# Patient Record
Sex: MALE | Race: WHITE | NOT HISPANIC OR LATINO | Employment: FULL TIME | URBAN - METROPOLITAN AREA
[De-identification: names, ages, dates, MRNs, and addresses within clinical notes are randomized per-mention and may not be internally consistent; named-entity substitution may affect disease eponyms.]

---

## 2017-01-02 PROBLEM — Z48.298 AFTERCARE FOLLOWING ORGAN TRANSPLANT: Status: ACTIVE | Noted: 2017-01-02

## 2017-01-03 ENCOUNTER — TELEPHONE (OUTPATIENT)
Dept: TRANSPLANT | Facility: CLINIC | Age: 47
End: 2017-01-03

## 2017-01-03 DIAGNOSIS — Z94.0 KIDNEY REPLACED BY TRANSPLANT: Primary | ICD-10-CM

## 2017-01-03 NOTE — Clinical Note
PHYSICIAN ORDERS      DATE & TIME ISSUED: 2017 11:13 AM  PATIENT NAME: Raj Pierce   : 1970     [facility] MR# [if applicable]: [id number]     DIAGNOSIS:  Kidney Transplant  ICD-10 CODE: Z94.0     Please complete the following labs:  Magnesium  PTH  Vitamin D    Any questions please call: [phone]    Please fax these results to [phone].    .

## 2017-01-03 NOTE — TELEPHONE ENCOUNTER
ISSUE:  Per Dr. Espinoza, pt was to have a bilateral native kidney and transplant kidney ultrasound to rule out RCC, has not been done.   Pt also needs additional f/u labs: PTH, Vit D, Mg.     PLAN:   Will send to scheduling.   Please send additional lab orders to outpt lab.

## 2017-01-10 ENCOUNTER — TELEPHONE (OUTPATIENT)
Dept: TRANSPLANT | Facility: CLINIC | Age: 47
End: 2017-01-10

## 2017-01-10 NOTE — TELEPHONE ENCOUNTER
Pt does not want to come to [place] for US.     Phone call to pt to determine where he'd like to schedule US and writer will send orders: KIERSTEN Left.

## 2017-01-11 NOTE — TELEPHONE ENCOUNTER
Callback: VM left. [facility] is trying to contact pt per Dr. Espinoza referral for pt to follow locally. Will work on coordinating this. Return call requested.

## 2017-01-12 DIAGNOSIS — Z94.0 KIDNEY REPLACED BY TRANSPLANT: Primary | ICD-10-CM

## 2017-01-12 NOTE — PROGRESS NOTES
Discussed with pt [facility] attempting ot get a hold of him. He states he's aware.     We discussed indications of being seen with them - pt agrees.     Discussed with ISHA Jarquin at [facility] - she is expecting pt call to schedule. Lab and imaging orders faxed along with office visits to [phone]

## 2017-01-12 NOTE — Clinical Note
PHYSICIAN ORDERS      DATE & TIME ISSUED: 2017 10:18 AM  PATIENT NAME: Raj Pierce   : 1970     [facility] MR# [if applicable]: [id number]     DIAGNOSIS:  Kidney transplant status    Please obtain:   Bilateral native kidney Ultrasound  Kidney transplant ultrasound    Any questions please call: [phone]    Please fax these results to [phone].    .

## 2017-01-20 PROCEDURE — 80197 ASSAY OF TACROLIMUS: CPT | Performed by: INTERNAL MEDICINE

## 2017-01-23 LAB
TACROLIMUS BLD-MCNC: 6.4 UG/L (ref 5–15)
TME LAST DOSE: NORMAL H

## 2017-01-24 ENCOUNTER — TELEPHONE (OUTPATIENT)
Dept: TRANSPLANT | Facility: CLINIC | Age: 47
End: 2017-01-24

## 2017-01-24 DIAGNOSIS — Z94.0 KIDNEY TRANSPLANTED: Primary | ICD-10-CM

## 2017-01-24 NOTE — Clinical Note
January 25, 2017    OUTPATIENT LABORATORY TEST ORDER    Patient Name: Raj Pierce  Transplant Date: 10/13/2015 (Kidney)  YOB: 1970                                                                Issue Date & Time:1/25/2017  1:27 PM    [facility] MR:  [id number]  Exp. Date (1 year after date issued)      Diagnoses: Kidney Transplant (ICD-10  Z94.0)   Long term use of medications (ICD-10  Z79.899)     Lab results to be available on the same day drawn.   Patient should release information to the [facility], Transplant Center.  Please fax to the Transplant Center at [phone].    Monthly   ?Hemogram and Platelet  ?Basic Metabolic Panel (Sodium, Potassium, Chloride, CO2, Creatinine, Urea Nitrogen, Glucose, Calcium)          ?/Tacrolimus/Prograf drug level  Every 3 Months Due:                   ?Urine for protein/creatinine  Every 6 months   ?BK (Polyoma Virus) PCR Quantitative - Plasma  Yearly:   ? PRA/DSA level (mailers provided by the patient)    If you have any questions, please call The Transplant Center at [phone] or [phone].    Please fax labs to [phone]  .

## 2017-01-25 NOTE — TELEPHONE ENCOUNTER
Return call: VM left indicating urine protein was reviewed by Dr. Espinoza, no concerns will continue to watch, all labs are stable. Will increase urine protein monitoring to [id number].

## 2017-01-30 DIAGNOSIS — N18.6 ESRD (END STAGE RENAL DISEASE) (H): ICD-10-CM

## 2017-01-30 DIAGNOSIS — Z76.82 AWAITING ORGAN TRANSPLANT: Primary | ICD-10-CM

## 2017-02-03 ENCOUNTER — TRANSFERRED RECORDS (OUTPATIENT)
Dept: HEALTH INFORMATION MANAGEMENT | Facility: CLINIC | Age: 47
End: 2017-02-03

## 2017-02-17 PROCEDURE — 80197 ASSAY OF TACROLIMUS: CPT | Performed by: PATHOLOGY

## 2017-02-21 ENCOUNTER — TELEPHONE (OUTPATIENT)
Dept: TRANSPLANT | Facility: CLINIC | Age: 47
End: 2017-02-21

## 2017-02-21 NOTE — LETTER
January 25, 2017    OUTPATIENT LABORATORY TEST ORDER    Patient Name: Raj Pierce  Transplant Date: 10/13/2015 (Kidney)  YOB: 1970                                                                Issue Date & Time:2/21/2017  1:27 PM    [facility] MR:  [id number]  Exp. Date (1 year after date issued)      Diagnoses: Kidney Transplant (ICD-10  Z94.0)   Long term use of medications (ICD-10  Z79.899)     Lab results to be available on the same day drawn.   Patient should release information to the [facility], Transplant Center.  Please fax to the Transplant Center at [phone].    Monthly   ?Hemogram and Platelet  ?Basic Metabolic Panel (Sodium, Potassium, Chloride, CO2, Creatinine, Urea Nitrogen, Glucose, Calcium)          ?/Tacrolimus/Prograf drug level  Every 3 Months Due:                   ?Urine for protein/creatinine  Every 6 months   ?BK (Polyoma Virus) PCR Quantitative - Plasma  Yearly:   ? PRA/DSA level (mailers provided by the patient)    If you have any questions, please call [facility] at [phone] or [phone].    Please fax labs to [phone]  .

## 2017-02-21 NOTE — TELEPHONE ENCOUNTER
Received a call from a [facility] center at [facility] in [facility]  Caller said that the lab orders were sent to a [phone] Fax - this patient does not go to this place to have labs drawn.  I reviewed patients chart patient have labs done at [place] Fax# is [phone].

## 2017-02-23 DIAGNOSIS — Z94.0 KIDNEY REPLACED BY TRANSPLANT: ICD-10-CM

## 2017-02-23 DIAGNOSIS — Z48.298 AFTERCARE FOLLOWING ORGAN TRANSPLANT: Primary | ICD-10-CM

## 2017-02-23 DIAGNOSIS — Z79.899 ENCOUNTER FOR LONG-TERM CURRENT USE OF MEDICATION: ICD-10-CM

## 2017-02-23 LAB
TACROLIMUS BLD-MCNC: 5.6 UG/L (ref 5–15)
TME LAST DOSE: NORMAL H

## 2017-02-24 ENCOUNTER — TELEPHONE (OUTPATIENT)
Dept: TRANSPLANT | Facility: CLINIC | Age: 47
End: 2017-02-24

## 2017-02-24 NOTE — TELEPHONE ENCOUNTER
ISSUE:  Tac level 5.6 on a 17-hour trough - if true, likely out of range  Only tac level reported, no additional labs    PLAN:   Please call pt and confirm 12-hour trough, dose. If good trough no changes needed, recheck with next labs. If poor trough, please reinforce 12-hour trough and have pt repeat at his convenience.   Please determine where he had labs drawn and obtain results. Thanks.

## 2017-02-24 NOTE — TELEPHONE ENCOUNTER
Left message for patient regarding tac level = 5.6.  Asked patient to return call regarding 12 hour trough level and current dose.

## 2017-03-03 ENCOUNTER — TELEPHONE (OUTPATIENT)
Dept: TRANSPLANT | Facility: CLINIC | Age: 47
End: 2017-03-03

## 2017-03-03 ENCOUNTER — RESULTS ONLY (OUTPATIENT)
Dept: OTHER | Facility: CLINIC | Age: 47
End: 2017-03-03

## 2017-03-03 DIAGNOSIS — N18.6 ESRD (END STAGE RENAL DISEASE) (H): ICD-10-CM

## 2017-03-03 DIAGNOSIS — Z76.82 AWAITING ORGAN TRANSPLANT: ICD-10-CM

## 2017-03-03 PROCEDURE — 86833 HLA CLASS II HIGH DEFIN QUAL: CPT | Performed by: TRANSPLANT SURGERY

## 2017-03-03 PROCEDURE — 86832 HLA CLASS I HIGH DEFIN QUAL: CPT | Performed by: TRANSPLANT SURGERY

## 2017-03-06 LAB — PRA SINGLE ANTIGEN IGG ANTIBODY: NORMAL

## 2017-03-08 DIAGNOSIS — I15.1 HYPERTENSION SECONDARY TO OTHER RENAL DISORDERS: ICD-10-CM

## 2017-03-09 RX ORDER — CHLORTHALIDONE 25 MG/1
TABLET ORAL
Qty: 15 TABLET | Refills: 11 | Status: SHIPPED | OUTPATIENT
Start: 2017-03-09 | End: 2018-04-07

## 2017-03-09 RX ORDER — AMLODIPINE BESYLATE 10 MG/1
TABLET ORAL
Qty: 30 TABLET | Refills: 11 | Status: SHIPPED | OUTPATIENT
Start: 2017-03-09 | End: 2018-04-07

## 2017-03-09 NOTE — TELEPHONE ENCOUNTER
Last Office Visit with Nephrologist:  12/20/16.  Medication refilled per Nephrology Clinic protocol.     Whit Wilkerson RN

## 2017-03-14 LAB
SA1 CELL: NORMAL
SA1 COMMENTS: NORMAL
SA1 HI RISK ABY: NORMAL
SA1 MOD RISK ABY: NORMAL
SA1 TEST METHOD: NORMAL
SA2 CELL: NORMAL
SA2 COMMENTS: NORMAL
SA2 HI RISK ABY UA: NORMAL
SA2 MOD RISK ABY: NORMAL
SA2 TEST METHOD: NORMAL

## 2017-03-17 DIAGNOSIS — Z48.298 AFTERCARE FOLLOWING ORGAN TRANSPLANT: ICD-10-CM

## 2017-03-17 DIAGNOSIS — Z94.0 KIDNEY REPLACED BY TRANSPLANT: ICD-10-CM

## 2017-03-17 DIAGNOSIS — Z79.899 ENCOUNTER FOR LONG-TERM CURRENT USE OF MEDICATION: ICD-10-CM

## 2017-03-24 ENCOUNTER — HOSPITAL ENCOUNTER (OUTPATIENT)
Facility: CLINIC | Age: 47
Setting detail: SPECIMEN
Discharge: HOME | End: 2017-03-24
Admitting: INTERNAL MEDICINE
Payer: MEDICARE

## 2017-03-24 PROCEDURE — 80197 ASSAY OF TACROLIMUS: CPT | Performed by: INTERNAL MEDICINE

## 2017-03-26 LAB
TACROLIMUS BLD-MCNC: 6.6 UG/L (ref 5–15)
TME LAST DOSE: NORMAL H

## 2017-03-31 DIAGNOSIS — N25.81 SECONDARY RENAL HYPERPARATHYROIDISM (H): ICD-10-CM

## 2017-03-31 DIAGNOSIS — Z94.0 KIDNEY REPLACED BY TRANSPLANT: ICD-10-CM

## 2017-03-31 RX ORDER — CALCITRIOL 0.25 UG/1
CAPSULE, LIQUID FILLED ORAL
Qty: 12 CAPSULE | Refills: 2 | Status: SHIPPED | OUTPATIENT
Start: 2017-03-31 | End: 2017-07-21

## 2017-04-14 DIAGNOSIS — Z79.899 ENCOUNTER FOR LONG-TERM CURRENT USE OF MEDICATION: ICD-10-CM

## 2017-04-14 DIAGNOSIS — Z48.298 AFTERCARE FOLLOWING ORGAN TRANSPLANT: ICD-10-CM

## 2017-04-14 DIAGNOSIS — Z94.0 KIDNEY REPLACED BY TRANSPLANT: ICD-10-CM

## 2017-04-14 PROCEDURE — 80197 ASSAY OF TACROLIMUS: CPT | Performed by: INTERNAL MEDICINE

## 2017-04-20 LAB
TACROLIMUS BLD-MCNC: 5.5 UG/L (ref 5–15)
TME LAST DOSE: NORMAL H

## 2017-05-26 DIAGNOSIS — Z79.899 ENCOUNTER FOR LONG-TERM CURRENT USE OF MEDICATION: ICD-10-CM

## 2017-05-26 DIAGNOSIS — Z48.298 AFTERCARE FOLLOWING ORGAN TRANSPLANT: ICD-10-CM

## 2017-05-26 DIAGNOSIS — Z94.0 KIDNEY REPLACED BY TRANSPLANT: ICD-10-CM

## 2017-05-26 PROCEDURE — 80197 ASSAY OF TACROLIMUS: CPT | Performed by: INTERNAL MEDICINE

## 2017-06-02 LAB
TACROLIMUS BLD-MCNC: 5.8 UG/L (ref 5–15)
TME LAST DOSE: NORMAL H

## 2017-06-05 ENCOUNTER — RESULTS ONLY (OUTPATIENT)
Dept: OTHER | Facility: CLINIC | Age: 47
End: 2017-06-05

## 2017-06-05 ENCOUNTER — OFFICE VISIT (OUTPATIENT)
Dept: NEPHROLOGY | Facility: CLINIC | Age: 47
End: 2017-06-05
Attending: INTERNAL MEDICINE
Payer: MEDICARE

## 2017-06-05 VITALS
WEIGHT: 204.6 LBS | BODY MASS INDEX: 29.29 KG/M2 | SYSTOLIC BLOOD PRESSURE: 138 MMHG | HEIGHT: 70 IN | DIASTOLIC BLOOD PRESSURE: 87 MMHG | RESPIRATION RATE: 18 BRPM | TEMPERATURE: 97.5 F | HEART RATE: 73 BPM

## 2017-06-05 DIAGNOSIS — Z94.0 KIDNEY TRANSPLANTED: ICD-10-CM

## 2017-06-05 DIAGNOSIS — D84.9 IMMUNOSUPPRESSED STATUS (H): ICD-10-CM

## 2017-06-05 DIAGNOSIS — Z48.298 AFTERCARE FOLLOWING ORGAN TRANSPLANT: ICD-10-CM

## 2017-06-05 DIAGNOSIS — Z76.82 AWAITING ORGAN TRANSPLANT: ICD-10-CM

## 2017-06-05 DIAGNOSIS — N25.81 SECONDARY RENAL HYPERPARATHYROIDISM (H): ICD-10-CM

## 2017-06-05 DIAGNOSIS — Z79.899 ENCOUNTER FOR LONG-TERM CURRENT USE OF MEDICATION: ICD-10-CM

## 2017-06-05 DIAGNOSIS — I15.1 HYPERTENSION SECONDARY TO OTHER RENAL DISORDERS: ICD-10-CM

## 2017-06-05 DIAGNOSIS — Z94.0 KIDNEY REPLACED BY TRANSPLANT: Primary | ICD-10-CM

## 2017-06-05 DIAGNOSIS — E55.9 VITAMIN D DEFICIENCY: ICD-10-CM

## 2017-06-05 DIAGNOSIS — Z94.0 KIDNEY REPLACED BY TRANSPLANT: ICD-10-CM

## 2017-06-05 DIAGNOSIS — N18.6 ESRD (END STAGE RENAL DISEASE) (H): ICD-10-CM

## 2017-06-05 LAB
ANION GAP SERPL CALCULATED.3IONS-SCNC: 10 MMOL/L (ref 3–14)
BUN SERPL-MCNC: 39 MG/DL (ref 7–30)
CALCIUM SERPL-MCNC: 8.4 MG/DL (ref 8.5–10.1)
CHLORIDE SERPL-SCNC: 110 MMOL/L (ref 94–109)
CO2 SERPL-SCNC: 23 MMOL/L (ref 20–32)
CREAT SERPL-MCNC: 2.65 MG/DL (ref 0.66–1.25)
CREAT UR-MCNC: 62 MG/DL
ERYTHROCYTE [DISTWIDTH] IN BLOOD BY AUTOMATED COUNT: 13 % (ref 10–15)
GFR SERPL CREATININE-BSD FRML MDRD: 26 ML/MIN/1.7M2
GLUCOSE SERPL-MCNC: 90 MG/DL (ref 70–99)
HCT VFR BLD AUTO: 49.2 % (ref 40–53)
HGB BLD-MCNC: 15.8 G/DL (ref 13.3–17.7)
MCH RBC QN AUTO: 27 PG (ref 26.5–33)
MCHC RBC AUTO-ENTMCNC: 32.1 G/DL (ref 31.5–36.5)
MCV RBC AUTO: 84 FL (ref 78–100)
PLATELET # BLD AUTO: 215 10E9/L (ref 150–450)
POTASSIUM SERPL-SCNC: 4.1 MMOL/L (ref 3.4–5.3)
PROT UR-MCNC: 0.8 G/L
PROT/CREAT 24H UR: 1.3 G/G CR (ref 0–0.2)
RBC # BLD AUTO: 5.85 10E12/L (ref 4.4–5.9)
SODIUM SERPL-SCNC: 143 MMOL/L (ref 133–144)
WBC # BLD AUTO: 6.5 10E9/L (ref 4–11)

## 2017-06-05 PROCEDURE — 84156 ASSAY OF PROTEIN URINE: CPT | Performed by: INTERNAL MEDICINE

## 2017-06-05 PROCEDURE — 86833 HLA CLASS II HIGH DEFIN QUAL: CPT | Performed by: TRANSPLANT SURGERY

## 2017-06-05 PROCEDURE — 86832 HLA CLASS I HIGH DEFIN QUAL: CPT | Performed by: TRANSPLANT SURGERY

## 2017-06-05 PROCEDURE — 87799 DETECT AGENT NOS DNA QUANT: CPT | Performed by: INTERNAL MEDICINE

## 2017-06-05 PROCEDURE — 80048 BASIC METABOLIC PNL TOTAL CA: CPT | Performed by: INTERNAL MEDICINE

## 2017-06-05 PROCEDURE — 36415 COLL VENOUS BLD VENIPUNCTURE: CPT | Performed by: INTERNAL MEDICINE

## 2017-06-05 PROCEDURE — 85027 COMPLETE CBC AUTOMATED: CPT | Performed by: INTERNAL MEDICINE

## 2017-06-05 PROCEDURE — 99212 OFFICE O/P EST SF 10 MIN: CPT | Mod: ZF

## 2017-06-05 ASSESSMENT — PAIN SCALES - GENERAL: PAINLEVEL: NO PAIN (0)

## 2017-06-05 NOTE — NURSING NOTE
"Chief Complaint   Patient presents with     RECHECK     Kidney follow up       Initial /87 (BP Location: Left arm, Patient Position: Chair, Cuff Size: Adult Large)  Pulse 73  Temp 97.5  F (36.4  C) (Oral)  Resp 18  Ht 1.778 m (5' 10\")  Wt 92.8 kg (204 lb 9.6 oz)  BMI 29.36 kg/m2 Estimated body mass index is 29.36 kg/(m^2) as calculated from the following:    Height as of this encounter: 1.778 m (5' 10\").    Weight as of this encounter: 92.8 kg (204 lb 9.6 oz).  Medication Reconciliation: complete   KASHIF VINCENT CMA      "

## 2017-06-05 NOTE — PROGRESS NOTES
Assessment and Plan:  1. DDKT - baseline Cr ~ 2.9-3.2, which has been stable lately.  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  Patient's wait list time was restored back to 7/24/13.  Moderate proteinuria of just over 1 gram.  No DSA.  Will make no other changes in immunosuppression.  2. HTN - okay control right at target of less than 140/90.  No changes.  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  Will continue ARB.  4. Secondary renal hyperparathyroidism - mildly elevated PTH.  Will continue on calcitriol.  Patient is managed by primary Nephrologist.  5. Vitamin D deficiency - low normal vitamin D level with last check and would recommend starting cholecalciferol 2000 iu daily.  6. Hypomagnesemia - normal serum magnesium level with last check and will continue on oral magnesium supplement.  7. Overweight - recommend increased exercise and watching caloric intake.  8. PKD - increased size in bilateral polycystic kidneys with some discomfort and side effects associated with this.  Will refer patient to Transplant Surgery to discuss possible bilateral native nephrectomy.  9. Recommend return visit in 12 months.    Assessment and plan was discussed with patient and he voiced his understanding and agreement.    Reason for Visit:  Mr. Pierce is here for routine follow up.    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         Transplant Hx:       Tx: DDKT  Date: 10/13/15       Present Maintenance IS: Tacrolimus and Mycophenolate mofetil       Baseline Creatinine: 2.9-3.2       Recent DSA: No  Date late checked: 7/2016       Biopsy: 10/28/15, mild, resolving ATN, other findings consistent with dysplastic kidney    Mr. Pierce reports feeling good overall with some medical complaints.  His energy level has been good and pretty much normal.  He is active and is  getting a little more exercise, more so during the warmer weather.  Denies any chest pain or shortness of breath with exertion.  Appetite is good and stable weight.  He can get full at times from pressure due to his large polycystic kidneys.  Patient also notes some uncomfortableness with sitting secondary to the large size of his kidneys.    No nausea, vomiting or diarrhea.  No fever, sweats or chills.  Slight leg swelling by the end of the day at times.    Home BP:  130/80s.      ROS:   A comprehensive review of systems was obtained and negative, except as noted in the HPI or PMH.    Active Medical Problems:  Patient Active Problem List   Diagnosis     Polycystic kidney, autosomal dominant     Dyslipidemia     Vitamin D deficiency     Hypertension secondary to other renal disorders     Secondary renal hyperparathyroidism (HCC)     Kidney replaced by transplant     Immunosuppressed status (H)     Acute gouty arthritis     Hypomagnesemia     Aftercare following organ transplant       Personal Hx:  Social History     Social History     Marital status:      Spouse name: Dianne     Number of children: 2     Years of education: 13     Occupational History      [employer]     Social History Main Topics     Smoking status: Never Smoker     Smokeless tobacco: Not on file     Alcohol use No     Drug use: No     Sexual activity: Yes     Partners: Female     Other Topics Concern     Blood Transfusions No     Seat Belt Yes     Social History Narrative       Allergies:  Allergies   Allergen Reactions     Alcohol Rash     Swabs used at hosptial       Medications:  Prior to Admission medications    Medication Sig Start Date End Date Taking? Authorizing Provider   amLODIPine (NORVASC) 10 MG tablet Take 1 tablet (10 mg) by mouth daily 11/17/15  Yes Francis Espinoza MD   mycophenolate (CELLCEPT - GENERIC EQUIVALENT) 250 MG capsule Take 4 capsules (1,000 mg) by mouth 2 times daily 11/12/15  Yes  Rodolfo Ruiz MD   sulfamethoxazole-trimethoprim (BACTRIM,SEPTRA) 400-80 MG per tablet Take 1 tablet by mouth three times a week 11/10/15  Yes Tessy Finch MD   clotrimazole (MYCELEX) 10 MG LOZG Place 1 lozenge (10 mg) inside cheek 3 times daily 11/10/15  Yes Tessy Finch MD   pantoprazole (PROTONIX) 40 MG enteric coated tablet Take 1 tablet (40 mg) by mouth daily 11/9/15  Yes Tessy Finch MD   polyethylene glycol (MIRALAX) powder Take 17 g by mouth daily 10/19/15  Yes Tessy Finch MD   bisacodyl (DULCOLAX) 10 MG suppository Place 1 suppository (10 mg) rectally daily as needed for constipation 10/18/15  Yes Noelle Dozier MD   ondansetron (ZOFRAN-ODT) 4 MG disintegrating tablet Take 1 tablet (4 mg) by mouth every 6 hours as needed for nausea 10/18/15  Yes Noelle Dozier MD   senna-docusate (SENOKOT-S;PERICOLACE) 8.6-50 MG per tablet Take 2 tablets by mouth 2 times daily  Patient taking differently: Take 2 tablets by mouth daily as needed  10/18/15  Yes Noelle Dozier MD   valGANciclovir (VALCYTE) 450 MG tablet Take 1 tablet (450 mg) by mouth twice a week 10/19/15  Yes Noelle Dozier MD   calcitRIOL (ROCALTROL) 0.25 MCG capsule Take 1 capsule (0.25 mcg) by mouth daily 10/18/15  Yes Noelle Dozier MD   oxyCODONE-acetaminophen (PERCOCET) 5-325 MG per tablet Take 1-2 tablets by mouth every 4 hours as needed for moderate to severe pain 10/18/15  Yes Eulalio Moreau MD   magnesium oxide (MAG-OX) 400 MG tablet Take 1 tablet (400 mg) by mouth daily 10/18/15  Yes Eulalio Moreau MD   SIMVASTATIN PO Take 10 mg by mouth At Bedtime   Yes Reported, Patient   citalopram (CELEXA) 20 MG tablet Take 20 mg by mouth daily.   Yes Reported, Patient   tacrolimus (PROGRAF - GENERIC EQUIVALENT) 1 MG capsule Take 1 capsule (1 mg) by mouth 2 times daily 11/20/15   Shannon Eugene MD       Vitals:  /87 (BP Location: Left arm, Patient Position: Chair, Cuff Size: Adult  "Large)  Pulse 73  Temp 97.5  F (36.4  C) (Oral)  Resp 18  Ht 1.778 m (5' 10\")  Wt 92.8 kg (204 lb 9.6 oz)  BMI 29.36 kg/m2    Exam:   GENERAL APPEARANCE: alert and no distress  HENT: mouth without ulcers or lesions  LYMPHATICS: no cervical or supraclavicular nodes  RESP: lungs clear to auscultation - no rales, rhonchi or wheezes  CV: regular rhythm, normal rate, no rub, no murmur  EDEMA: no LE edema bilaterally  ABDOMEN: soft, nondistended, nontender, bowel sounds normal, large bilateral polycystic kidneys palpable  MS: extremities normal - no gross deformities noted, no evidence of inflammation in joints, no muscle tenderness  SKIN: no rash  TX KIDNEY: normal    Results:   Recent Results (from the past 672 hour(s))   TXP External Lab Result    Collection Time: 05/26/17  8:15 AM   Result Value Ref Range    Glucose (External) 104 74 - 106 mg/dL    Urea Nitrogen (External) 40 (H) 7 - 17 mg/dL    Creatinine (External) 2.80 (H) 0.80 - 1.50 mg/dL    BUN/Creatinine Ratio (External) 13.8 (L) 15.0 - 20.0    Sodium (External) 145 137 - 145 meq/L    Potassium (External) 4.1 3.5 - 5.1 meq/L    Chloride (External) 109 (H) 98 - 107 meq/L    CO2 (External) 21 (L) 22 - 30 meq/L    Anion Gap (External) 19 6 - 20 meq/L    Calcium (External) 9.0 8.4 - 10.2 mg/dL    GFR Estimated (External) 23 ml/min/1.73 m2    GFR Est if Black (External) 28 ml/min/1.73 m2    WBC Count (External) 4.8 4.0 - 11.0 K/uL    RBC Count (External) 5.47 4.40 - 5.80 M/uL    Hemoglobin (External) 15.1 13.5 - 17.5 g/dL    Hematocrit (External) 45.9 40.0 - 50.0 %    MCV (External) 83.9 80.0 - 98.0 fL    MCH (External) 27.6 25.5 - 34.0 pg    MCHC (External) 32.9 31.6 - 36.5 g/dL    RDW (External) 13.5 11.5 - 15.5 %    Platelet Count (External) 203 140 - 400 K/uL    MPV (External) 10.4 8.5 - 12.0 fL   Tacrolimus level    Collection Time: 05/26/17  8:15 AM   Result Value Ref Range    Tacrolimus Last Dose 2000 5/25/17     Tacrolimus Level 5.8 5.0 - 15.0 ug/L "

## 2017-06-05 NOTE — LETTER
6/5/2017      RE: aRj Pierce  [address] [facility] [address]       Assessment and Plan:  1. DDKT - baseline Cr ~ 2.9-3.2, which has been stable lately.  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  Patient's wait list time was restored back to 7/24/13.  Moderate proteinuria of just over 1 gram.  No DSA.  Will make no other changes in immunosuppression.  2. HTN - okay control right at target of less than 140/90.  No changes.  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  Will continue ARB.  4. Secondary renal hyperparathyroidism - mildly elevated PTH.  Will continue on calcitriol.  Patient is managed by primary Nephrologist.  5. Vitamin D deficiency - low normal vitamin D level with last check and would recommend starting cholecalciferol 2000 iu daily.  6. Hypomagnesemia - normal serum magnesium level with last check and will continue on oral magnesium supplement.  7. Overweight - recommend increased exercise and watching caloric intake.  8. PKD - increased size in bilateral polycystic kidneys with some discomfort and side effects associated with this.  Will refer patient to Transplant Surgery to discuss possible bilateral native nephrectomy.  9. Recommend return visit in 12 months.    Assessment and plan was discussed with patient and he voiced his understanding and agreement.    Reason for Visit:  Mr. Pierce is here for routine follow up.    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         Transplant Hx:       Tx: DDKT  Date: 10/13/15       Present Maintenance IS: Tacrolimus and Mycophenolate mofetil       Baseline Creatinine: 2.9-3.2       Recent DSA: No  Date late checked: 7/2016       Biopsy: 10/28/15, mild, resolving ATN, other findings consistent with dysplastic kidney    Mr. Pierce reports feeling good overall with some medical  complaints.  His energy level has been good and pretty much normal.  He is active and is getting a little more exercise, more so during the warmer weather.  Denies any chest pain or shortness of breath with exertion.  Appetite is good and stable weight.  He can get full at times from pressure due to his large polycystic kidneys.  Patient also notes some uncomfortableness with sitting secondary to the large size of his kidneys.    No nausea, vomiting or diarrhea.  No fever, sweats or chills.  Slight leg swelling by the end of the day at times.    Home BP:  130/80s.      ROS:   A comprehensive review of systems was obtained and negative, except as noted in the HPI or PMH.    Active Medical Problems:  Patient Active Problem List   Diagnosis     Polycystic kidney, autosomal dominant     Dyslipidemia     Vitamin D deficiency     Hypertension secondary to other renal disorders     Secondary renal hyperparathyroidism (HCC)     Kidney replaced by transplant     Immunosuppressed status (H)     Acute gouty arthritis     Hypomagnesemia     Aftercare following organ transplant       Personal Hx:  Social History     Social History     Marital status:      Spouse name: Dianne     Number of children: 2     Years of education: 13     Occupational History      [employer]     Social History Main Topics     Smoking status: Never Smoker     Smokeless tobacco: Not on file     Alcohol use No     Drug use: No     Sexual activity: Yes     Partners: Female     Other Topics Concern     Blood Transfusions No     Seat Belt Yes     Social History Narrative       Allergies:  Allergies   Allergen Reactions     Alcohol Rash     Swabs used at hosptial       Medications:  Prior to Admission medications    Medication Sig Start Date End Date Taking? Authorizing Provider   amLODIPine (NORVASC) 10 MG tablet Take 1 tablet (10 mg) by mouth daily 11/17/15  Yes Francis Espinoza MD   mycophenolate (CELLCEPT - GENERIC  EQUIVALENT) 250 MG capsule Take 4 capsules (1,000 mg) by mouth 2 times daily 11/12/15  Yes Rodolfo Ruiz MD   sulfamethoxazole-trimethoprim (BACTRIM,SEPTRA) 400-80 MG per tablet Take 1 tablet by mouth three times a week 11/10/15  Yes Tessy Finch MD   clotrimazole (MYCELEX) 10 MG LOZG Place 1 lozenge (10 mg) inside cheek 3 times daily 11/10/15  Yes Tessy Finch MD   pantoprazole (PROTONIX) 40 MG enteric coated tablet Take 1 tablet (40 mg) by mouth daily 11/9/15  Yes Tessy Finch MD   polyethylene glycol (MIRALAX) powder Take 17 g by mouth daily 10/19/15  Yes Tessy Finch MD   bisacodyl (DULCOLAX) 10 MG suppository Place 1 suppository (10 mg) rectally daily as needed for constipation 10/18/15  Yes Noelle Dozier MD   ondansetron (ZOFRAN-ODT) 4 MG disintegrating tablet Take 1 tablet (4 mg) by mouth every 6 hours as needed for nausea 10/18/15  Yes Noelle Dozier MD   senna-docusate (SENOKOT-S;PERICOLACE) 8.6-50 MG per tablet Take 2 tablets by mouth 2 times daily  Patient taking differently: Take 2 tablets by mouth daily as needed  10/18/15  Yes Noelle Dozier MD   valGANciclovir (VALCYTE) 450 MG tablet Take 1 tablet (450 mg) by mouth twice a week 10/19/15  Yes Noelle Dozier MD   calcitRIOL (ROCALTROL) 0.25 MCG capsule Take 1 capsule (0.25 mcg) by mouth daily 10/18/15  Yes Noelle Dozier MD   oxyCODONE-acetaminophen (PERCOCET) 5-325 MG per tablet Take 1-2 tablets by mouth every 4 hours as needed for moderate to severe pain 10/18/15  Yes Eulalio Moreau MD   magnesium oxide (MAG-OX) 400 MG tablet Take 1 tablet (400 mg) by mouth daily 10/18/15  Yes Eulalio Moreau MD   SIMVASTATIN PO Take 10 mg by mouth At Bedtime   Yes Reported, Patient   citalopram (CELEXA) 20 MG tablet Take 20 mg by mouth daily.   Yes Reported, Patient   tacrolimus (PROGRAF - GENERIC EQUIVALENT) 1 MG capsule Take 1 capsule (1 mg) by mouth 2 times daily 11/20/15   Shannon Eugene MD  "      Vitals:  /87 (BP Location: Left arm, Patient Position: Chair, Cuff Size: Adult Large)  Pulse 73  Temp 97.5  F (36.4  C) (Oral)  Resp 18  Ht 1.778 m (5' 10\")  Wt 92.8 kg (204 lb 9.6 oz)  BMI 29.36 kg/m2    Exam:   GENERAL APPEARANCE: alert and no distress  HENT: mouth without ulcers or lesions  LYMPHATICS: no cervical or supraclavicular nodes  RESP: lungs clear to auscultation - no rales, rhonchi or wheezes  CV: regular rhythm, normal rate, no rub, no murmur  EDEMA: no LE edema bilaterally  ABDOMEN: soft, nondistended, nontender, bowel sounds normal, large bilateral polycystic kidneys palpable  MS: extremities normal - no gross deformities noted, no evidence of inflammation in joints, no muscle tenderness  SKIN: no rash  TX KIDNEY: normal    Results:   Recent Results (from the past 672 hour(s))   TXP External Lab Result    Collection Time: 05/26/17  8:15 AM   Result Value Ref Range    Glucose (External) 104 74 - 106 mg/dL    Urea Nitrogen (External) 40 (H) 7 - 17 mg/dL    Creatinine (External) 2.80 (H) 0.80 - 1.50 mg/dL    BUN/Creatinine Ratio (External) 13.8 (L) 15.0 - 20.0    Sodium (External) 145 137 - 145 meq/L    Potassium (External) 4.1 3.5 - 5.1 meq/L    Chloride (External) 109 (H) 98 - 107 meq/L    CO2 (External) 21 (L) 22 - 30 meq/L    Anion Gap (External) 19 6 - 20 meq/L    Calcium (External) 9.0 8.4 - 10.2 mg/dL    GFR Estimated (External) 23 ml/min/1.73 m2    GFR Est if Black (External) 28 ml/min/1.73 m2    WBC Count (External) 4.8 4.0 - 11.0 K/uL    RBC Count (External) 5.47 4.40 - 5.80 M/uL    Hemoglobin (External) 15.1 13.5 - 17.5 g/dL    Hematocrit (External) 45.9 40.0 - 50.0 %    MCV (External) 83.9 80.0 - 98.0 fL    MCH (External) 27.6 25.5 - 34.0 pg    MCHC (External) 32.9 31.6 - 36.5 g/dL    RDW (External) 13.5 11.5 - 15.5 %    Platelet Count (External) 203 140 - 400 K/uL    MPV (External) 10.4 8.5 - 12.0 fL   Tacrolimus level    Collection Time: 05/26/17  8:15 AM   Result Value " Ref Range    Tacrolimus Last Dose 2000 5/25/17     Tacrolimus Level 5.8 5.0 - 15.0 ug/L       Francis Espinoza MD

## 2017-06-05 NOTE — MR AVS SNAPSHOT
After Visit Summary   6/5/2017    Raj Pierce    MRN: [id number]           Patient Information     Date Of Birth          1970        Visit Information        Provider Department      6/5/2017 12:00 PM Francis Espinoza MD [facility] Nephrology        Today's Diagnoses     Kidney replaced by transplant    -  1    Aftercare following organ transplant        Immunosuppressed status (H)        Hypertension secondary to other renal disorders        Secondary renal hyperparathyroidism (HCC)        Vitamin D deficiency           Follow-ups after your visit        Follow-up notes from your care team     Return in about 1 year (around 6/5/2018).      Your next 10 appointments already scheduled     Jun 05, 2017 12:45 PM CDT   LAB with  LAB   [facility] Lab ([facility])    [address] [place] [address] [phone]           Patient must bring picture ID.  Patient should be prepared to give a urine specimen  Please do not eat 10-12 hours before your appointment if you are coming in fasting for labs on lipids, cholesterol, or glucose (sugar).  Pregnant women should follow their Care Team instructions. Water with medications is okay. Do not drink coffee or other fluids.   If you have concerns about taking  your medications, please ask at office or if scheduling via "[employer] send a message by clicking on Secure Messaging, Message Your Care Team.            Nov 27, 2017  9:30 AM CST   (Arrive by 9:15 AM)   RETURN WAIT LIST with Dariel Chavez MD   [facility] Solid Organ Transplant ([facility])    [address] [place] [address] [phone]              Who to contact     If you have questions or need follow up information about today's clinic visit or your schedule please contact [facility] NEPHROLOGY directly at [phone].  Normal or non-critical lab and imaging results will be  "communicated to you by MyChart, letter or phone within 4 business days after the clinic has received the results. If you do not hear from us within 7 days, please contact the clinic through [employer] or phone. If you have a critical or abnormal lab result, we will notify you by phone as soon as possible.  Submit refill requests through [employer] or call your pharmacy and they will forward the refill request to us. Please allow 3 business days for your refill to be completed.          Additional Information About Your Visit        [employer] Information     [employer] gives you secure access to your electronic health record. If you see a primary care provider, you can also send messages to your care team and make appointments. If you have questions, please call your primary care clinic.  If you do not have a primary care provider, please call [phone] and they will assist you.        Care EveryWhere ID     This is your Care EveryWhere ID. This could be used by other organizations to access your [facility] medical records  [id number]        Your Vitals Were     Pulse Temperature Respirations Height BMI (Body Mass Index)       73 97.5  F (36.4  C) (Oral) 18 1.778 m (5' 10\") 29.36 kg/m2        Blood Pressure from Last 3 Encounters:   06/05/17 138/87   12/20/16 (!) 133/92   11/28/16 139/90    Weight from Last 3 Encounters:   06/05/17 92.8 kg (204 lb 9.6 oz)   12/20/16 92.4 kg (203 lb 9.6 oz)   08/01/16 86.2 kg (190 lb)              Today, you had the following     No orders found for display       Primary Care Provider Office Phone # Fax #    Dozahraa Guerrero [phone]       [place] [phone]        Thank you!     Thank you for choosing [facility] NEPHROLOGY  for your care. Our goal is always to provide you with excellent care. Hearing back from our patients is one way we can continue to improve our services. Please take a few minutes to complete the written survey that you may " receive in the mail after your visit with us. Thank you!             Your Updated Medication List - Protect others around you: Learn how to safely use, store and throw away your medicines at www.disposemymeds.org.          This list is accurate as of: 6/5/17 12:15 PM.  Always use your most recent med list.                   Brand Name Dispense Instructions for use    amLODIPine 10 MG tablet    NORVASC    30 tablet    TAKE 1 TABLET BY MOUTH   ONCE DAILY       calcitRIOL 0.25 MCG capsule    ROCALTROL    12 capsule    TAKE 1 CAPSULE THREE     TIMES WEEKLY. TAKE ON    MONDAY WEDNESDAY AND     FRIDAYS.       chlorthalidone 25 MG tablet    HYGROTON    15 tablet    TAKE 1/2 TABLET BY MOUTH EVERY DAY       citalopram 20 MG tablet    celeXA     Take 20 mg by mouth daily.       losartan 25 MG tablet    COZAAR    90 tablet    Take 1 tablet (25 mg) by mouth daily       magnesium oxide 400 MG tablet    MAG-OX    30 tablet    Take 1 tablet (400 mg) by mouth daily       mycophenolate 250 MG capsule    CELLCEPT - GENERIC EQUIVALENT    180 capsule    Take 3 capsules (750 mg) by mouth 2 times daily       SIMVASTATIN PO      Take 10 mg by mouth At Bedtime       sulfamethoxazole-trimethoprim 400-80 MG per tablet    BACTRIM/SEPTRA    12 tablet    Take 1 tablet by mouth Every Mon, Wed, Fri Morning       tacrolimus 1 MG capsule    PROGRAF - GENERIC EQUIVALENT    120 capsule    Take 2 capsules (2 mg) by mouth 2 times daily

## 2017-06-06 LAB — PRA SINGLE ANTIGEN IGG ANTIBODY: NORMAL

## 2017-06-07 LAB
BKV DNA # SPEC NAA+PROBE: NORMAL COPIES/ML
BKV DNA SPEC NAA+PROBE-LOG#: NORMAL LOG COPIES/ML
SPECIMEN SOURCE: NORMAL

## 2017-06-23 DIAGNOSIS — Z79.899 ENCOUNTER FOR LONG-TERM CURRENT USE OF MEDICATION: ICD-10-CM

## 2017-06-23 DIAGNOSIS — Z94.0 KIDNEY REPLACED BY TRANSPLANT: ICD-10-CM

## 2017-06-23 DIAGNOSIS — Z48.298 AFTERCARE FOLLOWING ORGAN TRANSPLANT: ICD-10-CM

## 2017-06-23 PROCEDURE — 80197 ASSAY OF TACROLIMUS: CPT | Performed by: INTERNAL MEDICINE

## 2017-06-25 LAB
TACROLIMUS BLD-MCNC: 6.6 UG/L (ref 5–15)
TME LAST DOSE: NORMAL H

## 2017-06-28 DIAGNOSIS — Z94.0 KIDNEY TRANSPLANTED: Primary | ICD-10-CM

## 2017-06-29 RX ORDER — TACROLIMUS 1 MG/1
2 CAPSULE ORAL 2 TIMES DAILY
Qty: 120 CAPSULE | Refills: 11 | Status: SHIPPED | OUTPATIENT
Start: 2017-06-29 | End: 2017-07-24

## 2017-07-21 DIAGNOSIS — N25.81 SECONDARY RENAL HYPERPARATHYROIDISM (H): ICD-10-CM

## 2017-07-21 DIAGNOSIS — Z48.298 AFTERCARE FOLLOWING ORGAN TRANSPLANT: ICD-10-CM

## 2017-07-21 DIAGNOSIS — Z94.0 KIDNEY REPLACED BY TRANSPLANT: ICD-10-CM

## 2017-07-21 DIAGNOSIS — Z79.899 ENCOUNTER FOR LONG-TERM CURRENT USE OF MEDICATION: ICD-10-CM

## 2017-07-21 DIAGNOSIS — Z94.0 KIDNEY REPLACED BY TRANSPLANT: Primary | ICD-10-CM

## 2017-07-21 PROCEDURE — 80197 ASSAY OF TACROLIMUS: CPT | Performed by: INTERNAL MEDICINE

## 2017-07-21 RX ORDER — CALCITRIOL 0.25 UG/1
0.25 CAPSULE, LIQUID FILLED ORAL
Qty: 14 CAPSULE | Refills: 2 | Status: SHIPPED | OUTPATIENT
Start: 2017-07-21 | End: 2018-12-21

## 2017-07-21 RX ORDER — MYCOPHENOLATE MOFETIL 250 MG/1
750 CAPSULE ORAL 2 TIMES DAILY
Qty: 180 CAPSULE | Refills: 11 | Status: SHIPPED | OUTPATIENT
Start: 2017-07-21 | End: 2018-08-20

## 2017-07-23 LAB
TACROLIMUS BLD-MCNC: 6.9 UG/L (ref 5–15)
TME LAST DOSE: NORMAL H

## 2017-07-24 ENCOUNTER — TELEPHONE (OUTPATIENT)
Dept: TRANSPLANT | Facility: CLINIC | Age: 47
End: 2017-07-24

## 2017-07-24 DIAGNOSIS — Z94.0 KIDNEY TRANSPLANT RECIPIENT: Primary | ICD-10-CM

## 2017-07-24 DIAGNOSIS — Z79.60 LONG-TERM USE OF IMMUNOSUPPRESSANT MEDICATION: ICD-10-CM

## 2017-07-24 DIAGNOSIS — Z94.0 KIDNEY TRANSPLANTED: ICD-10-CM

## 2017-07-24 RX ORDER — TACROLIMUS 0.5 MG/1
0.5 CAPSULE ORAL 2 TIMES DAILY
Qty: 60 CAPSULE | Refills: 11 | Status: SHIPPED | OUTPATIENT
Start: 2017-07-24 | End: 2017-07-25

## 2017-07-24 RX ORDER — TACROLIMUS 1 MG/1
1 CAPSULE ORAL 2 TIMES DAILY
Qty: 60 CAPSULE | Refills: 11
Start: 2017-07-24 | End: 2017-07-25

## 2017-07-24 NOTE — LETTER
PHYSICIAN ORDERS      DATE & TIME ISSUED: 2017 4:49 PM  PATIENT NAME: Raj Pierce   : 1970     [facility] MR#  [id number]     DIAGNOSIS:  Kidney Transplant  ICD-10 CODE: Z94.0      Please check the following within 1-2 weeks of dose change  Tacrolimus Level   Creatinine Level    Any questions please call: [phone]    Please fax these results to [phone].

## 2017-07-25 DIAGNOSIS — Z94.0 KIDNEY TRANSPLANT RECIPIENT: Primary | ICD-10-CM

## 2017-07-25 DIAGNOSIS — Z79.60 LONG-TERM USE OF IMMUNOSUPPRESSANT MEDICATION: ICD-10-CM

## 2017-07-25 RX ORDER — TACROLIMUS 1 MG/1
1 CAPSULE ORAL 2 TIMES DAILY
Qty: 60 CAPSULE | Refills: 11 | Status: SHIPPED | OUTPATIENT
Start: 2017-07-25 | End: 2017-09-27

## 2017-07-25 RX ORDER — TACROLIMUS 0.5 MG/1
0.5 CAPSULE ORAL 2 TIMES DAILY
Qty: 60 CAPSULE | Refills: 11 | Status: SHIPPED | OUTPATIENT
Start: 2017-07-25 | End: 2017-09-27

## 2017-08-11 DIAGNOSIS — Z94.0 KIDNEY REPLACED BY TRANSPLANT: ICD-10-CM

## 2017-08-11 DIAGNOSIS — Z48.298 AFTERCARE FOLLOWING ORGAN TRANSPLANT: ICD-10-CM

## 2017-08-11 DIAGNOSIS — Z79.899 ENCOUNTER FOR LONG-TERM CURRENT USE OF MEDICATION: ICD-10-CM

## 2017-08-12 ENCOUNTER — HOSPITAL ENCOUNTER (OUTPATIENT)
Facility: CLINIC | Age: 47
Setting detail: SPECIMEN
Discharge: HOME | End: 2017-08-12
Admitting: INTERNAL MEDICINE
Payer: MEDICARE

## 2017-08-12 PROCEDURE — 80197 ASSAY OF TACROLIMUS: CPT | Performed by: INTERNAL MEDICINE

## 2017-08-13 LAB
TACROLIMUS BLD-MCNC: 4.7 UG/L (ref 5–15)
TME LAST DOSE: ABNORMAL H

## 2017-08-25 DIAGNOSIS — Z48.298 AFTERCARE FOLLOWING ORGAN TRANSPLANT: ICD-10-CM

## 2017-08-25 DIAGNOSIS — Z94.0 KIDNEY REPLACED BY TRANSPLANT: ICD-10-CM

## 2017-08-25 DIAGNOSIS — Z79.899 ENCOUNTER FOR LONG-TERM CURRENT USE OF MEDICATION: ICD-10-CM

## 2017-08-25 PROCEDURE — 80197 ASSAY OF TACROLIMUS: CPT | Performed by: INTERNAL MEDICINE

## 2017-08-27 LAB
TACROLIMUS BLD-MCNC: 4.7 UG/L (ref 5–15)
TME LAST DOSE: ABNORMAL H

## 2017-09-22 DIAGNOSIS — Z48.298 AFTERCARE FOLLOWING ORGAN TRANSPLANT: ICD-10-CM

## 2017-09-22 DIAGNOSIS — Z79.899 ENCOUNTER FOR LONG-TERM CURRENT USE OF MEDICATION: ICD-10-CM

## 2017-09-22 DIAGNOSIS — Z94.0 KIDNEY REPLACED BY TRANSPLANT: ICD-10-CM

## 2017-09-22 PROCEDURE — 80197 ASSAY OF TACROLIMUS: CPT | Performed by: INTERNAL MEDICINE

## 2017-09-24 LAB
TACROLIMUS BLD-MCNC: 3.1 UG/L (ref 5–15)
TME LAST DOSE: ABNORMAL H

## 2017-09-25 DIAGNOSIS — Z79.60 LONG-TERM USE OF IMMUNOSUPPRESSANT MEDICATION: ICD-10-CM

## 2017-09-25 DIAGNOSIS — Z94.0 KIDNEY TRANSPLANT RECIPIENT: ICD-10-CM

## 2017-09-25 NOTE — LETTER
PHYSICIAN ORDERS      DATE & TIME ISSUED: 2017 9:21 AM  PATIENT NAME: Raj Pierce   : 1970     [facility] MR# [if applicable]: [id number]     DIAGNOSIS:  Kidney transplant    ICD-9 CODE: Z94.0     Please recheck tacrolimus level within 1-2 weeks of dose change    Any questions please call: [phone]    Please fax these results to [phone].

## 2017-09-25 NOTE — TELEPHONE ENCOUNTER
Call placed to patient: No answer. Detailed voice message left requesting a return call to discuss tacrolimus level and possible dose change. Will try back

## 2017-09-25 NOTE — TELEPHONE ENCOUNTER
Tac level 3.1   Goal 4-6  Please phone patient and ask if last Tacrolimus level was a good 12 hour level.  If so, increase Tacrolimus from 1.5 mg Bid to 2.0 mg Bid  Repeat labs in 1-2 weeks

## 2017-09-27 RX ORDER — TACROLIMUS 0.5 MG/1
CAPSULE ORAL
Qty: 60 CAPSULE | Refills: 11
Start: 2017-09-27 | End: 2017-12-19

## 2017-09-27 RX ORDER — TACROLIMUS 1 MG/1
2 CAPSULE ORAL 2 TIMES DAILY
Qty: 120 CAPSULE | Refills: 11
Start: 2017-09-27 | End: 2017-11-27

## 2017-09-27 NOTE — TELEPHONE ENCOUNTER
F\U call placed to patient: Patient confirms current dose and accurate lab draw. Patient verbalize understanding to increase dose to 2 mg twice a day and repeat lab work in 1-2 weeks o  dose change. Rx/order sent.

## 2017-09-28 DIAGNOSIS — Z94.0 KIDNEY TRANSPLANTED: Primary | ICD-10-CM

## 2017-09-28 RX ORDER — SULFAMETHOXAZOLE AND TRIMETHOPRIM 400; 80 MG/1; MG/1
1 TABLET ORAL
Qty: 13 TABLET | Refills: 11 | Status: SHIPPED | OUTPATIENT
Start: 2017-09-29 | End: 2018-10-25

## 2017-10-06 ENCOUNTER — HOSPITAL ENCOUNTER (OUTPATIENT)
Facility: CLINIC | Age: 47
Setting detail: SPECIMEN
Discharge: HOME | End: 2017-10-06
Admitting: INTERNAL MEDICINE
Payer: MEDICARE

## 2017-10-06 PROCEDURE — 80197 ASSAY OF TACROLIMUS: CPT | Performed by: INTERNAL MEDICINE

## 2017-10-07 DIAGNOSIS — Z94.0 KIDNEY REPLACED BY TRANSPLANT: ICD-10-CM

## 2017-10-07 DIAGNOSIS — Z79.899 ENCOUNTER FOR LONG-TERM CURRENT USE OF MEDICATION: ICD-10-CM

## 2017-10-07 DIAGNOSIS — Z48.298 AFTERCARE FOLLOWING ORGAN TRANSPLANT: ICD-10-CM

## 2017-10-08 LAB
TACROLIMUS BLD-MCNC: 4.7 UG/L (ref 5–15)
TME LAST DOSE: ABNORMAL H

## 2017-10-10 ENCOUNTER — TELEPHONE (OUTPATIENT)
Dept: TRANSPLANT | Facility: CLINIC | Age: 47
End: 2017-10-10

## 2017-10-10 NOTE — TELEPHONE ENCOUNTER
Coordinator called pt to let Raj know that  his status was changed to inactive on kidney wait list d/t insurance prescription coverage.  Explained he will not be eligible for kidney offers while inactive, but will still accumulate wait time.     Contact information provided in voice mail. Left PFR name and office number.     [facility] updated, immunology & PFR notified, change in status letter routed to admin team to send out.

## 2017-10-10 NOTE — LETTER
October 10, 2017    Raj Pierce  [address]  [place] [address]      Dear Mr. Pierce,    This letter is sent to notify you that your listing status was changed from Active to Inactive on the kidney transplant waitlist at the [facility].  Your status was changed due to insurance prescription lack of coverage. Please contact your , Emperatriz at [phone].     During this waiting period, we may still request periodic laboratory tests with your primary physician.  It will be your responsibility to remind your physician to forward your results to the Transplant Office.    We still need to be kept informed of any changes such as:    o changes in your insurance coverage  o change in your phone number, address or emergency contact  o significant changes in your health  o significant infections (such as pneumonia or abscesses)  o blood transfusions  o any condition which requires hospitalization or surgery    Sincerely,        Kidney Transplant Program    Enclosures: UNOS Letter    CC:  [facility] Kidney Program-Gamaliel Nair

## 2017-10-11 ENCOUNTER — TELEPHONE (OUTPATIENT)
Dept: TRANSPLANT | Facility: CLINIC | Age: 47
End: 2017-10-11

## 2017-10-11 NOTE — TELEPHONE ENCOUNTER
Creatinine 3.12   baseline Cr ~ 2.9-3.2  Please phone patient and discuss hydration.  Ask patient to drink 1-2 Liters of water per day.  Ask if any N/V/D or change in medication.    Ok to check labs in 1 month.

## 2017-10-11 NOTE — TELEPHONE ENCOUNTER
Coordinator left pt msg that his status was changed to active on the kidney wait list as he now has insurance approval.  Contact information provided.    [facility] updated, immunology & PFR notified, change in status letter routed to admin team to send out.

## 2017-10-11 NOTE — TELEPHONE ENCOUNTER
Left message for patient regarding creatinine = 3.12, above baseline.  Instructed patient return call to transplant center regarding hydration, N/V/D or medication changes.  Instructed patient repeat labs in 1 month.

## 2017-10-11 NOTE — LETTER
October 11, 2017    Raj SOSA Stan  [address]  [facility] [address]      Dear Mr. Pierce,    This letter is sent to notify you that your listing status was changed from Inactive to Active on the kidney transplant waitlist at the [facility].  Your status was changed as you now have insurance approval.    During this waiting period, we may still request periodic laboratory tests with your primary physician.  It will be your responsibility to remind your physician to forward your results to the Transplant Office.    We still need to be kept informed of any changes such as:    o changes in your insurance coverage  o change in your phone number, address or emergency contact  o significant changes in your health  o significant infections (such as pneumonia or abscesses)  o blood transfusions  o any condition which requires hospitalization or surgery    Sincerely,        Kidney Transplant Program    Enclosures: Travel Resources, [facility]OS Letter, Waitlist Information Update and While You Are Waiting    CC: Gloria Nair; [facility]

## 2017-10-12 NOTE — TELEPHONE ENCOUNTER
F\U call placed to patient: No answer. Detailed voice message left requesting patient return call to discuss current creatinine level, n/v/d and hydration status. Patient instructed to improve hydration and recheck level in with next month lab work.

## 2017-10-18 ENCOUNTER — TEAM CONFERENCE (OUTPATIENT)
Dept: TRANSPLANT | Facility: CLINIC | Age: 47
End: 2017-10-18

## 2017-10-18 NOTE — TELEPHONE ENCOUNTER
Team Conference:      Attendees:Dr. Moreau, Dr. Marques, Coordinator, , Dietician, Pharmacy    Discussion and Outcome: Patient status changed to active approved.

## 2017-10-28 DIAGNOSIS — N25.81 SECONDARY RENAL HYPERPARATHYROIDISM (H): ICD-10-CM

## 2017-10-28 DIAGNOSIS — Z94.0 KIDNEY REPLACED BY TRANSPLANT: ICD-10-CM

## 2017-10-30 RX ORDER — CALCITRIOL 0.25 UG/1
CAPSULE, LIQUID FILLED ORAL
Qty: 12 CAPSULE | Refills: 1 | OUTPATIENT
Start: 2017-10-30

## 2017-11-10 DIAGNOSIS — Z94.0 KIDNEY REPLACED BY TRANSPLANT: ICD-10-CM

## 2017-11-10 DIAGNOSIS — Z79.899 ENCOUNTER FOR LONG-TERM CURRENT USE OF MEDICATION: ICD-10-CM

## 2017-11-10 DIAGNOSIS — Z48.298 AFTERCARE FOLLOWING ORGAN TRANSPLANT: ICD-10-CM

## 2017-11-10 PROCEDURE — 80197 ASSAY OF TACROLIMUS: CPT | Performed by: INTERNAL MEDICINE

## 2017-11-13 LAB
TACROLIMUS BLD-MCNC: 5.6 UG/L (ref 5–15)
TME LAST DOSE: NORMAL H

## 2017-11-14 DIAGNOSIS — G47.33 OBSTRUCTIVE SLEEP APNEA: ICD-10-CM

## 2017-11-14 DIAGNOSIS — Z76.82 ORGAN TRANSPLANT CANDIDATE: Primary | ICD-10-CM

## 2017-11-14 DIAGNOSIS — N18.6 END STAGE RENAL DISEASE (H): ICD-10-CM

## 2017-11-14 DIAGNOSIS — Z01.810 PREPROCEDURAL CARDIOVASCULAR EXAMINATION: ICD-10-CM

## 2017-11-14 DIAGNOSIS — I12.9 HYPERTENSIVE RENAL DISEASE: ICD-10-CM

## 2017-11-14 DIAGNOSIS — Z01.818 ENCOUNTER FOR PRE-OPERATIVE EXAMINATION: ICD-10-CM

## 2017-11-14 DIAGNOSIS — Z01.810 PRE-OPERATIVE CARDIOVASCULAR EXAMINATION: ICD-10-CM

## 2017-11-15 ENCOUNTER — TELEPHONE (OUTPATIENT)
Dept: TRANSPLANT | Facility: CLINIC | Age: 47
End: 2017-11-15

## 2017-11-15 NOTE — TELEPHONE ENCOUNTER
Creatinine level 3.45   baseline Cr ~ 2.9-3.2  Creatinine level continues to increase even with increase in hydration.  Will let MD know about level.

## 2017-11-15 NOTE — LETTER
November 15, 2017    Raj Pierce  [address] [facility] [address]      Dear Mr. Pierce,    Enclosed you will find a copy of your transplant waitlist appointment schedule and a map to our location.    If you are unable to come in for your appointments for any reason, please contact Shawna at [phone].      Sincerely,       [facility]    CC: ISHA Espinal, CHRISTOPHER                                          [facility]  [address] [place] [address]  [id number]    WAITLIST CLINIC APPOINTMENTS    Patient   Raj Pierce        MR#:    [id number]  :  Shawna    [phone]  Coordinator:  Aggie HARDY    [phone]  LPN:    Patience CAMPOS    [phone]  Location:   Transplant Center  Dates:   November 27, 2017    This is your schedule, please follow dates and times.  You will receive reminder phone calls for other tests, but please follow this schedule only!  If you have any questions about dates and times, please call us on number listed above.  Thank you, Transplant Clinic.     Day/Date:   Monday, November 27, 2017  Time Location Activity   9:30 a.m. [facility]  Transplant Services  3rd floor; Clinic 3A Appointment with Dr. Chavez,  Transplant Surgeon   11:00 a.m. [facility]  [facility] Waiting Room  2nd floor [facility] Lexiscan Stress Test = NO FOOD or DRINK 3 HOURS BEFORE TEST!!  Please read attached prep sheet!   2:00 p.m. [facility]  Transplant Services  3rd floor; Clinic 3A; Consult Room Appointment with either Shannon Shultz,  Transplant

## 2017-11-15 NOTE — TELEPHONE ENCOUNTER
Left voicemail for Raj to return my call.  I need to make sure the SW and Zoraida I added on 11/27 will work for him.

## 2017-11-25 DIAGNOSIS — D75.1 POST-TRANSPLANT ERYTHROCYTOSIS: ICD-10-CM

## 2017-11-25 DIAGNOSIS — I15.1 HYPERTENSION SECONDARY TO OTHER RENAL DISORDERS (CODE): ICD-10-CM

## 2017-11-27 ENCOUNTER — HOSPITAL ENCOUNTER (OUTPATIENT)
Dept: NUCLEAR MEDICINE | Facility: CLINIC | Age: 47
Setting detail: NUCLEAR MEDICINE
End: 2017-11-27
Attending: INTERNAL MEDICINE
Payer: MEDICARE

## 2017-11-27 ENCOUNTER — ALLIED HEALTH/NURSE VISIT (OUTPATIENT)
Dept: TRANSPLANT | Facility: CLINIC | Age: 47
End: 2017-11-27
Attending: PHYSICIAN ASSISTANT
Payer: MEDICARE

## 2017-11-27 ENCOUNTER — OFFICE VISIT (OUTPATIENT)
Dept: TRANSPLANT | Facility: CLINIC | Age: 47
End: 2017-11-27
Attending: TRANSPLANT SURGERY
Payer: MEDICARE

## 2017-11-27 ENCOUNTER — HOSPITAL ENCOUNTER (OUTPATIENT)
Dept: NUCLEAR MEDICINE | Facility: CLINIC | Age: 47
Setting detail: NUCLEAR MEDICINE
Discharge: HOME | End: 2017-11-27
Attending: INTERNAL MEDICINE | Admitting: INTERNAL MEDICINE
Payer: MEDICARE

## 2017-11-27 VITALS
HEART RATE: 72 BPM | SYSTOLIC BLOOD PRESSURE: 136 MMHG | WEIGHT: 210.5 LBS | RESPIRATION RATE: 16 BRPM | DIASTOLIC BLOOD PRESSURE: 87 MMHG | BODY MASS INDEX: 30.14 KG/M2 | TEMPERATURE: 98.2 F | HEIGHT: 70 IN | OXYGEN SATURATION: 100 %

## 2017-11-27 DIAGNOSIS — Z76.82 ORGAN TRANSPLANT CANDIDATE: ICD-10-CM

## 2017-11-27 DIAGNOSIS — N18.6 END STAGE RENAL DISEASE (H): ICD-10-CM

## 2017-11-27 DIAGNOSIS — Z94.0 KIDNEY REPLACED BY TRANSPLANT: Primary | ICD-10-CM

## 2017-11-27 DIAGNOSIS — Z79.60 LONG-TERM USE OF IMMUNOSUPPRESSANT MEDICATION: ICD-10-CM

## 2017-11-27 DIAGNOSIS — Z01.810 PRE-OPERATIVE CARDIOVASCULAR EXAMINATION: ICD-10-CM

## 2017-11-27 DIAGNOSIS — Z76.82 ORGAN TRANSPLANT CANDIDATE: Primary | ICD-10-CM

## 2017-11-27 DIAGNOSIS — Z94.0 KIDNEY TRANSPLANT RECIPIENT: ICD-10-CM

## 2017-11-27 PROCEDURE — 78452 HT MUSCLE IMAGE SPECT MULT: CPT | Mod: 26 | Performed by: INTERNAL MEDICINE

## 2017-11-27 PROCEDURE — 93016 CV STRESS TEST SUPVJ ONLY: CPT | Performed by: INTERNAL MEDICINE

## 2017-11-27 PROCEDURE — 93018 CV STRESS TEST I&R ONLY: CPT | Performed by: INTERNAL MEDICINE

## 2017-11-27 PROCEDURE — 99212 OFFICE O/P EST SF 10 MIN: CPT | Mod: ZF

## 2017-11-27 RX ORDER — TACROLIMUS 1 MG/1
2 CAPSULE ORAL 2 TIMES DAILY
Qty: 120 CAPSULE | Refills: 11 | Status: SHIPPED | OUTPATIENT
Start: 2017-11-27 | End: 2017-12-19

## 2017-11-27 RX ORDER — LOSARTAN POTASSIUM 25 MG/1
TABLET ORAL
Qty: 90 TABLET | Refills: 3 | Status: SHIPPED | OUTPATIENT
Start: 2017-11-27 | End: 2018-12-21

## 2017-11-27 RX ORDER — REGADENOSON 0.08 MG/ML
0.4 INJECTION, SOLUTION INTRAVENOUS ONCE
Status: COMPLETED | OUTPATIENT
Start: 2017-11-27 | End: 2017-11-27

## 2017-11-27 RX ADMIN — TETROFOSMIN 10.1 MCI.: 1.38 INJECTION, POWDER, LYOPHILIZED, FOR SOLUTION INTRAVENOUS at 10:45

## 2017-11-27 RX ADMIN — TETROFOSMIN 35.1 MCI.: 1.38 INJECTION, POWDER, LYOPHILIZED, FOR SOLUTION INTRAVENOUS at 11:54

## 2017-11-27 RX ADMIN — REGADENOSON 0.4 MG: 0.08 INJECTION, SOLUTION INTRAVENOUS at 12:00

## 2017-11-27 ASSESSMENT — PAIN SCALES - GENERAL: PAINLEVEL: MILD PAIN (2)

## 2017-11-27 NOTE — LETTER
11/27/2017       RE: Raj Pierce  [address]  [place] [address]     Dear Colleague,    Thank you for referring your patient, Raj Pierce, to the [facility] SOLID ORGAN TRANSPLANT at [facility]. Please see a copy of my visit note below.    HPI      ROS      Physical Exam    Transplant Surgery -OUTPATIENT IMMUNOSUPPRESSION PROGRESS NOTE    Date of Visit: 11/27/2017    Transplants:  10/13/2015 (Kidney); Postoperative day:  776  ASSESMENT AND PLAN:  1.Graft Function: kidney function stable creatinine  Is 3.45  2.Immunosuppression Management: keep tacrolimus levels at 5  3.Hypertension: ok  4.Renal Function: stable creatinine 3.45  5.Lab frequency:   6.Other:  Has mild pain in polycystic kidneys, If it gets worse, plan native nephrectomy    Date: November 27, 2017    Transplant:  [x]                             Liver []                              Kidney [x]                             Pancreas []                              Other:             Chief Complaint:Kidney Transplant (Yearly follow up) and Transplant Waitlist Maintenance (Assess PKD)  post kidney transplant 2 years, off dialysis, doing ok, has mild pain on the left side\  History of Present Illness:  Patient Active Problem List   Diagnosis     Polycystic kidney, autosomal dominant     Dyslipidemia     Vitamin D deficiency     Hypertension secondary to other renal disorders     Secondary renal hyperparathyroidism (HCC)     Kidney replaced by transplant     Immunosuppressed status (H)     Acute gouty arthritis     Hypomagnesemia     Aftercare following organ transplant     SOCIAL /FAMILY HISTORY: [x]                  No recent change    Past Medical History:   Diagnosis Date     Acidosis      Chronic kidney disease, stage IV (severe) (H)      Disorders of phosphorus metabolism      HTN (hypertension)     15 years     Hyperparathyroidism      Polycystic kidney, autosomal dominant      Pure  hypercholesterolemia      Sleep apnea     cpap     Vitamin D deficiency      Past Surgical History:   Procedure Laterality Date     BENCH KIDNEY N/A 10/13/2015    Procedure: BENCH KIDNEY;  Surgeon: Dariel Chavez MD;  Location: [facility] OR     CYSTOSCOPY, REMOVE STENT(S), COMBINED Right 2015    Procedure: COMBINED CYSTOSCOPY, REMOVE STENT(S);  Surgeon: Dariel Chavez MD;  Location: [facility] OR     HERNIORRHAPHY INGUINAL CHILD      right     TONSILLECTOMY       TRANSPLANT KIDNEY RECIPIENT  DONOR N/A 10/13/2015    Procedure: TRANSPLANT KIDNEY RECIPIENT  DONOR;  Surgeon: Dariel Chavez MD;  Location: [facility] OR     Social History     Social History     Marital status:      Spouse name: Dianne     Number of children: 2     Years of education: 13     Occupational History      [employer]     Social History Main Topics     Smoking status: Never Smoker     Smokeless tobacco: Not on file     Alcohol use No     Drug use: No     Sexual activity: Yes     Partners: Female     Other Topics Concern     Blood Transfusions No     Seat Belt Yes     Social History Narrative     Prescription Medications as of 2017             sulfamethoxazole-trimethoprim (BACTRIM/SEPTRA) 400-80 MG per tablet Take 1 tablet by mouth Every Mon, Wed, Fri Morning    tacrolimus (GENERIC EQUIVALENT) 1 MG capsule Take 2 capsules (2 mg) by mouth 2 times daily Total dose 2 mg twice a day    tacrolimus (GENERIC EQUIVALENT) 0.5 MG capsule HOLD    mycophenolate (CELLCEPT - GENERIC EQUIVALENT) 250 MG capsule Take 3 capsules (750 mg) by mouth 2 times daily    calcitRIOL (ROCALTROL) 0.25 MCG capsule Take 1 capsule (0.25 mcg) by mouth three times a week (, , )    chlorthalidone (HYGROTON) 25 MG tablet TAKE 1/2 TABLET BY MOUTH EVERY DAY    amLODIPine (NORVASC) 10 MG tablet TAKE 1 TABLET BY MOUTH   ONCE DAILY    losartan (COZAAR) 25 MG tablet Take 1 tablet (25 mg) by mouth daily     "magnesium oxide (MAG-OX) 400 MG tablet Take 1 tablet (400 mg) by mouth daily    SIMVASTATIN PO Take 10 mg by mouth At Bedtime    citalopram (CELEXA) 20 MG tablet Take 20 mg by mouth daily.        Alcohol   REVIEW OF SYSTEMS (check box if normal)  [x]               GENERAL  [x]                 PULMONARY [x]                GENITOURINARY  [x]                CNS                 [x]                 CARDIAC  [x]                 ENDOCRINE  [x]                EARS,NOSE,THROAT [x]                 GASTROINTESTINAL [x]                 NEUROLOGIC    [x]                MUSCLOSKELTAL  [x]                  HEMATOLOGY      PHYSICAL EXAM (check box if normal)/87  Pulse 72  Temp 98.2  F (36.8  C) (Oral)  Resp 16  Ht 1.778 m (5' 10\")  Wt 95.5 kg (210 lb 8 oz)  SpO2 100%  BMI 30.2 kg/m2      [x]            GENERAL:    [x]       EYES:  ICTERIC   []        YES  []                    NO  [x]           EXTREMITIES: Clubbing []       Y     [x]           N    [x]           EARS, NOSE, THROAT: Membranes Moist    YES   [x]                   NO []                  [x]           LUNGS:  CLEAR    YES       [x]                  NO    []                                [x]           SKIN: Jaundice           YES       []                  NO    [x]                   Rash: YES       []                  NO    [x]                                     [x]             HEART: Regular Rate          YES       [x]                  NO    []                   Incision Clean:  YES       [x]                  NO    []                                [x]                    ABDOMEN: Wound healing well Organomegaly YES       []                  NO    [x]                       [x]                    NEUROLOGICAL:  Nonfocal  YES       [x]                  NO    []                       [x]                    Hernia YES       []                  NO    [x]                   PSYCHIATRIC:  Appropriate  YES       [x]                  NO    []                   "     OTHER:                                                                                                   PAIN SCALE:: 3    Again, thank you for allowing me to participate in the care of your patient.      Sincerely,    Dariel Chavez MD

## 2017-11-27 NOTE — NURSING NOTE
"Chief Complaint   Patient presents with     Kidney Transplant     Yearly follow up     Transplant Waitlist Maintenance     Assess PKD       Initial /87  Pulse 72  Temp 98.2  F (36.8  C) (Oral)  Resp 16  Ht 1.778 m (5' 10\")  Wt 95.5 kg (210 lb 8 oz)  SpO2 100%  BMI 30.2 kg/m2 Estimated body mass index is 30.2 kg/(m^2) as calculated from the following:    Height as of this encounter: 1.778 m (5' 10\").    Weight as of this encounter: 95.5 kg (210 lb 8 oz).  Medication Reconciliation: complete    "

## 2017-11-27 NOTE — PROGRESS NOTES
HPI      ROS      Physical Exam    Transplant Surgery -OUTPATIENT IMMUNOSUPPRESSION PROGRESS NOTE    Date of Visit: 11/27/2017    Transplants:  10/13/2015 (Kidney); Postoperative day:  776  ASSESMENT AND PLAN:  1.Graft Function: kidney function stable creatinine  Is 3.45  2.Immunosuppression Management: keep tacrolimus levels at 5  3.Hypertension: ok  4.Renal Function: stable creatinine 3.45  5.Lab frequency:   6.Other:  Has mild pain in polycystic kidneys, If it gets worse, plan native nephrectomy    Date: November 27, 2017    Transplant:  [x]                             Liver []                              Kidney [x]                             Pancreas []                              Other:             Chief Complaint:Kidney Transplant (Yearly follow up) and Transplant Waitlist Maintenance (Assess PKD)  post kidney transplant 2 years, off dialysis, doing ok, has mild pain on the left side\  History of Present Illness:  Patient Active Problem List   Diagnosis     Polycystic kidney, autosomal dominant     Dyslipidemia     Vitamin D deficiency     Hypertension secondary to other renal disorders     Secondary renal hyperparathyroidism (HCC)     Kidney replaced by transplant     Immunosuppressed status (H)     Acute gouty arthritis     Hypomagnesemia     Aftercare following organ transplant     SOCIAL /FAMILY HISTORY: [x]                  No recent change    Past Medical History:   Diagnosis Date     Acidosis      Chronic kidney disease, stage IV (severe) (H)      Disorders of phosphorus metabolism      HTN (hypertension)     15 years     Hyperparathyroidism      Polycystic kidney, autosomal dominant      Pure hypercholesterolemia      Sleep apnea     cpap     Vitamin D deficiency      Past Surgical History:   Procedure Laterality Date     BENCH KIDNEY N/A 10/13/2015    Procedure: BENCH KIDNEY;  Surgeon: Dariel Chavez MD;  Location: [facility] OR     CYSTOSCOPY, REMOVE STENT(S), COMBINED Right 11/9/2015    Procedure:  COMBINED CYSTOSCOPY, REMOVE STENT(S);  Surgeon: Dariel Chavez MD;  Location: [facility] OR     HERNIORRHAPHY INGUINAL CHILD      right     TONSILLECTOMY       TRANSPLANT KIDNEY RECIPIENT  DONOR N/A 10/13/2015    Procedure: TRANSPLANT KIDNEY RECIPIENT  DONOR;  Surgeon: Dariel Chavez MD;  Location: [facility] OR     Social History     Social History     Marital status:      Spouse name: Dianne     Number of children: 2     Years of education: 13     Occupational History      [employer]     Social History Main Topics     Smoking status: Never Smoker     Smokeless tobacco: Not on file     Alcohol use No     Drug use: No     Sexual activity: Yes     Partners: Female     Other Topics Concern     Blood Transfusions No     Seat Belt Yes     Social History Narrative     Prescription Medications as of 2017             sulfamethoxazole-trimethoprim (BACTRIM/SEPTRA) 400-80 MG per tablet Take 1 tablet by mouth Every Mon, Wed, Fri Morning    tacrolimus (GENERIC EQUIVALENT) 1 MG capsule Take 2 capsules (2 mg) by mouth 2 times daily Total dose 2 mg twice a day    tacrolimus (GENERIC EQUIVALENT) 0.5 MG capsule HOLD    mycophenolate (CELLCEPT - GENERIC EQUIVALENT) 250 MG capsule Take 3 capsules (750 mg) by mouth 2 times daily    calcitRIOL (ROCALTROL) 0.25 MCG capsule Take 1 capsule (0.25 mcg) by mouth three times a week (, , )    chlorthalidone (HYGROTON) 25 MG tablet TAKE 1/2 TABLET BY MOUTH EVERY DAY    amLODIPine (NORVASC) 10 MG tablet TAKE 1 TABLET BY MOUTH   ONCE DAILY    losartan (COZAAR) 25 MG tablet Take 1 tablet (25 mg) by mouth daily    magnesium oxide (MAG-OX) 400 MG tablet Take 1 tablet (400 mg) by mouth daily    SIMVASTATIN PO Take 10 mg by mouth At Bedtime    citalopram (CELEXA) 20 MG tablet Take 20 mg by mouth daily.        Alcohol   REVIEW OF SYSTEMS (check box if normal)  [x]               GENERAL  [x]                 PULMONARY [x]            "     GENITOURINARY  [x]                CNS                 [x]                 CARDIAC  [x]                 ENDOCRINE  [x]                EARS,NOSE,THROAT [x]                 GASTROINTESTINAL [x]                 NEUROLOGIC    [x]                MUSCLOSKELTAL  [x]                  HEMATOLOGY      PHYSICAL EXAM (check box if normal)/87  Pulse 72  Temp 98.2  F (36.8  C) (Oral)  Resp 16  Ht 1.778 m (5' 10\")  Wt 95.5 kg (210 lb 8 oz)  SpO2 100%  BMI 30.2 kg/m2      [x]            GENERAL:    [x]       EYES:  ICTERIC   []        YES  []                    NO  [x]           EXTREMITIES: Clubbing []       Y     [x]           N    [x]           EARS, NOSE, THROAT: Membranes Moist    YES   [x]                   NO []                  [x]           LUNGS:  CLEAR    YES       [x]                  NO    []                                [x]           SKIN: Jaundice           YES       []                  NO    [x]                   Rash: YES       []                  NO    [x]                                     [x]             HEART: Regular Rate          YES       [x]                  NO    []                   Incision Clean:  YES       [x]                  NO    []                                [x]                    ABDOMEN: Wound healing well Organomegaly YES       []                  NO    [x]                       [x]                    NEUROLOGICAL:  Nonfocal  YES       [x]                  NO    []                       [x]                    Hernia YES       []                  NO    [x]                   PSYCHIATRIC:  Appropriate  YES       [x]                  NO    []                       OTHER:                                                                                                   PAIN SCALE:: 3  "

## 2017-11-27 NOTE — MR AVS SNAPSHOT
After Visit Summary   11/27/2017    Raj Pierce    MRN: [id number]           Patient Information     Date Of Birth          1970        Visit Information        Provider Department      11/27/2017 2:00 PM Lexii Burgess, VIRGILIO [facility] Solid Organ Transplant        Today's Diagnoses     Organ transplant candidate    -  1       Follow-ups after your visit        Your next 10 appointments already scheduled     Feb 26, 2018  9:30 AM CST   (Arrive by 9:15 AM)   RETURN WAIT LIST with Dariel Chavez MD   [facility] Solid Organ Transplant ([facility] [place])    [address] [place] [id number] [phone]              Who to contact     If you have questions or need follow up information about today's clinic visit or your schedule please contact [facility] SOLID ORGAN TRANSPLANT directly at [phone].  Normal or non-critical lab and imaging results will be communicated to you by MyChart, letter or phone within 4 business days after the clinic has received the results. If you do not hear from us within 7 days, please contact the clinic through [employer] or phone. If you have a critical or abnormal lab result, we will notify you by phone as soon as possible.  Submit refill requests through [employer] or call your pharmacy and they will forward the refill request to us. Please allow 3 business days for your refill to be completed.          Additional Information About Your Visit        MyChart Information     [employer] gives you secure access to your electronic health record. If you see a primary care provider, you can also send messages to your care team and make appointments. If you have questions, please call your primary care clinic.  If you do not have a primary care provider, please call [phone] and they will assist you.        Care EveryWhere ID     This is your Care EveryWhere ID. This could be used by other organizations to access your  [place] medical records  [id number]         Blood Pressure from Last 3 Encounters:   11/27/17 136/87   06/05/17 138/87   12/20/16 (!) 133/92    Weight from Last 3 Encounters:   11/27/17 95.5 kg (210 lb 8 oz)   06/05/17 92.8 kg (204 lb 9.6 oz)   12/20/16 92.4 kg (203 lb 9.6 oz)              Today, you had the following     No orders found for display         Where to get your medicines      These medications were sent to [facility]  [place] [id number] [facility]  [address], [facility] [address]     Phone:  [phone]     tacrolimus 1 MG capsule          Primary Care Provider Office Phone # Fax #    Do Guerrero, [phone]       [facility] [address] [facility] [address]        Equal Access to Services     MANUELA VELAZCO : Hadii nathanael ku hadasho Soomaali, waaxda luqadaha, qaybta kaalmada adeegyada, adia oneill . So [facility] [phone].    ATENCIÓN: Si habla español, tiene a garcia disposición servicios gratuitos de asistencia lingüística. Llame al [phone].    We comply with applicable federal civil rights laws and [place] laws. We do not discriminate on the basis of race, color, national origin, age, disability, sex, sexual orientation, or gender identity.            Thank you!     Thank you for choosing [facility] SOLID ORGAN TRANSPLANT  for your care. Our goal is always to provide you with excellent care. Hearing back from our patients is one way we can continue to improve our services. Please take a few minutes to complete the written survey that you may receive in the mail after your visit with us. Thank you!             Your Updated Medication List - Protect others around you: Learn how to safely use, store and throw away your medicines at www.disposemymeds.org.          This list is accurate as of: 11/27/17 11:59 PM.  Always use your most recent med list.                   Brand Name Dispense Instructions for use  Diagnosis    amLODIPine 10 MG tablet    NORVASC    30 tablet    TAKE 1 TABLET BY MOUTH   ONCE DAILY    Hypertension secondary to other renal disorders       calcitRIOL 0.25 MCG capsule    ROCALTROL    14 capsule    Take 1 capsule (0.25 mcg) by mouth three times a week (Mondays, Wednesdays, Fridays)    Kidney replaced by transplant, Secondary renal hyperparathyroidism (H)       chlorthalidone 25 MG tablet    HYGROTON    15 tablet    TAKE 1/2 TABLET BY MOUTH EVERY DAY    Hypertension secondary to other renal disorders       citalopram 20 MG tablet    celeXA     Take 20 mg by mouth daily.        losartan 25 MG tablet    COZAAR    90 tablet    TAKE 1 TABLET (25 MG) BY MOUTH DAILY    Post-transplant erythrocytosis, Hypertension secondary to other renal disorders (CODE)       magnesium oxide 400 MG tablet    MAG-OX    30 tablet    Take 1 tablet (400 mg) by mouth daily    Kidney replaced by transplant       mycophenolate 250 MG capsule    GENERIC EQUIVALENT    180 capsule    Take 3 capsules (750 mg) by mouth 2 times daily    Kidney replaced by transplant, Secondary renal hyperparathyroidism (H)       SIMVASTATIN PO      Take 10 mg by mouth At Bedtime        sulfamethoxazole-trimethoprim 400-80 MG per tablet    BACTRIM/SEPTRA    13 tablet    Take 1 tablet by mouth Every Mon, Wed, Fri Morning    Kidney transplanted       * tacrolimus 0.5 MG capsule    GENERIC EQUIVALENT    60 capsule    HOLD    Kidney transplant recipient, Long-term use of immunosuppressant medication       * tacrolimus 1 MG capsule    GENERIC EQUIVALENT    120 capsule    Take 2 capsules (2 mg) by mouth 2 times daily Total dose 2 mg twice a day    Kidney transplant recipient, Long-term use of immunosuppressant medication       * Notice:  This list has 2 medication(s) that are the same as other medications prescribed for you. Read the directions carefully, and ask your doctor or other care provider to review them with you.

## 2017-11-27 NOTE — PROGRESS NOTES
Pt here for Lexiscan. Test, medication and side effects reviewed with patient. Lung sounds clear. Pt denies caffeine use. Pt tolerated Lexiscan dose without any adverse reactions. Monitored post injection and escorted back to nuclear medicine for follow up imaging.

## 2017-11-27 NOTE — PROGRESS NOTES
"Patient Name: Raj Pierce  : 1970  Age: 47 year old  MRN: [id number]  Date of Initial Social Work Evaluation: 2013. Most recent transplant social work visit 2016.    Patient on kidney transplant wait list.  Saw today to update psychosocial assessment along with is geovanni Dean.      Presenting Information   Living Situation: Patient resides in a home with his wife and two children in [place], [place].  If not local, plans for short term stay:  Patient reported after his first transplant he and his wife drove back and forth from [place] to his daily appointments. Patient reported if he has to stay local, he will \"make arrangements\" but did not clarify.  Previous Functional Status: Patient is independent with ADL's.  Cultural/Language/Spiritual Considerations: None identified at this time.     Support System  Primary Support Person Geovanni Dean  Other support:  Sister, parents  Plan for support in immediate post-transplant period: Wife Dianne    Health Care Directive  Decision Maker: Self  Alternate Decision Maker: Wife  Health Care Directive: Provided education and Declined completing    Mental Health/Coping:   History of Mental Health: Patient has a history of depression.   History of Chemical Health: Patient reported he drinks about one alcoholic drink a day. Patient denied any tobacco or substance use.   Current status: Patient reported he is on an anti-depression medication and he feels his depression is well controlled. Patient denied any other mental health concerns.   Coping: Patient appears to be coping well.   Services Needed/Recommended: None identified at this time.     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   Impact of transplant on income: None identified at this time.   Insurance and medication coverage: Medicare (ESRD from first transplant which will end 10/2018) and BCBS through spouse employer.   Financial " concerns: None identified at this time.   Resources needed: None identified at this time.     Assessment and recommendations and plan:  Patient had his first transplant 10/2015. Patient is not yet on dialysis. Reviewed transplant education (Medicare, rehabilitation, donor issues, community/financial resources, and psych/family adjustment) as well as psychosocial risks of transplant. Provided patient with a copy of post-transplant informational sheet that includes information on potential costs of medications, Medicare ESRD, post-transplant lodging, etc. Patient seemed to process information well. Appeared well informed, motivated, and able to follow post transplant requirements. Behavior was appropriate during interview. Has adequate income and insurance coverage. Adequate social support. No major contraindications noted for transplant. At this time, patient appears to understand the risks and benefits of transplant.     Lexii Burgess [facility]    Kidney/Pancreas/[facility] Trans[facility]

## 2017-11-27 NOTE — MR AVS SNAPSHOT
After Visit Summary   11/27/2017    Raj Pierce    MRN: [id number]           Patient Information     Date Of Birth          1970        Visit Information        Provider Department      11/27/2017 9:30 AM Dariel Chavez MD [facility] Solid Organ Transplant        Today's Diagnoses     Kidney replaced by transplant    -  1       Follow-ups after your visit        Your next 10 appointments already scheduled     Nov 27, 2017 11:00 AM CST   NM INJECTION with [id number]   [facility], Nuclear Medicine ([facility])    [address] [address] [phone]            Nov 27, 2017 11:45 AM CST   NM SCAN3 with UUNM1   [facility], Nuclear Medicine ([facility])    [address] [id number] [phone]            Nov 27, 2017 12:15 PM CST   Ekg Stress Nm Lexiscan with [id number]   [facility] ELECTROCARDIOLOG[facility] ([facility])    [address] [facility] [address]               Nov 27, 2017  1:15 PM CST   NM MPI WITH LEXISCAN with UUNM1   [facility], Nuclear Medicine ([facility])    [address] [id number] [phone]           For a ONE day exam: Allow 3-4 hours for test. For a TWO day exam: Allow 2 hours PER day for test.  You may need to stop some medicines before the test. Follow your doctor s orders. - If you take a beta blocker: Follow your doctor s specific instructions on taking it prior to and on the day of your exam. - If you take Aggrenox or dipyridamole (Persantine, Permole), stop taking it 48 hours before your test. - If you take Viagra, Cialis or Levitra, stop taking it 48 hours before your test. - If you take theophylline or aminophylline, stop taking it 12 hours before your test.  For patients with diabetes: - If you take insulin, call  your diabetes care team. Ask if you should take a 1/2 dose the morning of your test. - If you take diabetes medicine by mouth, don t take it on the morning of your test. Bring it with you to take after the test. (If you have questions, call your diabetes care team.)  Do not take nitrates on the day of your test. Do not wear your Nitro-Patch.  Stop all caffeine 12 hours before the test. This includes coffee, tea, soda pop, chocolate and certain medicines (such as Anacin, Excedrin and NoDoz). Also avoid decaf coffee and tea, as these contain small amounts of caffeine.  No alcohol, smoking or other tobacco for 12 hours before the test.  Stop eating 3 hours before the test. You may drink water.  Please wear a loose two-piece outfit. If you will have an exercise test, bring rubber-soled walking shoes.  When you arrive, please tell us if you: - Have diabetes - Are breastfeeding - May be pregnant - Have a pacemaker of ICD (implantable defibrillator).  Please call your Imaging Department at your exam site with any questions.            Nov 27, 2017  2:00 PM CST   (Arrive by 1:45 PM)   SOT SOCIAL WORK EVAL with VIRGILIO Decker   [facility] Solid Organ Transplant ([facility])    [address] [place] [address] [phone]            Feb 26, 2018  9:30 AM CST   (Arrive by 9:15 AM)   RETURN WAIT LIST with Dariel Chavez MD   [facility] Solid Organ Transplant ([facility])    [address] [place] [address] [phone]              Who to contact     If you have questions or need follow up information about today's clinic visit or your schedule please contact [facility] SOLID ORGAN TRANSPLANT directly at [phone].  Normal or non-critical lab and imaging results will be communicated to you by MyChart, letter or phone within 4 business days after the clinic has received the results. If you do not hear from us  "within 7 days, please contact the clinic through [employer] or phone. If you have a critical or abnormal lab result, we will notify you by phone as soon as possible.  Submit refill requests through [employer] or call your pharmacy and they will forward the refill request to us. Please allow 3 business days for your refill to be completed.          Additional Information About Your Visit        [email or web address] Information     [employer] gives you secure access to your electronic health record. If you see a primary care provider, you can also send messages to your care team and make appointments. If you have questions, please call your primary care clinic.  If you do not have a primary care provider, please call [phone] and they will assist you.        Care EveryWhere ID     This is your Care EveryWhere ID. This could be used by other organizations to access your [place] medical records  [id number]        Your Vitals Were     Pulse Temperature Respirations Height Pulse Oximetry BMI (Body Mass Index)    72 98.2  F (36.8  C) (Oral) 16 1.778 m (5' 10\") 100% 30.2 kg/m2       Blood Pressure from Last 3 Encounters:   11/27/17 136/87   06/05/17 138/87   12/20/16 (!) 133/92    Weight from Last 3 Encounters:   11/27/17 95.5 kg (210 lb 8 oz)   06/05/17 92.8 kg (204 lb 9.6 oz)   12/20/16 92.4 kg (203 lb 9.6 oz)              Today, you had the following     No orders found for display       Primary Care Provider Office Phone # Fax #    Do DANNIE Guerrero [phone]       [place] [id number]        Equal Access to Services     MANUELA VELAZCO : Kassie Nava, sarah moon, qaadia sánchez. So [facility] [phone].    ATENCIÓN: Si habla español, tiene a garcia disposición servicios gratuitos de asistencia lingüística. Derick al [phone].    We comply with applicable federal civil rights laws and [place] laws. We do not " discriminate on the basis of race, color, national origin, age, disability, sex, sexual orientation, or gender identity.            Thank you!     Thank you for choosing [facility] SOLID ORGAN TRANSPLANT  for your care. Our goal is always to provide you with excellent care. Hearing back from our patients is one way we can continue to improve our services. Please take a few minutes to complete the written survey that you may receive in the mail after your visit with us. Thank you!             Your Updated Medication List - Protect others around you: Learn how to safely use, store and throw away your medicines at www.disposemymeds.org.          This list is accurate as of: 11/27/17 10:17 AM.  Always use your most recent med list.                   Brand Name Dispense Instructions for use Diagnosis    amLODIPine 10 MG tablet    NORVASC    30 tablet    TAKE 1 TABLET BY MOUTH   ONCE DAILY    Hypertension secondary to other renal disorders       calcitRIOL 0.25 MCG capsule    ROCALTROL    14 capsule    Take 1 capsule (0.25 mcg) by mouth three times a week (Mondays, Wednesdays, Fridays)    Kidney replaced by transplant, Secondary renal hyperparathyroidism (H)       chlorthalidone 25 MG tablet    HYGROTON    15 tablet    TAKE 1/2 TABLET BY MOUTH EVERY DAY    Hypertension secondary to other renal disorders       citalopram 20 MG tablet    celeXA     Take 20 mg by mouth daily.        losartan 25 MG tablet    COZAAR    90 tablet    Take 1 tablet (25 mg) by mouth daily    Post-transplant erythrocytosis, Hypertension secondary to other renal disorders (CODE)       magnesium oxide 400 MG tablet    MAG-OX    30 tablet    Take 1 tablet (400 mg) by mouth daily    Kidney replaced by transplant       mycophenolate 250 MG capsule    GENERIC EQUIVALENT    180 capsule    Take 3 capsules (750 mg) by mouth 2 times daily    Kidney replaced by transplant, Secondary renal hyperparathyroidism (H)       SIMVASTATIN PO      Take 10 mg by mouth At  Bedtime        sulfamethoxazole-trimethoprim 400-80 MG per tablet    BACTRIM/SEPTRA    13 tablet    Take 1 tablet by mouth Every Mon, Wed, Fri Morning    Kidney transplanted       * tacrolimus 1 MG capsule    GENERIC EQUIVALENT    120 capsule    Take 2 capsules (2 mg) by mouth 2 times daily Total dose 2 mg twice a day    Kidney transplant recipient, Long-term use of immunosuppressant medication       * tacrolimus 0.5 MG capsule    GENERIC EQUIVALENT    60 capsule    HOLD    Kidney transplant recipient, Long-term use of immunosuppressant medication       * Notice:  This list has 2 medication(s) that are the same as other medications prescribed for you. Read the directions carefully, and ask your doctor or other care provider to review them with you.

## 2017-11-27 NOTE — TELEPHONE ENCOUNTER
Patient Call: Medication Refill  Instruct the patient to first contact their pharmacy. If they have called their pharmacy and require further assistance, route to LPN.  Pharmacy Name:   [facility] - [place] - [address] [facility] [phone] (Phone)  [phone] (Fax)   Name of Medication: tacrolimus  When will the patient be out of this medication? Next few day also needs script to be updated

## 2017-12-15 ENCOUNTER — RESULTS ONLY (OUTPATIENT)
Dept: OTHER | Facility: CLINIC | Age: 47
End: 2017-12-15

## 2017-12-15 DIAGNOSIS — N18.6 ESRD (END STAGE RENAL DISEASE) (H): ICD-10-CM

## 2017-12-15 DIAGNOSIS — Z76.82 AWAITING ORGAN TRANSPLANT: ICD-10-CM

## 2017-12-15 PROCEDURE — 80197 ASSAY OF TACROLIMUS: CPT | Performed by: PATHOLOGY

## 2017-12-17 LAB
TACROLIMUS BLD-MCNC: 13 UG/L (ref 5–15)
TME LAST DOSE: NORMAL H

## 2017-12-18 ENCOUNTER — TELEPHONE (OUTPATIENT)
Dept: TRANSPLANT | Facility: CLINIC | Age: 47
End: 2017-12-18

## 2017-12-18 DIAGNOSIS — Z94.0 KIDNEY TRANSPLANT RECIPIENT: ICD-10-CM

## 2017-12-18 DIAGNOSIS — Z79.60 LONG-TERM USE OF IMMUNOSUPPRESSANT MEDICATION: ICD-10-CM

## 2017-12-18 NOTE — TELEPHONE ENCOUNTER
Tacrolimus level 13.0 reported as an 11-hour level  Goal 12-hour level 4-6  Current dose 2 mg twice daily    PLAN:  Decrease tacrolimus dose to 1.5 mg twice daily  Check tacrolimus level and creatinine one week after dose change.    TASK:  Call with instructions per plan.

## 2017-12-18 NOTE — LETTER
PHYSICIAN ORDERS      DATE & TIME ISSUED: 2017 11:01 AM  PATIENT NAME: Raj Pierce   : 1970     [facility] MR# [if applicable]: [id number]     DIAGNOSIS:  Kidney transplant  ICD-9 CODE: Z94.0      Please decrease tacrolimus dose to 1.5 mg twice daily and recheck labs in one week.    Tacrolimus level   Creatinine level    Any questions please call: [phone]    Please fax these results to [phone].

## 2017-12-19 RX ORDER — TACROLIMUS 1 MG/1
1 CAPSULE ORAL 2 TIMES DAILY
Qty: 60 CAPSULE | Refills: 11 | Status: SHIPPED | OUTPATIENT
Start: 2017-12-19 | End: 2018-01-03

## 2017-12-19 RX ORDER — TACROLIMUS 0.5 MG/1
0.5 CAPSULE ORAL 2 TIMES DAILY
Qty: 60 CAPSULE | Refills: 11 | Status: SHIPPED | OUTPATIENT
Start: 2017-12-19 | End: 2018-01-03

## 2017-12-19 NOTE — TELEPHONE ENCOUNTER
Call placed to patient:  No answer. Detailed voice message left requesting patient decrease tacrolimus level to 1.5 mg twice daily and recheck tacrolimus and creatinine level in one week after dose change. Will try back. Order/rx sent

## 2017-12-20 LAB — PRA SINGLE ANTIGEN IGG ANTIBODY: NORMAL

## 2017-12-20 NOTE — TELEPHONE ENCOUNTER
Call returned to patient: Patient verbalize understanding of instruction to decrease tacrolimus dose to 1.5 twice daily and check lab work in one week.

## 2017-12-29 PROCEDURE — 80197 ASSAY OF TACROLIMUS: CPT | Performed by: INTERNAL MEDICINE

## 2018-01-02 DIAGNOSIS — Z48.298 AFTERCARE FOLLOWING ORGAN TRANSPLANT: Primary | ICD-10-CM

## 2018-01-03 DIAGNOSIS — Z79.60 LONG-TERM USE OF IMMUNOSUPPRESSANT MEDICATION: ICD-10-CM

## 2018-01-03 DIAGNOSIS — Z94.0 KIDNEY TRANSPLANT RECIPIENT: ICD-10-CM

## 2018-01-03 LAB
TACROLIMUS BLD-MCNC: 13.9 UG/L (ref 5–15)
TME LAST DOSE: NORMAL H

## 2018-01-03 NOTE — TELEPHONE ENCOUNTER
Tacrolimus level 13.9, goal 4-6  Dose decreased to 1.5 mg two weeks ago    PLAN:  Decrease tacrolimus dose to 1 mg twice daily  Check tacrolimus level and creatinine 1-2 weeks after dose change.    TASK:  Call with instructions per plan.

## 2018-01-03 NOTE — LETTER
PHYSICIAN ORDERS      DATE & TIME ISSUED: January 3, 2018 5:04 PM  PATIENT NAME: Raj Pierce   : 1970     [facility] MR# [if applicable]: [id number]     DIAGNOSIS:  Kidney transplant  ICD-9 CODE: Z94.0      Please decrease tacrolimus dose to 1 mg twice daily, improve hydration and recheck tacrolimus and creatinine level in 1-2 weeks of dose change.     Creatinine and tacrolimus level    Any questions please call: [phone]    Please fax these results to [phone].    .

## 2018-01-03 NOTE — TELEPHONE ENCOUNTER
Call placed to patient: Patient states that he has had ongoing diarrhea and that he's attempting to stay hydrated. Notes that he was seen last Friday by his PCP whom ordered CDiff and ecoli labs, both returned negative. Patient verbalize understanding to decrease dose to 1 mg twice daily, improve hydration and recheck level in 1-2 weeks after dose change.

## 2018-01-04 RX ORDER — TACROLIMUS 1 MG/1
1 CAPSULE ORAL 2 TIMES DAILY
Qty: 60 CAPSULE | Refills: 11 | Status: SHIPPED | OUTPATIENT
Start: 2018-01-04 | End: 2019-01-28

## 2018-01-04 RX ORDER — TACROLIMUS 0.5 MG/1
CAPSULE ORAL
Qty: 60 CAPSULE | Refills: 11
Start: 2018-01-04 | End: 2019-01-28

## 2018-01-12 DIAGNOSIS — Z48.298 AFTERCARE FOLLOWING ORGAN TRANSPLANT: ICD-10-CM

## 2018-01-12 PROCEDURE — 80197 ASSAY OF TACROLIMUS: CPT | Performed by: INTERNAL MEDICINE

## 2018-01-14 LAB
TACROLIMUS BLD-MCNC: 7.5 UG/L (ref 5–15)
TME LAST DOSE: NORMAL H

## 2018-02-26 ENCOUNTER — OFFICE VISIT (OUTPATIENT)
Dept: TRANSPLANT | Facility: CLINIC | Age: 48
End: 2018-02-26
Attending: TRANSPLANT SURGERY
Payer: MEDICARE

## 2018-02-26 VITALS
HEIGHT: 70 IN | OXYGEN SATURATION: 96 % | BODY MASS INDEX: 27.49 KG/M2 | DIASTOLIC BLOOD PRESSURE: 81 MMHG | HEART RATE: 78 BPM | SYSTOLIC BLOOD PRESSURE: 139 MMHG | WEIGHT: 192 LBS

## 2018-02-26 DIAGNOSIS — Z94.0 KIDNEY REPLACED BY TRANSPLANT: Primary | ICD-10-CM

## 2018-02-26 PROCEDURE — G0463 HOSPITAL OUTPT CLINIC VISIT: HCPCS | Mod: ZF

## 2018-02-26 ASSESSMENT — PAIN SCALES - GENERAL: PAINLEVEL: NO PAIN (0)

## 2018-02-26 NOTE — PROGRESS NOTES
Immunosuppression Note:    Raj Pierce is a 47 year old male who is seen today  for immunosuppression management     I, Dariel Chavez MD, I have examined the patient with the resident/PA/Fellow, discussed and agree with the note and findings.  I have reviewed today's vital signs, medications, labs and imaging. I reviewed the immunosuppression medications and levels. I spoke to the patient/family and explained below clinical details and answered all the questions      Kidney Transplant Waitlist Clinic    Medical record number: [id number]  YOB: 1970,   Date of Visit:  02/26/2018    S: Mr. Pierce is in clinic today for kidney transplant waitlist updating.   Patient had a previous transplant with a  Small kidney that has not been functioning well.      Last Evaluation Clinic Visit Date:  Interval health problems since last visit: none.  Waitlist Data:  UNOS cPRA   Date Value Ref Range Status   06/16/2017 19  Final       Wait List Date:  7/24/  OS Qualifying Wait Time:   Status: Active/Inactive (either autopop or change to Active Status: Yes/No pull down)  Previous Transplant History: 10/13/2015 (Kidney)   Transplant Coordinator: Aggie Collier     Transplant Office Phone Number: [phone]     Summary of pertinent issues:           No   Yes  Dialysis:    [x]      [] via:    Can you link to the Dialysis section in [facility]?   Blood Transfusion                  [x]      []  Number of units:   Most recently:  Pregnancies:    [x]      [] Number:       Previous Transplant:  []      [x] Details:  2015  Makes Urine?   []      [x] Amount per day:  Few cups  Bladder dysfunction?  [x]      [] Cause:    Claudication   [x]      [] Distance:    Previous Amputation  [x]      [] Cause:     Cancer    [x]      [] Comment:   Recurrent infections  [x]      []  Type:                  On Immunosuppression? []      [x] Comment:     BID, Prograf 1mg BID   Chronic anticoagulation? []       [] Indication:   Religion  [x]      []    Potential Donor(s)  [x]      []      ROS:  A comprehensive review of systems was obtained and negative, except as noted in the HPI or PMH.    PMH:   Medical records were reviewed.  Past Medical History:   Diagnosis Date     Acidosis      Chronic kidney disease, stage IV (severe) (H)      Disorders of phosphorus metabolism      HTN (hypertension)     15 years     Hyperparathyroidism      Polycystic kidney, autosomal dominant      Pure hypercholesterolemia      Sleep apnea     cpap     Vitamin D deficiency        PSH:   Past Surgical History:   Procedure Laterality Date     BENCH KIDNEY N/A 10/13/2015    Procedure: BENCH KIDNEY;  Surgeon: Dariel Chavez MD;  Location: [facility] OR     CYSTOSCOPY, REMOVE STENT(S), COMBINED Right 2015    Procedure: COMBINED CYSTOSCOPY, REMOVE STENT(S);  Surgeon: Dariel Chavez MD;  Location: [facility] OR     HERNIORRHAPHY INGUINAL CHILD      right     TONSILLECTOMY       TRANSPLANT KIDNEY RECIPIENT  DONOR N/A 10/13/2015    Procedure: TRANSPLANT KIDNEY RECIPIENT  DONOR;  Surgeon: Dariel Chavez MD;  Location: [facility] OR       Personal Hx:   Social History     Social History     Marital status:      Spouse name: Dianne     Number of children: 2     Years of education: 13     Occupational History      [employer]     Social History Main Topics     Smoking status: Never Smoker     Smokeless tobacco: Never Used     Alcohol use 6.0 oz/week     10 Standard drinks or equivalent per week      Comment: 1 drink daily      Drug use: No     Sexual activity: Yes     Partners: Female     Other Topics Concern     Blood Transfusions No     Seat Belt Yes     Social History Narrative       Allergies:  Allergies   Allergen Reactions     Alcohol Rash     Other reaction(s): Rash  Swabs used at hosptial  Swabs used at hosptial       Medications:  Prior to Admission medications    Medication Sig Start Date  "End Date Taking? Authorizing Provider   tacrolimus (GENERIC EQUIVALENT) 0.5 MG capsule HOLD 1/4/18  Yes Francis Espinoza MD   tacrolimus (GENERIC EQUIVALENT) 1 MG capsule Take 1 capsule (1 mg) by mouth 2 times daily 1/4/18  Yes Francis Espinoza MD   losartan (COZAAR) 25 MG tablet TAKE 1 TABLET (25 MG) BY MOUTH DAILY 11/27/17  Yes Francis Espinoza MD   sulfamethoxazole-trimethoprim (BACTRIM/SEPTRA) 400-80 MG per tablet Take 1 tablet by mouth Every Mon, Wed, Fri Morning 9/29/17  Yes Francis Espinoza MD   mycophenolate (CELLCEPT - GENERIC EQUIVALENT) 250 MG capsule Take 3 capsules (750 mg) by mouth 2 times daily 7/21/17  Yes Francis Espinoza MD   calcitRIOL (ROCALTROL) 0.25 MCG capsule Take 1 capsule (0.25 mcg) by mouth three times a week (Mondays, Wednesdays, Fridays) 7/21/17  Yes Francis Espinoza MD   chlorthalidone (HYGROTON) 25 MG tablet TAKE 1/2 TABLET BY MOUTH EVERY DAY 3/9/17  Yes Francis Espinoza MD   amLODIPine (NORVASC) 10 MG tablet TAKE 1 TABLET BY MOUTH   ONCE DAILY 3/9/17  Yes Francis Espionza MD   magnesium oxide (MAG-OX) 400 MG tablet Take 1 tablet (400 mg) by mouth daily 3/15/16  Yes Francis Espinoza MD   SIMVASTATIN PO Take 10 mg by mouth At Bedtime   Yes Reported, Patient   citalopram (CELEXA) 20 MG tablet Take 20 mg by mouth daily.   Yes Reported, Patient        O: Vitals: /81  Pulse 78  Ht 1.778 m (5' 10\")  Wt 87.1 kg (192 lb)  SpO2 96%  BMI 27.55 kg/m2  BMI= Body mass index is 27.55 kg/(m^2).  Abdomen:  Femoral Pulses:     Exam:  GENERAL APPEARANCE: alert and no distress  HENT: mouth without ulcers or lesions  LYMPHATICS: no cervical or supraclavicular nodes  RESP: lungs clear to auscultation - no rales, rhonchi or wheezes  CV: regular rhythm, normal rate, no rub, no murmur  EDEMA: no LE edema bilaterally  ABDOMEN: soft, nondistended, nontender, bowel sounds normal  MS: extremities normal - no gross deformities noted, no evidence of " inflammation in joints, no muscle tenderness  SKIN: no rash    Results:       A/P:     1. CKD:  Previous transplant.  Cr has been stable   2. Wailist status: 5 years active.  Current health and functional status will be reviewed by the Multidisciplinary Selection Committee for final recommendations.  3. KDPI: We discussed approximate remaining wait time and how that is influenced by issues such as blood type and sensitization (PRA) and access to a living donor. I contrasted potential waiting time for living vs  donor kidneys from  normal (0-85%) or higher (%) kidney donor profile index (KDPI) donors and their associated outcomes. I would not recommend Mr. Pierce to consider kidneys from high KDPI donors. The reason for this decision is best summarized as: improved long term graft survival.    [id number]    Karena Roy, CNP

## 2018-02-26 NOTE — NURSING NOTE
"Chief Complaint   Patient presents with     RECHECK     Follow up.        Initial /81  Pulse 78  Ht 1.778 m (5' 10\")  Wt 87.1 kg (192 lb)  SpO2 96%  BMI 27.55 kg/m2 Estimated body mass index is 27.55 kg/(m^2) as calculated from the following:    Height as of this encounter: 1.778 m (5' 10\").    Weight as of this encounter: 87.1 kg (192 lb).  Medication Reconciliation: complete    "

## 2018-02-26 NOTE — MR AVS SNAPSHOT
"              After Visit Summary   2/26/2018    Raj Pierce    MRN: [id number]           Patient Information     Date Of Birth          1970        Visit Information        Provider Department      2/26/2018 9:30 AM Dariel Chavez MD [facility] Organ Transplant        Today's Diagnoses     Kidney replaced by transplant    -  1       Follow-ups after your visit        Who to contact     If you have questions or need follow up information about today's clinic visit or your schedule please contact [facility] SOLID ORGAN TRANSPLANT directly at [phone].  Normal or non-critical lab and imaging results will be communicated to you by [employer], letter or phone within 4 business days after the clinic has received the results. If you do not hear from us within 7 days, please contact the clinic through [employer] or phone. If you have a critical or abnormal lab result, we will notify you by phone as soon as possible.  Submit refill requests through [employer] or call your pharmacy and they will forward the refill request to us. Please allow 3 business days for your refill to be completed.          Additional Information About Your Visit        MyChart Information     [employer] gives you secure access to your electronic health record. If you see a primary care provider, you can also send messages to your care team and make appointments. If you have questions, please call your primary care clinic.  If you do not have a primary care provider, please call [phone] and they will assist you.        Care EveryWhere ID     This is your Care EveryWhere ID. This could be used by other organizations to access your [place] medical records  [id number]        Your Vitals Were     Pulse Height Pulse Oximetry BMI (Body Mass Index)          78 1.778 m (5' 10\") 96% 27.55 kg/m2         Blood Pressure from Last 3 Encounters:   02/26/18 139/81   11/27/17 136/87   06/05/17 138/87    Weight from Last 3 Encounters:   02/26/18 " 87.1 kg (192 lb)   11/27/17 95.5 kg (210 lb 8 oz)   06/05/17 92.8 kg (204 lb 9.6 oz)              Today, you had the following     No orders found for display       Primary Care Provider Office Phone # Fax #    Do Guerrero [phone]       [place] [address]        Equal Access to Services     MANUELA VELAZCO : Hadii aad ku hadasho Soomaali, waaxda luqadaha, qaybta kaalmada adeegyada, waxay idiin hayaan adeeg kharash mai . So [facility] [phone].    ATENCIÓN: Si merari sow, tiene a garcia disposición servicios gratuitos de asistencia lingüística. Llame al [phone].    We comply with applicable federal civil rights laws and [place] laws. We do not discriminate on the basis of race, color, national origin, age, disability, sex, sexual orientation, or gender identity.            Thank you!     Thank you for choosing [facility] SOLID ORGAN TRANSPLANT  for your care. Our goal is always to provide you with excellent care. Hearing back from our patients is one way we can continue to improve our services. Please take a few minutes to complete the written survey that you may receive in the mail after your visit with us. Thank you!             Your Updated Medication List - Protect others around you: Learn how to safely use, store and throw away your medicines at www.disposemymeds.org.          This list is accurate as of 2/26/18 11:59 PM.  Always use your most recent med list.                   Brand Name Dispense Instructions for use Diagnosis    amLODIPine 10 MG tablet    NORVASC    30 tablet    TAKE 1 TABLET BY MOUTH   ONCE DAILY    Hypertension secondary to other renal disorders       calcitRIOL 0.25 MCG capsule    ROCALTROL    14 capsule    Take 1 capsule (0.25 mcg) by mouth three times a week (Mondays, Wednesdays, Fridays)    Kidney replaced by transplant, Secondary renal hyperparathyroidism (H)       chlorthalidone 25 MG tablet    HYGROTON    15 tablet    TAKE  1/2 TABLET BY MOUTH EVERY DAY    Hypertension secondary to other renal disorders       citalopram 20 MG tablet    celeXA     Take 20 mg by mouth daily.        losartan 25 MG tablet    COZAAR    90 tablet    TAKE 1 TABLET (25 MG) BY MOUTH DAILY    Post-transplant erythrocytosis, Hypertension secondary to other renal disorders (CODE)       magnesium oxide 400 MG tablet    MAG-OX    30 tablet    Take 1 tablet (400 mg) by mouth daily    Kidney replaced by transplant       mycophenolate 250 MG capsule    GENERIC EQUIVALENT    180 capsule    Take 3 capsules (750 mg) by mouth 2 times daily    Kidney replaced by transplant, Secondary renal hyperparathyroidism (H)       SIMVASTATIN PO      Take 10 mg by mouth At Bedtime        sulfamethoxazole-trimethoprim 400-80 MG per tablet    BACTRIM/SEPTRA    13 tablet    Take 1 tablet by mouth Every Mon, Wed, Fri Morning    Kidney transplanted       * tacrolimus 0.5 MG capsule    GENERIC EQUIVALENT    60 capsule    HOLD    Kidney transplant recipient, Long-term use of immunosuppressant medication       * tacrolimus 1 MG capsule    GENERIC EQUIVALENT    60 capsule    Take 1 capsule (1 mg) by mouth 2 times daily    Kidney transplant recipient, Long-term use of immunosuppressant medication       * Notice:  This list has 2 medication(s) that are the same as other medications prescribed for you. Read the directions carefully, and ask your doctor or other care provider to review them with you.

## 2018-02-26 NOTE — LETTER
2/26/2018      RE: Raj Pierce  [address]  [place] [address]       Immunosuppression Note:    Raj Pierce is a 47 year old male who is seen today  for immunosuppression management     IDariel MD, I have examined the patient with the resident/PA/Fellow, discussed and agree with the note and findings.  I have reviewed today's vital signs, medications, labs and imaging. I reviewed the immunosuppression medications and levels. I spoke to the patient/family and explained below clinical details and answered all the questions      Kidney Transplant Waitlist Clinic    Medical record number: [id number]  YOB: 1970,   Date of Visit:  02/26/2018    S: Mr. Pierce is in clinic today for kidney transplant waitlist updating.   Patient had a previous transplant with a  Small kidney that has not been functioning well.      Last Evaluation Clinic Visit Date:  Interval health problems since last visit: none.  Waitlist Data:  OS cPRA   Date Value Ref Range Status   06/16/2017 19  Final       Wait List Date:  7/24/  OS Qualifying Wait Time:   Status: Active/Inactive (either autopop or change to Active Status: Yes/No pull down)  Previous Transplant History: 10/13/2015 (Kidney)   Transplant Coordinator: Aggie Collier     Transplant Office Phone Number: [phone]     Summary of pertinent issues:           No   Yes  Dialysis:    [x]      [] via:    Can you link to the Dialysis section in [facility]?   Blood Transfusion                  [x]      []  Number of units:   Most recently:  Pregnancies:    [x]      [] Number:       Previous Transplant:  []      [x] Details:  2015  Makes Urine?   []      [x] Amount per day:  Few cups  Bladder dysfunction?  [x]      [] Cause:    Claudication   [x]      [] Distance:    Previous Amputation  [x]      [] Cause:     Cancer    [x]      [] Comment:   Recurrent infections  [x]      []  Type:                  On  Immunosuppression? []      [x] Comment:     BID, Prograf 1mg BID   Chronic anticoagulation? []      [] Indication:   Quaker  [x]      []    Potential Donor(s)  [x]      []      ROS:  A comprehensive review of systems was obtained and negative, except as noted in the HPI or PMH.    PMH:   Medical records were reviewed.  Past Medical History:   Diagnosis Date     Acidosis      Chronic kidney disease, stage IV (severe) (H)      Disorders of phosphorus metabolism      HTN (hypertension)     15 years     Hyperparathyroidism      Polycystic kidney, autosomal dominant      Pure hypercholesterolemia      Sleep apnea     cpap     Vitamin D deficiency        PSH:   Past Surgical History:   Procedure Laterality Date     BENCH KIDNEY N/A 10/13/2015    Procedure: BENCH KIDNEY;  Surgeon: Dariel Chavez MD;  Location: [facility] OR     CYSTOSCOPY, REMOVE STENT(S), COMBINED Right 2015    Procedure: COMBINED CYSTOSCOPY, REMOVE STENT(S);  Surgeon: Dariel Chavez MD;  Location: [facility] OR     HERNIORRHAPHY INGUINAL CHILD      right     TONSILLECTOMY       TRANSPLANT KIDNEY RECIPIENT  DONOR N/A 10/13/2015    Procedure: TRANSPLANT KIDNEY RECIPIENT  DONOR;  Surgeon: Dariel Chavez MD;  Location: [facility] OR       Personal Hx:   Social History     Social History     Marital status:      Spouse name: Dianne     Number of children: 2     Years of education: 13     Occupational History      [employer]     Social History Main Topics     Smoking status: Never Smoker     Smokeless tobacco: Never Used     Alcohol use 6.0 oz/week     10 Standard drinks or equivalent per week      Comment: 1 drink daily      Drug use: No     Sexual activity: Yes     Partners: Female     Other Topics Concern     Blood Transfusions No     Seat Belt Yes     Social History Narrative       Allergies:  Allergies   Allergen Reactions     Alcohol Rash     Other reaction(s): Rash  Swabs used [facility]  "hosptial  [facility]       Medications:  Prior to Admission medications    Medication Sig Start Date End Date Taking? Authorizing Provider   tacrolimus (GENERIC EQUIVALENT) 0.5 MG capsule HOLD 1/4/18  Yes Francis Espinoza MD   tacrolimus (GENERIC EQUIVALENT) 1 MG capsule Take 1 capsule (1 mg) by mouth 2 times daily 1/4/18  Yes Francis Espinoza MD   losartan (COZAAR) 25 MG tablet TAKE 1 TABLET (25 MG) BY MOUTH DAILY 11/27/17  Yes Francis Espinoza MD   sulfamethoxazole-trimethoprim (BACTRIM/SEPTRA) 400-80 MG per tablet Take 1 tablet by mouth Every Mon, Wed, Fri Morning 9/29/17  Yes Francis Espinoza MD   mycophenolate (CELLCEPT - GENERIC EQUIVALENT) 250 MG capsule Take 3 capsules (750 mg) by mouth 2 times daily 7/21/17  Yes Francis Espinoza MD   calcitRIOL (ROCALTROL) 0.25 MCG capsule Take 1 capsule (0.25 mcg) by mouth three times a week (Mondays, Wednesdays, Fridays) 7/21/17  Yes Francis Espinoza MD   chlorthalidone (HYGROTON) 25 MG tablet TAKE 1/2 TABLET BY MOUTH EVERY DAY 3/9/17  Yes Francis Espinoza MD   amLODIPine (NORVASC) 10 MG tablet TAKE 1 TABLET BY MOUTH   ONCE DAILY 3/9/17  Yes Francis Espinoza MD   magnesium oxide (MAG-OX) 400 MG tablet Take 1 tablet (400 mg) by mouth daily 3/15/16  Yes Francis Espinoza MD   SIMVASTATIN PO Take 10 mg by mouth At Bedtime   Yes Reported, Patient   citalopram (CELEXA) 20 MG tablet Take 20 mg by mouth daily.   Yes Reported, Patient        O: Vitals: /81  Pulse 78  Ht 1.778 m (5' 10\")  Wt 87.1 kg (192 lb)  SpO2 96%  BMI 27.55 kg/m2  BMI= Body mass index is 27.55 kg/(m^2).  Abdomen:  Femoral Pulses:     Exam:  GENERAL APPEARANCE: alert and no distress  HENT: mouth without ulcers or lesions  LYMPHATICS: no cervical or supraclavicular nodes  RESP: lungs clear to auscultation - no rales, rhonchi or wheezes  CV: regular rhythm, normal rate, no rub, no murmur  EDEMA: no LE edema bilaterally  ABDOMEN: soft, " nondistended, nontender, bowel sounds normal  MS: extremities normal - no gross deformities noted, no evidence of inflammation in joints, no muscle tenderness  SKIN: no rash    Results:       A/P:     1. CKD:  Previous transplant.  Cr has been stable   2. Wailist status: 5 years active.  Current health and functional status will be reviewed by the Multidisciplinary Selection Committee for final recommendations.  3. KDPI: We discussed approximate remaining wait time and how that is influenced by issues such as blood type and sensitization (PRA) and access to a living donor. I contrasted potential waiting time for living vs  donor kidneys from  normal (0-85%) or higher (%) kidney donor profile index (KDPI) donors and their associated outcomes. I would not recommend Mr. Pierce to consider kidneys from high KDPI donors. The reason for this decision is best summarized as: improved long term graft survival.    Total time: 25 minutes  Counselling time: 15 minutes    JUAN Tyson MD

## 2018-03-02 DIAGNOSIS — Z48.298 AFTERCARE FOLLOWING ORGAN TRANSPLANT: ICD-10-CM

## 2018-03-02 PROCEDURE — 80197 ASSAY OF TACROLIMUS: CPT | Performed by: INTERNAL MEDICINE

## 2018-03-04 LAB
TACROLIMUS BLD-MCNC: 4.8 UG/L (ref 5–15)
TME LAST DOSE: ABNORMAL H

## 2018-03-06 ENCOUNTER — TELEPHONE (OUTPATIENT)
Dept: TRANSPLANT | Facility: CLINIC | Age: 48
End: 2018-03-06

## 2018-03-06 NOTE — TELEPHONE ENCOUNTER
Provider Call: Transplant Lab  Facility Name: [facility], fax [phone]  Facility Location: [place]  Reason for Call: Annual lab reorder  Callback needed? Yes  Return Call Needed  Same as documented in contacts section

## 2018-03-06 NOTE — LETTER
[facility]  Room 2-200  [facility],  [address]  [address]  Tel [phone]  Toll Free [phone]                OUTPATIENT LABORATORY TEST ORDER    Patient Name: Raj Pierce  Transplant Date: 10/13/2015 (Kidney)  YOB: 1970  Issue Date & Time:March 6, 20181:54 PM    [facility] MR:  [id number]  Exp. Date (1 year after date issued)      Diagnoses: Kidney Transplant (ICD-10  Z94.0)   Long term use of medications (ICD-10  Z79.899)     Lab results to be available on the same day drawn.   Patient should release information to the [facility].  Please fax to the Transplant Center at [phone].    Monthly    ?Hemogram and Platelet   ?Basic Metabolic Panel (Sodium, Potassium, Chloride, CO2, Creatinine, BUN, Glucose, Calcium)   ?/Tacrolimus/Prograf drug level (mail to [facility] - mailers and instructions provided by the patient)    Every 3 Months   ?Urine for protein/creatinine    Every 6 Months                  ?BK (Polyoma Virus) PCR Quantitative - Plasma                                             Yearly   ? PRA/DSA level (mailers provided by the patient)      If you have any questions, please call [facility] at [phone] or [phone].    Please fax labs to [phone]  .

## 2018-03-22 ENCOUNTER — DOCUMENTATION ONLY (OUTPATIENT)
Dept: TRANSPLANT | Facility: CLINIC | Age: 48
End: 2018-03-22

## 2018-03-22 DIAGNOSIS — Z76.82 AWAITING ORGAN TRANSPLANT: Primary | ICD-10-CM

## 2018-03-22 NOTE — PROGRESS NOTES
PATHOLOGY HLA CROSSMATCH CONSULTATION: DONOR/RECIPIENT VIRTUAL CROSSMATCH - Kidney  Consultation Date: 3/22/2018  Consultation Requested by: Dr. Rapp  Regarding: Compatibility of  donor organ UNOS #[id number]  from OPO/Hospital: [facility]/[place], [place] with patient Raj Pierce       Findings: Regarding a virtual crossmatch between Raj Pierce and  donor listed above (match ID [id number]):  The most recent and peak patient sera were analyzed.  The patient has 1 donor-specific antibody(ies)  (DSA) as listed in table below. No other antibodies listed with specificity against donor organ.      ANTIBODY MOST RECENT SERUM (mfi) 12.15.17 Peak Serum #1 (mfi)  07.01.15     DP1  - 600       Record Review Indicates: I personally reviewed the most recent serum and the  peak serum/sera.  In addition, I analyzed 11 more sera:  The patient has antibodies against the donor organ.   Based on historical data from this hospital's histocompatibility lab, using the sum mfi of the patient's DSA to antibodies with specificity against this donor organ, the probability of a positive B cell crossmatch is  7%  for the peak serum.  DSA to C-locus antigens were not considered in deriving the probability of positive crossmatch because DSA to C-locus antigens rarely contribute to a positive lymphocyte crossmatch test.    The results of this virtual XM are:   -most recent serum: Compatible  -peak #1:  Likely compatible    Disclaimer: Clinical judgement must take into account other factors, such as non-HLA antibodies not detected in the assay.   The VXM gives probabilities only.  The probability does not account for the potential for auto-antibodies that may be present in the patient's serum.  These autoantibodies may render the physical crossmatch falsely positive, and would be detected by an autologous crossmatch.  When possible, confirm findings with a prospective allogeneic and autologous flow crossmatches before going to  transplant.    Anne Marshall MD  Medical Director, Immunology/Histocompatibility Laboratory

## 2018-04-06 DIAGNOSIS — Z48.298 AFTERCARE FOLLOWING ORGAN TRANSPLANT: ICD-10-CM

## 2018-04-06 PROCEDURE — 80197 ASSAY OF TACROLIMUS: CPT | Performed by: INTERNAL MEDICINE

## 2018-04-07 DIAGNOSIS — I15.1 HYPERTENSION SECONDARY TO OTHER RENAL DISORDERS: ICD-10-CM

## 2018-04-09 LAB
TACROLIMUS BLD-MCNC: 3.5 UG/L (ref 5–15)
TME LAST DOSE: ABNORMAL H

## 2018-04-09 RX ORDER — AMLODIPINE BESYLATE 10 MG/1
TABLET ORAL
Qty: 30 TABLET | Refills: 12 | Status: SHIPPED | OUTPATIENT
Start: 2018-04-09 | End: 2019-10-28

## 2018-04-09 RX ORDER — CHLORTHALIDONE 25 MG/1
TABLET ORAL
Qty: 15 TABLET | Refills: 12 | Status: SHIPPED | OUTPATIENT
Start: 2018-04-09 | End: 2019-05-17

## 2018-04-10 ENCOUNTER — TELEPHONE (OUTPATIENT)
Dept: TRANSPLANT | Facility: CLINIC | Age: 48
End: 2018-04-10

## 2018-04-10 NOTE — TELEPHONE ENCOUNTER
ISSUE:   Tac 3.5 (goal 4-6)    PLAN:  Verify tacrolimus dose and if level was a good 12 hour trough.   If accurate, INCREASE tacrolimus to 1.5 mg every morning and 1 mg every evening from 1 mg BID.  Repeat tac level in 1-2 weeks.     LPN TASK:   Call pt with instructions per plan.   Enter rx/lab orders

## 2018-04-10 NOTE — LETTER
PHYSICIAN ORDERS      DATE & TIME ISSUED: 2018 11:18 AM  PATIENT NAME: Raj Pierce   : 1970     [facility] MR# [if applicable]: [id number]     DIAGNOSIS:  Kidney transplant  ICD-9 CODE: Z94.0      Please repeat tacrolimus level in 1-2 weeks and return call to the transplant office to discuss your results.     Any questions please call: [phone] Option #5    Please fax these results to [phone].    .

## 2018-04-10 NOTE — TELEPHONE ENCOUNTER
Call placed to patient. No answer. Detailed voice message left with instructions listed below. Will try back.

## 2018-04-11 NOTE — TELEPHONE ENCOUNTER
Call returned to patient: No answer. Detailed voice message left requesting patient increase his tacrolimus level to 1.5 mg in the AM and 1 mg in the PM and repeat level in 1-2 week if accurate trough level and repeat level in 1-2 weeks if not an accurate trough level. Order sent

## 2018-05-11 DIAGNOSIS — Z48.298 AFTERCARE FOLLOWING ORGAN TRANSPLANT: ICD-10-CM

## 2018-05-11 PROCEDURE — 80197 ASSAY OF TACROLIMUS: CPT | Performed by: INTERNAL MEDICINE

## 2018-05-13 LAB
TACROLIMUS BLD-MCNC: 3.5 UG/L (ref 5–15)
TME LAST DOSE: ABNORMAL H

## 2018-05-14 ENCOUNTER — TELEPHONE (OUTPATIENT)
Dept: TRANSPLANT | Facility: CLINIC | Age: 48
End: 2018-05-14

## 2018-05-14 NOTE — TELEPHONE ENCOUNTER
Left message for patient regarding tac level = 3.5, below goal.  Instructed patient return call and confirm current dose and good 12 hour trough level.  If both accurate, patient should increase dose to 1.5 mg BID and repeat labs.

## 2018-05-14 NOTE — TELEPHONE ENCOUNTER
ISSUE:  Tac level 3.5 (goal 4-6)    PLAN/TASK:  Confirm current dose of 1.5mg in the am and 1mg pm - and good 12 hour trough  Increase dose to 1.5mg twice daily and recheck level end of this week  Enter order, update rx. Thanks!

## 2018-05-15 NOTE — TELEPHONE ENCOUNTER
Left message for patient regarding tac level = 3.5, below goal.  Instructed patient return call and confirm current dose and good 12 hour trough level.  If both accurate, patient should increase dose to 1.5 mg BID and repeat labs

## 2018-06-15 DIAGNOSIS — Z48.298 AFTERCARE FOLLOWING ORGAN TRANSPLANT: ICD-10-CM

## 2018-06-15 PROCEDURE — 80197 ASSAY OF TACROLIMUS: CPT | Performed by: INTERNAL MEDICINE

## 2018-06-17 LAB
TACROLIMUS BLD-MCNC: 4.8 UG/L (ref 5–15)
TME LAST DOSE: ABNORMAL H

## 2018-07-06 ENCOUNTER — TELEPHONE (OUTPATIENT)
Dept: TRANSPLANT | Facility: CLINIC | Age: 48
End: 2018-07-06

## 2018-07-06 ENCOUNTER — DOCUMENTATION ONLY (OUTPATIENT)
Dept: TRANSPLANT | Facility: CLINIC | Age: 48
End: 2018-07-06

## 2018-07-06 DIAGNOSIS — I10 HYPERTENSION: ICD-10-CM

## 2018-07-06 DIAGNOSIS — N18.6 END STAGE RENAL DISEASE (H): ICD-10-CM

## 2018-07-06 DIAGNOSIS — Z76.82 ORGAN TRANSPLANT CANDIDATE: Primary | ICD-10-CM

## 2018-07-06 DIAGNOSIS — Z12.5 PROSTATE CANCER SCREENING: ICD-10-CM

## 2018-07-06 NOTE — TELEPHONE ENCOUNTER
Spoke to patient and confirmed he is not currently on dialysis. In looking at provider notes, patient is due for annual f/u and PKD check by surgeon. Orders were placed. Patient verbalized understanding and has no further questions at this time.

## 2018-07-13 ENCOUNTER — RESULTS ONLY (OUTPATIENT)
Dept: OTHER | Facility: CLINIC | Age: 48
End: 2018-07-13

## 2018-07-13 DIAGNOSIS — N18.6 END STAGE RENAL DISEASE (H): ICD-10-CM

## 2018-07-13 DIAGNOSIS — Z12.5 PROSTATE CANCER SCREENING: ICD-10-CM

## 2018-07-13 DIAGNOSIS — Z76.82 ORGAN TRANSPLANT CANDIDATE: ICD-10-CM

## 2018-07-13 DIAGNOSIS — I10 HYPERTENSION: ICD-10-CM

## 2018-07-13 PROCEDURE — 80197 ASSAY OF TACROLIMUS: CPT | Performed by: INTERNAL MEDICINE

## 2018-07-16 LAB — PRA SINGLE ANTIGEN IGG ANTIBODY: NORMAL

## 2018-07-20 LAB — UNOS CPRA: 10

## 2018-08-20 DIAGNOSIS — N25.81 SECONDARY RENAL HYPERPARATHYROIDISM (H): ICD-10-CM

## 2018-08-20 DIAGNOSIS — Z94.0 KIDNEY REPLACED BY TRANSPLANT: ICD-10-CM

## 2018-08-20 RX ORDER — MYCOPHENOLATE MOFETIL 250 MG/1
CAPSULE ORAL
Qty: 180 CAPSULE | Refills: 11 | Status: SHIPPED | OUTPATIENT
Start: 2018-08-20 | End: 2019-09-10

## 2018-08-31 ENCOUNTER — HOSPITAL ENCOUNTER (OUTPATIENT)
Facility: CLINIC | Age: 48
Setting detail: SPECIMEN
Discharge: HOME | End: 2018-08-31
Admitting: INTERNAL MEDICINE
Payer: MEDICARE

## 2018-08-31 PROCEDURE — 80197 ASSAY OF TACROLIMUS: CPT | Performed by: INTERNAL MEDICINE

## 2018-09-01 DIAGNOSIS — Z48.298 AFTERCARE FOLLOWING ORGAN TRANSPLANT: ICD-10-CM

## 2018-09-02 LAB
TACROLIMUS BLD-MCNC: 4.8 UG/L (ref 5–15)
TME LAST DOSE: ABNORMAL H

## 2018-10-25 DIAGNOSIS — Z94.0 KIDNEY TRANSPLANTED: ICD-10-CM

## 2018-10-25 RX ORDER — SULFAMETHOXAZOLE AND TRIMETHOPRIM 400; 80 MG/1; MG/1
TABLET ORAL
Qty: 12 TABLET | Refills: 11 | Status: SHIPPED | OUTPATIENT
Start: 2018-10-25 | End: 2019-11-18

## 2018-11-01 ENCOUNTER — OFFICE VISIT (OUTPATIENT)
Dept: TRANSPLANT | Facility: CLINIC | Age: 48
End: 2018-11-01
Attending: INTERNAL MEDICINE
Payer: COMMERCIAL

## 2018-11-01 ENCOUNTER — RADIANT APPOINTMENT (OUTPATIENT)
Dept: ULTRASOUND IMAGING | Facility: CLINIC | Age: 48
End: 2018-11-01
Attending: INTERNAL MEDICINE
Payer: COMMERCIAL

## 2018-11-01 ENCOUNTER — RADIANT APPOINTMENT (OUTPATIENT)
Dept: CARDIOLOGY | Facility: CLINIC | Age: 48
End: 2018-11-01
Attending: INTERNAL MEDICINE
Payer: COMMERCIAL

## 2018-11-01 VITALS
WEIGHT: 205.8 LBS | SYSTOLIC BLOOD PRESSURE: 132 MMHG | TEMPERATURE: 97.5 F | OXYGEN SATURATION: 97 % | DIASTOLIC BLOOD PRESSURE: 86 MMHG | HEIGHT: 70 IN | BODY MASS INDEX: 29.46 KG/M2 | HEART RATE: 86 BPM

## 2018-11-01 DIAGNOSIS — N18.6 END STAGE RENAL DISEASE (H): ICD-10-CM

## 2018-11-01 DIAGNOSIS — Z12.5 PROSTATE CANCER SCREENING: ICD-10-CM

## 2018-11-01 DIAGNOSIS — Z76.82 ORGAN TRANSPLANT CANDIDATE: ICD-10-CM

## 2018-11-01 DIAGNOSIS — Z94.0 KIDNEY REPLACED BY TRANSPLANT: Primary | ICD-10-CM

## 2018-11-01 DIAGNOSIS — I10 HYPERTENSION: ICD-10-CM

## 2018-11-01 DIAGNOSIS — Z48.298 AFTERCARE FOLLOWING ORGAN TRANSPLANT: ICD-10-CM

## 2018-11-01 DIAGNOSIS — I10 HYPERTENSION, UNSPECIFIED TYPE: ICD-10-CM

## 2018-11-01 DIAGNOSIS — Z79.60 LONG-TERM USE OF IMMUNOSUPPRESSANT MEDICATION: ICD-10-CM

## 2018-11-01 DIAGNOSIS — Z76.82 AWAITING ORGAN TRANSPLANT: ICD-10-CM

## 2018-11-01 LAB
ABO + RH BLD: NORMAL
ABO + RH BLD: NORMAL
BLOOD BANK CMNT PATIENT-IMP: NORMAL
BLOOD BANK CMNT PATIENT-IMP: NORMAL
PSA SERPL-ACNC: 1.17 UG/L (ref 0–4)
SPECIMEN EXP DATE BLD: NORMAL

## 2018-11-01 PROCEDURE — 86905 BLOOD TYPING RBC ANTIGENS: CPT | Performed by: INTERNAL MEDICINE

## 2018-11-01 PROCEDURE — 80197 ASSAY OF TACROLIMUS: CPT | Performed by: INTERNAL MEDICINE

## 2018-11-01 PROCEDURE — G0103 PSA SCREENING: HCPCS | Performed by: INTERNAL MEDICINE

## 2018-11-01 PROCEDURE — 86850 RBC ANTIBODY SCREEN: CPT

## 2018-11-01 PROCEDURE — 86900 BLOOD TYPING SEROLOGIC ABO: CPT | Performed by: INTERNAL MEDICINE

## 2018-11-01 PROCEDURE — 36415 COLL VENOUS BLD VENIPUNCTURE: CPT | Performed by: INTERNAL MEDICINE

## 2018-11-01 PROCEDURE — 86886 COOMBS TEST INDIRECT TITER: CPT | Performed by: INTERNAL MEDICINE

## 2018-11-01 ASSESSMENT — PAIN SCALES - GENERAL: PAINLEVEL: NO PAIN (0)

## 2018-11-01 NOTE — LETTER
11/1/2018       RE: Raj Pierce  [address]  [place] [address]     Dear Colleague,    Thank you for referring your patient, Raj Pierce, to the [facility] at [facility]. Please see a copy of my visit note below.    Kidney Transplant Waitlist Clinic    Medical record number: [id number]  YOB: 1970,   Date of Visit:  11/01/2018    S: Mr. Pierce is in clinic today for kidney transplant waitlist updating. Last Evaluation Clinic Visit Date:  2/26/2018 Interval health problems since last visit: None.  .  Waitlist Data:  B  Wait List Date:  7/24/2013  UNOS Qualifying Wait Time: 5  Status: Active  Previous Transplant History: 10/13/2015 (Kidney)   Transplant Coordinator: Rylee Espinal     Transplant Office Phone Number: [phone]   Summary of pertinent issues:                                                                                                                                                                                   No   Yes  Dialysis:                                                                     [x]      []                 via:    Can you link to the Dialysis section in [email or web address]?   Blood Transfusion                                 [x]      []                 Number of units:                       Most recently:  Pregnancies:                                                             [x]      []                 Number:       Previous Transplant:                                       []      [x]                 Details:  2015  Makes Urine?                                                            []      [x]                 Amount per day:  Few cups  Bladder dysfunction?                                       [x]      []                 Cause:    Claudication                                                               [x]      []                 Distance:    Previous Amputation                                        [x]      []                 Cause:     Cancer                                                                                 [x]      []                 Comment:   Recurrent infections                                        [x]      []                 Type:                  On Immunosuppression?                      []      [x]                 Comment:     BID, Prograf 1mg BID   Chronic anticoagulation?                       []      []                 Indication:   Islam                                          [x]      []     Potential Donor(s)                                        [x]      []    ROS:  A comprehensive review of systems was obtained and negative, except as noted in the HPI or PMH.    PMH:   Medical records were obtained and reviewed.  Past Medical History:   Diagnosis Date     Acidosis      Chronic kidney disease, stage IV (severe) (H)      Disorders of phosphorus metabolism      HTN (hypertension)     15 years     Hyperparathyroidism      Polycystic kidney, autosomal dominant      Pure hypercholesterolemia      Sleep apnea     cpap     Vitamin D deficiency        PSH:   Past Surgical History:   Procedure Laterality Date     BENCH KIDNEY N/A 10/13/2015    Procedure: BENCH KIDNEY;  Surgeon: Dariel Chavez MD;  Location: [facility] OR     CYSTOSCOPY, REMOVE STENT(S), COMBINED Right 2015    Procedure: COMBINED CYSTOSCOPY, REMOVE STENT(S);  Surgeon: Dariel Chavez MD;  Location: [facility] OR     HERNIORRHAPHY INGUINAL CHILD      right     TONSILLECTOMY       TRANSPLANT KIDNEY RECIPIENT  DONOR N/A 10/13/2015    Procedure: TRANSPLANT KIDNEY RECIPIENT  DONOR;  Surgeon: Dariel Chavez MD;  Location: [facility] OR       Personal Hx:   Social History     Social History     Marital status:      Spouse name: Dianne     Number of children: 2     Years of education: 13     Occupational History      [employer]      Social History Main Topics     Smoking status: Never Smoker     Smokeless tobacco: Never Used     Alcohol use 6.0 oz/week     10 Standard drinks or equivalent per week      Comment: 1 drink daily      Drug use: No     Sexual activity: Yes     Partners: Female     Other Topics Concern     Blood Transfusions No     Seat Belt Yes     Social History Narrative       Allergies:  Allergies   Allergen Reactions     Alcohol Rash     Other reaction(s): Rash  Swabs used at hosptial  Swabs used at hosptial       Medications:  Prior to Admission medications    Medication Sig Start Date End Date Taking? Authorizing Provider   amLODIPine (NORVASC) 10 MG tablet TAKE 1 TABLET BY MOUTH ONCE DAILY 4/9/18  Yes Francis Espinoza MD   calcitRIOL (ROCALTROL) 0.25 MCG capsule Take 1 capsule (0.25 mcg) by mouth three times a week (Mondays, Wednesdays, Fridays) 7/21/17  Yes Francis Espinoza MD   chlorthalidone (HYGROTON) 25 MG tablet TAKE 1/2 TABLET BY MOUTH EVERY DAY 4/9/18  Yes Francis Espinoza MD   citalopram (CELEXA) 20 MG tablet Take 20 mg by mouth daily.   Yes Reported, Patient   losartan (COZAAR) 25 MG tablet TAKE 1 TABLET (25 MG) BY MOUTH DAILY 11/27/17  Yes Francis Espinoza MD   magnesium oxide (MAG-OX) 400 MG tablet Take 1 tablet (400 mg) by mouth daily 3/15/16  Yes Francis Espinoza MD   mycophenolate (GENERIC EQUIVALENT) 250 MG capsule TAKE 3 CAPSULES BY MOUTH TWICE A DAY 8/20/18  Yes Francis Espinoza MD   SIMVASTATIN PO Take 10 mg by mouth At Bedtime   Yes Reported, Patient   sulfamethoxazole-trimethoprim (BACTRIM/SEPTRA) 400-80 MG per tablet TAKE 1 TABLET BY MOUTH EVERY MON WED & FRI MORNING 10/25/18  Yes Francis Espinoza MD   tacrolimus (GENERIC EQUIVALENT) 0.5 MG capsule HOLD 1/4/18  Yes Francis Espinoza MD   tacrolimus (GENERIC EQUIVALENT) 1 MG capsule Take 1 capsule (1 mg) by mouth 2 times daily 1/4/18  Yes Francis Espinoza MD        O: Vitals: /86  Pulse 86   "Temp 97.5  F (36.4  C)  Ht 1.778 m (5' 10\")  Wt 93.4 kg (205 lb 12.8 oz)  SpO2 97%  BMI 29.53 kg/m2  BMI= Body mass index is 29.53 kg/(m^2).  Abdomen:  Femoral Pulses:     Exam:  GENERAL APPEARANCE: alert and no distress  HENT: mouth without ulcers or lesions  LYMPHATICS: no cervical or supraclavicular nodes  RESP: lungs clear to auscultation - no rales, rhonchi or wheezes  CV: regular rhythm, normal rate, no rub, no murmur/  +2 femoral pulses  EDEMA: no LE edema bilaterally  ABDOMEN: soft, nondistended, nontender, bowel sounds normal  MS: extremities normal - no gross deformities noted, no evidence of inflammation in joints, no muscle tenderness  SKIN: no rash        A/P:     1. ESRD: PKD  2. Wailist status: active.  Current health and functional status will be reviewed by the Multidisciplinary Selection Committee for final recommendations.  3. KDPI: We discussed approximate remaining wait time and how that is influenced by issues such as blood type and sensitization (PRA) and access to a living donor. I contrasted potential waiting time for living vs  donor kidneys from  normal (0-85%) or higher (%) kidney donor profile index (KDPI) donors and their associated outcomes. I would not recommend Mr. Pierce to consider kidneys from high KDPI donors. The reason for this decision is best summarized as: improved long term graft survival.    Total time: 45 minutes  Counselling time: 35 minutes    Karena Roy, CNP     Attestation:  Patient has been seen and evaluated by me.  Immunosuppression impact upon kidney function was discussed as being potentially deleterious.  He may benefit from change to belatacept, although at this time with degree of graft dysfunction and low tacro dose it may be too late.  Will defer to Dr Espinoza.  Vital signs, labs, medications and orders were reviewed.   When obtained, diagnostic images were reviewed by me and interpreted as above.    The care plan was discussed with " the multidisciplinary team and I agree with the findings and plan in this note, with any differences recorded in blue.    .

## 2018-11-01 NOTE — NURSING NOTE
"Chief Complaint   Patient presents with     RECHECK     Blood pressure 132/86, pulse 86, temperature 97.5  F (36.4  C), height 1.778 m (5' 10\"), weight 93.4 kg (205 lb 12.8 oz), SpO2 97 %.    John Engle CMA    "

## 2018-11-01 NOTE — PROGRESS NOTES
Kidney Transplant Waitlist Clinic    Medical record number: [id number]  YOB: 1970,   Date of Visit:  11/01/2018    S: Mr. Pierce is in clinic today for kidney transplant waitlist updating. Last Evaluation Clinic Visit Date:  2/26/2018 Interval health problems since last visit: None.  .  Waitlist Data:  B  Wait List Date:  7/24/2013  UNOS Qualifying Wait Time: 5  Status: Active  Previous Transplant History: 10/13/2015 (Kidney)   Transplant Coordinator: Rylee Espinal     Transplant Office Phone Number: [phone]   Summary of pertinent issues:                                                                                                                                                                                   No   Yes  Dialysis:                                                                     [x]      []                 via:    Can you link to the Dialysis section in [facility]?   Blood Transfusion                                 [x]      []                 Number of units:                       Most recently:  Pregnancies:                                                             [x]      []                 Number:       Previous Transplant:                                       []      [x]                 Details:  2015  Makes Urine?                                                            []      [x]                 Amount per day:  Few cups  Bladder dysfunction?                                       [x]      []                 Cause:    Claudication                                                               [x]      []                 Distance:    Previous Amputation                                       [x]      []                 Cause:     Cancer                                                                                 [x]      []                 Comment:   Recurrent infections                                        [x]      []                 Type:                   On Immunosuppression?                      []      [x]                 Comment:     BID, Prograf 1mg BID   Chronic anticoagulation?                       []      []                 Indication:   Synagogue                                          [x]      []     Potential Donor(s)                                        [x]      []    ROS:  A comprehensive review of systems was obtained and negative, except as noted in the HPI or PMH.    PMH:   Medical records were obtained and reviewed.  Past Medical History:   Diagnosis Date     Acidosis      Chronic kidney disease, stage IV (severe) (H)      Disorders of phosphorus metabolism      HTN (hypertension)     15 years     Hyperparathyroidism      Polycystic kidney, autosomal dominant      Pure hypercholesterolemia      Sleep apnea     cpap     Vitamin D deficiency        PSH:   Past Surgical History:   Procedure Laterality Date     BENCH KIDNEY N/A 10/13/2015    Procedure: BENCH KIDNEY;  Surgeon: Dariel Chavez MD;  Location: [facility] OR     CYSTOSCOPY, REMOVE STENT(S), COMBINED Right 2015    Procedure: COMBINED CYSTOSCOPY, REMOVE STENT(S);  Surgeon: Dariel Chavez MD;  Location: [facility] OR     HERNIORRHAPHY INGUINAL CHILD      right     TONSILLECTOMY       TRANSPLANT KIDNEY RECIPIENT  DONOR N/A 10/13/2015    Procedure: TRANSPLANT KIDNEY RECIPIENT  DONOR;  Surgeon: Dariel Chavez MD;  Location: [facility] OR       Personal Hx:   Social History     Social History     Marital status:      Spouse name: Dianne     Number of children: 2     Years of education: 13     Occupational History      [employer]     Social History Main Topics     Smoking status: Never Smoker     Smokeless tobacco: Never Used     Alcohol use 6.0 oz/week     10 Standard drinks or equivalent per week      Comment: 1 drink daily      Drug use: No     Sexual activity: Yes     Partners: Female     Other Topics Concern     Blood  "Transfusions No     Seat Belt Yes     Social History Narrative       Allergies:  Allergies   Allergen Reactions     Alcohol Rash     Other reaction(s): Rash  Swabs used at hosptial  Swabs used at hosptial       Medications:  Prior to Admission medications    Medication Sig Start Date End Date Taking? Authorizing Provider   amLODIPine (NORVASC) 10 MG tablet TAKE 1 TABLET BY MOUTH ONCE DAILY 4/9/18  Yes Francis Espinoza MD   calcitRIOL (ROCALTROL) 0.25 MCG capsule Take 1 capsule (0.25 mcg) by mouth three times a week (Mondays, Wednesdays, Fridays) 7/21/17  Yes Francis Espinoza MD   chlorthalidone (HYGROTON) 25 MG tablet TAKE 1/2 TABLET BY MOUTH EVERY DAY 4/9/18  Yes Francis Espinoza MD   citalopram (CELEXA) 20 MG tablet Take 20 mg by mouth daily.   Yes Reported, Patient   losartan (COZAAR) 25 MG tablet TAKE 1 TABLET (25 MG) BY MOUTH DAILY 11/27/17  Yes Francis Espinoza MD   magnesium oxide (MAG-OX) 400 MG tablet Take 1 tablet (400 mg) by mouth daily 3/15/16  Yes Francis Espinoza MD   mycophenolate (GENERIC EQUIVALENT) 250 MG capsule TAKE 3 CAPSULES BY MOUTH TWICE A DAY 8/20/18  Yes Francis Espinoza MD   SIMVASTATIN PO Take 10 mg by mouth At Bedtime   Yes Reported, Patient   sulfamethoxazole-trimethoprim (BACTRIM/SEPTRA) 400-80 MG per tablet TAKE 1 TABLET BY MOUTH EVERY MON WED & FRI MORNING 10/25/18  Yes Francis Espinoza MD   tacrolimus (GENERIC EQUIVALENT) 0.5 MG capsule HOLD 1/4/18  Yes Francis Espinoza MD   tacrolimus (GENERIC EQUIVALENT) 1 MG capsule Take 1 capsule (1 mg) by mouth 2 times daily 1/4/18  Yes Francis Espinoza MD        O: Vitals: /86  Pulse 86  Temp 97.5  F (36.4  C)  Ht 1.778 m (5' 10\")  Wt 93.4 kg (205 lb 12.8 oz)  SpO2 97%  BMI 29.53 kg/m2  BMI= Body mass index is 29.53 kg/(m^2).  Abdomen:  Femoral Pulses:     Exam:  GENERAL APPEARANCE: alert and no distress  HENT: mouth without ulcers or lesions  LYMPHATICS: no cervical or " supraclavicular nodes  RESP: lungs clear to auscultation - no rales, rhonchi or wheezes  CV: regular rhythm, normal rate, no rub, no murmur/  +2 femoral pulses  EDEMA: no LE edema bilaterally  ABDOMEN: soft, nondistended, nontender, bowel sounds normal. Unable to palpate kidneys.   MS: extremities normal - no gross deformities noted, no evidence of inflammation in joints, no muscle tenderness  SKIN: no rash        A/P:     1. ESRD: PKD  2. Wailist status: active.  Current health and functional status will be reviewed by the Multidisciplinary Selection Committee for final recommendations.  3. KDPI: We discussed approximate remaining wait time and how that is influenced by issues such as blood type and sensitization (PRA) and access to a living donor. I contrasted potential waiting time for living vs  donor kidneys from  normal (0-85%) or higher (%) kidney donor profile index (KDPI) donors and their associated outcomes. I would not recommend Mr. Pierce to consider kidneys from high KDPI donors. The reason for this decision is best summarized as: improved long term graft survival.  4. PKD:  Unable to palpate kidneys.  Room for transplanted kidney     Total time: 45 minutes  Counselling time: 35 minutes    Karena Roy, CNP     Attestation:  Patient has been seen and evaluated by me.  Immunosuppression impact upon kidney function was discussed as being potentially deleterious.  He may benefit from change to belatacept, although at this time with degree of graft dysfunction and low tacro dose it may be too late.  Will defer to Dr Espinoza.  Vital signs, labs, medications and orders were reviewed.   When obtained, diagnostic images were reviewed by me and interpreted as above.    The care plan was discussed with the multidisciplinary team and I agree with the findings and plan in this note, with any differences recorded in blue.    .

## 2018-11-01 NOTE — MR AVS SNAPSHOT
After Visit Summary   11/1/2018    Raj Pierce    MRN: [id number]           Patient Information     Date Of Birth          1970        Visit Information        Provider Department      11/1/2018 10:30 AM Fahad Ro MD [facility] Solid Organ Transplant        Today's Diagnoses     Kidney replaced by transplant    -  1    Awaiting organ transplant        Long-term use of immunosuppressant medication        Hypertension, unspecified type           Follow-ups after your visit        Your next 10 appointments already scheduled     Nov 20, 2018  4:00 PM CST   RETURN WAIT LIST with Nikki Gilmore PA-C   [facility] ([facility])    [address]  [address]  [facility] [id number] [phone]            Dec 21, 2018  2:00 PM CST   Return Kidney Transplant with Francis Espinoza MD   [facility] ([facility])    [address] [place] [id number] [phone]              Who to contact     If you have questions or need follow up information about today's clinic visit or your schedule please contact [facility] SOLID ORGAN TRANSPLANT directly at [phone].  Normal or non-critical lab and imaging results will be communicated to you by [employer], letter or phone within 4 business days after the clinic has received the results. If you do not hear from us within 7 days, please contact the clinic through [employer] or phone. If you have a critical or abnormal lab result, we will notify you by phone as soon as possible.  Submit refill requests through [employer] or call your pharmacy and they will forward the refill request to us. Please allow 3 business days for your refill to be completed.          Additional Information About Your Visit        MyChart Information     [employer] gives you secure access to your electronic health record. If you see a primary care provider, you can also send messages to your care team and  "make appointments. If you have questions, please call your primary care clinic.  If you do not have a primary care provider, please call [phone] and they will assist you.        Care EveryWhere ID     This is your Care EveryWhere ID. This could be used by other organizations to access your [facility] medical records  [id number]        Your Vitals Were     Pulse Temperature Height Pulse Oximetry BMI (Body Mass Index)       86 97.5  F (36.4  C) 1.778 m (5' 10\") 97% 29.53 kg/m2        Blood Pressure from Last 3 Encounters:   11/01/18 132/86   02/26/18 139/81   11/27/17 136/87    Weight from Last 3 Encounters:   11/01/18 93.4 kg (205 lb 12.8 oz)   02/26/18 87.1 kg (192 lb)   11/27/17 95.5 kg (210 lb 8 oz)              Today, you had the following     No orders found for display       Primary Care Provider Office Phone # Fax #    Do Yolanda, [phone]       [place] [phone]        Equal Access to Services     [facility] : Hadii aad ku hadasho Soingrisali, waaxda luqadaha, qaybta kaalmada adeegyada, adia oneill . So [facility] [phone].    ATENCIÓN: Si habla español, tiene a garcia disposición servicios gratuitos de asistencia lingüística. [facility] [phone].    We comply with applicable federal civil rights laws and [place] laws. We do not discriminate on the basis of race, color, national origin, age, disability, sex, sexual orientation, or gender identity.            Thank you!     Thank you for choosing [facility] SOLID ORGAN TRANSPLANT  for your care. Our goal is always to provide you with excellent care. Hearing back from our patients is one way we can continue to improve our services. Please take a few minutes to complete the written survey that you may receive in the mail after your visit with us. Thank you!             Your Updated Medication List - Protect others around you: Learn how to safely use, store and throw away " your medicines at www.disposemymeds.org.          This list is accurate as of 11/1/18  2:08 PM.  Always use your most recent med list.                   Brand Name Dispense Instructions for use Diagnosis    amLODIPine 10 MG tablet    NORVASC    30 tablet    TAKE 1 TABLET BY MOUTH ONCE DAILY    Hypertension secondary to other renal disorders       calcitRIOL 0.25 MCG capsule    ROCALTROL    14 capsule    Take 1 capsule (0.25 mcg) by mouth three times a week (Mondays, Wednesdays, Fridays)    Kidney replaced by transplant, Secondary renal hyperparathyroidism (H)       chlorthalidone 25 MG tablet    HYGROTON    15 tablet    TAKE 1/2 TABLET BY MOUTH EVERY DAY    Hypertension secondary to other renal disorders       citalopram 20 MG tablet    celeXA     Take 20 mg by mouth daily.        losartan 25 MG tablet    COZAAR    90 tablet    TAKE 1 TABLET (25 MG) BY MOUTH DAILY    Post-transplant erythrocytosis, Hypertension secondary to other renal disorders (CODE)       magnesium oxide 400 MG tablet    MAG-OX    30 tablet    Take 1 tablet (400 mg) by mouth daily    Kidney replaced by transplant       mycophenolate 250 MG capsule    GENERIC EQUIVALENT    180 capsule    TAKE 3 CAPSULES BY MOUTH TWICE A DAY    Kidney replaced by transplant, Secondary renal hyperparathyroidism (H)       SIMVASTATIN PO      Take 10 mg by mouth At Bedtime        sulfamethoxazole-trimethoprim 400-80 MG per tablet    BACTRIM/SEPTRA    12 tablet    TAKE 1 TABLET BY MOUTH EVERY MON WED & FRI MORNING    Kidney transplanted       * tacrolimus 0.5 MG capsule    GENERIC EQUIVALENT    60 capsule    HOLD    Kidney transplant recipient, Long-term use of immunosuppressant medication       * tacrolimus 1 MG capsule    GENERIC EQUIVALENT    60 capsule    Take 1 capsule (1 mg) by mouth 2 times daily    Kidney transplant recipient, Long-term use of immunosuppressant medication       * Notice:  This list has 2 medication(s) that are the same as other medications  prescribed for you. Read the directions carefully, and ask your doctor or other care provider to review them with you.

## 2018-11-02 LAB
BLD GP AB SCN SERPL QL: NORMAL
BLD GP AB SCN TITR SERPL: NORMAL {TITER}

## 2018-11-08 DIAGNOSIS — Z48.298 AFTERCARE FOLLOWING ORGAN TRANSPLANT: ICD-10-CM

## 2018-11-08 PROCEDURE — 80197 ASSAY OF TACROLIMUS: CPT | Performed by: INTERNAL MEDICINE

## 2018-11-11 LAB
TACROLIMUS BLD-MCNC: 5 UG/L (ref 5–15)
TME LAST DOSE: NORMAL H

## 2018-12-14 DIAGNOSIS — Z48.298 AFTERCARE FOLLOWING ORGAN TRANSPLANT: ICD-10-CM

## 2018-12-14 PROCEDURE — 80197 ASSAY OF TACROLIMUS: CPT | Performed by: INTERNAL MEDICINE

## 2018-12-16 LAB
TACROLIMUS BLD-MCNC: 3.7 UG/L (ref 5–15)
TME LAST DOSE: ABNORMAL H

## 2018-12-21 ENCOUNTER — OFFICE VISIT (OUTPATIENT)
Dept: NEPHROLOGY | Facility: CLINIC | Age: 48
End: 2018-12-21
Payer: COMMERCIAL

## 2018-12-21 VITALS
SYSTOLIC BLOOD PRESSURE: 142 MMHG | BODY MASS INDEX: 30.06 KG/M2 | WEIGHT: 210 LBS | DIASTOLIC BLOOD PRESSURE: 80 MMHG | HEIGHT: 70 IN | HEART RATE: 70 BPM | RESPIRATION RATE: 20 BRPM

## 2018-12-21 DIAGNOSIS — I15.1 HTN, KIDNEY TRANSPLANT RELATED: Primary | ICD-10-CM

## 2018-12-21 DIAGNOSIS — Z94.0 HTN, KIDNEY TRANSPLANT RELATED: Primary | ICD-10-CM

## 2018-12-21 RX ORDER — LOSARTAN POTASSIUM 50 MG/1
50 TABLET ORAL DAILY
Qty: 90 TABLET | Refills: 3 | Status: SHIPPED | OUTPATIENT
Start: 2018-12-21 | End: 2019-12-12

## 2018-12-21 ASSESSMENT — MIFFLIN-ST. JEOR: SCORE: 1818.8

## 2018-12-21 NOTE — LETTER
2018      RE: Raj Pierce  [address] [place] [address]       CHRONIC TRANSPLANT NEPHROLOGY VISIT    Assessment & Plan   # DDKT: Stable. 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  Patient's wait list time was restored back to 13.  Patient is now active on the kidney transpant waiting list, but recommend since he is still off dialysis, he only accept a very good kidney.   - Baseline Cr ~ 2.9-3.2;    - Proteinuria: Mild   - Date of DSA last checked: 2018 Latest DSA: No   - BK Viremia: No   - Kidney Tx Biopsy: 10/28/15, mild, resolving ATN, other findings consistent with dysplastic kidney.    # Immunosuppression: Tacrolimus immediate release (goal  4-6) and Mycophenolate mofetil (goal  1-3.5)   - Changes: No    # Hypertension: Borderline control; Goal BP: < 130/80   - Changes: Yes - Will increase losartan to 50 mg daily.    # Post-Transplant erythrocytosis: Hgb: Stable, now low normal.    # Mineral Bone Disorder:    - Secondary renal hyperparathyroidism; PTH level is: Normal with last check and would confirm patient is not taking calcitriol.   - Vitamin D; level is: Low with last check and will recheck with next labs.   - Calcium; level is: Normal     # Electrolytes:   - Magnesium; level: Normal and will continue on magnesium supplement.  Will recheck magnesium with next labs.    # PKD: No symptoms.    # Overweight: Weight is stable.  Recommend increased exercise and watch caloric intake.    # Skin Cancer Risk:    - Discussed sun protection and recommend regular follow up with Dermatology.    # Medical Compliance: Yes    Return visit: Return in about 1 year (around 2019).    # Transplant History:  Etiology of kidney failure: polycystic kidney disease (PKD)  Tx: DDKT  Transplant: 10/13/2015 (Kidney)  Donor Type:  - Brain Death Donor Class:   Significant changes in  immunosuppression: None  Significant transplant-related complications: None    Transplant Office Phone Number: [phone]    Assessment and plan was discussed with the patient and he voiced his understanding and agreement.    Francis Espinoza MD    Chief Complaint   Mr. Pierce is a 48 year old here for routine follow up.    History of Present Illness    Mr. Pierce reports feeling good overall with minimal medical complaints.  Since last clinic visit, patient reports no hospitalizations or new medical complaints and has been doing well overall.  His energy level is good and remains pretty close to normal.  He is active, although doesn't get a lot of exercise.  Denies any chest pain or shortness of breath with exertion.  No leg swelling.    Recent Hospitalizations:  [x] No [] Yes    New Medical Issues: [x] No [] Yes    Decreased energy: [x] No [] Yes    Chest pain or SOB with exertion:  [x] No [] Yes    Appetite change or weight change: [x] No [] Yes    Nausea, vomiting or diarrhea:  [x] No [] Yes    Fever, sweats or chills: [x] No [] Yes    Leg swelling: [x] No [] Yes      Other medical issues:  No    Home BP: Not checked    Problem List   Patient Active Problem List   Diagnosis     Polycystic kidney, autosomal dominant     Dyslipidemia     Vitamin D deficiency     Hypertension secondary to other renal disorders     Secondary renal hyperparathyroidism (HCC)     Kidney replaced by transplant     Immunosuppressed status (H)     Acute gouty arthritis     Hypomagnesemia     Aftercare following organ transplant       Social History   Social History     Tobacco Use     Smoking status: Never Smoker     Smokeless tobacco: Never Used   Substance Use Topics     Alcohol use: Yes     Alcohol/week: 6.0 oz     Types: 10 Standard drinks or equivalent per week     Comment: 1 drink daily      Drug use: No       Allergies   Allergies   Allergen Reactions     Alcohol Rash     Other reaction(s): Rash  Swabs used at  "hosptial  Swabs used at hosptial       Medications   Current Outpatient Medications   Medication Sig     losartan (COZAAR) 50 MG tablet Take 1 tablet (50 mg) by mouth daily     amLODIPine (NORVASC) 10 MG tablet TAKE 1 TABLET BY MOUTH ONCE DAILY     chlorthalidone (HYGROTON) 25 MG tablet TAKE 1/2 TABLET BY MOUTH EVERY DAY     citalopram (CELEXA) 20 MG tablet Take 20 mg by mouth daily.     mycophenolate (GENERIC EQUIVALENT) 250 MG capsule TAKE 3 CAPSULES BY MOUTH TWICE A DAY     SIMVASTATIN PO Take 10 mg by mouth At Bedtime     sulfamethoxazole-trimethoprim (BACTRIM/SEPTRA) 400-80 MG per tablet TAKE 1 TABLET BY MOUTH EVERY MON WED & FRI MORNING     tacrolimus (GENERIC EQUIVALENT) 0.5 MG capsule HOLD     tacrolimus (GENERIC EQUIVALENT) 1 MG capsule Take 1 capsule (1 mg) by mouth 2 times daily     No current facility-administered medications for this visit.      Medications Discontinued During This Encounter   Medication Reason     magnesium oxide (MAG-OX) 400 MG tablet Medication Reconciliation Clean Up     calcitRIOL (ROCALTROL) 0.25 MCG capsule      losartan (COZAAR) 25 MG tablet        Physical Exam   Vital Signs: /80 (BP Location: Left arm, Patient Position: Sitting, Cuff Size: Adult Regular)   Pulse 70   Resp 20   Ht 1.778 m (5' 10\")   Wt 95.3 kg (210 lb)   BMI 30.13 kg/m       GENERAL APPEARANCE: alert and no distress  HENT: mouth without ulcers or lesions  LYMPHATICS: no cervical or supraclavicular nodes  RESP: lungs clear to auscultation - no rales, rhonchi or wheezes  CV: regular rhythm, normal rate, no rub, no murmur  EDEMA: no LE edema bilaterally  ABDOMEN: soft, nondistended, nontender, bowel sounds normal  MS: extremities normal - no gross deformities noted, no evidence of inflammation in joints, no muscle tenderness  SKIN: no rash  TX KIDNEY: normal      Data     Renal Latest Ref Rng & Units 12/14/2018 11/9/2018 8/31/2018   Na 133 - 144 mmol/L - - -   Na (external) 136 - 145 meq/L 144 140 144 "   K 3.4 - 5.3 mmol/L - - -   K (external) 3.5 - 5.1 meq/L 4.4 4.7 4.7   Cl 94 - 109 mmol/L - - -   Cl (external) 98 - 109 meq/L 109 107 113(H)   CO2 20 - 32 mmol/L - - -   CO2 (external) 20 - 29 meq/L 23 27 22   BUN 7 - 30 mg/dL - - -   BUN (external) 6 - 22 mg/dL 53(H) 64(H) 52(H)   Cr 0.66 - 1.25 mg/dL - - -   Cr (external) 0.70 - 1.30 mg/dL 2.98(H) 3.29(H) 3.20(H)   Glucose 70 - 99 mg/dL - - -   Glucose (external) 70 - 99 mg/dL 107(H) 91 112(H)   Ca  8.5 - 10.1 mg/dL - - -   Ca (external) 8.5 - 10.5 mg/dL 9.8 9.5 8.7   Mg 1.6 - 2.3 mg/dL - - -   Mg (external) 1.8 - 2.4 mg/dL - - -     Bone Health Latest Ref Rng & Units 9/22/2017 8/1/2016 1/25/2016   Phos 2.5 - 4.5 mg/dL - - -   Phos (external) 2.5 - 4.5 mg/dL 3.9 - -   PTHi 12 - 72 pg/mL - 90(H) 121(H)   Vit D Def 20 - 75 ug/L - 35 35     Heme Latest Ref Rng & Units 12/14/2018 11/9/2018 8/31/2018   WBC 4.0 - 11.0 10e9/L - - -   WBC (external) 4.0 - 11.0 K/uL 5.0 5.6 4.5   Hgb 13.3 - 17.7 g/dL - - -   Hgb (external) 13.5 - 17.5 g/dL 13.8 13.9 13.9   Plt 150 - 450 10e9/L - - -   Plt (external) 140 - 400 K/uL 209 202 175     Liver Latest Ref Rng & Units 12/29/2017 9/22/2017 4/15/2016   AP 40 - 150 U/L - - -   AP (external) 38 - 126 U/L 71 - 59   TBili 0.2 - 1.3 mg/dL - - -   TBili (external) 0.2 - 1.3 mg/dL 0.3 - 0.5   DBili (external) 0.0 - 0.4 mg/dL - - 0.1   ALT 0 - 70 U/L - - -   ALT (external) 21 - 72 U/L 46 - 22   AST 0 - 45 U/L - - -   AST (external) 17 - 59 U/L 28 - 22   Tot Protein 6.8 - 8.8 g/dL - - -   Tot Protein (external) 6.3 - 8.2 g/dL 6.6 - 6.9   Albumin 3.4 - 5.0 g/dL - - -   Albumin (external) 3.5 - 5.0 g/dL 4.4 4.4 4.3     Pancreas Latest Ref Rng & Units 10/14/2015 10/13/2015   A1C 4.3 - 6.0 % 4.8 4.8     Iron studies Latest Ref Rng & Units 10/17/2015   Iron 35 - 180 ug/dL 13(L)   Iron sat 15 - 46 % 5(L)     [facility] Txp Virology Latest Ref Rng & Units 11/9/2018 5/11/2018 11/10/2017   CVM DNA Quant - - - -   BK Spec - - - -   BK Res BKNEG copies/mL - -  -   BK Log <2.7 Log copies/mL - - -   BK Quant Result Ext None detected None detected None detected None detected   BK Quant Spec Ext - - - -   Hep B Core NR - - -   Hep B Core Ext Nonreactive - - -   Hep B Surf - - - -   Hep B Surf Ext  - - - -   HIV 1&2 NEG - - -        Recent Labs   Lab Test 08/31/18  1417 11/08/18  0800 12/14/18  0752   DOSTAC 08/30 2100 11/08/18 2000 [id number]   TACROL 4.8* 5.0 3.7*     Recent Labs   Lab Test 10/19/15  0707 10/26/15  0707 11/09/15  0758   DOSMPA 2,000 Not Provided 11/8/15 AT 2030   MPACID 1.27 4.01* 0.45*   MPAG >200.0* 190.0* 20.5*       Francis Espinoza MD

## 2018-12-21 NOTE — LETTER
12/21/2018       RE: Raj Pierce  [address]  [place] [address]     Dear Colleague,    Thank you for referring your patient, Raj Pierce, to the [email or web address] at [facility]. Please see a copy of my visit note below.    CHRONIC TRANSPLANT NEPHROLOGY VISIT    Assessment & Plan   # DDKT: Stable. 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  Patient's wait list time was restored back to 7/24/13.  Patient is now active on the kidney transpant waiting list, but recommend since he is still off dialysis, he only accept a very good kidney.   - Baseline Cr ~ 2.9-3.2;    - Proteinuria: Mild   - Date of DSA last checked: 7/2018 Latest DSA: No   - BK Viremia: No   - Kidney Tx Biopsy: 10/28/15, mild, resolving ATN, other findings consistent with dysplastic kidney.    # Immunosuppression: Tacrolimus immediate release (goal  4-6) and Mycophenolate mofetil (goal  1-3.5)   - Changes: No    # Hypertension: Borderline control; Goal BP: < 130/80   - Changes: Yes - Will increase losartan to 50 mg daily.    # Post-Transplant erythrocytosis: Hgb: Stable, now low normal.    # Mineral Bone Disorder:    - Secondary renal hyperparathyroidism; PTH level is: Normal with last check and would confirm patient is not taking calcitriol.   - Vitamin D; level is: Low with last check and will recheck with next labs.   - Calcium; level is: Normal     # Electrolytes:   - Magnesium; level: Normal and will continue on magnesium supplement.  Will recheck magnesium with next labs.    # PKD: No symptoms.    # Overweight: Weight is stable.  Recommend increased exercise and watch caloric intake.    # Skin Cancer Risk:    - Discussed sun protection and recommend regular follow up with Dermatology.    # Medical Compliance: Yes    Return visit: Return in about 1 year (around 12/21/2019).    #  Transplant History:  Etiology of kidney failure: polycystic kidney disease (PKD)  Tx: DDKT  Transplant: 10/13/2015 (Kidney)  Donor Type:  - Brain Death Donor Class:   Significant changes in immunosuppression: None  Significant transplant-related complications: None    Transplant Office Phone Number: [phone]    Assessment and plan was discussed with the patient and he voiced his understanding and agreement.    Francis Espinoza MD    Chief Complaint   Mr. Pierce is a 48 year old here for routine follow up.    History of Present Illness    Mr. Pierce reports feeling good overall with minimal medical complaints.  Since last clinic visit, patient reports no hospitalizations or new medical complaints and has been doing well overall.  His energy level is good and remains pretty close to normal.  He is active, although doesn't get a lot of exercise.  Denies any chest pain or shortness of breath with exertion.  No leg swelling.    Recent Hospitalizations:  [x] No [] Yes    New Medical Issues: [x] No [] Yes    Decreased energy: [x] No [] Yes    Chest pain or SOB with exertion:  [x] No [] Yes    Appetite change or weight change: [x] No [] Yes    Nausea, vomiting or diarrhea:  [x] No [] Yes    Fever, sweats or chills: [x] No [] Yes    Leg swelling: [x] No [] Yes      Other medical issues:  No    Home BP: Not checked    Problem List   Patient Active Problem List   Diagnosis     Polycystic kidney, autosomal dominant     Dyslipidemia     Vitamin D deficiency     Hypertension secondary to other renal disorders     Secondary renal hyperparathyroidism (HCC)     Kidney replaced by transplant     Immunosuppressed status (H)     Acute gouty arthritis     Hypomagnesemia     Aftercare following organ transplant       Social History   Social History     Tobacco Use     Smoking status: Never Smoker     Smokeless tobacco: Never Used   Substance Use Topics     Alcohol use: Yes     Alcohol/week: 6.0 oz     Types: 10  "Standard drinks or equivalent per week     Comment: 1 drink daily      Drug use: No       Allergies   Allergies   Allergen Reactions     Alcohol Rash     Other reaction(s): Rash  Swabs used at hosptial  Swabs used at hosptial       Medications   Current Outpatient Medications   Medication Sig     losartan (COZAAR) 50 MG tablet Take 1 tablet (50 mg) by mouth daily     amLODIPine (NORVASC) 10 MG tablet TAKE 1 TABLET BY MOUTH ONCE DAILY     chlorthalidone (HYGROTON) 25 MG tablet TAKE 1/2 TABLET BY MOUTH EVERY DAY     citalopram (CELEXA) 20 MG tablet Take 20 mg by mouth daily.     mycophenolate (GENERIC EQUIVALENT) 250 MG capsule TAKE 3 CAPSULES BY MOUTH TWICE A DAY     SIMVASTATIN PO Take 10 mg by mouth At Bedtime     sulfamethoxazole-trimethoprim (BACTRIM/SEPTRA) 400-80 MG per tablet TAKE 1 TABLET BY MOUTH EVERY MON WED & FRI MORNING     tacrolimus (GENERIC EQUIVALENT) 0.5 MG capsule HOLD     tacrolimus (GENERIC EQUIVALENT) 1 MG capsule Take 1 capsule (1 mg) by mouth 2 times daily     No current facility-administered medications for this visit.      Medications Discontinued During This Encounter   Medication Reason     magnesium oxide (MAG-OX) 400 MG tablet Medication Reconciliation Clean Up     calcitRIOL (ROCALTROL) 0.25 MCG capsule      losartan (COZAAR) 25 MG tablet        Physical Exam   Vital Signs: /80 (BP Location: Left arm, Patient Position: Sitting, Cuff Size: Adult Regular)   Pulse 70   Resp 20   Ht 1.778 m (5' 10\")   Wt 95.3 kg (210 lb)   BMI 30.13 kg/m       GENERAL APPEARANCE: alert and no distress  HENT: mouth without ulcers or lesions  LYMPHATICS: no cervical or supraclavicular nodes  RESP: lungs clear to auscultation - no rales, rhonchi or wheezes  CV: regular rhythm, normal rate, no rub, no murmur  EDEMA: no LE edema bilaterally  ABDOMEN: soft, nondistended, nontender, bowel sounds normal  MS: extremities normal - no gross deformities noted, no evidence of inflammation in joints, no muscle " tenderness  SKIN: no rash  TX KIDNEY: normal      Data     Renal Latest Ref Rng & Units 12/14/2018 11/9/2018 8/31/2018   Na 133 - 144 mmol/L - - -   Na (external) 136 - 145 meq/L 144 140 144   K 3.4 - 5.3 mmol/L - - -   K (external) 3.5 - 5.1 meq/L 4.4 4.7 4.7   Cl 94 - 109 mmol/L - - -   Cl (external) 98 - 109 meq/L 109 107 113(H)   CO2 20 - 32 mmol/L - - -   CO2 (external) 20 - 29 meq/L 23 27 22   BUN 7 - 30 mg/dL - - -   BUN (external) 6 - 22 mg/dL 53(H) 64(H) 52(H)   Cr 0.66 - 1.25 mg/dL - - -   Cr (external) 0.70 - 1.30 mg/dL 2.98(H) 3.29(H) 3.20(H)   Glucose 70 - 99 mg/dL - - -   Glucose (external) 70 - 99 mg/dL 107(H) 91 112(H)   Ca  8.5 - 10.1 mg/dL - - -   Ca (external) 8.5 - 10.5 mg/dL 9.8 9.5 8.7   Mg 1.6 - 2.3 mg/dL - - -   Mg (external) 1.8 - 2.4 mg/dL - - -     Bone Health Latest Ref Rng & Units 9/22/2017 8/1/2016 1/25/2016   Phos 2.5 - 4.5 mg/dL - - -   Phos (external) 2.5 - 4.5 mg/dL 3.9 - -   PTHi 12 - 72 pg/mL - 90(H) 121(H)   Vit D Def 20 - 75 ug/L - 35 35     Heme Latest Ref Rng & Units 12/14/2018 11/9/2018 8/31/2018   WBC 4.0 - 11.0 10e9/L - - -   WBC (external) 4.0 - 11.0 K/uL 5.0 5.6 4.5   Hgb 13.3 - 17.7 g/dL - - -   Hgb (external) 13.5 - 17.5 g/dL 13.8 13.9 13.9   Plt 150 - 450 10e9/L - - -   Plt (external) 140 - 400 K/uL 209 202 175     Liver Latest Ref Rng & Units 12/29/2017 9/22/2017 4/15/2016   AP 40 - 150 U/L - - -   AP (external) 38 - 126 U/L 71 - 59   TBili 0.2 - 1.3 mg/dL - - -   TBili (external) 0.2 - 1.3 mg/dL 0.3 - 0.5   DBili (external) 0.0 - 0.4 mg/dL - - 0.1   ALT 0 - 70 U/L - - -   ALT (external) 21 - 72 U/L 46 - 22   AST 0 - 45 U/L - - -   AST (external) 17 - 59 U/L 28 - 22   Tot Protein 6.8 - 8.8 g/dL - - -   Tot Protein (external) 6.3 - 8.2 g/dL 6.6 - 6.9   Albumin 3.4 - 5.0 g/dL - - -   Albumin (external) 3.5 - 5.0 g/dL 4.4 4.4 4.3     Pancreas Latest Ref Rng & Units 10/14/2015 10/13/2015   A1C 4.3 - 6.0 % 4.8 4.8     Iron studies Latest Ref Rng & Units 10/17/2015   Iron  35 - 180 ug/dL 13(L)   Iron sat 15 - 46 % 5(L)     [facility] Txp Virology Latest Ref Rng & Units 11/9/2018 5/11/2018 11/10/2017   CVM DNA Quant - - - -   BK Spec - - - -   BK Res BKNEG copies/mL - - -   BK Log <2.7 Log copies/mL - - -   BK Quant Result Ext None detected None detected None detected None detected   BK Quant Spec Ext - - - -   Hep B Core NR - - -   Hep B Core Ext Nonreactive - - -   Hep B Surf - - - -   Hep B Surf Ext  - - - -   HIV 1&2 NEG - - -        Recent Labs   Lab Test 08/31/18  1417 11/08/18  0800 12/14/18  0752   DOSTAC 08/30 2100 11/08/18 2000 [id number]   TACROL 4.8* 5.0 3.7*     Recent Labs   Lab Test 10/19/15  0707 10/26/15  0707 11/09/15  0758   DOSMPA 2,000 Not Provided 11/8/15 AT 2030   MPACID 1.27 4.01* 0.45*   MPAG >200.0* 190.0* 20.5*     Again, thank you for allowing me to participate in the care of your patient.      Sincerely,    Francis Espinoza MD

## 2018-12-21 NOTE — PROGRESS NOTES
CHRONIC TRANSPLANT NEPHROLOGY VISIT    Assessment & Plan   # DDKT: Stable. 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  Patient's wait list time was restored back to 13.  Patient is now active on the kidney transpant waiting list, but recommend since he is still off dialysis, he only accept a very good kidney.   - Baseline Cr ~ 2.9-3.2;    - Proteinuria: Mild   - Date of DSA last checked: 2018 Latest DSA: No   - BK Viremia: No   - Kidney Tx Biopsy: 10/28/15, mild, resolving ATN, other findings consistent with dysplastic kidney.    # Immunosuppression: Tacrolimus immediate release (goal  4-6) and Mycophenolate mofetil (goal  1-3.5)   - Changes: No    # Hypertension: Borderline control; Goal BP: < 130/80   - Changes: Yes - Will increase losartan to 50 mg daily.    # Post-Transplant erythrocytosis: Hgb: Stable, now low normal.    # Mineral Bone Disorder:    - Secondary renal hyperparathyroidism; PTH level is: Normal with last check and would confirm patient is not taking calcitriol.   - Vitamin D; level is: Low with last check and will recheck with next labs.   - Calcium; level is: Normal     # Electrolytes:   - Magnesium; level: Normal and will continue on magnesium supplement.  Will recheck magnesium with next labs.    # PKD: No symptoms.    # Overweight: Weight is stable.  Recommend increased exercise and watch caloric intake.    # Skin Cancer Risk:    - Discussed sun protection and recommend regular follow up with Dermatology.    # Medical Compliance: Yes    Return visit: Return in about 1 year (around 2019).    # Transplant History:  Etiology of kidney failure: polycystic kidney disease (PKD)  Tx: DDKT  Transplant: 10/13/2015 (Kidney)  Donor Type:  - Brain Death Donor Class:   Significant changes in immunosuppression: None  Significant transplant-related complications: None    Transplant Office Phone  Number: [phone]    Assessment and plan was discussed with the patient and he voiced his understanding and agreement.    Francis Espinoza MD    Chief Complaint   Mr. Pierce is a 48 year old here for routine follow up.    History of Present Illness    Mr. Pierce reports feeling good overall with minimal medical complaints.  Since last clinic visit, patient reports no hospitalizations or new medical complaints and has been doing well overall.  His energy level is good and remains pretty close to normal.  He is active, although doesn't get a lot of exercise.  Denies any chest pain or shortness of breath with exertion.  No leg swelling.    Recent Hospitalizations:  [x] No [] Yes    New Medical Issues: [x] No [] Yes    Decreased energy: [x] No [] Yes    Chest pain or SOB with exertion:  [x] No [] Yes    Appetite change or weight change: [x] No [] Yes    Nausea, vomiting or diarrhea:  [x] No [] Yes    Fever, sweats or chills: [x] No [] Yes    Leg swelling: [x] No [] Yes      Other medical issues:  No    Home BP: Not checked    Review of Systems   A comprehensive review of systems was obtained and negative, except as noted in the HPI or PMH.    Problem List   Patient Active Problem List   Diagnosis     Polycystic kidney, autosomal dominant     Dyslipidemia     Vitamin D deficiency     Hypertension secondary to other renal disorders     Secondary renal hyperparathyroidism (HCC)     Kidney replaced by transplant     Immunosuppressed status (H)     Acute gouty arthritis     Hypomagnesemia     Aftercare following organ transplant       Social History   Social History     Tobacco Use     Smoking status: Never Smoker     Smokeless tobacco: Never Used   Substance Use Topics     Alcohol use: Yes     Alcohol/week: 6.0 oz     Types: 10 Standard drinks or equivalent per week     Comment: 1 drink daily      Drug use: No       Allergies   Allergies   Allergen Reactions     Alcohol Rash     Other reaction(s): Rash  Swabs  "used at hosptial  Swabs used at hosptial       Medications   Current Outpatient Medications   Medication Sig     losartan (COZAAR) 50 MG tablet Take 1 tablet (50 mg) by mouth daily     amLODIPine (NORVASC) 10 MG tablet TAKE 1 TABLET BY MOUTH ONCE DAILY     chlorthalidone (HYGROTON) 25 MG tablet TAKE 1/2 TABLET BY MOUTH EVERY DAY     citalopram (CELEXA) 20 MG tablet Take 20 mg by mouth daily.     mycophenolate (GENERIC EQUIVALENT) 250 MG capsule TAKE 3 CAPSULES BY MOUTH TWICE A DAY     SIMVASTATIN PO Take 10 mg by mouth At Bedtime     sulfamethoxazole-trimethoprim (BACTRIM/SEPTRA) 400-80 MG per tablet TAKE 1 TABLET BY MOUTH EVERY MON WED & FRI MORNING     tacrolimus (GENERIC EQUIVALENT) 0.5 MG capsule HOLD     tacrolimus (GENERIC EQUIVALENT) 1 MG capsule Take 1 capsule (1 mg) by mouth 2 times daily     No current facility-administered medications for this visit.      Medications Discontinued During This Encounter   Medication Reason     magnesium oxide (MAG-OX) 400 MG tablet Medication Reconciliation Clean Up     calcitRIOL (ROCALTROL) 0.25 MCG capsule      losartan (COZAAR) 25 MG tablet        Physical Exam   Vital Signs: /80 (BP Location: Left arm, Patient Position: Sitting, Cuff Size: Adult Regular)   Pulse 70   Resp 20   Ht 1.778 m (5' 10\")   Wt 95.3 kg (210 lb)   BMI 30.13 kg/m      GENERAL APPEARANCE: alert and no distress  HENT: mouth without ulcers or lesions  LYMPHATICS: no cervical or supraclavicular nodes  RESP: lungs clear to auscultation - no rales, rhonchi or wheezes  CV: regular rhythm, normal rate, no rub, no murmur  EDEMA: no LE edema bilaterally  ABDOMEN: soft, nondistended, nontender, bowel sounds normal  MS: extremities normal - no gross deformities noted, no evidence of inflammation in joints, no muscle tenderness  SKIN: no rash  TX KIDNEY: normal      Data     Renal Latest Ref Rng & Units 12/14/2018 11/9/2018 8/31/2018   Na 133 - 144 mmol/L - - -   Na (external) 136 - 145 meq/L 144 " 140 144   K 3.4 - 5.3 mmol/L - - -   K (external) 3.5 - 5.1 meq/L 4.4 4.7 4.7   Cl 94 - 109 mmol/L - - -   Cl (external) 98 - 109 meq/L 109 107 113(H)   CO2 20 - 32 mmol/L - - -   CO2 (external) 20 - 29 meq/L 23 27 22   BUN 7 - 30 mg/dL - - -   BUN (external) 6 - 22 mg/dL 53(H) 64(H) 52(H)   Cr 0.66 - 1.25 mg/dL - - -   Cr (external) 0.70 - 1.30 mg/dL 2.98(H) 3.29(H) 3.20(H)   Glucose 70 - 99 mg/dL - - -   Glucose (external) 70 - 99 mg/dL 107(H) 91 112(H)   Ca  8.5 - 10.1 mg/dL - - -   Ca (external) 8.5 - 10.5 mg/dL 9.8 9.5 8.7   Mg 1.6 - 2.3 mg/dL - - -   Mg (external) 1.8 - 2.4 mg/dL - - -     Bone Health Latest Ref Rng & Units 9/22/2017 8/1/2016 1/25/2016   Phos 2.5 - 4.5 mg/dL - - -   Phos (external) 2.5 - 4.5 mg/dL 3.9 - -   PTHi 12 - 72 pg/mL - 90(H) 121(H)   Vit D Def 20 - 75 ug/L - 35 35     Heme Latest Ref Rng & Units 12/14/2018 11/9/2018 8/31/2018   WBC 4.0 - 11.0 10e9/L - - -   WBC (external) 4.0 - 11.0 K/uL 5.0 5.6 4.5   Hgb 13.3 - 17.7 g/dL - - -   Hgb (external) 13.5 - 17.5 g/dL 13.8 13.9 13.9   Plt 150 - 450 10e9/L - - -   Plt (external) 140 - 400 K/uL 209 202 175     Liver Latest Ref Rng & Units 12/29/2017 9/22/2017 4/15/2016   AP 40 - 150 U/L - - -   AP (external) 38 - 126 U/L 71 - 59   TBili 0.2 - 1.3 mg/dL - - -   TBili (external) 0.2 - 1.3 mg/dL 0.3 - 0.5   DBili (external) 0.0 - 0.4 mg/dL - - 0.1   ALT 0 - 70 U/L - - -   ALT (external) 21 - 72 U/L 46 - 22   AST 0 - 45 U/L - - -   AST (external) 17 - 59 U/L 28 - 22   Tot Protein 6.8 - 8.8 g/dL - - -   Tot Protein (external) 6.3 - 8.2 g/dL 6.6 - 6.9   Albumin 3.4 - 5.0 g/dL - - -   Albumin (external) 3.5 - 5.0 g/dL 4.4 4.4 4.3     Pancreas Latest Ref Rng & Units 10/14/2015 10/13/2015   A1C 4.3 - 6.0 % 4.8 4.8     Iron studies Latest Ref Rng & Units 10/17/2015   Iron 35 - 180 ug/dL 13(L)   Iron sat 15 - 46 % 5(L)     [facility] Txp Virology Latest Ref Rng & Units 11/9/2018 5/11/2018 11/10/2017   CVM DNA Quant - - - -   BK Spec - - - -   BK Res BKNEG  copies/mL - - -   BK Log <2.7 Log copies/mL - - -   BK Quant Result Ext None detected None detected None detected None detected   BK Quant Spec Ext - - - -   Hep B Core NR - - -   Hep B Core Ext Nonreactive - - -   Hep B Surf - - - -   Hep B Surf Ext  - - - -   HIV 1&2 NEG - - -        Recent Labs   Lab Test 08/31/18  1417 11/08/18  0800 12/14/18  0752   DOSTAC 08/30 2100 11/08/18 2000 [id number]   TACROL 4.8* 5.0 3.7*     Recent Labs   Lab Test 10/19/15  0707 10/26/15  0707 11/09/15  0758   DOSMPA 2,000 Not Provided 11/8/15 AT 2030   MPACID 1.27 4.01* 0.45*   MPAG >200.0* 190.0* 20.5*

## 2018-12-29 DIAGNOSIS — Z79.60 LONG-TERM USE OF IMMUNOSUPPRESSANT MEDICATION: ICD-10-CM

## 2018-12-29 DIAGNOSIS — Z94.0 KIDNEY TRANSPLANT RECIPIENT: Primary | ICD-10-CM

## 2018-12-31 DIAGNOSIS — Z79.60 LONG-TERM USE OF IMMUNOSUPPRESSANT MEDICATION: ICD-10-CM

## 2018-12-31 DIAGNOSIS — Z94.0 KIDNEY TRANSPLANT RECIPIENT: Primary | ICD-10-CM

## 2018-12-31 RX ORDER — TACROLIMUS 0.5 MG/1
0.5 CAPSULE ORAL 2 TIMES DAILY
Qty: 60 CAPSULE | Refills: 11 | Status: SHIPPED | OUTPATIENT
Start: 2018-12-31 | End: 2020-03-04

## 2018-12-31 RX ORDER — TACROLIMUS 0.5 MG/1
0.5 CAPSULE ORAL 2 TIMES DAILY
Qty: 60 CAPSULE | Refills: 11 | Status: SHIPPED | OUTPATIENT
Start: 2018-12-31 | End: 2019-01-28

## 2019-01-16 ENCOUNTER — DOCUMENTATION ONLY (OUTPATIENT)
Dept: TRANSPLANT | Facility: CLINIC | Age: 49
End: 2019-01-16

## 2019-01-23 ENCOUNTER — TELEPHONE (OUTPATIENT)
Dept: TRANSPLANT | Facility: CLINIC | Age: 49
End: 2019-01-23

## 2019-01-23 NOTE — LETTER
[facility]  Room 2-200  [facility],  [address]  [address]  Tel [phone]  Toll Free [phone]                OUTPATIENT LABORATORY TEST ORDER    Patient Name: Raj Pierce  Transplant Date: 10/13/2015 (Kidney)  YOB: 1970  Issue Date & Time:1- 5:20 PM    [facility] MR:  [id number]  Exp. Date (1 year after date issued)      Diagnoses: Kidney Transplant (ICD-10 Z94.0)   Long term use of medications (ICD-10 Z79.899)     Lab results to be available on the same day drawn.   Patient should release information to the [facility], [facility].  Please fax to the Transplant Center at [phone].    Monthly    ?Hemogram and Platelet   ?Basic Metabolic Panel (Sodium, Potassium, Chloride, CO2, Creatinine, BUN, Glucose, Calcium)   ?/Tacrolimus/Prograf drug level (mail to [facility] - mailers and instructions provided by the patient)            Every 6 Months                   ?BK (Polyoma Virus) PCR Quantitative - Plasma                                            ?Urine for protein/creatinine    Yearly:   ? PRA/DSA level (mailers provided by the patient)       If you have any questions, please call [facility] at [phone] or [phone].    Please fax labs to [phone]

## 2019-01-23 NOTE — TELEPHONE ENCOUNTER
Patient Call: Transplant Lab/Orders  Route to CHRISTOPHER Anthony was wondering if he needs to go every month or every three months for lab draws.  Post Transplant Days: 1198  When patient is less than 60 days post-transplant, route high priority    Reason for Call: Annual lab reorder  Callback needed? Yes    Return Call Needed  Same as documented in contacts section  When to return call?: Same day: Route High Priority

## 2019-01-25 DIAGNOSIS — Z48.298 AFTERCARE FOLLOWING ORGAN TRANSPLANT: ICD-10-CM

## 2019-01-25 PROCEDURE — 80197 ASSAY OF TACROLIMUS: CPT | Performed by: INTERNAL MEDICINE

## 2019-01-27 LAB
TACROLIMUS BLD-MCNC: 3.3 UG/L (ref 5–15)
TME LAST DOSE: ABNORMAL H

## 2019-01-28 DIAGNOSIS — Z79.60 LONG-TERM USE OF IMMUNOSUPPRESSANT MEDICATION: ICD-10-CM

## 2019-01-28 DIAGNOSIS — Z94.0 KIDNEY TRANSPLANT RECIPIENT: ICD-10-CM

## 2019-01-28 RX ORDER — TACROLIMUS 0.5 MG/1
CAPSULE ORAL
Qty: 60 CAPSULE | Refills: 11
Start: 2019-01-28 | End: 2020-03-04

## 2019-01-28 RX ORDER — TACROLIMUS 1 MG/1
2 CAPSULE ORAL 2 TIMES DAILY
Qty: 120 CAPSULE | Refills: 11 | Status: SHIPPED | OUTPATIENT
Start: 2019-01-28 | End: 2020-02-10

## 2019-01-28 NOTE — LETTER
PHYSICIAN ORDERS      DATE & TIME ISSUED: 2019 12:23 PM  PATIENT NAME: Raj Pierce   : 1970     [facility] MR# [if applicable]: [id number]     DIAGNOSIS:  Kidney transplant  ICD-10 CODE: Z94.0      Please increase tacrolimus dose to 2 mg BID and repeat level in one week of dose change.     Tacrolimus level    Any questions please call: [phone] option #5    Please fax results to [phone].

## 2019-01-28 NOTE — TELEPHONE ENCOUNTER
Call placed to patient. Patient confirms current dose and accurate trough level. Denies any recent illness, diarrhea or medication changes. Patient v\u to increase dose to 2 mg BID and recheck level in one week. Order/rx sent

## 2019-01-28 NOTE — TELEPHONE ENCOUNTER
ISSUE:   Tacrolimus level 3.3 on 1/25/19, goal 4-6, dose 1.5 mg BID    PLAN:   Please call pt and confirm this was a good 12-hour trough. Verify dose 1.5 mg BID. Confirm no new medications or illness (german. Diarrhea). If good trough, increase dose to 2 mg BID and recheck level in 1 weeks. Update rx and enter lab order.

## 2019-01-31 ENCOUNTER — TELEPHONE (OUTPATIENT)
Dept: TRANSPLANT | Facility: CLINIC | Age: 49
End: 2019-01-31

## 2019-01-31 DIAGNOSIS — Z76.82 ORGAN TRANSPLANT CANDIDATE: ICD-10-CM

## 2019-01-31 DIAGNOSIS — N18.6 ESRD (END STAGE RENAL DISEASE) (H): ICD-10-CM

## 2019-01-31 NOTE — TELEPHONE ENCOUNTER
ISSUE:  Creatinine above baseline 3.43    PLAN:  Call placed to pt. No answer. Left detailed vm asking for return call.

## 2019-01-31 NOTE — TELEPHONE ENCOUNTER
Call returned from [facility]. He states that he was recently treated for fistula infection. He has been advised to continue to orally hydrate well and to repeat all transplant labs next month as scheduled.  Pt verbalizes understanding of plan

## 2019-02-22 DIAGNOSIS — Z76.82 ORGAN TRANSPLANT CANDIDATE: ICD-10-CM

## 2019-02-22 DIAGNOSIS — N18.6 ESRD (END STAGE RENAL DISEASE) (H): ICD-10-CM

## 2019-02-22 PROCEDURE — 80197 ASSAY OF TACROLIMUS: CPT | Performed by: INTERNAL MEDICINE

## 2019-02-23 DIAGNOSIS — Z48.298 AFTERCARE FOLLOWING ORGAN TRANSPLANT: ICD-10-CM

## 2019-02-24 LAB
TACROLIMUS BLD-MCNC: 5.2 UG/L (ref 5–15)
TME LAST DOSE: NORMAL H

## 2019-02-26 LAB
PROTOCOL CUTOFF: NORMAL
SA1 CELL: NORMAL
SA1 COMMENTS: NORMAL
SA1 HI RISK ABY: NORMAL
SA1 MOD RISK ABY: NORMAL
SA1 TEST METHOD: NORMAL
SA2 CELL: NORMAL
SA2 COMMENTS: NORMAL
SA2 HI RISK ABY UA: NORMAL
SA2 MOD RISK ABY: NORMAL
SA2 TEST METHOD: NORMAL
UNACCEPTABLE ANTIGEN: NORMAL
UNOS CPRA: 10

## 2019-03-18 ENCOUNTER — DOCUMENTATION ONLY (OUTPATIENT)
Dept: TRANSPLANT | Facility: CLINIC | Age: 49
End: 2019-03-18

## 2019-03-25 ENCOUNTER — MEDICAL CORRESPONDENCE (OUTPATIENT)
Dept: TRANSPLANT | Facility: CLINIC | Age: 49
End: 2019-03-25

## 2019-03-29 PROCEDURE — 80197 ASSAY OF TACROLIMUS: CPT | Performed by: INTERNAL MEDICINE

## 2019-03-31 LAB
TACROLIMUS BLD-MCNC: 5.6 UG/L (ref 5–15)
TME LAST DOSE: NORMAL H

## 2019-05-02 PROCEDURE — 80197 ASSAY OF TACROLIMUS: CPT | Performed by: SURGERY

## 2019-05-03 LAB
TACROLIMUS BLD-MCNC: 5.5 UG/L (ref 5–15)
TME LAST DOSE: NORMAL H

## 2019-05-10 DIAGNOSIS — Z48.298 AFTERCARE FOLLOWING ORGAN TRANSPLANT: Primary | ICD-10-CM

## 2019-05-16 ENCOUNTER — RESULTS ONLY (OUTPATIENT)
Dept: OTHER | Facility: CLINIC | Age: 49
End: 2019-05-16

## 2019-05-16 ENCOUNTER — ORGAN (OUTPATIENT)
Dept: TRANSPLANT | Facility: CLINIC | Age: 49
End: 2019-05-16
Payer: COMMERCIAL

## 2019-05-16 ENCOUNTER — DOCUMENTATION ONLY (OUTPATIENT)
Dept: TRANSPLANT | Facility: CLINIC | Age: 49
End: 2019-05-16

## 2019-05-16 DIAGNOSIS — Z76.82 AWAITING ORGAN TRANSPLANT: Primary | ICD-10-CM

## 2019-05-16 NOTE — TELEPHONE ENCOUNTER
There are three types of donors - living donors, brain dead donors, and DCD donors.  Living donation would be like if your family member donated an organ to you.  Brain death donors are  donors who have no brain stem reflexes whose bodies are being kept alive with life support.  And then there is your type of offer - DCD donation.    DCD stands for donation after cardiac death.  It is when the potential donor had a brain injury but did not progress to brain death, so the family is choosing to withdraw life support.  There is a chance that the donor may not pass in time suitable for donation and transplant, but without moving forward, we have no way of knowing for sure.    So the important part for you to know about this is kidneys from DCD donors have the same long term outcomes as kidneys from brain dead donors.  Sometimes they may take longer to come to full power after transplant. This is called 'delayed graft function' and may result in the need for some dialysis treatments for the first few days or weeks.

## 2019-05-16 NOTE — PROGRESS NOTES
PATHOLOGY HLA CROSSMATCH CONSULTATION: DONOR/RECIPIENT  VIRTUAL CROSSMATCH - kIDNEY  Consultation Date: mAY 16, 2019  Consultation Requested by: Dr. Rapp  Regarding: Compatibility of  donor organ UNOS #[id number] from OPO: mnop  with Raj Pierce      Findings: Regarding a virtual crossmatch between Raj Pierce and  donor listed above (match ID [id number]):  The most recent (19) and 11 additional patient serum/sera  were analyzed.  The patient has no antibodies listed with HLA specificity against the donor organ.      Record Review Indicates: I personally reviewed the most recent serum, the historic peak sera, and all other sera with solid-phase HLA Single Antigen test results:  The patient has no HLA antibodies against the donor organ.     The results of this virtual XM are:   -most recent serum: compatible   -peak #1: compatible    Disclaimer: Clinical judgement must take into account other factors, such as non-HLA antibodies not detected in the assay. The VXM gives probabilities only.  The probability does not account for the potential for auto-antibodies that may be present in the patient's serum.  These autoantibodies may render the physical crossmatch falsely positive, and would be detected by an autologous crossmatch.  When possible, confirm findings with prospective allogeneic and autologous flow crossmatches before going to transplant as clinically indicated.     Aleks Bartlett, PhD,[facility]  Medical Director, Immunology/Histocompatibility Laboratory  Pager [phone]          I, Anne Marshall, have reviewed the laboratory and clinical data relevant for this case and have confirmed Dr. Bartlett's interpretation of the virtual crossmatch. The probability of an incompatible B cell flow crossmatch is approximately near 0% due to antibodies against HLA molecules on the donor organ. I would recommend Raj Pierce receive a prospective flow crossmatch prior to  transplantation.    Anne Marshall MD

## 2019-05-17 DIAGNOSIS — I15.1 HYPERTENSION SECONDARY TO OTHER RENAL DISORDERS: ICD-10-CM

## 2019-05-17 RX ORDER — CHLORTHALIDONE 25 MG/1
12.5 TABLET ORAL DAILY
Qty: 90 TABLET | Refills: 0 | Status: SHIPPED | OUTPATIENT
Start: 2019-05-17 | End: 2019-05-21

## 2019-05-17 NOTE — TELEPHONE ENCOUNTER
Medication: Nephrology RN Care Coordinator received prescription refill request for Pending Prescriptions:                       Disp   Refills    chlorthalidone (HYGROTON) 25 MG tablet    90 tab*0            Sig: Take 0.5 tablets (12.5 mg) by mouth daily    Last office visit: 12/18/19  Upcoming office visit: See's nephrology Dr. Don 5/30/19    Jaden Don MD    Nephrology    [address] [facility] [address]         Phone: [phone]    Fax: [phone]       BP Readings from Last 5 Encounters:   12/21/18 142/80   11/01/18 132/86   02/26/18 139/81   11/27/17 136/87   06/05/17 138/87         Medication refilled per Nephrology medication protocol    Madina Paniagua, RN, BSN  Nephrology Care Coordinator  [facility]  [email or web address]  [phone]    Please send future requests to   Do Guerrero RN,CNP (Jan. 22, 2016January 22, 2016 - Present)  [phone] (Work)  [phone] (Fax)  [address] [place] [address]     Or to Jaden Don MD    Nephrology    [address] [facility] [address]         Phone: [phone]    Fax: [phone]

## 2019-05-17 NOTE — TELEPHONE ENCOUNTER
Organ Offer Encounter Information    Organ Offer Information  Organ offer date & time:  5/16/2019  2:58 PM  Coordinator/Fellow/Attending name:  Lisa Esteves RN   Organ(s):   Organ UNOS ID Match Run ID Comment Organ Laterality   Kidney [id number]  [id number] MNOP       Recent infections?:  No    New medications?:  No Recent pregnancy?:  No   Angicoagulation medications?:  No Recent vaccinations?:  No   Recent blood transfusions?:  No Recent hospitalizations?:  No   Has your insurance changed in the last 6-12 months?:  Neg    Discussed organ offer with:  Patient  Patient/Caregiver name:  Raj  Discussed risk category with Patient/Other:  DCD  Did not understand donor criteria, questions answered, verbalized understanding  Patient/Other asked to speak to a surgeon?:  Yes  Discussed program-specific outcomes:  Did not have questions regarding SRTR  Right to decline organ offer without penalty, Patient/Other:  Aware of option to decline without penalty  Organ offer decision status Patient/Other:  Accepted Offer  Organ disposition:  Case Cancelled - Other (specify)  Additional Comments:  5/16/2019 2:59 PM  Kidney: Local, DCD  MD: Dionte ([facility])  Plan (XM, NPO, Donor OR): VXM done by Dr. Bartlett - compatible, no DSA; plan per Dr Rapp, have patient come drop off blood for FXM.  Called patient, no answer, LM requesting return call.  Saskia Esteves RN   Transplant Coordinator    May 16, 2019 3:16 PM  Received call back from patient; discussed offer, discussed donor DCD status.  Patient requesting to speak to Dr. Dionte HERNANDEZ to call patient.  Saskia Esteves RN   Transplant Coordinator    May 16, 2019 3:49 PM  Patient is interested in moving forward with offer, will come to [facility] to have blood drawn for FXM.  Kandice Burton in Immunology notified.  Instructed patient that he does NOT need to be NPO yet at this point and can go home to wait after blood is drawn.  Will f/u after XM results available.  Contact info  provided.  Saskia Esteves RN   Transplant Coordinator    May 16, 2019 11:03 PM  Case aborted d/t WIT.  Updated patient.  Saskia Esteves RN   Transplant Coordinator          Attestation I have discussed all of the above with the Patient/Legal Guardian/Caregiver regarding this organ offer.:  Yes  Coordinator/Fellow/Attending name:  Lisa Esteves RN

## 2019-05-20 LAB
CELL TYPE ALLO: NORMAL
CELL TYPE AUTO: NORMAL
CHANNELSHIFTALLOB1: -25
CHANNELSHIFTALLOB2: -49
CHANNELSHIFTALLOT1: -1
CHANNELSHIFTALLOT2: 3
CHANNELSHIFTAUTOB1: -101
CHANNELSHIFTAUTOB2: -106
CHANNELSHIFTAUTOT1: -31
CHANNELSHIFTAUTOT2: -42
COMMENT ALLOB2: NORMAL
CROSSMATCHDATEALLO: NORMAL
CROSSMATCHDATEAUTO: NORMAL
DONOR ALLO: NORMAL
DONOR AUTO: NORMAL
DONORCELLDATE ALLO: NORMAL
DONORCELLDATE AUTO: NORMAL
POS CUT OFF ALLO B: >101
POS CUT OFF ALLO T: >67
POS CUT OFF AUTO B: >101
POS CUT OFF AUTO T: >67
PROTOCOL CUTOFF: NORMAL
RESULT ALLO B1: NORMAL
RESULT ALLO B2: NORMAL
RESULT ALLO T1: NORMAL
RESULT ALLO T2: NORMAL
RESULT AUTO B1: NORMAL
RESULT AUTO B2: NORMAL
RESULT AUTO T1: NORMAL
RESULT AUTO T2: NORMAL
SA1 CELL: NORMAL
SA1 COMMENTS: NORMAL
SA1 HI RISK ABY: NORMAL
SA1 MOD RISK ABY: NORMAL
SA1 TEST METHOD: NORMAL
SA2 CELL: NORMAL
SA2 COMMENTS: NORMAL
SA2 HI RISK ABY UA: NORMAL
SA2 MOD RISK ABY: NORMAL
SA2 TEST METHOD: NORMAL
SERUM DATE ALLO B1: NORMAL
SERUM DATE ALLO B2: NORMAL
SERUM DATE ALLO T1: NORMAL
SERUM DATE ALLO T2: NORMAL
SERUM DATE AUTO B1: NORMAL
SERUM DATE AUTO B2: NORMAL
SERUM DATE AUTO T1: NORMAL
SERUM DATE AUTO T2: NORMAL
TESTMETHODALLO: NORMAL
TESTMETHODAUTO: NORMAL
TREATMENT ALLO B1: NORMAL
TREATMENT ALLO B2: NORMAL
TREATMENT ALLO T1: NORMAL
TREATMENT ALLO T2: NORMAL
TREATMENT AUTO B1: NORMAL
TREATMENT AUTO B2: NORMAL
TREATMENT AUTO T1: NORMAL
TREATMENT AUTO T2: NORMAL
UNACCEPTABLE ANTIGEN: NORMAL
UNOS CPRA: 10

## 2019-05-21 DIAGNOSIS — I15.1 HYPERTENSION SECONDARY TO OTHER RENAL DISORDERS: ICD-10-CM

## 2019-05-21 RX ORDER — CHLORTHALIDONE 25 MG/1
12.5 TABLET ORAL DAILY
Qty: 90 TABLET | Refills: 0 | Status: SHIPPED | OUTPATIENT
Start: 2019-05-21 | End: 2019-12-23

## 2019-05-22 ENCOUNTER — DOCUMENTATION ONLY (OUTPATIENT)
Dept: TRANSPLANT | Facility: CLINIC | Age: 49
End: 2019-05-22

## 2019-05-22 ENCOUNTER — ORGAN (OUTPATIENT)
Dept: TRANSPLANT | Facility: CLINIC | Age: 49
End: 2019-05-22

## 2019-05-22 DIAGNOSIS — Z76.82 AWAITING ORGAN TRANSPLANT: Primary | ICD-10-CM

## 2019-05-22 NOTE — PROGRESS NOTES
PATHOLOGY HLA CROSSMATCH CONSULTATION: DONOR/RECIPIENT VIRTUAL CROSSMATCH - Kidney  Consultation Date: May 22, 2019  Consultation Requested by: Dr. Michael Rapp  Regarding: Compatibility of  donor organ UNOS #[id number] from OPO: MNOP with patient Raj Pierce  Findings: Regarding a virtual crossmatch between Raj Pierce and  donor listed above (match ID [id number]):  The most recent and peak patient sera were analyzed.  The patient has one (1) donor-specific antibody(ies)  (DSA) as listed in table below. No other antibodies listed with specificity against donor organ.      ANTIBODY MOST RECENT SERUM (mfi) 19 Peak Serum #1 (mfi)  7/1/15      Cw7 None 651          Record Review Indicates: I personally reviewed the most recent serum and the  peak serum/sera.  In addition, I analyzed 10 more sera:  The patient has only anti-C-locus antibodies against the donor organ.   Based on historical data from this hospital's histocompatibility lab, this donor organ is considered compatible because DSA to C-locus antigens rarely contribute to a positive lymphocyte crossmatch test; therefore, they were not considered in deriving the probability of positive crossmatch.    The results of this virtual XM are:   -most recent serum: Compatible  -peak #1:  Compatible    Disclaimer: Clinical judgement must take into account other factors, such as non-HLA antibodies not detected in the assay.   The VXM gives probabilities only.  The probability does not account for the potential for auto-antibodies that may be present in the patient's serum.  These autoantibodies may render the physical crossmatch falsely positive, and would be detected by an autologous crossmatch.  When possible, confirm findings with a prospective allogeneic and autologous flow crossmatches before going to transplant.    Aleks Bartlett PhD, [facility]  Medical Director, Immunology/Histocompatibility Laboratory  Pager: [phone]

## 2019-05-22 NOTE — TELEPHONE ENCOUNTER
Organ Offer Encounter Information    Organ Offer Information  Organ offer date & time:  5/22/2019  8:45 AM  Coordinator/Fellow/Attending name:  Payton Jeff RN   Organ(s):   Organ UNOS ID Match Run ID Comment Organ Laterality   Kidney [id number] MNOP       Discussed organ offer with:  Patient  Discussed risk category with Patient/Other:  DCD  Organ offer decision status Patient/Other:  Declined Offer  UNOS decline reason:  830 - Donor age or quality  Additional Comments:  5/22/2019 8:45 AM  Kidney: Backup  MD: Finger  Plan (XM, NPO, Donor OR): Attempted to call patient regarding backup DCD Kidney offer. No answer-left VM.   Payton Jeff  Transplant Coordinator    5/22/2019 9:40 AM:  Upon review of offer with Dr. Emanuel--he feels as though patient needs better kidney. Coding for patient-830. Patient called back and this was explained to him-he understood and had no questions at this time.   Payton Jeff  Transplant Coordinator  Attestation I have discussed all of the above with the Patient/Legal Guardian/Caregiver regarding this organ offer.:  Yes  Coordinator/Fellow/Attending name:  Payton Jeff, RN

## 2019-06-20 ENCOUNTER — TELEPHONE (OUTPATIENT)
Dept: TRANSPLANT | Facility: CLINIC | Age: 49
End: 2019-06-20

## 2019-06-21 DIAGNOSIS — Z48.298 AFTERCARE FOLLOWING ORGAN TRANSPLANT: ICD-10-CM

## 2019-06-21 PROCEDURE — 80197 ASSAY OF TACROLIMUS: CPT | Performed by: INTERNAL MEDICINE

## 2019-06-23 LAB
TACROLIMUS BLD-MCNC: 4.7 UG/L (ref 5–15)
TME LAST DOSE: ABNORMAL H

## 2019-08-02 DIAGNOSIS — Z76.82 ORGAN TRANSPLANT CANDIDATE: ICD-10-CM

## 2019-08-02 DIAGNOSIS — N18.6 ESRD (END STAGE RENAL DISEASE) (H): ICD-10-CM

## 2019-08-02 PROCEDURE — 80197 ASSAY OF TACROLIMUS: CPT | Performed by: INTERNAL MEDICINE

## 2019-08-03 DIAGNOSIS — Z48.298 AFTERCARE FOLLOWING ORGAN TRANSPLANT: ICD-10-CM

## 2019-08-04 LAB
TACROLIMUS BLD-MCNC: 5.9 UG/L (ref 5–15)
TME LAST DOSE: NORMAL H

## 2019-08-06 ENCOUNTER — TELEPHONE (OUTPATIENT)
Dept: TRANSPLANT | Facility: CLINIC | Age: 49
End: 2019-08-06

## 2019-08-06 NOTE — TELEPHONE ENCOUNTER
RNCC left  for Raj to give SOT office a phone call back with an update as to how he is feeling.      UPDATE:  Ant called back - he is feeling well, no pruritis, N/V. [facility] is NOT currently on [facility]

## 2019-09-10 DIAGNOSIS — N25.81 SECONDARY RENAL HYPERPARATHYROIDISM (H): ICD-10-CM

## 2019-09-10 DIAGNOSIS — N18.6 ESRD (END STAGE RENAL DISEASE) (H): ICD-10-CM

## 2019-09-10 DIAGNOSIS — Z94.0 KIDNEY REPLACED BY TRANSPLANT: ICD-10-CM

## 2019-09-10 DIAGNOSIS — Z76.82 ORGAN TRANSPLANT CANDIDATE: ICD-10-CM

## 2019-09-10 DIAGNOSIS — Z12.5 PROSTATE CANCER SCREENING: ICD-10-CM

## 2019-09-10 DIAGNOSIS — Z01.810 ENCOUNTER FOR PRE-OPERATIVE CARDIOVASCULAR CLEARANCE: Primary | ICD-10-CM

## 2019-09-10 NOTE — TELEPHONE ENCOUNTER
Patient Call: Medication Refill  Route to LPN  Instruct the patient to first contact their pharmacy. If they have called their pharmacy and require further assistance, route to LPN.     mycophenolate (GENERIC EQUIVALENT) 250 MG capsule     [id number] - [address] & [facility] [phone] (Phone)  [phone] (Fax)         When will the patient be out of this medication?: Less than 24 [facility] then [facility] if no answer)

## 2019-09-11 ENCOUNTER — TELEPHONE (OUTPATIENT)
Dept: TRANSPLANT | Facility: CLINIC | Age: 49
End: 2019-09-11

## 2019-09-11 RX ORDER — MYCOPHENOLATE MOFETIL 250 MG/1
CAPSULE ORAL
Qty: 180 CAPSULE | Refills: 11 | Status: SHIPPED | OUTPATIENT
Start: 2019-09-11 | End: 2020-09-23

## 2019-09-11 NOTE — TELEPHONE ENCOUNTER
Pt had a VM regarding scheduling some appts, the pt is unaware if they are for his kidney waitlist or post transplant appts- please connect with the pt to schedule.

## 2019-09-12 NOTE — TELEPHONE ENCOUNTER
Spoke with the patient and confirmed Ev[facility]: Waitlist appointments on October 28 and 29 .  Informed patient an itinerary can be accessed via [employer], and will be sent via mail.

## 2019-09-13 DIAGNOSIS — Z48.298 AFTERCARE FOLLOWING ORGAN TRANSPLANT: ICD-10-CM

## 2019-09-13 PROCEDURE — 80197 ASSAY OF TACROLIMUS: CPT | Performed by: INTERNAL MEDICINE

## 2019-09-15 LAB
TACROLIMUS BLD-MCNC: 4.8 UG/L (ref 5–15)
TME LAST DOSE: ABNORMAL H

## 2019-10-18 ENCOUNTER — TELEPHONE (OUTPATIENT)
Dept: TRANSPLANT | Facility: CLINIC | Age: 49
End: 2019-10-18

## 2019-10-18 DIAGNOSIS — Z94.0 KIDNEY REPLACED BY TRANSPLANT: Primary | ICD-10-CM

## 2019-10-18 DIAGNOSIS — N18.6 ESRD (END STAGE RENAL DISEASE) (H): ICD-10-CM

## 2019-10-18 DIAGNOSIS — Z76.82 ORGAN TRANSPLANT CANDIDATE: ICD-10-CM

## 2019-10-18 DIAGNOSIS — Z48.298 AFTERCARE FOLLOWING ORGAN TRANSPLANT: ICD-10-CM

## 2019-10-18 DIAGNOSIS — Z79.899 ENCOUNTER FOR LONG-TERM CURRENT USE OF MEDICATION: ICD-10-CM

## 2019-10-18 DIAGNOSIS — Z94.0 KIDNEY REPLACED BY TRANSPLANT: ICD-10-CM

## 2019-10-18 PROCEDURE — 80197 ASSAY OF TACROLIMUS: CPT | Performed by: INTERNAL MEDICINE

## 2019-10-18 NOTE — TELEPHONE ENCOUNTER
ISSUE:  K+ 5.3    OUTCOME:  Called and spoke with Raj.  He verbalized understanding to decrease intake of K+ rich foods.

## 2019-10-22 LAB
TACROLIMUS BLD-MCNC: 5.3 UG/L (ref 5–15)
TME LAST DOSE: NORMAL H

## 2019-10-27 NOTE — PROGRESS NOTES
Heart Care       HPI: Raj Pierce is a 48 year old male who presents for kidney transplant wait list CV clearance.  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 His transplanted kidney in 2015 was too small and he was immediately relisted although he has not been on HD since his transplant in 2015. Lexiscan scheduled today.     Reviewed current interval:  -- Echocardiogram 11/2018 showed Normal LV function, LVEF 55-60%, normal right ventricular size and function, and no significant valvular dysfunction.  -- Presenting EKG personally reviewed tracings: NSR, Vent rate 74, DC interval 150 ms, QRS duration 102 ms, QTc 424 ms.    Current Interval history:   Patient states that he feels well. States that he rarely misses doses of medication. Patient denies tobacco use.  Drinking alcohol, 3 beers/day.  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  Sleeps with 2 pillows under head, has SILVIA and the two pillows help keep his CPAP in good position.  Has a family history of heart disease; dad has CAD first stents at 50-56 yo. Denies chest pain or pressure, dizziness, syncope, dyspnea at rest or with exertion, palpitations, orthopnea, PND, abdominal edema, pedal edema, or claudication.     Current Outpatient Medications   Medication Sig Dispense Refill     AMLODIPINE BESYLATE PO Take 10 mg by mouth daily       B Complex-C-Folic Acid (DIALYVITE 800 PO) Take 1 tablet by mouth daily       calcium acetate (PHOSLO) 667 MG CAPS Take 1,234 mg by mouth 3 times daily (with meals)       Epoetin Marko (PROCRIT IJ) gets at dialysis         Past Medical History:   Diagnosis Date     ESRD (end stage renal disease) (H)     11/13/15     Hepatitis B core antibody positive      Hypertension      Noncompliance        No past surgical history on file.    Family  History   Family history unknown: Yes       Social History     Tobacco Use     Smoking status: Never Smoker     Smokeless tobacco: Never Used   Substance Use Topics     Alcohol use: No     Alcohol/week: 0.0 oz       No Known Allergies      ROS:   A complete review of systems was performed and is negative except as noted in the HPI.     Physical Examination:  Vitals: 130/89, pulse 89   BMI= 29.84    GENERAL APPEARANCE: healthy, alert, and no acute distress  HEENT: no icterus, no xanthelasmas, normal pupil size and reaction, no cyanosis.  NECK: no asymmetry, no cervical bruits, no JVD   RESPIRATORY: lungs clear to auscultation - no rales, rhonchi or wheezes, no use of accessory muscles, no retractions, respirations are unlabored, normal respiratory rate  CARDIOVASCULAR: regular rhythm, normal S1 with physiologic split S2, no S3 or S4 and no murmur, click or rub  GI:  no abdominal bruits, soft, non-tender  EXTREMITIES: no clubbing  NEURO: alert and oriented to person/place/time, normal speech, gait and affect  VASC: Radial and posterior tibialis pulses +2 and symmetric bilaterally. No cyanosis or edema.   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  He is stable from a cardiovascular disease standpoint with no evidence of unstable angina, decompensated heart failure or significant arrhythmias.      CV Risk Factor Profile Includes:  Age > 60: No   Diabetes Mellitus: No  Hypertension: Yes  Dyslipidemia: Yes  Peripheral Vascular Disease: No  History of CAD: No  LVH: No  Family History of Heart Disease: Yes  Dialysis > 1 Year: No  Prolonged Duration of CKD: Yes  History of Smoking: No  History of Radiation Therapy (either whole body or chest irradiation: No    Per the [place] Society for Transplantation Guidelines (2005), patients  with < 3 of the above clinical risk factors are considered to be at low risk for cardiac disease, and in general, do not require screening with noninvasive testing unless significant DM or PVD history. Patients with > 3 of the above clinical risk factors are considered intermediate risk and a non-invasive study such as a dobutamine stress echo or nuclear SPECT is warranted. Patients with angina symptoms, cardiomyopathy with reduced EF, long-standing type I diabetes, or a positive stress test are considered high risk and may warrant further evaluation with coronary angiography.     Pre-operative cardiovascular examination  -- Lexiscan today was a normal stress test. He can proceed with transplant at an acceptable perioperative risk.   -- He should have repeat stress test and clinical follow-up annually if he is still on the transplant list. Discussed heart health lifestyle modifications with patient.     GUY Rocha CNP    CC  Patient Care Team:  Afshan Morley as PCP - General (Internal Medicine)  Lucie Swift RN as Registered Nurse (Transplant)  Te Hardy MD as MD (Nephrology)  Flavio Christie MD as MD (Nephrology)  NEMO GRAHAM

## 2019-10-28 ENCOUNTER — HOSPITAL ENCOUNTER (OUTPATIENT)
Dept: NUCLEAR MEDICINE | Facility: CLINIC | Age: 49
Setting detail: NUCLEAR MEDICINE
Discharge: HOME | End: 2019-10-28
Attending: INTERNAL MEDICINE | Admitting: INTERNAL MEDICINE
Payer: COMMERCIAL

## 2019-10-28 ENCOUNTER — HOSPITAL ENCOUNTER (OUTPATIENT)
Dept: CARDIOLOGY | Facility: CLINIC | Age: 49
Discharge: HOME | End: 2019-10-28
Attending: INTERNAL MEDICINE | Admitting: INTERNAL MEDICINE
Payer: COMMERCIAL

## 2019-10-28 ENCOUNTER — OFFICE VISIT (OUTPATIENT)
Dept: CARDIOLOGY | Facility: CLINIC | Age: 49
End: 2019-10-28
Attending: INTERNAL MEDICINE
Payer: COMMERCIAL

## 2019-10-28 ENCOUNTER — HOSPITAL ENCOUNTER (OUTPATIENT)
Dept: NUCLEAR MEDICINE | Facility: CLINIC | Age: 49
Setting detail: NUCLEAR MEDICINE
End: 2019-10-28
Attending: INTERNAL MEDICINE
Payer: COMMERCIAL

## 2019-10-28 VITALS
HEART RATE: 89 BPM | SYSTOLIC BLOOD PRESSURE: 130 MMHG | WEIGHT: 208 LBS | HEIGHT: 70 IN | BODY MASS INDEX: 29.78 KG/M2 | DIASTOLIC BLOOD PRESSURE: 89 MMHG | OXYGEN SATURATION: 97 %

## 2019-10-28 VITALS — SYSTOLIC BLOOD PRESSURE: 123 MMHG | DIASTOLIC BLOOD PRESSURE: 71 MMHG

## 2019-10-28 DIAGNOSIS — N18.6 ESRD (END STAGE RENAL DISEASE) (H): ICD-10-CM

## 2019-10-28 DIAGNOSIS — Z01.810 ENCOUNTER FOR PRE-OPERATIVE CARDIOVASCULAR CLEARANCE: ICD-10-CM

## 2019-10-28 DIAGNOSIS — Z76.82 ORGAN TRANSPLANT CANDIDATE: ICD-10-CM

## 2019-10-28 DIAGNOSIS — Z12.5 PROSTATE CANCER SCREENING: ICD-10-CM

## 2019-10-28 PROCEDURE — 93010 ELECTROCARDIOGRAM REPORT: CPT | Mod: 59 | Performed by: INTERNAL MEDICINE

## 2019-10-28 PROCEDURE — 93017 CV STRESS TEST TRACING ONLY: CPT

## 2019-10-28 PROCEDURE — 93018 CV STRESS TEST I&R ONLY: CPT | Performed by: INTERNAL MEDICINE

## 2019-10-28 PROCEDURE — 93016 CV STRESS TEST SUPVJ ONLY: CPT | Performed by: INTERNAL MEDICINE

## 2019-10-28 PROCEDURE — 78452 HT MUSCLE IMAGE SPECT MULT: CPT

## 2019-10-28 PROCEDURE — 25000128 H RX IP 250 OP 636: Performed by: INTERNAL MEDICINE

## 2019-10-28 PROCEDURE — G0463 HOSPITAL OUTPT CLINIC VISIT: HCPCS | Mod: 25,ZF

## 2019-10-28 PROCEDURE — 34300033 ZZH RX 343: Performed by: INTERNAL MEDICINE

## 2019-10-28 PROCEDURE — 99213 OFFICE O/P EST LOW 20 MIN: CPT | Mod: 25 | Performed by: NURSE PRACTITIONER

## 2019-10-28 PROCEDURE — 78452 HT MUSCLE IMAGE SPECT MULT: CPT | Mod: 26 | Performed by: INTERNAL MEDICINE

## 2019-10-28 PROCEDURE — A9502 TC99M TETROFOSMIN: HCPCS | Performed by: INTERNAL MEDICINE

## 2019-10-28 PROCEDURE — 93005 ELECTROCARDIOGRAM TRACING: CPT | Mod: XU

## 2019-10-28 RX ORDER — AMINOPHYLLINE 25 MG/ML
50-100 INJECTION, SOLUTION INTRAVENOUS
Status: DISCONTINUED | OUTPATIENT
Start: 2019-10-28 | End: 2019-10-29 | Stop reason: HOSPADM

## 2019-10-28 RX ORDER — ALBUTEROL SULFATE 90 UG/1
2 AEROSOL, METERED RESPIRATORY (INHALATION) EVERY 5 MIN PRN
Status: DISCONTINUED | OUTPATIENT
Start: 2019-10-28 | End: 2019-10-29 | Stop reason: HOSPADM

## 2019-10-28 RX ORDER — ACYCLOVIR 200 MG/1
0-1 CAPSULE ORAL
Status: DISCONTINUED | OUTPATIENT
Start: 2019-10-28 | End: 2019-10-29 | Stop reason: HOSPADM

## 2019-10-28 RX ORDER — REGADENOSON 0.08 MG/ML
0.4 INJECTION, SOLUTION INTRAVENOUS ONCE
Status: COMPLETED | OUTPATIENT
Start: 2019-10-28 | End: 2019-10-28

## 2019-10-28 RX ORDER — CHOLECALCIFEROL (VITAMIN D3) 50 MCG
1 TABLET ORAL DAILY
Status: ON HOLD | COMMUNITY
End: 2021-11-07

## 2019-10-28 RX ORDER — ATORVASTATIN CALCIUM 10 MG/1
10 TABLET, FILM COATED ORAL DAILY
COMMUNITY

## 2019-10-28 RX ADMIN — TETROFOSMIN 10.3 MCI.: 1.38 INJECTION, POWDER, LYOPHILIZED, FOR SOLUTION INTRAVENOUS at 12:40

## 2019-10-28 RX ADMIN — TETROFOSMIN 40 MCI.: 1.38 INJECTION, POWDER, LYOPHILIZED, FOR SOLUTION INTRAVENOUS at 13:35

## 2019-10-28 RX ADMIN — REGADENOSON 0.4 MG: 0.08 INJECTION, SOLUTION INTRAVENOUS at 13:33

## 2019-10-28 ASSESSMENT — ENCOUNTER SYMPTOMS
SHORTNESS OF BREATH: 0
WHEEZING: 0
DYSPNEA ON EXERTION: 0
HEMOPTYSIS: 0
SNORES LOUDLY: 1
POSTURAL DYSPNEA: 0
COUGH DISTURBING SLEEP: 0
COUGH: 0
SPUTUM PRODUCTION: 0

## 2019-10-28 ASSESSMENT — MIFFLIN-ST. JEOR: SCORE: 1809.73

## 2019-10-28 ASSESSMENT — PAIN SCALES - GENERAL: PAINLEVEL: NO PAIN (0)

## 2019-10-28 NOTE — LETTER
10/28/2019      RE: Raj Pierce  [address]  [place] [address]       Dear Colleague,    Thank you for the opportunity to participate in the care of your patient, Raj Pierce, at the [facility] at [facility]. Please see a copy of my visit note below.    Heart Care       HPI: Raj Pierce is a 48 year old male who presents for kidney transplant wait list CV clearance.  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 His transplanted kidney in 2015 was too small and he was immediately relisted although he has not been on HD since his transplant in 2015. Lexiscan scheduled today.     Reviewed current interval:  -- Echocardiogram 11/2018 showed Normal LV function, LVEF 55-60%, normal right ventricular size and function, and no significant valvular dysfunction.  -- Presenting EKG personally reviewed tracings: NSR, Vent rate 74, NV interval 150 ms, QRS duration 102 ms, QTc 424 ms.    Current Interval history:   Patient states that he feels well. States that he rarely misses doses of medication. Patient denies tobacco use.   Drinking alcohol, 3 beers/day.  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  Sleeps with 2 pillows under head, has SILVIA and the two pillows help keep his CPAP in good position.  Has a family history of heart disease; dad has CAD first stents at 50-54 yo. Denies chest pain or pressure, dizziness, syncope, dyspnea at rest or with exertion, palpitations, orthopnea, PND, abdominal edema, pedal edema, or claudication.     Current Outpatient Medications   Medication Sig Dispense Refill     AMLODIPINE BESYLATE PO Take 10 mg by mouth daily       B Complex-C-Folic Acid (DIALYVITE 800 PO) Take 1 tablet by mouth daily       calcium acetate (PHOSLO) 667 MG CAPS  Take 1,234 mg by mouth 3 times daily (with meals)       Epoetin Marko (PROCRIT IJ) gets at dialysis         Past Medical History:   Diagnosis Date     ESRD (end stage renal disease) (H)     11/13/15     Hepatitis B core antibody positive      Hypertension      Noncompliance        No past surgical history on file.    Family History   Family history unknown: Yes       Social History     Tobacco Use     Smoking status: Never Smoker     Smokeless tobacco: Never Used   Substance Use Topics     Alcohol use: No     Alcohol/week: 0.0 oz       No Known Allergies      ROS:   A complete review of systems was performed and is negative except as noted in the HPI.     Physical Examination:  Vitals: 130/89, pulse 89   BMI= 29.84    GENERAL APPEARANCE: healthy, alert, and no acute distress  HEENT: no icterus, no xanthelasmas, normal pupil size and reaction, no cyanosis.  NECK: no asymmetry, no cervical bruits, no JVD   RESPIRATORY: lungs clear to auscultation - no rales, rhonchi or wheezes, no use of accessory muscles, no retractions, respirations are unlabored, normal respiratory rate  CARDIOVASCULAR: regular rhythm, normal S1 with physiologic split S2, no S3 or S4 and no murmur, click or rub  GI:  no abdominal bruits, soft, non-tender  EXTREMITIES: no clubbing  NEURO: alert and oriented to person/place/time, normal speech, gait and affect  VASC: Radial and posterior tibialis pulses +2 and symmetric bilaterally. No cyanosis or edema.   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  He is stable from a cardiovascular disease standpoint with no evidence of unstable angina, decompensated heart failure or significant arrhythmias.      CV Risk Factor Profile Includes:  Age > 60: No   Diabetes Mellitus: No  Hypertension: Yes  Dyslipidemia:  Yes  Peripheral Vascular Disease: No  History of CAD: No  LVH: No  Family History of Heart Disease: Yes  Dialysis > 1 Year: No  Prolonged Duration of CKD: Yes  History of Smoking: No  History of Radiation Therapy (either whole body or chest irradiation: No    Per the [place] Society for Transplantation Guidelines (2005), patients with < 3 of the above clinical risk factors are considered to be at low risk for cardiac disease, and in general, do not require screening with noninvasive testing unless significant DM or PVD history. Patients with > 3 of the above clinical risk factors are considered intermediate risk and a non-invasive study such as a dobutamine stress echo or nuclear SPECT is warranted. Patients with angina symptoms, cardiomyopathy with reduced EF, long-standing type I diabetes, or a positive stress test are considered high risk and may warrant further evaluation with coronary angiography.     Pre-operative cardiovascular examination  -- Lexiscan today was a normal stress test. He can proceed with transplant at an acceptable perioperative risk.   -- He should have repeat stress test and clinical follow-up annually if he is still on the transplant list. Discussed heart health lifestyle modifications with patient.     GUY Rocha CNP    CC  Patient Care Team:  Afshan Morley as PCP - General (Internal Medicine)  Lucie Swift RN as Registered Nurse (Transplant)  Te Hardy MD as MD (Nephrology)  Flavio Christie MD as MD (Nephrology)  NEMO GRAHAM

## 2019-10-28 NOTE — NURSING NOTE
Chief Complaint   Patient presents with     Follow Up     Cardiology eval     Vitals were taken and medications were reconciled. EKG was performed.    Johanna Bean CMA    9:11 AM

## 2019-10-28 NOTE — PROGRESS NOTES
Assessment and Plan:  #Kidney Transplant Wait List Evaluation: Patient is a good candidate overall. Patient should remain active on the wait list.    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 His wait time was re-instated and he has been listed since 2013. His eGFR has been around 17-21 ml/min for at least the last year and he has remained on MMF and tacrolimus. As he's not on dialysis, and overall feeling well, recommend he be selective with offers at this time.    # Cardiac Risk: he had risk assessment yesterday and a normal NM lexiscan stress test.     # PKD: evaluated by surgery earlier today.    # Health Maintenance: Colonoscopy: need records and Dental: Up to date. He will get a PSA with his next labs.     Discussed the risks and benefits of a transplant, including the risk of surgery and immunosuppression medications.    KDPI: We discussed approximate remaining wait time and how that is influenced by issues such as blood type and sensitization (PRA) and access to a living donor. I contrasted potential waiting time for living vs  donor kidneys from  normal (0-85%) or higher (%) kidney donor profile index (KDPI) donors and their associated outcomes. I would not recommend Mr. Pierce to consider kidneys from high KDPI donors. The reason for this decision is best summarized as: not on dialysis yet.    Patient s overall re-evaluation may require further discussion in the Transplant Program s multidisciplinary selection committee for a final recommendation on the patient s suitability for transplant.     Reason for Visit:  Raj Pierce is a 48-year-old male with ESKD from polycystic kidney disease (PKD), who presents for kidney transplant wait list evaluation.     Date of Initial Transplant Evaluation:          Current  Transplant Phase: Waitlist: Active  Official [facility] Listing Date: 7/24/2013  Blood Type: B        cPRA: 10       Date of cPRA: October 2019  Transplant Coordinator: Do [facility] Transplant Office phone number [phone]     Previous Medical Issues:  None     History of Present Illness:   Mr. Pierce is a 48-year-old male with history of ESKD from PKD s/p DDKT October 2015 that was unfortunately a small and dysplastic kidney.         Interim Events:        Overall, he's been doing very well. He continues to follow here and locally post-transplant. His potassium has been increasing and he's been counseled to eat a low potassium diet. He switched pharmacies recently and noticed that he hasn't had amlodipine for a few weeks. His BP at home has been in the high 130s/90s and was higher here today, but he reports he didn't take his BP meds with the stress test. He put a call in to his pharmacy to get his med list corrected.          Kidney Disease: PKD. Continues to follow with post-transplant team.        Kidney Disease Dx: Polycystic kidney disease (PKD)        On Dialysis: No       Primary Nephrologist: [facility] transplant neprhology         Cardiac/Vascular Disease History:       Known CAD: No       Known PAD/Claudication Symptoms: No       Known Heart Failure: No       Arrhythmia:  No       Pulmonary Hypertension: No        Valvular Disease: No       Other: None       New Cardiac/Vascular Events: No         Functional Capacity/Frailty:        He continues to work full time as a  and is not physically limited by anything in particular. He can walk several blocks and go up and down stairs without chest pain, SOB, or claudication.       Fatigue/Decreased Energy: [x] No [] Yes    Chest Pain or SOB with Exertion: [x] No [] Yes    Significant Weight Change: [x] No [] Yes    Nausea, Vomiting or Diarrhea: [x] No [] Yes    Fever, Sweats or Chills:  [x] No [] Yes    Leg Swelling [x] No [] Yes         Other Pertinent Transplant Surgical Issues:  Recent Blood Transfusion: No  Previous Abdominal Transplant: Yes  Bladder Dysfunction: No  Chronic/Recurrent Infections: No  Chronic Anticoagulation: No  Jehovah s Witness: No    Review Of Systems:  A comprehensive review of systems was obtained and negative, except as noted in the HPI or PMH.     Active Problem List:   Patient Active Problem List   Diagnosis     Polycystic kidney, autosomal dominant     Dyslipidemia     Vitamin D deficiency     Hypertension secondary to other renal disorders     Secondary renal hyperparathyroidism (HCC)     Kidney replaced by transplant     Immunosuppressed status (H)     Acute gouty arthritis     Hypomagnesemia     Aftercare following organ transplant       Personal History:  Social History     Socioeconomic History     Marital status:      Spouse name: Dianne     Number of children: 2     Years of education: 13     Highest education level: Not on file   Occupational History     Occupation: [employer]     Employer: [employer]   Social Needs     Financial resource strain: Not on file     Food insecurity:     Worry: Not on file     Inability: Not on file     Transportation needs:     Medical: Not on file     Non-medical: Not on file   Tobacco Use     Smoking status: Never Smoker     Smokeless tobacco: Never Used   Substance and Sexual Activity     Alcohol use: Yes     Alcohol/week: 10.0 standard drinks     Types: 10 Standard drinks or equivalent per week     Comment: 1 drink daily      Drug use: No     Sexual activity: Yes     Partners: Female   Lifestyle     Physical activity:     Days per week: Not on file     Minutes per session: Not on file     Stress: Not on file   Relationships     Social connections:     Talks on phone: Not on file     Gets together: Not on file     Attends [employer] service: Not on file     Active member of club or organization: Not on file     Attends meetings of clubs or organizations:  Not on file     Relationship status: Not on file     Intimate partner violence:     Fear of current or ex partner: Not on file     Emotionally abused: Not on file     Physically abused: Not on file     Forced sexual activity: Not on file   Other Topics Concern     Parent/sibling w/ CABG, MI or angioplasty before 65F 55M? Not Asked      Service Not Asked     Blood Transfusions No     Caffeine Concern Not Asked     Occupational Exposure Not Asked     Hobby Hazards Not Asked     Sleep Concern Not Asked     Stress Concern Not Asked     Weight Concern Not Asked     Special Diet Not Asked     Back Care Not Asked     Exercise Not Asked     Bike Helmet Not Asked     Seat Belt Yes     Self-Exams Not Asked   Social History Narrative     Not on file        Allergies:  Allergies   Allergen Reactions     Alcohol Rash     Other reaction(s): Rash  Swabs used at hosptial  Swabs used at hosptial        Medications:  Current Outpatient Medications   Medication Sig     atorvastatin (LIPITOR) 10 MG tablet Take 10 mg by mouth daily     chlorthalidone (HYGROTON) 25 MG tablet Take 0.5 tablets (12.5 mg) by mouth daily     citalopram (CELEXA) 20 MG tablet Take 20 mg by mouth daily.     losartan (COZAAR) 50 MG tablet Take 1 tablet (50 mg) by mouth daily     magnesium oxide 400 MG CAPS      mycophenolate (GENERIC EQUIVALENT) 250 MG capsule TAKE 3 CAPSULES BY MOUTH TWICE A DAY     SIMVASTATIN PO Take 10 mg by mouth At Bedtime     sulfamethoxazole-trimethoprim (BACTRIM/SEPTRA) 400-80 MG per tablet TAKE 1 TABLET BY MOUTH EVERY MON WED & FRI MORNING     tacrolimus (GENERIC EQUIVALENT) 0.5 MG capsule HOLD     tacrolimus (GENERIC EQUIVALENT) 0.5 MG capsule Take 1 capsule (0.5 mg) by mouth 2 times daily     tacrolimus (GENERIC EQUIVALENT) 1 MG capsule Take 2 capsules (2 mg) by mouth 2 times daily     vitamin D3 (CHOLECALCIFEROL) 2000 units (50 mcg) tablet Take 1 tablet by mouth daily     No current facility-administered medications for this  "visit.         Vitals:  BP (!) 158/101 (BP Location: Right arm, Patient Position: Sitting, Cuff Size: Adult Regular)   Pulse 74   Temp 98  F (36.7  C) (Oral)   Ht 1.778 m (5' 10\")   Wt 92.4 kg (203 lb 11.3 oz)   SpO2 97%   BMI 29.23 kg/m       Exam:  GENERAL APPEARANCE: alert and no distress  HENT: mouth without ulcers or lesions. Good dentition   LYMPHATICS: no cervical or supraclavicular nodes  RESP: lungs clear to auscultation - no rales, rhonchi or wheezes  CV: regular rhythm, normal rate, no rub, no murmur  EDEMA: no LE edema bilaterally  ABDOMEN: soft, nondistended, nontender, bowel sounds normal  MS: extremities normal - no gross deformities noted, no evidence of inflammation in joints, no muscle tenderness  SKIN: no rash        "

## 2019-10-28 NOTE — PATIENT INSTRUCTIONS
Cardiology Provider you saw in clinic today: LENNOX Tesfaye    Medication Changes:  None today    Labs/Tests needed:  EKG and NM stress test today.     Follow-up Visit:  Should have repeat stress test and clinical follow-up annually while still on the transplant list.    Further Instructions:    Lifestyle recommendations:     Avoid tobacco    Maintain a healthy weight    Limit alcohol    Eat at least 2-3 servings fruit and vegetables/day, limit saturated fats, and consider following the Mediterranean or the DASH diet. To download free cookbooks and healthy recipes go to https://healthyeating.nhlbi.nih.gov/default.aspx    Include stress reduction activities in your day. These may include Yoga, Demario chi, meditation, or deep breathing.     Get at least 150 minutes/week moderate intensity aerobic physical activities.  Also, do muscle strengthening activities on 2 or more days/week.    You will receive all normal lab and testing results via [employer] or mail if not reviewed in clinic today. Please contact our office if you need assistance with setting up [employer].    If you need a medication refill please contact your pharmacy. Please allow 3 business days for your refill to be completed.     As always, thank you for trusting us with your health care needs!    If you have any questions regarding your visit please contact your care team:   Cardiology  Telephone Number    EP RN  Electrophysiology Nurse Coordinator [phone]

## 2019-10-29 ENCOUNTER — ANCILLARY PROCEDURE (OUTPATIENT)
Dept: CT IMAGING | Facility: CLINIC | Age: 49
End: 2019-10-29
Payer: COMMERCIAL

## 2019-10-29 ENCOUNTER — OFFICE VISIT (OUTPATIENT)
Dept: TRANSPLANT | Facility: CLINIC | Age: 49
End: 2019-10-29
Attending: SURGERY
Payer: COMMERCIAL

## 2019-10-29 ENCOUNTER — TELEPHONE (OUTPATIENT)
Dept: TRANSPLANT | Facility: CLINIC | Age: 49
End: 2019-10-29

## 2019-10-29 ENCOUNTER — OFFICE VISIT (OUTPATIENT)
Dept: NEPHROLOGY | Facility: CLINIC | Age: 49
End: 2019-10-29
Attending: SURGERY
Payer: COMMERCIAL

## 2019-10-29 ENCOUNTER — ALLIED HEALTH/NURSE VISIT (OUTPATIENT)
Dept: TRANSPLANT | Facility: CLINIC | Age: 49
End: 2019-10-29
Attending: TRANSPLANT SURGERY
Payer: COMMERCIAL

## 2019-10-29 VITALS
BODY MASS INDEX: 29.16 KG/M2 | DIASTOLIC BLOOD PRESSURE: 101 MMHG | OXYGEN SATURATION: 97 % | HEART RATE: 74 BPM | TEMPERATURE: 98 F | WEIGHT: 203.71 LBS | SYSTOLIC BLOOD PRESSURE: 158 MMHG | HEIGHT: 70 IN

## 2019-10-29 VITALS
OXYGEN SATURATION: 97 % | DIASTOLIC BLOOD PRESSURE: 94 MMHG | WEIGHT: 203.8 LBS | HEART RATE: 74 BPM | SYSTOLIC BLOOD PRESSURE: 163 MMHG | BODY MASS INDEX: 29.18 KG/M2 | TEMPERATURE: 98.3 F | HEIGHT: 70 IN

## 2019-10-29 DIAGNOSIS — Z76.82 ORGAN TRANSPLANT CANDIDATE: Primary | ICD-10-CM

## 2019-10-29 DIAGNOSIS — Z76.82 ORGAN TRANSPLANT CANDIDATE: ICD-10-CM

## 2019-10-29 DIAGNOSIS — Z01.810 ENCOUNTER FOR PRE-OPERATIVE CARDIOVASCULAR CLEARANCE: ICD-10-CM

## 2019-10-29 DIAGNOSIS — Z76.82 AWAITING ORGAN TRANSPLANT: Primary | ICD-10-CM

## 2019-10-29 DIAGNOSIS — Z12.5 PROSTATE CANCER SCREENING: ICD-10-CM

## 2019-10-29 DIAGNOSIS — N18.6 ESRD (END STAGE RENAL DISEASE) (H): ICD-10-CM

## 2019-10-29 DIAGNOSIS — N18.6 END STAGE RENAL DISEASE (H): ICD-10-CM

## 2019-10-29 PROCEDURE — G0463 HOSPITAL OUTPT CLINIC VISIT: HCPCS | Mod: ZF

## 2019-10-29 ASSESSMENT — MIFFLIN-ST. JEOR
SCORE: 1790.25
SCORE: 1790.68

## 2019-10-29 ASSESSMENT — PAIN SCALES - GENERAL: PAINLEVEL: NO PAIN (0)

## 2019-10-29 NOTE — TELEPHONE ENCOUNTER
Lab order faxed.    Nikki Gilmore PA-C [place], Rebecca, ISHA             Hello- can you please add a PSA to patient's next local labs? I saw him for wait list follow up today and thought it be easier for him to do PSA locally rather than go to lab here for 1 draw. Thanks!

## 2019-10-29 NOTE — PROGRESS NOTES
Patient Name: Raj Pierce  : 1970  Age: 48 year old  MRN: [id number]  Date of Initial Social Work Evaluation: 2013. Most recent transplant social work visit 2017    Patient on kidney transplant wait list.  Saw today to update psychosocial assessment.      Presenting Information   Living Situation: Patient resides in a home with his wife and two adult children in [place], [place].  If not local, plans for short term stay:  Local hotel. Patient reported after his first transplant he and his wife drove back and forth from [place] to his daily appointments. Informed patient that he will need to stay local for up to 2 weeks.   Previous Functional Status: Patient is independent with ADL's.  Cultural/Language/Spiritual Considerations: None identified at this time.     Support System  Primary Support Person Wife Dianne  Other support:  Sister, parents  Plan for support in immediate post-transplant period: Wife Dianne    Health Care Directive  Decision Maker: Self  Alternate Decision Maker: Wife  Health Care Directive: Provided education and Declined completing    Mental Health/Coping:   History of Mental Health: Patient has a history of depression.   History of Chemical Health: Patient reported he drinks about one alcoholic drink a day. Patient denied any tobacco or substance use.   Current status: Patient reported he is on an anti-depression medication and he feels his depression is well controlled. Patient denied any other mental health concerns.   Coping: Patient appears to be coping well.   Services Needed/Recommended: None identified at this time.     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   Impact of transplant on income: None identified at this time.   Insurance and medication coverage:  BCBS through spouse employer.   Financial concerns: None identified at this time.   Resources needed: None identified at this time.      Assessment and recommendations and plan:  Patient had his first transplant 10/2015. Patient is not yet on dialysis. Patient reported now that he no longer has Medicare his Cellcept copay is a little high but manageable. Provided patient with $15 cellcept copay card. Reviewed transplant education (Medicare, rehabilitation, donor issues, community/financial resources, and psych/family adjustment) as well as psychosocial risks of transplant. Provided patient with a copy of post-transplant informational sheet that includes information on potential costs of medications, Medicare ESRD, post-transplant lodging, etc. Patient seemed to process information well. Appeared well informed, motivated, and able to follow post transplant requirements. Behavior was appropriate during interview. Has adequate income and insurance coverage. Adequate social support. No major contraindications noted for transplant. At this time, patient appears to understand the risks and benefits of transplant.     Lexii Burgess [facility]    Kidney/Pancreas/Auto Islet Transplant Programs

## 2019-10-29 NOTE — LETTER
10/29/2019      RE: Raj Pierce  [address]  [place] [address]     Assessment and Plan:  #Kidney Transplant Wait List Evaluation: Patient is a good candidate overall. Patient should remain active on the wait list.    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 His wait time was re-instated and he has been listed since 2013. His eGFR has been around 17-21 ml/min for at least the last year and he has remained on MMF and tacrolimus. As he's not on dialysis, and overall feeling well, recommend he be selective with offers at this time.    # Cardiac Risk: he had risk assessment yesterday and a normal NM lexiscan stress test.     # PKD: evaluated by surgery earlier today.    # Health Maintenance: Colonoscopy: need records and Dental: Up to date. He will get a PSA with his next labs.     Discussed the risks and benefits of a transplant, including the risk of surgery and immunosuppression medications.    KDPI: We discussed approximate remaining wait time and how that is influenced by issues such as blood type and sensitization (PRA) and access to a living donor. I contrasted potential waiting time for living vs  donor kidneys from  normal (0-85%) or higher (%) kidney donor profile index (KDPI) donors and their associated outcomes. I would not recommend Mr. Pierce to consider kidneys from high KDPI donors. The reason for this decision is best summarized as: not on dialysis yet.    Patient s overall re-evaluation may require further discussion in the Transplant Program s multidisciplinary selection committee for a final recommendation on the patient s suitability for transplant.     Reason for Visit:  Raj Pierce is a 48-year-old male with ESKD from polycystic kidney disease (PKD), who presents for kidney transplant  wait list evaluation.     Date of Initial Transplant Evaluation:  2013        Current Transplant Phase: Waitlist: Active  Official [facility] Listing Date: 7/24/2013  Blood Type: B        cPRA: 10       Date of cPRA: October 2019  Transplant Coordinator: [facility] Transplant Office phone number [phone]     Previous Medical Issues:  None     History of Present Illness:   Mr. Pierce is a 48-year-old male with history of ESKD from PKD s/p DDKT October 2015 that was unfortunately a small and dysplastic kidney.         Interim Events:        Overall, he's been doing very well. He continues to follow here and locally post-transplant. His potassium has been increasing and he's been counseled to eat a low potassium diet. He switched pharmacies recently and noticed that he hasn't had amlodipine for a few weeks. His BP at home has been in the high 130s/90s and was higher here today, but he reports he didn't take his BP meds with the stress test. He put a call in to his pharmacy to get his med list corrected.          Kidney Disease: PKD. Continues to follow with post-transplant team.        Kidney Disease Dx: Polycystic kidney disease (PKD)        On Dialysis: No       Primary Nephrologist: [facility] transplant neprhology         Cardiac/Vascular Disease History:       Known CAD: No       Known PAD/Claudication Symptoms: No       Known Heart Failure: No       Arrhythmia:  No       Pulmonary Hypertension: No        Valvular Disease: No       Other: None       New Cardiac/Vascular Events: No         Functional Capacity/Frailty:        He continues to work full time as a  and is not physically limited by anything in particular. He can walk several blocks and go up and down stairs without chest pain, SOB, or claudication.       Fatigue/Decreased Energy: [x] No [] Yes    Chest Pain or SOB with Exertion: [x] No [] Yes    Significant Weight Change: [x] No [] Yes    Nausea, Vomiting or Diarrhea: [x] No  [] Yes    Fever, Sweats or Chills:  [x] No [] Yes    Leg Swelling [x] No [] Yes        Other Pertinent Transplant Surgical Issues:  Recent Blood Transfusion: No  Previous Abdominal Transplant: Yes  Bladder Dysfunction: No  Chronic/Recurrent Infections: No  Chronic Anticoagulation: No  Jehovah s Witness: No    Review Of Systems:  A comprehensive review of systems was obtained and negative, except as noted in the HPI or PMH.     Active Problem List:   Patient Active Problem List   Diagnosis     Polycystic kidney, autosomal dominant     Dyslipidemia     Vitamin D deficiency     Hypertension secondary to other renal disorders     Secondary renal hyperparathyroidism (HCC)     Kidney replaced by transplant     Immunosuppressed status (H)     Acute gouty arthritis     Hypomagnesemia     Aftercare following organ transplant       Personal History:  Social History     Socioeconomic History     Marital status:      Spouse name: Dianne     Number of children: 2     Years of education: 13     Highest education level: Not on file   Occupational History     Occupation:      Employer: [employer]   Social Needs     Financial resource strain: Not on file     Food insecurity:     Worry: Not on file     Inability: Not on file     Transportation needs:     Medical: Not on file     Non-medical: Not on file   Tobacco Use     Smoking status: Never Smoker     Smokeless tobacco: Never Used   Substance and Sexual Activity     Alcohol use: Yes     Alcohol/week: 10.0 standard drinks     Types: 10 Standard drinks or equivalent per week     Comment: 1 drink daily      Drug use: No     Sexual activity: Yes     Partners: Female   Lifestyle     Physical activity:     Days per week: Not on file     Minutes per session: Not on file     Stress: Not on file   Relationships     Social connections:     Talks on phone: Not on file     Gets together: Not on file     Attends Denominational service: Not on file     Active member of  club or organization: Not on file     Attends meetings of clubs or organizations: Not on file     Relationship status: Not on file     Intimate partner violence:     Fear of current or ex partner: Not on file     Emotionally abused: Not on file     Physically abused: Not on file     Forced sexual activity: Not on file   Other Topics Concern     Parent/sibling w/ CABG, MI or angioplasty before 65F 55M? Not Asked      Service Not Asked     Blood Transfusions No     Caffeine Concern Not Asked     Occupational Exposure Not Asked     Hobby Hazards Not Asked     Sleep Concern Not Asked     Stress Concern Not Asked     Weight Concern Not Asked     Special Diet Not Asked     Back Care Not Asked     Exercise Not Asked     Bike Helmet Not Asked     Seat Belt Yes     Self-Exams Not Asked   Social History Narrative     Not on file        Allergies:  Allergies   Allergen Reactions     Alcohol Rash     Other reaction(s): Rash  Swabs used at hosptial  Swabs used at hosptial        Medications:  Current Outpatient Medications   Medication Sig     atorvastatin (LIPITOR) 10 MG tablet Take 10 mg by mouth daily     chlorthalidone (HYGROTON) 25 MG tablet Take 0.5 tablets (12.5 mg) by mouth daily     citalopram (CELEXA) 20 MG tablet Take 20 mg by mouth daily.     losartan (COZAAR) 50 MG tablet Take 1 tablet (50 mg) by mouth daily     magnesium oxide 400 MG CAPS      mycophenolate (GENERIC EQUIVALENT) 250 MG capsule TAKE 3 CAPSULES BY MOUTH TWICE A DAY     SIMVASTATIN PO Take 10 mg by mouth At Bedtime     sulfamethoxazole-trimethoprim (BACTRIM/SEPTRA) 400-80 MG per tablet TAKE 1 TABLET BY MOUTH EVERY MON WED & FRI MORNING     tacrolimus (GENERIC EQUIVALENT) 0.5 MG capsule HOLD     tacrolimus (GENERIC EQUIVALENT) 0.5 MG capsule Take 1 capsule (0.5 mg) by mouth 2 times daily     tacrolimus (GENERIC EQUIVALENT) 1 MG capsule Take 2 capsules (2 mg) by mouth 2 times daily     vitamin D3 (CHOLECALCIFEROL) 2000 units (50 mcg) tablet Take  "1 tablet by mouth daily     No current facility-administered medications for this visit.         Vitals:  BP (!) 158/101 (BP Location: Right arm, Patient Position: Sitting, Cuff Size: Adult Regular)   Pulse 74   Temp 98  F (36.7  C) (Oral)   Ht 1.778 m (5' 10\")   Wt 92.4 kg (203 lb 11.3 oz)   SpO2 97%   BMI 29.23 kg/m       Exam:  GENERAL APPEARANCE: alert and no distress  HENT: mouth without ulcers or lesions. Good dentition   LYMPHATICS: no cervical or supraclavicular nodes  RESP: lungs clear to auscultation - no rales, rhonchi or wheezes  CV: regular rhythm, normal rate, no rub, no murmur  EDEMA: no LE edema bilaterally  ABDOMEN: soft, nondistended, nontender, bowel sounds normal  MS: extremities normal - no gross deformities noted, no evidence of inflammation in joints, no muscle tenderness  SKIN: no rash    Nikki Gilmore PA-C      "

## 2019-10-29 NOTE — Clinical Note
10/29/2019       RE: Raj Pierce  [address]  [place] [address]     Dear Colleague,    Thank you for referring your patient, Raj Pierce, to the [facility] NEPHROLOGY at [facility]. Please see a copy of my visit note below.    Assessment and Plan:  #Kidney Transplant Wait List Evaluation: Patient is a good candidate overall. Patient should remain active on the wait list.    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 His wait time was re-instated and he has been listed since 2013. His eGFR has been around 17-21 ml/min for at least the last year and he has remained on MMF and tacrolimus.    # Cardiac Risk: he had risk assessment yesterday and a normal NM lexiscan stress test.     # PKD: evaluated by surgery earlier today.    # Health Maintenance: Colonoscopy: {UMP TX UP TO DATE:105279016} and Dental: {UMP TX UP TO DATE:381259798}     Discussed the risks and benefits of a transplant, including the risk of surgery and immunosuppression medications.    KDPI: We discussed approximate remaining wait time and how that is influenced by issues such as blood type and sensitization (PRA) and access to a living donor. I contrasted potential waiting time for living vs  donor kidneys from  normal (0-85%) or higher (%) kidney donor profile index (KDPI) donors and their associated outcomes. I {Would:890288} recommend Mr. Pierce to consider kidneys from high KDPI donors. The reason for this decision is best summarized as: {KDPI REASON:[id number]}.    Patient s overall re-evaluation may require further discussion in the Transplant Program s multidisciplinary selection committee for a final recommendation on the patient s suitability for transplant.     Reason for Visit:  Raj Pierce is  a 48-year-old male with ESKD from polycystic kidney disease (PKD), who presents for kidney transplant wait list evaluation.     Date of Initial Transplant Evaluation:  2013        Current Transplant Phase: Waitlist: Active  Official UNOS Listing Date: 7/24/2013  Blood Type: B        cPRA: 10       Date of cPRA: October 2019  Transplant Coordinator: [facility] Office phone number [phone]     Previous Medical Issues:  None     History of Present Illness:   Mr. Pierce is a 48-year-old male with history of ESKD from PKD s/p DDKT October 2015 that was unfortunately a small and dysplastic kidney.         Interim Events:        *** K+  [facility] and [facility]  139/90 at home, not sure if he took BP meds yesterday d/t stress  Amlodipine? On list, not taking, just switched pharmacy         Kidney Disease: PKD. Continues to follow with post-transplant team.        Kidney Disease Dx: Polycystic kidney disease (PKD)        On Dialysis: No       Primary Nephrologist: [facility] transplant neprhology         Cardiac/Vascular Disease History:       Known CAD: No       Known PAD/Claudication Symptoms: No       Known Heart Failure: No       Arrhythmia:  No       Pulmonary Hypertension: No        Valvular Disease: No       Other: None       New Cardiac/Vascular Events: No         Functional Capacity/Frailty:        ***       Fatigue/Decreased Energy: [] No [] Yes    Chest Pain or SOB with Exertion: [] No [] Yes    Significant Weight Change: [] No [] Yes    Nausea, Vomiting or Diarrhea: [] No [] Yes    Fever, Sweats or Chills:  [] No [] Yes    Leg Swelling [] No [] Yes        Other Pertinent Transplant Surgical Issues:  Recent Blood Transfusion: No  Previous Abdominal Transplant: Yes  Bladder Dysfunction: {YES WITH WILD CARD/NO:18441857}  Chronic/Recurrent Infections: No  Chronic Anticoagulation: No  Jehovah s Witness: No    Review Of Systems:  A comprehensive review of systems was obtained and negative,  except as noted in the HPI or PMH.     Active Problem List:   Patient Active Problem List   Diagnosis     Polycystic kidney, autosomal dominant     Dyslipidemia     Vitamin D deficiency     Hypertension secondary to other renal disorders     Secondary renal hyperparathyroidism (HCC)     Kidney replaced by transplant     Immunosuppressed status (H)     Acute gouty arthritis     Hypomagnesemia     Aftercare following organ transplant       Personal History:  Social History     Socioeconomic History     Marital status:      Spouse name: Dianne     Number of children: 2     Years of education: 13     Highest education level: Not on file   Occupational History     Occupation:      Employer: [employer]   Social Needs     Financial resource strain: Not on file     Food insecurity:     Worry: Not on file     Inability: Not on file     Transportation needs:     Medical: Not on file     Non-medical: Not on file   Tobacco Use     Smoking status: Never Smoker     Smokeless tobacco: Never Used   Substance and Sexual Activity     Alcohol use: Yes     Alcohol/week: 10.0 standard drinks     Types: 10 Standard drinks or equivalent per week     Comment: 1 drink daily      Drug use: No     Sexual activity: Yes     Partners: Female   Lifestyle     Physical activity:     Days per week: Not on file     Minutes per session: Not on file     Stress: Not on file   Relationships     Social connections:     Talks on phone: Not on file     Gets together: Not on file     Attends Scientologist service: Not on file     Active member of club or organization: Not on file     Attends meetings of clubs or organizations: Not on file     Relationship status: Not on file     Intimate partner violence:     Fear of current or ex partner: Not on file     Emotionally abused: Not on file     Physically abused: Not on file     Forced sexual activity: Not on file   Other Topics Concern     Parent/sibling w/ CABG, MI or angioplasty  before 65F 55M? Not Asked      Service Not Asked     Blood Transfusions No     Caffeine Concern Not Asked     Occupational Exposure Not Asked     Hobby Hazards Not Asked     Sleep Concern Not Asked     Stress Concern Not Asked     Weight Concern Not Asked     Special Diet Not Asked     Back Care Not Asked     Exercise Not Asked     Bike Helmet Not Asked     Seat Belt Yes     Self-Exams Not Asked   Social History Narrative     Not on file        Allergies:  Allergies   Allergen Reactions     Alcohol Rash     Other reaction(s): Rash  Swabs used at hosptial  Swabs used at hosptial        Medications:  Current Outpatient Medications   Medication Sig     atorvastatin (LIPITOR) 10 MG tablet Take 10 mg by mouth daily     chlorthalidone (HYGROTON) 25 MG tablet Take 0.5 tablets (12.5 mg) by mouth daily     citalopram (CELEXA) 20 MG tablet Take 20 mg by mouth daily.     losartan (COZAAR) 50 MG tablet Take 1 tablet (50 mg) by mouth daily     magnesium oxide 400 MG CAPS      mycophenolate (GENERIC EQUIVALENT) 250 MG capsule TAKE 3 CAPSULES BY MOUTH TWICE A DAY     SIMVASTATIN PO Take 10 mg by mouth At Bedtime     sulfamethoxazole-trimethoprim (BACTRIM/SEPTRA) 400-80 MG per tablet TAKE 1 TABLET BY MOUTH EVERY MON WED & FRI MORNING     tacrolimus (GENERIC EQUIVALENT) 0.5 MG capsule HOLD     tacrolimus (GENERIC EQUIVALENT) 0.5 MG capsule Take 1 capsule (0.5 mg) by mouth 2 times daily     tacrolimus (GENERIC EQUIVALENT) 1 MG capsule Take 2 capsules (2 mg) by mouth 2 times daily     vitamin D3 (CHOLECALCIFEROL) 2000 units (50 mcg) tablet Take 1 tablet by mouth daily     No current facility-administered medications for this visit.         Vitals:  There were no vitals taken for this visit.     Exam:  GENERAL APPEARANCE: alert and no distress  HENT: mouth without ulcers or lesions  LYMPHATICS: no cervical or supraclavicular nodes  RESP: lungs clear to auscultation - no rales, rhonchi or wheezes  CV: regular rhythm, normal  rate, no rub, no murmur  EDEMA: no LE edema bilaterally  ABDOMEN: soft, nondistended, nontender, bowel sounds normal  MS: extremities normal - no gross deformities noted, no evidence of inflammation in joints, no muscle tenderness  SKIN: no rash  DIALYSIS ACCESS:  { :[id number]}        Assessment and Plan:  #Kidney Transplant Wait List Evaluation: Patient is a good candidate overall. Patient should remain active on the wait list.    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 His wait time was re-instated and he has been listed since 2013. His eGFR has been around 17-21 ml/min for at least the last year and he has remained on MMF and tacrolimus. As he's not on dialysis, and overall feeling well, recommend he be selective with offers at this time.    # Cardiac Risk: he had risk assessment yesterday and a normal NM lexiscan stress test.     # PKD: evaluated by surgery earlier today.    # Health Maintenance: Colonoscopy: need records and Dental: Up to date. He will get a PSA with his next labs.     Discussed the risks and benefits of a transplant, including the risk of surgery and immunosuppression medications.    KDPI: We discussed approximate remaining wait time and how that is influenced by issues such as blood type and sensitization (PRA) and access to a living donor. I contrasted potential waiting time for living vs  donor kidneys from  normal (0-85%) or higher (%) kidney donor profile index (KDPI) donors and their associated outcomes. I would not recommend Mr. Pierce to consider kidneys from high KDPI donors. The reason for this decision is best summarized as: not on dialysis yet.    Patient s overall re-evaluation may require further discussion in the Transplant Program s multidisciplinary selection committee for a final  recommendation on the patient s suitability for transplant.     Reason for Visit:  Raj Pierce is a 48-year-old male with ESKD from polycystic kidney disease (PKD), who presents for kidney transplant wait list evaluation.     Date of Initial Transplant Evaluation:  2013        Current Transplant Phase: Waitlist: Active  Official UNOS Listing Date: 7/24/2013  Blood Type: B        cPRA: 10       Date of cPRA: October 2019  Transplant Coordinator: [facility] Office phone number [phone]     Previous Medical Issues:  None     History of Present Illness:   Mr. Pierce is a 48-year-old male with history of ESKD from PKD s/p DDKT October 2015 that was unfortunately a small and dysplastic kidney.         Interim Events:        Overall, he's been doing very well. He continues to follow here and locally post-transplant. His potassium has been increasing and he's been counseled to eat a low potassium diet. He switched pharmacies recently and noticed that he hasn't had amlodipine for a few weeks. His BP at home has been in the high 130s/90s and was higher here today, but he reports he didn't take his BP meds with the stress test. He put a call in to his pharmacy to get his med list corrected.          Kidney Disease: PKD. Continues to follow with post-transplant team.        Kidney Disease Dx: Polycystic kidney disease (PKD)        On Dialysis: No       Primary Nephrologist: [facility] transplant neprhology         Cardiac/Vascular Disease History:       Known CAD: No       Known PAD/Claudication Symptoms: No       Known Heart Failure: No       Arrhythmia:  No       Pulmonary Hypertension: No        Valvular Disease: No       Other: None       New Cardiac/Vascular Events: No         Functional Capacity/Frailty:        He continues to work full time as a  and is not physically limited by anything in particular. He can walk several blocks and go up and down stairs without chest  pain, SOB, or claudication.       Fatigue/Decreased Energy: [x] No [] Yes    Chest Pain or SOB with Exertion: [x] No [] Yes    Significant Weight Change: [x] No [] Yes    Nausea, Vomiting or Diarrhea: [x] No [] Yes    Fever, Sweats or Chills:  [x] No [] Yes    Leg Swelling [x] No [] Yes        Other Pertinent Transplant Surgical Issues:  Recent Blood Transfusion: No  Previous Abdominal Transplant: Yes  Bladder Dysfunction: No  Chronic/Recurrent Infections: No  Chronic Anticoagulation: No  Jehovah s Witness: No    Review Of Systems:  A comprehensive review of systems was obtained and negative, except as noted in the HPI or PMH.     Active Problem List:   Patient Active Problem List   Diagnosis     Polycystic kidney, autosomal dominant     Dyslipidemia     Vitamin D deficiency     Hypertension secondary to other renal disorders     Secondary renal hyperparathyroidism (HCC)     Kidney replaced by transplant     Immunosuppressed status (H)     Acute gouty arthritis     Hypomagnesemia     Aftercare following organ transplant       Personal History:  Social History     Socioeconomic History     Marital status:      Spouse name: Dianne     Number of children: 2     Years of education: 13     Highest education level: Not on file   Occupational History     Occupation:      Employer: [employer]   Social Needs     Financial resource strain: Not on file     Food insecurity:     Worry: Not on file     Inability: Not on file     Transportation needs:     Medical: Not on file     Non-medical: Not on file   Tobacco Use     Smoking status: Never Smoker     Smokeless tobacco: Never Used   Substance and Sexual Activity     Alcohol use: Yes     Alcohol/week: 10.0 standard drinks     Types: 10 Standard drinks or equivalent per week     Comment: 1 drink daily      Drug use: No     Sexual activity: Yes     Partners: Female   Lifestyle     Physical activity:     Days per week: Not on file     Minutes per  session: Not on file     Stress: Not on file   Relationships     Social connections:     Talks on phone: Not on file     Gets together: Not on file     Attends Congregational service: Not on file     Active member of club or organization: Not on file     Attends meetings of clubs or organizations: Not on file     Relationship status: Not on file     Intimate partner violence:     Fear of current or ex partner: Not on file     Emotionally abused: Not on file     Physically abused: Not on file     Forced sexual activity: Not on file   Other Topics Concern     Parent/sibling w/ CABG, MI or angioplasty before 65F 55M? Not Asked      Service Not Asked     Blood Transfusions No     Caffeine Concern Not Asked     Occupational Exposure Not Asked     Hobby Hazards Not Asked     Sleep Concern Not Asked     Stress Concern Not Asked     Weight Concern Not Asked     Special Diet Not Asked     Back Care Not Asked     Exercise Not Asked     Bike Helmet Not Asked     Seat Belt Yes     Self-Exams Not Asked   Social History Narrative     Not on file        Allergies:  Allergies   Allergen Reactions     Alcohol Rash     Other reaction(s): Rash  Swabs used at hosptial  Swabs used at hosptial        Medications:  Current Outpatient Medications   Medication Sig     atorvastatin (LIPITOR) 10 MG tablet Take 10 mg by mouth daily     chlorthalidone (HYGROTON) 25 MG tablet Take 0.5 tablets (12.5 mg) by mouth daily     citalopram (CELEXA) 20 MG tablet Take 20 mg by mouth daily.     losartan (COZAAR) 50 MG tablet Take 1 tablet (50 mg) by mouth daily     magnesium oxide 400 MG CAPS      mycophenolate (GENERIC EQUIVALENT) 250 MG capsule TAKE 3 CAPSULES BY MOUTH TWICE A DAY     SIMVASTATIN PO Take 10 mg by mouth At Bedtime     sulfamethoxazole-trimethoprim (BACTRIM/SEPTRA) 400-80 MG per tablet TAKE 1 TABLET BY MOUTH EVERY MON WED & FRI MORNING     tacrolimus (GENERIC EQUIVALENT) 0.5 MG capsule HOLD     tacrolimus (GENERIC EQUIVALENT) 0.5 MG  "capsule Take 1 capsule (0.5 mg) by mouth 2 times daily     tacrolimus (GENERIC EQUIVALENT) 1 MG capsule Take 2 capsules (2 mg) by mouth 2 times daily     vitamin D3 (CHOLECALCIFEROL) 2000 units (50 mcg) tablet Take 1 tablet by mouth daily     No current facility-administered medications for this visit.         Vitals:  BP (!) 158/101 (BP Location: Right arm, Patient Position: Sitting, Cuff Size: Adult Regular)   Pulse 74   Temp 98  F (36.7  C) (Oral)   Ht 1.778 m (5' 10\")   Wt 92.4 kg (203 lb 11.3 oz)   SpO2 97%   BMI 29.23 kg/m        Exam:  GENERAL APPEARANCE: alert and no distress  HENT: mouth without ulcers or lesions. Good dentition   LYMPHATICS: no cervical or supraclavicular nodes  RESP: lungs clear to auscultation - no rales, rhonchi or wheezes  CV: regular rhythm, normal rate, no rub, no murmur  EDEMA: no LE edema bilaterally  ABDOMEN: soft, nondistended, nontender, bowel sounds normal  MS: extremities normal - no gross deformities noted, no evidence of inflammation in joints, no muscle tenderness  SKIN: no rash          Again, thank you for allowing me to participate in the care of your patient.      Sincerely,    Nikki Gilmore PA-C    "

## 2019-10-29 NOTE — NURSING NOTE
"Chief Complaint   Patient presents with     RECHECK     Wait list     Blood pressure (!) 158/101, pulse 74, temperature 98  F (36.7  C), temperature source Oral, height 1.778 m (5' 10\"), weight 92.4 kg (203 lb 11.3 oz), SpO2 97 %.    Claire Morgan on 10/29/2019 at 12:15 PM    "

## 2019-10-29 NOTE — PROGRESS NOTES
Transplant Surgery Consult Note     Medical record number: [id number]  YOB: 1970,   Consult requested by Dr. Espinoza for evaluation of kidney transplant candidacy.    Assessment and Recommendations:Mr. Pierce appears to be a excellent candidate for kidney transplantation and has a good understanding of the risks and benefits of this approach to the management of renal failure. The following issues should be addressed prior to finalizing his transplant candidacy:     CT A/P  Low K diet  Would recommend being very selective on the kidneys he would accept at this time given he's not on dialysis and having very few symptoms from his kidney disease.  When he goes on dialysis or is having symptoms, I would recommend not being as selective.    The majority of our visit was spent in counselling, discussing the medical and surgical risks of kidney transplantation. We discussed approximate wait time and how that is influenced by issues such as blood type and sensitization (PRA) and access to a living donor. I contrasted potential waiting time for living vs  donor kidneys from  normal (0-85%) or higher (%) kidney donor profile index (KDPI) donors and their associated outcomes. I would not recommend this individual to consider kidneys from high KDPI donors. The reason for this decision is best summarized as: not on dialysis yet. Potential surgical complications of kidney transplantation include bleeding, superficial or deep wound complications (infection, hernia, lymphocele), ureteral anastomotic failure (leak or stenosis), graft thrombosis, need for reoperation and other issues such as cardiac complications, pneumonia, deep venous thrombosis, pulmonary embolism, post transplant diabetes and death. The potential for recurrent disease or need for retransplantation was also addressed. We discussed the possible need for ureteral stent (and subsequent removal), and the utility of protocol biopsy and  laboratory studies to evaluate for rejection or recurrent disease. We discussed the risk of graft rejection, our center's average graft and patient survival rates, immunosuppression protocols, as well as the potential opportunity to participate in clinical trials.  We also discussed the average length of stay, recovery process, and posttransplant lab and monitoring protocol.  I emphasized the need for strict immunosuppression medication adherence and the potential for complications of immunosuppression such as skin cancer or lymphoma, as well as a very low but not zero risk of donor-derived disease transmission risks (infection, cancer). Mr. Pierce asked good questions and his candidacy will be reviewed at our Multidisciplinary Selection Committee. Thank you for the opportunity to participate in Mr. Pierce's care.      Total time: 45 minutes  Counselling time: 40 minutes      Madina Warren MD FACS  Assistant Professor of Surgery  Director, Living Kidney Donor Program.  ---------------------------------------------------------------------------------------------------    HPI: Mr. Pierce has Chronic renal failure due to polycystic kidney disease (PKD). The patient is non-diabetic.       The patient is not on dialysis.    Has potential kidney donors:  No.  Interested in participation in paired exchange if a donor is willing: Doesn't know     The patient has the following pertinent history:       No    Yes  Dialysis:    [x]      [] via:       Blood Transfusion                  [x]      []  Number of units:   Most recently:  Pregnancy:    [x]      [] Number:       Previous Transplant:  []      [x] Details:   2015  Cancer    [x]      [] Comment:   Kidney stones   [x]      [] Comment:      Recurrent infections  [x]      []  Type:                  Bladder dysfunction  [x]      [] Cause:    Claudication   [x]      [] Distance:    Previous Amputation  [x]      [] Cause:     Chronic anticoagulation  [x]       [] Indication:   Temple  [x]      []      Past Medical History:   Diagnosis Date     Acidosis      Chronic kidney disease, stage IV (severe) (H)      Disorders of phosphorus metabolism      HTN (hypertension)     15 years     Hyperparathyroidism      Polycystic kidney, autosomal dominant      Pure hypercholesterolemia      Sleep apnea     cpap     Vitamin D deficiency      Past Surgical History:   Procedure Laterality Date     BENCH KIDNEY N/A 10/13/2015    Procedure: BENCH KIDNEY;  Surgeon: Dariel Chavez MD;  Location: [facility] OR     CYSTOSCOPY, REMOVE STENT(S), COMBINED Right 2015    Procedure: COMBINED CYSTOSCOPY, REMOVE STENT(S);  Surgeon: Dariel Chavez MD;  Location: [facility] OR     HERNIORRHAPHY INGUINAL CHILD      right     TONSILLECTOMY       TRANSPLANT KIDNEY RECIPIENT  DONOR N/A 10/13/2015    Procedure: TRANSPLANT KIDNEY RECIPIENT  DONOR;  Surgeon: Dariel Chavez MD;  Location: [facility] OR     Family History   Problem Relation Age of Onset     Gastrointestinal Disease Mother         ESRD PCKD     Hypertension Mother      C.A.D. Father      Hypertension Father      Gastrointestinal Disease Sister         PCKD     Social History     Socioeconomic History     Marital status:      Spouse name: Dianne     Number of children: 2     Years of education: 13     Highest education level: Not on file   Occupational History     Occupation:      Employer: [employer]   Social Needs     Financial resource strain: Not on file     Food insecurity:     Worry: Not on file     Inability: Not on file     Transportation needs:     Medical: Not on file     Non-medical: Not on file   Tobacco Use     Smoking status: Never Smoker     Smokeless tobacco: Never Used   Substance and Sexual Activity     Alcohol use: Yes     Alcohol/week: 10.0 standard drinks     Types: 10 Standard drinks or equivalent per week     Comment: 1 drink daily      Drug use: No     Sexual  activity: Yes     Partners: Female   Lifestyle     Physical activity:     Days per week: Not on file     Minutes per session: Not on file     Stress: Not on file   Relationships     Social connections:     Talks on phone: Not on file     Gets together: Not on file     Attends Taoism service: Not on file     Active member of club or organization: Not on file     Attends meetings of clubs or organizations: Not on file     Relationship status: Not on file     Intimate partner violence:     Fear of current or ex partner: Not on file     Emotionally abused: Not on file     Physically abused: Not on file     Forced sexual activity: Not on file   Other Topics Concern     Parent/sibling w/ CABG, MI or angioplasty before 65F 55M? Not Asked      Service Not Asked     Blood Transfusions No     Caffeine Concern Not Asked     Occupational Exposure Not Asked     Hobby Hazards Not Asked     Sleep Concern Not Asked     Stress Concern Not Asked     Weight Concern Not Asked     Special Diet Not Asked     Back Care Not Asked     Exercise Not Asked     Bike Helmet Not Asked     Seat Belt Yes     Self-Exams Not Asked   Social History Narrative     Not on file       ROS:   CONSTITUTIONAL:  No fevers or chills  EYES: negative for icterus  ENT:  negative for hearing loss, tinnitus and sore throat  RESPIRATORY:  negative for cough, sputum, dyspnea  CARDIOVASCULAR:  negative for chest pain Fatigue  GASTROINTESTINAL:  negative for nausea, vomiting, diarrhea or constipation  GENITOURINARY:  negative for incontinence, dysuria, bladder emptying problems  HEME:  No easy bruising  INTEGUMENT:  negative for rash and pruritus  NEURO:  Negative for headache, seizure disorder  Allergies:   Allergies   Allergen Reactions     Alcohol Rash     Other reaction(s): Rash  Swabs used at hosptial  Swabs used at hosptial     Medications:  Prescription Medications as of 10/29/2019       Rx Number Disp Refills Start End Last Dispensed Date Next Fill  Date Owning Pharmacy    atorvastatin (LIPITOR) 10 MG tablet        [id number] [place] [id number] [facility] AT [address]    Sig: Take 10 mg by mouth daily    Class: Historical    Route: Oral    chlorthalidone (HYGROTON) 25 MG tablet  90 tablet 0 5/21/2019    [facility][id number] - [place] [id number] [facility] AT [facility]    Sig: Take 0.5 tablets (12.5 mg) by mouth daily    Class: E-Prescribe    Route: Oral    citalopram (CELEXA) 20 MG tablet            Sig: Take 20 mg by mouth daily.    Class: Historical    Route: Oral    losartan (COZAAR) 50 MG tablet  90 tablet 3 12/21/2018 12/21/2019   [facility] [place] [address]    Sig: Take 1 tablet (50 mg) by mouth daily    Class: E-Prescribe    Route: Oral    magnesium oxide 400 MG CAPS        [id number] - [address] AT [facility]    Class: Historical    Route: Oral    mycophenolate (GENERIC EQUIVALENT) 250 MG capsule  180 capsule 11 9/11/2019    [id number] - [address] AT [address]    Sig: TAKE 3 CAPSULES BY MOUTH TWICE A DAY    Class: E-Prescribe    Renewals     Renewal provider:  Francis Espinoza MD          SIMVASTATIN PO            Sig: Take 10 mg by mouth At Bedtime    Class: Historical    Route: Oral    sulfamethoxazole-trimethoprim (BACTRIM/SEPTRA) 400-80 MG per tablet  12 tablet 11 10/25/2018    [facility] [place] [address]    Sig: TAKE 1 TABLET BY MOUTH EVERY MON WED & FRI MORNING    Class: E-Prescribe    tacrolimus (GENERIC EQUIVALENT) 0.5 MG capsule  60 capsule 11 1/28/2019    [facility] [place] [address]    Sig: HOLD    Class: No Print Out    tacrolimus (GENERIC EQUIVALENT) 0.5 MG capsule  60 capsule 11 12/31/2018    [facility] [place] [address]    Sig: Take 1  capsule (0.5 mg) by mouth 2 times daily    Class: E-Prescribe    Route: Oral    tacrolimus (GENERIC EQUIVALENT) 1 MG capsule  120 capsule 11 1/28/2019    [facility] [place] [facility] [address] [facility]    Sig: Take 2 capsules (2 mg) by mouth 2 times daily    Class: E-Prescribe    Notes to Pharmacy: Dose change    Route: Oral    vitamin D3 (CHOLECALCIFEROL) 2000 units (50 mcg) tablet        [id number] [place] - [address] [facility] AT [address] [facility]    Sig: Take 1 tablet by mouth daily    Class: Historical    Route: Oral      Hospital Medications as of 10/29/2019       Dose Frequency Start End    regadenoson (LEXISCAN) injection 0.4 mg (Completed) 0.4 mg ONCE 10/28/2019 10/28/2019    Admin Instructions: Push  medication in through saline lock over 15 seconds. Follow medication with saline flush. To be given in procedure area.    Class: E-Prescribe    Route: Intravenous    technetium Tc 99m tetrofosmin 2UD study (MYOVIEW) radioisotope injection 3-42 mCi (Completed) 3-42 mCi 2 TIMES DAILY PRN 10/28/2019 10/28/2019    Class: E-Prescribe    Route: Intravenous        Exam:   Heart Rate:  [101] 101  BP: (123)/(71) 123/71  Appearance: in no apparent distress.   Skin: normal  Head and Neck: Normal, no rashes or jaundice  Respiratory: easy respirations, no audible wheezing.  Cardiovascular: RRR  Abdomen: rounded, No distention and Surgical scars consistent with history   Extremeties: femoral 2+/2+, Edema, none  Neuro: without deficit     Diagnostics:   Recent Results (from the past 672 hour(s))   CBC with platelets    Collection Time: 10/18/19  7:39 AM   Result Value Ref Range    WBC Count (External) 7.1 4.0 - 11.0 K/uL    RBC Count (External) 5.04 4.40 - 5.80 M/uL    Hemoglobin (External) 14.1 13.5 - 17.5 g/dL    Hematocrit (External) 43.3 40.0 - 50.0 %    MCV (External) 85.9 80.0 - 98.0 fL    MCH (External) 28.0 25.5 - 34.0 pg    MCHC (External) 32.6 31.5 - 36.5 g/dL    RDW  (External) 13.1 11.5 - 15.5 %    Platelet Count (External) 212 140 - 400 K/uL   Basic metabolic panel    Collection Time: 10/18/19  7:39 AM   Result Value Ref Range    Glucose (External) 98 70 - 99 mg/dL    Urea Nitrogen (External) 55 (H) 6 - 22 mg/dL    Creatinine (External) 3.21 (H) 0.70 - 1.30 mg/dL    BUN/Creatinine Ratio (External) 17.1 0 - 25.0    Sodium (External) 141 136 - 145 meq/L    Potassium (External) 5.3 (H) 3.5 - 5.1 meq/L    Chloride (External) 108 98 - 109 meq/L    CO2 (External) 23 20 - 29 meq/L    Anion Gap (External) 15 6 - 20 meq/L    Calcium (External) 9.3 8.5 - 10.5 mg/dL    GFR Estimated (External) 21 (L) >=60 mL/min/1.73m2    GFR Est if Black (External) 25 (L) >=60 mL/min/1.73m2   Tacrolimus level    Collection Time: 10/18/19  7:50 AM   Result Value Ref Range    Tacrolimus Last Dose 10/17/2019 21:00     Tacrolimus Level 5.3 5.0 - 15.0 ug/L   PRA Single Antigen IgG Antibody ([id number])    Collection Time: 10/18/19  7:50 AM   Result Value Ref Range    SA1 Test Method SA FCS     SA1 Cell Class I     SA1 Hi Risk Loni None     SA1 Mod Risk Loni None     SA1 Comments        Test performed by modified procedure. Serum heat inactivated and tested   by a modified ([facility]) protocol including fetal calf serum addition.   High-risk, mfi >3,000. Mod-risk, mfi 500-3,000.      SA2 Test Method SA FCS     SA2 Cell Class II     SA2 Hi Risk Loni None     SA2 Mod Risk Loni DQ:4     SA2 Comments        Test performed by modified procedure. Serum heat inactivated and tested   by a modified ([facility]) protocol including fetal calf serum addition.   High-risk, mfi >3,000. Mod-risk, mfi 500-3,000.      Protocol Cutoff Plan A, 500 mfi/cumulative      UNOS cPRA 10     Unacceptable Antigen DQ:4       EKG 12-lead, tracing only (Same Day)    Collection Time: 10/28/19  9:15 AM   Result Value Ref Range    Interpretation ECG Click View Image link to view waveform and result      [facility] cPRA   Date Value Ref Range Status   10/18/2019 10   Final

## 2019-10-29 NOTE — LETTER
PHYSICIAN ORDERS      DATE & TIME ISSUED: 2019 3:12 PM  PATIENT NAME: Raj Pierce   : 1970     [facility] MR# [if applicable]: [id number]     DIAGNOSIS:  kidney transplant  ICD-10 CODE: z94.0      Please obtain the following labs with next lab draw   Prostate specific antigen screen    Any questions please call: [phone]  Please fax results to [phone].    .

## 2019-10-29 NOTE — LETTER
10/29/2019      RE: Raj Pierce  [address]  [place] [address]       Transplant Surgery Consult Note     Medical record number: [id number]  YOB: 1970,   Consult requested by Dr. Espinoza for evaluation of kidney transplant candidacy.    Assessment and Recommendations:Mr. Pierce appears to be a excellent candidate for kidney transplantation and has a good understanding of the risks and benefits of this approach to the management of renal failure. The following issues should be addressed prior to finalizing his transplant candidacy:     CT A/P  Low K diet  Would recommend being very selective on the kidneys he would accept at this time given he's not on dialysis and having very few symptoms from his kidney disease.  When he goes on dialysis or is having symptoms, I would recommend not being as selective.    The majority of our visit was spent in counselling, discussing the medical and surgical risks of kidney transplantation. We discussed approximate wait time and how that is influenced by issues such as blood type and sensitization (PRA) and access to a living donor. I contrasted potential waiting time for living vs  donor kidneys from  normal (0-85%) or higher (%) kidney donor profile index (KDPI) donors and their associated outcomes. I would not recommend this individual to consider kidneys from high KDPI donors. The reason for this decision is best summarized as: not on dialysis yet. Potential surgical complications of kidney transplantation include bleeding, superficial or deep wound complications (infection, hernia, lymphocele), ureteral anastomotic failure (leak or stenosis), graft thrombosis, need for reoperation and other issues such as cardiac complications, pneumonia, deep venous thrombosis, pulmonary embolism, post transplant diabetes and death. The potential for recurrent disease or need for retransplantation was also addressed. We discussed the possible need  for ureteral stent (and subsequent removal), and the utility of protocol biopsy and laboratory studies to evaluate for rejection or recurrent disease. We discussed the risk of graft rejection, our center's average graft and patient survival rates, immunosuppression protocols, as well as the potential opportunity to participate in clinical trials.  We also discussed the average length of stay, recovery process, and posttransplant lab and monitoring protocol.  I emphasized the need for strict immunosuppression medication adherence and the potential for complications of immunosuppression such as skin cancer or lymphoma, as well as a very low but not zero risk of donor-derived disease transmission risks (infection, cancer). Mr. Pierce asked good questions and his candidacy will be reviewed at our Multidisciplinary Selection Committee. Thank you for the opportunity to participate in Mr. Pierce's care.      Total time: 45 minutes  Counselling time: 40 minutes      Madina Warren MD FACS  Assistant Professor of Surgery  Director, Living Kidney Donor Program.  ---------------------------------------------------------------------------------------------------    HPI: Mr. Pierce has Chronic renal failure due to polycystic kidney disease (PKD). The patient is non-diabetic.       The patient is not on dialysis.    Has potential kidney donors:  No.  Interested in participation in paired exchange if a donor is willing: Doesn't know     The patient has the following pertinent history:       No    Yes  Dialysis:    [x]      [] via:       Blood Transfusion                  [x]      []  Number of units:   Most recently:  Pregnancy:    [x]      [] Number:       Previous Transplant:  []      [x] Details:   2015  Cancer    [x]      [] Comment:   Kidney stones   [x]      [] Comment:      Recurrent infections  [x]      []  Type:                  Bladder dysfunction  [x]      [] Cause:    Claudication   [x]       [] Distance:    Previous Amputation  [x]      [] Cause:     Chronic anticoagulation  [x]      [] Indication:   Hindu  [x]      []      Past Medical History:   Diagnosis Date     Acidosis      Chronic kidney disease, stage IV (severe) (H)      Disorders of phosphorus metabolism      HTN (hypertension)     15 years     Hyperparathyroidism      Polycystic kidney, autosomal dominant      Pure hypercholesterolemia      Sleep apnea     cpap     Vitamin D deficiency      Past Surgical History:   Procedure Laterality Date     BENCH KIDNEY N/A 10/13/2015    Procedure: BENCH KIDNEY;  Surgeon: Dariel Chavez MD;  Location: [facility] OR     CYSTOSCOPY, REMOVE STENT(S), COMBINED Right 2015    Procedure: COMBINED CYSTOSCOPY, REMOVE STENT(S);  Surgeon: Dariel Chavez MD;  Location: [facility] OR     HERNIORRHAPHY INGUINAL CHILD      right     TONSILLECTOMY       TRANSPLANT KIDNEY RECIPIENT  DONOR N/A 10/13/2015    Procedure: TRANSPLANT KIDNEY RECIPIENT  DONOR;  Surgeon: Dariel Chavez MD;  Location: [facility] OR     Family History   Problem Relation Age of Onset     Gastrointestinal Disease Mother         ESRD PCKD     Hypertension Mother      C.A.D. Father      Hypertension Father      Gastrointestinal Disease Sister         PCKD     Social History     Socioeconomic History     Marital status:      Spouse name: Dianne     Number of children: 2     Years of education: 13     Highest education level: Not on file   Occupational History     Occupation:      Employer: [employer]   Social Needs     Financial resource strain: Not on file     Food insecurity:     Worry: Not on file     Inability: Not on file     Transportation needs:     Medical: Not on file     Non-medical: Not on file   Tobacco Use     Smoking status: Never Smoker     Smokeless tobacco: Never Used   Substance and Sexual Activity     Alcohol use: Yes     Alcohol/week: 10.0 standard drinks     Types: 10  Standard drinks or equivalent per week     Comment: 1 drink daily      Drug use: No     Sexual activity: Yes     Partners: Female   Lifestyle     Physical activity:     Days per week: Not on file     Minutes per session: Not on file     Stress: Not on file   Relationships     Social connections:     Talks on phone: Not on file     Gets together: Not on file     Attends Faith service: Not on file     Active member of club or organization: Not on file     Attends meetings of clubs or organizations: Not on file     Relationship status: Not on file     Intimate partner violence:     Fear of current or ex partner: Not on file     Emotionally abused: Not on file     Physically abused: Not on file     Forced sexual activity: Not on file   Other Topics Concern     Parent/sibling w/ CABG, MI or angioplasty before 65F 55M? Not Asked      Service Not Asked     Blood Transfusions No     Caffeine Concern Not Asked     Occupational Exposure Not Asked     Hobby Hazards Not Asked     Sleep Concern Not Asked     Stress Concern Not Asked     Weight Concern Not Asked     Special Diet Not Asked     Back Care Not Asked     Exercise Not Asked     Bike Helmet Not Asked     Seat Belt Yes     Self-Exams Not Asked   Social History Narrative     Not on file       ROS:   CONSTITUTIONAL:  No fevers or chills  EYES: negative for icterus  ENT:  negative for hearing loss, tinnitus and sore throat  RESPIRATORY:  negative for cough, sputum, dyspnea  CARDIOVASCULAR:  negative for chest pain Fatigue  GASTROINTESTINAL:  negative for nausea, vomiting, diarrhea or constipation  GENITOURINARY:  negative for incontinence, dysuria, bladder emptying problems  HEME:  No easy bruising  INTEGUMENT:  negative for rash and pruritus  NEURO:  Negative for headache, seizure disorder  Allergies:   Allergies   Allergen Reactions     Alcohol Rash     Other reaction(s): Rash  Swabs used at hosptial  Swabs used at hosptial     Medications:  Prescription  Medications as of 10/29/2019       Rx Number Disp Refills Start End Last Dispensed Date Next Fill Date Owning Pharmacy    atorvastatin (LIPITOR) 10 MG tablet        [id number] - [place] AT  & [facility]    Sig: Take 10 mg by mouth daily    Class: Historical    Route: Oral    chlorthalidone (HYGROTON) 25 MG tablet  90 tablet 0 5/21/2019    [facility] #[id number] - [address] AT  & [facility]    Sig: Take 0.5 tablets (12.5 mg) by mouth daily    Class: E-Prescribe    Route: Oral    citalopram (CELEXA) 20 MG tablet            Sig: Take 20 mg by mouth daily.    Class: Historical    Route: Oral    losartan (COZAAR) 50 MG tablet  90 tablet 3 12/21/2018 12/21/2019   [facility] [address]    Sig: Take 1 tablet (50 mg) by mouth daily    Class: E-Prescribe    Route: Oral    magnesium oxide 400 MG CAPS        [facility] #[id number] - [address] AT  & [facility]    Class: Historical    Route: Oral    mycophenolate (GENERIC EQUIVALENT) 250 MG capsule  180 capsule 11 9/11/2019    [facility] #[id number] - [address] AT [address]    Sig: TAKE 3 CAPSULES BY MOUTH TWICE A DAY    Class: E-Prescribe    Renewals     Renewal provider:  Francis Espinoza MD          SIMVASTATIN PO            Sig: Take 10 mg by mouth At Bedtime    Class: Historical    Route: Oral    sulfamethoxazole-trimethoprim (BACTRIM/SEPTRA) 400-80 MG per tablet  12 tablet 11 10/25/2018    [facility] [place], [address]    Sig: TAKE 1 TABLET BY MOUTH EVERY MON WED & FRI MORNING    Class: E-Prescribe    tacrolimus (GENERIC EQUIVALENT) 0.5 MG capsule  60 capsule 11 1/28/2019    [facility] [place], [place] [id number] [facility]    Sig: HOLD    Class: No Print Out    tacrolimus (GENERIC EQUIVALENT) 0.5 MG capsule  60 capsule 11  12/31/2018    [facility] [place]  [address] [facility]    Sig: Take 1 capsule (0.5 mg) by mouth 2 times daily    Class: E-Prescribe    Route: Oral    tacrolimus (GENERIC EQUIVALENT) 1 MG capsule  120 capsule 11 1/28/2019    [facility] [place]  [address] [facility]    Sig: Take 2 capsules (2 mg) by mouth 2 times daily    Class: E-Prescribe    Notes to Pharmacy: Dose change    Route: Oral    vitamin D3 (CHOLECALCIFEROL) 2000 units (50 mcg) tablet        [facility][id number] - [place], [place] - [address] [facility] AT [facility] [address] & [facility]    Sig: Take 1 tablet by mouth daily    Class: Historical    Route: Oral      Hospital Medications as of 10/29/2019       Dose Frequency Start End    regadenoson (LEXISCAN) injection 0.4 mg (Completed) 0.4 mg ONCE 10/28/2019 10/28/2019    Admin Instructions: Push  medication in through saline lock over 15 seconds. Follow medication with saline flush. To be given in procedure area.    Class: E-Prescribe    Route: Intravenous    technetium Tc 99m tetrofosmin 2UD study (MYOVIEW) radioisotope injection 3-42 mCi (Completed) 3-42 mCi 2 TIMES DAILY PRN 10/28/2019 10/28/2019    Class: E-Prescribe    Route: Intravenous        Exam:   Heart Rate:  [101] 101  BP: (123)/(71) 123/71  Appearance: in no apparent distress.   Skin: normal  Head and Neck: Normal, no rashes or jaundice  Respiratory: easy respirations, no audible wheezing.  Cardiovascular: RRR  Abdomen: rounded, No distention and Surgical scars consistent with history   Extremeties: femoral 2+/2+, Edema, none  Neuro: without deficit     Diagnostics:   Recent Results (from the past 672 hour(s))   CBC with platelets    Collection Time: 10/18/19  7:39 AM   Result Value Ref Range    WBC Count (External) 7.1 4.0 - 11.0 K/uL    RBC Count (External) 5.04 4.40 - 5.80 M/uL    Hemoglobin (External) 14.1 13.5 - 17.5 g/dL    Hematocrit (External) 43.3 40.0 - 50.0 %    MCV (External) 85.9 80.0 - 98.0 fL     MCH (External) 28.0 25.5 - 34.0 pg    MCHC (External) 32.6 31.5 - 36.5 g/dL    RDW (External) 13.1 11.5 - 15.5 %    Platelet Count (External) 212 140 - 400 K/uL   Basic metabolic panel    Collection Time: 10/18/19  7:39 AM   Result Value Ref Range    Glucose (External) 98 70 - 99 mg/dL    Urea Nitrogen (External) 55 (H) 6 - 22 mg/dL    Creatinine (External) 3.21 (H) 0.70 - 1.30 mg/dL    BUN/Creatinine Ratio (External) 17.1 0 - 25.0    Sodium (External) 141 136 - 145 meq/L    Potassium (External) 5.3 (H) 3.5 - 5.1 meq/L    Chloride (External) 108 98 - 109 meq/L    CO2 (External) 23 20 - 29 meq/L    Anion Gap (External) 15 6 - 20 meq/L    Calcium (External) 9.3 8.5 - 10.5 mg/dL    GFR Estimated (External) 21 (L) >=60 mL/min/1.73m2    GFR Est if Black (External) 25 (L) >=60 mL/min/1.73m2   Tacrolimus level    Collection Time: 10/18/19  7:50 AM   Result Value Ref Range    Tacrolimus Last Dose 10/17/2019 21:00     Tacrolimus Level 5.3 5.0 - 15.0 ug/L   PRA Single Antigen IgG Antibody ([id number])    Collection Time: 10/18/19  7:50 AM   Result Value Ref Range    SA1 Test Method SA FCS     SA1 Cell Class I     SA1 Hi Risk Loni None     SA1 Mod Risk Loni None     SA1 Comments        Test performed by modified procedure. Serum heat inactivated and tested   by a modified ([place]) protocol including fetal calf serum addition.   High-risk, mfi >3,000. Mod-risk, mfi 500-3,000.      SA2 Test Method SA FCS     SA2 Cell Class II     SA2 Hi Risk Loni None     SA2 Mod Risk Loni DQ:4     SA2 Comments        Test performed by modified procedure. Serum heat inactivated and tested   by a modified ([place]) protocol including fetal calf serum addition.   High-risk, mfi >3,000. Mod-risk, mfi 500-3,000.      Protocol Cutoff Plan A, 500 mfi/cumulative      UNOS cPRA 10     Unacceptable Antigen DQ:4       EKG 12-lead, tracing only (Same Day)    Collection Time: 10/28/19  9:15 AM   Result Value Ref Range    Interpretation ECG Click View Image link to  view waveform and result      UNOS cPRA   Date Value Ref Range Status   10/18/2019 10  Final       Madina Warren MD, MD

## 2019-10-30 LAB — INTERPRETATION ECG - MUSE: NORMAL

## 2019-11-04 ENCOUNTER — DOCUMENTATION ONLY (OUTPATIENT)
Dept: TRANSPLANT | Facility: CLINIC | Age: 49
End: 2019-11-04

## 2019-11-04 NOTE — TELEPHONE ENCOUNTER
Call placed to patient: Patient verbalize understanding to decrease dose to 1.5 mg twice a day and recheck lab in 1-2 weeks of dose change. Order sent  
Last two tacrolimus levels 6.6 and 6.9, goal 4-6.  Tacrolimus dose 2 mg twice a day.    PLAN:  Decrease dose to 1.5 mg twice a day.  Check tacrolimus level and creatinine 1-2 weeks after dose change.    TASK:  Call patient with instructions for dose change and blood test.  Needs lab order. New prescriptions already sent to pharmacy for new dose.  
Prescriptions electronically submitted to pharmacy from [facility]

## 2019-11-18 DIAGNOSIS — Z94.0 KIDNEY TRANSPLANTED: Primary | ICD-10-CM

## 2019-11-18 RX ORDER — SULFAMETHOXAZOLE AND TRIMETHOPRIM 400; 80 MG/1; MG/1
1 TABLET ORAL
Qty: 12 TABLET | Refills: 11 | Status: SHIPPED | OUTPATIENT
Start: 2019-11-18 | End: 2020-12-28

## 2019-11-22 DIAGNOSIS — Z79.899 ENCOUNTER FOR LONG-TERM CURRENT USE OF MEDICATION: ICD-10-CM

## 2019-11-22 DIAGNOSIS — Z94.0 KIDNEY REPLACED BY TRANSPLANT: ICD-10-CM

## 2019-11-22 DIAGNOSIS — Z48.298 AFTERCARE FOLLOWING ORGAN TRANSPLANT: ICD-10-CM

## 2019-11-22 PROCEDURE — 80197 ASSAY OF TACROLIMUS: CPT | Performed by: INTERNAL MEDICINE

## 2019-11-24 LAB
TACROLIMUS BLD-MCNC: 5.1 UG/L (ref 5–15)
TME LAST DOSE: NORMAL H

## 2019-12-12 DIAGNOSIS — I15.1 HTN, KIDNEY TRANSPLANT RELATED: ICD-10-CM

## 2019-12-12 DIAGNOSIS — Z94.0 HTN, KIDNEY TRANSPLANT RELATED: ICD-10-CM

## 2019-12-12 RX ORDER — LOSARTAN POTASSIUM 50 MG/1
50 TABLET ORAL DAILY
Qty: 90 TABLET | Refills: 3 | Status: SHIPPED | OUTPATIENT
Start: 2019-12-12 | End: 2020-12-21

## 2019-12-13 ENCOUNTER — TELEPHONE (OUTPATIENT)
Dept: TRANSPLANT | Facility: CLINIC | Age: 49
End: 2019-12-13

## 2019-12-13 NOTE — TELEPHONE ENCOUNTER
Called patient to schedule 6 month Follow-up appt in [facility], [facility] to call back

## 2019-12-15 ENCOUNTER — ORGAN (OUTPATIENT)
Dept: TRANSPLANT | Facility: CLINIC | Age: 49
End: 2019-12-15

## 2019-12-15 ENCOUNTER — DOCUMENTATION ONLY (OUTPATIENT)
Dept: TRANSPLANT | Facility: CLINIC | Age: 49
End: 2019-12-15

## 2019-12-15 DIAGNOSIS — Z76.82 AWAITING ORGAN TRANSPLANT: Primary | ICD-10-CM

## 2019-12-16 NOTE — PROGRESS NOTES
PATHOLOGY HLA CROSSMATCH CONSULTATION: DONOR/RECIPIENT VIRTUAL CROSSMATCH - Kidney  Consultation Date: 12/15/2019  Consultation Requested by: Dr. Zhu  Regarding: Compatibility of  donor organ UNOS #[id number]  from OPO: [place]OP with patient Raj Pierce       Findings: Regarding a virtual crossmatch between Raj Pierce and  donor listed above (match ID [id number]):  The most recent and peak patient sera were analyzed.  The patient has 1 donor-specific antibody(ies)  (DSA) as listed in table below. No other antibodies listed with specificity against donor organ.      ANTIBODY MOST RECENT SERUM (mfi)10.18.19 Peak Serum #1 (mfi)  12.24.15     DR16  - 750       Record Review Indicates: I personally reviewed the most recent serum and the  peak serum/sera.  In addition, I analyzed 10 more sera:  The patient has antibodies against the donor organ.   Based on historical data from this \[facility]\""'s histocompatibility lab, using the sum mfi of the patient's DSA with specificity against this donor organ, the probability of a positive B cell crossmatch is  7% for the peak serum.  DSA to C-locus antigens were not considered in deriving the probability of positive crossmatch because DSA to C-locus antigens rarely contribute to a positive lymphocyte crossmatch test.    The results of this virtual XM are:   -most recent serum: Compatible  -peak #1:  Likely compatible      Disclaimer: Clinical judgement must take into account other factors, such as non-HLA antibodies not detected in the assay.   The VXM gives probabilities only.  The probability does not account for the potential for auto-antibodies that may be present in the patient's serum.  These autoantibodies may render the physical crossmatch falsely positive, and would be detected by an autologous crossmatch.  When possible, confirm findings with a prospective allogeneic and autologous flow crossmatches before going to transplant.    Anne Marshall,  MD  Medical Director, Immunology/Histocompatibility Laboratory

## 2019-12-16 NOTE — TELEPHONE ENCOUNTER
Organ Offer Encounter Information    Organ Offer Information  Organ offer date & time:  12/15/2019  6:15 PM  Coordinator/Fellow/Attending name:  Morenita Patel RN   Organ(s):   Organ UNOS ID Match Run ID Comment Organ Laterality   Kidney [id number] MNOP - backup       Recent infections?:  No    New medications?:  No Recent pregnancy?:  No   Angicoagulation medications?:  No Recent vaccinations?:  No   Recent blood transfusions?:  No Recent hospitalizations?:  No   Has your insurance changed in the last 6-12 months?:  Neg    Discussed organ offer with:  Patient  Understood donor criteria, verbalized understanding  Discussed program-specific outcomes:  Did not have questions regarding SRTR  Right to decline organ offer without penalty, Patient/Other:  Aware of option to decline without penalty  Organ offer decision status Patient/Other:  Accepted Offer  Organ disposition:  Case Cancelled - Other (specify) (Comment: did not become primary)  Additional Comments:  12/15/2019 6:33 PM  Kidney: Local Backup  MD: Dr. Zhu/Dr. Barton  OPO Contact: [place] [phone]  VXM Results: Negative for DSA on most recent sample (Dr. Marshall)  Plan (XM, NPO, Donor OR): Called and talked to patient, discussed offer with him and explained that he is backup and it is unlikely he will see this offer as the kidneys have already been placed w other patients. No need for NPO. Explained that this writer will be changing shifts and Saskia will call him with her information to touch base in the next couple hours. All questions answered.    December 15, 2019 7:08 PM  Called patient to give updated contact info.  No changes at this time.  Saskia Esteves RN   Transplant Coordinator    December 16, 2019 9:21 AM  Patient did not become primary, updated recipient.  Saskia Esteves RN   Transplant Coordinator          Attestation I have discussed all of the above with the Patient/Legal Guardian/Caregiver regarding this organ offer.:   Yes  Coordinator/Fellow/Attending name:  Morenita Patel, RN

## 2019-12-17 ENCOUNTER — ORGAN (OUTPATIENT)
Dept: TRANSPLANT | Facility: CLINIC | Age: 49
End: 2019-12-17

## 2019-12-17 ENCOUNTER — DOCUMENTATION ONLY (OUTPATIENT)
Dept: TRANSPLANT | Facility: CLINIC | Age: 49
End: 2019-12-17

## 2019-12-17 DIAGNOSIS — Z76.82 AWAITING ORGAN TRANSPLANT: Primary | ICD-10-CM

## 2019-12-18 ENCOUNTER — HOSPITAL ENCOUNTER (OUTPATIENT)
Facility: CLINIC | Age: 49
Discharge: HOME | End: 2019-12-18
Attending: SURGERY | Admitting: SURGERY
Payer: COMMERCIAL

## 2019-12-18 ENCOUNTER — ANESTHESIA EVENT (OUTPATIENT)
Dept: MEDSURG UNIT | Facility: CLINIC | Age: 49
End: 2019-12-18

## 2019-12-18 ENCOUNTER — ANESTHESIA (OUTPATIENT)
Dept: MEDSURG UNIT | Facility: CLINIC | Age: 49
End: 2019-12-18

## 2019-12-18 VITALS
BODY MASS INDEX: 23.27 KG/M2 | RESPIRATION RATE: 18 BRPM | OXYGEN SATURATION: 96 % | TEMPERATURE: 98.2 F | SYSTOLIC BLOOD PRESSURE: 143 MMHG | WEIGHT: 162.2 LBS | HEART RATE: 73 BPM | DIASTOLIC BLOOD PRESSURE: 110 MMHG

## 2019-12-18 PROBLEM — Z76.82 KIDNEY TRANSPLANT CANDIDATE: Status: ACTIVE | Noted: 2019-12-18

## 2019-12-18 LAB — GLUCOSE BLDC GLUCOMTR-MCNC: 90 MG/DL (ref 70–99)

## 2019-12-18 PROCEDURE — 00000146 ZZHCL STATISTIC GLUCOSE BY METER IP

## 2019-12-18 PROCEDURE — 12000026 ZZH R&B TRANSPLANT

## 2019-12-18 PROCEDURE — 80061 LIPID PANEL: CPT | Performed by: STUDENT IN AN ORGANIZED HEALTH CARE EDUCATION/TRAINING PROGRAM

## 2019-12-18 RX ORDER — CEFUROXIME SODIUM 1.5 G/16ML
1.5 INJECTION, POWDER, FOR SOLUTION INTRAVENOUS ONCE
Status: DISCONTINUED | OUTPATIENT
Start: 2019-12-18 | End: 2019-12-18 | Stop reason: HOSPADM

## 2019-12-18 ASSESSMENT — ACTIVITIES OF DAILY LIVING (ADL): ADLS_ACUITY_SCORE: 15

## 2019-12-18 NOTE — H&P
"    [facility], [place]    History and Physical  Solid Organ Transplant     Date of Admission:  2019    Assessment & Plan   Raj Pierce is a 49 year old male with a past medical history significant for end stage kidney disease secondary to polycystic kidney disease.  He underwent previous kidney transplant on 10/13/15 but this has since failed. Patient has continued on mycophenolate and tacrolimus. Other past medical history includes HTN, hyperparathyroidism, hypercholesterolemia, and SILVIA.  Patient was notified as an acceptable  donor organ became available and presented for further pre-operative work-up.  Patient was informed of the risks and benefits regarding  donor organ transplantation, and has elected to proceed.    The patient is on dialysis.  *** Modality: ***, via ***.    Dialysis days: {DAY OF WEEK-MULTI:774009}.  Last dialysis run: ***  The patient {is/is not:454302::\"is\"} making {quantity} urine prior to transplantation.    H/o blood transfusion: ***  ***Chronic anticoagulation, etc      Plan:  -[place] to outpatient for pre-op work-up  -Pre-op labs, including BMP, CBC, coag panel, viral serologies  -EKG, CXR  -Cardiac clearance:      Fe Vega DO PGY-1    Code Status   {CODE STATUS      :003098}    Primary Care Physician   Do Guerrero    Chief Complaint   Kidney transplant candidate    History is obtained from the patient    History of Present Illness   Raj Pierce is a 49 year old male with a past medical history significant for end stage kidney disease secondary to polycystic kidney disease. He underwent previous kidney transplant on 10/13/15 that has since failed. Other past medical history includes HTN, hyperparathyroidism, hypercholesterolemia, and SILVIA.    The patient {is/is not:860380::\"is\"} on dialysis.  ***Modality: ***, via ***.    Dialysis days: {DAY OF WEEK-MULTI:327371}.  Last dialysis run: ***  {Does/Does " "not:954732::\"is\"} make *** urine prior to transplantation.    H/o blood transfusion: ***  ***Chronic anticoagulation, etc  Other past medical history includes ***    At the time of admission, the patient ***    Past Medical History    {Did you review the Past Medical History?     :037388}    Past Surgical History   {Did you review the Past Surgical History?     :755680}    Prior to Admission Medications   Cannot display prior to admission medications because the patient has not been admitted in this contact.     Allergies   Allergies   Allergen Reactions     Alcohol Rash     Other reaction(s): Rash  Swabs used at hosptial  Swabs used at hosptial       Social History   {Did you review the Social History?:706976}    Family History   {Did you review the Family History?:349090}    Review of Systems   {ROS OPTIONS FOR H&P AND CONSULT COLLAPSIBLE NOTES:592101}    Physical Exam                      Vital Signs with Ranges  BP: ()/()   Arterial Line BP: ()/()   0 lbs 0 oz    {Choose blank, pick list or full exam by system and edit    :339998}    Data   {What lab and imaging data do you want to display for this admission?     :029271}    "

## 2019-12-18 NOTE — TELEPHONE ENCOUNTER
Organ Offer Encounter Information    Organ Offer Information  Organ offer date & time:  12/17/2019 10:44 PM  Coordinator/Fellow/Attending name:  Sandhya Patel, RN   Organ(s):   Organ UNOS ID Match Run ID Comment Organ Laterality   Kidney [id number] MNOP       Recent infections?:  No    New medications?:  No Recent pregnancy?:  No   Angicoagulation medications?:  No Recent vaccinations?:  No   Recent blood transfusions?:  No Recent hospitalizations?:  No   Has your insurance changed in the last 6-12 months?:  Neg    Discussed organ offer with:  Patient  Patient/Caregiver name:  Raj  Discussed risk category with Patient/Other:  DCD  Understood donor criteria, verbalized understanding  Patient/Other asked to speak to a surgeon?:  No  Discussed program-specific outcomes:  Asked questions regarding SRTR, verbalized understanding  Right to decline organ offer without penalty, Patient/Other:  Aware of option to decline without penalty  Organ offer decision status Patient/Other:  Accepted Offer  Organ disposition:  Case Cancelled - Donor quality - Other (specify)  Additional Comments:  12/17/2019 10:45 PM  Kidney: Primary, DCD  MD: Dr. Scott  OPO Contact: SelamKior Results: compatible, no DSA  Plan (XM, NPO, Donor OR): Donor OR at midnight. Plan to admit him after midnight pending the donor passing during allotted time.     Accepting right kidney. Notified Raj of plan to admit him now. Pt is a little over 2 hours away, ETA 05:30.   Admissions: [facility] at 0425     Unit: 7A done 04:40Dasha Provider Entering Orders: 7A charge to notify providers  Immunology:  Ariela done 04:15  Book OR: Ela at 0400, OR TBD pending room availability    Vessel Storage Confirmation:  n/a  Blood Bank:  [place] done 0430  Research: done 0500[place] replied at 05:06 excluded d/t previous transplant  TransNet/ABO Verification: Waiting biopsy and pump numbers to officially accept  Add Organ:  Done, right  kidney, removed at 0638    Organ turned down. Notified charge nurse, Dasha. Dr. Zhu updated patient. Immunology notified. OR notified. Blood bank notified.   Electronically filed by Sandhya Patel RN  12/18/2019  6:46 AM  Attestation I have discussed all of the above with the Patient/Legal Guardian/Caregiver regarding this organ offer.:  Yes  Coordinator/Fellow/Attending name:  Sandhya Patel RN

## 2019-12-23 DIAGNOSIS — I15.1 HYPERTENSION SECONDARY TO OTHER RENAL DISORDERS: ICD-10-CM

## 2019-12-23 RX ORDER — CHLORTHALIDONE 25 MG/1
12.5 TABLET ORAL DAILY
Qty: 90 TABLET | Refills: 0 | Status: ON HOLD | OUTPATIENT
Start: 2019-12-23 | End: 2021-10-26

## 2019-12-27 NOTE — PROGRESS NOTES
PATHOLOGY HLA CROSSMATCH CONSULTATION: DONOR/RECIPIENT  VIRTUAL CROSSMATCH - Kidney  Consultation Date: 2019  Consultation Requested by: Dr. George Zhu    Regarding: Compatibility of  donor organ UNOS #[id number] from OPO: MNOP with Raj Pierce    Findings: Regarding a virtual crossmatch between Raj Pierce and  donor listed above (match ID [id number]):  The most recent (10/18/19) and 11 additional patient serum/sera  were analyzed.  The patient has no antibodies listed with HLA specificity against the donor organ.      Record Review Indicates: I personally reviewed the most recent serum, the historic peak sera, and all other sera with solid-phase HLA Single Antigen test results:  The patient has no HLA antibodies against the donor organ.     The results of this virtual XM are:   -most recent serum: compatible   -peak #1: compatible  -peak #2: compatible    Disclaimer: Clinical judgement must take into account other factors, such as non-HLA antibodies not detected in the assay. The VXM gives probabilities only.  The probability does not account for the potential for auto-antibodies that may be present in the patient's serum.  These autoantibodies may render the physical crossmatch falsely positive, and would be detected by an autologous crossmatch.  When possible, confirm findings with prospective allogeneic and autologous flow crossmatches before going to transplant as clinically indicated.     Aleks Bartlett, PhD    Aleks Bartlett, PhD,[facility]  Medical Director, Immunology/Histocompatibility Laboratory  Pager [phone]

## 2019-12-30 DIAGNOSIS — Z79.899 ENCOUNTER FOR LONG-TERM CURRENT USE OF MEDICATION: ICD-10-CM

## 2019-12-30 DIAGNOSIS — Z48.298 AFTERCARE FOLLOWING ORGAN TRANSPLANT: ICD-10-CM

## 2019-12-30 DIAGNOSIS — Z94.0 KIDNEY REPLACED BY TRANSPLANT: ICD-10-CM

## 2019-12-30 PROCEDURE — 80197 ASSAY OF TACROLIMUS: CPT | Performed by: INTERNAL MEDICINE

## 2020-01-01 LAB
TACROLIMUS BLD-MCNC: 6.3 UG/L (ref 5–15)
TME LAST DOSE: NORMAL H

## 2020-02-09 DIAGNOSIS — Z79.60 LONG-TERM USE OF IMMUNOSUPPRESSANT MEDICATION: ICD-10-CM

## 2020-02-09 DIAGNOSIS — Z94.0 KIDNEY TRANSPLANT RECIPIENT: Primary | ICD-10-CM

## 2020-02-10 RX ORDER — TACROLIMUS 1 MG/1
2 CAPSULE ORAL 2 TIMES DAILY
Qty: 120 CAPSULE | Refills: 11 | Status: SHIPPED | OUTPATIENT
Start: 2020-02-10 | End: 2020-03-04

## 2020-02-14 ENCOUNTER — TELEPHONE (OUTPATIENT)
Dept: TRANSPLANT | Facility: CLINIC | Age: 50
End: 2020-02-14

## 2020-02-14 DIAGNOSIS — Z48.298 AFTERCARE FOLLOWING ORGAN TRANSPLANT: ICD-10-CM

## 2020-02-14 PROCEDURE — 80197 ASSAY OF TACROLIMUS: CPT | Performed by: INTERNAL MEDICINE

## 2020-02-14 NOTE — LETTER
PHYSICIAN ORDERS    DATE & TIME ISSUED: 2020 10:22 AM  PATIENT NAME: Raj Pierce   : 1970     [facility] MR# [if applicable]: [id number]     DIAGNOSIS / ICD - 10 CODES    Kidney Transplanted (Z94.0)    After Care Following Organ Transplant (Z48.298)    Long Term Use of Medication (Z79.899)    Immunosuppressed Status (Z92.25)    Complications Kidney Transplant (T86.10)    Every other month    Hemogram and Platelet    Basic Metabolic Panel (Sodium,potassium,chloride,CO2,creatinine,urea,nitrogen,glucose,calcium)     / tacrolimus / Prograf drug level    Every 6 months    BK (polyoma virus) PCR Quantitative - Plasma    Urine for protein creatinine ratio    Yearly    PRA / DSA level (mailers provided by patient)      Patient should release information to the [facility] Transplant [facility].   Please fax results to the Transplant Center at [phone].  Any questions please call [phone] or [phone].    .

## 2020-02-14 NOTE — TELEPHONE ENCOUNTER
Last lab order sent May 2019 and should be good until May 2020.    Updated lab orders sent to:   [phone] (Phone)  [phone] (Fax)

## 2020-02-14 NOTE — TELEPHONE ENCOUNTER
Patient Call: Transplant Lab/Orders    Post Transplant Days: 1585      Reason for Call: Annual lab reorder  Callback needed? No     Please mail Raj a copy of updated standing lab order too    [facility] lab fax# [phone]

## 2020-02-14 NOTE — LETTER
PHYSICIAN ORDERS    DATE & TIME ISSUED: 2020 10:22 AM  PATIENT NAME: Raj Pierce   : 1970     [facility] MR# [if applicable]: [id number]     DIAGNOSIS / ICD - 10 CODES    Kidney Transplanted (Z94.0)    After Care Following Organ Transplant (Z48.298)    Long Term Use of Medication (Z79.899)    Immunosuppressed Status (Z92.25)    Complications Kidney Transplant (T86.10)    Every other month    Hemogram and Platelet    Basic Metabolic Panel (Sodium,potassium,chloride,CO2,creatinine,urea,nitrogen,glucose,calcium)     / tacrolimus / Prograf drug level    Every 6 months    BK (polyoma virus) PCR Quantitative - Plasma    Urine for protein creatinine ratio    Yearly    PRA / DSA level (mailers provided by patient)      Patient should release information to the [facility] Transplant Center.   Please fax results to the Transplant Center at [phone].  Any questions please call [phone] or [phone].    .

## 2020-02-16 LAB
TACROLIMUS BLD-MCNC: 15 UG/L (ref 5–15)
TME LAST DOSE: NORMAL H

## 2020-02-17 ENCOUNTER — TELEPHONE (OUTPATIENT)
Dept: TRANSPLANT | Facility: CLINIC | Age: 50
End: 2020-02-17

## 2020-02-17 NOTE — TELEPHONE ENCOUNTER
Tacrolimus = 15.0 (2/14/20 7:53 AM)  Last dose 2/13/20 9:00 PM  Goal 4-6  Previous levels within goal.  Current tac dose 0.5 mg BID    PLAN:  Call Raj Pierce confirm result is a good 12 hr and taking 0.5 mg BID.  Confirm no new medications or illness (german. Diarrhea).   Previous levels at goal. No dose change at this time.   Recommend rechecking tac levels in 2 weeks.    OUTCOME:  Left VM.  Orders sent to  [facility] [phone] (Phone)  [phone] (Fax)

## 2020-02-17 NOTE — LETTER
PHYSICIAN ORDERS      DATE & TIME ISSUED: 2020 3:43 PM  PATIENT NAME: Raj Pierce   : 1970     [facility] MR# [if applicable]: [id number]     DIAGNOSIS / ICD - 10 CODES    Kidney Transplanted (Z94.0)    After Care Following Organ Transplant (Z48.298)    Long Term Use of Medication (Z79.899)      Please recheck in 1- 2 weeks:      Tacrolimus level  --- 12 hr trough, pls indicate date and time of last dose      Patient should release information to the [facility] Transplant [facility].   Please fax results to the Transplant Center at [phone].  Any questions please call [phone].        .

## 2020-02-17 NOTE — TELEPHONE ENCOUNTER
Patient called back to confirm receipt of VM. He did have the Flu last week. He will continue on the same dose and repeat labs as instructed.

## 2020-02-17 NOTE — LETTER
PHYSICIAN ORDERS      DATE & TIME ISSUED: 2020 3:43 PM  PATIENT NAME: Raj Pierce   : 1970     [facility] MR# [if applicable]: [id number]     DIAGNOSIS / ICD - 10 CODES    Kidney Transplanted (Z94.0)    After Care Following Organ Transplant (Z48.298)    Long Term Use of Medication (Z79.899)      Please recheck in 1- 2 weeks:      Tacrolimus level  --- 12 hr trough, pls indicate date and time of last dose      Patient should release information to the [facility] Transplant Center.   Please fax results to the Transplant Center at [phone].  Any questions please call [phone].        .

## 2020-02-25 ENCOUNTER — DOCUMENTATION ONLY (OUTPATIENT)
Dept: TRANSPLANT | Facility: CLINIC | Age: 50
End: 2020-02-25

## 2020-02-25 DIAGNOSIS — Z76.82 AWAITING ORGAN TRANSPLANT: Primary | ICD-10-CM

## 2020-02-25 NOTE — PROGRESS NOTES
PATHOLOGY HLA CROSSMATCH CONSULTATION: DONOR/RECIPIENT  VIRTUAL CROSSMATCH - Kidney  Consultation Date: 2020  Consultation Requested by: Dr. Madina Warren    Regarding: Compatibility of  donor organ UNOS #[id number] from OPO: LUNA  with Raj Pierce    Findings: Regarding a virtual crossmatch between Raj Pierce and  donor listed above (match ID [id number]):  The most recent (10/18/19) and 11 additional patient serum/sera  were analyzed.  The patient has no antibodies listed with HLA specificity against the donor organ.      Record Review Indicates: I personally reviewed the most recent serum, the historic peak sera, and all other sera with solid-phase HLA Single Antigen test results:  The patient has no HLA antibodies against the donor organ.     The results of this virtual XM are:   -most recent serum: compatible   -peak #1: compatible    NB:  This vXM is based on PRA test results obtained 4 mos. 8 days prior to the current kidney offer.  These results may not reflect the candidate's current HLA sensitization status.    Disclaimer: Clinical judgement must take into account other factors, such as non-HLA antibodies not detected in the assay. The VXM gives probabilities only.  The probability does not account for the potential for auto-antibodies that may be present in the patient's serum.  These autoantibodies may render the physical crossmatch falsely positive, and would be detected by an autologous crossmatch.  When possible, confirm findings with prospective allogeneic and autologous flow crossmatches before going to transplant as clinically indicated.     Aleks Bartlett, PhD    Aleks Bartlett, PhD,[facility]  Medical Director, Immunology/Histocompatibility Laboratory  Pager [phone]

## 2020-02-28 DIAGNOSIS — Z48.298 AFTERCARE FOLLOWING ORGAN TRANSPLANT: ICD-10-CM

## 2020-02-28 PROCEDURE — 80197 ASSAY OF TACROLIMUS: CPT | Performed by: INTERNAL MEDICINE

## 2020-03-02 ENCOUNTER — HEALTH MAINTENANCE LETTER (OUTPATIENT)
Age: 50
End: 2020-03-02

## 2020-03-03 DIAGNOSIS — Z48.298 AFTERCARE FOLLOWING ORGAN TRANSPLANT: ICD-10-CM

## 2020-03-03 DIAGNOSIS — Z94.0 KIDNEY REPLACED BY TRANSPLANT: ICD-10-CM

## 2020-03-03 DIAGNOSIS — Z79.899 ENCOUNTER FOR LONG-TERM CURRENT USE OF MEDICATION: ICD-10-CM

## 2020-03-03 LAB
TACROLIMUS BLD-MCNC: 11.7 UG/L (ref 5–15)
TME LAST DOSE: NORMAL H

## 2020-03-03 PROCEDURE — 86833 HLA CLASS II HIGH DEFIN QUAL: CPT | Performed by: TRANSPLANT SURGERY

## 2020-03-03 PROCEDURE — 86832 HLA CLASS I HIGH DEFIN QUAL: CPT | Performed by: TRANSPLANT SURGERY

## 2020-03-04 ENCOUNTER — TELEPHONE (OUTPATIENT)
Dept: TRANSPLANT | Facility: CLINIC | Age: 50
End: 2020-03-04

## 2020-03-04 DIAGNOSIS — Z94.0 KIDNEY TRANSPLANT RECIPIENT: ICD-10-CM

## 2020-03-04 DIAGNOSIS — Z79.60 LONG-TERM USE OF IMMUNOSUPPRESSANT MEDICATION: ICD-10-CM

## 2020-03-04 RX ORDER — TACROLIMUS 0.5 MG/1
0.5 CAPSULE ORAL 2 TIMES DAILY
Qty: 60 CAPSULE | Refills: 11 | Status: SHIPPED | OUTPATIENT
Start: 2020-03-04 | End: 2020-03-18

## 2020-03-04 RX ORDER — TACROLIMUS 1 MG/1
1 CAPSULE ORAL 2 TIMES DAILY
Qty: 60 CAPSULE | Refills: 11 | Status: SHIPPED | OUTPATIENT
Start: 2020-03-04 | End: 2020-03-18

## 2020-03-04 NOTE — LETTER
PHYSICIAN ORDERS    DATE & TIME ISSUED: 2020 3:00 PM  PATIENT NAME: Raj Pierce   : 1970     [facility] MR# [if applicable]: [id number]     DIAGNOSIS / ICD - 10 CODES    Kidney Transplanted (Z94.0)    After Care Following Organ Transplant (Z48.298)    Long Term Use of Medication (Z79.899)      Please complete the following:    C.difficile toxin B PCR    Enteric bacteria and virus panel by NAMRATA stool    Ova and parasite exam routine      Patient should release information to the [facility] Transplant Center.   Please fax results to the Transplant Center at [phone].  Any questions please call [phone].        .

## 2020-03-04 NOTE — LETTER
PHYSICIAN ORDERS    DATE & TIME ISSUED: 2020 3:00 PM  PATIENT NAME: Raj Pierce   : 1970     [facility] MR# [if applicable]: [id number]     DIAGNOSIS / ICD - 10 CODES    Kidney Transplanted (Z94.0)    After Care Following Organ Transplant (Z48.298)    Long Term Use of Medication (Z79.899)      Please complete the following:    C.difficile toxin B PCR    Enteric bacteria and virus panel by NAMRATA stool    Ova and parasite exam routine      Patient should release information to the [facility] Transplant Center.   Please fax results to the Transplant Center at [phone].  Any questions please call [phone].        .

## 2020-03-04 NOTE — LETTER
PHYSICIAN ORDERS      DATE & TIME ISSUED: 2020 12:55 PM  PATIENT NAME: Raj Pierce   : 1970     [facility] MR# [if applicable]: [id number]     DIAGNOSIS:  Kidney Transplant  ICD-10 CODE: Z94.0     Please repeat the following labs in 1 week:  Tacrolimus drug level  CBC  BMP    Any questions please call: [phone]  Please fax lab results to [phone].    .

## 2020-03-04 NOTE — TELEPHONE ENCOUNTER
ISSUE:   Tacrolimus IR level 11/7 on 2/28, goal 4-6, dose 2.5 mg BID.    PLAN:   Please call patient and confirm this was an accurate 12-hour trough. Verify Tacrolimus IR dose 2.5 mg BID. Confirm no new medications or illness. Confirm no missed doses. If accurate trough and accurate dose, decrease Tacrolimus IR dose to 2 mg BID and repeat labs in 1 week.    Katie Yusuf RN      OUTCOME:   Spoke with patient, they confirm accurate trough level and current dose 2 mg BID. Patient agrees to repeat labs in 1 weeks. Orders sent to preferred pharmacy for dose change and lab for repeat labs. Patient voiced understanding of plan.     Patient also reports ongoing diarrhea X 1 month, reporting 2 episodes of liquid diarrhea daily.  Patient also states that he vomited X 1 today and has had a headache occassionally for the past week.  Please advise of any recommendations.

## 2020-03-04 NOTE — TELEPHONE ENCOUNTER
PLAN:  Call Raj Pierce and discuss issue: diarrhea x 1 month.  Recommend stool test.    IS: mmf 750 bid, tac 2.5 bid    OUTCOME:  Called Raj Pierce and discussed completing stool test.  Confirmed on mmf 750 bid. Also taking 2 mg BID not 2.5 as written in Epic.  States he has been on 2 mg BID for some time.  Recommended decreasing dose to 1.5 mg BID.  rx for tac sent to  [facility] #[id number] - [place], [place] - [address] [place] [address] [phone] (Phone)  [phone] (Fax)   Verbalized understanding and agreement to plan.    Orders for stool test sent to:  [facility] [phone] (Phone)  [phone] (Fax)

## 2020-03-06 ENCOUNTER — TELEPHONE (OUTPATIENT)
Dept: TRANSPLANT | Facility: CLINIC | Age: 50
End: 2020-03-06

## 2020-03-06 DIAGNOSIS — Z94.0 KIDNEY TRANSPLANTED: ICD-10-CM

## 2020-03-06 DIAGNOSIS — Z48.298 AFTERCARE FOLLOWING ORGAN TRANSPLANT: ICD-10-CM

## 2020-03-06 DIAGNOSIS — A08.11 NOROVIRUS: Primary | ICD-10-CM

## 2020-03-06 DIAGNOSIS — D84.9 IMMUNOSUPPRESSED STATUS (H): ICD-10-CM

## 2020-03-09 ENCOUNTER — TELEPHONE (OUTPATIENT)
Dept: TRANSPLANT | Facility: CLINIC | Age: 50
End: 2020-03-09

## 2020-03-09 RX ORDER — NITAZOXANIDE 500 MG/1
500 TABLET ORAL 2 TIMES DAILY WITH MEALS
Qty: 28 TABLET | Refills: 0 | Status: SHIPPED | OUTPATIENT
Start: 2020-03-09 | End: 2020-03-23

## 2020-03-09 NOTE — TELEPHONE ENCOUNTER
Called back stating Nitazoxanide will cost $4000.  Discussed plan B, which is to decrease mmf to 500 mg BID for 1 week then resume original dose of 750 bid.  Verbalized understanding and agreement to plan.

## 2020-03-09 NOTE — TELEPHONE ENCOUNTER
Francis Espinoza MD [place]um, Teresa, RN               Yes to the plan.    Previous Messages     ----- Message -----   From: Shawna Sylvester, RN   Sent: 3/6/2020   2:37 PM CST   To: Francis Espinoza MD   Subject: positive Noro                                     Dr. Espinoza,     4 yrs post Kid txp     Issue: diarrhea x 1 month   Stool test came back + for norovirus     Current IS meds:   Tac 1.5 bid   Mmf 750 bid     Offer nitazoxanide 500 mg bid x 14 d if insurance covers it?   If not, are you ok with decreasing mmf to 500 bid x 5 days?   Let me know.     Thanks,   [place]         PLAN:  Call back Raj Pierce and discuss Rx for Nitazoxanide sent to   [facility] #[id number] - [place], [address] AT [address] [phone] (Phone)  [phone] (Fax)     OUTCOME:  Left VM rx sent to [facility].  Instructed to call back [facility] if insurance doesn't cover.

## 2020-03-10 ENCOUNTER — TELEPHONE (OUTPATIENT)
Dept: TRANSPLANT | Facility: CLINIC | Age: 50
End: 2020-03-10

## 2020-03-10 NOTE — TELEPHONE ENCOUNTER
Central Prior Authorization Team   Phone: [phone]    PA Initiation    Medication: nitazoxanide (ALINIA) 500 MG tablet   Insurance Company: [employer] - Phone [phone] Fax [phone]  Pharmacy Filling the Rx: [facility] #[id number] - [place], [place] - [address] [place] [address] AT [facility]  Filling Pharmacy Phone: [phone]  Filling Pharmacy Fax:    Start Date: 3/10/2020

## 2020-03-10 NOTE — TELEPHONE ENCOUNTER
Prior Authorization Specialty Medication Request    Medication/Dose:   nitazoxanide (ALINIA) 500 MG tablet  Take 1 tablet (500 mg) by mouth 2 times daily (with meals) for 14 days     ICD code (if different than what is on RX):  A04.72   Previously Tried and Failed:      Important Lab Values:   Rationale:     Insurance Name:   Insurance ID: [id number]  Insurance Phone Number: [phone]    Pharmacy Information (if different than what is on RX)  Name:  [place]  Phone:  [phone]

## 2020-03-11 NOTE — TELEPHONE ENCOUNTER
Prior Authorization Approval for 1 month        Authorization Effective Date: 2/9/2020  Authorization Expiration Date: 4/9/2020  Medication: nitazoxanide (ALINIA) 500 MG tablet   Approved Dose/Quantity:   Reference #: [id number]   Insurance Company: [employer] - Phone [phone] Fax [phone]  Expected CoPay:       CoPay Card Available:      Foundation Assistance Needed:    Which Pharmacy is filling the prescription (Not needed for infusion/clinic administered): [facility] #[id number] - [place] AT [address]  Pharmacy Notified: Yes - via voicemail  Patient Notified:

## 2020-03-13 DIAGNOSIS — Z79.899 ENCOUNTER FOR LONG-TERM CURRENT USE OF MEDICATION: ICD-10-CM

## 2020-03-13 DIAGNOSIS — Z94.0 KIDNEY REPLACED BY TRANSPLANT: ICD-10-CM

## 2020-03-13 DIAGNOSIS — Z48.298 AFTERCARE FOLLOWING ORGAN TRANSPLANT: ICD-10-CM

## 2020-03-13 PROCEDURE — 80197 ASSAY OF TACROLIMUS: CPT | Performed by: INTERNAL MEDICINE

## 2020-03-15 LAB
TACROLIMUS BLD-MCNC: 8.9 UG/L (ref 5–15)
TME LAST DOSE: NORMAL H

## 2020-03-16 DIAGNOSIS — Z79.60 LONG-TERM USE OF IMMUNOSUPPRESSANT MEDICATION: ICD-10-CM

## 2020-03-16 DIAGNOSIS — Z94.0 KIDNEY TRANSPLANT RECIPIENT: ICD-10-CM

## 2020-03-16 NOTE — LETTER
PHYSICIAN ORDERS      DATE & TIME ISSUED: 2020 11:16 AM  PATIENT NAME: Raj Pierce   : 1970     [facility] MR# [if applicable]: [id number]     DIAGNOSIS:  Kidney transplant   ICD-10 CODE: Z94.0      Please complete the following labs in two weeks  BMP  Tacrolimus level (ensure 12 hours between last dose and blood draw)     Any questions please call: [phone] option 5    Please fax results to [phone].    .

## 2020-03-16 NOTE — TELEPHONE ENCOUNTER
Tacrolimus = 8.9 (3/13/20)  Goal 4-6  Current Tac dose 1.5 mg BID    Creatinine = 3.36  Baseline 2.9-3.2    PLAN:   Call Raj Pierce and confirm this was a good 12-hour trough. Verify dose 1.5 mg BID.   Confirm no new medications or illness (german. Diarrhea).   If good trough, decrease dose to 1 mg BID and recheck Tac level in 2 weeks  Improve hydration and recheck BMP.    Fax lab orders to:  [facility] [phone] (Phone)  [phone] (Fax)

## 2020-03-18 RX ORDER — TACROLIMUS 0.5 MG/1
CAPSULE ORAL
Qty: 60 CAPSULE | Refills: 11 | Status: SHIPPED | OUTPATIENT
Start: 2020-03-18 | End: 2020-06-02

## 2020-03-18 RX ORDER — TACROLIMUS 1 MG/1
1 CAPSULE ORAL 2 TIMES DAILY
Qty: 60 CAPSULE | Refills: 11 | Status: SHIPPED | OUTPATIENT
Start: 2020-03-18 | End: 2020-06-02

## 2020-03-18 NOTE — TELEPHONE ENCOUNTER
Call placed to patient. No answer. Voice message left requesting a return call to confirm current dose and trough level. Instructions left for patient to improve hydration; decrease tacrolimus to 1 mg bid and repeat labs in two weeks. Order/rx sent

## 2020-03-26 ENCOUNTER — TELEPHONE (OUTPATIENT)
Dept: TRANSPLANT | Facility: CLINIC | Age: 50
End: 2020-03-26

## 2020-03-26 NOTE — TELEPHONE ENCOUNTER
Patient Call: Medication Clarification  Route to LPN    mycophenolate (GENERIC EQUIVALENT) 250 MG capsule       Call back needed? Yes    Return Call Needed  Same as documented in contacts section  When to return call?: Greater than one day: Route standard priority

## 2020-03-26 NOTE — TELEPHONE ENCOUNTER
Call returned to patient. No answer. Voice message left informing patient of his current mycophenolate dose.

## 2020-04-15 ENCOUNTER — TELEPHONE (OUTPATIENT)
Dept: TRANSPLANT | Facility: CLINIC | Age: 50
End: 2020-04-15

## 2020-04-15 NOTE — TELEPHONE ENCOUNTER
Called to check if need to complete labs this month.  RNCC discussed planning on having it next month.  Verbalized understanding and agreement to plan.

## 2020-05-14 ENCOUNTER — APPOINTMENT (OUTPATIENT)
Dept: LAB | Facility: CLINIC | Age: 50
End: 2020-05-14
Attending: TRANSPLANT SURGERY
Payer: COMMERCIAL

## 2020-05-14 PROCEDURE — 86833 HLA CLASS II HIGH DEFIN QUAL: CPT | Performed by: INTERNAL MEDICINE

## 2020-05-14 PROCEDURE — 80197 ASSAY OF TACROLIMUS: CPT | Performed by: INTERNAL MEDICINE

## 2020-05-14 PROCEDURE — 86832 HLA CLASS I HIGH DEFIN QUAL: CPT | Performed by: INTERNAL MEDICINE

## 2020-05-15 ENCOUNTER — TELEPHONE (OUTPATIENT)
Dept: TRANSPLANT | Facility: CLINIC | Age: 50
End: 2020-05-15

## 2020-05-15 LAB
TACROLIMUS BLD-MCNC: 7.3 UG/L (ref 5–15)
TME LAST DOSE: NORMAL H

## 2020-05-15 NOTE — TELEPHONE ENCOUNTER
Tacrolimus = 7.3 (5/14/20)  Goal 4-6  Previous levels above goal  Current tac dose 1 mg BID      PLAN:   Call Raj Pierce and confirm this was a good 12-hour trough. Verify dose 1 mg BID.   Confirm no new medications or illness (german. Diarrhea).   If good trough, decrease dose to 1.0 mg in AM and 0.5 mg in PM and recheck level in 2 weeks

## 2020-05-15 NOTE — TELEPHONE ENCOUNTER
Call placed to patient. Patient confirms current dose at 1.5 mg bid and accurate trough level. Denies any recent illness, diarrhea and medication changes. Patient v\u to decrease dose to 1 mg bid and repeat level in 2 weeks. Order sent

## 2020-05-15 NOTE — LETTER
PHYSICIAN ORDERS    DATE & TIME ISSUED: 5- 3:00 PM  PATIENT NAME: Raj Pierce   : 1970     [facility] MR# [if applicable]: [id number]     DIAGNOSIS / ICD - 10 CODES    Kidney Transplanted (Z94.0)    After Care Following Organ Transplant (Z48.298)    Long Term Use of Medication (Z79.899)      Please complete the following in 2 weeks:    Tacrolimus level      Patient should release information to the [facility] Transplant Center.   Please fax results to the Transplant Center at [phone].  Any questions please call [phone].        .

## 2020-05-18 DIAGNOSIS — Z94.0 KIDNEY REPLACED BY TRANSPLANT: ICD-10-CM

## 2020-05-18 DIAGNOSIS — Z48.298 AFTERCARE FOLLOWING ORGAN TRANSPLANT: ICD-10-CM

## 2020-05-18 DIAGNOSIS — Z79.899 ENCOUNTER FOR LONG-TERM CURRENT USE OF MEDICATION: ICD-10-CM

## 2020-05-21 ENCOUNTER — DOCUMENTATION ONLY (OUTPATIENT)
Dept: TRANSPLANT | Facility: CLINIC | Age: 50
End: 2020-05-21

## 2020-05-21 ENCOUNTER — ORGAN (OUTPATIENT)
Dept: TRANSPLANT | Facility: CLINIC | Age: 50
End: 2020-05-21

## 2020-05-21 DIAGNOSIS — Z76.82 AWAITING ORGAN TRANSPLANT: Primary | ICD-10-CM

## 2020-05-21 NOTE — TELEPHONE ENCOUNTER
Organ Offer Encounter Information    Organ Offer Information  Organ offer date & time:  5/21/2020  3:25 PM  Coordinator/Fellow/Attending name:  Payton Jeff, RN   Organ(s):   Organ UNOS ID Match Run ID Comment Organ Laterality   Kidney [id number] MNOP       Recent infections?:  No    New medications?:  No Recent pregnancy?:  No (Comment: NA)   Angicoagulation medications?:  No Recent vaccinations?:  No   Recent blood transfusions?:  No Recent hospitalizations?:  No   Has your insurance changed in the last 6-12 months?:  Neg    Discussed organ offer with:  Patient  Discussed risk category with Patient/Other:  DCD  Understood donor criteria, verbalized understanding  Patient/Other asked to speak to a surgeon?:  No  Discussed program-specific outcomes:  Did not have questions regarding SRTR  Right to decline organ offer without penalty, Patient/Other:  Aware of option to decline without penalty  Organ offer decision status Patient/Other:  Declined Offer  UNOS decline reason:  801 - Candidate ill, unavailable, refused, or temporarily unsuitable  Additional Comments:  5/21/2020 3:31 PM  Kidney: Primary  MD: Norma/Oralia  OPO Contact: Shelbi LOPEZ Results: Compatible-Dr. Marshall  Plan (XM, NPO, Donor OR): Attempted to call patient- no answer. Voicemail full-unable to leave message. Called wife-left message. Patient called back- discussed offer. Discussed donor was DCD and COVID-19 precautions-no visitor policy. Patient understood and wants to decline at this time, wants a better offer as his doctors have told him to be selective. Did not wish to have MD call him to discuss. Notified Dr. Green of patient turndown.    In the past month, have you had:    Any close contact with a suspect or laboratory-confirmed COVID-19 patient: No  Travel anywhere: No    Fever: No  Cough: No  Short of Breath: No  Rash: No    Payton Jeff  Transplant Coordinator    Attestation I have discussed all of the above  with the Patient/Legal Guardian/Caregiver regarding this organ offer.:  Yes  Coordinator/Fellow/Attending name:  Payton Jeff RN

## 2020-05-21 NOTE — PROGRESS NOTES
PATHOLOGY HLA CROSSMATCH CONSULTATION: DONOR/RECIPIENT  VIRTUAL CROSSMATCH - Kidney  Consultation Date: 2020  Consultation Requested by: Dr. Green    Regarding: Compatibility of  donor organ UNOS #[id number] from OPO: MNOP  with Raj Pierce    Findings: Regarding a virtual crossmatch between Raj Pierce and  donor listed above (match ID [id number]):  The most recent (2020) and 10 additional patient serum/sera  were analyzed.  The patient has no antibodies listed with HLA specificity against the donor organ.      Record Review Indicates: I personally reviewed the most recent serum, the historic peak sera, and all other sera with solid-phase HLA Single Antigen test results:  The patient has no HLA antibodies against the donor organ.     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    Disclaimer: Clinical judgement must take into account other factors, such as non-HLA antibodies not detected in the assay. The VXM gives probabilities only.  The probability does not account for the potential for auto-antibodies that may be present in the patient's serum.  These autoantibodies may render the physical crossmatch falsely positive, and would be detected by an autologous crossmatch.  When possible, confirm findings with prospective allogeneic and autologous flow crossmatches before going to transplant as clinically indicated.     Anne Marshall MD  Medical Director, Immunology/Histocompatibility Laboratory

## 2020-05-29 DIAGNOSIS — Z79.899 ENCOUNTER FOR LONG-TERM CURRENT USE OF MEDICATION: ICD-10-CM

## 2020-05-29 DIAGNOSIS — Z48.298 AFTERCARE FOLLOWING ORGAN TRANSPLANT: ICD-10-CM

## 2020-05-29 DIAGNOSIS — Z94.0 KIDNEY REPLACED BY TRANSPLANT: ICD-10-CM

## 2020-05-29 PROCEDURE — 80197 ASSAY OF TACROLIMUS: CPT | Performed by: INTERNAL MEDICINE

## 2020-05-31 LAB
TACROLIMUS BLD-MCNC: 3.2 UG/L (ref 5–15)
TME LAST DOSE: ABNORMAL H

## 2020-06-01 DIAGNOSIS — Z79.60 LONG-TERM USE OF IMMUNOSUPPRESSANT MEDICATION: ICD-10-CM

## 2020-06-01 DIAGNOSIS — Z94.0 KIDNEY TRANSPLANT RECIPIENT: ICD-10-CM

## 2020-06-01 NOTE — TELEPHONE ENCOUNTER
Left message and sent [employer] message regarding:  Tacrolimus = 3.2 (5/29/20)  Goal 4-6  Current tac dose 1.0/0.5  --- was recently decreased from 1 mg BID d/t levels above goal        PLAN:   Call Raj Pierce and confirm this was a good 12-hour trough. Verify dose 1.0/0.5.   Confirm no new medications or illness (german. Diarrhea).   If good trough, increase dose back to 1 mg BID and recheck level in 2 weeks or with next lab.  Otherwise, if not a good level, just have Tac levels rechecked and make sure it is a good trough

## 2020-06-01 NOTE — TELEPHONE ENCOUNTER
Tacrolimus = 3.2 (5/29/20)  Goal 4-6  Current tac dose 1.0/0.5  --- was recently decreased from 1 mg BID d/t levels above goal      PLAN:   Call Raj Pierce and confirm this was a good 12-hour trough. Verify dose 1.0/0.5.   Confirm no new medications or illness (german. Diarrhea).   If good trough, increase dose back to 1 mg BID and recheck level in 2 weeks or with next lab.  Otherwise, if not a good level, just have Tac levels rechecked and make sure it is a good trough.

## 2020-06-01 NOTE — LETTER
PHYSICIAN ORDERS      DATE & TIME ISSUED: 2020 10:55 AM  PATIENT NAME: Raj Pierce   : 1970     [facility] MR# [if applicable]: [id number]     DIAGNOSIS:  Kidney transplant   ICD-10 CODE: Z94.0      Please complete the following labs in two weeks  Tacrolimus level   BMP    Any questions please call: [phone] option 5    Please fax results to [phone].

## 2020-06-02 RX ORDER — TACROLIMUS 0.5 MG/1
0.5 CAPSULE ORAL 2 TIMES DAILY
Qty: 60 CAPSULE | Refills: 11 | Status: SHIPPED | OUTPATIENT
Start: 2020-06-02 | End: 2020-10-05

## 2020-06-02 RX ORDER — TACROLIMUS 1 MG/1
1 CAPSULE ORAL 2 TIMES DAILY
Qty: 60 CAPSULE | Refills: 11 | Status: SHIPPED | OUTPATIENT
Start: 2020-06-02 | End: 2020-10-05

## 2020-06-02 NOTE — TELEPHONE ENCOUNTER
Call placed to patient. Patient confirms accurate trough level and current dose at 1 mg bid. Denies any recent illness, diarrhea or medication changes. Patient v\u to increase dose to 1.5 mg bid and repeat level in two weeks. Rx/order sent

## 2020-06-10 DIAGNOSIS — I15.1 HYPERTENSION SECONDARY TO OTHER RENAL DISORDERS: ICD-10-CM

## 2020-06-10 RX ORDER — CHLORTHALIDONE 25 MG/1
12.5 TABLET ORAL DAILY
Qty: 90 TABLET | Refills: 0 | OUTPATIENT
Start: 2020-06-10

## 2020-06-12 DIAGNOSIS — Z48.298 AFTERCARE FOLLOWING ORGAN TRANSPLANT: ICD-10-CM

## 2020-06-12 DIAGNOSIS — Z79.899 ENCOUNTER FOR LONG-TERM CURRENT USE OF MEDICATION: ICD-10-CM

## 2020-06-12 DIAGNOSIS — Z94.0 KIDNEY REPLACED BY TRANSPLANT: ICD-10-CM

## 2020-06-12 PROCEDURE — 80197 ASSAY OF TACROLIMUS: CPT | Performed by: INTERNAL MEDICINE

## 2020-06-14 LAB
TACROLIMUS BLD-MCNC: 5.8 UG/L (ref 5–15)
TME LAST DOSE: NORMAL H

## 2020-07-24 ENCOUNTER — APPOINTMENT (OUTPATIENT)
Dept: LAB | Facility: CLINIC | Age: 50
End: 2020-07-24
Attending: TRANSPLANT SURGERY
Payer: COMMERCIAL

## 2020-07-24 DIAGNOSIS — Z48.298 AFTERCARE FOLLOWING ORGAN TRANSPLANT: ICD-10-CM

## 2020-07-24 PROCEDURE — 80197 ASSAY OF TACROLIMUS: CPT | Performed by: INTERNAL MEDICINE

## 2020-07-26 DIAGNOSIS — Z76.82 ORGAN TRANSPLANT CANDIDATE: ICD-10-CM

## 2020-07-26 DIAGNOSIS — N18.6 ESRD (END STAGE RENAL DISEASE) (H): ICD-10-CM

## 2020-07-26 LAB
TACROLIMUS BLD-MCNC: 6.6 UG/L (ref 5–15)
TME LAST DOSE: NORMAL H

## 2020-08-14 ENCOUNTER — TELEPHONE (OUTPATIENT)
Dept: TRANSPLANT | Facility: CLINIC | Age: 50
End: 2020-08-14

## 2020-08-14 PROCEDURE — 80197 ASSAY OF TACROLIMUS: CPT | Performed by: INTERNAL MEDICINE

## 2020-08-14 NOTE — LETTER
PHYSICIAN ORDERS      DATE & TIME ISSUED: 2020 6:03 PM  PATIENT NAME: Raj Pierce   : 1970     [facility] MR# [if applicable]: [id number]     DIAGNOSIS:  Kidney transplant   ICD-10 CODE: Z94.0      Please complete the following level in one week  BMP    Any questions please call: [phone] option 5    Please fax results to [phone].

## 2020-08-14 NOTE — TELEPHONE ENCOUNTER
Call placed to patient. Patient note that he feel well. Denies any recent illness, s\s of uremia, diarrhea or dehydration. Patient note the following medication changes. Losartan 75 mg daily and Sodium bicarb 650 mg bid. Patient v\u to improve hydration, decrease dietary potassium and repeat level in one week.

## 2020-08-14 NOTE — TELEPHONE ENCOUNTER
8/14/20:  Creatinine = 3.51  Potassium 5.4      PLAN:  Call Raj Pierce and check how patient is feeling?   Having cough/cold/congestion?   Nausea/Vomiting?  Diarrhea?   Dehydration?   Missed medication?  Changes in medication, (german diuretics)?    Any uremic symptoms:  Changes in taste, Loss of appetite, N/V/D  Changes in concentration  Fatigue  Restless leg  Decreased UO    Recommend eating LESS of Potassium rich food.  If not on fluid restriction, instruct to improve hydration and recheck BMP next week.

## 2020-08-17 DIAGNOSIS — Z94.0 KIDNEY REPLACED BY TRANSPLANT: Primary | ICD-10-CM

## 2020-08-17 DIAGNOSIS — Z79.899 ENCOUNTER FOR LONG-TERM CURRENT USE OF MEDICATION: ICD-10-CM

## 2020-08-17 DIAGNOSIS — Z48.298 AFTERCARE FOLLOWING ORGAN TRANSPLANT: ICD-10-CM

## 2020-08-17 LAB
TACROLIMUS BLD-MCNC: 7.2 UG/L (ref 5–15)
TME LAST DOSE: NORMAL H

## 2020-08-20 NOTE — TELEPHONE ENCOUNTER
Norovirus Positive (3/4/20)    PLAN:  Call Raj Pierce and discuss results.  Nitazoxanide 500 bid w/ meals x 14d? --- Will discuss with Dr. Espinoza.     OUTCOME:  Spoke with Raj and answered questions he had about the Norovirus.  Confirms current IS:  Tac 1.5 mg BID and mmf 750 bid.  Will discuss with Dr. Espinoza and call him back with plan.   Detail Level: Zone Otc Regimen: Moisturizes daily \\nCerave \\nCetaphil \\nVanicream Initiate Treatment: Efudex 5 % topical cream daily

## 2020-09-23 DIAGNOSIS — N25.81 SECONDARY RENAL HYPERPARATHYROIDISM (H): ICD-10-CM

## 2020-09-23 DIAGNOSIS — Z94.0 KIDNEY REPLACED BY TRANSPLANT: Primary | ICD-10-CM

## 2020-09-23 RX ORDER — MYCOPHENOLATE MOFETIL 250 MG/1
750 CAPSULE ORAL 2 TIMES DAILY
Qty: 180 CAPSULE | Refills: 11 | Status: SHIPPED | OUTPATIENT
Start: 2020-09-23 | End: 2021-10-04

## 2020-10-02 ENCOUNTER — TRANSFERRED RECORDS (OUTPATIENT)
Dept: HEALTH INFORMATION MANAGEMENT | Facility: CLINIC | Age: 50
End: 2020-10-02

## 2020-10-02 ENCOUNTER — APPOINTMENT (OUTPATIENT)
Dept: LAB | Facility: CLINIC | Age: 50
End: 2020-10-02
Attending: TRANSPLANT SURGERY
Payer: COMMERCIAL

## 2020-10-02 DIAGNOSIS — Z79.899 ENCOUNTER FOR LONG-TERM CURRENT USE OF MEDICATION: ICD-10-CM

## 2020-10-02 DIAGNOSIS — Z94.0 KIDNEY REPLACED BY TRANSPLANT: ICD-10-CM

## 2020-10-02 DIAGNOSIS — Z48.298 AFTERCARE FOLLOWING ORGAN TRANSPLANT: ICD-10-CM

## 2020-10-02 PROCEDURE — 86833 HLA CLASS II HIGH DEFIN QUAL: CPT | Performed by: INTERNAL MEDICINE

## 2020-10-02 PROCEDURE — 80197 ASSAY OF TACROLIMUS: CPT | Performed by: INTERNAL MEDICINE

## 2020-10-02 PROCEDURE — 86832 HLA CLASS I HIGH DEFIN QUAL: CPT | Performed by: INTERNAL MEDICINE

## 2020-10-04 DIAGNOSIS — Z48.298 AFTERCARE FOLLOWING ORGAN TRANSPLANT: ICD-10-CM

## 2020-10-04 DIAGNOSIS — Z94.0 KIDNEY REPLACED BY TRANSPLANT: ICD-10-CM

## 2020-10-04 DIAGNOSIS — Z79.899 ENCOUNTER FOR LONG-TERM CURRENT USE OF MEDICATION: ICD-10-CM

## 2020-10-04 LAB
TACROLIMUS BLD-MCNC: 6.7 UG/L (ref 5–15)
TME LAST DOSE: NORMAL H

## 2020-10-05 DIAGNOSIS — Z79.60 LONG-TERM USE OF IMMUNOSUPPRESSANT MEDICATION: ICD-10-CM

## 2020-10-05 DIAGNOSIS — Z94.0 KIDNEY TRANSPLANT RECIPIENT: Primary | ICD-10-CM

## 2020-10-05 RX ORDER — TACROLIMUS 0.5 MG/1
0.5 CAPSULE ORAL 2 TIMES DAILY
Qty: 60 CAPSULE | Refills: 3 | Status: SHIPPED | OUTPATIENT
Start: 2020-10-05 | End: 2020-12-17

## 2020-10-05 RX ORDER — TACROLIMUS 1 MG/1
1 CAPSULE ORAL 2 TIMES DAILY
Qty: 60 CAPSULE | Refills: 3 | Status: SHIPPED | OUTPATIENT
Start: 2020-10-05 | End: 2020-12-17

## 2020-10-05 NOTE — TELEPHONE ENCOUNTER
Patient Call: Medication Refill  Route to LPN  Instruct the patient to first contact their pharmacy. If they have called their pharmacy and require further assistance, route to LPN.      tacrolimus (GENERIC EQUIVALENT) 1 MG capsule    [id number] - [address] AT [facility] Phone:  [phone]   Fax:  [phone]          When will the patient be out of this medication?: Greater than 3 days (Route standard priority)

## 2020-10-06 NOTE — TELEPHONE ENCOUNTER
LPN task:  Call and invite back for Annual appointment with Transplant Nephrology (Virtual Visit).  Send a message to Scheduling pool to set-up visit.  Please send a letter if unable to discuss on the phone.    Rx for Tac sent (1 month, 3 refills)

## 2020-11-20 DIAGNOSIS — Z76.82 ORGAN TRANSPLANT CANDIDATE: ICD-10-CM

## 2020-11-20 DIAGNOSIS — Z12.5 PROSTATE CANCER SCREENING: ICD-10-CM

## 2020-11-20 DIAGNOSIS — Z01.810 PRE-OPERATIVE CARDIOVASCULAR EXAMINATION: ICD-10-CM

## 2020-11-20 DIAGNOSIS — N18.9 CHRONIC KIDNEY DISEASE (CKD): ICD-10-CM

## 2020-12-07 ENCOUNTER — VIRTUAL VISIT (OUTPATIENT)
Dept: TRANSPLANT | Facility: CLINIC | Age: 50
End: 2020-12-07
Attending: PHYSICIAN ASSISTANT
Payer: COMMERCIAL

## 2020-12-07 ENCOUNTER — TELEPHONE (OUTPATIENT)
Dept: TRANSPLANT | Facility: CLINIC | Age: 50
End: 2020-12-07

## 2020-12-07 ENCOUNTER — VIRTUAL VISIT (OUTPATIENT)
Dept: NEPHROLOGY | Facility: CLINIC | Age: 50
End: 2020-12-07
Attending: PHYSICIAN ASSISTANT
Payer: COMMERCIAL

## 2020-12-07 DIAGNOSIS — Z01.810 PRE-OPERATIVE CARDIOVASCULAR EXAMINATION: ICD-10-CM

## 2020-12-07 DIAGNOSIS — Z12.5 PROSTATE CANCER SCREENING: ICD-10-CM

## 2020-12-07 DIAGNOSIS — N18.4 CKD (CHRONIC KIDNEY DISEASE) STAGE 4, GFR 15-29 ML/MIN (H): ICD-10-CM

## 2020-12-07 DIAGNOSIS — Z76.82 ORGAN TRANSPLANT CANDIDATE: ICD-10-CM

## 2020-12-07 DIAGNOSIS — N18.9 CHRONIC KIDNEY DISEASE (CKD): ICD-10-CM

## 2020-12-07 PROCEDURE — 99213 OFFICE O/P EST LOW 20 MIN: CPT | Mod: 95 | Performed by: PHYSICIAN ASSISTANT

## 2020-12-07 NOTE — TELEPHONE ENCOUNTER
Nikki Gilmore PA-C [place], Teresa, RN             Hello,     I just spoke with Raj for wait list follow up. He asked about bactrim and tacrolimus refills, but looks like they were recently placed. He said he's taking tacrolimus 2 mg bid now, but his chart mentions 1.5 mg bid. Not sure if changes were made and I'm just not seeing them. Can you please call to confirm and refill as needed?     Thanks,   Nikki        6/2/20 - Tac level 3.2 (5/29/20) dose increased from 1 mg BID to 1.5 mg BID    Called and left VM, rechecking labs including a tac level. If remains above goal, will decrease dose to 1.5 mg BID.      LPN task:  Please send lab orders to:  [facility]        [phone] (Phone)        [phone] (Fax)

## 2020-12-07 NOTE — LETTER
2020         RE: Raj Pierce  [address]  [place] [address]        Dear Colleague,    Thank you for referring your patient, Raj Pierce, to the [facility]. Please see a copy of my visit note below.    Patient Name: Raj Pierce  : 1970  Age: 50 year old  MRN: [id number]  Date of Initial Social Work Evaluation: , most recent transplant social work visit 10/29/2019    Patient on kidney transplant wait list.  Telephone visit completed with patient today to update psychosocial assessment.      Presenting Information   Living Situation: Patient resides in a home with his wife and two adult children in [place], [place].  If not local, plans for short term stay:  Local hotel.   Previous Functional Status: Patient is independent with ADL's, drives self  Cultural/Language/Spiritual Considerations: None identified at this time.     Support System  Primary Support Person Wife Dianne  Other support:  Sister, parents  Plan for support in immediate post-transplant period: Wife Dianne     Health Care Directive  Decision Maker: Self  Alternate Decision Maker: Wife  Health Care Directive: Declined completing    Mental Health/Coping:   History of Mental Health: Patient has a history of depression.   History of Chemical Health: Patient reported he drinks alcohol about twice a week. Patient denied any tobacco or substance use.   Current status: Patient reported he continues to take an anti-depression medication and he feels his depression is well controlled. Patient denied any other mental health concerns.   Coping: Patient appears to be coping well.   Services Needed/Recommended: None identified at this time.     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   Impact of transplant on income: None identified at this time.   Insurance and medication coverage:  BCBS through spouse employer.   Financial  concerns: None identified at this time.   Resources needed: None identified at this time.     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 Pt continues to be on immunosuppression. Pt reported he is compliant. Reviewed transplant education (Medicare, rehabilitation, donor issues, community/financial resources, and psych/family adjustment) as well as psychosocial risks of transplant. Patient seemed to process information well. Appeared well informed, motivated, and able to follow post transplant requirements. Behavior was appropriate during interview. Has adequate income and insurance coverage. Adequate social support. No major contraindications noted for transplant. At this time, patient appears to understand the risks and benefits of transplant.     [facility]    Kidney/Pancreas/Auto Islet Transplant Programs    The author of this note documented a reason for not sharing it with the patient.        Again, thank you for allowing me to participate in the care of your patient.        Sincerely,        VIRGILIO Decker

## 2020-12-07 NOTE — PROGRESS NOTES
"Raj Pierce is a 50 year old male who is being evaluated via a billable telephone visit.      The patient has been notified of following:     \"This telephone visit will be conducted via a call between you and your physician/provider. We have found that certain health care needs can be provided without the need for a physical exam.  This service lets us provide the care you need with a short phone conversation.  If a prescription is necessary we can send it directly to your pharmacy.  If lab work is needed we can place an order for that and you can then stop by our lab to have the test done at a later time.    Telephone visits are billed at different rates depending on your insurance coverage. During this emergency period, for some insurers they may be billed the same as an in-person visit.  Please reach out to your insurance provider with any questions.    If during the course of the call the physician/provider feels a telephone visit is not appropriate, you will not be charged for this service.\"    Patient has given verbal consent for Telephone visit?  Yes    What phone number would you like to be contacted at? [phone]    How would you like to obtain your AVS? Mail a copy    Phone call duration: *** minutes    {signature options:[id number]}      "

## 2020-12-07 NOTE — LETTER
PHYSICIAN ORDERS    DATE & TIME ISSUED: 2020  5:16 PM  PATIENT NAME: Raj Pierce   : 1970     [facility] MR# [if applicable]: [id number]     DIAGNOSIS / ICD - 10 CODES    Kidney Transplanted (Z94.0)    After Care Following Organ Transplant (Z48.298)    Long Term Use of Medication (Z79.899)    Immunosuppressed Status (Z92.25)    Complications Kidney Transplant (T86.10)    Every 3 months    Hemogram and Platelet    Basic Metabolic Panel (Sodium,potassium,chloride,CO2,creatinine,urea,nitrogen,glucose,calcium)     / tacrolimus / Prograf drug level    Every 6 months    Urine for protein creatinine ratio    Patient should release information to the [facility].   Please fax results to the Transplant Center at [phone].  Any questions please call [phone] or [phone].    .

## 2020-12-07 NOTE — PROGRESS NOTES
Patient Name: Raj Pierce  : 1970  Age: 50 year old  MRN: [id number]  Date of Initial Social Work Evaluation: , most recent transplant social work visit 10/29/2019    Patient on kidney transplant wait list.  Telephone visit completed with patient today to update psychosocial assessment.      Presenting Information   Living Situation: Patient resides in a home with his wife and two adult children in [place], [place].  If not local, plans for short term stay:  Local hotel.   Previous Functional Status: Patient is independent with ADL's, drives self  Cultural/Language/Spiritual Considerations: None identified at this time.     Support System  Primary Support Person Wife Dianne  Other support:  Sister, parents  Plan for support in immediate post-transplant period: Wife Dianne     Health Care Directive  Decision Maker: Self  Alternate Decision Maker: Wife  Health Care Directive: Declined completing    Mental Health/Coping:   History of Mental Health: Patient has a history of depression.   History of Chemical Health: Patient reported he drinks alcohol about twice a week. Patient denied any tobacco or substance use.   Current status: Patient reported he continues to take an anti-depression medication and he feels his depression is well controlled. Patient denied any other mental health concerns.   Coping: Patient appears to be coping well.   Services Needed/Recommended: None identified at this time.     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   Impact of transplant on income: None identified at this time.   Insurance and medication coverage:  BCBS through spouse employer.   Financial concerns: None identified at this time.   Resources needed: None identified at this time.     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 Pt  continues to be on immunosuppression. Pt reported he is compliant. Reviewed transplant education (Medicare, rehabilitation, donor issues, community/financial resources, and psych/family adjustment) as well as psychosocial risks of transplant. Patient seemed to process information well. Appeared well informed, motivated, and able to follow post transplant requirements. Behavior was appropriate during interview. Has adequate income and insurance coverage. Adequate social support. No major contraindications noted for transplant. At this time, patient appears to understand the risks and benefits of transplant.     [facility]    Kidney/Pancreas/Auto Islet Transplant Programs    The author of this note documented a reason for not sharing it with the patient.

## 2020-12-07 NOTE — PROGRESS NOTES
"Raj Pierce is a 50 year old male who is being evaluated via a billable telephone visit.      The patient has been notified of following:     \"This telephone visit will be conducted via a call between you and your physician/provider. We have found that certain health care needs can be provided without the need for a physical exam.  This service lets us provide the care you need with a short phone conversation.  If a prescription is necessary we can send it directly to your pharmacy.  If lab work is needed we can place an order for that and you can then stop by our lab to have the test done at a later time.    Telephone visits are billed at different rates depending on your insurance coverage. During this emergency period, for some insurers they may be billed the same as an in-person visit.  Please reach out to your insurance provider with any questions.    If during the course of the call the physician/provider feels a telephone visit is not appropriate, you will not be charged for this service.\"    Patient has given verbal consent for Telephone visit?  Yes    What phone number would you like to be contacted at? [phone]    How would you like to obtain your AVS? Mail a copy    Phone call duration: 10 minutes    Nikki Gilmore PA-C          Assessment and Plan:  #Kidney Transplant Wait List Evaluation: Patient is a good candidate overall. Patient should remain active on the wait list.    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 His wait time was re-instated and he has been listed since July 2013. His eGFR has remained relatively stable around 18-23 ml/min and he has remained on MMF and tacrolimus. He would likely benefit from another kidney transplant.    # Cardiac Risk: he has no known history of cardiac disease or events and is " asymptomatic with exertion. He is scheduled for risk assessment in the upcoming weeks.     # PKD: scheduled for surgery visit next week.    # Health Maintenance: Colonoscopy: Up to date (repeat 2023), PSA due, and Dental: Not up to date     Discussed the risks and benefits of a transplant, including the risk of surgery and immunosuppression medications.    Patient s overall re-evaluation may require further discussion in the Transplant Program s multidisciplinary selection committee for a final recommendation on the patient s suitability for transplant.     Reason for Visit:  Raj Pierce is a 50-year-old male with ESKD from polycystic kidney disease (PKD), who presents for kidney transplant wait list evaluation.     Date of Initial Transplant Evaluation: 2013        Current Transplant Phase: Waitlist: Active  Official [facility]OS Listing Date: 7/24/2013  Blood Type: B        cPRA: 10       Date of cPRA: October 2020  Transplant Coordinator: Aggie Martinez Transplant Office phone number [phone]    History of Present Illness:   Mr. 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         Interim Events: None          Kidney Disease: eGFR has remained relatively stable around 18-23 ml/min over the last few years.        On Dialysis: No       Primary Nephrologist: Dr. Don         Cardiac/Vascular Disease History:       - None. Normal Lexiscan October 2019          Functional Capacity/Frailty:        He continues to work full time as a . He is not doing anything in particular for exercise, but is able to walk several blocks and go up and down stairs without chest pain, SOB, or claudication symptoms.     Fatigue/Decreased Energy: [x] No [] Yes    Chest Pain or SOB with Exertion: [x] No [] Yes    Significant Weight Change: [x] No [] Yes    Nausea, Vomiting or Diarrhea: [x]  No [] Yes    Fever, Sweats or Chills:  [x] No [] Yes    Leg Swelling [x] No [] Yes        Other Pertinent Transplant Surgical Issues:  Recent Blood Transfusion: No  Previous Abdominal Transplant: Yes  Bladder Dysfunction: No  Chronic/Recurrent Infections: No  Chronic Anticoagulation: No  Jehovah s Witness: No    Review Of Systems:  A comprehensive review of systems was obtained and negative, except as noted in the HPI or PMH.     Active Problem List:   Patient Active Problem List   Diagnosis     Polycystic kidney, autosomal dominant     Dyslipidemia     Vitamin D deficiency     Hypertension secondary to other renal disorders     Secondary renal hyperparathyroidism (HCC)     Kidney replaced by transplant     Immunosuppressed status (H)     Acute gouty arthritis     Hypomagnesemia     Aftercare following organ transplant     Kidney transplant candidate       Personal History:  Social History     Socioeconomic History     Marital status:      Spouse name: Dianne     Number of children: 2     Years of education: 13     Highest education level: Not on file   Occupational History     Occupation:      Employer: [employer]   Social Needs     Financial resource strain: Not on file     Food insecurity     Worry: Not on file     Inability: Not on file     Transportation needs     Medical: Not on file     Non-medical: Not on file   Tobacco Use     Smoking status: Never Smoker     Smokeless tobacco: Never Used   Substance and Sexual Activity     Alcohol use: Yes     Alcohol/week: 10.0 standard drinks     Types: 10 Standard drinks or equivalent per week     Comment: 1 drink daily      Drug use: No     Sexual activity: Yes     Partners: Female   Lifestyle     Physical activity     Days per week: Not on file     Minutes per session: Not on file     Stress: Not on file   Relationships     Social connections     Talks on phone: Not on file     Gets together: Not on file     Attends Sabianist service: Not  on file     Active member of club or organization: Not on file     Attends meetings of clubs or organizations: Not on file     Relationship status: Not on file     Intimate partner violence     Fear of current or ex partner: Not on file     Emotionally abused: Not on file     Physically abused: Not on file     Forced sexual activity: Not on file   Other Topics Concern     Parent/sibling w/ CABG, MI or angioplasty before 65F 55M? Not Asked      Service Not Asked     Blood Transfusions No     Caffeine Concern Not Asked     Occupational Exposure Not Asked     Hobby Hazards Not Asked     Sleep Concern Not Asked     Stress Concern Not Asked     Weight Concern Not Asked     Special Diet Not Asked     Back Care Not Asked     Exercise Not Asked     Bike Helmet Not Asked     Seat Belt Yes     Self-Exams Not Asked   Social History Narrative     Not on file        Allergies:  Allergies   Allergen Reactions     Alcohol Rash     Other reaction(s): Rash  Swabs used at hosptial  Swabs used at hosptial        Medications:  Current Outpatient Medications   Medication Sig     atorvastatin (LIPITOR) 10 MG tablet Take 10 mg by mouth daily     chlorthalidone (HYGROTON) 25 MG tablet Take 0.5 tablets (12.5 mg) by mouth daily     citalopram (CELEXA) 20 MG tablet Take 20 mg by mouth daily.     losartan (COZAAR) 50 MG tablet Take 1 tablet (50 mg) by mouth daily     magnesium oxide 400 MG CAPS      mycophenolate (GENERIC EQUIVALENT) 250 MG capsule Take 3 capsules (750 mg) by mouth 2 times daily TAKE 3 CAPSULES BY MOUTH TWICE A DAY     SIMVASTATIN PO Take 10 mg by mouth At Bedtime     sulfamethoxazole-trimethoprim (BACTRIM/SEPTRA) 400-80 MG tablet Take 1 tablet by mouth Every Mon, Wed, Fri Morning     tacrolimus (GENERIC EQUIVALENT) 0.5 MG capsule Take 1 capsule (0.5 mg) by mouth 2 times daily Total dose = 1.5 mg twice a day     tacrolimus (GENERIC EQUIVALENT) 1 MG capsule Take 1 capsule (1 mg) by mouth 2 times daily Total dose = 1.5  mg twice a day     vitamin D3 (CHOLECALCIFEROL) 2000 units (50 mcg) tablet Take 1 tablet by mouth daily     No current facility-administered medications for this visit.

## 2020-12-07 NOTE — LETTER
"12/7/2020       RE: Raj Pierce  [address]  [place] [address]     Dear Colleague,    Thank you for referring your patient, Raj Pierce, to the [facility] NEPHROLOGY [facility]IC [place] at [facility]. Please see a copy of my visit note below.    Raj Pierce is a 50 year old male who is being evaluated via a billable telephone visit.      The patient has been notified of following:     \"This telephone visit will be conducted via a call between you and your physician/provider. We have found that certain health care needs can be provided without the need for a physical exam.  This service lets us provide the care you need with a short phone conversation.  If a prescription is necessary we can send it directly to your pharmacy.  If lab work is needed we can place an order for that and you can then stop by our lab to have the test done at a later time.    Telephone visits are billed at different rates depending on your insurance coverage. During this emergency period, for some insurers they may be billed the same as an in-person visit.  Please reach out to your insurance provider with any questions.    If during the course of the call the physician/provider feels a telephone visit is not appropriate, you will not be charged for this service.\"    Patient has given verbal consent for Telephone visit?  Yes    What phone number would you like to be contacted at? [phone]    How would you like to obtain your AVS? Mail a copy    Phone call duration: 10 minutes    Nikki Gilmore PA-C          Assessment and Plan:  #Kidney Transplant Wait List Evaluation: Patient is a good candidate overall. Patient should remain active on the wait list.    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 " His wait time was re-instated and he has been listed since July 2013. His eGFR has remained relatively stable around 18-23 ml/min and he has remained on MMF and tacrolimus. He would likely benefit from another kidney transplant.    # Cardiac Risk: he has no known history of cardiac disease or events and is asymptomatic with exertion. He is scheduled for risk assessment in the upcoming weeks.     # PKD: scheduled for surgery visit next week.    # Health Maintenance: Colonoscopy: Up to date (repeat 2023), PSA due, and Dental: Not up to date     Discussed the risks and benefits of a transplant, including the risk of surgery and immunosuppression medications.    Patient s overall re-evaluation may require further discussion in the Transplant Program s multidisciplinary selection committee for a final recommendation on the patient s suitability for transplant.     Reason for Visit:  Raj Pierce is a 50-year-old male with ESKD from polycystic kidney disease (PKD), who presents for kidney transplant wait list evaluation.     Date of Initial Transplant Evaluation: 2013        Current Transplant Phase: Waitlist: Active  Official [facility] Listing Date: 7/24/2013  Blood Type: B        cPRA: 10       Date of cPRA: October 2020  Transplant Coordinator: Aggie Martinez Transplant Office phone number [phone]    History of Present Illness:   Mr. 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         Interim Events: None          Kidney Disease: eGFR has remained relatively stable around 18-23 ml/min over the last few years.        On Dialysis: No       Primary Nephrologist: Dr. Don         Cardiac/Vascular Disease History:       - None. Normal Lexiscan October 2019          Functional Capacity/Frailty:        He continues to work full time as a . He is not doing anything in  particular for exercise, but is able to walk several blocks and go up and down stairs without chest pain, SOB, or claudication symptoms.     Fatigue/Decreased Energy: [x] No [] Yes    Chest Pain or SOB with Exertion: [x] No [] Yes    Significant Weight Change: [x] No [] Yes    Nausea, Vomiting or Diarrhea: [x] No [] Yes    Fever, Sweats or Chills:  [x] No [] Yes    Leg Swelling [x] No [] Yes        Other Pertinent Transplant Surgical Issues:  Recent Blood Transfusion: No  Previous Abdominal Transplant: Yes  Bladder Dysfunction: No  Chronic/Recurrent Infections: No  Chronic Anticoagulation: No  Jehovah s Witness: No    Review Of Systems:  A comprehensive review of systems was obtained and negative, except as noted in the HPI or PMH.     Active Problem List:   Patient Active Problem List   Diagnosis     Polycystic kidney, autosomal dominant     Dyslipidemia     Vitamin D deficiency     Hypertension secondary to other renal disorders     Secondary renal hyperparathyroidism (HCC)     Kidney replaced by transplant     Immunosuppressed status (H)     Acute gouty arthritis     Hypomagnesemia     Aftercare following organ transplant     Kidney transplant candidate       Personal History:  Social History     Socioeconomic History     Marital status:      Spouse name: Dianne     Number of children: 2     Years of education: 13     Highest education level: Not on file   Occupational History     Occupation: [employer]     Employer: [employer]   Social Needs     Financial resource strain: Not on file     Food insecurity     Worry: Not on file     Inability: Not on file     Transportation needs     Medical: Not on file     Non-medical: Not on file   Tobacco Use     Smoking status: Never Smoker     Smokeless tobacco: Never Used   Substance and Sexual Activity     Alcohol use: Yes     Alcohol/week: 10.0 standard drinks     Types: 10 Standard drinks or equivalent per week     Comment: 1 drink daily      Drug use:  No     Sexual activity: Yes     Partners: Female   Lifestyle     Physical activity     Days per week: Not on file     Minutes per session: Not on file     Stress: Not on file   Relationships     Social connections     Talks on phone: Not on file     Gets together: Not on file     Attends Adventism service: Not on file     Active member of club or organization: Not on file     Attends meetings of clubs or organizations: Not on file     Relationship status: Not on file     Intimate partner violence     Fear of current or ex partner: Not on file     Emotionally abused: Not on file     Physically abused: Not on file     Forced sexual activity: Not on file   Other Topics Concern     Parent/sibling w/ CABG, MI or angioplasty before 65F 55M? Not Asked      Service Not Asked     Blood Transfusions No     Caffeine Concern Not Asked     Occupational Exposure Not Asked     Hobby Hazards Not Asked     Sleep Concern Not Asked     Stress Concern Not Asked     Weight Concern Not Asked     Special Diet Not Asked     Back Care Not Asked     Exercise Not Asked     Bike Helmet Not Asked     Seat Belt Yes     Self-Exams Not Asked   Social History Narrative     Not on file        Allergies:  Allergies   Allergen Reactions     Alcohol Rash     Other reaction(s): Rash  Swabs used at hosptial  Swabs used at hosptial        Medications:  Current Outpatient Medications   Medication Sig     atorvastatin (LIPITOR) 10 MG tablet Take 10 mg by mouth daily     chlorthalidone (HYGROTON) 25 MG tablet Take 0.5 tablets (12.5 mg) by mouth daily     citalopram (CELEXA) 20 MG tablet Take 20 mg by mouth daily.     losartan (COZAAR) 50 MG tablet Take 1 tablet (50 mg) by mouth daily     magnesium oxide 400 MG CAPS      mycophenolate (GENERIC EQUIVALENT) 250 MG capsule Take 3 capsules (750 mg) by mouth 2 times daily TAKE 3 CAPSULES BY MOUTH TWICE A DAY     SIMVASTATIN PO Take 10 mg by mouth At Bedtime     sulfamethoxazole-trimethoprim  (BACTRIM/SEPTRA) 400-80 MG tablet Take 1 tablet by mouth Every Mon, Wed, Fri Morning     tacrolimus (GENERIC EQUIVALENT) 0.5 MG capsule Take 1 capsule (0.5 mg) by mouth 2 times daily Total dose = 1.5 mg twice a day     tacrolimus (GENERIC EQUIVALENT) 1 MG capsule Take 1 capsule (1 mg) by mouth 2 times daily Total dose = 1.5 mg twice a day     vitamin D3 (CHOLECALCIFEROL) 2000 units (50 mcg) tablet Take 1 tablet by mouth daily     No current facility-administered medications for this visit.      Again, thank you for allowing me to participate in the care of your patient.      Sincerely,    Nikki Gilmore PA-C

## 2020-12-07 NOTE — TELEPHONE ENCOUNTER
Updated current standing lab orders and faxed to patients local lab and sent copy to patient via [facility].

## 2020-12-10 ENCOUNTER — TELEPHONE (OUTPATIENT)
Dept: TRANSPLANT | Facility: CLINIC | Age: 50
End: 2020-12-10

## 2020-12-10 PROBLEM — N18.4 CKD (CHRONIC KIDNEY DISEASE) STAGE 4, GFR 15-29 ML/MIN (H): Status: ACTIVE | Noted: 2020-12-10

## 2020-12-10 NOTE — TELEPHONE ENCOUNTER
Patient called back and confirmed for added appt for Lexiscan on Mon, Dec 14 & he'll look at his mychart.

## 2020-12-10 NOTE — TELEPHONE ENCOUNTER
Called patient lvm that Lexiscan was being scheduled for Mon, Dec 14 at 8am when he was coming in for other appts, I would be sending a [employer] message with prep and asked for a call back.

## 2020-12-14 ENCOUNTER — HOSPITAL ENCOUNTER (OUTPATIENT)
Dept: NUCLEAR MEDICINE | Facility: CLINIC | Age: 50
Setting detail: NUCLEAR MEDICINE
End: 2020-12-14
Attending: PHYSICIAN ASSISTANT
Payer: COMMERCIAL

## 2020-12-14 ENCOUNTER — OFFICE VISIT (OUTPATIENT)
Dept: TRANSPLANT | Facility: CLINIC | Age: 50
End: 2020-12-14
Attending: TRANSPLANT SURGERY
Payer: COMMERCIAL

## 2020-12-14 ENCOUNTER — HOSPITAL ENCOUNTER (OUTPATIENT)
Dept: CARDIOLOGY | Facility: CLINIC | Age: 50
End: 2020-12-14
Attending: PHYSICIAN ASSISTANT
Payer: COMMERCIAL

## 2020-12-14 VITALS
HEIGHT: 70 IN | BODY MASS INDEX: 29.43 KG/M2 | HEART RATE: 81 BPM | DIASTOLIC BLOOD PRESSURE: 93 MMHG | SYSTOLIC BLOOD PRESSURE: 141 MMHG | OXYGEN SATURATION: 97 % | WEIGHT: 205.6 LBS

## 2020-12-14 DIAGNOSIS — Z94.0 KIDNEY REPLACED BY TRANSPLANT: ICD-10-CM

## 2020-12-14 DIAGNOSIS — Z12.5 PROSTATE CANCER SCREENING: ICD-10-CM

## 2020-12-14 DIAGNOSIS — N18.9 CHRONIC KIDNEY DISEASE (CKD): ICD-10-CM

## 2020-12-14 DIAGNOSIS — Z76.82 ORGAN TRANSPLANT CANDIDATE: ICD-10-CM

## 2020-12-14 DIAGNOSIS — Z01.810 PRE-OPERATIVE CARDIOVASCULAR EXAMINATION: ICD-10-CM

## 2020-12-14 DIAGNOSIS — N18.4 STAGE 4 CHRONIC KIDNEY DISEASE (H): ICD-10-CM

## 2020-12-14 DIAGNOSIS — Z79.899 ENCOUNTER FOR LONG-TERM CURRENT USE OF MEDICATION: ICD-10-CM

## 2020-12-14 DIAGNOSIS — Z48.298 AFTERCARE FOLLOWING ORGAN TRANSPLANT: ICD-10-CM

## 2020-12-14 LAB
ANION GAP SERPL CALCULATED.3IONS-SCNC: 9 MMOL/L (ref 3–14)
BUN SERPL-MCNC: 68 MG/DL (ref 7–30)
CALCIUM SERPL-MCNC: 8.8 MG/DL (ref 8.5–10.1)
CHLORIDE SERPL-SCNC: 106 MMOL/L (ref 94–109)
CO2 SERPL-SCNC: 24 MMOL/L (ref 20–32)
CREAT SERPL-MCNC: 3.13 MG/DL (ref 0.66–1.25)
CV STRESS MAX HR HE: 106
ERYTHROCYTE [DISTWIDTH] IN BLOOD BY AUTOMATED COUNT: 12.4 % (ref 10–15)
GFR SERPL CREATININE-BSD FRML MDRD: 22 ML/MIN/{1.73_M2}
GLUCOSE SERPL-MCNC: 92 MG/DL (ref 70–99)
HCT VFR BLD AUTO: 43.9 % (ref 40–53)
HGB BLD-MCNC: 13.8 G/DL (ref 13.3–17.7)
MCH RBC QN AUTO: 27.8 PG (ref 26.5–33)
MCHC RBC AUTO-ENTMCNC: 31.4 G/DL (ref 31.5–36.5)
MCV RBC AUTO: 89 FL (ref 78–100)
PLATELET # BLD AUTO: 246 10E9/L (ref 150–450)
POTASSIUM SERPL-SCNC: 4.4 MMOL/L (ref 3.4–5.3)
PSA SERPL-ACNC: 0.88 UG/L (ref 0–4)
RATE PRESSURE PRODUCT: NORMAL
RBC # BLD AUTO: 4.96 10E12/L (ref 4.4–5.9)
SODIUM SERPL-SCNC: 139 MMOL/L (ref 133–144)
STRESS ECHO BASELINE DIASTOLIC HE: 89
STRESS ECHO BASELINE HR: 79
STRESS ECHO BASELINE SYSTOLIC BP: 130
STRESS ECHO CALCULATED PERCENT HR: 63 %
STRESS ECHO LAST STRESS DIASTOLIC BP: 79
STRESS ECHO LAST STRESS SYSTOLIC BP: 128
STRESS ECHO TARGET HR: 168
TACROLIMUS BLD-MCNC: 5.2 UG/L (ref 5–15)
TME LAST DOSE: NORMAL H
WBC # BLD AUTO: 6.1 10E9/L (ref 4–11)

## 2020-12-14 PROCEDURE — 36415 COLL VENOUS BLD VENIPUNCTURE: CPT | Performed by: PATHOLOGY

## 2020-12-14 PROCEDURE — 93016 CV STRESS TEST SUPVJ ONLY: CPT | Performed by: INTERNAL MEDICINE

## 2020-12-14 PROCEDURE — 250N000011 HC RX IP 250 OP 636: Performed by: INTERNAL MEDICINE

## 2020-12-14 PROCEDURE — 93017 CV STRESS TEST TRACING ONLY: CPT

## 2020-12-14 PROCEDURE — 78452 HT MUSCLE IMAGE SPECT MULT: CPT

## 2020-12-14 PROCEDURE — 78452 HT MUSCLE IMAGE SPECT MULT: CPT | Mod: 26 | Performed by: INTERNAL MEDICINE

## 2020-12-14 PROCEDURE — 85027 COMPLETE CBC AUTOMATED: CPT | Performed by: PATHOLOGY

## 2020-12-14 PROCEDURE — 80197 ASSAY OF TACROLIMUS: CPT | Mod: 90 | Performed by: PATHOLOGY

## 2020-12-14 PROCEDURE — A9502 TC99M TETROFOSMIN: HCPCS | Performed by: PHYSICIAN ASSISTANT

## 2020-12-14 PROCEDURE — 99213 OFFICE O/P EST LOW 20 MIN: CPT | Performed by: TRANSPLANT SURGERY

## 2020-12-14 PROCEDURE — G0103 PSA SCREENING: HCPCS | Performed by: PATHOLOGY

## 2020-12-14 PROCEDURE — 93018 CV STRESS TEST I&R ONLY: CPT | Performed by: INTERNAL MEDICINE

## 2020-12-14 PROCEDURE — 343N000001 HC RX 343: Performed by: PHYSICIAN ASSISTANT

## 2020-12-14 PROCEDURE — 80048 BASIC METABOLIC PNL TOTAL CA: CPT | Performed by: PATHOLOGY

## 2020-12-14 RX ORDER — ACYCLOVIR 200 MG/1
0-1 CAPSULE ORAL
Status: DISCONTINUED | OUTPATIENT
Start: 2020-12-14 | End: 2020-12-15 | Stop reason: HOSPADM

## 2020-12-14 RX ORDER — REGADENOSON 0.08 MG/ML
0.4 INJECTION, SOLUTION INTRAVENOUS ONCE
Status: COMPLETED | OUTPATIENT
Start: 2020-12-14 | End: 2020-12-14

## 2020-12-14 RX ORDER — AMINOPHYLLINE 25 MG/ML
50-100 INJECTION, SOLUTION INTRAVENOUS
Status: DISCONTINUED | OUTPATIENT
Start: 2020-12-14 | End: 2020-12-15 | Stop reason: HOSPADM

## 2020-12-14 RX ORDER — ALBUTEROL SULFATE 90 UG/1
2 AEROSOL, METERED RESPIRATORY (INHALATION) EVERY 5 MIN PRN
Status: DISCONTINUED | OUTPATIENT
Start: 2020-12-14 | End: 2020-12-15 | Stop reason: HOSPADM

## 2020-12-14 RX ORDER — CAFFEINE CITRATE 20 MG/ML
60 SOLUTION INTRAVENOUS
Status: DISCONTINUED | OUTPATIENT
Start: 2020-12-14 | End: 2020-12-15 | Stop reason: HOSPADM

## 2020-12-14 RX ADMIN — TETROFOSMIN 39 MCI.: 1.38 INJECTION, POWDER, LYOPHILIZED, FOR SOLUTION INTRAVENOUS at 09:03

## 2020-12-14 RX ADMIN — REGADENOSON 0.4 MG: 0.08 INJECTION, SOLUTION INTRAVENOUS at 08:59

## 2020-12-14 RX ADMIN — TETROFOSMIN 10.2 MCI.: 1.38 INJECTION, POWDER, LYOPHILIZED, FOR SOLUTION INTRAVENOUS at 07:42

## 2020-12-14 ASSESSMENT — MIFFLIN-ST. JEOR: SCORE: 1788.85

## 2020-12-14 NOTE — PROGRESS NOTES
Pt here for Lexiscan nuclear stress test.  Medication and side effects reviewed with patient. Lung sounds clear to auscultation bilaterally. Denied caffeine use. Patient tolerated Lexiscan dose without any adverse reactions. VSS. Monitored post injection and then taken to the [facility] waiting room and instructed to wait there for nuclear medicine tech for follow up imaging.

## 2020-12-14 NOTE — NURSING NOTE
"Chief Complaint   Patient presents with     Consult     RWL     Blood pressure (!) 141/93, pulse 81, height 1.778 m (5' 10\"), weight 93.3 kg (205 lb 9.6 oz), SpO2 97 %.    Claire Morgan on 12/14/2020 at 1:13 PM    "

## 2020-12-14 NOTE — LETTER
"    12/14/2020         RE: Raj Pierce  [address]  [place] [address]        Dear Colleague,    Thank you for referring your patient, Raj Pierce, to the [facility] TRANSPLANT [facility]. Please see a copy of my visit note below.            Transplant Surgery      Reason For Visit: Kidney transplant waitlist    History of Present Illness:  Patient is on the kidney transplant waiting list with 7+ years of waiting time, blood type B, cPRA 10% who comes in for a WL appointment. Patient has PKD.  Had a prior transplant, but that had poor function and his waiting time was reinstated.     Not on dialysiis.  On tac and MMF for IS.  Good activity tolerance    Exam:   BP (!) 141/93   Pulse 81   Ht 1.778 m (5' 10\")   Wt 93.3 kg (205 lb 9.6 oz)   SpO2 97%   BMI 29.50 kg/m    Well appearing no apparent distress   Abd soft, ND.  Palpable kidneys.  Space sufficient for transplant on left.    CT 2019 shows sufficient space for left sided transplant       Impression:  OK to remain on the transplant list.    Plan: Timing of transplant to be determined by patient and nephrology.    Eulalio Emanuel MD, PhD  Transplant Surgery          "

## 2020-12-16 DIAGNOSIS — Z94.0 KIDNEY TRANSPLANT RECIPIENT: ICD-10-CM

## 2020-12-16 DIAGNOSIS — Z79.60 LONG-TERM USE OF IMMUNOSUPPRESSANT MEDICATION: ICD-10-CM

## 2020-12-16 NOTE — TELEPHONE ENCOUNTER
Patient Call: Medication Refill  Route to LPN  Instruct the patient to first contact their pharmacy. If they have called their pharmacy and require further assistance, route to LPN.      tacrolimus (GENERIC EQUIVALENT) 0.5 MG capsule  tacrolimus (GENERIC EQUIVALENT) 1 MG capsule    [id number] - [place], [place] - [address] [place] [address] [facility] Phone:  [phone]   Fax:  [phone]          When will the patient be out of this medication?: Less than 3 days (Route high priority)

## 2020-12-17 RX ORDER — TACROLIMUS 0.5 MG/1
0.5 CAPSULE ORAL 2 TIMES DAILY
Qty: 60 CAPSULE | Refills: 3 | Status: SHIPPED | OUTPATIENT
Start: 2020-12-17 | End: 2021-07-06

## 2020-12-17 RX ORDER — TACROLIMUS 1 MG/1
1 CAPSULE ORAL 2 TIMES DAILY
Qty: 60 CAPSULE | Refills: 3 | Status: SHIPPED | OUTPATIENT
Start: 2020-12-17 | End: 2021-10-08

## 2020-12-20 ENCOUNTER — HEALTH MAINTENANCE LETTER (OUTPATIENT)
Age: 50
End: 2020-12-20

## 2020-12-21 ENCOUNTER — VIRTUAL VISIT (OUTPATIENT)
Dept: CARDIOLOGY | Facility: CLINIC | Age: 50
End: 2020-12-21
Attending: NURSE PRACTITIONER
Payer: COMMERCIAL

## 2020-12-21 DIAGNOSIS — I10 ESSENTIAL HYPERTENSION: ICD-10-CM

## 2020-12-21 DIAGNOSIS — N18.6 ESRD (END STAGE RENAL DISEASE) (H): ICD-10-CM

## 2020-12-21 DIAGNOSIS — I15.1 HTN, KIDNEY TRANSPLANT RELATED: ICD-10-CM

## 2020-12-21 DIAGNOSIS — Z94.0 HTN, KIDNEY TRANSPLANT RELATED: ICD-10-CM

## 2020-12-21 DIAGNOSIS — Z01.810 PRE-OPERATIVE CARDIOVASCULAR EXAMINATION: Primary | ICD-10-CM

## 2020-12-21 PROCEDURE — 99213 OFFICE O/P EST LOW 20 MIN: CPT | Mod: 95 | Performed by: NURSE PRACTITIONER

## 2020-12-21 RX ORDER — SODIUM BICARBONATE 650 MG/1
1300 TABLET ORAL 2 TIMES DAILY
Status: ON HOLD | COMMUNITY
Start: 2020-08-14 | End: 2021-10-26

## 2020-12-21 RX ORDER — LOSARTAN POTASSIUM 50 MG/1
75 TABLET ORAL DAILY
Qty: 90 TABLET | Refills: 3 | Status: ON HOLD | COMMUNITY
Start: 2020-12-21 | End: 2021-10-26

## 2020-12-21 NOTE — PROGRESS NOTES
"Raj Pierce is a 50 year old male who is being evaluated via a billable telephone visit.      The patient has been notified of following:     \"This telephone visit will be conducted via a call between you and your physician/provider. We have found that certain health care needs can be provided without the need for a physical exam.  This service lets us provide the care you need with a short phone conversation.  If a prescription is necessary we can send it directly to your pharmacy.  If lab work is needed we can place an order for that and you can then stop by our lab to have the test done at a later time.    Telephone visits are billed at different rates depending on your insurance coverage. During this emergency period, for some insurers they may be billed the same as an in-person visit.  Please reach out to your insurance provider with any questions.    If during the course of the call the physician/provider feels a telephone visit is not appropriate, you will not be charged for this service.\"    Patient has given verbal consent for Telephone visit?  Yes    What phone number would you like to be contacted at?   [phone]    How would you like to obtain your AVS? MyChart    Vitals - Patient Reported  Pain Score: No Pain (0)(No SOB)          "

## 2020-12-21 NOTE — PROGRESS NOTES
[facility]  CARDIOVASCULAR MEDICINE TELEPHONE VISIT NOTE    Referring Provider: Referred Self   Primary Care Provider: Do Guerrero     Patient Name: Raj Pierce   MRN: [id number]     PERTINENT CLINICAL HISTORY:   Raj Pierce is a 50 year old male with past medical history of HTN and ESRD (PCKD) s/p kidney transplant 2015 who presents via telephone visit for cardiac clearance as part of kidney transplant evaluation. Apparently kidney that was transplanted in 2015 was too small and thus he was immediately re-listed although has been doing well. Patient last saw Cardiology (Elif Hu, DANNIE) in 10/19. Nuclear stress test at that time was negative for inducible ischemia.     Raj reports feeling well recently. He is working full time, 10 hour days and is on his feet the whole time operating machinery. He does not do dedicated exercise although he can walk a couple of blocks and take stairs without symptoms. No chest pain, SOB, EDWARDS, palpitations, lightheadedness, dizziness. He had a nuclear stress test earlier this month which was negative for inducible ischemia.     Cardiac medications include atorvastatin 10 mg and losartan 75 mg.   PAST MEDICAL HISTORY:     Past Medical History:   Diagnosis Date     Acidosis      Chronic kidney disease, stage IV (severe) (H)      Disorders of phosphorus metabolism      HTN (hypertension)     15 years     Hyperparathyroidism      Polycystic kidney, autosomal dominant      Pure hypercholesterolemia      Sleep apnea     cpap     Vitamin D deficiency         PAST SURGICAL HISTORY:     Past Surgical History:   Procedure Laterality Date     BENCH KIDNEY N/A 10/13/2015    Procedure: BENCH KIDNEY;  Surgeon: Dariel Chavez MD;  Location: [facility] OR     CYSTOSCOPY, REMOVE STENT(S), COMBINED Right 11/9/2015    Procedure: COMBINED CYSTOSCOPY, REMOVE STENT(S);  Surgeon: Dariel Chavez MD;  Location: [facility] OR     HERNIORRHAPHY INGUINAL CHILD      right      TONSILLECTOMY       TRANSPLANT KIDNEY RECIPIENT  DONOR N/A 10/13/2015    Procedure: TRANSPLANT KIDNEY RECIPIENT  DONOR;  Surgeon: Dariel Chavez MD;  Location:  OR        CURRENT MEDICATIONS:     Current Outpatient Medications   Medication Sig Dispense Refill     atorvastatin (LIPITOR) 10 MG tablet Take 10 mg by mouth daily       chlorthalidone (HYGROTON) 25 MG tablet Take 0.5 tablets (12.5 mg) by mouth daily 90 tablet 0     citalopram (CELEXA) 20 MG tablet Take 20 mg by mouth daily.       losartan (COZAAR) 50 MG tablet Take 1 tablet (50 mg) by mouth daily 90 tablet 3     magnesium oxide 400 MG CAPS        mycophenolate (GENERIC EQUIVALENT) 250 MG capsule Take 3 capsules (750 mg) by mouth 2 times daily TAKE 3 CAPSULES BY MOUTH TWICE A  capsule 11     SIMVASTATIN PO Take 10 mg by mouth At Bedtime       sulfamethoxazole-trimethoprim (BACTRIM/SEPTRA) 400-80 MG tablet Take 1 tablet by mouth Every Mon, Wed, Fri Morning 12 tablet 11     tacrolimus (GENERIC EQUIVALENT) 0.5 MG capsule Take 1 capsule (0.5 mg) by mouth 2 times daily Total dose = 1.5 mg twice a day 60 capsule 3     tacrolimus (GENERIC EQUIVALENT) 1 MG capsule Take 1 capsule (1 mg) by mouth 2 times daily Total dose = 1.5 mg twice a day 60 capsule 3     vitamin D3 (CHOLECALCIFEROL) 2000 units (50 mcg) tablet Take 1 tablet by mouth daily          ALLERGIES:     Allergies   Allergen Reactions     Alcohol Rash     Other reaction(s): Rash  Swabs used at hosptial  Swabs used at hosptial        FAMILY HISTORY:     Family History   Problem Relation Age of Onset     Gastrointestinal Disease Mother         ESRD PCKD     Hypertension Mother      C.A.D. Father      Hypertension Father      Gastrointestinal Disease Sister         PCKD        SOCIAL HISTORY:     Social History     Socioeconomic History     Marital status:      Spouse name: Dianne     Number of children: 2     Years of education: 13     Highest education level: Not on file    Occupational History     Occupation:      Employer: [email or web address]   Social Needs     Financial resource strain: Not on file     Food insecurity     Worry: Not on file     Inability: Not on file     Transportation needs     Medical: Not on file     Non-medical: Not on file   Tobacco Use     Smoking status: Never Smoker     Smokeless tobacco: Never Used   Substance and Sexual Activity     Alcohol use: Yes     Alcohol/week: 10.0 standard drinks     Types: 10 Standard drinks or equivalent per week     Comment: 1 drink daily      Drug use: No     Sexual activity: Yes     Partners: Female   Lifestyle     Physical activity     Days per week: Not on file     Minutes per session: Not on file     Stress: Not on file   Relationships     Social connections     Talks on phone: Not on file     Gets together: Not on file     Attends Hoahaoism service: Not on file     Active member of club or organization: Not on file     Attends meetings of clubs or organizations: Not on file     Relationship status: Not on file     Intimate partner violence     Fear of current or ex partner: Not on file     Emotionally abused: Not on file     Physically abused: Not on file     Forced sexual activity: Not on file   Other Topics Concern     Parent/sibling w/ CABG, MI or angioplasty before 65F 55M? Not Asked      Service Not Asked     Blood Transfusions No     Caffeine Concern Not Asked     Occupational Exposure Not Asked     Hobby Hazards Not Asked     Sleep Concern Not Asked     Stress Concern Not Asked     Weight Concern Not Asked     Special Diet Not Asked     Back Care Not Asked     Exercise Not Asked     Bike Helmet Not Asked     Seat Belt Yes     Self-Exams Not Asked   Social History Narrative     Not on file     Raj  reports current alcohol use of about 10.0 standard drinks of alcohol per week. and  reports that he has never smoked. He has never used smokeless tobacco..     REVIEW OF SYSTEMS:   CLAUDIA  comprehensive review of systems was performed and negative unless otherwise noted in the HPI above.      PHYSICAL EXAMINATION:   No vital signs were taken for this telephone visit.   There is no height or weight on file to calculate BMI.  Wt Readings from Last 2 Encounters:   12/14/20 93.3 kg (205 lb 9.6 oz)   12/18/19 73.6 kg (162 lb 3.2 oz)     Constitutional: no acute distress, pleasant and cooperative  Respiratory: no audible wheezes   Neurologic: Alert and oriented  Psychiatric: appropriate affect, intact thought and speech     LABORATORY DATA:     LIPID RESULTS:  Recent Labs   Lab Test 10/13/15  0937 07/10/13  0723   CHOL 178 243*   HDL 66 61   LDL 86 155*   TRIG 129 136   CHOLHDLRATIO 2.7 4.0        LIVER ENZYME RESULTS:  Recent Labs   Lab Test 10/13/15  0937 07/10/13  0723   AST 30 28   ALT 28 26       CBC RESULTS:  Recent Labs   Lab Test 12/14/20  1302 06/05/17  1245   WBC 6.1 6.5   HGB 13.8 15.8   HCT 43.9 49.2    215       BMP RESULTS:  Recent Labs   Lab Test 12/14/20  1302 06/05/17  1245    143   POTASSIUM 4.4 4.1   CHLORIDE 106 110*   CO2 24 23   ANIONGAP 9 10   GLC 92 90   BUN 68* 39*   CR 3.13* 2.65*   LILIANA 8.8 8.4*       A1C RESULTS:  Lab Results   Component Value Date    A1C 4.8 10/14/2015       INR RESULTS:  Recent Labs   Lab Test 10/28/15  0701 10/13/15  0937   INR 1.05 0.98          PROCEDURES & FURTHER ASSESSMENTS:     ECHO: 11/18 - Normal left ventricular size, thickness, and function. EF 55-60%.  Normal right ventricular size and function.  No hemodynamically significant valvular dysfunction.  Mild dilatation of the ascending aorta (4.1 cm) that is unchanged from  11/28/16    STRESS TEST: 12/20 - The nuclear stress test is negative for inducible myocardial ischemia or infarction.     CLINICAL IMPRESSION:     Raj Pierce is a 50 year old male with past medical history of HTN and ESRD (PCKD) s/p kidney transplant 2015 who presents via telephone visit for cardiac clearance as  part of kidney transplant evaluation.    PLAN:  Pre-operative cardiovascular clearance   Raj is able to achieve >4 METS without symptoms. Risk factors for perioperative MACE include high-risk surgery and creatinine >2 thus RCRI risk score is 2. This score corresponds with a 2.4% risk of periprocedural MACE. He had a NM stress test in 12/20 which was ordered by Transplant Nephrology; this test was negative for inducible ischemia.   -given normal NM stress test, Raj may proceed with transplant at an acceptable cardiac risk   -no further cardiac testing indicated at this time    Follow-up: w/ Cardiology in one year if still listed at that time     Thank you for allowing me to take part in the care of this very pleasant patient.  Please do not hesitate to call if any further questions or concerns arise.    Mel Oconnell DNP APRN CNP  Interventional and Critical Care Cardiology  [phone]    Duration of telephone visit: 6 minutes  Patient location: home  Provider location: home    CC  Patient Care Team:  Do Guerrero NP as PCP - Jaden Mckeon MD as MD (Internal Medicine)  Francis Espinoza MD as MD (Nephrology)  Aggie Abrams, ISHA as Registered Nurse  Patience To LPN as Karena Jeffers NP as Nurse Practitioner (Nurse Practitioner)  Elif Hu APRN CNP as Assigned PCP  Madina Warren MD as Assigned Surgical Provider  SELF, REFERRED

## 2020-12-21 NOTE — PATIENT INSTRUCTIONS
Thank you for your visit with me in the Cardiovascular Clinic at the [facility]! I appreciate your time.     Cardiology provider you saw during your visit: Mel Oconnell, PAZ APRN CNP.    1. Your nuclear stress test was negative for inducible ischemia (normal). You are cleared for transplant from a heart standpoint.   3. Follow up with Cardiology in one year if still listed at that time.     Questions and [phone].   First press #1 for the University and then press #3 for Medical Questions to reach the Cardiology triage nurse.     On Call Cardiologist for after hours or on weekends: [phone], press option #4 and ask to speak to the on-call Cardiologist.

## 2020-12-21 NOTE — LETTER
12/21/2020      RE: Raj Pierce  [address]  [place] [address]       Dear Colleague,    Thank you for the opportunity to participate in the care of your patient, Raj Pierce, at the [facility] HEART [facility]. Please see a copy of my visit note below.    [facility]  CARDIOVASCULAR MEDICINE TELEPHONE VISIT NOTE    Referring Provider: Referred Self   Primary Care Provider: Do Guerrero     Patient Name: Raj Pierce   MRN: [id number]     PERTINENT CLINICAL HISTORY:   Raj Pierce is a 50 year old male with past medical history of HTN and ESRD (PCKD) s/p kidney transplant 2015 who presents via telephone visit for cardiac clearance as part of kidney transplant evaluation. Apparently kidney that was transplanted in 2015 was too small and thus he was immediately re-listed although has been doing well. Patient last saw Cardiology (Elif Hu, DANNIE) in 10/19. Nuclear stress test at that time was negative for inducible ischemia.     Raj reports feeling well recently. He is working full time, 10 hour days and is on his feet the whole time operating machinery. He does not do dedicated exercise although he can walk a couple of blocks and take stairs without symptoms. No chest pain, SOB, EDWARDS, palpitations, lightheadedness, dizziness. He had a nuclear stress test earlier this month which was negative for inducible ischemia.     Cardiac medications include atorvastatin 10 mg and losartan 75 mg.   PAST MEDICAL HISTORY:     Past Medical History:   Diagnosis Date     Acidosis      Chronic kidney disease, stage IV (severe) (H)      Disorders of phosphorus metabolism      HTN (hypertension)     15 years     Hyperparathyroidism      Polycystic kidney, autosomal dominant      Pure hypercholesterolemia      Sleep apnea     cpap     Vitamin D deficiency         PAST SURGICAL HISTORY:     Past Surgical History:   Procedure  Laterality Date     BENCH KIDNEY N/A 10/13/2015    Procedure: BENCH KIDNEY;  Surgeon: Dariel Chavez MD;  Location: [facility] OR     CYSTOSCOPY, REMOVE STENT(S), COMBINED Right 2015    Procedure: COMBINED CYSTOSCOPY, REMOVE STENT(S);  Surgeon: Dariel Chavez MD;  Location: [facility] OR     HERNIORRHAPHY INGUINAL CHILD      right     TONSILLECTOMY       TRANSPLANT KIDNEY RECIPIENT  DONOR N/A 10/13/2015    Procedure: TRANSPLANT KIDNEY RECIPIENT  DONOR;  Surgeon: Dariel Chavez MD;  Location: [facility] OR        CURRENT MEDICATIONS:     Current Outpatient Medications   Medication Sig Dispense Refill     atorvastatin (LIPITOR) 10 MG tablet Take 10 mg by mouth daily       chlorthalidone (HYGROTON) 25 MG tablet Take 0.5 tablets (12.5 mg) by mouth daily 90 tablet 0     citalopram (CELEXA) 20 MG tablet Take 20 mg by mouth daily.       losartan (COZAAR) 50 MG tablet Take 1 tablet (50 mg) by mouth daily 90 tablet 3     magnesium oxide 400 MG CAPS        mycophenolate (GENERIC EQUIVALENT) 250 MG capsule Take 3 capsules (750 mg) by mouth 2 times daily TAKE 3 CAPSULES BY MOUTH TWICE A  capsule 11     SIMVASTATIN PO Take 10 mg by mouth At Bedtime       sulfamethoxazole-trimethoprim (BACTRIM/SEPTRA) 400-80 MG tablet Take 1 tablet by mouth Every Mon, Wed, Fri Morning 12 tablet 11     tacrolimus (GENERIC EQUIVALENT) 0.5 MG capsule Take 1 capsule (0.5 mg) by mouth 2 times daily Total dose = 1.5 mg twice a day 60 capsule 3     tacrolimus (GENERIC EQUIVALENT) 1 MG capsule Take 1 capsule (1 mg) by mouth 2 times daily Total dose = 1.5 mg twice a day 60 capsule 3     vitamin D3 (CHOLECALCIFEROL) 2000 units (50 mcg) tablet Take 1 tablet by mouth daily          ALLERGIES:     Allergies   Allergen Reactions     Alcohol Rash     Other reaction(s): Rash  Swabs used at hosptial  Swabs used at hosptial        FAMILY HISTORY:     Family History   Problem Relation Age of Onset     Gastrointestinal Disease Mother          ESRD PCKD     Hypertension Mother      C.A.D. Father      Hypertension Father      Gastrointestinal Disease Sister         PCKD        SOCIAL HISTORY:     Social History     Socioeconomic History     Marital status:      Spouse name: Dianne     Number of children: 2     Years of education: 13     Highest education level: Not on file   Occupational History     Occupation:      Employer: [employer]   Social Needs     Financial resource strain: Not on file     Food insecurity     Worry: Not on file     Inability: Not on file     Transportation needs     Medical: Not on file     Non-medical: Not on file   Tobacco Use     Smoking status: Never Smoker     Smokeless tobacco: Never Used   Substance and Sexual Activity     Alcohol use: Yes     Alcohol/week: 10.0 standard drinks     Types: 10 Standard drinks or equivalent per week     Comment: 1 drink daily      Drug use: No     Sexual activity: Yes     Partners: Female   Lifestyle     Physical activity     Days per week: Not on file     Minutes per session: Not on file     Stress: Not on file   Relationships     Social connections     Talks on phone: Not on file     Gets together: Not on file     Attends Mosque service: Not on file     Active member of club or organization: Not on file     Attends meetings of clubs or organizations: Not on file     Relationship status: Not on file     Intimate partner violence     Fear of current or ex partner: Not on file     Emotionally abused: Not on file     Physically abused: Not on file     Forced sexual activity: Not on file   Other Topics Concern     Parent/sibling w/ CABG, MI or angioplasty before 65F 55M? Not Asked      Service Not Asked     Blood Transfusions No     Caffeine Concern Not Asked     Occupational Exposure Not Asked     Hobby Hazards Not Asked     Sleep Concern Not Asked     Stress Concern Not Asked     Weight Concern Not Asked     Special Diet Not Asked     Back Care Not Asked      Exercise Not Asked     Bike Helmet Not Asked     Seat Belt Yes     Self-Exams Not Asked   Social History Narrative     Not on file     Raj  reports current alcohol use of about 10.0 standard drinks of alcohol per week. and  reports that he has never smoked. He has never used smokeless tobacco..     REVIEW OF SYSTEMS:   A comprehensive review of systems was performed and negative unless otherwise noted in the HPI above.      PHYSICAL EXAMINATION:   No vital signs were taken for this telephone visit.   There is no height or weight on file to calculate BMI.  Wt Readings from Last 2 Encounters:   12/14/20 93.3 kg (205 lb 9.6 oz)   12/18/19 73.6 kg (162 lb 3.2 oz)     Constitutional: no acute distress, pleasant and cooperative  Respiratory: no audible wheezes   Neurologic: Alert and oriented  Psychiatric: appropriate affect, intact thought and speech     LABORATORY DATA:     LIPID RESULTS:  Recent Labs   Lab Test 10/13/15  0937 07/10/13  0723   CHOL 178 243*   HDL 66 61   LDL 86 155*   TRIG 129 136   CHOLHDLRATIO 2.7 4.0        LIVER ENZYME RESULTS:  Recent Labs   Lab Test 10/13/15  0937 07/10/13  0723   AST 30 28   ALT 28 26       CBC RESULTS:  Recent Labs   Lab Test 12/14/20  1302 06/05/17  1245   WBC 6.1 6.5   HGB 13.8 15.8   HCT 43.9 49.2    215       BMP RESULTS:  Recent Labs   Lab Test 12/14/20  1302 06/05/17  1245    143   POTASSIUM 4.4 4.1   CHLORIDE 106 110*   CO2 24 23   ANIONGAP 9 10   GLC 92 90   BUN 68* 39*   CR 3.13* 2.65*   LILIANA 8.8 8.4*       A1C RESULTS:  Lab Results   Component Value Date    A1C 4.8 10/14/2015       INR RESULTS:  Recent Labs   Lab Test 10/28/15  0701 10/13/15  0937   INR 1.05 0.98          PROCEDURES & FURTHER ASSESSMENTS:     ECHO: 11/18 - Normal left ventricular size, thickness, and function. EF 55-60%.  Normal right ventricular size and function.  No hemodynamically significant valvular dysfunction.  Mild dilatation of the ascending aorta (4.1 cm) that is unchanged  "from  11/28/16    STRESS TEST: 12/20 - The nuclear stress test is negative for inducible myocardial ischemia or infarction.     CLINICAL IMPRESSION:     Raj Pierce is a 50 year old male with past medical history of HTN and ESRD (PCKD) s/p kidney transplant 2015 who presents via telephone visit for cardiac clearance as part of kidney transplant evaluation.    PLAN:  Pre-operative cardiovascular clearance   Raj is able to achieve >4 METS without symptoms. Risk factors for perioperative MACE include high-risk surgery and creatinine >2 thus RCRI risk score is 2. This score corresponds with a 2.4% risk of periprocedural MACE. He had a NM stress test in 12/20 which was ordered by Transplant Nephrology; this test was negative for inducible ischemia.   -given normal NM stress test, Raj may proceed with transplant at an acceptable cardiac risk   -no further cardiac testing indicated at this time    Follow-up: w/ Cardiology in one year if still listed at that time     Thank you for allowing me to take part in the care of this very pleasant patient.  Please do not hesitate to call if any further questions or concerns arise.    Mel Oconnell DNP APRN CNP  Interventional and Critical Care Cardiology  [phone]    Duration of telephone visit: 6 minutes  Patient location: home  Provider location: home    CC  Patient Care Team:  Do Guerrero NP as PCP - Jaden Mckeon MD as MD (Internal Medicine)  Francis Espinoza MD as MD (Nephrology)  Aggie Abrams RN as Registered Nurse  Patience To LPN as Karena Jeffers NP as Nurse Practitioner (Nurse Practitioner)  Elif Hu APRN CNP as Assigned PCP  Madina Warren MD as Assigned Surgical Provider  SELF, REFERRED      Raj Pierce is a 50 year old male who is being evaluated via a billable telephone visit.      The patient has been notified of following:     \"This telephone visit will be conducted via a call between you " "and your physician/provider. We have found that certain health care needs can be provided without the need for a physical exam.  This service lets us provide the care you need with a short phone conversation.  If a prescription is necessary we can send it directly to your pharmacy.  If lab work is needed we can place an order for that and you can then stop by our lab to have the test done at a later time.    Telephone visits are billed at different rates depending on your insurance coverage. During this emergency period, for some insurers they may be billed the same as an in-person visit.  Please reach out to your insurance provider with any questions.    If during the course of the call the physician/provider feels a telephone visit is not appropriate, you will not be charged for this service.\"    Patient has given verbal consent for Telephone visit?  Yes    What phone number would you like to be contacted at?   [phone]    How would you like to obtain your AVS? Christinehart    Vitals - Patient Reported  Pain Score: No Pain (0)(No SOB)          Please do not hesitate to contact me if you have any questions/concerns.     Sincerely,     Mel Oconnell, GUY CNP    "

## 2020-12-28 ENCOUNTER — TELEPHONE (OUTPATIENT)
Dept: TRANSPLANT | Facility: CLINIC | Age: 50
End: 2020-12-28

## 2020-12-28 DIAGNOSIS — Z94.0 KIDNEY TRANSPLANTED: ICD-10-CM

## 2020-12-28 RX ORDER — SULFAMETHOXAZOLE AND TRIMETHOPRIM 400; 80 MG/1; MG/1
1 TABLET ORAL
Qty: 12 TABLET | Refills: 11 | Status: ON HOLD | OUTPATIENT
Start: 2020-12-28 | End: 2021-10-26

## 2020-12-28 NOTE — TELEPHONE ENCOUNTER
Needs Bactrim Refills.  Sent Rx to:  [id number] - [place], [place] - [address] AT [address]

## 2021-01-08 ENCOUNTER — TELEPHONE (OUTPATIENT)
Dept: TRANSPLANT | Facility: CLINIC | Age: 51
End: 2021-01-08

## 2021-01-08 NOTE — TELEPHONE ENCOUNTER
Patient Call: Transplant Illness  If the patient reports CHEST PAIN, SEVERE SHORTNESS OF BREATH, ONE SIDED WEAKNESS, or DIFFICULTY SPEAKING: CONTACT RN FACE-TO-FACE IMMEDIATELY    Duration of illness: extra Fatigue   Transplanted organ? kidney   Illness: Diarrhea       Heart Lung Liver Kidney/Pancreas   Blood pressure Route Routine to RN Route Routine to RN Route Routine to RN Route Routine to RN   Diarrhea Route Routine to RN Route Routine to RN Route Routine to RN Route Routine to RN   Fever Route Routine to RN Route Routine to RN Route Routine to RN Route Routine to RN   Nausea Route Routine to RN Route Routine to RN Route Routine to RN Route Routine to RN   Pain Route Routine to RN Route Routine to RN Route Routine to RN Route Routine to RN   Swelling Route Routine to RN Route Routine to RN Route Routine to RN Route Routine to RN   Vomiting >24 hours Lync, then Page Lync, then Page Lync, then Page Lync, route High   Unable to take medication Lync, then Page Lync, then Page Lync, then Page Lync, route High   Fever > 100.5 Lync, then Page Lync, then Page Lync, then Page Lync, route High   Shortness of breath Lync, then Page Lync, then Page Lync, then Page Lync, route High   Productive cough Route Routine to RN Lync, then Page Route Routine to RN Route Routine to RN   Jaundice Route Routine to RN Route Routine to RN Lync, then Page Route Routine to RN   Unable to urinate Route Routine to RN Route Routine to RN Route Routine to RN Lync, route High   Other Route Routine to RN Route Routine to RN Route Routine to RN Route Routine to RN

## 2021-01-08 NOTE — LETTER
PHYSICIAN ORDERS    DATE & TIME ISSUED: 2021 2:05 PM  PATIENT NAME: Raj Pierce   : 1970     [facility] MR# [if applicable]: [id number]     DIAGNOSIS / ICD - 10 CODES    Kidney Transplanted (Z94.0)    After Care Following Organ Transplant (Z48.298)    Long Term Use of Medication (Z79.899)    Immunosuppressed Status (Z92.25)      Please check:    COVID-19 PCR      Patient should release information to the [facility].   Please fax results to the Transplant Center at [phone].  Any questions please call [phone].        .

## 2021-01-08 NOTE — LETTER
PHYSICIAN ORDERS    DATE & TIME ISSUED: 2021 2:05 PM  PATIENT NAME: Raj Pierce   : 1970     [facility] MR# [if applicable]: [id number]     DIAGNOSIS / ICD - 10 CODES    Kidney Transplanted (Z94.0)    After Care Following Organ Transplant (Z48.298)    Long Term Use of Medication (Z79.899)    Immunosuppressed Status (Z92.25)      Please check:    COVID-19 PCR      Patient should release information to the [facility].   Please fax results to the Transplant Center at [phone].  Any questions please call [phone].        .

## 2021-01-08 NOTE — TELEPHONE ENCOUNTER
Diarrhea (2-3 x a day) and fatigue, walking  feet feels wiped out  Three weeks ago family had COVID-19 denies SOB, fevers  States only his wife got tested and was positive  He and his kids did not get tested but were symptomatic.  States kid lost sense of taste.    Recommended improving hydration.  States diarrhea is getting less.  Just feels wiped out while walking at work.  RNCC confirms he is wearing mask and washing hands.  Recommended getting tested for COVID 19  Verbalized understanding and agreement to plan.  RNCC sent orders to:  [facility]        [phone] (Phone)        [phone] (Fax)          Will make Dr. Quach aware.    RE: COVID-19 exposure, Sx Received: Today Message Contents   Georges Quach MD Ututalum, Teresa, RN             Decrease MMF to 500mg PO BID, get tested please. Thanks      Previous Messages    ----- Message -----   From: Shawna Sylvester RN   Sent: 1/8/2021   2:11 PM CST   To: Georges Quach MD   Subject: COVID-19 exposure, Sx                             Kidney Txp #1: 10/13/2015 (5y 2m)     IS:   Tac 4-6    BID     Reports three weeks ago family had COVID-19   Diarrhea (improved to 2-3 x a day) and fatigue, walking  feet at work feels wiped out   denies SOB, fevers     States only his wife got tested and was positive   He and his kids did not get tested but were symptomatic.   States kid lost sense of taste.     Recommended improving hydration.   COVID 19 PCR orders sent to local lab.   Anything else you recommend?     Thanks,   German           Called back Raj Pierce  Discussed recommendations to decrease MMF to 500 mg BID (orig dose 750 BID) and getting tested for COVID.

## 2021-01-11 ENCOUNTER — TELEPHONE (OUTPATIENT)
Dept: TRANSPLANT | Facility: CLINIC | Age: 51
End: 2021-01-11

## 2021-01-11 NOTE — TELEPHONE ENCOUNTER
Patient called to report positive COVID test. Patient will have Georgia sent test result to Transplant office.

## 2021-01-11 NOTE — TELEPHONE ENCOUNTER
1/10/21- Positive rapid test    Reports:  Diarrhea resolved.  Denies SOB.  Feels really tired.  No fever

## 2021-01-18 NOTE — PROGRESS NOTES
"  Transplant Surgery      Reason For Visit: Kidney transplant waitlist    History of Present Illness:  Patient is on the kidney transplant waiting list with 7+ years of waiting time, blood type B, cPRA 10% who comes in for a WL appointment. Patient has PKD.  Had a prior transplant, but that had poor function and his waiting time was reinstated.     Not on dialysiis.  On tac and MMF for IS.  Good activity tolerance    Exam:   BP (!) 141/93   Pulse 81   Ht 1.778 m (5' 10\")   Wt 93.3 kg (205 lb 9.6 oz)   SpO2 97%   BMI 29.50 kg/m    Well appearing no apparent distress   Abd soft, ND.  Palpable kidneys.  Space sufficient for transplant on left.    CT 2019 shows sufficient space for left sided transplant       Impression:  OK to remain on the transplant list.    Plan: Timing of transplant to be determined by patient and nephrology.    Eulalio Emanuel MD, PhD  Transplant Surgery      "

## 2021-01-19 ENCOUNTER — TELEPHONE (OUTPATIENT)
Dept: TRANSPLANT | Facility: CLINIC | Age: 51
End: 2021-01-19

## 2021-01-19 NOTE — TELEPHONE ENCOUNTER
Weekly COVID-19 check-in / update:  Still has fatigued. Did a few things and just had to stop because he felt really tired.  No fever no sob no diarrhea    Covid 19 Review Meeting Recommendations:  No changes.  Continue with same lowered Immunosuppression:  Tac 4-6,  mg BID (Original 750 BID).    LPN task:  Pls call Raj Pierce and make aware.

## 2021-01-21 ENCOUNTER — DOCUMENTATION ONLY (OUTPATIENT)
Dept: TRANSPLANT | Facility: CLINIC | Age: 51
End: 2021-01-21

## 2021-01-26 ENCOUNTER — TELEPHONE (OUTPATIENT)
Dept: TRANSPLANT | Facility: CLINIC | Age: 51
End: 2021-01-26

## 2021-01-26 NOTE — TELEPHONE ENCOUNTER
Weekly COVID-19 check-in / update:  Called Raj Pierce and left message to call back.  Called back again and left VM    Covid 19 Review Meeting Recommendations:  If getting better to increase MMF back to 750 mg BID      ADDENDUM:  Raj L Anderson called back and reports he feels so much better.He is back to work  Discussed increasing MMF to 750 mg BID.  Verbalized understanding and agreement to plan.

## 2021-02-05 NOTE — TELEPHONE ENCOUNTER
Covid 19 Review Meeting Recommendations:  No changes.  Track down COVID-19 results      Spoke with Raj Pierce  Verbalized understanding and agreement to plan.  [facility] and [facility].  He will send result / link via [employer].                 Discharge Planning Assessment   Timi     Patient Name: Reyna Escalona  MRN: [id number]  Today's Date: 2/5/2021    Admit Date: 2/4/2021    Discharge Needs Assessment     Row Name 02/05/21 1234       Living Environment    Lives With  spouse    Current Living Arrangements  home/apartment/condo    Potentially Unsafe Housing Conditions  unable to assess    Primary Care Provided by  self    Provides Primary Care For  no one    Family Caregiver if Needed  spouse    Quality of Family Relationships  unable to assess    Able to Return to Prior Arrangements  yes       Resource/Environmental Concerns    Resource/Environmental Concerns  none       Transition Planning    Patient/Family Anticipates Transition to  home with family    Patient/Family Anticipated Services at Transition  none       Discharge Needs Assessment    Equipment Currently Used at Home  none    Concerns to be Addressed  no discharge needs identified    Provided Post Acute Provider List?  N/A    N/A Provider List Comment  patient denies any needs for discharge        Discharge Plan     Row Name 02/05/21 1237       Plan    Plan  return home    Patient/Family in Agreement with Plan  yes    Plan Comments  spoke to patient in room with ppe(mask and goggles); keeping 6 feet away and less than 15 mins; patient states he lives with spouse; he drives and has no dme; he follows with va nurse practioner and [facility]; spouse will come to get him when discharged and denies any needs for discharge       Carol naegele rn  Case management  Office number [phone]  Cell phone [phone]

## 2021-02-19 ENCOUNTER — TELEPHONE (OUTPATIENT)
Dept: TRANSPLANT | Facility: CLINIC | Age: 51
End: 2021-02-19

## 2021-02-19 DIAGNOSIS — Z94.0 KIDNEY REPLACED BY TRANSPLANT: ICD-10-CM

## 2021-02-19 DIAGNOSIS — Z48.298 AFTERCARE FOLLOWING ORGAN TRANSPLANT: ICD-10-CM

## 2021-02-19 DIAGNOSIS — Z79.899 ENCOUNTER FOR LONG-TERM CURRENT USE OF MEDICATION: ICD-10-CM

## 2021-02-19 PROCEDURE — 86833 HLA CLASS II HIGH DEFIN QUAL: CPT | Performed by: INTERNAL MEDICINE

## 2021-02-19 PROCEDURE — 86832 HLA CLASS I HIGH DEFIN QUAL: CPT | Performed by: INTERNAL MEDICINE

## 2021-02-19 PROCEDURE — 80197 ASSAY OF TACROLIMUS: CPT | Performed by: INTERNAL MEDICINE

## 2021-02-19 NOTE — TELEPHONE ENCOUNTER
DATE:  2/19/2021   TIME OF RECEIPT FROM LAB:  3:35  LAB TEST:  Creatinine  LAB VALUE:  3.66  RESULTS GIVEN WITH READ-BACK TO (PROVIDER):  Patience Alicea    TIME LAB VALUE REPORTED TO PROVIDER:   3:43

## 2021-02-19 NOTE — TELEPHONE ENCOUNTER
Call placed to patient.     Reviewed Cr 3.66    Denies swelling, nausea.    Reports having a fever of 100.6 on Wednesday. States he has not checked it recently.   C/o LLQ pain. States kidney tx is located on right.   Denies s/s UTI.     Recommended pt check temperature, and go in for evaluation to PCP, urgent care, or ED. Recommended ED if febrile and pain persists. Pt v/u.

## 2021-02-22 ENCOUNTER — TELEPHONE (OUTPATIENT)
Dept: TRANSPLANT | Facility: CLINIC | Age: 51
End: 2021-02-22

## 2021-02-22 DIAGNOSIS — Z94.0 KIDNEY REPLACED BY TRANSPLANT: ICD-10-CM

## 2021-02-22 DIAGNOSIS — Z79.899 ENCOUNTER FOR LONG-TERM CURRENT USE OF MEDICATION: ICD-10-CM

## 2021-02-22 DIAGNOSIS — Z48.298 AFTERCARE FOLLOWING ORGAN TRANSPLANT: ICD-10-CM

## 2021-02-22 LAB
TACROLIMUS BLD-MCNC: 4.7 UG/L (ref 5–15)
TME LAST DOSE: ABNORMAL H

## 2021-02-22 NOTE — TELEPHONE ENCOUNTER
UPC 1.5 (2/19/21)  Cr 3.66 elevated from baseline 2.9-3.2    Reported fever last week w/ LLQ pain.    PLAN:  Call and check up on how he is doing.    OUTCOME:  Left VM.  Sent [employer] message as well.

## 2021-02-23 ENCOUNTER — TELEPHONE (OUTPATIENT)
Dept: TRANSPLANT | Facility: CLINIC | Age: 51
End: 2021-02-23

## 2021-02-23 NOTE — LETTER
PHYSICIAN ORDERS    DATE & TIME ISSUED: 2021 8:57 AM  PATIENT NAME: Raj Pierce   : 1970     [facility] MR# [if applicable]: [id number]     DIAGNOSIS / ICD - 10 CODES    Kidney Transplanted (Z94.0)    After Care Following Organ Transplant (Z48.298)    Long Term Use of Medication (Z79.899)    Immunosuppressed Status (Z92.25)    Complications Kidney Transplant (T86.10)    Creatinine Elevation (R79.89)      Please complete the following labs:    Basic Metabolic panel    Urinalysis    Urine Culture      Patient should release information to the [facility].   Please fax results to the Transplant Center at [phone].  Any questions please call [phone].      .

## 2021-02-23 NOTE — LETTER
PHYSICIAN ORDERS    DATE & TIME ISSUED: 2021 8:57 AM  PATIENT NAME: Raj Pierce   : 1970     [facility] MR# [if applicable]: [id number]     DIAGNOSIS / ICD - 10 CODES    Kidney Transplanted (Z94.0)    After Care Following Organ Transplant (Z48.298)    Long Term Use of Medication (Z79.899)    Immunosuppressed Status (Z92.25)    Complications Kidney Transplant (T86.10)    Creatinine Elevation (R79.89)      Please complete the following labs:    Basic Metabolic panel    Urinalysis    Urine Culture      Patient should release information to the [facility].   Please fax results to the Transplant Center at [phone].  Any questions please call [phone].      .

## 2021-02-24 LAB
PROTOCOL CUTOFF: NORMAL
SA1 CELL: NORMAL
SA1 COMMENTS: NORMAL
SA1 HI RISK ABY: NORMAL
SA1 MOD RISK ABY: NORMAL
SA1 TEST METHOD: NORMAL
SA2 CELL: NORMAL
SA2 COMMENTS: NORMAL
SA2 HI RISK ABY UA: NORMAL
SA2 MOD RISK ABY: NORMAL
SA2 TEST METHOD: NORMAL
UNACCEPTABLE ANTIGEN: NORMAL
UNOS CPRA: 67

## 2021-03-07 ENCOUNTER — TELEPHONE (OUTPATIENT)
Dept: TRANSPLANT | Facility: CLINIC | Age: 51
End: 2021-03-07

## 2021-03-07 NOTE — LETTER
PHYSICIAN ORDERS      DATE & TIME ISSUED: 2021 12:41 PM  PATIENT NAME: Raj Pierce   : 1970     [facility] MR# [if applicable]: [id number]     DIAGNOSIS / ICD - 10 CODES    Kidney Transplanted (Z94.0)    After Care Following Organ Transplant (Z48.298)    Long Term Use of Medication (Z79.899)    Immunosuppressed Status (Z92.25)    Complications Kidney Transplant (T86.10)    Creatinine Elevation (R79.89)    Please complete the following:    Urine protein / creatinine ratio    Urine albumin / creatinine ratio      Patient should release information to the [facility]plant [facility].   Please fax results to the Transplant Center at [phone].  Any questions please call [phone].      .

## 2021-03-07 NOTE — LETTER
PHYSICIAN ORDERS      DATE & TIME ISSUED: 2021 12:41 PM  PATIENT NAME: Raj Pierce   : 1970     [facility] MR# [if applicable]: [id number]     DIAGNOSIS / ICD - 10 CODES    Kidney Transplanted (Z94.0)    After Care Following Organ Transplant (Z48.298)    Long Term Use of Medication (Z79.899)    Immunosuppressed Status (Z92.25)    Complications Kidney Transplant (T86.10)    Creatinine Elevation (R79.89)    Please complete the following:    Urine protein / creatinine ratio    Urine albumin / creatinine ratio      Patient should release information to the [facility]plant [facility].   Please fax results to the Transplant Center at [phone].  Any questions please call [phone].      .

## 2021-03-08 NOTE — TELEPHONE ENCOUNTER
Francis Espinoza MD Ututalum, Teresa, RN             Likely just failing kidney.  Recommend repeating UPC and also urine albumin/creatinine.    Previous Messages    ----- Message -----   From: Shawna Sylvester RN   Sent: 3/4/2021   9:18 AM CST   To: Francis Espinoza MD     Cr back to baseline (2.9-3.2)   Results as expected for patient.   Increased proteinuria. Sent to Dr. Espinoza for review.         Check UPC and Urine albumin / creatinine    OUTCOME:  Left VM.   Sent MyChart Message.    Lab orders sent to:  [facility]        [phone] (Phone)        [phone] (Fax)

## 2021-03-09 ENCOUNTER — TELEPHONE (OUTPATIENT)
Dept: TRANSPLANT | Facility: CLINIC | Age: 51
End: 2021-03-09

## 2021-03-09 NOTE — TELEPHONE ENCOUNTER
Called [facility] and spoke with Erum.   Reports other orders still not completed.  Made aware it looks like patient went to [facility] lab in [place].  She will cancel those orders.  RNCC will add UA/UC which was not completed.

## 2021-03-09 NOTE — LETTER
PHYSICIAN ORDERS      DATE & TIME ISSUED: 2021 4:32 PM  PATIENT NAME: Raj Pierce   : 1970     [facility] MR# [if applicable]: [id number]     DIAGNOSIS / ICD - 10 CODES    Kidney Transplanted (Z94.0)    After Care Following Organ Transplant (Z48.298)    Long Term Use of Medication (Z79.899)    Immunosuppressed Status (Z92.25)    Complications Kidney Transplant (T86.10)    Proteinuria (R80.9)      Please add to next lab:    Urinalysis with micro    Urine culture      Patient should release information to the [facility].   Please fax results to the Transplant Center at [phone].  Any questions please call [phone].      .

## 2021-03-09 NOTE — LETTER
PHYSICIAN ORDERS      DATE & TIME ISSUED: 2021 4:32 PM  PATIENT NAME: Raj Pierce   : 1970     [facility] MR# [if applicable]: [id number]     DIAGNOSIS / ICD - 10 CODES    Kidney Transplanted (Z94.0)    After Care Following Organ Transplant (Z48.298)    Long Term Use of Medication (Z79.899)    Immunosuppressed Status (Z92.25)    Complications Kidney Transplant (T86.10)    Proteinuria (R80.9)      Please add to next lab:    Urinalysis with micro    Urine culture      Patient should release information to the [facility].   Please fax results to the Transplant Center at [phone].  Any questions please call [phone].      .

## 2021-04-18 ENCOUNTER — HEALTH MAINTENANCE LETTER (OUTPATIENT)
Age: 51
End: 2021-04-18

## 2021-06-25 DIAGNOSIS — Z79.899 ENCOUNTER FOR LONG-TERM CURRENT USE OF MEDICATION: ICD-10-CM

## 2021-06-25 DIAGNOSIS — Z94.0 KIDNEY REPLACED BY TRANSPLANT: ICD-10-CM

## 2021-06-25 DIAGNOSIS — Z48.298 AFTERCARE FOLLOWING ORGAN TRANSPLANT: ICD-10-CM

## 2021-06-25 PROCEDURE — 86832 HLA CLASS I HIGH DEFIN QUAL: CPT | Performed by: TRANSPLANT SURGERY

## 2021-06-25 PROCEDURE — 80197 ASSAY OF TACROLIMUS: CPT | Performed by: INTERNAL MEDICINE

## 2021-06-25 PROCEDURE — 86833 HLA CLASS II HIGH DEFIN QUAL: CPT | Performed by: TRANSPLANT SURGERY

## 2021-06-27 LAB
TACROLIMUS BLD-MCNC: 3.8 UG/L (ref 5–15)
TME LAST DOSE: ABNORMAL H

## 2021-06-28 ENCOUNTER — TELEPHONE (OUTPATIENT)
Dept: TRANSPLANT | Facility: CLINIC | Age: 51
End: 2021-06-28

## 2021-06-28 DIAGNOSIS — Z48.298 AFTERCARE FOLLOWING ORGAN TRANSPLANT: ICD-10-CM

## 2021-06-28 DIAGNOSIS — Z79.899 ENCOUNTER FOR LONG-TERM CURRENT USE OF MEDICATION: ICD-10-CM

## 2021-06-28 DIAGNOSIS — Z94.0 KIDNEY REPLACED BY TRANSPLANT: Primary | ICD-10-CM

## 2021-07-05 NOTE — TELEPHONE ENCOUNTER
Correct.   ----- Message -----   From: Lucie Galeano RN   Sent: 6/28/2021  10:53 AM CDT   To: Francis Espinoza MD   Subject: Increased creatinine  -                              Patient should continue  To  follow up with Dr Don  Regarding his Chronic    Active on wait list for kidney transplant

## 2021-07-06 DIAGNOSIS — Z79.60 LONG-TERM USE OF IMMUNOSUPPRESSANT MEDICATION: ICD-10-CM

## 2021-07-06 DIAGNOSIS — Z94.0 KIDNEY TRANSPLANT RECIPIENT: Primary | ICD-10-CM

## 2021-07-06 RX ORDER — TACROLIMUS 0.5 MG/1
0.5 CAPSULE ORAL 2 TIMES DAILY
Qty: 60 CAPSULE | Refills: 11 | Status: SHIPPED | OUTPATIENT
Start: 2021-07-06 | End: 2021-10-14

## 2021-10-03 ENCOUNTER — HEALTH MAINTENANCE LETTER (OUTPATIENT)
Age: 51
End: 2021-10-03

## 2021-10-04 DIAGNOSIS — Z94.0 KIDNEY REPLACED BY TRANSPLANT: Primary | ICD-10-CM

## 2021-10-04 DIAGNOSIS — N25.81 SECONDARY RENAL HYPERPARATHYROIDISM (H): ICD-10-CM

## 2021-10-04 RX ORDER — MYCOPHENOLATE MOFETIL 250 MG/1
750 CAPSULE ORAL 2 TIMES DAILY
Qty: 180 CAPSULE | Refills: 0 | Status: ON HOLD | OUTPATIENT
Start: 2021-10-04 | End: 2021-10-26

## 2021-10-07 ENCOUNTER — TELEPHONE (OUTPATIENT)
Dept: TRANSPLANT | Facility: CLINIC | Age: 51
End: 2021-10-07

## 2021-10-07 NOTE — TELEPHONE ENCOUNTER
Patient Call: Transplant Lab/Orders  Route to LPN  Post Transplant Days: 2186  When patient is less than 60 days post-transplant, route high priority    Patient is requesting his lab orders (Tacro & ALA) for 10/11 be sent to [facility] in [place] but in the future continue to go to [place].    Reason for Call: Lab Order  Callback needed? No

## 2021-10-07 NOTE — LETTER
PHYSICIAN ORDERS      DATE & TIME ISSUED: 2021 7:13 AM  PATIENT NAME: Raj Pierce   : 1970     [facility] MR# [if applicable]: [id number]     DIAGNOSIS:  Kidney transplant   ICD-10 CODE: Z94.0      Please complete the following labs  Tacrolimus level (ensure 12 hours between last dose and blood draw)  PRA DSA    Any questions please call: [phone] option 5    Please fax results to [phone].    .

## 2021-10-08 DIAGNOSIS — Z94.0 KIDNEY TRANSPLANT RECIPIENT: ICD-10-CM

## 2021-10-08 DIAGNOSIS — Z79.60 LONG-TERM USE OF IMMUNOSUPPRESSANT MEDICATION: ICD-10-CM

## 2021-10-08 RX ORDER — TACROLIMUS 1 MG/1
CAPSULE ORAL
Qty: 60 CAPSULE | Refills: 3 | Status: SHIPPED | OUTPATIENT
Start: 2021-10-08 | End: 2021-10-14

## 2021-10-11 ENCOUNTER — LAB (OUTPATIENT)
Dept: LAB | Facility: CLINIC | Age: 51
End: 2021-10-11
Payer: COMMERCIAL

## 2021-10-11 ENCOUNTER — APPOINTMENT (OUTPATIENT)
Dept: LAB | Facility: CLINIC | Age: 51
End: 2021-10-11
Payer: COMMERCIAL

## 2021-10-11 DIAGNOSIS — Z79.899 ENCOUNTER FOR LONG-TERM CURRENT USE OF MEDICATION: ICD-10-CM

## 2021-10-11 DIAGNOSIS — Z48.298 AFTERCARE FOLLOWING ORGAN TRANSPLANT: ICD-10-CM

## 2021-10-11 DIAGNOSIS — Z94.0 KIDNEY REPLACED BY TRANSPLANT: ICD-10-CM

## 2021-10-11 PROCEDURE — 36415 COLL VENOUS BLD VENIPUNCTURE: CPT

## 2021-10-11 PROCEDURE — 86833 HLA CLASS II HIGH DEFIN QUAL: CPT

## 2021-10-11 PROCEDURE — 80197 ASSAY OF TACROLIMUS: CPT

## 2021-10-11 PROCEDURE — 86832 HLA CLASS I HIGH DEFIN QUAL: CPT

## 2021-10-12 ENCOUNTER — TELEPHONE (OUTPATIENT)
Dept: TRANSPLANT | Facility: CLINIC | Age: 51
End: 2021-10-12

## 2021-10-12 ENCOUNTER — ORGAN (OUTPATIENT)
Dept: TRANSPLANT | Facility: CLINIC | Age: 51
End: 2021-10-12

## 2021-10-12 ENCOUNTER — DOCUMENTATION ONLY (OUTPATIENT)
Dept: TRANSPLANT | Facility: CLINIC | Age: 51
End: 2021-10-12

## 2021-10-12 DIAGNOSIS — Z79.60 LONG-TERM USE OF IMMUNOSUPPRESSANT MEDICATION: ICD-10-CM

## 2021-10-12 DIAGNOSIS — Z76.82 AWAITING ORGAN TRANSPLANT: Primary | ICD-10-CM

## 2021-10-12 DIAGNOSIS — N18.9 CHRONIC KIDNEY DISEASE (CKD): ICD-10-CM

## 2021-10-12 DIAGNOSIS — Z76.82 ORGAN TRANSPLANT CANDIDATE: ICD-10-CM

## 2021-10-12 DIAGNOSIS — Z94.0 KIDNEY TRANSPLANT RECIPIENT: Primary | ICD-10-CM

## 2021-10-12 LAB
TACROLIMUS BLD-MCNC: 3.3 UG/L (ref 5–15)
TME LAST DOSE: ABNORMAL H
TME LAST DOSE: ABNORMAL H

## 2021-10-12 NOTE — TELEPHONE ENCOUNTER
Some donors have risk factors or behaviors that increase their chance of having an infection such as human immunodeficiency virus (HIV), hepatitis B virus (HBV), and/or hepatitis C virus (HCV). This donor has met one or more of the risk criteria in the last 30 days for these infections.  This donor was tested for HIV, HBV, and HCV, which resulted as negative, but as with every test, there is a small chance that the test result was negative even if the virus is present.   A negative test might happen if the donor had a very recent infection.  For this reason, we identify and discuss donors who have potential exposures for HIV, HBV, and/or HCV in the last month that might be missed by testing. The risk for undetected infections is very low but not zero.    Studies show that transplant candidates may have a higher chance of survival by accepting organs from donors with risk factors for these infections compared to waiting for an organ from a donor without these risk factors.    All transplant recipients are tested for HIV, HCV, and HBV after transplant.  In the very rare event that transmission would occur, there are effective therapies available, your medical team would collaborate with a team of infectious disease experts to treat you accordingly.    *On-call Coordinator can reference list below of risk criteria behaviors if patient asks additional questions about criteria  List of Risk Criteria:  1. Sex (i.e., any method of sexual contact, including vaginal, anal, and oral) with a person known or suspected to have HIV, HBV, or HCV infection  2. Man who has had sex with another man  3. Sex in exchange for money or drugs  4. Sex with a person who had sex in exchange for money or drugs  5. Drug injection for nonmedical reasons  6. Sex with a person who injected drugs for nonmedical reasons  7. Incarceration (confinement in [employer], prison, or juvenile correction facility) for ?72 consecutive hours  8. Child  by a  mother with HIVinfection  9. Child born to a mother with HIV, HBV, or HCV infection  10. Unknown medical or social history

## 2021-10-12 NOTE — PROGRESS NOTES
PATHOLOGY HLA CROSSMATCH CONSULTATION: DONOR/RECIPIENT  VIRTUAL CROSSMATCH - Kidney  Consultation Date: 2021  Consultation Requested by: Dr. Georges Mattson    Regarding: Compatibility of  donor organ UNOS #[id number] from OPO: MNOP with Raj Pierce    Findings: Regarding a virtual crossmatch between Raj Pierce and  donor listed above (match ID [id number]):  The most recent (21) and 11 additional patient serum/sera  were analyzed.  The patient has no antibodies listed with HLA specificity against the donor organ.      Record Review Indicates: I personally reviewed the most recent serum, the historic peak sera, and all other sera with solid-phase HLA Single Antigen test results:  The patient has no HLA antibodies against the donor organ.     The results of this virtual XM are:   -most recent serum: compatible   -peak #1: compatible  -peak #2: compatible    Disclaimer: Clinical judgement must take into account other factors, such as non-HLA antibodies not detected in the assay. The VXM gives probabilities only.  The probability does not account for the potential for auto-antibodies that may be present in the patient's serum.  These autoantibodies may render the physical crossmatch falsely positive, and would be detected by an autologous crossmatch.  When possible, confirm findings with prospective allogeneic and autologous flow crossmatches before going to transplant as clinically indicated.     lAeks Bartlett, PhD    Aleks Barteltt, PhD,[facility]  Medical Director, Immunology/Histocompatibility Laboratory  Pager [phone]

## 2021-10-12 NOTE — TELEPHONE ENCOUNTER
Tacrolimus = 3.3 (10/11/21)  Goal 4-6  Current tac dose 0.5 mg BID    Previous level also below goal at 3.8 (June 2021)    PLAN:   Call and confirm this was a good 12-hour trough.   Verify dose 0.5 mg BID.   Confirm no new medications or illness (german. Diarrhea).  MISSED DOSES?   If good trough, increase dose to 1 mg / 0.5 mg.   Recheck level in 2 weeks and make sure it is a good trough to avoid additional lab draws.

## 2021-10-12 NOTE — TELEPHONE ENCOUNTER
Organ Offer Encounter Information    Organ Offer Information  Organ offer date & time: 10/12/2021 11:39 AM  Coordinator/Fellow/Attending name: Alona Beasley RN   Organ(s):  Organ UNOS ID Match Run ID Comment Organ Laterality   Kidney  [id number] MNOP       Discussed organ offer with: Patient  Patient/Caregiver name: Raj  Discussed risk category with Patient/Other: PHS Risk Criteria Met  Understood donor criteria, verbalized understanding  Patient/Other asked to speak to a surgeon?: No  Discussed program-specific outcomes: Did not have questions regarding SRTR  Right to decline organ offer without penalty, Patient/Other: Aware of option to decline without penalty  Organ offer decision status Patient/Other: Declined Offer  UNOS decline reason: 801 - Candidate ill, unavailable, refused, or temporarily unsuitable  Additional Comments: 10/12/2021 12:02 PM:  Called Raj with a backup kidney offer. Discussed the offer with him, discussed that the donor was PHS high risk and went over the information regarding that. Raj did not have any further questions regarding high risk criteria, but stated that he would like to decline this offer because he was not on HD and feeling good, and because he had not received his Covid Vaccination and is going to later this week and would like to get it before his transplant, and because of the high risk criteria. He is aware that we don't know when the next offer may come, and that he has a PRA of 65 and that can make him harder to match.   Alona Beasley  Transplant Coordinator  Attestation I have discussed all of the above with the Patient/Legal Guardian/Caregiver regarding this organ offer.: Yes  Coordinator/Fellow/Attending name: Alona Beasley RN

## 2021-10-12 NOTE — LETTER
PHYSICIAN ORDERS      DATE & TIME ISSUED: 10/14/2021  2:44 PM  PATIENT NAME: Raj Pierce   : 1970     [facility] MR# [if applicable]: [id number]     DIAGNOSIS:  Kidney transplant   ICD-10 CODE: Z94.0      Please repeat the following labs in 2 weeks:  Tacrolimus level (ensure 12 hours between last dose and blood draw)  CBC  BMP  Any questions please call: [phone] option 5    Please fax results to [phone].    .

## 2021-10-14 RX ORDER — TACROLIMUS 0.5 MG/1
0.5 CAPSULE ORAL EVERY EVENING
Qty: 30 CAPSULE | Refills: 11 | Status: ON HOLD | OUTPATIENT
Start: 2021-10-14 | End: 2021-10-26

## 2021-10-14 RX ORDER — TACROLIMUS 1 MG/1
CAPSULE ORAL
Qty: 270 CAPSULE | Refills: 3 | Status: ON HOLD | OUTPATIENT
Start: 2021-10-14 | End: 2021-10-26

## 2021-10-14 NOTE — TELEPHONE ENCOUNTER
Spoke to patient regarding:  Tacrolimus = 3.3 (10/11/21)  Goal 4-6  Current tac dose 0.5 mg BID     Confirmed this was a good 12-hour trough.   Verifie dose 1.5 mg BID.   Confirmed no new medications or illness (german. Diarrhea).  Patient denies any MISSED DOSES.   Patient confirms dose increase 2 mg / 1.5 mg.   Patient agrees to recheck level in 2 weeks and make sure it is a good trough to avoid additional lab draws

## 2021-10-18 DIAGNOSIS — N18.9 CHRONIC KIDNEY DISEASE (CKD): ICD-10-CM

## 2021-10-18 DIAGNOSIS — Z76.82 ORGAN TRANSPLANT CANDIDATE: ICD-10-CM

## 2021-10-19 LAB
PROTOCOL CUTOFF: NORMAL
SA 1 CELL: NORMAL
SA 1 TEST METHOD: NORMAL
SA 2 CELL: NORMAL
SA 2 TEST METHOD: NORMAL
SA1 HI RISK ABY: NORMAL
SA1 MOD RISK ABY: NORMAL
SA2 HI RISK ABY: NORMAL
SA2 MOD RISK ABY: NORMAL
UNACCEPTABLE ANTIGENS: NORMAL
UNOS CPRA: 67
ZZZSA 1  COMMENTS: NORMAL
ZZZSA 2 COMMENTS: NORMAL

## 2021-10-21 DIAGNOSIS — Z01.810 PRE-OPERATIVE CARDIOVASCULAR EXAMINATION: ICD-10-CM

## 2021-10-21 DIAGNOSIS — Z76.82 ORGAN TRANSPLANT CANDIDATE: ICD-10-CM

## 2021-10-21 DIAGNOSIS — N18.6 ESRD (END STAGE RENAL DISEASE) (H): ICD-10-CM

## 2021-10-21 DIAGNOSIS — Z12.5 PROSTATE CANCER SCREENING: ICD-10-CM

## 2021-10-21 NOTE — PROGRESS NOTES
Called pt to let them know they are due for yearly return waitlist visits with transplant nephrology, social work, transplant surgery, cards and lab appointment for PSA. Our schedulers will call to get these appts set up. Pt verbalized understanding of information and has no further questions. Encouraged to reach out if questions arise.

## 2021-10-22 ENCOUNTER — DOCUMENTATION ONLY (OUTPATIENT)
Dept: TRANSPLANT | Facility: CLINIC | Age: 51
End: 2021-10-22

## 2021-10-22 ENCOUNTER — ORGAN (OUTPATIENT)
Dept: TRANSPLANT | Facility: CLINIC | Age: 51
End: 2021-10-22

## 2021-10-22 DIAGNOSIS — Z76.82 AWAITING ORGAN TRANSPLANT: Primary | ICD-10-CM

## 2021-10-22 NOTE — PROGRESS NOTES
PATHOLOGY HLA CROSSMATCH CONSULTATION: DONOR/RECIPIENT VIRTUAL CROSSMATCH - Kidney  Consultation Date: 10/22/2021  Consultation Requested by: Dr. Mattson  Regarding: Compatibility of  donor organ UNOS #[id number]  from OPO: IAOP with patient Raj Pierce       Findings: Regarding a virtual crossmatch between Raj Pierce and  donor listed above (match ID [id number]):  The most recent and peak patient sera were analyzed.  The patient has 1 donor-specific antibody(ies)  (DSA) as listed in table below. No other antibodies listed with specificity against donor organ.      ANTIBODY MOST RECENT SERUM (mfi) 10.11.21 Peak Serum #1 (mfi)  7.1.15     C7  - 552       Record Review Indicates: I personally reviewed the most recent serum and the  peak serum/sera.  In addition, I analyzed 12 more sera:  The patient has antibodies against the donor organ.   DSA to C-locus antigens were not considered in deriving the probability of positive crossmatch because DSA to C-locus antigens rarely contribute to a positive lymphocyte crossmatch test.    The results of this virtual XM are:   -most recent serum: Compatible  -peak #1:  Likely compatible      Disclaimer: Clinical judgement must take into account other factors, such as non-HLA antibodies not detected in the assay.   The VXM gives probabilities only.  The probability does not account for the potential for auto-antibodies that may be present in the patient's serum.  These autoantibodies may render the physical crossmatch falsely positive, and would be detected by an autologous crossmatch.  When possible, confirm findings with a prospective allogeneic and autologous flow crossmatches before going to transplant.    Anne Marshall MD  Medical Director, Immunology/Histocompatibility Laboratory

## 2021-10-23 ENCOUNTER — APPOINTMENT (OUTPATIENT)
Dept: ULTRASOUND IMAGING | Facility: CLINIC | Age: 51
End: 2021-10-23
Attending: SURGERY
Payer: COMMERCIAL

## 2021-10-23 ENCOUNTER — ANESTHESIA EVENT (OUTPATIENT)
Dept: SURGERY | Facility: CLINIC | Age: 51
End: 2021-10-23
Payer: COMMERCIAL

## 2021-10-23 ENCOUNTER — APPOINTMENT (OUTPATIENT)
Dept: GENERAL RADIOLOGY | Facility: CLINIC | Age: 51
End: 2021-10-23
Attending: SURGERY
Payer: COMMERCIAL

## 2021-10-23 ENCOUNTER — DOCUMENTATION ONLY (OUTPATIENT)
Dept: TRANSPLANT | Facility: CLINIC | Age: 51
End: 2021-10-23

## 2021-10-23 ENCOUNTER — ANESTHESIA (OUTPATIENT)
Dept: SURGERY | Facility: CLINIC | Age: 51
End: 2021-10-23
Payer: COMMERCIAL

## 2021-10-23 ENCOUNTER — HOSPITAL ENCOUNTER (INPATIENT)
Facility: CLINIC | Age: 51
LOS: 3 days | Discharge: HOME | End: 2021-10-26
Attending: SURGERY | Admitting: SURGERY
Payer: COMMERCIAL

## 2021-10-23 DIAGNOSIS — Z94.0 KIDNEY TRANSPLANT RECIPIENT: Primary | ICD-10-CM

## 2021-10-23 DIAGNOSIS — Z94.0 KIDNEY REPLACED BY TRANSPLANT: ICD-10-CM

## 2021-10-23 DIAGNOSIS — N25.81 SECONDARY RENAL HYPERPARATHYROIDISM (H): ICD-10-CM

## 2021-10-23 DIAGNOSIS — Z76.82 KIDNEY TRANSPLANT CANDIDATE: ICD-10-CM

## 2021-10-23 LAB
ALBUMIN SERPL-MCNC: 3.6 G/DL (ref 3.4–5)
ALBUMIN UR-MCNC: 50 MG/DL
ALP SERPL-CCNC: 56 U/L (ref 40–150)
ALT SERPL W P-5'-P-CCNC: 24 U/L (ref 0–70)
ANION GAP SERPL CALCULATED.3IONS-SCNC: 10 MMOL/L (ref 3–14)
ANION GAP SERPL CALCULATED.3IONS-SCNC: 7 MMOL/L (ref 3–14)
APPEARANCE UR: CLEAR
APTT PPP: 28 SECONDS (ref 22–38)
AST SERPL W P-5'-P-CCNC: 23 U/L (ref 0–45)
BASE EXCESS BLDV CALC-SCNC: -2.4 MMOL/L (ref -8.1–1.9)
BASE EXCESS BLDV CALC-SCNC: -4.7 MMOL/L (ref -8.1–1.9)
BASOPHILS # BLD AUTO: 0 10E3/UL (ref 0–0.2)
BASOPHILS NFR BLD AUTO: 1 %
BILIRUB SERPL-MCNC: 0.6 MG/DL (ref 0.2–1.3)
BILIRUB UR QL STRIP: NEGATIVE
BUN SERPL-MCNC: 60 MG/DL (ref 7–30)
BUN SERPL-MCNC: 61 MG/DL (ref 7–30)
CA-I BLD-MCNC: 4.6 MG/DL (ref 4.4–5.2)
CA-I BLD-MCNC: 4.6 MG/DL (ref 4.4–5.2)
CALCIUM SERPL-MCNC: 8.5 MG/DL (ref 8.5–10.1)
CALCIUM SERPL-MCNC: 9.9 MG/DL (ref 8.5–10.1)
CHLORIDE BLD-SCNC: 107 MMOL/L (ref 94–109)
CHLORIDE BLD-SCNC: 108 MMOL/L (ref 94–109)
CHOLEST SERPL-MCNC: 162 MG/DL
CMV DNA SPEC NAA+PROBE-ACNC: NOT DETECTED IU/ML
CO2 SERPL-SCNC: 22 MMOL/L (ref 20–32)
CO2 SERPL-SCNC: 26 MMOL/L (ref 20–32)
COLOR UR AUTO: ABNORMAL
CREAT SERPL-MCNC: 3.9 MG/DL (ref 0.66–1.25)
CREAT SERPL-MCNC: 3.97 MG/DL (ref 0.66–1.25)
EOSINOPHIL # BLD AUTO: 0.2 10E3/UL (ref 0–0.7)
EOSINOPHIL NFR BLD AUTO: 3 %
ERYTHROCYTE [DISTWIDTH] IN BLOOD BY AUTOMATED COUNT: 12.2 % (ref 10–15)
ERYTHROCYTE [DISTWIDTH] IN BLOOD BY AUTOMATED COUNT: 12.3 % (ref 10–15)
FASTING STATUS PATIENT QL REPORTED: YES
GFR SERPL CREATININE-BSD FRML MDRD: 16 ML/MIN/1.73M2
GFR SERPL CREATININE-BSD FRML MDRD: 17 ML/MIN/1.73M2
GLUCOSE BLD-MCNC: 166 MG/DL (ref 70–99)
GLUCOSE BLD-MCNC: 174 MG/DL (ref 70–99)
GLUCOSE BLD-MCNC: 92 MG/DL (ref 70–99)
GLUCOSE BLD-MCNC: 94 MG/DL (ref 70–99)
GLUCOSE UR STRIP-MCNC: NEGATIVE MG/DL
HBA1C MFR BLD: 5.6 % (ref 0–5.6)
HBV CORE AB SERPL QL IA: NONREACTIVE
HBV SURFACE AB SERPL IA-ACNC: 62.4 M[IU]/ML
HBV SURFACE AG SERPL QL IA: NONREACTIVE
HCO3 BLDV-SCNC: 22 MMOL/L (ref 21–28)
HCO3 BLDV-SCNC: 24 MMOL/L (ref 21–28)
HCT VFR BLD AUTO: 37.1 % (ref 40–53)
HCT VFR BLD AUTO: 38.6 % (ref 40–53)
HCV AB SERPL QL IA: NONREACTIVE
HDLC SERPL-MCNC: 64 MG/DL
HGB BLD-MCNC: 11.6 G/DL (ref 13.3–17.7)
HGB BLD-MCNC: 11.8 G/DL (ref 13.3–17.7)
HGB BLD-MCNC: 11.8 G/DL (ref 13.3–17.7)
HGB BLD-MCNC: 12.1 G/DL (ref 13.3–17.7)
HGB UR QL STRIP: NEGATIVE
HIV 1+2 AB+HIV1 P24 AG SERPL QL IA: NONREACTIVE
HOLD SPECIMEN: NORMAL
IMM GRANULOCYTES # BLD: 0 10E3/UL
IMM GRANULOCYTES NFR BLD: 1 %
INR PPP: 1.07 (ref 0.85–1.15)
KETONES UR STRIP-MCNC: NEGATIVE MG/DL
LACTATE BLD-SCNC: 0.6 MMOL/L
LACTATE BLD-SCNC: 1.2 MMOL/L
LDLC SERPL CALC-MCNC: 77 MG/DL
LEUKOCYTE ESTERASE UR QL STRIP: NEGATIVE
LYMPHOCYTES # BLD AUTO: 1 10E3/UL (ref 0.8–5.3)
LYMPHOCYTES NFR BLD AUTO: 14 %
MAGNESIUM SERPL-MCNC: 1.7 MG/DL (ref 1.6–2.3)
MCH RBC QN AUTO: 27.9 PG (ref 26.5–33)
MCH RBC QN AUTO: 28 PG (ref 26.5–33)
MCHC RBC AUTO-ENTMCNC: 31.3 G/DL (ref 31.5–36.5)
MCHC RBC AUTO-ENTMCNC: 31.8 G/DL (ref 31.5–36.5)
MCV RBC AUTO: 88 FL (ref 78–100)
MCV RBC AUTO: 89 FL (ref 78–100)
MONOCYTES # BLD AUTO: 0.6 10E3/UL (ref 0–1.3)
MONOCYTES NFR BLD AUTO: 8 %
NEUTROPHILS # BLD AUTO: 4.9 10E3/UL (ref 1.6–8.3)
NEUTROPHILS NFR BLD AUTO: 73 %
NITRATE UR QL: NEGATIVE
NONHDLC SERPL-MCNC: 98 MG/DL
NRBC # BLD AUTO: 0 10E3/UL
NRBC BLD AUTO-RTO: 0 /100
O2/TOTAL GAS SETTING VFR VENT: 33 %
O2/TOTAL GAS SETTING VFR VENT: 42 %
OXYHGB MFR BLDV: 72 % (ref 92–100)
OXYHGB MFR BLDV: 74 % (ref 92–100)
PCO2 BLDV: 44 MM HG (ref 40–50)
PCO2 BLDV: 46 MM HG (ref 40–50)
PH BLDV: 7.3 [PH] (ref 7.32–7.43)
PH BLDV: 7.32 [PH] (ref 7.32–7.43)
PH UR STRIP: 7 [PH] (ref 5–7)
PHOSPHATE SERPL-MCNC: 6 MG/DL (ref 2.5–4.5)
PLATELET # BLD AUTO: 236 10E3/UL (ref 150–450)
PLATELET # BLD AUTO: 264 10E3/UL (ref 150–450)
PO2 BLDV: 40 MM HG (ref 25–47)
PO2 BLDV: 43 MM HG (ref 25–47)
POTASSIUM BLD-SCNC: 4.4 MMOL/L (ref 3.5–5)
POTASSIUM BLD-SCNC: 4.5 MMOL/L (ref 3.4–5.3)
POTASSIUM BLD-SCNC: 4.6 MMOL/L (ref 3.4–5.3)
POTASSIUM BLD-SCNC: 4.6 MMOL/L (ref 3.5–5)
PROT SERPL-MCNC: 6.5 G/DL (ref 6.8–8.8)
RBC # BLD AUTO: 4.21 10E6/UL (ref 4.4–5.9)
RBC # BLD AUTO: 4.33 10E6/UL (ref 4.4–5.9)
RBC URINE: 0 /HPF
SARS-COV-2 RNA RESP QL NAA+PROBE: NEGATIVE
SODIUM BLD-SCNC: 140 MMOL/L (ref 133–144)
SODIUM BLD-SCNC: 143 MMOL/L (ref 133–144)
SODIUM SERPL-SCNC: 139 MMOL/L (ref 133–144)
SODIUM SERPL-SCNC: 141 MMOL/L (ref 133–144)
SP GR UR STRIP: 1.01 (ref 1–1.03)
TRIGL SERPL-MCNC: 105 MG/DL
UROBILINOGEN UR STRIP-MCNC: NORMAL MG/DL
WBC # BLD AUTO: 6.7 10E3/UL (ref 4–11)
WBC # BLD AUTO: 6.8 10E3/UL (ref 4–11)
WBC URINE: 1 /HPF

## 2021-10-23 PROCEDURE — 999N000141 HC STATISTIC PRE-PROCEDURE NURSING ASSESSMENT: Performed by: SURGERY

## 2021-10-23 PROCEDURE — 258N000003 HC RX IP 258 OP 636

## 2021-10-23 PROCEDURE — 272N000001 HC OR GENERAL SUPPLY STERILE: Performed by: SURGERY

## 2021-10-23 PROCEDURE — 85730 THROMBOPLASTIN TIME PARTIAL: CPT

## 2021-10-23 PROCEDURE — 250N000013 HC RX MED GY IP 250 OP 250 PS 637: Performed by: SURGERY

## 2021-10-23 PROCEDURE — 250N000009 HC RX 250: Performed by: STUDENT IN AN ORGANIZED HEALTH CARE EDUCATION/TRAINING PROGRAM

## 2021-10-23 PROCEDURE — 710N000010 HC RECOVERY PHASE 1, LEVEL 2, PER MIN: Performed by: SURGERY

## 2021-10-23 PROCEDURE — 87340 HEPATITIS B SURFACE AG IA: CPT

## 2021-10-23 PROCEDURE — 370N000017 HC ANESTHESIA TECHNICAL FEE, PER MIN: Performed by: SURGERY

## 2021-10-23 PROCEDURE — 999N000065 XR CHEST PORT 1 VIEW

## 2021-10-23 PROCEDURE — 82947 ASSAY GLUCOSE BLOOD QUANT: CPT

## 2021-10-23 PROCEDURE — 86665 EPSTEIN-BARR CAPSID VCA: CPT

## 2021-10-23 PROCEDURE — 82805 BLOOD GASES W/O2 SATURATION: CPT

## 2021-10-23 PROCEDURE — 86644 CMV ANTIBODY: CPT

## 2021-10-23 PROCEDURE — 250N000011 HC RX IP 250 OP 636

## 2021-10-23 PROCEDURE — 81001 URINALYSIS AUTO W/SCOPE: CPT

## 2021-10-23 PROCEDURE — 87516 HEPATITIS B DNA AMP PROBE: CPT

## 2021-10-23 PROCEDURE — 86704 HEP B CORE ANTIBODY TOTAL: CPT

## 2021-10-23 PROCEDURE — 360N000077 HC SURGERY LEVEL 4, PER MIN: Performed by: SURGERY

## 2021-10-23 PROCEDURE — 86832 HLA CLASS I HIGH DEFIN QUAL: CPT

## 2021-10-23 PROCEDURE — 84295 ASSAY OF SERUM SODIUM: CPT | Performed by: SURGERY

## 2021-10-23 PROCEDURE — 86803 HEPATITIS C AB TEST: CPT

## 2021-10-23 PROCEDURE — 84132 ASSAY OF SERUM POTASSIUM: CPT

## 2021-10-23 PROCEDURE — 250N000011 HC RX IP 250 OP 636: Performed by: SURGERY

## 2021-10-23 PROCEDURE — 86825 HLA X-MATH NON-CYTOTOXIC: CPT

## 2021-10-23 PROCEDURE — C2617 STENT, NON-COR, TEM W/O DEL: HCPCS | Performed by: SURGERY

## 2021-10-23 PROCEDURE — 82330 ASSAY OF CALCIUM: CPT

## 2021-10-23 PROCEDURE — 93005 ELECTROCARDIOGRAM TRACING: CPT

## 2021-10-23 PROCEDURE — 999N000128 HC STATISTIC PERIPHERAL IV START W/O US GUIDANCE

## 2021-10-23 PROCEDURE — 84295 ASSAY OF SERUM SODIUM: CPT

## 2021-10-23 PROCEDURE — 80061 LIPID PANEL: CPT

## 2021-10-23 PROCEDURE — 82040 ASSAY OF SERUM ALBUMIN: CPT

## 2021-10-23 PROCEDURE — 84100 ASSAY OF PHOSPHORUS: CPT | Performed by: SURGERY

## 2021-10-23 PROCEDURE — 76776 US EXAM K TRANSPL W/DOPPLER: CPT

## 2021-10-23 PROCEDURE — 250N000009 HC RX 250: Performed by: SURGERY

## 2021-10-23 PROCEDURE — 76776 US EXAM K TRANSPL W/DOPPLER: CPT | Mod: 26 | Performed by: STUDENT IN AN ORGANIZED HEALTH CARE EDUCATION/TRAINING PROGRAM

## 2021-10-23 PROCEDURE — 83605 ASSAY OF LACTIC ACID: CPT

## 2021-10-23 PROCEDURE — 812N000003 HC ACQUISITION KIDNEY CADAVER

## 2021-10-23 PROCEDURE — 85018 HEMOGLOBIN: CPT | Performed by: SURGERY

## 2021-10-23 PROCEDURE — 258N000002 HC RX IP 258 OP 250: Performed by: SURGERY

## 2021-10-23 PROCEDURE — 250N000011 HC RX IP 250 OP 636: Performed by: ANESTHESIOLOGY

## 2021-10-23 PROCEDURE — 250N000011 HC RX IP 250 OP 636: Performed by: STUDENT IN AN ORGANIZED HEALTH CARE EDUCATION/TRAINING PROGRAM

## 2021-10-23 PROCEDURE — P9041 ALBUMIN (HUMAN),5%, 50ML: HCPCS | Performed by: SURGERY

## 2021-10-23 PROCEDURE — 250N000013 HC RX MED GY IP 250 OP 250 PS 637: Performed by: ANESTHESIOLOGY

## 2021-10-23 PROCEDURE — 258N000003 HC RX IP 258 OP 636: Performed by: SURGERY

## 2021-10-23 PROCEDURE — 93010 ELECTROCARDIOGRAM REPORT: CPT | Mod: 59 | Performed by: INTERNAL MEDICINE

## 2021-10-23 PROCEDURE — 250N000009 HC RX 250

## 2021-10-23 PROCEDURE — 50360 RNL ALTRNSPLJ W/O RCP NFRCT: CPT | Mod: RT | Performed by: SURGERY

## 2021-10-23 PROCEDURE — 71046 X-RAY EXAM CHEST 2 VIEWS: CPT | Mod: 26 | Performed by: STUDENT IN AN ORGANIZED HEALTH CARE EDUCATION/TRAINING PROGRAM

## 2021-10-23 PROCEDURE — 83036 HEMOGLOBIN GLYCOSYLATED A1C: CPT

## 2021-10-23 PROCEDURE — 84156 ASSAY OF PROTEIN URINE: CPT

## 2021-10-23 PROCEDURE — 85018 HEMOGLOBIN: CPT

## 2021-10-23 PROCEDURE — 71045 X-RAY EXAM CHEST 1 VIEW: CPT | Mod: 26 | Performed by: RADIOLOGY

## 2021-10-23 PROCEDURE — 87389 HIV-1 AG W/HIV-1&-2 AB AG IA: CPT

## 2021-10-23 PROCEDURE — 85025 COMPLETE CBC W/AUTO DIFF WBC: CPT

## 2021-10-23 PROCEDURE — 71046 X-RAY EXAM CHEST 2 VIEWS: CPT

## 2021-10-23 PROCEDURE — 120N000003 HC R&B IMCU UMMC

## 2021-10-23 PROCEDURE — 250N000025 HC SEVOFLURANE, PER MIN: Performed by: SURGERY

## 2021-10-23 PROCEDURE — 87799 DETECT AGENT NOS DNA QUANT: CPT

## 2021-10-23 PROCEDURE — 258N000003 HC RX IP 258 OP 636: Performed by: STUDENT IN AN ORGANIZED HEALTH CARE EDUCATION/TRAINING PROGRAM

## 2021-10-23 PROCEDURE — 0TY00Z0 TRANSPLANTATION OF RIGHT KIDNEY, ALLOGENEIC, OPEN APPROACH: ICD-10-PCS | Performed by: SURGERY

## 2021-10-23 PROCEDURE — 36415 COLL VENOUS BLD VENIPUNCTURE: CPT

## 2021-10-23 PROCEDURE — 99253 IP/OBS CNSLTJ NEW/EST LOW 45: CPT | Mod: GC | Performed by: INTERNAL MEDICINE

## 2021-10-23 PROCEDURE — 86706 HEP B SURFACE ANTIBODY: CPT

## 2021-10-23 PROCEDURE — 83735 ASSAY OF MAGNESIUM: CPT | Performed by: SURGERY

## 2021-10-23 PROCEDURE — U0003 INFECTIOUS AGENT DETECTION BY NUCLEIC ACID (DNA OR RNA); SEVERE ACUTE RESPIRATORY SYNDROME CORONAVIRUS 2 (SARS-COV-2) (CORONAVIRUS DISEASE [COVID-19]), AMPLIFIED PROBE TECHNIQUE, MAKING USE OF HIGH THROUGHPUT TECHNOLOGIES AS DESCRIBED BY CMS-2020-01-R: HCPCS

## 2021-10-23 PROCEDURE — 85610 PROTHROMBIN TIME: CPT

## 2021-10-23 PROCEDURE — 86833 HLA CLASS II HIGH DEFIN QUAL: CPT

## 2021-10-23 DEVICE — STENT URETERAL DBL PIGTAIL INLAY 6FRX16CM G14865
Type: IMPLANTABLE DEVICE | Site: URETER | Status: NON-FUNCTIONAL
Removed: 2021-10-27

## 2021-10-23 RX ORDER — HYDROMORPHONE HYDROCHLORIDE 1 MG/ML
0.2 INJECTION, SOLUTION INTRAMUSCULAR; INTRAVENOUS; SUBCUTANEOUS EVERY 5 MIN PRN
Status: DISCONTINUED | OUTPATIENT
Start: 2021-10-23 | End: 2021-10-23

## 2021-10-23 RX ORDER — ALBUMIN, HUMAN INJ 5% 5 %
250 SOLUTION INTRAVENOUS ONCE
Status: COMPLETED | OUTPATIENT
Start: 2021-10-23 | End: 2021-10-23

## 2021-10-23 RX ORDER — HYDROMORPHONE HCL IN WATER/PF 6 MG/30 ML
0.4 PATIENT CONTROLLED ANALGESIA SYRINGE INTRAVENOUS
Status: DISCONTINUED | OUTPATIENT
Start: 2021-10-23 | End: 2021-10-25

## 2021-10-23 RX ORDER — CEFUROXIME SODIUM 1.5 G/16ML
1.5 INJECTION, POWDER, FOR SOLUTION INTRAVENOUS ONCE
Status: COMPLETED | OUTPATIENT
Start: 2021-10-23 | End: 2021-10-23

## 2021-10-23 RX ORDER — NICOTINE POLACRILEX 4 MG
15-30 LOZENGE BUCCAL
Status: DISCONTINUED | OUTPATIENT
Start: 2021-10-23 | End: 2021-10-23 | Stop reason: HOSPADM

## 2021-10-23 RX ORDER — OXYCODONE HYDROCHLORIDE 5 MG/1
5 TABLET ORAL EVERY 4 HOURS PRN
Status: DISCONTINUED | OUTPATIENT
Start: 2021-10-23 | End: 2021-10-26 | Stop reason: HOSPADM

## 2021-10-23 RX ORDER — CALCIUM CHLORIDE 100 MG/ML
INJECTION INTRAVENOUS; INTRAVENTRICULAR PRN
Status: DISCONTINUED | OUTPATIENT
Start: 2021-10-23 | End: 2021-10-23

## 2021-10-23 RX ORDER — HEPARIN SODIUM 1000 [USP'U]/ML
INJECTION, SOLUTION INTRAVENOUS; SUBCUTANEOUS PRN
Status: DISCONTINUED | OUTPATIENT
Start: 2021-10-23 | End: 2021-10-23

## 2021-10-23 RX ORDER — DEXTROSE, SODIUM CHLORIDE, SODIUM LACTATE, POTASSIUM CHLORIDE, AND CALCIUM CHLORIDE 5; .6; .31; .03; .02 G/100ML; G/100ML; G/100ML; G/100ML; G/100ML
INJECTION, SOLUTION INTRAVENOUS CONTINUOUS
Status: DISCONTINUED | OUTPATIENT
Start: 2021-10-23 | End: 2021-10-25

## 2021-10-23 RX ORDER — FENTANYL CITRATE 50 UG/ML
25 INJECTION, SOLUTION INTRAMUSCULAR; INTRAVENOUS EVERY 5 MIN PRN
Status: DISCONTINUED | OUTPATIENT
Start: 2021-10-23 | End: 2021-10-23

## 2021-10-23 RX ORDER — SODIUM CHLORIDE, SODIUM LACTATE, POTASSIUM CHLORIDE, CALCIUM CHLORIDE 600; 310; 30; 20 MG/100ML; MG/100ML; MG/100ML; MG/100ML
INJECTION, SOLUTION INTRAVENOUS CONTINUOUS PRN
Status: DISCONTINUED | OUTPATIENT
Start: 2021-10-23 | End: 2021-10-23

## 2021-10-23 RX ORDER — VERAPAMIL HYDROCHLORIDE 2.5 MG/ML
INJECTION, SOLUTION INTRAVENOUS PRN
Status: DISCONTINUED | OUTPATIENT
Start: 2021-10-23 | End: 2021-10-23 | Stop reason: HOSPADM

## 2021-10-23 RX ORDER — NALOXONE HYDROCHLORIDE 0.4 MG/ML
0.2 INJECTION, SOLUTION INTRAMUSCULAR; INTRAVENOUS; SUBCUTANEOUS
Status: DISCONTINUED | OUTPATIENT
Start: 2021-10-23 | End: 2021-10-26 | Stop reason: HOSPADM

## 2021-10-23 RX ORDER — HYDROMORPHONE HCL IN WATER/PF 6 MG/30 ML
0.4 PATIENT CONTROLLED ANALGESIA SYRINGE INTRAVENOUS EVERY 5 MIN PRN
Status: DISCONTINUED | OUTPATIENT
Start: 2021-10-23 | End: 2021-10-23 | Stop reason: HOSPADM

## 2021-10-23 RX ORDER — MAGNESIUM OXIDE 400 MG/1
400 TABLET ORAL
Status: DISCONTINUED | OUTPATIENT
Start: 2021-10-25 | End: 2021-10-26 | Stop reason: HOSPADM

## 2021-10-23 RX ORDER — SODIUM CHLORIDE 9 MG/ML
1000 INJECTION, SOLUTION INTRAVENOUS CONTINUOUS PRN
Status: DISCONTINUED | OUTPATIENT
Start: 2021-10-23 | End: 2021-10-25

## 2021-10-23 RX ORDER — MANNITOL 20 G/100ML
INJECTION, SOLUTION INTRAVENOUS PRN
Status: DISCONTINUED | OUTPATIENT
Start: 2021-10-23 | End: 2021-10-23

## 2021-10-23 RX ORDER — HYDROMORPHONE HCL IN WATER/PF 6 MG/30 ML
0.2 PATIENT CONTROLLED ANALGESIA SYRINGE INTRAVENOUS
Status: DISCONTINUED | OUTPATIENT
Start: 2021-10-23 | End: 2021-10-25

## 2021-10-23 RX ORDER — FENTANYL CITRATE 50 UG/ML
INJECTION, SOLUTION INTRAMUSCULAR; INTRAVENOUS PRN
Status: DISCONTINUED | OUTPATIENT
Start: 2021-10-23 | End: 2021-10-23

## 2021-10-23 RX ORDER — ACETAMINOPHEN 325 MG/1
975 TABLET ORAL EVERY 8 HOURS
Status: DISCONTINUED | OUTPATIENT
Start: 2021-10-24 | End: 2021-10-26 | Stop reason: HOSPADM

## 2021-10-23 RX ORDER — CITALOPRAM HYDROBROMIDE 20 MG/1
20 TABLET ORAL DAILY
Status: DISCONTINUED | OUTPATIENT
Start: 2021-10-24 | End: 2021-10-26 | Stop reason: HOSPADM

## 2021-10-23 RX ORDER — LIDOCAINE 40 MG/G
CREAM TOPICAL
Status: DISCONTINUED | OUTPATIENT
Start: 2021-10-23 | End: 2021-10-23 | Stop reason: HOSPADM

## 2021-10-23 RX ORDER — ONDANSETRON 2 MG/ML
4 INJECTION INTRAMUSCULAR; INTRAVENOUS EVERY 30 MIN PRN
Status: DISCONTINUED | OUTPATIENT
Start: 2021-10-23 | End: 2021-10-23

## 2021-10-23 RX ORDER — GLYCOPYRROLATE 0.2 MG/ML
INJECTION, SOLUTION INTRAMUSCULAR; INTRAVENOUS PRN
Status: DISCONTINUED | OUTPATIENT
Start: 2021-10-23 | End: 2021-10-23

## 2021-10-23 RX ORDER — OXYCODONE HYDROCHLORIDE 5 MG/1
5 TABLET ORAL EVERY 4 HOURS PRN
Status: DISCONTINUED | OUTPATIENT
Start: 2021-10-23 | End: 2021-10-23

## 2021-10-23 RX ORDER — FENTANYL CITRATE 50 UG/ML
50 INJECTION, SOLUTION INTRAMUSCULAR; INTRAVENOUS EVERY 5 MIN PRN
Status: DISCONTINUED | OUTPATIENT
Start: 2021-10-23 | End: 2021-10-23 | Stop reason: HOSPADM

## 2021-10-23 RX ORDER — CEFUROXIME SODIUM 1.5 G/16ML
1.5 INJECTION, POWDER, FOR SOLUTION INTRAVENOUS SEE ADMIN INSTRUCTIONS
Status: DISCONTINUED | OUTPATIENT
Start: 2021-10-23 | End: 2021-10-23 | Stop reason: HOSPADM

## 2021-10-23 RX ORDER — SODIUM CHLORIDE 450 MG/100ML
INJECTION, SOLUTION INTRAVENOUS CONTINUOUS PRN
Status: DISCONTINUED | OUTPATIENT
Start: 2021-10-23 | End: 2021-10-25

## 2021-10-23 RX ORDER — ACETAMINOPHEN 325 MG/1
650 TABLET ORAL EVERY 4 HOURS PRN
Status: DISCONTINUED | OUTPATIENT
Start: 2021-10-26 | End: 2021-10-26 | Stop reason: HOSPADM

## 2021-10-23 RX ORDER — TACROLIMUS 1 MG/1
3 CAPSULE ORAL
Status: DISCONTINUED | OUTPATIENT
Start: 2021-10-24 | End: 2021-10-26 | Stop reason: HOSPADM

## 2021-10-23 RX ORDER — FUROSEMIDE 10 MG/ML
INJECTION INTRAMUSCULAR; INTRAVENOUS PRN
Status: DISCONTINUED | OUTPATIENT
Start: 2021-10-23 | End: 2021-10-23

## 2021-10-23 RX ORDER — ONDANSETRON 4 MG/1
4 TABLET, ORALLY DISINTEGRATING ORAL EVERY 6 HOURS PRN
Status: DISCONTINUED | OUTPATIENT
Start: 2021-10-23 | End: 2021-10-26 | Stop reason: HOSPADM

## 2021-10-23 RX ORDER — ATORVASTATIN CALCIUM 10 MG/1
10 TABLET, FILM COATED ORAL DAILY
Status: DISCONTINUED | OUTPATIENT
Start: 2021-10-24 | End: 2021-10-26 | Stop reason: HOSPADM

## 2021-10-23 RX ORDER — POLYETHYLENE GLYCOL 3350 17 G/17G
17 POWDER, FOR SOLUTION ORAL DAILY
Status: DISCONTINUED | OUTPATIENT
Start: 2021-10-24 | End: 2021-10-26 | Stop reason: HOSPADM

## 2021-10-23 RX ORDER — MYCOPHENOLATE MOFETIL 250 MG/1
750 CAPSULE ORAL
Status: DISCONTINUED | OUTPATIENT
Start: 2021-10-24 | End: 2021-10-26 | Stop reason: HOSPADM

## 2021-10-23 RX ORDER — NALOXONE HYDROCHLORIDE 0.4 MG/ML
0.4 INJECTION, SOLUTION INTRAMUSCULAR; INTRAVENOUS; SUBCUTANEOUS
Status: DISCONTINUED | OUTPATIENT
Start: 2021-10-23 | End: 2021-10-26 | Stop reason: HOSPADM

## 2021-10-23 RX ORDER — MAGNESIUM HYDROXIDE 1200 MG/15ML
LIQUID ORAL PRN
Status: DISCONTINUED | OUTPATIENT
Start: 2021-10-23 | End: 2021-10-23 | Stop reason: HOSPADM

## 2021-10-23 RX ORDER — OXYCODONE HYDROCHLORIDE 10 MG/1
10 TABLET ORAL EVERY 4 HOURS PRN
Status: DISCONTINUED | OUTPATIENT
Start: 2021-10-23 | End: 2021-10-26 | Stop reason: HOSPADM

## 2021-10-23 RX ORDER — AMOXICILLIN 250 MG
1 CAPSULE ORAL 2 TIMES DAILY
Status: DISCONTINUED | OUTPATIENT
Start: 2021-10-24 | End: 2021-10-26 | Stop reason: HOSPADM

## 2021-10-23 RX ORDER — SODIUM CHLORIDE, SODIUM LACTATE, POTASSIUM CHLORIDE, CALCIUM CHLORIDE 600; 310; 30; 20 MG/100ML; MG/100ML; MG/100ML; MG/100ML
INJECTION, SOLUTION INTRAVENOUS CONTINUOUS
Status: DISCONTINUED | OUTPATIENT
Start: 2021-10-23 | End: 2021-10-23

## 2021-10-23 RX ORDER — DEXTROSE MONOHYDRATE 25 G/50ML
25-50 INJECTION, SOLUTION INTRAVENOUS
Status: DISCONTINUED | OUTPATIENT
Start: 2021-10-23 | End: 2021-10-23 | Stop reason: HOSPADM

## 2021-10-23 RX ORDER — BISACODYL 10 MG
10 SUPPOSITORY, RECTAL RECTAL DAILY PRN
Status: DISCONTINUED | OUTPATIENT
Start: 2021-10-23 | End: 2021-10-26 | Stop reason: HOSPADM

## 2021-10-23 RX ORDER — SULFAMETHOXAZOLE AND TRIMETHOPRIM 400; 80 MG/1; MG/1
1 TABLET ORAL DAILY
Status: DISCONTINUED | OUTPATIENT
Start: 2021-10-24 | End: 2021-10-25

## 2021-10-23 RX ORDER — PROCHLORPERAZINE MALEATE 5 MG
10 TABLET ORAL EVERY 6 HOURS PRN
Status: DISCONTINUED | OUTPATIENT
Start: 2021-10-23 | End: 2021-10-26 | Stop reason: HOSPADM

## 2021-10-23 RX ORDER — ACETAMINOPHEN 325 MG/1
975 TABLET ORAL ONCE
Status: COMPLETED | OUTPATIENT
Start: 2021-10-23 | End: 2021-10-23

## 2021-10-23 RX ORDER — ONDANSETRON 2 MG/ML
INJECTION INTRAMUSCULAR; INTRAVENOUS PRN
Status: DISCONTINUED | OUTPATIENT
Start: 2021-10-23 | End: 2021-10-23

## 2021-10-23 RX ORDER — ONDANSETRON 4 MG/1
4 TABLET, ORALLY DISINTEGRATING ORAL EVERY 30 MIN PRN
Status: DISCONTINUED | OUTPATIENT
Start: 2021-10-23 | End: 2021-10-23

## 2021-10-23 RX ORDER — VALGANCICLOVIR 450 MG/1
450 TABLET, FILM COATED ORAL
Status: DISCONTINUED | OUTPATIENT
Start: 2021-10-25 | End: 2021-10-26

## 2021-10-23 RX ORDER — ONDANSETRON 2 MG/ML
4 INJECTION INTRAMUSCULAR; INTRAVENOUS EVERY 6 HOURS PRN
Status: DISCONTINUED | OUTPATIENT
Start: 2021-10-23 | End: 2021-10-26 | Stop reason: HOSPADM

## 2021-10-23 RX ORDER — PROPOFOL 10 MG/ML
INJECTION, EMULSION INTRAVENOUS PRN
Status: DISCONTINUED | OUTPATIENT
Start: 2021-10-23 | End: 2021-10-23

## 2021-10-23 RX ADMIN — FUROSEMIDE 100 MG: 10 INJECTION, SOLUTION INTRAVENOUS at 17:55

## 2021-10-23 RX ADMIN — ONDANSETRON 4 MG: 2 INJECTION INTRAMUSCULAR; INTRAVENOUS at 19:21

## 2021-10-23 RX ADMIN — ALBUMIN HUMAN 250 ML: 0.05 INJECTION, SOLUTION INTRAVENOUS at 23:10

## 2021-10-23 RX ADMIN — MYCOPHENOLATE MOFETIL 1000 MG: 500 INJECTION, POWDER, LYOPHILIZED, FOR SOLUTION INTRAVENOUS at 15:09

## 2021-10-23 RX ADMIN — FENTANYL CITRATE 150 MCG: 50 INJECTION, SOLUTION INTRAMUSCULAR; INTRAVENOUS at 13:28

## 2021-10-23 RX ADMIN — ACETAMINOPHEN 975 MG: 325 TABLET, FILM COATED ORAL at 11:19

## 2021-10-23 RX ADMIN — ROCURONIUM BROMIDE 20 MG: 50 INJECTION, SOLUTION INTRAVENOUS at 14:47

## 2021-10-23 RX ADMIN — SODIUM CHLORIDE, SODIUM LACTATE, POTASSIUM CHLORIDE, CALCIUM CHLORIDE AND DEXTROSE MONOHYDRATE 100 ML/HR: 5; 600; 310; 30; 20 INJECTION, SOLUTION INTRAVENOUS at 22:00

## 2021-10-23 RX ADMIN — SODIUM CHLORIDE 500 MG: 9 INJECTION, SOLUTION INTRAVENOUS at 14:21

## 2021-10-23 RX ADMIN — FUROSEMIDE 10 MG/HR: 10 INJECTION, SOLUTION INTRAVENOUS at 20:30

## 2021-10-23 RX ADMIN — PROPOFOL 150 MG: 10 INJECTION, EMULSION INTRAVENOUS at 13:28

## 2021-10-23 RX ADMIN — SODIUM CHLORIDE 100 ML: 9 INJECTION, SOLUTION INTRAVENOUS at 22:00

## 2021-10-23 RX ADMIN — SODIUM CHLORIDE, POTASSIUM CHLORIDE, SODIUM LACTATE AND CALCIUM CHLORIDE: 600; 310; 30; 20 INJECTION, SOLUTION INTRAVENOUS at 13:10

## 2021-10-23 RX ADMIN — FUROSEMIDE 10 MG/HR: 10 INJECTION, SOLUTION INTRAVENOUS at 18:27

## 2021-10-23 RX ADMIN — ACETAMINOPHEN 975 MG: 325 TABLET, FILM COATED ORAL at 23:55

## 2021-10-23 RX ADMIN — SODIUM CHLORIDE 10 ML: 4.5 INJECTION, SOLUTION INTRAVENOUS at 20:00

## 2021-10-23 RX ADMIN — FUROSEMIDE 100 MG: 10 INJECTION, SOLUTION INTRAVENOUS at 17:12

## 2021-10-23 RX ADMIN — CALCIUM CHLORIDE 500 MG: 100 INJECTION INTRAVENOUS; INTRAVENTRICULAR at 17:44

## 2021-10-23 RX ADMIN — SODIUM CHLORIDE, SODIUM LACTATE, POTASSIUM CHLORIDE, CALCIUM CHLORIDE AND DEXTROSE MONOHYDRATE 100 ML/HR: 5; 600; 310; 30; 20 INJECTION, SOLUTION INTRAVENOUS at 20:00

## 2021-10-23 RX ADMIN — CEFUROXIME 1.5 G: 1.5 INJECTION, POWDER, FOR SOLUTION INTRAVENOUS at 14:42

## 2021-10-23 RX ADMIN — HYDROMORPHONE HYDROCHLORIDE 0.4 MG: 0.2 INJECTION, SOLUTION INTRAMUSCULAR; INTRAVENOUS; SUBCUTANEOUS at 23:55

## 2021-10-23 RX ADMIN — CALCIUM CHLORIDE 500 MG: 100 INJECTION INTRAVENOUS; INTRAVENTRICULAR at 17:48

## 2021-10-23 RX ADMIN — FENTANYL CITRATE 50 MCG: 50 INJECTION, SOLUTION INTRAMUSCULAR; INTRAVENOUS at 16:07

## 2021-10-23 RX ADMIN — SODIUM CHLORIDE, POTASSIUM CHLORIDE, SODIUM LACTATE AND CALCIUM CHLORIDE: 600; 310; 30; 20 INJECTION, SOLUTION INTRAVENOUS at 17:03

## 2021-10-23 RX ADMIN — GLYCOPYRROLATE 0.2 MG: 0.2 INJECTION, SOLUTION INTRAMUSCULAR; INTRAVENOUS at 15:39

## 2021-10-23 RX ADMIN — ROCURONIUM BROMIDE 20 MG: 50 INJECTION, SOLUTION INTRAVENOUS at 16:07

## 2021-10-23 RX ADMIN — FENTANYL CITRATE 50 MCG: 50 INJECTION, SOLUTION INTRAMUSCULAR; INTRAVENOUS at 14:47

## 2021-10-23 RX ADMIN — FENTANYL CITRATE 50 MCG: 50 INJECTION INTRAMUSCULAR; INTRAVENOUS at 20:16

## 2021-10-23 RX ADMIN — CEFUROXIME 1.5 G: 1.5 INJECTION, POWDER, FOR SOLUTION INTRAVENOUS at 18:51

## 2021-10-23 RX ADMIN — ROCURONIUM BROMIDE 20 MG: 50 INJECTION, SOLUTION INTRAVENOUS at 17:42

## 2021-10-23 RX ADMIN — FENTANYL CITRATE 50 MCG: 50 INJECTION INTRAMUSCULAR; INTRAVENOUS at 20:46

## 2021-10-23 RX ADMIN — SUGAMMADEX 200 MG: 100 INJECTION, SOLUTION INTRAVENOUS at 19:28

## 2021-10-23 RX ADMIN — ANTI-THYMOCYTE GLOBULIN (RABBIT) 175 MG: 5 INJECTION, POWDER, LYOPHILIZED, FOR SOLUTION INTRAVENOUS at 15:01

## 2021-10-23 RX ADMIN — SODIUM CHLORIDE 125 ML: 4.5 INJECTION, SOLUTION INTRAVENOUS at 21:00

## 2021-10-23 RX ADMIN — HEPARIN SODIUM 3000 UNITS: 1000 INJECTION INTRAVENOUS; SUBCUTANEOUS at 15:58

## 2021-10-23 RX ADMIN — SODIUM CHLORIDE, POTASSIUM CHLORIDE, SODIUM LACTATE AND CALCIUM CHLORIDE: 600; 310; 30; 20 INJECTION, SOLUTION INTRAVENOUS at 14:53

## 2021-10-23 RX ADMIN — SODIUM CHLORIDE 1000 ML: 4.5 INJECTION, SOLUTION INTRAVENOUS at 23:55

## 2021-10-23 RX ADMIN — HYDROMORPHONE HYDROCHLORIDE 0.4 MG: 1 INJECTION, SOLUTION INTRAMUSCULAR; INTRAVENOUS; SUBCUTANEOUS at 21:41

## 2021-10-23 RX ADMIN — HYDROMORPHONE HYDROCHLORIDE 0.4 MG: 1 INJECTION, SOLUTION INTRAMUSCULAR; INTRAVENOUS; SUBCUTANEOUS at 21:27

## 2021-10-23 RX ADMIN — CALCIUM CHLORIDE 250 MG: 100 INJECTION INTRAVENOUS; INTRAVENTRICULAR at 16:54

## 2021-10-23 RX ADMIN — MANNITOL 25 G: 20 INJECTION, SOLUTION INTRAVENOUS at 17:12

## 2021-10-23 RX ADMIN — ROCURONIUM BROMIDE 70 MG: 50 INJECTION, SOLUTION INTRAVENOUS at 13:28

## 2021-10-23 RX ADMIN — SODIUM CHLORIDE, SODIUM LACTATE, POTASSIUM CHLORIDE, CALCIUM CHLORIDE AND DEXTROSE MONOHYDRATE 100 ML/HR: 5; 600; 310; 30; 20 INJECTION, SOLUTION INTRAVENOUS at 21:00

## 2021-10-23 RX ADMIN — HYDROMORPHONE HYDROCHLORIDE 0.2 MG: 1 INJECTION, SOLUTION INTRAMUSCULAR; INTRAVENOUS; SUBCUTANEOUS at 22:11

## 2021-10-23 RX ADMIN — HYDROMORPHONE HYDROCHLORIDE 0.5 MG: 1 INJECTION, SOLUTION INTRAMUSCULAR; INTRAVENOUS; SUBCUTANEOUS at 18:53

## 2021-10-23 ASSESSMENT — ACTIVITIES OF DAILY LIVING (ADL)
ADLS_ACUITY_SCORE: 5
VISION_MANAGEMENT: PERSCRIPTION
PATIENT_/_FAMILY_COMMUNICATION_STYLE: SPOKEN LANGUAGE (ENGLISH OR BILINGUAL)
DOING_ERRANDS_INDEPENDENTLY_DIFFICULTY: NO
WEAR_GLASSES_OR_BLIND: YES
CONCENTRATING,_REMEMBERING_OR_MAKING_DECISIONS_DIFFICULTY: NO
WALKING_OR_CLIMBING_STAIRS_DIFFICULTY: NO
DRESSING/BATHING_DIFFICULTY: NO
DIFFICULTY_EATING/SWALLOWING: NO
HEARING_DIFFICULTY_OR_DEAF: OTHER (SEE COMMENTS)
FALL_HISTORY_WITHIN_LAST_SIX_MONTHS: NO
DIFFICULTY_COMMUNICATING: NO
TOILETING_ISSUES: NO

## 2021-10-23 ASSESSMENT — LIFESTYLE VARIABLES: TOBACCO_USE: 0

## 2021-10-23 NOTE — PLAN OF CARE
BP (!) 136/95 (BP Location: Right arm)   Pulse 74   Temp 97.8  F (36.6  C) (Oral)   Resp 16   Wt 91.3 kg (201 lb 4.8 oz)   SpO2 97%   BMI 28.88 kg/m      Shift: 2293-6207  VS: VSS on RA, afebrile  Neuro: AOx4  Labs: Cr. 3.9  Respiratory: WDL.   Pain/Nausea/PRN: Denies.   Diet: NPO for surgery.   LDA: Waiting for PIV placement.   GI/: No BM this shift. UA sent and pending. Voiding spontaneously.   Skin: WDL. Clean, dry, intact.   Mobility: UAL.   Plan: Kidney transplant later this afternoon. Will continue to monitor and notify the team with any changes.     Handoff given to following RN.

## 2021-10-23 NOTE — PLAN OF CARE
Admitted/transferred from: Home  Time of arrival on unit 0155  2 RN full  skin assessment completed byJulienne GRAY And Anel WALTER  Skin assessment finding: skin intact, no problems   Interventions/actions: other No adjustments needed.      Will continue to monitor.

## 2021-10-23 NOTE — LETTER
Transition Communication Hand-off for Care Transitions to Next Level of Care Provider    Name: Raj Pierce  : 1970  MRN #: [id number]  Primary Care Provider: Do Guerrero     Primary Clinic: [address] [address]     Reason for Hospitalization:  Kidney transplant recipient [Z94.0]  Admit Date/Time: 10/23/2021  1:53 AM  Discharge Date:  10/26/2021  Payor Source: Payor: BCBS / Plan: BCBS OUT OF STATE / Product Type: Indemnity /     Reason for Communication Hand-off Referral: Other Continuity of care    Discharge Plan:  Home with outpatient follow up       Concern for non-adherence with plan of care:   No  Discharge Needs Assessment:  Needs      Most Recent Value   Equipment Currently Used at Home  none   # of Referrals Placed by CTS  External Care Coordination, Homecare            Follow-up specialty is recommended: Yes    Follow-up plan:    Future Appointments   Date Time Provider Department Center   10/26/2021 11:30 AM Scooby Desouza, PT [facility]   2021 12:00 PM Madina Warren MD [facility]       Any outstanding tests or procedures:              Key Recommendations:   Please see attached AVS.  Patient has declined home care.  Patient will not be staying locally.   ATC appointments requested for 10/27 and 10/28.     Ela Painting RN    AVS/Discharge Summary is the source of truth; this is a helpful guide for improved communication of patient story

## 2021-10-23 NOTE — TELEPHONE ENCOUNTER
"TRANSPLANT OR REPORT     Organ: Kidney  Laterality (if known): RIGHT  Organ Location: Import     UNOS ID: [id number]  Donor OR Time: 1830  Actual Cross Clamp Time: 1933  Expected Organ Arrival Time: 0145     Surgeon: Kelvin  Time in OR: 0600  Time in 3C (N/A for LI): 0500     Recipient Details  Admission ETA: 0130  Unit: 7A  Isolation: No  Latex Allergy: No  : No  Diagnosis: ESRD     Liver Transplants  Bypass: NA  Hemodialysis: NA  ~ \"RENAL STAFF TEACHING SERVICE MEDICINE\" : NA  ~ CRRT Resource Nurse: RENETTA  (Telephone Number for CRRT [phone])     Kidney/Panc Transplants  XM Status (Need to wait for XM?): Yes- should be complete around 6658-2093     Liver or KP/PA Recipients - Vessel Banking:  Donor has positive serologies for HIV/HCV/HBV: No  Donor has risk criteria for HIV/HCV/HBV: No        Transplant Coordinator Contact Info:   Until 0700 [address]   After 0700 [address]         Vessel Bank Information  Transplant hospitals must not store a donor s extra vessels if the donor has tested positive for any of the following:   - HIV by antibody, antigen, or nucleic acid test (NAMRATA)   - Hepatitis B surface antigen (HBsAg)   - Hepatitis B (HBV) by NAMRATA   - Hepatitis C (HCV) by antibody or NAMRATA      Extra vessels from donors that do not test positive for HIV, HBV, or HCV as above may be stored     "

## 2021-10-23 NOTE — ANESTHESIA PROCEDURE NOTES
Airway       Patient location during procedure: OR       Procedure Start/Stop Times: 10/23/2021 1:33 PM  Staff -        Anesthesiologist:  Melvin Jane MD       Resident/Fellow: Maurilio Harrison MD       Performed By: resident  Consent for Airway        Urgency: elective  Indications and Patient Condition       Indications for airway management: rodrigo-procedural       Induction type:intravenous       Mask difficulty assessment: 2 - vent by mask + OA or adjuvant +/- NMBA    Final Airway Details       Final airway type: endotracheal airway       Successful airway: ETT - single and Oral  Endotracheal Airway Details        ETT size (mm): 7.5       Cuffed: yes       Successful intubation technique: video laryngoscopy       VL Blade Size: MAC 4       Adjucts: stylet       Measured from: lips       Secured at (cm): 25       Bite block used: None    Post intubation assessment        Placement verified by: capnometry, equal breath sounds and chest rise        Number of attempts at approach: 1       Number of other approaches attempted: 0       Secured with: ties       Ease of procedure: easy       Dentition: Intact and Unchanged

## 2021-10-23 NOTE — ANESTHESIA PROCEDURE NOTES
Central Line/PA Catheter Placement    Pre-Procedure   Staff -        Anesthesiologist:  Melvin Jane MD       Resident/Fellow: Maurilio Harrison MD       Performed By: resident       Location: OR       Pre-Anesthestic Checklist: patient identified, IV checked, site marked, risks and benefits discussed, informed consent, monitors and equipment checked, pre-op evaluation and at physician/surgeon's request  Timeout:       Correct Patient: Yes        Correct Procedure: Yes        Correct Site: Yes        Correct Position: Yes        Correct Laterality: Yes     Procedure   Procedure: central line       Laterality: right       Insertion Site: internal jugular.       Patient Position: Trendelenburg  Sterile Prep        All elements of maximal sterile barrier technique followed       Patient Prep/Sterile Barriers: draped, hand hygiene, gloves , hat , mask , draped, gown, sterile gel and probe cover       Skin prep: Betadine  Insertion/Injection        Technique: ultrasound guided and Seldinger Technique        1. Ultrasound was used to evaluate the access site.       2. Vein evaluated via ultrasound for patency/adequacy.       3. Using real-time ultrasound the needle/catheter was observed entering the artery/vein.       5. The visualized structures were anatomically normal.       6. There were no apparent abnormal pathologic findings.       Introducer Type: 9 Fr, 7 cm        Catheter size: 8.5 Fr 4 lumen 15 cm.  Narrative         Secured by: suture       Biopatch and Tegaderm dressing used.       Complications: None apparent,        blood aspirated from all lumens,        All lumens flushed: Yes       Verification method: Placement to be verified post-op

## 2021-10-23 NOTE — ANESTHESIA PREPROCEDURE EVALUATION
Anesthesia Pre-Procedure Evaluation    Patient: Raj Pierce   MRN: [id number] : 1970        Preoperative Diagnosis: End stage renal disease (H) [N18.6]    Procedure : Procedure(s):  TRANSPLANT, KIDNEY, RECIPIENT,  DONOR          Past Medical History:   Diagnosis Date     Acidosis      Chronic kidney disease, stage IV (severe) (H)      Disorders of phosphorus metabolism      HTN (hypertension)     15 years     Hyperparathyroidism      Polycystic kidney, autosomal dominant      Pure hypercholesterolemia      Sleep apnea     cpap     Vitamin D deficiency       Past Surgical History:   Procedure Laterality Date     BENCH KIDNEY N/A 10/13/2015    Procedure: BENCH KIDNEY;  Surgeon: Dariel Chavez MD;  Location: [facility] OR     CYSTOSCOPY, REMOVE STENT(S), COMBINED Right 2015    Procedure: COMBINED CYSTOSCOPY, REMOVE STENT(S);  Surgeon: Dariel Chavez MD;  Location: [facility] OR     HERNIORRHAPHY INGUINAL CHILD      right     TONSILLECTOMY       TRANSPLANT KIDNEY RECIPIENT  DONOR N/A 10/13/2015    Procedure: TRANSPLANT KIDNEY RECIPIENT  DONOR;  Surgeon: Dariel Chavez MD;  Location: [facility] OR      Allergies   Allergen Reactions     Alcohol Rash     Other reaction(s): Rash  Swabs used at hosptial  Swabs used at hosptial      Social History     Tobacco Use     Smoking status: Never Smoker     Smokeless tobacco: Never Used   Substance Use Topics     Alcohol use: Yes     Alcohol/week: 10.0 standard drinks     Types: 10 Standard drinks or equivalent per week     Comment: 1 drink daily       Wt Readings from Last 1 Encounters:   20 93.3 kg (205 lb 9.6 oz)        Anesthesia Evaluation   Pt has had prior anesthetic.     No history of anesthetic complications       ROS/MED HX  ENT/Pulmonary:     (+) sleep apnea, uses CPAP,  (-) tobacco use   Neurologic:  - neg neurologic ROS     Cardiovascular:     (+) Dyslipidemia hypertension-----Previous cardiac testing   Echo: Date: 2018  Results:  LV EF 60-65%  RV normal  Stress Test: Date: 12/14/2020 Results:    The nuclear stress test is negative for inducible myocardial ischemia or infarction.    LVEDv 105ml. LVESv 21ml. LV EF80%.  ECG Reviewed: Date: Results:    Cath:  Date: Results:   (-) CAD and CHF   METS/Exercise Tolerance:     Hematologic:     (+) anemia,     Musculoskeletal: Comment: History of gout  (+) arthritis,     GI/Hepatic:  - neg GI/hepatic ROS     Renal/Genitourinary: Comment: History of polycystic kidney disease s/p KTP 2015, however, kidney was too small and was relisted; has not needed dialysis since then    (+) renal disease (stable CKD IV), type: CRI, Pt does not require dialysis, Pt has history of transplant, date: 10/13/2015, Nephrolithiasis ,     Endo: Comment: History of hyperparathyroidism    (+) Obesity,  (-) Type II DM   Psychiatric/Substance Use:     (+) psychiatric history depression     Infectious Disease:  - neg infectious disease ROS     Malignancy:  - neg malignancy ROS     Other:            Physical Exam    Airway        Mallampati: II   TM distance: > 3 FB   Neck ROM: full   Mouth opening: > 3 cm    Respiratory Devices and Support         Dental  no notable dental history         Cardiovascular          Rhythm and rate: regular and normal     Pulmonary           breath sounds clear to auscultation           OUTSIDE LABS:  CBC:   Lab Results   Component Value Date    WBC 6.1 12/14/2020    WBC 6.5 06/05/2017    HGB 13.8 12/14/2020    HGB 15.8 06/05/2017    HCT 43.9 12/14/2020    HCT 49.2 06/05/2017     12/14/2020     06/05/2017     BMP:   Lab Results   Component Value Date     12/14/2020     06/05/2017    POTASSIUM 4.4 12/14/2020    POTASSIUM 4.1 06/05/2017    CHLORIDE 106 12/14/2020    CHLORIDE 110 (H) 06/05/2017    CO2 24 12/14/2020    CO2 23 06/05/2017    BUN 68 (H) 12/14/2020    BUN 39 (H) 06/05/2017    CR 3.13 (H) 12/14/2020    CR 2.65 (H) 06/05/2017    GLC 92 12/14/2020    GLC 90  06/05/2017     COAGS:   Lab Results   Component Value Date    PTT 26 10/13/2015    INR 1.05 10/28/2015     POC:   Lab Results   Component Value Date    BGM 90 12/18/2019     HEPATIC:   Lab Results   Component Value Date    ALBUMIN 3.9 10/13/2015    PROTTOTAL 7.2 10/13/2015    ALT 28 10/13/2015    AST 30 10/13/2015    ALKPHOS 89 10/13/2015    BILITOTAL 0.9 10/13/2015     OTHER:   Lab Results   Component Value Date    LACT 0.7 10/17/2015    A1C 4.8 10/14/2015    LILIANA 8.8 12/14/2020    PHOS 3.1 11/09/2015    MAG 1.7 08/01/2016       Anesthesia Plan    ASA Status:  3      Anesthesia Type: General.     - Airway: ETT   Induction: Intravenous.   Maintenance: Balanced.   Techniques and Equipment:     - Lines/Monitors: 2nd IV, Central Line     Consents    Anesthesia Plan(s) and associated risks, benefits, and realistic alternatives discussed. Questions answered and patient/representative(s) expressed understanding.     - Discussed with:  Patient      - Extended Intubation/Ventilatory Support Discussed: No.      - Patient is DNR/DNI Status: No    Use of blood products discussed: Yes.     - Discussed with: Patient.     - Consented: consented to blood products            Reason for refusal: other.     Postoperative Care    Pain management: IV analgesics, Oral pain medications, Multi-modal analgesia.   PONV prophylaxis: Ondansetron (or other 5HT-3), Dexamethasone or Solumedrol     Comments:                Rito Barrera

## 2021-10-23 NOTE — H&P
[facility]    History and Physical  Solid Organ Transplant     Date of Admission:  10/23/2021    Attestation: I saw and examined the patient with Tapan Cheng MD, and the transplant team. I independently reviewed all pertinent laboratory and imaging results, including immunosuppression management. I agree with the findings and plan as detailed in this note.  George Zhu MD      Assessment & Plan   Raj Pierce is a 50 year old male with a past medical history significant for end stage kidney disease secondary PCKD s/p kidney transplant () c/b poor function. Other past medical history includes htn, hyperparathyroidism, pckd, and richard (with cpap)  Patient was notified as an acceptable  donor organ became available and presented for further pre-operative work-up.  Patient was informed of the risks and benefits regarding  donor kidney transplantation, and has elected to proceed.    -[place] to outpatient for pre-op work-up  -Pre-op labs, including BMP, CBC, coag panel, viral serologies  -EKG, CXR  -Final cross match  -covid test  -Cardiac clearance: performed and cleared    Code Status   Full Code  Disposition Plan    7A    Tapan Cheng MD    Primary Care Physician   Do Guerrero    Chief Complaint   Pre-op kidney transplant, ESRD    History is obtained from the patient    History of Present Illness   Raj Pierce is a 50 year old male with a past medical history significant for end stage kidney disease secondary PCKD s/p kidney transplant () c/b poor function. Other past medical history includes htn, hyperparathyroidism, pckd, and richard (with cpap)      The patient is not on dialysis. (prior to his first kidney transplant in  he was on dialysis)  Is making urine currently    At the time of admission, the patient is in good health. Denies any constitutional symptoms including fever, chills, night sweats, weight changes. Denies HA,  lightheadedness, cp, sob, abdominal pain, change in stool, change in urination. Denies any recent sickness or sick contacts.     Past Medical History    I have reviewed this patient's medical history and updated it with pertinent information if needed.   Past Medical History:   Diagnosis Date     Acidosis      Chronic kidney disease, stage IV (severe) (H)      Disorders of phosphorus metabolism      HTN (hypertension)     15 years     Hyperparathyroidism      Polycystic kidney, autosomal dominant      Pure hypercholesterolemia      Sleep apnea     cpap     Vitamin D deficiency        Past Surgical History   I have reviewed this patient's surgical history and updated it with pertinent information if needed.  Past Surgical History:   Procedure Laterality Date     BENCH KIDNEY N/A 10/13/2015    Procedure: BENCH KIDNEY;  Surgeon: Dariel Chavez MD;  Location: [facility]     CYSTOSCOPY, REMOVE STENT(S), COMBINED Right 2015    Procedure: COMBINED CYSTOSCOPY, REMOVE STENT(S);  Surgeon: Dariel Chavez MD;  Location: [facility]     HERNIORRHAPHY INGUINAL CHILD      right     TONSILLECTOMY       TRANSPLANT KIDNEY RECIPIENT  DONOR N/A 10/13/2015    Procedure: TRANSPLANT KIDNEY RECIPIENT  DONOR;  Surgeon: Dariel Chavez MD;  Location: [facility] OR       Prior to Admission Medications   Prior to Admission Medications   Prescriptions Last Dose Informant Patient Reported? Taking?   atorvastatin (LIPITOR) 10 MG tablet   Yes No   Sig: Take 10 mg by mouth daily   chlorthalidone (HYGROTON) 25 MG tablet   No No   Sig: Take 0.5 tablets (12.5 mg) by mouth daily   citalopram (CELEXA) 20 MG tablet   Yes No   Sig: Take 20 mg by mouth daily.   losartan (COZAAR) 50 MG tablet   Yes No   Sig: Take 1.5 tablets (75 mg) by mouth daily   magnesium oxide 400 MG CAPS   Yes No   mycophenolate (GENERIC EQUIVALENT) 250 MG capsule   No No   Sig: Take 3 capsules (750 mg) by mouth 2 times daily   sodium bicarbonate 650 MG tablet    Yes No   Sig: Take 1,300 mg by mouth 2 times daily   sulfamethoxazole-trimethoprim (BACTRIM) 400-80 MG tablet   No No   Sig: Take 1 tablet by mouth Every Mon, Wed, Fri Morning   tacrolimus (GENERIC EQUIVALENT) 0.5 MG capsule   No No   Sig: Take 1 capsule (0.5 mg) by mouth every evening Total dose = 2 mg in the AM and 1.5 mg in the PM   tacrolimus (GENERIC EQUIVALENT) 1 MG capsule   No No   Sig: Total dose = 2 mg in the AM and 1.5 mg in the PM   vitamin D3 (CHOLECALCIFEROL) 2000 units (50 mcg) tablet   Yes No   Sig: Take 1 tablet by mouth daily      Facility-Administered Medications: None     Allergies   Allergies   Allergen Reactions     Alcohol Rash     Other reaction(s): Rash  Swabs used at hosptial  Swabs used at hosptial       Social History   I have reviewed this patient's social history and updated it with pertinent information if needed. Raj Pierce  reports that he has never smoked. He has never used smokeless tobacco. He reports current alcohol use of about 10.0 standard drinks of alcohol per week. He reports that he does not use drugs. He lives in [facility] with his wife (Dianne) and two kids.     Family History   I have reviewed this patient's family history and updated it with pertinent information if needed.   Family History   Problem Relation Age of Onset     Gastrointestinal Disease Mother         ESRD PCKD     Hypertension Mother      C.A.D. Father      Hypertension Father      Gastrointestinal Disease Sister         PCKD       Review of Systems   The 10 point Review of Systems is negative other than noted in the HPI or here.     Physical Exam   Temp: 97.8  F (36.6  C) Temp src: Oral BP: (!) 136/95 Pulse: 74   Resp: 16 SpO2: 97 % O2 Device: None (Room air)    Vital Signs with Ranges  Temp:  [97.8  F (36.6  C)] 97.8  F (36.6  C)  Pulse:  [74] 74  Resp:  [16] 16  BP: (136)/(95) 136/95  SpO2:  [97 %] 97 %  201 lbs 4.8 oz    Gen: in no acute distress, in good spirits, conversational  HEENT:  atraumatic  Neuro: A&Ox3, CN 2-12 grossly intact,   CV: RRR, no m/r/g  Resp: ctab, normal work of breathing on RA  GI: soft, nontender, non-distended, palpable prior kidney tx in RLQ with healed incision  Extremities: WWP, 2+ pulses, prior LUE fistula in place    Data   No current data, will collect routine pretransplant labs

## 2021-10-23 NOTE — PLAN OF CARE
VS: BP (!) 134/97 (BP Location: Right arm)   Pulse 64   Temp 97.8  F (36.6  C) (Oral)   Resp 16   Wt 91.3 kg (201 lb 4.8 oz)   SpO2 97%   BMI 28.88 kg/m      Cares:[id number]    Current condition: Stable on RA  Neuro: WDL  Cardio: WDL  Respiratory: WDL  Skin: WDL, fistula on left arm.   Diet:   NPO  LDA:  IV order placed.   Mobility:  UAL  Pain: Denies.   Changes: Pt left at 1100 for pre-op.   Plan of Care: Plan for Kidney transplant.

## 2021-10-24 ENCOUNTER — APPOINTMENT (OUTPATIENT)
Dept: ULTRASOUND IMAGING | Facility: CLINIC | Age: 51
End: 2021-10-24
Attending: PHYSICIAN ASSISTANT
Payer: COMMERCIAL

## 2021-10-24 ENCOUNTER — APPOINTMENT (OUTPATIENT)
Dept: PHYSICAL THERAPY | Facility: CLINIC | Age: 51
End: 2021-10-24
Attending: SURGERY
Payer: COMMERCIAL

## 2021-10-24 LAB
ANION GAP SERPL CALCULATED.3IONS-SCNC: 9 MMOL/L (ref 3–14)
BASOPHILS # BLD AUTO: 0 10E3/UL (ref 0–0.2)
BASOPHILS NFR BLD AUTO: 0 %
BUN SERPL-MCNC: 64 MG/DL (ref 7–30)
CALCIUM SERPL-MCNC: 8.7 MG/DL (ref 8.5–10.1)
CHLORIDE BLD-SCNC: 106 MMOL/L (ref 94–109)
CO2 SERPL-SCNC: 23 MMOL/L (ref 20–32)
CREAT SERPL-MCNC: 4.44 MG/DL (ref 0.66–1.25)
CREAT UR-MCNC: 46 MG/DL
EOSINOPHIL # BLD AUTO: 0 10E3/UL (ref 0–0.7)
EOSINOPHIL NFR BLD AUTO: 0 %
ERYTHROCYTE [DISTWIDTH] IN BLOOD BY AUTOMATED COUNT: 12.7 % (ref 10–15)
GFR SERPL CREATININE-BSD FRML MDRD: 14 ML/MIN/1.73M2
GLUCOSE BLD-MCNC: 218 MG/DL (ref 70–99)
GLUCOSE BLDC GLUCOMTR-MCNC: 122 MG/DL (ref 70–99)
GLUCOSE BLDC GLUCOMTR-MCNC: 125 MG/DL (ref 70–99)
GLUCOSE BLDC GLUCOMTR-MCNC: 127 MG/DL (ref 70–99)
GLUCOSE BLDC GLUCOMTR-MCNC: 138 MG/DL (ref 70–99)
GLUCOSE BLDC GLUCOMTR-MCNC: 142 MG/DL (ref 70–99)
GLUCOSE BLDC GLUCOMTR-MCNC: 146 MG/DL (ref 70–99)
GLUCOSE BLDC GLUCOMTR-MCNC: 146 MG/DL (ref 70–99)
GLUCOSE BLDC GLUCOMTR-MCNC: 151 MG/DL (ref 70–99)
GLUCOSE BLDC GLUCOMTR-MCNC: 178 MG/DL (ref 70–99)
GLUCOSE BLDC GLUCOMTR-MCNC: 178 MG/DL (ref 70–99)
GLUCOSE BLDC GLUCOMTR-MCNC: 182 MG/DL (ref 70–99)
GLUCOSE BLDC GLUCOMTR-MCNC: 205 MG/DL (ref 70–99)
GLUCOSE BLDC GLUCOMTR-MCNC: 242 MG/DL (ref 70–99)
HCT VFR BLD AUTO: 36.6 % (ref 40–53)
HGB BLD-MCNC: 10 G/DL (ref 13.3–17.7)
HGB BLD-MCNC: 10.4 G/DL (ref 13.3–17.7)
HGB BLD-MCNC: 11 G/DL (ref 13.3–17.7)
HGB BLD-MCNC: 11.2 G/DL (ref 13.3–17.7)
HGB BLD-MCNC: 11.5 G/DL (ref 13.3–17.7)
IMM GRANULOCYTES # BLD: 0.1 10E3/UL
IMM GRANULOCYTES NFR BLD: 1 %
LYMPHOCYTES # BLD AUTO: 0 10E3/UL (ref 0.8–5.3)
LYMPHOCYTES NFR BLD AUTO: 0 %
MAGNESIUM SERPL-MCNC: 1.8 MG/DL (ref 1.6–2.3)
MCH RBC QN AUTO: 28.5 PG (ref 26.5–33)
MCHC RBC AUTO-ENTMCNC: 31.4 G/DL (ref 31.5–36.5)
MCV RBC AUTO: 91 FL (ref 78–100)
MONOCYTES # BLD AUTO: 0.5 10E3/UL (ref 0–1.3)
MONOCYTES NFR BLD AUTO: 4 %
NEUTROPHILS # BLD AUTO: 10.9 10E3/UL (ref 1.6–8.3)
NEUTROPHILS NFR BLD AUTO: 95 %
NRBC # BLD AUTO: 0 10E3/UL
NRBC BLD AUTO-RTO: 0 /100
PHOSPHATE SERPL-MCNC: 6.5 MG/DL (ref 2.5–4.5)
PLATELET # BLD AUTO: 266 10E3/UL (ref 150–450)
POTASSIUM BLD-SCNC: 4.4 MMOL/L (ref 3.4–5.3)
POTASSIUM BLD-SCNC: 4.7 MMOL/L (ref 3.4–5.3)
POTASSIUM BLD-SCNC: 4.8 MMOL/L (ref 3.4–5.3)
POTASSIUM BLD-SCNC: 4.8 MMOL/L (ref 3.4–5.3)
POTASSIUM BLD-SCNC: 5.4 MMOL/L (ref 3.4–5.3)
POTASSIUM BLD-SCNC: 5.5 MMOL/L (ref 3.4–5.3)
POTASSIUM BLD-SCNC: 5.5 MMOL/L (ref 3.4–5.3)
PROT UR-MCNC: 0.63 G/L
PROT/CREAT 24H UR: 1.37 G/G CR (ref 0–0.2)
RBC # BLD AUTO: 4.03 10E6/UL (ref 4.4–5.9)
SODIUM SERPL-SCNC: 138 MMOL/L (ref 133–144)
WBC # BLD AUTO: 11.5 10E3/UL (ref 4–11)

## 2021-10-24 PROCEDURE — 85018 HEMOGLOBIN: CPT | Performed by: STUDENT IN AN ORGANIZED HEALTH CARE EDUCATION/TRAINING PROGRAM

## 2021-10-24 PROCEDURE — 250N000012 HC RX MED GY IP 250 OP 636 PS 637: Performed by: SURGERY

## 2021-10-24 PROCEDURE — 250N000013 HC RX MED GY IP 250 OP 250 PS 637: Performed by: SURGERY

## 2021-10-24 PROCEDURE — 80048 BASIC METABOLIC PNL TOTAL CA: CPT | Performed by: STUDENT IN AN ORGANIZED HEALTH CARE EDUCATION/TRAINING PROGRAM

## 2021-10-24 PROCEDURE — 97116 GAIT TRAINING THERAPY: CPT | Mod: GP

## 2021-10-24 PROCEDURE — 76776 US EXAM K TRANSPL W/DOPPLER: CPT

## 2021-10-24 PROCEDURE — 120N000003 HC R&B IMCU UMMC

## 2021-10-24 PROCEDURE — 84132 ASSAY OF SERUM POTASSIUM: CPT | Performed by: PHYSICIAN ASSISTANT

## 2021-10-24 PROCEDURE — 258N000003 HC RX IP 258 OP 636: Performed by: SURGERY

## 2021-10-24 PROCEDURE — 76776 US EXAM K TRANSPL W/DOPPLER: CPT | Mod: 26 | Performed by: RADIOLOGY

## 2021-10-24 PROCEDURE — 36592 COLLECT BLOOD FROM PICC: CPT | Performed by: STUDENT IN AN ORGANIZED HEALTH CARE EDUCATION/TRAINING PROGRAM

## 2021-10-24 PROCEDURE — 250N000011 HC RX IP 250 OP 636: Performed by: SURGERY

## 2021-10-24 PROCEDURE — 97530 THERAPEUTIC ACTIVITIES: CPT | Mod: GP

## 2021-10-24 PROCEDURE — 250N000013 HC RX MED GY IP 250 OP 250 PS 637: Performed by: PHYSICIAN ASSISTANT

## 2021-10-24 PROCEDURE — 82310 ASSAY OF CALCIUM: CPT | Performed by: SURGERY

## 2021-10-24 PROCEDURE — 258N000003 HC RX IP 258 OP 636: Performed by: PHYSICIAN ASSISTANT

## 2021-10-24 PROCEDURE — 36415 COLL VENOUS BLD VENIPUNCTURE: CPT | Performed by: STUDENT IN AN ORGANIZED HEALTH CARE EDUCATION/TRAINING PROGRAM

## 2021-10-24 PROCEDURE — 84132 ASSAY OF SERUM POTASSIUM: CPT | Performed by: STUDENT IN AN ORGANIZED HEALTH CARE EDUCATION/TRAINING PROGRAM

## 2021-10-24 PROCEDURE — 250N000011 HC RX IP 250 OP 636: Performed by: PHYSICIAN ASSISTANT

## 2021-10-24 PROCEDURE — 84100 ASSAY OF PHOSPHORUS: CPT | Performed by: SURGERY

## 2021-10-24 PROCEDURE — 83735 ASSAY OF MAGNESIUM: CPT | Performed by: SURGERY

## 2021-10-24 PROCEDURE — 36592 COLLECT BLOOD FROM PICC: CPT | Performed by: SURGERY

## 2021-10-24 PROCEDURE — P9041 ALBUMIN (HUMAN),5%, 50ML: HCPCS | Performed by: PHYSICIAN ASSISTANT

## 2021-10-24 PROCEDURE — 258N000001 HC RX 258: Performed by: PHYSICIAN ASSISTANT

## 2021-10-24 PROCEDURE — 97161 PT EVAL LOW COMPLEX 20 MIN: CPT | Mod: GP

## 2021-10-24 PROCEDURE — 99233 SBSQ HOSP IP/OBS HIGH 50: CPT | Mod: GC | Performed by: INTERNAL MEDICINE

## 2021-10-24 PROCEDURE — 85025 COMPLETE CBC W/AUTO DIFF WBC: CPT | Performed by: SURGERY

## 2021-10-24 PROCEDURE — 36592 COLLECT BLOOD FROM PICC: CPT | Performed by: PHYSICIAN ASSISTANT

## 2021-10-24 RX ORDER — ACETAMINOPHEN 325 MG/1
650 TABLET ORAL ONCE
Status: DISCONTINUED | OUTPATIENT
Start: 2021-10-24 | End: 2021-10-25

## 2021-10-24 RX ORDER — DEXTROSE MONOHYDRATE 100 MG/ML
INJECTION, SOLUTION INTRAVENOUS CONTINUOUS
Status: DISCONTINUED | OUTPATIENT
Start: 2021-10-24 | End: 2021-10-24

## 2021-10-24 RX ORDER — DEXTROSE MONOHYDRATE 25 G/50ML
25-50 INJECTION, SOLUTION INTRAVENOUS
Status: CANCELLED | OUTPATIENT
Start: 2021-10-24

## 2021-10-24 RX ORDER — DEXTROSE MONOHYDRATE 25 G/50ML
25-50 INJECTION, SOLUTION INTRAVENOUS
Status: DISCONTINUED | OUTPATIENT
Start: 2021-10-24 | End: 2021-10-25

## 2021-10-24 RX ORDER — NICOTINE POLACRILEX 4 MG
15-30 LOZENGE BUCCAL
Status: CANCELLED | OUTPATIENT
Start: 2021-10-24

## 2021-10-24 RX ORDER — AMLODIPINE BESYLATE 5 MG/1
5 TABLET ORAL DAILY
Status: ON HOLD | COMMUNITY
End: 2021-10-26

## 2021-10-24 RX ORDER — DEXTROSE MONOHYDRATE 25 G/50ML
25-50 INJECTION, SOLUTION INTRAVENOUS
Status: DISCONTINUED | OUTPATIENT
Start: 2021-10-24 | End: 2021-10-26 | Stop reason: HOSPADM

## 2021-10-24 RX ORDER — DIPHENHYDRAMINE HCL 25 MG
25 CAPSULE ORAL ONCE
Status: COMPLETED | OUTPATIENT
Start: 2021-10-24 | End: 2021-10-24

## 2021-10-24 RX ORDER — NICOTINE POLACRILEX 4 MG
15-30 LOZENGE BUCCAL
Status: DISCONTINUED | OUTPATIENT
Start: 2021-10-24 | End: 2021-10-26 | Stop reason: HOSPADM

## 2021-10-24 RX ORDER — DEXTROSE MONOHYDRATE 25 G/50ML
25 INJECTION, SOLUTION INTRAVENOUS ONCE
Status: COMPLETED | OUTPATIENT
Start: 2021-10-24 | End: 2021-10-24

## 2021-10-24 RX ORDER — ALBUMIN, HUMAN INJ 5% 5 %
25 SOLUTION INTRAVENOUS ONCE
Status: COMPLETED | OUTPATIENT
Start: 2021-10-24 | End: 2021-10-24

## 2021-10-24 RX ORDER — DIPHENHYDRAMINE HYDROCHLORIDE 50 MG/ML
25 INJECTION INTRAMUSCULAR; INTRAVENOUS ONCE
Status: COMPLETED | OUTPATIENT
Start: 2021-10-24 | End: 2021-10-24

## 2021-10-24 RX ORDER — NICOTINE POLACRILEX 4 MG
15-30 LOZENGE BUCCAL
Status: DISCONTINUED | OUTPATIENT
Start: 2021-10-24 | End: 2021-10-25

## 2021-10-24 RX ADMIN — SODIUM CHLORIDE, SODIUM LACTATE, POTASSIUM CHLORIDE, CALCIUM CHLORIDE AND DEXTROSE MONOHYDRATE: 5; 600; 310; 30; 20 INJECTION, SOLUTION INTRAVENOUS at 05:12

## 2021-10-24 RX ADMIN — DOCUSATE SODIUM 50 MG AND SENNOSIDES 8.6 MG 1 TABLET: 8.6; 5 TABLET, FILM COATED ORAL at 19:02

## 2021-10-24 RX ADMIN — TACROLIMUS 3 MG: 1 CAPSULE ORAL at 07:49

## 2021-10-24 RX ADMIN — ATORVASTATIN CALCIUM 10 MG: 10 TABLET, FILM COATED ORAL at 07:50

## 2021-10-24 RX ADMIN — OXYCODONE HYDROCHLORIDE 5 MG: 5 TABLET ORAL at 05:07

## 2021-10-24 RX ADMIN — DEXTROSE MONOHYDRATE 25 G: 500 INJECTION PARENTERAL at 09:00

## 2021-10-24 RX ADMIN — SODIUM CHLORIDE 1000 ML: 9 INJECTION, SOLUTION INTRAVENOUS at 10:01

## 2021-10-24 RX ADMIN — SODIUM CHLORIDE, SODIUM LACTATE, POTASSIUM CHLORIDE, CALCIUM CHLORIDE AND DEXTROSE MONOHYDRATE: 5; 600; 310; 30; 20 INJECTION, SOLUTION INTRAVENOUS at 15:25

## 2021-10-24 RX ADMIN — OXYCODONE HYDROCHLORIDE 5 MG: 5 TABLET ORAL at 01:10

## 2021-10-24 RX ADMIN — HUMAN INSULIN 9 UNITS: 100 INJECTION, SOLUTION SUBCUTANEOUS at 09:02

## 2021-10-24 RX ADMIN — TACROLIMUS 3 MG: 1 CAPSULE ORAL at 17:26

## 2021-10-24 RX ADMIN — CITALOPRAM HYDROBROMIDE 20 MG: 20 TABLET ORAL at 08:02

## 2021-10-24 RX ADMIN — ACETAMINOPHEN 975 MG: 325 TABLET, FILM COATED ORAL at 07:50

## 2021-10-24 RX ADMIN — MYCOPHENOLATE MOFETIL 750 MG: 250 CAPSULE ORAL at 17:26

## 2021-10-24 RX ADMIN — OXYCODONE HYDROCHLORIDE 10 MG: 10 TABLET ORAL at 15:05

## 2021-10-24 RX ADMIN — FUROSEMIDE 10 MG/HR: 10 INJECTION, SOLUTION INTRAVENOUS at 07:05

## 2021-10-24 RX ADMIN — DOCUSATE SODIUM 50 MG AND SENNOSIDES 8.6 MG 1 TABLET: 8.6; 5 TABLET, FILM COATED ORAL at 07:49

## 2021-10-24 RX ADMIN — DEXTROSE MONOHYDRATE: 100 INJECTION, SOLUTION INTRAVENOUS at 08:54

## 2021-10-24 RX ADMIN — ANTI-THYMOCYTE GLOBULIN (RABBIT) 175 MG: 5 INJECTION, POWDER, LYOPHILIZED, FOR SOLUTION INTRAVENOUS at 15:28

## 2021-10-24 RX ADMIN — ALBUMIN HUMAN 25 G: 0.05 INJECTION, SOLUTION INTRAVENOUS at 12:17

## 2021-10-24 RX ADMIN — OXYCODONE HYDROCHLORIDE 5 MG: 5 TABLET ORAL at 20:04

## 2021-10-24 RX ADMIN — METHYLPREDNISOLONE SODIUM SUCCINATE 250 MG: 125 INJECTION, POWDER, LYOPHILIZED, FOR SOLUTION INTRAMUSCULAR; INTRAVENOUS at 14:53

## 2021-10-24 RX ADMIN — MYCOPHENOLATE MOFETIL 750 MG: 250 CAPSULE ORAL at 07:49

## 2021-10-24 RX ADMIN — ACETAMINOPHEN 975 MG: 325 TABLET, FILM COATED ORAL at 14:56

## 2021-10-24 RX ADMIN — DIPHENHYDRAMINE HYDROCHLORIDE 25 MG: 25 CAPSULE ORAL at 14:53

## 2021-10-24 RX ADMIN — OXYCODONE HYDROCHLORIDE 10 MG: 10 TABLET ORAL at 09:42

## 2021-10-24 RX ADMIN — SULFAMETHOXAZOLE AND TRIMETHOPRIM 1 TABLET: 400; 80 TABLET ORAL at 07:50

## 2021-10-24 ASSESSMENT — ACTIVITIES OF DAILY LIVING (ADL)
ADLS_ACUITY_SCORE: 5
ADLS_ACUITY_SCORE: 4
ADLS_ACUITY_SCORE: 5
ADLS_ACUITY_SCORE: 4
ADLS_ACUITY_SCORE: 5
ADLS_ACUITY_SCORE: 4
ADLS_ACUITY_SCORE: 5
ADLS_ACUITY_SCORE: 4
ADLS_ACUITY_SCORE: 4
ADLS_ACUITY_SCORE: 5
ADLS_ACUITY_SCORE: 4
ADLS_ACUITY_SCORE: 5
ADLS_ACUITY_SCORE: 5
ADLS_ACUITY_SCORE: 4

## 2021-10-24 NOTE — PROGRESS NOTES
"CLINICAL NUTRITION SERVICES - ASSESSMENT NOTE     Nutrition Prescription    RECOMMENDATIONS FOR MDs/PROVIDERS TO ORDER:  None at this time     Malnutrition Status:    Unable to determine    Recommendations already ordered by Registered Dietitian (RD):  Discharge diet order written.     Future/Additional Recommendations:  If suspect eating poorly, would offer supplements and start on kcal cnts.        REASON FOR ASSESSMENT  Raj Pierce is a 50 year old male seen by the dietitian for MD Order- Assess & Educate post-op SOT    NUTRITION HISTORY  Clinical History: end stage kidney disease secondary PCKD s/p kidney transplant (2015) c/b poor function. Other past medical history includes htn, hyperparathyroidism, pckd, and richard (with cpap). Kidney transplant on 10/23.     Spoke with Raj over the phone. Pt reports that he has a good appetite and usually eats 3 meals a day.     CURRENT NUTRITION ORDERS  Diet: Regular  Intake/Tolerance: Pt has eaten fruit so far today.     LABS  Labs reviewed  K+ 4.8 (WNL)  Phos 6.5 (H)  BG 10/23-10/24:     MEDICATIONS  Medications reviewed  Novolog- medium insulin resistance dosing   Methylprednisolone   Mycophenolate   Miralax  Senokot  Tacrolimus  D5 @ 100 mL/hr  Lasix    ANTHROPOMETRICS  Height: 5' 10\"   Most Recent Weight: 90 kg (198 lb 6.6 oz)    IBW: 75.5 kg (119%)   BMI: Overweight BMI 25-29.9  Weight History: Weight stable.   Wt Readings from Last 3 Encounters:   10/24/21 90 kg (198 lb 6.6 oz)   12/14/20 93.3 kg (205 lb 9.6 oz)   12/18/19 73.6 kg (162 lb 3.2 oz)     87.8 kg (193 lb 9.6 oz) 03/03/2021     Dosing Weight: 90 kg    ASSESSED NUTRITION NEEDS:  Estimated Energy Needs: 3997-5205 kcals (30-35 Kcal/Kg)  Justification: increased needs post-op SOT  Estimated Protein Needs: 115-180 grams protein (1.3-2 gm/Kg)  Justification: increased needs post op SOT  Estimated Fluid Needs: 6816-1779  mL (25-30 mL/Kg)  Justification: maintenance, or per MD pending fluid status and " adequate UOP    PHYSICAL FINDINGS  See malnutrition section below.    MALNUTRITION  % Intake: No decreased intake noted  % Weight Loss: None noted  Subcutaneous Fat Loss: Unable to assess  Muscle Loss: Unable to assess  Fluid Accumulation/Edema: Unable to assess  Malnutrition Diagnosis: Unable to determine due to no NFPA     NUTRITION DIAGNOSIS:  Food and nutrition-related knowledge deficit r/t length of time since previous post-transplant education AEB patient verbal report, review of chart record, and MD consult for nutrition education.     INTERVENTIONS  Implementation  Nutrition Education:  Provided instruction on post-transplant diet with discussion regarding protein sources and high protein needs in acute post-tx phase.  Reviewed recommendations to follow low fat/low sodium diet long term and discussed heart healthy diet tips.  Discussed monitoring of K+/Phos lab values with possible need for adjustment of these in the diet as necessary. Reviewed need for food safety precautions to prevent food borne illness.    Goals  1. Patient will verbalize understanding of 3 important aspects of post-transplant diet guidelines.   2. PO intake >50% meals TID.    Monitoring/Evaluation  Progress toward goals will be monitored and evaluated per protocol.    Deborah Inman, RD, LD  6B RD pager [phone]  Weekend RD pager [phone]

## 2021-10-24 NOTE — PLAN OF CARE
Neuro: A&Ox4. PERRLA. Follows commands. Swallows without difficulty.   Cardiac: SR/SB; intermittently HR will stay mid-50's. BP trending down from 120's/70's -- 100's/70's since admission from PACU. On-Call CC notified, see Provider Notification note. CVP's 6-7 overnight. Afebrile. Bruit & Thrill not present on admission; baseline.    Respiratory: Sating >95% on 2L NC. Denies SOB. Unlabored breathing.   GI/: Adequate urine output, spears. No BM.   Diet/appetite: Tolerating clear liquid diet. AAT to Regular diet.   Activity:  Stood at bedside this morning, tolerated well.  Pain: Pain at incision site. PRN dilaudid given x1 & PRN oxycodone given x2 with improvement.   Skin: No new deficits noted. Drainage marked on dressing. No new drainage noted.    LDA's:  - Spears catheter  - 2PIV R arm.  - CVC 4LPICC IJ    Plan: Address serum potassium levels with the Primary team in the AM. Continue with POC. Notify primary team with changes.

## 2021-10-24 NOTE — PHARMACY-ADMISSION MEDICATION HISTORY
Admission Medication History Completed by Pharmacy    See [facility] Admission Navigator for allergy information, preferred outpatient pharmacy, prior to admission medications and immunization status.     Medication History Sources:     Patient interview    Dispense report    Changes made to PTA medication list (reason):    Added:   o Amlodipine 5 MG tablet      Deleted: None      Changed:   o Magnesium Oxide 400 MG capsule --> Magnesium Oxide 250 MG tablet: Take 250 MG by mouth 2 times daily.      Additional Information:    Last home doses of pta meds occurred 10/22 PM    Long-term Bactrim M-W-F dosing    Prior to Admission medications    Medication Sig Last Dose Taking? Auth Provider   amLODIPine (NORVASC) 5 MG tablet Take 5 mg by mouth daily 10/22/2021 Yes Unknown, Entered By History   atorvastatin (LIPITOR) 10 MG tablet Take 10 mg by mouth daily 10/22/2021 Yes Reported, Patient   chlorthalidone (HYGROTON) 25 MG tablet Take 0.5 tablets (12.5 mg) by mouth daily 10/22/2021 Yes Francis Espinoza MD   citalopram (CELEXA) 20 MG tablet Take 20 mg by mouth daily. 10/22/2021 Yes Reported, Patient   losartan (COZAAR) 50 MG tablet Take 1.5 tablets (75 mg) by mouth daily 10/22/2021 Yes Mel Oconnell APRN CNP   Magnesium Oxide 250 MG TABS Take 250 mg by mouth 2 times daily  10/22/2021 Yes Reported, Patient   mycophenolate (GENERIC EQUIVALENT) 250 MG capsule Take 3 capsules (750 mg) by mouth 2 times daily 10/22/2021 Yes Francis Espinoza MD   sodium bicarbonate 650 MG tablet Take 1,300 mg by mouth 2 times daily 10/22/2021 Yes Reported, Patient   sulfamethoxazole-trimethoprim (BACTRIM) 400-80 MG tablet Take 1 tablet by mouth Every Mon, Wed, Fri Morning 10/22/2021 Yes Francis Espinoza MD   tacrolimus (GENERIC EQUIVALENT) 0.5 MG capsule Take 1 capsule (0.5 mg) by mouth every evening Total dose = 2 mg in the AM and 1.5 mg in the PM 10/22/2021 Yes Francis Espinoza MD   tacrolimus (GENERIC EQUIVALENT) 1 MG  capsule Total dose = 2 mg in the AM and 1.5 mg in the PM  Patient taking differently: Take 1 mg by mouth 2 times daily Total dose = 2 mg in the AM and 1.5 mg in the PM 10/22/2021 Yes Francis Espinoza MD   vitamin D3 (CHOLECALCIFEROL) 2000 units (50 mcg) tablet Take 1 tablet by mouth daily 10/22/2021 Yes Reported, Patient         Date completed: 10/24/21    Medication history completed by: Rk Flores

## 2021-10-24 NOTE — PROVIDER NOTIFICATION
"Text paged Surgery CC On-Call 0311  \"(FYI) /76 (MAP 85). . CVP 6.\"    Text paged Surgery CC On-all 0315  \"(FYI) Lab results came back; Potassium 5.5. Previously (2000 10/23) 4.6.\"  - No new interventions ordered. Continue to monitor pt. Team to address changes in the AM.     Text paged Surgery CC On-Call 0421  \"(FYI) UOP decreased from 125 to 105. /70's (MAP 80's).\"  - No new interventions ordered. Continue monitoring pt.     Text paged Surgery CC On-Call 0614  \"(FYI) BP 99/78, CVP 6, HR 60, .\"  - No new interventions ordered. Continue to monitor patient.   "

## 2021-10-24 NOTE — ANESTHESIA POSTPROCEDURE EVALUATION
Patient: Raj Pierce    Procedure: Procedure(s):  TRANSPLANT, KIDNEY, RECIPIENT,  DONOR, URETERAL STENT PLACEMENT       Diagnosis:End stage renal disease (H) [N18.6]  Diagnosis Additional Information: No value filed.    Anesthesia Type:  General    Note:  Disposition: Inpatient   Postop Pain Control: Uneventful            Sign Out: Well controlled pain   PONV: No   Neuro/Psych: Uneventful            Sign Out: Acceptable/Baseline neuro status   Airway/Respiratory: Uneventful            Sign Out: Acceptable/Baseline resp. status   CV/Hemodynamics: Uneventful            Sign Out: Acceptable CV status; No obvious hypovolemia; No obvious fluid overload   Other NRE: NONE   DID A NON-ROUTINE EVENT OCCUR? No           Last vitals:  Vitals Value Taken Time   /67 10/23/21 2120   Temp 35  C (95  F) 10/23/21 2000   Pulse 68 10/23/21 2125   Resp 16 10/23/21 2020   SpO2 96 % 10/23/21 2125   Vitals shown include unvalidated device data.    Electronically Signed By: Pedro Colvin MD  2021  9:26 PM

## 2021-10-24 NOTE — PROGRESS NOTES
10/24/21 1000   Quick Adds   Type of Visit Initial PT Evaluation  (Susy Em, PT, DPT )       Present no   Living Environment   People in home spouse;child(ned), adult   Current Living Arrangements house  (single story - basement avoidable )   Home Accessibility stairs within home   Number of Stairs, Within Home, Primary 4   Stair Railings, Within Home, Primary railing on right side (ascending)   Transportation Anticipated car, drives self;family or friend will provide   Living Environment Comments Lives in single story home with SO (who is a PTA). also has adult children who live with him. has 4 FRANSICO with handrail on the R.    Self-Care   Usual Activity Tolerance good   Current Activity Tolerance poor   Regular Exercise No   Equipment Currently Used at Home none   Activity/Exercise/Self-Care Comment indep with ADLs. no equipment usage pre op    Disability/Function   Hearing Difficulty or Deaf no   Wear Glasses or Blind yes   Vision Management glasses   Concentrating, Remembering or Making Decisions Difficulty no   Difficulty Communicating no   Difficulty Eating/Swallowing no   Walking or Climbing Stairs Difficulty no   Dressing/Bathing Difficulty no   Toileting issues no   Doing Errands Independently Difficulty (such as shopping) no   Fall history within last six months no   Change in Functional Status Since Onset of Current Illness/Injury yes   General Information   Onset of Illness/Injury or Date of Surgery 10/23/21   Referring Physician Georges Mattson MD   Patient/Family Therapy Goals Statement (PT) return home    Pertinent History of Current Problem (include personal factors and/or comorbidities that impact the POC) 50 year old male with a past medical history significant for end stage kidney disease secondary PCKD s/p kidney transplant (2015) c/b poor function. Other past medical history includes htn, hyperparathyroidism, pckd, and richard (with cpap)  Patient was notified as an  acceptable  donor organ became available and presented for further pre-operative work-up.  Patient was informed of the risks and benefits regarding  donor kidney transplantation, and has elected to proceed.   Existing Precautions/Restrictions abdominal   General Observations activity: ambulate    Cognition   Orientation Status (Cognition) oriented x 4   Affect/Mental Status (Cognition) WNL   Follows Commands (Cognition) WNL   Pain Assessment   Patient Currently in Pain Yes, see Vital Sign flowsheet  (pain at incision )   Integumentary/Edema   Integumentary/Edema no deficits were identifed   Integumentary/Edema Comments incision bandaged with no excess drainage    Posture    Posture Forward head position   Range of Motion (ROM)   ROM Quick Adds ROM WNL   Strength   Manual Muscle Testing Quick Adds Strength WNL   Bed Mobility   Comment (Bed Mobility) log roll, SBA    Transfers   Transfer Safety Comments STS with CGA   Gait/Stairs (Locomotion)   Comment (Gait/Stairs) ambulate with CGA, unilateral UE support on IV pole. dec step length and gait speed    Balance   Balance Comments not formally assessed, first time up    Sensory Examination   Sensory Perception WNL   Coordination   Coordination no deficits were identified   Muscle Tone   Muscle Tone no deficits were identified   Clinical Impression   Criteria for Skilled Therapeutic Intervention yes, treatment indicated   PT Diagnosis (PT) dec functional mobility    Influenced by the following impairments pain, post op, deconditioning    Functional limitations due to impairments bed mobility, transfers, gait, stairs, activity tolerance    Clinical Presentation Stable/Uncomplicated   Clinical Presentation Rationale PM, clinical judgement, rohini gómez    Clinical Decision Making (Complexity) moderate complexity   Therapy Frequency (PT) 6x/week   Predicted Duration of Therapy Intervention (days/wks) week   Planned Therapy Interventions (PT) balance  training;bed mobility training;gait training;home exercise program;stair training;strengthening;transfer training;home program guidelines;progressive activity/exercise   Anticipated Equipment Needs at Discharge (PT)   (TBD)   Risk & Benefits of therapy have been explained evaluation/treatment results reviewed;care plan/treatment goals reviewed;risks/benefits reviewed;participants included;patient   Clinical Impression Comments PT eval completed, tx indicated    PT Discharge Planning    PT Discharge Recommendation (DC Rec) home with assist   PT Rationale for DC Rec anaticipate pt will progress to dc home with A. pt lives with spouse who is a PTA. will update as appropriate   PT Brief overview of current status  up with A x 1, abd precautions    Total Evaluation Time   Total Evaluation Time (Minutes) 9

## 2021-10-24 NOTE — PROGRESS NOTES
[facility]  Transplant Nephrology PROGRESS NOTE  Date of Admission:  10/23/2021  Today's Date: 10/24/2021  Requesting physician: Madina Warren MD    Recommendations:  - high risk induction dose #2/3 ATG 2mg/kg today  - hold bactrim given hyperK  - run FK closer to 8 than 10 given slow graft function, hyperk    Assessment & Plan      # DDKT: pre-emptive DCD kidney with prolonged CIT-slow graft function   - Baseline Creatinine: ~ TBD.    - Proteinuria:  Will be checked  per protocol after Transplant    - Date DSA Last Checked: Jul/2018      Latest DSA: No   - BK Viremia: No   - Kidney Tx Biopsy: No  US kidney Tx no collection or hydronephrosis, good flows    ACCESS - his LUE fistula is clotted  He still makes adequate urine  No indication for HD at this time . However should he need RRT post transplant, he is agreeable to it. He signed consent for RRT ( placed in chart)     # Immunosuppression: tacrolimus goal trough (8-10).  mg po bid   - Changes: No   PRA 67.  Received high induction per DDF protocol due to prolonged cold ischemic time, DCD kidney    # Infection Prophylaxis:   - PJP: Sulfa/TMP (Bactrim)  - CMV: Valganciclovir (Valcyte)  - Thrush: None  - Fungal: None    # Hypertension: Controlled;  Goal BP: < 130/80   - Volume status: Euvolemic  EDW ~ 201 lbs   - Changes: No      # Anemia in Chronic Renal Disease: Hgb: Stable      NADIYA: No   - Iron studies: Not checked recently    # Mineral Bone Disorder:   - Secondary renal hyperparathyroidism; PTH level: Not checked recently        On treatment: None  - Vitamin D; level: Not checked recently        On supplement: No  - Calcium; level: Normal        On supplement: No  - Phosphorus; level: Normal        On supplement: No    # Electrolytes:   - Potassium; level: Normal        On supplement: No  - Magnesium; level: Normal        On supplement: No  - Bicarbonate; level: Normal        On supplement: No  -  Sodium; level: Normal      # Transplant History:  Etiology of Kidney Failure: Polycystic kidney disease (PKD)  Tx: DDKT  Transplant: 10/23/2021 (Kidney), 10/13/2015 (Kidney)  Crossmatch at time of Tx: negative  Significant changes in immunosuppression: None  Significant transplant-related complications: None    Recommendations were communicated to the primary team verbally.  Seen and discussed with Dr. Compa Marr MD  Pager: [phone]    INTERVAL HISTORY  Patient doing well, making urine, creatinine trending down.  No shortness of breath or chest pain.  No nausea vomiting.  Some discomfort at operative site.  No BM.  No flatus.    Review of Systems    The 4 point Review of Systems is negative other than noted in the HPI or here.     Allergies   Allergies   Allergen Reactions     Alcohol Rash     Other reaction(s): Rash  Swabs used at hosptial  Swabs used at hosptial     Prior to Admission Medications     acetaminophen  650 mg Oral Once     acetaminophen  975 mg Oral Q8H     atorvastatin  10 mg Oral Daily     citalopram  20 mg Oral Daily     insulin aspart  1-7 Units Subcutaneous TID AC     insulin aspart  1-5 Units Subcutaneous At Bedtime     [START ON 10/25/2021] magnesium oxide  400 mg Oral Daily with lunch     mycophenolate  750 mg Oral BID IS     polyethylene glycol  17 g Oral Daily     senna-docusate  1 tablet Oral BID     sodium chloride (PF)  10 mL Intracatheter Q8H     sulfamethoxazole-trimethoprim  1 tablet Oral Daily     tacrolimus  3 mg Oral BID IS     [START ON 10/25/2021] valGANciclovir  450 mg Oral Once per day on Mon Thu       dextrose 5% lactated ringers 100 mL/hr at 10/24/21 1525     NaCl Stopped (10/24/21 0000)     NaCl 25 mL/hr at 10/24/21 1600     Current Facility-Administered Medications   Medication     acetaminophen (TYLENOL) tablet 650 mg     [START ON 10/26/2021] acetaminophen (TYLENOL) tablet 650 mg     acetaminophen (TYLENOL) tablet 975 mg     atorvastatin (LIPITOR) tablet  10 mg     bisacodyl (DULCOLAX) Suppository 10 mg     citalopram (celeXA) tablet 20 mg     dextrose 5% in lactated ringers infusion     glucose gel 15-30 g    Or     dextrose 50 % injection 25-50 mL    Or     glucagon injection 1 mg     glucose gel 15-30 g    Or     dextrose 50 % injection 25-50 mL    Or     glucagon injection 1 mg     HYDROmorphone (DILAUDID) injection 0.2 mg    Or     HYDROmorphone (DILAUDID) injection 0.4 mg     insulin aspart (NovoLOG) injection (RAPID ACTING)     insulin aspart (NovoLOG) injection (RAPID ACTING)     magnesium hydroxide (MILK OF MAGNESIA) suspension 30 mL     [START ON 10/25/2021] magnesium oxide (MAG-OX) tablet 400 mg     mycophenolate (GENERIC EQUIVALENT) capsule 750 mg     naloxone (NARCAN) injection 0.2 mg    Or     naloxone (NARCAN) injection 0.4 mg    Or     naloxone (NARCAN) injection 0.2 mg    Or     naloxone (NARCAN) injection 0.4 mg     ondansetron (ZOFRAN-ODT) ODT tab 4 mg    Or     ondansetron (ZOFRAN) injection 4 mg     oxyCODONE (ROXICODONE) tablet 5 mg    Or     oxyCODONE IR (ROXICODONE) tablet 10 mg     polyethylene glycol (MIRALAX) Packet 17 g     prochlorperazine (COMPAZINE) injection 10 mg    Or     prochlorperazine (COMPAZINE) tablet 10 mg     senna-docusate (SENOKOT-S/PERICOLACE) 8.6-50 MG per tablet 1 tablet     sodium chloride (PF) 0.9% PF flush 10 mL     sodium chloride (PF) 0.9% PF flush 10-20 mL     sodium chloride 0.45% infusion     sodium chloride 0.9% infusion     sulfamethoxazole-trimethoprim (BACTRIM) 400-80 MG per tablet 1 tablet     tacrolimus (GENERIC EQUIVALENT) capsule 3 mg     [START ON 10/25/2021] valGANciclovir (VALCYTE) tablet 450 mg       Physical Exam   Temp  Av.9  F (36.1  C)  Min: 95  F (35  C)  Max: 97.8  F (36.6  C)      Pulse  Av  Min: 64  Max: 74 Resp  Avg: 15  Min: 14  Max: 16  SpO2  Av %  Min: 97 %  Max: 97 %     /68   Pulse 62   Temp 98.2  F (36.8  C) (Oral)   Resp 16   Wt 90 kg (198 lb 6.6 oz)   SpO2  93%   BMI 28.47 kg/m     Date 10/23/21 0700 - 10/24/21 0659   Shift 9075-7222 0513-8119 7064-4456 24 Hour Total   INTAKE   I.V. 1000 1800  2800   IV Piggyback  860  860   Shift Total(mL/kg) 1000(10.95) 2660(29.13)  3660(40.08)   OUTPUT   Urine  1150  1150   Shift Total(mL/kg)  1150(12.59)  1150(12.59)   Weight (kg) 91.31 91.31 91.31 91.31      Admit Weight: 91.3 kg (201 lb 4.8 oz)     GENERAL APPEARANCE: alert and no distress  HENT: mouth without ulcers or lesions  LYMPHATICS: no cervical or supraclavicular nodes  RESP: lungs clear to auscultation - no rales, rhonchi or wheezes  CV: regular rhythm, normal rate, no rub, no murmur  EDEMA: no LE edema bilaterally  ABDOMEN: Surgical dressing CDI.  Mild tenderness.  No guarding or rigidity.  Bowel sounds hypoactive   MS: extremities normal - no gross deformities noted, no evidence of inflammation in joints, no muscle tenderness  SKIN: no rash  Access -clotted left AV fistula.    Data   CMP  Recent Labs   Lab 10/24/21  1603 10/24/21  1512 10/24/21  1405 10/24/21  1402 10/24/21  1306 10/24/21  1223 10/24/21  1136 10/24/21  1020 10/24/21  0954 10/24/21  0849 10/24/21  0816 10/24/21  0636 10/24/21  0632 10/24/21  0234 10/23/21  2002 10/23/21  1647 10/23/21  1500 10/23/21  1500 10/23/21  0358 10/23/21  0358   NA  --   --   --   --   --   --   --   --   --   --   --   --  138  --  139 140  --  143  --  141   POTASSIUM  --   --  4.8  --   --   --  4.8  --  4.7  --  5.4*   < > 5.5*   < > 4.6 4.6   < > 4.4   < > 4.5   CHLORIDE  --   --   --   --   --   --   --   --   --   --   --   --  106  --  107  --   --   --   --  108   CO2  --   --   --   --   --   --   --   --   --   --   --   --  23  --  22  --   --   --   --  26   ANIONGAP  --   --   --   --   --   --   --   --   --   --   --   --  9  --  10  --   --   --   --  7   * 122*  --  142* 146*   < >  --    < >  --    < >  --    < > 218*  --  166* 174*   < > 94   < > 92   BUN  --   --   --   --   --   --   --   --   --    --   --   --  64*  --  61*  --   --   --   --  60*   CR  --   --   --   --   --   --   --   --   --   --   --   --  4.44*  --  3.97*  --   --   --   --  3.90*   GFRESTIMATED  --   --   --   --   --   --   --   --   --   --   --   --  14*  --  16*  --   --   --   --  17*   LILIANA  --   --   --   --   --   --   --   --   --   --   --   --  8.7  --  9.9  --   --   --   --  8.5   MAG  --   --   --   --   --   --   --   --   --   --   --   --  1.8  --  1.7  --   --   --   --   --    PHOS  --   --   --   --   --   --   --   --   --   --   --   --  6.5*  --  6.0*  --   --   --   --   --    PROTTOTAL  --   --   --   --   --   --   --   --   --   --   --   --   --   --   --   --   --   --   --  6.5*   ALBUMIN  --   --   --   --   --   --   --   --   --   --   --   --   --   --   --   --   --   --   --  3.6   BILITOTAL  --   --   --   --   --   --   --   --   --   --   --   --   --   --   --   --   --   --   --  0.6   ALKPHOS  --   --   --   --   --   --   --   --   --   --   --   --   --   --   --   --   --   --   --  56   AST  --   --   --   --   --   --   --   --   --   --   --   --   --   --   --   --   --   --   --  23   ALT  --   --   --   --   --   --   --   --   --   --   --   --   --   --   --   --   --   --   --  24    < > = values in this interval not displayed.     CBC  Recent Labs   Lab 10/24/21  1405 10/24/21  0954 10/24/21  0632 10/24/21  0234 10/23/21  2002 10/23/21  2002 10/23/21  1500 10/23/21  0358   HGB 10.4* 11.0* 11.5* 11.2*   < > 12.1*   < > 11.8*   WBC  --   --  11.5*  --   --  6.8  --  6.7   RBC  --   --  4.03*  --   --  4.33*  --  4.21*   HCT  --   --  36.6*  --   --  38.6*  --  37.1*   MCV  --   --  91  --   --  89  --  88   MCH  --   --  28.5  --   --  27.9  --  28.0   MCHC  --   --  31.4*  --   --  31.3*  --  31.8   RDW  --   --  12.7  --   --  12.3  --  12.2   PLT  --   --  266  --   --  236  --  264    < > = values in this interval not displayed.     INR  Recent Labs   Lab 10/23/21  0358   INR 1.07    PTT 28     ABG  Recent Labs   Lab 10/23/21  1647 10/23/21  1500   O2PER 42.0 33.0      Urine Studies  Recent Labs   Lab Test 10/23/21  0526 11/09/15  1300 10/28/15  0702 10/17/15  0500   COLOR Straw Yellow Yellow Light Yellow   APPEARANCE Clear Clear Clear Clear   URINEGLC Negative Negative Negative Negative   URINEBILI Negative Negative Negative Negative   URINEKETONE Negative Negative Negative Negative   SG 1.009 1.008 1.008 1.008   UBLD Negative Negative Negative Large*   URINEPH 7.0 5.5 6.5 5.5   PROTEIN 50 * 10* 10* 30*   NITRITE Negative Negative Negative Negative   LEUKEST Negative Negative Negative Negative   RBCU 0 <1 1 >182*   WBCU 1 1 1 3*     Recent Labs   Lab Test 10/23/21  0526 06/05/17  1253 01/25/16  1602 10/28/15  0702 10/13/15  1035   UTPG 1.37* 1.30* 0.36* 0.45* 0.55*     PTH  Recent Labs   Lab Test 08/01/16  1638 01/25/16  1601 10/15/15  0520   PTHI 90* 121* 434*     Iron Studies  Recent Labs   Lab Test 10/17/15  0455   IRON 13*      IRONSAT 5*       IMAGING:  All imaging studies reviewed by me.    This patient has been seen and evaluated by me, Alondra Roy MD.  I have reviewed the note and agree with plan of care as documented by the fellow.  Alondra Roy MD

## 2021-10-24 NOTE — PROGRESS NOTES
Patient removed from [facility] waitlist after DCD donor Right Ki transplant. [facility] ID [id number].    Donor Has Risk Criteria for Transmission of HIV/HCV/HBV: NO  Recipient Notified of Risk Criteria: N/A    Gilberto Taylor RN on 10/23/2021 at 11:59 PM

## 2021-10-24 NOTE — PROGRESS NOTES
"Transplant Surgery  Inpatient Daily Progress Note  10/24/2021    Assessment & Plan: Raj Pierce is a 50 year old male with a past medical history significant for end stage kidney disease secondary to PCKD s/p kidney transplant () c/b poor function (small graft). Other past medical history includes HTN, hyperparathyroidism, and SILVIA (with cpap). Now admitted for pre-emptive  donor (DCD) kidney re-transplant on 10/23 with Dr. Warren (ureteral stent placed).    Graft function: DCD donor with longer cold ischemia time, expecting slow or delayed graft function. Patient making good urine, though he did make urine beforehand. Continues on lasix gtt to help minimize hyperkalemia. K has normalized at this time, will stop lasix gtt.  Immunosuppression management: PRA of 67, but will bump patient to \"high risk\" immunosuppression protocol due to possibility of DGF.  Thymo 175/175 mg  Solu-Medrol 500/250 mg  Tacrolimus: PTA dose 2mg qAM/1.5 mg qPM, increased to 3 mg BID. Goal 8-10  MMF: 750 mg BID  Complexity of management:Medium. Contributing factors: organ dysfunction  Hematology: Hgb with slow drift down, latest 10.4  Cardiorespiratory: Slightly lower pressures, giving additional fluids. Had continued on lasix gtt earlier to mitigate hyperkalemia, will stop at this time since potassium has been stable and blood pressures have been running a little lower.  GI/Nutrition: Advance diet as tolerated.  Endocrine: Steroid induced hyperkalemia, sliding scale insulin ordered  Fluid/Electrolytes: MIVF:Continue IVF with urine replacements  : Wade to remain due to new surgical anastomosis  Infectious disease: Ppx with valcyte, bactrim  Prophylaxis: DVT, fall, GI, fungal  Disposition: 6B     Medical Decision Making: Medium  Subsequent visit 08766 (moderate level decision making)    JATINDER/Fellow/Resident Provider: Gudelia Ocampo PA-C    Faculty: Madina Warren, " MD  _________________________________________________________________  Transplant History: Admitted 10/23/2021 for  donor kidney re-transplant.  10/23/2021 (Kidney), 10/13/2015 (Kidney), Postoperative day:  (Kidney), 1 (Kidney)     Interval History: History is obtained from the patient  Overnight events: Good urine output, though made urine before. Feeling OK overall.    ROS:   A 10-point review of systems was negative except as noted above.    Meds:    acetaminophen  650 mg Oral Once     acetaminophen  975 mg Oral Q8H     anti-thymocyte globulin  175 mg Intravenous Central line Once     atorvastatin  10 mg Oral Daily     citalopram  20 mg Oral Daily     diphenhydrAMINE  25 mg Oral Once    Or     diphenhydrAMINE  25 mg Intravenous Once     insulin aspart  1-7 Units Subcutaneous TID AC     insulin aspart  1-5 Units Subcutaneous At Bedtime     [START ON 10/25/2021] magnesium oxide  400 mg Oral Daily with lunch     methylPREDNISolone  250 mg Intravenous Once     mycophenolate  750 mg Oral BID IS     polyethylene glycol  17 g Oral Daily     senna-docusate  1 tablet Oral BID     sodium chloride (PF)  10 mL Intracatheter Q8H     sulfamethoxazole-trimethoprim  1 tablet Oral Daily     tacrolimus  3 mg Oral BID IS     [START ON 10/25/2021] valGANciclovir  450 mg Oral Once per day on Mon Thu       Physical Exam:     Admit Weight: 91.3 kg (201 lb 4.8 oz)    Current vitals:   /78   Pulse 65   Temp 98  F (36.7  C) (Oral)   Resp 16   Wt 90 kg (198 lb 6.6 oz)   SpO2 95%   BMI 28.47 kg/m      CVP (mmHg): 7 mmHg    Vital sign ranges:    Temp:  [95  F (35  C)-99  F (37.2  C)] 98  F (36.7  C)  Pulse:  [54-79] 65  Resp:  [12-22] 16  BP: ()/(65-89) 108/78  SpO2:  [94 %-99 %] 95 %  Patient Vitals for the past 24 hrs:   BP Temp Temp src Pulse Resp SpO2 Weight   10/24/21 1400 108/78 -- -- 65 16 95 % --   10/24/21 1300 99/70 -- -- 63 16 95 % --   10/24/21 1200 101/66 -- -- 64 16 95 % --   10/24/21 1100 105/76  -- -- 73 18 95 % --   10/24/21 1000 100/69 -- -- 64 16 95 % --   10/24/21 0900 100/78 -- -- 68 16 98 % --   10/24/21 0800 113/82 -- -- 65 16 98 % --   10/24/21 0738 105/73 98  F (36.7  C) Oral 62 18 98 % --   10/24/21 0700 105/73 -- -- 62 -- 97 % --   10/24/21 0600 99/78 -- -- 57 15 98 % 90 kg (198 lb 6.6 oz)   10/24/21 0500 105/72 98.1  F (36.7  C) Oral 54 16 96 % --   10/24/21 0400 104/77 98.7  F (37.1  C) Oral 57 16 97 % --   10/24/21 0300 111/76 -- -- 55 18 97 % --   10/24/21 0200 123/75 -- -- 56 16 98 % --   10/24/21 0100 121/88 98.1  F (36.7  C) Oral 67 16 97 % --   10/24/21 0000 117/81 -- -- 75 20 99 % --   10/23/21 2341 135/89 98.7  F (37.1  C) Oral 66 -- -- --   10/23/21 2320 -- 98.1  F (36.7  C) Core -- -- -- --   10/23/21 2300 115/77 -- -- 73 18 95 % --   10/23/21 2250 108/78 -- -- 69 16 94 % --   10/23/21 2240 111/75 -- -- 76 12 94 % --   10/23/21 2230 110/78 99  F (37.2  C) Core 67 12 94 % --   10/23/21 2220 107/71 -- -- 67 16 96 % --   10/23/21 2210 119/81 -- -- 62 16 95 % --   10/23/21 2200 113/76 -- -- 65 14 96 % --   10/23/21 2150 105/70 -- -- 74 22 96 % --   10/23/21 2140 98/70 -- -- 73 14 95 % --   10/23/21 2130 110/74 98.4  F (36.9  C) Core 71 18 95 % --   10/23/21 2120 102/67 -- -- 76 12 96 % --   10/23/21 2110 105/65 -- -- 73 12 96 % --   10/23/21 2100 98/68 -- -- 73 12 96 % --   10/23/21 2050 99/66 97.5  F (36.4  C) -- 68 16 96 % --   10/23/21 2040 110/80 -- -- 79 -- 94 % --   10/23/21 2030 100/68 -- -- 64 -- 95 % --   10/23/21 2020 93/69 -- -- 70 16 95 % --   10/23/21 2010 112/73 -- -- 68 14 94 % --   10/23/21 2000 110/70 (!) 95  F (35  C) Core 68 14 94 % --   10/23/21 1950 112/77 -- -- 72 -- 96 % --   10/23/21 1940 115/72 -- -- -- -- -- --     General Appearance: in no apparent distress.   Skin: normal, warm  Heart: regular rate and rhythm  Lungs: Nonlabored resps on RA  Abdomen: The abdomen is soft, and non-tender. LLQ incision covered with primapore.  : spears is present.  Urine has no  gross hematuria.   Extremities: edema: absent.   Neurologic: awake, alert and oriented. Tremor absent.     Data:   CMP  Recent Labs   Lab 10/24/21  1402 10/24/21  1306 10/24/21  1223 10/24/21  1136 10/24/21  1020 10/24/21  0954 10/24/21  0636 10/24/21  0632 10/24/21  0234 10/23/21  2002 10/23/21  1647 10/23/21  1500 10/23/21  1500 10/23/21  0358 10/23/21  0358   NA  --   --   --   --   --   --   --  138  --  139 140   < > 143   < > 141   POTASSIUM  --   --   --  4.8  --  4.7   < > 5.5*   < > 4.6 4.6   < > 4.4   < > 4.5   CHLORIDE  --   --   --   --   --   --   --  106  --  107  --   --   --    < > 108   CO2  --   --   --   --   --   --   --  23  --  22  --   --   --    < > 26   * 146*   < >  --    < >  --    < > 218*  --  166* 174*   < > 94   < > 92   BUN  --   --   --   --   --   --   --  64*  --  61*  --   --   --    < > 60*   CR  --   --   --   --   --   --   --  4.44*  --  3.97*  --   --   --    < > 3.90*   GFRESTIMATED  --   --   --   --   --   --   --  14*  --  16*  --   --   --    < > 17*   LILIANA  --   --   --   --   --   --   --  8.7  --  9.9  --   --   --    < > 8.5   ICAW  --   --   --   --   --   --   --   --   --   --  4.6  --  4.6  --   --    MAG  --   --   --   --   --   --   --  1.8  --  1.7  --   --   --   --   --    PHOS  --   --   --   --   --   --   --  6.5*  --  6.0*  --   --   --   --   --    ALBUMIN  --   --   --   --   --   --   --   --   --   --   --   --   --   --  3.6   BILITOTAL  --   --   --   --   --   --   --   --   --   --   --   --   --   --  0.6   ALKPHOS  --   --   --   --   --   --   --   --   --   --   --   --   --   --  56   AST  --   --   --   --   --   --   --   --   --   --   --   --   --   --  23   ALT  --   --   --   --   --   --   --   --   --   --   --   --   --   --  24    < > = values in this interval not displayed.     CBC  Recent Labs   Lab 10/24/21  1405 10/24/21  0954 10/24/21  0632 10/24/21  0632 10/24/21  0234 10/23/21  2002 10/23/21  1500 10/23/21  0358   B  10.4* 11.0*   < > 11.5*   < > 12.1*   < > 11.8*   WBC  --   --   --  11.5*  --  6.8  --  6.7   PLT  --   --   --  266  --  236  --  264   A1C  --   --   --   --   --   --   --  5.6    < > = values in this interval not displayed.     COAGS  Recent Labs   Lab 10/23/21  0358   INR 1.07   PTT 28      Urinalysis  Recent Labs   Lab Test 10/23/21  0526 06/05/17  1253 01/25/16  1602 11/09/15  1300   COLOR Straw  --   --  Yellow   APPEARANCE Clear  --   --  Clear   URINEGLC Negative  --   --  Negative   URINEBILI Negative  --   --  Negative   URINEKETONE Negative  --   --  Negative   SG 1.009  --   --  1.008   UBLD Negative  --   --  Negative   URINEPH 7.0  --   --  5.5   PROTEIN 50 *  --   --  10*   NITRITE Negative  --   --  Negative   LEUKEST Negative  --   --  Negative   RBCU 0  --   --  <1   WBCU 1  --   --  1   UTPG 1.37* 1.30*   < >  --     < > = values in this interval not displayed.     Virology:  CMV DNA Quantitation Specimen   Date Value Ref Range Status   08/01/2016   Corrected    Plasma  CORRECTED ON 08/02 AT 1044: PREVIOUSLY REPORTED AS Blood       CMV IgG Antibody   Date Value Ref Range Status   07/10/2013 <0.20  Negative for anti-CMV IgG U/mL Final     EBV VCA IgG Antibody   Date Value Ref Range Status   07/10/2013 >750.00  Positive, suggests immunologic exposure. U/mL Final     Hepatitis C Antibody   Date Value Ref Range Status   10/23/2021 Nonreactive Nonreactive Final   04/25/2016  NR Final    Nonreactive   Assay performance characteristics have not been established for newborns,   infants, and children       Hep B Surface Loni   Date Value Ref Range Status   07/10/2013 0.0  Final     Comment:     Negative, No antibody detected when the value is less than 5.0 mlU/mL.     BK Virus Result   Date Value Ref Range Status   06/05/2017 BK Virus DNA Not Detected BKNEG copies/mL Final   10/28/2015 BK Virus DNA Not Detected BKNEG copies/mL Final

## 2021-10-24 NOTE — PROVIDER NOTIFICATION
[id number] Notified Surgery cross cover Re:  -/72 (81), HR 52-56. UOP 160mL/last hour.   -No interventions ordered at this time.

## 2021-10-24 NOTE — PROGRESS NOTES
Admission          10/23/2021  23:41  -----------------------------------------------------------  Reason for admission:  Primary team notified of pt arrival.  Admitted from:  Via: 6B bed.   Accompanied by: Spouse.  Belongings: Placed in closet; valuables sent home with family.  Admission Profile: complete.   Teaching: orientation to call light- call light within reach, call don't fall, use of console, meal times, when to call for the RN, and enforced importance of safety.  Access: PIV  Telemetry:Placed on pt.  Ht./Wt.: complete.  Code Status verified on armband: yes.  2 RN Skin Assessment Completed By: Angelia PIÑA  Med Rec completed: yes.  Bed surface reassessed with algorithm and charted: yes.  New bed surface ordered: no.  Suction/Ambu bag/Flowmeter at bedside: yes.    Pt status:  VSS. Afebrile. Continuous fluids rate/dose verified (see eMAR).

## 2021-10-24 NOTE — CONSULTS
[facility]  Transplant Nephrology Consult  Date of Admission:  10/23/2021  Today's Date: 10/23/2021  Requesting physician: Madina Warren MD    Recommendations:  Patient remains hemodynamically stable and OK to proceed with DDKT       Assessment & Plan      # DDKT: failed , now admmitted for repeat DDKT today    - Baseline Creatinine: ~ TBD.    - Proteinuria:  Will be checked  per protocol after Transplant    - Date DSA Last Checked: Jul/2018      Latest DSA: No   - BK Viremia: No   - Kidney Tx Biopsy: 10/28/15, mild, resolving ATN, other findings consistent with dysplastic kidney.  ACCESS - his LUE fistula is clotted  He still makes adequate urine  No indication for HD at this time . However should he need RRT post transplant, he is agreeable to it. He signed concent for RRT ( placed in chart)     # Immunosuppression: Tacrolimus immediate release (goal 4-6) and Mycophenolate mofetil (dose goal 1-3.5)   - Changes: Yes - patient schedduled for DDKT . PRA 67.     # Infection Prophylaxis:   - PJP: None  - CMV: None  - Thrush: None  - Fungal: None    # Hypertension: Controlled;  Goal BP: < 130/80   - Volume status: Euvolemic  EDW ~ 201 lbs   - Changes: No      # Anemia in Chronic Renal Disease: Hgb: Stable      NADIYA: No   - Iron studies: Not checked recently    # Mineral Bone Disorder:   - Secondary renal hyperparathyroidism; PTH level: Not checked recently        On treatment: None  - Vitamin D; level: Not checked recently        On supplement: No  - Calcium; level: Normal        On supplement: No  - Phosphorus; level: Normal        On supplement: No    # Electrolytes:   - Potassium; level: Normal        On supplement: No  - Magnesium; level: Normal        On supplement: No  - Bicarbonate; level: Normal        On supplement: No  - Sodium; level: Normal      # Transplant History:  Etiology of Kidney Failure: Polycystic kidney disease (PKD)  Tx: DDKT  Transplant:  10/23/2021 (Kidney), 10/13/2015 (Kidney)  Crossmatch at time of Tx: negative  Significant changes in immunosuppression: None  Significant transplant-related complications: None    Recommendations were communicated to the primary team via this note.    Seen and discussed with Dr. Compa Marr MD  Pager: [phone]    REASON FOR CONSULT   DDKT and IS management    History of Present Illness   Raj Pierce is a 50 year old male with h/o DDKT in  , which failed . 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   No anginal or anginal equivalent symptoms       Review of Systems    The 10 point Review of Systems is negative other than noted in the HPI or here.     Past Medical History    I have reviewed this patient's medical history and updated it with pertinent information if needed.   Past Medical History:   Diagnosis Date    Acidosis     Chronic kidney disease, stage IV (severe) (H)     Disorders of phosphorus metabolism     HTN (hypertension)     15 years    Hyperparathyroidism     Polycystic kidney, autosomal dominant     Pure hypercholesterolemia     Sleep apnea     cpap    Vitamin D deficiency        Past Surgical History   I have reviewed this patient's surgical history and updated it with pertinent information if needed.  Past Surgical History:   Procedure Laterality Date    BENCH KIDNEY N/A 10/13/2015    Procedure: BENCH KIDNEY;  Surgeon: Dariel Chavez MD;  Location: [facility] OR    CYSTOSCOPY, REMOVE STENT(S), COMBINED Right 2015    Procedure: COMBINED CYSTOSCOPY, REMOVE STENT(S);  Surgeon: Dariel Chavez MD;  Location: [facility] OR    HERNIORRHAPHY INGUINAL CHILD      right    TONSILLECTOMY      TRANSPLANT KIDNEY RECIPIENT  DONOR N/A 10/13/2015    Procedure: TRANSPLANT KIDNEY RECIPIENT  DONOR;  Surgeon: Dariel Chavez MD;  Location: [facility] OR       Family History   I have reviewed this patient's family  history and updated it with pertinent information if needed.   Family History   Problem Relation Age of Onset    Gastrointestinal Disease Mother         ESRD PCKD    Hypertension Mother     C.A.D. Father     Hypertension Father     Gastrointestinal Disease Sister         PCKD       Social History   I have reviewed this patient's social history and updated it with pertinent information if needed. Raj Pierce  reports that he has never smoked. He has never used smokeless tobacco. He reports current alcohol use of about 10.0 standard drinks of alcohol per week. He reports that he does not use drugs.    Allergies   Allergies   Allergen Reactions    Alcohol Rash     Other reaction(s): Rash  Swabs used at hosptial  Swabs used at hosptial     Prior to Admission Medications    sodium chloride (PF)  10 mL Intracatheter Q8H      dextrose 5% lactated ringers      furosemide (LASIX) infusion ADULT STANDARD      lactated ringers      NaCl      NaCl         Physical Exam   Temp  Av.9  F (36.1  C)  Min: 95  F (35  C)  Max: 97.8  F (36.6  C)      Pulse  Av  Min: 64  Max: 74 Resp  Avg: 15  Min: 14  Max: 16  SpO2  Av %  Min: 97 %  Max: 97 %     BP (!) 134/97 (BP Location: Right arm)   Pulse 64   Temp (!) 95  F (35  C) (Core)   Resp 14   Wt 91.3 kg (201 lb 4.8 oz)   SpO2 97%   BMI 28.88 kg/m     Date 10/23/21 0700 - 10/24/21 0659   Shift 7749-7591 8191-3749 4456-9147 24 Hour Total   INTAKE   I.V. 1000 1800  2800   IV Piggyback  860  860   Shift Total(mL/kg) 1000(10.95) 2660(29.13)  3660(40.08)   OUTPUT   Urine  1150  1150   Shift Total(mL/kg)  1150(12.59)  1150(12.59)   Weight (kg) 91.31 91.31 91.31 91.31      Admit Weight: 91.3 kg (201 lb 4.8 oz)     GENERAL APPEARANCE: alert and no distress  HENT: mouth without ulcers or lesions  LYMPHATICS: no cervical or supraclavicular nodes  RESP: lungs clear to auscultation - no rales, rhonchi or wheezes  CV: regular rhythm, normal rate, no rub, no murmur  EDEMA: no LE  edema bilaterally  ABDOMEN: soft, nondistended, nontender, bowel sounds normal  MS: extremities normal - no gross deformities noted, no evidence of inflammation in joints, no muscle tenderness  SKIN: no rash    Data   CMP  Recent Labs   Lab 10/23/21  1647 10/23/21  1500 10/23/21  0358    143 141   POTASSIUM 4.6 4.4 4.5   CHLORIDE  --   --  108   CO2  --   --  26   ANIONGAP  --   --  7   * 94 92   BUN  --   --  60*   CR  --   --  3.90*   GFRESTIMATED  --   --  17*   LILIANA  --   --  8.5   PROTTOTAL  --   --  6.5*   ALBUMIN  --   --  3.6   BILITOTAL  --   --  0.6   ALKPHOS  --   --  56   AST  --   --  23   ALT  --   --  24     CBC  Recent Labs   Lab 10/23/21  2002 10/23/21  1647 10/23/21  1500 10/23/21  0358   HGB 12.1* 11.6* 11.8* 11.8*   WBC 6.8  --   --  6.7   RBC 4.33*  --   --  4.21*   HCT 38.6*  --   --  37.1*   MCV 89  --   --  88   MCH 27.9  --   --  28.0   MCHC 31.3*  --   --  31.8   RDW 12.3  --   --  12.2     --   --  264     INR  Recent Labs   Lab 10/23/21  0358   INR 1.07   PTT 28     ABG  Recent Labs   Lab 10/23/21  1647 10/23/21  1500   O2PER 42.0 33.0      Urine Studies  Recent Labs   Lab Test 10/23/21  0526 11/09/15  1300 10/28/15  0702 10/17/15  0500   COLOR Straw Yellow Yellow Light Yellow   APPEARANCE Clear Clear Clear Clear   URINEGLC Negative Negative Negative Negative   URINEBILI Negative Negative Negative Negative   URINEKETONE Negative Negative Negative Negative   SG 1.009 1.008 1.008 1.008   UBLD Negative Negative Negative Large*   URINEPH 7.0 5.5 6.5 5.5   PROTEIN 50 * 10* 10* 30*   NITRITE Negative Negative Negative Negative   LEUKEST Negative Negative Negative Negative   RBCU 0 <1 1 >182*   WBCU 1 1 1 3*     Recent Labs   Lab Test 06/05/17  1253 01/25/16  1602 10/28/15  0702 10/13/15  1035   UTPG 1.30* 0.36* 0.45* 0.55*     PTH  Recent Labs   Lab Test 08/01/16  1638 01/25/16  1601 10/15/15  0520   PTHI 90* 121* 434*     Iron Studies  Recent Labs   Lab Test 10/17/15  045    IRON 13*      IRONSAT 5*       IMAGING:  All imaging studies reviewed by me.    This patient has been seen and evaluated by me, Alondra Roy MD.  I have reviewed the note and agree with plan of care as documented by the fellow.  Alondra Roy MD

## 2021-10-24 NOTE — ANESTHESIA CARE TRANSFER NOTE
Patient: Raj Pierce    Procedure: Procedure(s):  TRANSPLANT, KIDNEY, RECIPIENT,  DONOR, URETERAL STENT PLACEMENT       Diagnosis: End stage renal disease (H) [N18.6]  Diagnosis Additional Information: No value filed.    Anesthesia Type:   General     Note:    Oropharynx: spontaneously breathing  Level of Consciousness: awake  Oxygen Supplementation: nasal cannula    Independent Airway: airway patency satisfactory and stable  Dentition: dentition unchanged  Vital Signs Stable: post-procedure vital signs reviewed and stable  Report to RN Given: handoff report given  Patient transferred to: PACU    Handoff Report: Identifed the Patient, Identified the Reponsible Provider, Reviewed the pertinent medical history, Discussed the surgical course, Reviewed Intra-OP anesthesia mangement and issues during anesthesia, Set expectations for post-procedure period and Allowed opportunity for questions and acknowledgement of understanding      Vitals:  Vitals Value Taken Time   /72 10/23/21 1942   Temp     Pulse 77 10/23/21 1945   Resp     SpO2 92 % 10/23/21 1945   Vitals shown include unvalidated device data.    Electronically Signed By: Flavio Mejia MD  2021  7:46 PM

## 2021-10-24 NOTE — TELEPHONE ENCOUNTER
Organ Offer Encounter Information    Organ Offer Information  Organ offer date & time: 10/22/2021  6:50 PM  Coordinator/Fellow/Attending name: Payton Jeff, RN   Organ(s):  Organ UNOS ID Match Run ID Comment Organ Laterality   Kidney [id number] IAOP       Recent infections?: No    New medications?: No Recent pregnancy?: No (Comment: NA)   Angicoagulation medications?: No Recent vaccinations?: Yes (Comment: Terrence and [employer]rence COVID vaccine 10-14-21)   Recent blood transfusions?: No Recent hospitalizations?: No   Has your insurance changed in the last 6-12 months?: Neg    Discussed organ offer with: Patient  Discussed risk category with Patient/Other: DCD  Did not understand donor criteria, questions answered, verbalized understanding  Patient/Other asked to speak to a surgeon?: No  Discussed program-specific outcomes: Did not have questions regarding SRTR  Right to decline organ offer without penalty, Patient/Other: Aware of option to decline without penalty  Organ offer decision status Patient/Other: Accepted Offer  Organ disposition: Transplanted  Additional Comments: 10/22/2021 6:53 PM  Kidney: Primary without choice  MD: Kelvin/Twila  [facility] Contact: UNTAYLER/Sharon  VXM Results: Compatible. DSA in peak date to C7  XM Plan (FXM must be done with serum no older than 10 days from transplant):  Would be admitted and run XM once kidney would arrive if accepted  Plan (Admission, NPO, Donor OR): Called patient regarding primary DCD kidney offer. Donor OR 1830. Explained we would wait on biopsy and pump results before accepting kidney. Patient understood- discussing with family and will call back with answer. Provided contact information.  - - -   COVID Screening  In the past month, have you had:  Any close contact with a suspect or laboratory-confirmed COVID-19 patient: No  Travel anywhere: [place]drive  COVID Symptoms (Fever, Cough, Short of Breath, Loss of Taste/Smell, Rash): Magalis Cain  Randee  Transplant Coordinator    10/22/2021 7:16 PM:  Patient called back and wants to proceed with offer. Plan to make NPO at midnight. Will call patient back with updates- likely around midnight.   Payton Jeff  Transplant Coordinator    Donor OR Time: 1830  Procuring MD: Dr. Still  Contact in the OR: UNOS-Sharon  Organs Being Procured: KI  Flush Solution: [facility]  Biopsy: Yes  Pump: Yes  Special Requests (Special blood tubes, nodes, waivers): None  MD for Visualization: Twila  Transportation Details: Pending acceptance    10/22/2021 11:18 PM:  Reviewed biopsy and anatomy with Dr. Mattson. Accepting RIGHT kidney for recipient. Plan to set OR for 0600. Run FXM when kidney arrives. Imelda from [facility] [employer] setting up transportation with Sharon from [facility]. Called patient and provided admission instructions- admit ASAP.    Admissions: [address]-- ETA 0130  Unit: 7[address]- [address]-- Needs STAT FXM and COVID  Update Provider Entering Orders (XM Plan & COVID Testing): Paged [phone] CHASIDY RODRIGUEZ [ Msg Id [id number] ]- Call back [phone]- Needs stat covid and XM  Immunology: Payton Vallecillo  Inpatient Lab (COVID Testing [phone], Option 2): [place] 0150  Book OR: [place] 0017  Blood Bank: 0142 Done  TransNet/ABO Verification: Done 2334  Add Organ: Done 2320    Payton Jeff  Transplant Coordinator    10/22/2021 11:54 PM:  Due to logistics- requested kidney be placed on ice- not on pump for travel-discussed with Dr. Mattson. Would add CIT and delay XM by having pumped.   Payton Jeff  Transplant Coordinator    10/23/2021 1:41 AM:  OPO moved kidneys to their main office--extra 2.5 hrs away and had delays in transportation. Imelda at [facility] [employer] [facility] obtained a new - ETA 0800. Moved OR to 1200 to accommodate XM time.  Payton Jeff  Transplant Coordinator    10/23/2021 3:53 AM:  ETA 0800. Lovely Job ID [id number]   Payton Jeff  Transplant Coordinator      Attestation I have discussed all of the above with the  Patient/Legal Guardian/Caregiver regarding this organ offer.: Yes  Coordinator/Fellow/Attending name: Payton Jeff RN

## 2021-10-25 ENCOUNTER — DOCUMENTATION ONLY (OUTPATIENT)
Dept: TRANSPLANT | Facility: CLINIC | Age: 51
End: 2021-10-25

## 2021-10-25 ENCOUNTER — TELEPHONE (OUTPATIENT)
Dept: TRANSPLANT | Facility: CLINIC | Age: 51
End: 2021-10-25

## 2021-10-25 ENCOUNTER — LAB REQUISITION (OUTPATIENT)
Dept: LAB | Facility: CLINIC | Age: 51
End: 2021-10-25
Payer: COMMERCIAL

## 2021-10-25 ENCOUNTER — APPOINTMENT (OUTPATIENT)
Dept: PHYSICAL THERAPY | Facility: CLINIC | Age: 51
End: 2021-10-25
Attending: SURGERY
Payer: COMMERCIAL

## 2021-10-25 LAB
ANION GAP SERPL CALCULATED.3IONS-SCNC: 9 MMOL/L (ref 3–14)
ATRIAL RATE - MUSE: 65 BPM
BASOPHILS # BLD AUTO: 0 10E3/UL (ref 0–0.2)
BASOPHILS NFR BLD AUTO: 0 %
BKV DNA # SPEC NAA+PROBE: NOT DETECTED COPIES/ML
BUN SERPL-MCNC: 67 MG/DL (ref 7–30)
CALCIUM SERPL-MCNC: 7.9 MG/DL (ref 8.5–10.1)
CHLORIDE BLD-SCNC: 106 MMOL/L (ref 94–109)
CMV IGG SERPL IA-ACNC: <0.2 U/ML
CMV IGG SERPL IA-ACNC: NORMAL
CO2 SERPL-SCNC: 24 MMOL/L (ref 20–32)
CREAT SERPL-MCNC: 4.15 MG/DL (ref 0.66–1.25)
DIASTOLIC BLOOD PRESSURE - MUSE: NORMAL MMHG
DONOR IDENTIFICATION: NORMAL
DSA COMMENTS: NORMAL
DSA PRESENT: NO
DSA TEST METHOD: NORMAL
EBV VCA IGG SER IA-ACNC: >750 U/ML
EBV VCA IGG SER IA-ACNC: POSITIVE
EBV VCA IGM SER IA-ACNC: <10 U/ML
EBV VCA IGM SER IA-ACNC: NORMAL
EOSINOPHIL # BLD AUTO: 0 10E3/UL (ref 0–0.7)
EOSINOPHIL NFR BLD AUTO: 0 %
ERYTHROCYTE [DISTWIDTH] IN BLOOD BY AUTOMATED COUNT: 12.6 % (ref 10–15)
GFR SERPL CREATININE-BSD FRML MDRD: 15 ML/MIN/1.73M2
GLUCOSE BLD-MCNC: 201 MG/DL (ref 70–99)
GLUCOSE BLDC GLUCOMTR-MCNC: 104 MG/DL (ref 70–99)
GLUCOSE BLDC GLUCOMTR-MCNC: 127 MG/DL (ref 70–99)
GLUCOSE BLDC GLUCOMTR-MCNC: 147 MG/DL (ref 70–99)
GLUCOSE BLDC GLUCOMTR-MCNC: 171 MG/DL (ref 70–99)
HBV DNA SERPL QL NAA+PROBE: NORMAL
HCT VFR BLD AUTO: 31.4 % (ref 40–53)
HCV RNA SERPL QL NAA+PROBE: NORMAL
HGB BLD-MCNC: 9.8 G/DL (ref 13.3–17.7)
HIV1+2 RNA SERPL QL NAA+PROBE: NORMAL
IMM GRANULOCYTES # BLD: 0 10E3/UL
IMM GRANULOCYTES NFR BLD: 0 %
INTERPRETATION ECG - MUSE: NORMAL
LYMPHOCYTES # BLD AUTO: 0.1 10E3/UL (ref 0.8–5.3)
LYMPHOCYTES NFR BLD AUTO: 1 %
MAGNESIUM SERPL-MCNC: 1.9 MG/DL (ref 1.6–2.3)
MCH RBC QN AUTO: 28.3 PG (ref 26.5–33)
MCHC RBC AUTO-ENTMCNC: 31.2 G/DL (ref 31.5–36.5)
MCV RBC AUTO: 91 FL (ref 78–100)
MONOCYTES # BLD AUTO: 0.3 10E3/UL (ref 0–1.3)
MONOCYTES NFR BLD AUTO: 6 %
NEUTROPHILS # BLD AUTO: 5.4 10E3/UL (ref 1.6–8.3)
NEUTROPHILS NFR BLD AUTO: 93 %
NRBC # BLD AUTO: 0 10E3/UL
NRBC BLD AUTO-RTO: 0 /100
ORGAN: NORMAL
P AXIS - MUSE: 60 DEGREES
PHOSPHATE SERPL-MCNC: 6.2 MG/DL (ref 2.5–4.5)
PLATELET # BLD AUTO: 187 10E3/UL (ref 150–450)
POTASSIUM BLD-SCNC: 4.7 MMOL/L (ref 3.4–5.3)
PR INTERVAL - MUSE: 158 MS
QRS DURATION - MUSE: 106 MS
QT - MUSE: 424 MS
QTC - MUSE: 440 MS
R AXIS - MUSE: 33 DEGREES
RBC # BLD AUTO: 3.46 10E6/UL (ref 4.4–5.9)
SA 1 CELL: NORMAL
SA 1 TEST METHOD: NORMAL
SA 2 CELL: NORMAL
SA 2 TEST METHOD: NORMAL
SA1 HI RISK ABY: NORMAL
SA1 MOD RISK ABY: NORMAL
SA2 HI RISK ABY: NORMAL
SA2 MOD RISK ABY: NORMAL
SODIUM SERPL-SCNC: 139 MMOL/L (ref 133–144)
SYSTOLIC BLOOD PRESSURE - MUSE: NORMAL MMHG
T AXIS - MUSE: 21 DEGREES
TACROLIMUS BLD-MCNC: 6.6 UG/L (ref 5–15)
TME LAST DOSE: NORMAL H
TME LAST DOSE: NORMAL H
UNACCEPTABLE ANTIGENS: NORMAL
UNOS CPRA: 67
VENTRICULAR RATE- MUSE: 65 BPM
WBC # BLD AUTO: 5.8 10E3/UL (ref 4–11)
ZZZSA 1  COMMENTS: NORMAL
ZZZSA 2 COMMENTS: NORMAL

## 2021-10-25 PROCEDURE — 97116 GAIT TRAINING THERAPY: CPT | Mod: GP

## 2021-10-25 PROCEDURE — 250N000012 HC RX MED GY IP 250 OP 636 PS 637: Performed by: PHYSICIAN ASSISTANT

## 2021-10-25 PROCEDURE — 84100 ASSAY OF PHOSPHORUS: CPT | Performed by: SURGERY

## 2021-10-25 PROCEDURE — 250N000013 HC RX MED GY IP 250 OP 250 PS 637: Performed by: SURGERY

## 2021-10-25 PROCEDURE — 120N000011 HC R&B TRANSPLANT UMMC

## 2021-10-25 PROCEDURE — 85025 COMPLETE CBC W/AUTO DIFF WBC: CPT | Performed by: SURGERY

## 2021-10-25 PROCEDURE — 250N000012 HC RX MED GY IP 250 OP 636 PS 637: Performed by: SURGERY

## 2021-10-25 PROCEDURE — 83735 ASSAY OF MAGNESIUM: CPT | Performed by: SURGERY

## 2021-10-25 PROCEDURE — 80197 ASSAY OF TACROLIMUS: CPT | Performed by: PHYSICIAN ASSISTANT

## 2021-10-25 PROCEDURE — 250N000013 HC RX MED GY IP 250 OP 250 PS 637: Performed by: PHYSICIAN ASSISTANT

## 2021-10-25 PROCEDURE — 258N000003 HC RX IP 258 OP 636: Performed by: PHYSICIAN ASSISTANT

## 2021-10-25 PROCEDURE — 250N000011 HC RX IP 250 OP 636: Performed by: SURGERY

## 2021-10-25 PROCEDURE — 250N000011 HC RX IP 250 OP 636: Performed by: PHYSICIAN ASSISTANT

## 2021-10-25 PROCEDURE — 97530 THERAPEUTIC ACTIVITIES: CPT | Mod: GP

## 2021-10-25 PROCEDURE — 80048 BASIC METABOLIC PNL TOTAL CA: CPT | Performed by: SURGERY

## 2021-10-25 PROCEDURE — 36415 COLL VENOUS BLD VENIPUNCTURE: CPT | Performed by: SURGERY

## 2021-10-25 PROCEDURE — 99233 SBSQ HOSP IP/OBS HIGH 50: CPT | Mod: GC | Performed by: INTERNAL MEDICINE

## 2021-10-25 RX ORDER — DIPHENHYDRAMINE HCL 12.5MG/5ML
25-50 LIQUID (ML) ORAL ONCE
Status: COMPLETED | OUTPATIENT
Start: 2021-10-25 | End: 2021-10-25

## 2021-10-25 RX ORDER — SODIUM CHLORIDE 9 MG/ML
INJECTION, SOLUTION INTRAVENOUS CONTINUOUS
Status: DISCONTINUED | OUTPATIENT
Start: 2021-10-25 | End: 2021-10-25

## 2021-10-25 RX ORDER — SULFAMETHOXAZOLE AND TRIMETHOPRIM 400; 80 MG/1; MG/1
1 TABLET ORAL
Status: DISCONTINUED | OUTPATIENT
Start: 2021-10-26 | End: 2021-10-26

## 2021-10-25 RX ORDER — ACETAMINOPHEN 325 MG/1
650 TABLET ORAL ONCE
Status: DISCONTINUED | OUTPATIENT
Start: 2021-10-25 | End: 2021-10-26 | Stop reason: CLARIF

## 2021-10-25 RX ORDER — DIPHENHYDRAMINE HCL 25 MG
25-50 CAPSULE ORAL ONCE
Status: COMPLETED | OUTPATIENT
Start: 2021-10-25 | End: 2021-10-25

## 2021-10-25 RX ORDER — HEPARIN SODIUM 5000 [USP'U]/.5ML
5000 INJECTION, SOLUTION INTRAVENOUS; SUBCUTANEOUS EVERY 12 HOURS
Status: DISCONTINUED | OUTPATIENT
Start: 2021-10-25 | End: 2021-10-26 | Stop reason: HOSPADM

## 2021-10-25 RX ORDER — METHYLPREDNISOLONE SODIUM SUCCINATE 125 MG/2ML
100 INJECTION, POWDER, LYOPHILIZED, FOR SOLUTION INTRAMUSCULAR; INTRAVENOUS ONCE
Status: COMPLETED | OUTPATIENT
Start: 2021-10-25 | End: 2021-10-25

## 2021-10-25 RX ORDER — ASPIRIN 81 MG/1
81 TABLET ORAL DAILY
Status: DISCONTINUED | OUTPATIENT
Start: 2021-10-25 | End: 2021-10-25

## 2021-10-25 RX ADMIN — Medication 400 MG: at 13:41

## 2021-10-25 RX ADMIN — HEPARIN SODIUM 5000 UNITS: 10000 INJECTION, SOLUTION INTRAVENOUS; SUBCUTANEOUS at 10:24

## 2021-10-25 RX ADMIN — ACETAMINOPHEN 975 MG: 325 TABLET, FILM COATED ORAL at 00:13

## 2021-10-25 RX ADMIN — MYCOPHENOLATE MOFETIL 750 MG: 250 CAPSULE ORAL at 07:59

## 2021-10-25 RX ADMIN — OXYCODONE HYDROCHLORIDE 5 MG: 5 TABLET ORAL at 23:49

## 2021-10-25 RX ADMIN — OXYCODONE HYDROCHLORIDE 5 MG: 5 TABLET ORAL at 15:23

## 2021-10-25 RX ADMIN — TACROLIMUS 3 MG: 1 CAPSULE ORAL at 07:59

## 2021-10-25 RX ADMIN — DOCUSATE SODIUM 50 MG AND SENNOSIDES 8.6 MG 1 TABLET: 8.6; 5 TABLET, FILM COATED ORAL at 07:59

## 2021-10-25 RX ADMIN — METHYLPREDNISOLONE SODIUM SUCCINATE 100 MG: 125 INJECTION, POWDER, FOR SOLUTION INTRAMUSCULAR; INTRAVENOUS at 17:15

## 2021-10-25 RX ADMIN — HEPARIN SODIUM 5000 UNITS: 10000 INJECTION, SOLUTION INTRAVENOUS; SUBCUTANEOUS at 22:13

## 2021-10-25 RX ADMIN — OXYCODONE HYDROCHLORIDE 5 MG: 5 TABLET ORAL at 03:15

## 2021-10-25 RX ADMIN — INSULIN ASPART 1 UNITS: 100 INJECTION, SOLUTION INTRAVENOUS; SUBCUTANEOUS at 07:56

## 2021-10-25 RX ADMIN — SODIUM CHLORIDE: 9 INJECTION, SOLUTION INTRAVENOUS at 09:59

## 2021-10-25 RX ADMIN — VALGANCICLOVIR 450 MG: 450 TABLET, FILM COATED ORAL at 10:04

## 2021-10-25 RX ADMIN — DOCUSATE SODIUM 50 MG AND SENNOSIDES 8.6 MG 1 TABLET: 8.6; 5 TABLET, FILM COATED ORAL at 19:50

## 2021-10-25 RX ADMIN — ANTI-THYMOCYTE GLOBULIN (RABBIT) 175 MG: 5 INJECTION, POWDER, LYOPHILIZED, FOR SOLUTION INTRAVENOUS at 18:11

## 2021-10-25 RX ADMIN — DIPHENHYDRAMINE HYDROCHLORIDE 50 MG: 25 CAPSULE ORAL at 17:15

## 2021-10-25 RX ADMIN — OXYCODONE HYDROCHLORIDE 5 MG: 5 TABLET ORAL at 19:45

## 2021-10-25 RX ADMIN — SODIUM CHLORIDE, SODIUM LACTATE, POTASSIUM CHLORIDE, CALCIUM CHLORIDE AND DEXTROSE MONOHYDRATE: 5; 600; 310; 30; 20 INJECTION, SOLUTION INTRAVENOUS at 00:13

## 2021-10-25 RX ADMIN — OXYCODONE HYDROCHLORIDE 5 MG: 5 TABLET ORAL at 09:22

## 2021-10-25 RX ADMIN — ACETAMINOPHEN 975 MG: 325 TABLET, FILM COATED ORAL at 17:13

## 2021-10-25 RX ADMIN — MYCOPHENOLATE MOFETIL 750 MG: 250 CAPSULE ORAL at 17:14

## 2021-10-25 RX ADMIN — CITALOPRAM HYDROBROMIDE 20 MG: 20 TABLET ORAL at 08:00

## 2021-10-25 RX ADMIN — ATORVASTATIN CALCIUM 10 MG: 10 TABLET, FILM COATED ORAL at 07:59

## 2021-10-25 RX ADMIN — TACROLIMUS 3 MG: 1 CAPSULE ORAL at 17:14

## 2021-10-25 RX ADMIN — ACETAMINOPHEN 975 MG: 325 TABLET, FILM COATED ORAL at 07:59

## 2021-10-25 RX ADMIN — ACETAMINOPHEN 975 MG: 325 TABLET, FILM COATED ORAL at 23:48

## 2021-10-25 ASSESSMENT — ACTIVITIES OF DAILY LIVING (ADL)
ADLS_ACUITY_SCORE: 8
ADLS_ACUITY_SCORE: 8
ADLS_ACUITY_SCORE: 4
ADLS_ACUITY_SCORE: 8
ADLS_ACUITY_SCORE: 6
ADLS_ACUITY_SCORE: 8
ADLS_ACUITY_SCORE: 4
ADLS_ACUITY_SCORE: 8
ADLS_ACUITY_SCORE: 8
ADLS_ACUITY_SCORE: 4
ADLS_ACUITY_SCORE: 8
ADLS_ACUITY_SCORE: 4
ADLS_ACUITY_SCORE: 8
DEPENDENT_IADLS:: INDEPENDENT
ADLS_ACUITY_SCORE: 4
ADLS_ACUITY_SCORE: 8
ADLS_ACUITY_SCORE: 8

## 2021-10-25 NOTE — DISCHARGE SUMMARY
[facility]    Discharge Summary  Transplant Surgery    Date of Admission:  10/23/2021  Date of Discharge:  10/26/2021  Discharging Provider: Rika Sam PA-C     Attestation: I saw and examined the patient with Rika Sam PA-C, and the transplant team. I independently reviewed all pertinent laboratory and imaging results. I agree with the findings and plan as documented in this note.     -George Zhu MD    Discharge Diagnoses   Active Problems:    Immunosuppressed status (H)    Hypomagnesemia    Kidney transplant recipient      Procedure/Surgery Information   Procedure: Procedure(s):  TRANSPLANT, KIDNEY, RECIPIENT,  DONOR, URETERAL STENT PLACEMENT   Surgeon(s): Surgeon(s) and Role:     * Madina Warren MD - Primary     * Georges Mattson MD - Fellow - Assisting   Specimens: * No specimens in log *   Non-operative procedures None performed     History of Present Illness   Raj Pierce is a 50 year old male with a past medical history significant for end stage kidney disease secondary to PCKD s/p kidney transplant () c/b poor function (small graft). Other past medical history includes HTN, hyperparathyroidism, and SILVIA (with cpap). Now admitted for pre-emptive  donor (DCD) kidney re-transplant on 10/23 with Dr. Warren (ureteral stent placed).    Hospital Course   Raj Pierce was admitted on 10/23/2021.  The following problems were addressed during his hospitalization:    Kidney transplant: DCD donor with longer cold ischemia time, expecting slow or delayed graft function. Patient making good urine, though he did make urine beforehand. US kidney x 2 post op due to elevated velocity renal artery, velocity decreased 2nd US. Cr trending down to 3.4 on POD 3. UO 2.5L yesterday.  -US kidney x 2 post op due to elevated velocity renal artery, velocity decreased 2nd US.    Immunosuppression:  Induction immunosuppression  with thymoglobulin 6 mg/kg and steroid taper. Maintenance immunosuppression with tacrolimus and mycophenolate 750 mg BID.  Tacrolimus goal level 8-10 (12 hour trough).  Infectious prophylaxis with valganciclovir (x 6 months) and bactrim (indefinitely). Tacrolimus level 4.8 on 10/26, dose increased to 4 mg BID.    Transplant coordinator: Deborah Sotelo  [phone]  Donor type: DCD  DSA at time of transplant: No  Ureteral stent: yes  CMV IgG Ab High Risk Discordance (D+/R-): no  EBV IgG Ab High Risk Discordance (D+/R-): no  Thymoglobulin: 6 mg/kg    Post-operative pain control: included Hydromorphone (dilaudid) IV, oxycodone and acetaminophen and will be  oxycodone and acetaminophen  on discharge.     Anemia on chronic disease: Hemoglobin stable, 9.8.    : Spears removed POD 3. PVR negative for retention. Ureteral stent will need to be removed in 4 weeks.    GI: Patient tolerating diet. Had BM on POD 3.     Hypomagnesemia: Continue Mg oxide.    Rika Sam PA-C    Discharge Disposition   Discharged to home   Condition at discharge: Stable    Primary Care Physician   Do Guerrero    General Appearance: in no apparent distress.   Skin: normal, warm  Heart: NSR  Lungs: Nonlabored resps on RA  Abdomen: The abdomen is soft, and non-tender. LLQ incision with steri strips.  : spears is present.  Urine has no gross hematuria.   Extremities: edema: absent.   Neurologic: awake, alert and oriented. Tremor absent.     Consultations This Hospital Stay   NEPHROLOGY KIDNEY/PANCREAS TRANSPLANT ADULT IP CONSULT  VASCULAR ACCESS CARE ADULT IP CONSULT  NEPHROLOGY KIDNEY/PANCREAS TRANSPLANT ADULT IP CONSULT  SOT MEDICATION HISTORY IP PHARMACY CONSULT  SOCIAL WORK IP CONSULT  PHARMACY IP CONSULT  NUTRITION SERVICES ADULT IP CONSULT  PHYSICAL THERAPY ADULT IP CONSULT  CARE MANAGEMENT / SOCIAL WORK IP CONSULT    Time Spent on this Encounter   I have spent greater than 30 minutes on this discharge.    Discharge Orders      Reason  for your hospital stay     donor kidney retransplant with ureteral stent placement     Activity    Your activity upon discharge:   Walk at least four times a day, lift no greater than 10 pounds for 6-8 weeks from the time of surgery.  No driving while taking narcotics or 3 weeks after surgery.     Adult [facility] Follow-up and recommended labs and tests    Over the next 2 days you will be seen in the [facility] at 7 am (ph. [phone], option 7).  Your labs will be drawn at the beginning of your appointment at 7:00 am.  DO NOT take your medications prior to having labs drawn. Please bring all your medications with you from home to take after labs are drawn.    LABS:  CBC, BMP, Mg, Phos to be drawn daily while in ATC, then every Monday, Thursday by home health care nurse if arranged, or at an outpatient lab.   Tacrolimus levels (12 hours after administration) daily while in ATC then 2 times weekly.     FOLLOW UP APPOINTMENTS:  -Remember to always bring an updated medication list to all appointments.     -An appointment with your transplant surgeon will be scheduled for approximately 2 weeks following discharge from the hospital.  Your transplant surgeon is: Dr. Flavio Maradiaga.  -You will follow up with transplant nephrology in clinic as scheduled.   -Follow up with primary care provider in 4-8 weeks. (Pt to schedule)  -You have a ureteral stent in place which needs to be removed in 4-6 weeks.  You will be scheduled for stent removal.  If a  does not contact you for this, please contact your transplant coordinator.  Call scheduling at [phone] if you have not heard about your appointments within 48 hours after discharge.     When to contact your care team    WHEN TO CONTACT YOUR  COORDINATOR:  Deborah Sotelo, Transplant Coordinator [phone].   Notify your coordinator if you have pain over your kidney, increased redness or drainage from your incision, fever greater than 100.5F, or  decreased urine output.  Notify your coordinator immediately if you are ever unable to take your immunosuppressive medications for any reason.  If it is outside of office hours, please call the hospital switchboard at [phone] and ask to have the kidney transplant surgery fellow paged for urgent medical questions, or present to the emergency department.     Wound care and dressings    Instructions to care for your wound at home:   OK to shower. Gently wash around your incisions with soap and water.   Don't bathe/submerge incisions until ok per surgeon.  Wear loose-fitting clothes. This will help you be more comfortable and cause less irritation around your incisions.     Diet    Follow this diet upon discharge:   -Diet recommendations post-transplant: Heart healthy dietary habits long term (low saturated/trans fat, low sodium). High protein diet x 8 weeks. Practice food safety precautions.  -Keep yourself well hydrated (goal 1500 ml/day).   -Eat lightly the first week as tolerated and avoid constipating foods.  -If you are constipated, take a stool softener like Senna/Colace/Dulcolax/Miralax.     Discharge Medications   Discharge Medication List as of 10/26/2021 12:22 PM      START taking these medications    Details   oxyCODONE (ROXICODONE) 5 MG tablet Take 1 tablet (5 mg) by mouth every 6 hours as needed for moderate to severe pain, Disp-20 tablet, R-0, E-Prescribe      polyethylene glycol (MIRALAX) 17 GM/Dose powder Take 17 g by mouth daily, Disp-510 g, R-0, E-Prescribe      senna-docusate (SENOKOT-S/PERICOLACE) 8.6-50 MG tablet Take 1 tablet by mouth 2 times daily, Disp-60 tablet, R-0, E-Prescribe      acetaminophen (TYLENOL) 325 MG tablet Take 2 tablets (650 mg) by mouth every 4 hours as needed for other (For optimal non-opioid multimodal pain management to improve pain control.), OTC         CONTINUE these medications which have CHANGED    Details   mycophenolate (GENERIC EQUIVALENT) 250 MG capsule Take 3  capsules (750 mg) by mouth 2 times daily, Disp-180 capsule, R-11, E-Prescribe      sulfamethoxazole-trimethoprim (BACTRIM) 400-80 MG tablet Take 1 tablet by mouth daily, Disp-30 tablet, R-11, E-Prescribe      tacrolimus (GENERIC EQUIVALENT) 1 MG capsule Take 4 capsules (4 mg) by mouth 2 times daily, Disp-240 capsule, R-11, E-Prescribe      valGANciclovir (VALCYTE) 450 MG tablet Take 1 tablet (450 mg) by mouth every other day, Disp-60 tablet, R-5, E-Prescribe         CONTINUE these medications which have NOT CHANGED    Details   atorvastatin (LIPITOR) 10 MG tablet Take 10 mg by mouth daily, Historical      citalopram (CELEXA) 20 MG tablet Take 20 mg by mouth daily., Historical      Magnesium Oxide 250 MG TABS Take 250 mg by mouth 2 times daily , Historical      vitamin D3 (CHOLECALCIFEROL) 2000 units (50 mcg) tablet Take 1 tablet by mouth daily, Historical         STOP taking these medications       amLODIPine (NORVASC) 5 MG tablet Comments:   Reason for Stopping:         chlorthalidone (HYGROTON) 25 MG tablet Comments:   Reason for Stopping:         losartan (COZAAR) 50 MG tablet Comments:   Reason for Stopping:         sodium bicarbonate 650 MG tablet Comments:   Reason for Stopping:         tacrolimus (GENERIC EQUIVALENT) 0.5 MG capsule Comments:   Reason for Stopping:             Allergies   No Known Allergies

## 2021-10-25 NOTE — PLAN OF CARE
Neuro: Patient alert and oriented x4. PERRLA. Follows commands.   Cardiac: NSR/SB 50's-70's. BP's 100's systolic throughout the shift. 500ml of albumin given. Afebrile.   Respiratory: Sating >92% on RA-2L NC. Denies SOB.   GI/: Adequate urine output, spears. No BM. Starting to pass gas.   Diet/appetite: Tolerating regular diet. Fair appetite. ACHS BS d/t high dose steroids.   Activity: 1 assist. Up to chair and in room.   Pain: Pain at incision site. PRN oxycodone given, 5-10mg.   Skin: No new deficits noted. Drainage marked on dressing. No new drainage noted.    LDA's: Spears. 1 PIV on right. Quad lumen internal jugular with d5LR at 100 ml/hr. Intermittently requiring replacement #1.   Plan: Lasix gtt DC'ed. Potassium shifted this AM, latest K 4.4. Thymo given. Renal US completed. Wife updated. Will continue with plan of care.

## 2021-10-25 NOTE — PROGRESS NOTES
Handoff information     Type of transplant: DDKT   Date of transplant: 10/23/21   Direct/non-direct/PEP- NA  Transplant history: DDKT 10/2015 (small dysplastic kidney)  Outstanding items for patient: None  Pertinent history: ESRD from [facility]   Barriers to post transplant care: None

## 2021-10-25 NOTE — CONSULTS
Transplant Admission Psychosocial Assessment    Patient Name: Raj Pierce  : 1970  Age: 50 year old  MRN: [id number]  Completed assessment with: Patient, Wife Dianne    Patient underwent a  donor kidney transplant.  Met with patient to update psychosocial assessment and provide brief education about SW role while inpatient, as well as expectations/requirements and follow up needs post-transplant. SW also provided education about need for compliance with transplant medications, and explained ESRD Medicare benefits and medication coverage under Medicare part B.  Medicare 2728 forms completed and signed by patient.    Presenting Information   Living Situation: Patient lives in [place], [place] with his wife Dianne and their two children (Yazmin, 22 and Yuriy, 20)  If not local, plans for short term stay:  Patient plans to drive to and from the transplant center  Previous Functional Status: Independent, works full time  Cultural/Language/Spiritual Considerations: None indicated    Support System  Primary Support Person: Wife Dianne  Other support:  Family  Plan for support in immediate post-transplant period: Wife Dianne    Health Care Directive  Decision Maker: Patient  Alternate Decision Maker: Legal NOK would be his geovanni Dean  Health Care Directive: Provided education    Mental Health/Coping:   History of Mental Health: History of depression but no current concerns. He continues to take Celexa  History of Chemical Health: Patient reports having 2 drinks per day  Current status: Appropriate and oriented  Coping: Patient appears to be coping well  Services Needed/Recommended: SW provided information on how to write to his donor's family    Financial   Income: Patient and wife work full time  Impact of transplant on income: Patient unable to work for 6-8 weeks  Insurance and medication coverage: SOT*OLVINTORITO GAMEZ IS A KIDNEY TRANSPLANT PATIENT  (DATE OF TRANSPLANT: 10/23/21)     HEALTH BENEFIT:  [employer]BS  ID#[id number] GRP#[id number] (EFFECTIVE 19)     PHARMACY BENEFIT: [employer]  PROCESSING INFO: ID#[id number] GRP#[id number] BIN#[id number] (EFFECTIVE 12)   NO DEDUCTIBLE & OUT-OF-POCKET $6550  COPAY STRUCTURE:  UNABLE TO DETERMINE THE BENEFITS WHEN CALLING [employer], PLEASE REFER TO TEST CLAIMS OR CONTACT MEMBER SERVICES    TESTCLAIM SPECIALTY #28:   MYCOPHENOLATE 250MG=$52.49,   PROGRAF 1MG=$160,   TACROLIMUS 1MG=REFILL TOO SOON, NEXT FILL 10/30,   CYCLOSPORINE 100MG=$154.61  VALGANCICLOVIR 450MG=$20  Financial concerns: None indicated  Resources needed: None indicated    Assessment, recommendations and discharge plan:  Patient is a 50-year-old, male, who underwent a  donor kidney transplant on 10/23/2021. Patient was not on dialysis prior to transplant. Patient was sitting up in a chair and willing to talk with . Patient's wife Dianne was also present and supportive. SW provided information on Medicare and how to sign up for Medicare post-transplant. SW also provided information on how to write to his donor's family. SW provided contact information for inpatient and outpatient SW. SW then provided the cost of patient's transplant medication. Patient has adequate insurance coverage and social support. No coping issues or psychosocial concerns. Patient was well informed of post-transplant expectations/follow through.    VIRGILIO Rob, LAUREL  7A   Ph: [phone]  Pager: [phone]

## 2021-10-25 NOTE — TELEPHONE ENCOUNTER
"A pharmacist spent 25 minutes on the phone providing medication teaching with Raj Pierce and Dianne (wife) for discharge with a focus on new medications/dose changes.  The discharge medication list was reviewed with the patient/family and the following points were discussed, as applicable: Name, description, purpose, dose/strength, duration of medications, common side effects, food/medications to avoid, action to be taken if dose is missed and when to call MD.  Raj stated that he felt \"pretty good.\"  The patient will be responsible for managing medications. Additionally, the following transplant related education was covered: Purpose of medication card, Medication videos, Timing of medications and day of lab draw considerations , Emesis or missed dose, Prescription Insurance  and Discharge process for receiving meds   Patient will  a thermometer at discharge pharmacy along with medications. He already has a BP monitor and medication box. Patient will use local pharmacy or other mail order for outpatient medications. His insurance precludes [place] pharmacies from shipping to him.  Clinical Pharmacy Consult:                                                      Transplant Specific:   Date of Transplant: 10/23/2021  Type of Transplant: kidney  First Transplant: no, kidney: 10/13/2015  History of rejection: no    Immunosuppression Regimen   TAC 3mg qAM & 3mg qPM and MMF 750mg qAM & 750mg qPM  Patient specific goal: 8-10 (closer to 8 than 10, d/t slow graft function and hyperkalemia)  Most recent level: ---, date   Immunosuppressant Levels:    Pt adherent to lab draws: yes  Scr:   Lab Results   Component Value Date    CR 4.15 10/25/2021    CR 3.13 12/14/2020     Side effects: no side effects    Prophylactic Medications  Antibacterial:  Bactrim 400-80 mg Q Tues, Thurs, Sat  Scheduled Discontinue Date: Lifelong    Antifungal: Not needed thus far  Scheduled Discontinue Date: N/A    Antiviral: CrCl 10 to 24 " mL/minute: Valcyte 450 mg twice weekly   Scheduled Discontinue Date: 6 months    Acid Reducer:   Scheduled Reviewed Date:     Thrombosis Prevention:   Scheduled Discontinue Date:     Blood Pressure Management  Frequency of home Blood Pressure checks: twice daily  Most recent home BP: 135/98  Patient Blood pressure goal: <130/80  Patient blood pressure at goal:  no  Hospitalizations/ER visits since last assessment: 0      Med rec/DUR performed: yes  Med Rec Discrepancies: no    Reminders:    1. Bring to first clinic appt: med box, med card, bp monitor, all medications being taken, and lab book.  2.   MT[facility] pharmacist visit on first clinic appt and if ok, again in 3 to 4 months during follow up appt.  3.   Avoid Grapefruit and Grapefruit juice.   4.   Avoid herbal supplements. If wish to take other medications or supplements, call your coordinator.   5.   Keep lab appts.   6.   Can use apps on phone like [employer] to help manage medication lists and reminders.   7.   Make sure you are protecting your skin by wearing long sleeves and applying sunscreen to exposed skin, for any significant time in the sun.     Transplant Coordinator is Sheri Valdez (?).      Jordyn Contreras [facility]

## 2021-10-25 NOTE — PLAN OF CARE
Neuro: A&Ox4. Able to make needs known. Denies N/T  Cardiac: SR/SB HR 50-70s. -130s/80-90s. CVPs 14-17 this shift.   Respiratory: Sating >95% on 2L NC. Attempted to titrate off oxygen but requires 2L while sleeping. IS at bedside, encouraged use. Pt reports he will use in the morning.   GI/: Adequate urine output, 60-110mL/Hr, via spears. LBM 10/22, passing gas.  Diet/appetite: Tolerating Regular diet.   Activity:  Stood at bedside for weight. Moves in bed independently, refused assistance w/any repositioning.   Pain: C/O Incisional pain, PRN Oxycodone x1, w/relief. Scheduled Tylenol.   Skin: No new deficits noted.  LDA's: Quadruple R internal jugular, R PIV x1.     Plan: Continue with POC. Notify primary team with changes.

## 2021-10-25 NOTE — PROGRESS NOTES
"Transplant Surgery  Inpatient Daily Progress Note  10/25/2021    Assessment & Plan: Raj Pierce is a 50 year old male with a past medical history significant for end stage kidney disease secondary to PCKD s/p kidney transplant () c/b poor function (small graft). Other past medical history includes HTN, hyperparathyroidism, and SILVIA (with cpap). Now admitted for pre-emptive  donor (DCD) kidney re-transplant on 10/23 with Dr. Warren (ureteral stent placed).    Graft function: DCD donor with longer cold ischemia time, expecting slow or delayed graft function. Patient making good urine, though he did make urine beforehand. K stable. Lasix gtt discontinued. US kidney x 2 post op due to elevated velocity renal artery, velocity decreased 2nd US. Cr 4.15 (4.4). UO 3.6L yesterday and 655 ml thus far today.   Immunosuppression management: PRA of 67, plan for \"high risk\" immunosuppression protocol due to possibility of DGF.  Thymo 175/175 mg/175 mg = 6 mg/kg  Solu-Medrol 500 mg/250 mg/100 mg  Tacrolimus: PTA dose 2mg qAM/1.5 mg qPM, increased to 3 mg BID. Goal 8-10. Check level today.  MMF: 750 mg BID  Complexity of management:Medium. Contributing factors: organ dysfunction  Hematology: Hgb stable, 9.8. .  Cardiorespiratory: Stable on RA. BP improved with fluid replacement. Now BP 120s-130s.   GI/Nutrition: Regular diet.  Bowel regimen: Senna/colace BID, miralax daily.  Neuro: Acute pain: Tylenol scheduled. Oxycodone PRN.   Endocrine: Steroid induced hyperkalemia, sliding scale insulin ordered  Fluid/Electrolytes: Stop MIV. Electrolytes acceptable.   -PTA Mg oxide 250 mg BID. Started Mg oxide 400 mg daily ordered.   : Wade to remain due to new surgical anastomosis. Will removed on POD 3.   Infectious disease: afebrile.   Prophylaxis: DVT (heparin), viral (Valcyte x 6 months, CMV IgG D+/R-), PJP (Bactrim).  Disposition: transfer to .      Medical Decision Making: Medium  Subsequent visit 68426 " (moderate level decision making)    JATINDER/Fellow/Resident Provider: Rika Sam PA-C    Faculty: George Zhu MD    Attestation: I saw and examined the patient with Rika Sam PA-C, and the transplant team. I independently reviewed all pertinent laboratory and imaging results. I agree with the findings and plan as documented in this note.     -George Zhu MD  _________________________________________________________________  Transplant History: Admitted 10/23/2021 for  donor kidney re-transplant.  10/23/2021 (Kidney), 10/13/2015 (Kidney), Postoperative day:  (Kidney), 2 (Kidney)     Interval History: History is obtained from the patient  Overnight events: Pain control adequate. No c/o nausea. Tolerating diet.     ROS:   A 10-point review of systems was negative except as noted above.    Meds:    acetaminophen  650 mg Oral Once     acetaminophen  650 mg Oral Once     acetaminophen  975 mg Oral Q8H     anti-thymocyte globulin  2 mg/kg Intravenous Central line Once     atorvastatin  10 mg Oral Daily     citalopram  20 mg Oral Daily     diphenhydrAMINE  25-50 mg Oral Once    Or     diphenhydrAMINE  25-50 mg Per NG tube Once     heparin ANTICOAGULANT  5,000 Units Subcutaneous Q12H     insulin aspart  1-7 Units Subcutaneous TID AC     insulin aspart  1-5 Units Subcutaneous At Bedtime     magnesium oxide  400 mg Oral Daily with lunch     methylPREDNISolone  100 mg Intravenous Once     mycophenolate  750 mg Oral BID IS     polyethylene glycol  17 g Oral Daily     senna-docusate  1 tablet Oral BID     sodium chloride (PF)  10 mL Intracatheter Q8H     [START ON 10/26/2021] sulfamethoxazole-trimethoprim  1 tablet Oral Once per day on  Sat     tacrolimus  3 mg Oral BID IS     valGANciclovir  450 mg Oral Once per day on        Physical Exam:     Admit Weight: 91.3 kg (201 lb 4.8 oz)    Current vitals:   BP (!) 137/90 (BP Location: Right arm)   Pulse 70   Temp 98.6  F (37  C)  (Oral)   Resp 16   Wt 91 kg (200 lb 9.9 oz)   SpO2 92%   BMI 28.79 kg/m        Vital sign ranges:    Temp:  [97.7  F (36.5  C)-98.6  F (37  C)] 98.6  F (37  C)  Pulse:  [58-78] 70  Resp:  [14-18] 16  BP: ()/(66-98) 137/90  SpO2:  [90 %-96 %] 92 %  Patient Vitals for the past 24 hrs:   BP Temp Temp src Pulse Resp SpO2 Weight   10/25/21 0800 (!) 137/90 98.6  F (37  C) Oral 70 16 92 % --   10/25/21 0700 (!) 130/92 -- -- 65 16 96 % --   10/25/21 0521 -- -- -- 76 -- 95 % 91 kg (200 lb 9.9 oz)   10/25/21 0500 (!) 121/91 -- -- 61 15 94 % --   10/25/21 0400 (!) 136/93 98.1  F (36.7  C) Oral 58 14 95 % --   10/25/21 0300 (!) 130/92 -- -- 58 14 95 % --   10/25/21 0200 124/84 -- -- 63 15 94 % --   10/25/21 0100 (!) 126/98 -- -- 62 14 95 % --   10/25/21 0000 113/87 97.7  F (36.5  C) Oral 73 16 94 % --   10/24/21 2300 118/83 -- -- 66 14 93 % --   10/24/21 2200 111/81 -- -- 69 15 93 % --   10/24/21 2100 106/82 -- -- 64 14 94 % --   10/24/21 2000 112/81 -- -- 78 14 95 % --   10/24/21 1900 110/79 97.8  F (36.6  C) Oral 71 16 92 % --   10/24/21 1800 (!) 125/90 -- -- 69 18 90 % --   10/24/21 1700 101/74 -- -- 74 14 90 % --   10/24/21 1600 101/68 -- -- 62 16 93 % --   10/24/21 1545 100/71 98.2  F (36.8  C) Oral 72 14 94 % --   10/24/21 1530 108/75 98.4  F (36.9  C) Oral 68 14 95 % --   10/24/21 1500 110/79 -- -- 70 18 94 % --   10/24/21 1400 108/78 -- -- 65 16 95 % --   10/24/21 1300 99/70 -- -- 63 16 95 % --   10/24/21 1200 101/66 -- -- 64 16 95 % --   10/24/21 1100 105/76 -- -- 73 18 95 % --   10/24/21 1000 100/69 -- -- 64 16 95 % --     General Appearance: in no apparent distress.   Skin: normal, warm  Heart: NSR  Lungs: Nonlabored resps on RA  Abdomen: The abdomen is soft, and non-tender. LLQ incision with steri strips.  : spears is present.  Urine has no gross hematuria.   Extremities: edema: absent.   Neurologic: awake, alert and oriented. Tremor absent.     Data:   CMP  Recent Labs   Lab 10/25/21  0743 10/25/21  9901  10/24/21  2103 10/24/21  1920 10/24/21  0636 10/24/21  0632 10/23/21  2002 10/23/21  1647 10/23/21  1500 10/23/21  1500 10/23/21  0358   NA  --  139  --   --   --  138   < > 140   < > 143 141   POTASSIUM  --  4.7  --  4.4   < > 5.5*   < > 4.6   < > 4.4 4.5   CHLORIDE  --  106  --   --   --  106   < >  --   --   --  108   CO2  --  24  --   --   --  23   < >  --   --   --  26   * 201*   < >  --    < > 218*   < > 174*   < > 94 92   BUN  --  67*  --   --   --  64*   < >  --   --   --  60*   CR  --  4.15*  --   --   --  4.44*   < >  --   --   --  3.90*   GFRESTIMATED  --  15*  --   --   --  14*   < >  --   --   --  17*   LILIANA  --  7.9*  --   --   --  8.7   < >  --   --   --  8.5   ICAW  --   --   --   --   --   --   --  4.6  --  4.6  --    MAG  --  1.9  --   --   --  1.8   < >  --   --   --   --    PHOS  --  6.2*  --   --   --  6.5*   < >  --   --   --   --    ALBUMIN  --   --   --   --   --   --   --   --   --   --  3.6   BILITOTAL  --   --   --   --   --   --   --   --   --   --  0.6   ALKPHOS  --   --   --   --   --   --   --   --   --   --  56   AST  --   --   --   --   --   --   --   --   --   --  23   ALT  --   --   --   --   --   --   --   --   --   --  24    < > = values in this interval not displayed.     CBC  Recent Labs   Lab 10/25/21  0505 10/24/21  1920 10/24/21  0954 10/24/21  0632 10/23/21  1500 10/23/21  0358   HGB 9.8* 10.0*   < > 11.5*   < > 11.8*   WBC 5.8  --   --  11.5*   < > 6.7     --   --  266   < > 264   A1C  --   --   --   --   --  5.6    < > = values in this interval not displayed.     COAGS  Recent Labs   Lab 10/23/21  0358   INR 1.07   PTT 28      Urinalysis  Recent Labs   Lab Test 10/23/21  0526 06/05/17  1253 01/25/16  1602 11/09/15  1300   COLOR Straw  --   --  Yellow   APPEARANCE Clear  --   --  Clear   URINEGLC Negative  --   --  Negative   URINEBILI Negative  --   --  Negative   URINEKETONE Negative  --   --  Negative   SG 1.009  --   --  1.008   UBLD Negative  --   --   Negative   URINEPH 7.0  --   --  5.5   PROTEIN 50 *  --   --  10*   NITRITE Negative  --   --  Negative   LEUKEST Negative  --   --  Negative   RBCU 0  --   --  <1   WBCU 1  --   --  1   UTPG 1.37* 1.30*   < >  --     < > = values in this interval not displayed.     Virology:  CMV DNA Quantitation Specimen   Date Value Ref Range Status   08/01/2016   Corrected    Plasma  CORRECTED ON 08/02 AT 1044: PREVIOUSLY REPORTED AS Blood       CMV IgG Antibody   Date Value Ref Range Status   07/10/2013 <0.20  Negative for anti-CMV IgG U/mL Final     EBV VCA IgG Antibody   Date Value Ref Range Status   07/10/2013 >750.00  Positive, suggests immunologic exposure. U/mL Final     Hepatitis C Antibody   Date Value Ref Range Status   10/23/2021 Nonreactive Nonreactive Final   04/25/2016  NR Final    Nonreactive   Assay performance characteristics have not been established for newborns,   infants, and children       Hep B Surface Loni   Date Value Ref Range Status   07/10/2013 0.0  Final     Comment:     Negative, No antibody detected when the value is less than 5.0 mlU/mL.     BK Virus Result   Date Value Ref Range Status   06/05/2017 BK Virus DNA Not Detected BKNEG copies/mL Final   10/28/2015 BK Virus DNA Not Detected BKNEG copies/mL Final

## 2021-10-25 NOTE — PLAN OF CARE
BP (!) 135/98 (BP Location: Right arm)   Pulse 71   Temp 97.9  F (36.6  C) (Oral)   Resp 16   Wt 91 kg (200 lb 9.9 oz)   SpO2 96%   BMI 28.79 kg/m      4248-7923. AVSS on RA. . Denies pain and nausea. R internal jugular + PIV x 2, saline locked. Tolerating a regular diet, good oral intake. Wade, pink tinged w/ adequate output. No BM, pt reports passing flatus. Incision w/ dermabond + steri strips. Pt & spouse met with specialty pharmacy today. Med card and lab book up to date. Plan for discharge tomorrow. Will continue to monitor and update with any changes.

## 2021-10-25 NOTE — PHARMACY-TRANSPLANT NOTE
Adult Kidney Transplant Post Operative Note    50 year old male s/p  donor kidney transplant on 10/23/2021 for end stage kidney disease secondary to PCKD s/p kidney transplant () c/b poor function (small graft).    Planned immunosuppression regimen per kidney transplant COVID-19 high risk protocol due to possible DGF:  INDUCTION: thymoglobulin to total ~ 6mg/kg (2mg/kg x 3 days) and methylprednisolone IV daily x 3 doses.  MAINTENANCE:  mycophenolate and tacrolimus with goal trough levels of 8-10 mcg/L for first 6 months post-transplant.    Opportunistic pathogen prophylaxis includes: trimethoprim/sulfamethoxazole and valganciclovir.    Patient is not enrolled in medication study.    Pharmacy will monitor for medication interactions and immunosuppression levels in conjunction with the team. Medication therapy needs for discharge planning will continue to be addressed throughout the current admission via multidisciplinary rounds and order review.  Pharmacy will make recommendations as appropriate.    Gracie Cobian, Pharm.D., [facility]  Pager [phone]

## 2021-10-25 NOTE — PLAN OF CARE
Admitted/transferred from: 6B  Time of arrival on unit 1100  2 RN full  skin assessment completed by Marzena HUMPHREY & Ant LOVELACE  Skin assessment finding: skin intact, no problems   Interventions/actions: other none needed     Will continue to monitor.

## 2021-10-25 NOTE — PROGRESS NOTES
Transfer  Transferred to: 7A  Via: w/c  Reason for transfer:Pt no longer appropriate for 6B- improved patient condition  Family: Wife Dianne accompanied pt  Belongings: Packed and sent with pt, recliner sent with pt.  Chart: Delivered with pt to next unit  Medications: Meds sent to new unit with pt  Report given to: WADE RN  Pt status: A&Ox4, VSS on RA. Urine monitoring q2hr, good output. Incision dressing removed by provider during rounds, approximated, left BRIJESH. Ambulated in weems x1, up in recliner.

## 2021-10-25 NOTE — PROGRESS NOTES
[facility]   Transplant Nephrology Progress Note  Date of Admission:  10/23/2021  Today's Date: 10/25/2021    Recommendations:  - Follow tacrolimus level. 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               - Proteinuria:  Will be checked  per protocol after Transplant               - Date DSA Last Checked: Jul/2018      Latest DSA: No              - BK Viremia: No              - Kidney Tx Biopsy: No  US kidney Tx no collection or hydronephrosis, good flows     ACCESS - his LUE fistula is clotted  He still makes adequate urine  No indication for HD at this time. However should he need RRT post transplant, he is agreeable to it. He signed consent for RRT (placed in chart)    # Immunosuppression: tacrolimus goal trough (~8).  mg po bid              - Changes: No   PRA 67.  Received high induction per DDF protocol due to prolonged cold ischemic time, DCD kidney    # Infection Prophylaxis:  - PJP: Sulfa/TMP (Bactrim)  - CMV: Valganciclovir (Valcyte)     # Hypertension: Controlled;   Goal BP: < 150/90              - Volume status: Euvolemic                EDW ~ 201 lbs              - Changes: No     # Anemia in Chronic Renal Disease: Hgb: Stable      NADIYA: No              - Iron studies: Not checked recently     # Mineral Bone Disorder:   - Secondary renal hyperparathyroidism; PTH level: Not checked recently        On treatment: None  - Vitamin D; level: Not checked recently        On supplement: No  - Calcium; level: Normal        On supplement: No  - Phosphorus; level: Normal        On supplement: No     # Electrolytes:   - Potassium; level: Normal        On supplement: No  - Magnesium; level: Normal        On supplement: No  - Bicarbonate; level: Normal        On supplement: No  - Sodium; level:  Normal     # Transplant History:  Etiology of Kidney Failure: Polycystic kidney disease (PKD)  Tx: DDKT  Transplant: 10/23/2021 (Kidney), 10/13/2015 (Kidney)  Crossmatch at time of Tx: negative  Significant changes in immunosuppression: None  Significant transplant-related complications: None     Recommendations were communicated to the primary team verbally.  Seen and discussed with Dr. Compa Bailey MD   Pager: [phone]    Interval History   Denies any new concerns. Continues to make adequate urine but Cr not significantly improved    Review of Systems   4 point ROS was obtained and negative except as noted in the Interval History.    MEDICATIONS:   acetaminophen  650 mg Oral Once    acetaminophen  975 mg Oral Q8H    anti-thymocyte globulin  2 mg/kg Intravenous Central line Once    atorvastatin  10 mg Oral Daily    citalopram  20 mg Oral Daily    diphenhydrAMINE  25-50 mg Oral Once    Or    diphenhydrAMINE  25-50 mg Per NG tube Once    heparin ANTICOAGULANT  5,000 Units Subcutaneous Q12H    insulin aspart  1-7 Units Subcutaneous TID AC    insulin aspart  1-5 Units Subcutaneous At Bedtime    magnesium oxide  400 mg Oral Daily with lunch    methylPREDNISolone  100 mg Intravenous Once    mycophenolate  750 mg Oral BID IS    polyethylene glycol  17 g Oral Daily    senna-docusate  1 tablet Oral BID    sodium chloride (PF)  10 mL Intracatheter Q8H    [START ON 10/26/2021] sulfamethoxazole-trimethoprim  1 tablet Oral Once per day on Tue Thu Sat    tacrolimus  3 mg Oral BID IS    valGANciclovir  450 mg Oral Once per day on Mon Thu        Physical Exam   Temp  Av  F (36.7  C)  Min: 95  F (35  C)  Max: 99  F (37.2  C)      Pulse  Av.3  Min: 54  Max: 79 Resp  Avg: 15.5  Min: 12  Max: 22  SpO2  Av.1 %  Min: 90 %  Max: 99 %    CVP (mmHg): 14 mmHgBP (!) 135/98 (BP Location: Right arm)   Pulse 71   Temp 97.9  F (36.6  C) (Oral)   Resp 16   Wt 91 kg (200 lb 9.9 oz)   SpO2 96%   BMI 28.79 kg/m      Date 10/25/21 0700 - 10/26/21 0659   Shift 4389-5479 9875-0164 7026-0210 24 Hour Total   INTAKE   P.O. 240   240   Shift Total(mL/kg) 240(2.64)   240(2.64)   OUTPUT   Urine 425   425   Shift Total(mL/kg) 425(4.67)   425(4.67)   Weight (kg) 91 91 91 91      Admit Weight: 91.3 kg (201 lb 4.8 oz)     GENERAL APPEARANCE: alert and no distress  HENT: mouth without ulcers or lesions  RESP: breathing non-labored  CV: regular rhythm, normal rate  EDEMA: no LE edema bilaterally  ABDOMEN: soft, nondistended  MS: extremities normal - no gross deformities noted, no evidence of inflammation in joints  SKIN: no rash on exposed surfaces    Data   All labs reviewed by me.  CMP  Recent Labs   Lab 10/25/21  1332 10/25/21  0743 10/25/21  0505 10/24/21  2103 10/24/21  1920 10/24/21  1603 10/24/21  1512 10/24/21  1405 10/24/21  1223 10/24/21  1136 10/24/21  0636 10/24/21  0632 10/24/21  0234 10/23/21  2002 10/23/21  1647 10/23/21  1500 10/23/21  0358 10/23/21  0358   NA  --   --  139  --   --   --   --   --   --   --   --  138  --  139 140   < >  --  141   POTASSIUM  --   --  4.7  --  4.4  --   --  4.8  --  4.8   < > 5.5*   < > 4.6 4.6   < >   < > 4.5   CHLORIDE  --   --  106  --   --   --   --   --   --   --   --  106  --  107  --   --   --  108   CO2  --   --  24  --   --   --   --   --   --   --   --  23  --  22  --   --   --  26   ANIONGAP  --   --  9  --   --   --   --   --   --   --   --  9  --  10  --   --   --  7   * 171* 201* 178*  --    < >   < >  --    < >  --    < > 218*  --  166* 174*   < >   < > 92   BUN  --   --  67*  --   --   --   --   --   --   --   --  64*  --  61*  --   --   --  60*   CR  --   --  4.15*  --   --   --   --   --   --   --   --  4.44*  --  3.97*  --   --   --  3.90*   GFRESTIMATED  --   --  15*  --   --   --   --   --   --   --   --  14*  --  16*  --   --   --  17*   LILIANA  --   --  7.9*  --   --   --   --   --   --   --   --  8.7  --  9.9  --   --   --  8.5   MAG  --   --  1.9  --   --   --   --    --   --   --   --  1.8  --  1.7  --   --   --   --    PHOS  --   --  6.2*  --   --   --   --   --   --   --   --  6.5*  --  6.0*  --   --   --   --    PROTTOTAL  --   --   --   --   --   --   --   --   --   --   --   --   --   --   --   --   --  6.5*   ALBUMIN  --   --   --   --   --   --   --   --   --   --   --   --   --   --   --   --   --  3.6   BILITOTAL  --   --   --   --   --   --   --   --   --   --   --   --   --   --   --   --   --  0.6   ALKPHOS  --   --   --   --   --   --   --   --   --   --   --   --   --   --   --   --   --  56   AST  --   --   --   --   --   --   --   --   --   --   --   --   --   --   --   --   --  23   ALT  --   --   --   --   --   --   --   --   --   --   --   --   --   --   --   --   --  24    < > = values in this interval not displayed.     CBC  Recent Labs   Lab 10/25/21  0505 10/24/21  1920 10/24/21  1405 10/24/21  0954 10/24/21  0632 10/24/21  0632 10/24/21  0234 10/23/21  2002 10/23/21  1500 10/23/21  0358   HGB 9.8* 10.0* 10.4* 11.0*   < > 11.5*   < > 12.1*   < > 11.8*   WBC 5.8  --   --   --   --  11.5*  --  6.8  --  6.7   RBC 3.46*  --   --   --   --  4.03*  --  4.33*  --  4.21*   HCT 31.4*  --   --   --   --  36.6*  --  38.6*  --  37.1*   MCV 91  --   --   --   --  91  --  89  --  88   MCH 28.3  --   --   --   --  28.5  --  27.9  --  28.0   MCHC 31.2*  --   --   --   --  31.4*  --  31.3*  --  31.8   RDW 12.6  --   --   --   --  12.7  --  12.3  --  12.2     --   --   --   --  266  --  236  --  264    < > = values in this interval not displayed.     INR  Recent Labs   Lab 10/23/21  0358   INR 1.07   PTT 28     ABG  Recent Labs   Lab 10/23/21  1647 10/23/21  1500   O2PER 42.0 33.0      Urine Studies  Recent Labs   Lab Test 10/23/21  0526 11/09/15  1300 10/28/15  0702 10/17/15  0500   COLOR Straw Yellow Yellow Light Yellow   APPEARANCE Clear Clear Clear Clear   URINEGLC Negative Negative Negative Negative   URINEBILI Negative Negative Negative Negative   URINEKETONE  Negative Negative Negative Negative   SG 1.009 1.008 1.008 1.008   UBLD Negative Negative Negative Large*   URINEPH 7.0 5.5 6.5 5.5   PROTEIN 50 * 10* 10* 30*   NITRITE Negative Negative Negative Negative   LEUKEST Negative Negative Negative Negative   RBCU 0 <1 1 >182*   WBCU 1 1 1 3*     Recent Labs   Lab Test 10/23/21  0526 06/05/17  1253 01/25/16  1602 10/28/15  0702 10/13/15  1035   UTPG 1.37* 1.30* 0.36* 0.45* 0.55*     PTH  Recent Labs   Lab Test 08/01/16  1638 01/25/16  1601 10/15/15  0520   PTHI 90* 121* 434*     Iron Studies  Recent Labs   Lab Test 10/17/15  0455   IRON 13*      IRONSAT 5*       IMAGING:  All imaging studies reviewed by me.    This patient has been seen and evaluated by me, Alondra Roy MD.  I have reviewed the note and agree with plan of care as documented by the fellow.  Alondra Roy MD

## 2021-10-26 ENCOUNTER — TELEPHONE (OUTPATIENT)
Dept: TRANSPLANT | Facility: CLINIC | Age: 51
End: 2021-10-26

## 2021-10-26 ENCOUNTER — APPOINTMENT (OUTPATIENT)
Dept: PHYSICAL THERAPY | Facility: CLINIC | Age: 51
End: 2021-10-26
Attending: SURGERY
Payer: COMMERCIAL

## 2021-10-26 VITALS
TEMPERATURE: 97.8 F | DIASTOLIC BLOOD PRESSURE: 87 MMHG | OXYGEN SATURATION: 95 % | WEIGHT: 204.5 LBS | RESPIRATION RATE: 16 BRPM | SYSTOLIC BLOOD PRESSURE: 125 MMHG | HEART RATE: 72 BPM | BODY MASS INDEX: 29.34 KG/M2

## 2021-10-26 LAB
ANION GAP SERPL CALCULATED.3IONS-SCNC: 7 MMOL/L (ref 3–14)
BASOPHILS # BLD AUTO: 0 10E3/UL (ref 0–0.2)
BASOPHILS NFR BLD AUTO: 0 %
BUN SERPL-MCNC: 67 MG/DL (ref 7–30)
CALCIUM SERPL-MCNC: 8.1 MG/DL (ref 8.5–10.1)
CELL TYPE ALLO: NORMAL
CELL TYPE AUTO: NORMAL
CHANNELSHIFTALLOB1: -50
CHANNELSHIFTALLOB2: -49
CHANNELSHIFTALLOT1: -9
CHANNELSHIFTALLOT2: -23
CHANNELSHIFTAUTOB1: -58
CHANNELSHIFTAUTOB2: -45
CHANNELSHIFTAUTOT1: -33
CHANNELSHIFTAUTOT2: -25
CHLORIDE BLD-SCNC: 109 MMOL/L (ref 94–109)
CO2 SERPL-SCNC: 23 MMOL/L (ref 20–32)
CREAT SERPL-MCNC: 3.4 MG/DL (ref 0.66–1.25)
CROSSMATCHDATEALLO: NORMAL
CROSSMATCHDATEAUTO: NORMAL
DONOR ALLO: NORMAL
DONOR AUTO: NORMAL
DONORCELLDATE ALLO: NORMAL
DONORCELLDATE AUTO: NORMAL
EOSINOPHIL # BLD AUTO: 0 10E3/UL (ref 0–0.7)
EOSINOPHIL NFR BLD AUTO: 0 %
ERYTHROCYTE [DISTWIDTH] IN BLOOD BY AUTOMATED COUNT: 12.4 % (ref 10–15)
GFR SERPL CREATININE-BSD FRML MDRD: 20 ML/MIN/1.73M2
GLUCOSE BLD-MCNC: 136 MG/DL (ref 70–99)
GLUCOSE BLDC GLUCOMTR-MCNC: 145 MG/DL (ref 70–99)
GLUCOSE BLDC GLUCOMTR-MCNC: 158 MG/DL (ref 70–99)
HCT VFR BLD AUTO: 31.3 % (ref 40–53)
HGB BLD-MCNC: 9.7 G/DL (ref 13.3–17.7)
IMM GRANULOCYTES # BLD: 0 10E3/UL
IMM GRANULOCYTES NFR BLD: 0 %
LYMPHOCYTES # BLD AUTO: 0.1 10E3/UL (ref 0.8–5.3)
LYMPHOCYTES NFR BLD AUTO: 3 %
MAGNESIUM SERPL-MCNC: 2.1 MG/DL (ref 1.6–2.3)
MCH RBC QN AUTO: 28 PG (ref 26.5–33)
MCHC RBC AUTO-ENTMCNC: 31 G/DL (ref 31.5–36.5)
MCV RBC AUTO: 91 FL (ref 78–100)
MONOCYTES # BLD AUTO: 0.3 10E3/UL (ref 0–1.3)
MONOCYTES NFR BLD AUTO: 11 %
NEUTROPHILS # BLD AUTO: 2.1 10E3/UL (ref 1.6–8.3)
NEUTROPHILS NFR BLD AUTO: 86 %
NRBC # BLD AUTO: 0 10E3/UL
NRBC BLD AUTO-RTO: 0 /100
PHOSPHATE SERPL-MCNC: 5.9 MG/DL (ref 2.5–4.5)
PLATELET # BLD AUTO: 172 10E3/UL (ref 150–450)
POS CUT OFF ALLO B: >93
POS CUT OFF ALLO T: >79
POS CUT OFF AUTO B: >93
POS CUT OFF AUTO T: >79
POTASSIUM BLD-SCNC: 4.7 MMOL/L (ref 3.4–5.3)
RBC # BLD AUTO: 3.46 10E6/UL (ref 4.4–5.9)
RESULT ALLO B1: NORMAL
RESULT ALLO B2: NORMAL
RESULT ALLO T1: NORMAL
RESULT ALLO T2: NORMAL
RESULT AUTO B1: NORMAL
RESULT AUTO B2: NORMAL
RESULT AUTO T1: NORMAL
RESULT AUTO T2: NORMAL
SERUM DATE ALLO B1: NORMAL
SERUM DATE ALLO B2: NORMAL
SERUM DATE ALLO T1: NORMAL
SERUM DATE ALLO T2: NORMAL
SERUM DATE AUTO B1: NORMAL
SERUM DATE AUTO B2: NORMAL
SERUM DATE AUTO T1: NORMAL
SERUM DATE AUTO T2: NORMAL
SODIUM SERPL-SCNC: 139 MMOL/L (ref 133–144)
TACROLIMUS BLD-MCNC: 4.8 UG/L (ref 5–15)
TESTMETHODALLO: NORMAL
TESTMETHODAUTO: NORMAL
TME LAST DOSE: ABNORMAL H
TME LAST DOSE: ABNORMAL H
TREATMENT ALLO B1: NORMAL
TREATMENT ALLO B2: NORMAL
TREATMENT ALLO T1: NORMAL
TREATMENT ALLO T2: NORMAL
TREATMENT AUTO B1: NORMAL
TREATMENT AUTO B2: NORMAL
TREATMENT AUTO T1: NORMAL
TREATMENT AUTO T2: NORMAL
WBC # BLD AUTO: 2.4 10E3/UL (ref 4–11)
ZZZCOMMENT ALLOB2: NORMAL

## 2021-10-26 PROCEDURE — 80197 ASSAY OF TACROLIMUS: CPT | Performed by: PHYSICIAN ASSISTANT

## 2021-10-26 PROCEDURE — 250N000011 HC RX IP 250 OP 636: Performed by: PHYSICIAN ASSISTANT

## 2021-10-26 PROCEDURE — 85025 COMPLETE CBC W/AUTO DIFF WBC: CPT | Performed by: PHYSICIAN ASSISTANT

## 2021-10-26 PROCEDURE — 80048 BASIC METABOLIC PNL TOTAL CA: CPT | Performed by: PHYSICIAN ASSISTANT

## 2021-10-26 PROCEDURE — 83735 ASSAY OF MAGNESIUM: CPT | Performed by: PHYSICIAN ASSISTANT

## 2021-10-26 PROCEDURE — 36592 COLLECT BLOOD FROM PICC: CPT | Performed by: PHYSICIAN ASSISTANT

## 2021-10-26 PROCEDURE — 250N000012 HC RX MED GY IP 250 OP 636 PS 637: Performed by: PHYSICIAN ASSISTANT

## 2021-10-26 PROCEDURE — 97116 GAIT TRAINING THERAPY: CPT | Mod: GP

## 2021-10-26 PROCEDURE — 84100 ASSAY OF PHOSPHORUS: CPT | Performed by: PHYSICIAN ASSISTANT

## 2021-10-26 PROCEDURE — 250N000013 HC RX MED GY IP 250 OP 250 PS 637: Performed by: PHYSICIAN ASSISTANT

## 2021-10-26 RX ORDER — AMOXICILLIN 250 MG
1 CAPSULE ORAL 2 TIMES DAILY
Qty: 60 TABLET | Refills: 0 | Status: SHIPPED | OUTPATIENT
Start: 2021-10-26 | End: 2022-03-18

## 2021-10-26 RX ORDER — TACROLIMUS 1 MG/1
4 CAPSULE ORAL 2 TIMES DAILY
Qty: 240 CAPSULE | Refills: 11 | Status: ON HOLD | OUTPATIENT
Start: 2021-10-26 | End: 2021-11-07

## 2021-10-26 RX ORDER — TACROLIMUS 1 MG/1
3 CAPSULE ORAL 2 TIMES DAILY
Qty: 180 CAPSULE | Refills: 11 | Status: SHIPPED | OUTPATIENT
Start: 2021-10-26 | End: 2021-10-26

## 2021-10-26 RX ORDER — SULFAMETHOXAZOLE AND TRIMETHOPRIM 400; 80 MG/1; MG/1
1 TABLET ORAL DAILY
Qty: 30 TABLET | Refills: 11 | Status: ON HOLD | OUTPATIENT
Start: 2021-10-26 | End: 2021-11-07

## 2021-10-26 RX ORDER — VALGANCICLOVIR 450 MG/1
450 TABLET, FILM COATED ORAL
Qty: 60 TABLET | Refills: 5 | Status: SHIPPED | OUTPATIENT
Start: 2021-10-28 | End: 2021-10-26

## 2021-10-26 RX ORDER — POLYETHYLENE GLYCOL 3350 17 G/17G
17 POWDER, FOR SOLUTION ORAL DAILY
Qty: 510 G | Refills: 0 | Status: SHIPPED | OUTPATIENT
Start: 2021-10-26 | End: 2022-03-18

## 2021-10-26 RX ORDER — SULFAMETHOXAZOLE AND TRIMETHOPRIM 400; 80 MG/1; MG/1
1 TABLET ORAL
Qty: 30 TABLET | Refills: 11 | Status: SHIPPED | OUTPATIENT
Start: 2021-10-26 | End: 2021-10-26

## 2021-10-26 RX ORDER — ACETAMINOPHEN 325 MG/1
650 TABLET ORAL EVERY 4 HOURS PRN
COMMUNITY
Start: 2021-10-26 | End: 2022-08-18

## 2021-10-26 RX ORDER — VALGANCICLOVIR 450 MG/1
450 TABLET, FILM COATED ORAL EVERY OTHER DAY
Qty: 60 TABLET | Refills: 5 | Status: ON HOLD | OUTPATIENT
Start: 2021-10-26 | End: 2021-11-07

## 2021-10-26 RX ORDER — MYCOPHENOLATE MOFETIL 250 MG/1
750 CAPSULE ORAL 2 TIMES DAILY
Qty: 180 CAPSULE | Refills: 11 | Status: SHIPPED | OUTPATIENT
Start: 2021-10-26 | End: 2021-12-15

## 2021-10-26 RX ORDER — VALGANCICLOVIR 450 MG/1
450 TABLET, FILM COATED ORAL EVERY OTHER DAY
Status: DISCONTINUED | OUTPATIENT
Start: 2021-10-27 | End: 2021-10-26 | Stop reason: HOSPADM

## 2021-10-26 RX ORDER — SULFAMETHOXAZOLE AND TRIMETHOPRIM 400; 80 MG/1; MG/1
1 TABLET ORAL DAILY
Status: DISCONTINUED | OUTPATIENT
Start: 2021-10-27 | End: 2021-10-26 | Stop reason: HOSPADM

## 2021-10-26 RX ORDER — OXYCODONE HYDROCHLORIDE 5 MG/1
5 TABLET ORAL EVERY 6 HOURS PRN
Qty: 20 TABLET | Refills: 0 | Status: SHIPPED | OUTPATIENT
Start: 2021-10-26 | End: 2022-03-18

## 2021-10-26 RX ORDER — BISACODYL 10 MG
10 SUPPOSITORY, RECTAL RECTAL DAILY PRN
Status: DISCONTINUED | OUTPATIENT
Start: 2021-10-26 | End: 2021-10-26

## 2021-10-26 RX ADMIN — OXYCODONE HYDROCHLORIDE 5 MG: 5 TABLET ORAL at 11:25

## 2021-10-26 RX ADMIN — DOCUSATE SODIUM 50 MG AND SENNOSIDES 8.6 MG 1 TABLET: 8.6; 5 TABLET, FILM COATED ORAL at 07:49

## 2021-10-26 RX ADMIN — MYCOPHENOLATE MOFETIL 750 MG: 250 CAPSULE ORAL at 07:48

## 2021-10-26 RX ADMIN — ACETAMINOPHEN 975 MG: 325 TABLET, FILM COATED ORAL at 07:48

## 2021-10-26 RX ADMIN — CITALOPRAM HYDROBROMIDE 20 MG: 20 TABLET ORAL at 07:48

## 2021-10-26 RX ADMIN — POLYETHYLENE GLYCOL 3350 17 G: 17 POWDER, FOR SOLUTION ORAL at 07:48

## 2021-10-26 RX ADMIN — TACROLIMUS 3 MG: 1 CAPSULE ORAL at 07:49

## 2021-10-26 RX ADMIN — INSULIN ASPART 1 UNITS: 100 INJECTION, SOLUTION INTRAVENOUS; SUBCUTANEOUS at 08:01

## 2021-10-26 RX ADMIN — Medication 400 MG: at 11:25

## 2021-10-26 RX ADMIN — ATORVASTATIN CALCIUM 10 MG: 10 TABLET, FILM COATED ORAL at 07:48

## 2021-10-26 RX ADMIN — OXYCODONE HYDROCHLORIDE 5 MG: 5 TABLET ORAL at 05:22

## 2021-10-26 RX ADMIN — SULFAMETHOXAZOLE AND TRIMETHOPRIM 1 TABLET: 400; 80 TABLET ORAL at 07:57

## 2021-10-26 RX ADMIN — HEPARIN SODIUM 5000 UNITS: 10000 INJECTION, SOLUTION INTRAVENOUS; SUBCUTANEOUS at 11:25

## 2021-10-26 ASSESSMENT — ACTIVITIES OF DAILY LIVING (ADL)
ADLS_ACUITY_SCORE: 8

## 2021-10-26 NOTE — PLAN OF CARE
DISCHARGE:   D: Patient with orders to discharge to Home.   I: Discharge instructions, medications, & follow ups reviewed with Patient. Copy of discharge summary given to Patient.  PIV removed. All belongings packed & sent with patient. Medications filled & picked up at discharge pharmacy. Paperwork faxed.   A: Patient in stable condition. AVSS. Patient had no further questions regarding discharge instructions and medications. Patient transferred out by ambulation & left with Wife.   P: Plan for Discharge to home

## 2021-10-26 NOTE — PLAN OF CARE
BP (!) 130/99 (BP Location: Right arm)   Pulse 65   Temp 98.4  F (36.9  C) (Oral)   Resp 16   Wt 91 kg (200 lb 9.9 oz)   SpO2 93%   BMI 28.79 kg/m   AVSS on RA. Patient c/o mild incisional pain. Received scheduled Tylenol and prn Oxycodone 5 mg at 2400. Patient denies nausea.  @ 0200. Urine Output - 850 ml pink via spears so far this shift. Bowel Function - No BM since before surgery. Activity - 1 assist/SBA. Slept fair.

## 2021-10-26 NOTE — PHARMACY-TRANSPLANT NOTE
Solid Organ Transplant Recipient Prior to Discharge Note    50 year old male s/p kidney transplant on 10/23/2021.    Maintenance immunosuppression:  -Mycophenoalte mofetil 750mg by mouth twice a day   -Tacrolimus titrate dose to goal trough of 8-10 mcg/L as directed by transplant team. Tacrolimus level today, 10/26/2021 is 4.8 mcg/L (13.5 hour trough) on regimen of 3mg by mouth twice a day (has received 4 doses).    Pharmacy has monitored for medication interactions and immunosuppression levels in conjunction with the multidisciplinary team. In anticipation for discharge, medication therapy needs have been addressed daily throughout the current admission via multidisciplinary rounds and/or discussions, order verification, daily clinical pharmacy review, and communication with prescribers.  Gracie Cobian, Pharm.D., [facility]  Pager [phone]

## 2021-10-26 NOTE — PLAN OF CARE
Vitals: BP (!) 139/96 (BP Location: Right arm)   Pulse 72   Temp 98.3  F (36.8  C) (Oral)   Resp 16   Wt 91 kg (200 lb 9.9 oz)   SpO2 96%   BMI 28.79 kg/m    Endocrine: ACHS BG checks, 127, 147  Labs: None on this shift  Pain: Controlled. Abdominal, incisional  PRN's: Oxycodone given x2  Diet: Regular. Poor appetite. Pt states r/t dislike for hospital food.   LDA: R internal jugular, PIV, Wade  GI: No BM on this shift. Active bowel sounds, passing flatus. Senna given. Pt declined other interventions.   : Wade in place. 800 mL output. Light pink, clear urine.   Skin: L hockey stick incision, dermabonded, BRIJESH. No redness, swelling, or drainage noted.   Neuro: Intact  Mobility: UAL  Plan: Possible discharge home tomorrow. Will continue to monitor and follow POC. Will update provider with any changes in condition.

## 2021-10-26 NOTE — PLAN OF CARE
Physical Therapy Discharge Summary    Reason for therapy discharge:    Discharged to home.  All goals and outcomes met, no further needs identified.  Pt able to perform all functional mobility IND    Progress towards therapy goal(s). See goals on Care Plan in [facility] electronic health record for goal details.  Goals met    Therapy recommendation(s):    No further therapy is recommended.

## 2021-10-26 NOTE — OP NOTE
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 Right kidney DCD transplant,  Donation after Circulatory Death , Left iliac fossa, with venous reconstruction. A J-J ureteral stent was placed.  2. Kidney allograft preparation on Back Table      Surgeon:  MARIYA CLAYTON    Fellow/Assistant:  Georges Mattson, fellow, There was no qualified resident to assist with this procedure.    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 After discussing the risks and benefits of proceeding, the patient agreed to proceed with surgery and provided informed consent.  Findings: Integrity of recipient artery: Normal   Intraoperative Events: None,     Final ABO/Crossmatch verification: After the donor organ arrived to the operating room and prior to anastomosis, I participated in the transplant pre-verification upon organ receipt timeout by visually verifying the donor ID, organ and laterality, donor blood type, recipient unique identifier, recipient blood type, and that the donor and recipient are blood type compatible. The crossmatch was done prospectively; the T cell flow crossmatch result was negative and B cell flow crossmatch result was negative prior to anastomosis.  The patient received Thymoglobulin on induction.    Donor Organ Information:   Donor UNOS ID:  [id number]    Donor arterial clamp on:  10/22/2021  7:33 PM    Total ischemic time:  1320 min    Cold ischemic time:  1,265 min    Warm ischemic time:  55 min    Preservation fluid:  HTK      Back Table  Details:   Procedure:  Bench preparation of the kidney allograft for transplantation with venous reconstruction    Surgeon:  MARIYA CLAYTON    Faculty Co-Surgeon:  MARIYA CLAYTON    Fellow/Assistant:  Georges Mattson fellow,     Donor arrival to recipient room:  10/23/2021  1:10 PM    Graft injury:  No    Graft biopsy:  yes    Organ received on:  Ice    Pump resistance:      Pump flow:      Arterial anatomy:  Single    Donor arterial quality:  Mild atherosclerosis    Venous anatomy:  Single    Ureteral anatomy:  Single    Any reconstruction:  Yes    Artery:  None    Vein:  Caval conduit anastomosis x 2     Complications: None.    Findings: Mild atherosclerosis       None.    Back Table Preparation:  The donor kidney was received and inspected. It had been flushed with HTK. The graft was prepared on the back table by removing perinephric fat and ligating venous tributaries and lymphatics. The ureter was also cleaned of excess tissue. If required, reconstruction was performed as detailed above. The kidney was stored in iced cold preservation solution until ready for transplantation. Faculty was present for the critical portions of the procedure.    Operative Procedure:   Arterial anastomosis start:  10/23/2021  4:38 PM    Arterial unclamp:  10/23/2021  5:33 PM    Extra vessels used:   None        The patient was brought to the operating room, placed in a supine position, and a time out was performed. Sequential compression devices were placed on both lower extremities and general endotracheal anesthesia was induced.  The patient was given IV antibiotics and a Wade catheter. A central line was placed by Anesthesia service. The abdomen was then shaved, prepped, and draped in the usual sterile fashion.  An incision was made in the left lower quadrant and carried down through the subcutaneous tissue and the abdominal wall fascia. If encountered, the epigastric vessels were ligated in continuity, divided and  secured with surgical clips. The left iliac artery and vein were exposed. The retractor system was placed and the lymphatics overlying the vessels were serially ligated and divided.  Several internal iliac veins were ligated and divided to allow the external iliac vein to elevate.   The patient was heparinized. We applied atraumatic vascular clamps and the donor kidney was brought to the operative field. We made a venotomy and the caval conduit was anastomosed to the recipient left External Iliac vein in an end-to-side fashion. An arteriotomy was made and the donor renal artery was anastomosed to the recipient left common iliac artery  in an end to side fashion. The patient was simultaneously loaded with IV mannitol, Lasix and volume. The renal artery was protected and the clamps were removed. After several cardiac cycles, we opened the renal artery and the kidney had Good reperfusion and was firm, pink  and normal. Verapimil given for vasospasm with good response.    The transplant ureter was managed by creating a Liche (anterior multistitch) anastomosis with absorbable suture. A stent was placed across the anastomosis. The kidney made urine prior to implantation.    Hemostasis was obtained, the anastomoses inspected, and the kidney placed in the iliac fossa. After placement, the vessel lay was inspected and found to be acceptable. The kidney position was Retroperitoneal. The field was irrigated with antibiotic solution. No drain was placed. The retractor was removed and the abdominal wall fascia reapproximated. Subcutaneous tissues were irrigated and hemostasis obtained.  The skin was reapproximated with running subcuticular stitch and steristrip was applied.   All needle, sponge and instrument counts were correct x 2. The patient was awakened, extubated, and transferred to PACU for post-op monitoring. Faculty was present for key portions of the procedure.    Physician Attestation   I was present for the key  portions of the procedure and I was immediately available for the entire procedure between opening and closing.    Madina Warren MD  Date of Service (when I saw the patient): 10/23/2021

## 2021-10-26 NOTE — PROGRESS NOTES
Care Management Discharge Note    Discharge Date: 10/26/2021       Discharge Disposition: Home  Discharge Services: None    Discharge DME: None    Discharge Transportation: family or friend will provide    Patient/family educated on Medicare website which has current facility and service quality ratings:No    Education Provided on the Discharge Plan:  Yes  Persons Notified of Discharge Plans: Patient  Patient/Family in Agreement with the Plan: yes    Handoff Referral Completed: Yes    Additional Information:  Pt status reviewed/discussed during care team rounds.  Patient s/p kidney transplant 10/23.  Team anticipates discharge home today.  Per discussion with provider team pt has declined need for home care RN f/u.   Pt will need ATC appointments confirmed for  Wednesday 10/27 and  Thursday 10/28.  Per provider team pt does not need to stay locally.     Met with pt.  Introduced RNCC role.  Discussed/reviewed discharge plan.  Pt notes no concerns.  Per pt his wife will be able to bring him to all outpatient clinic appointments.        ATC appointment request sent.       Nadia Painting RN BSN, PHN, ACM-RN  7A RN Care Coordinator  Phone: [phone]  Pager [phone]    10/26/2021 11:09 AM

## 2021-10-26 NOTE — PROGRESS NOTES
CLINICAL NUTRITION SERVICES - BRIEF/DISCHARGE NOTE     Nutrition Prescription    Reviewed the following with the patient for homegoing:  Minimize diet restrictions as able d/t high calorie/protein needs post-transplant.  Oral supplements as needed to help meet nutritional needs.     High protein food choices with meals to help meet high needs post-transplant over the next 6-8 weeks.     Heart-healthy diet (low saturated fat, low sodium, high fiber) and food safety precautions long term due to immunosuppression regimen post-transplant         NEW FINDINGS   K+ 4.7 (WNL) - pt reports having had to watch K+ prior to transplant. Was last elevated on 10/24.    Patient s discharge needs assessed and discharge planning has been conducted with the multidisciplinary transplant care team including physicians, pharmacy, social work and transplant coordinator.    Interventions:  Nutrition Education (pt and spouse): Reviewed post-transplant diet guidelines, particularly food safety, potential for hyperkalemia, and high-protein diet. Provided handouts (Food Safety Booklet, Guide to Diet After Transplant, and Source of Protein). Discussed protein goals. Pt reports he is overall familiar with post-txp diet as this is his 2nd kidney transplant. He was appreciative of the review. Answered all questions.     Monitoring/Evaluation  Progress toward goals will be monitored and evaluated per protocol.    Once discharged, place outpatient nutrition consult via the transplant team if nutrition concerns arise.    Stella Dunlap RD, LD  Pager: [phone]

## 2021-10-26 NOTE — PROGRESS NOTES
Transplant Social Work Service Discharge Note      Patient Name:  Raj Pierce     Anticipated Discharge Date: 10/26/2021    Discharge Disposition: Home    Plan for 24 hour care for immediate post transplant period: Patient will receive care from his wife Dianne    If not local, plans for short term stay: Will be driving to and from [place], [place]. Surgeon team aware.     Additional Services/Equipment Arranged: RNCC set up appropriate ATC visits. No further SW needs.    Persons notified of above discharge plan:  Patient, patient's wife, treatment team, nursing staff, ATC, home care    Patient / Family response to discharge plan:  Agreeable    Education and resources provided by SW at discharge: Social Work role inpatient setting, 2728 Medicare Form, [facility] Donor information, and outpatient social work contact information.    Discussed anticipated pharmacy out of pocket costs: YES    Provided [facility] Donor Letter Writing packet : YES    Plan: Patient will discharge home into the care of his wife Dianne in [place], [place]. Patient will attend ATC and has declined home care. No SW needs identified at discharge.    VIRGILIO Rob, LAUREL  7A   Ph: [phone]  Pager: [phone]

## 2021-10-27 ENCOUNTER — APPOINTMENT (OUTPATIENT)
Dept: ULTRASOUND IMAGING | Facility: CLINIC | Age: 51
End: 2021-10-27
Attending: SURGERY
Payer: COMMERCIAL

## 2021-10-27 ENCOUNTER — HOSPITAL ENCOUNTER (INPATIENT)
Facility: CLINIC | Age: 51
LOS: 11 days | Discharge: HOME | End: 2021-11-07
Attending: EMERGENCY MEDICINE | Admitting: SURGERY
Payer: COMMERCIAL

## 2021-10-27 ENCOUNTER — OFFICE VISIT (OUTPATIENT)
Dept: INFUSION THERAPY | Facility: CLINIC | Age: 51
End: 2021-10-27
Attending: INTERNAL MEDICINE
Payer: COMMERCIAL

## 2021-10-27 ENCOUNTER — LAB (OUTPATIENT)
Dept: LAB | Facility: CLINIC | Age: 51
End: 2021-10-27
Attending: INTERNAL MEDICINE
Payer: COMMERCIAL

## 2021-10-27 ENCOUNTER — TELEPHONE (OUTPATIENT)
Dept: TRANSPLANT | Facility: CLINIC | Age: 51
End: 2021-10-27

## 2021-10-27 ENCOUNTER — ANCILLARY PROCEDURE (OUTPATIENT)
Dept: CT IMAGING | Facility: CLINIC | Age: 51
End: 2021-10-27
Attending: INTERNAL MEDICINE
Payer: COMMERCIAL

## 2021-10-27 ENCOUNTER — ANCILLARY PROCEDURE (OUTPATIENT)
Dept: ULTRASOUND IMAGING | Facility: CLINIC | Age: 51
End: 2021-10-27
Attending: INTERNAL MEDICINE
Payer: COMMERCIAL

## 2021-10-27 ENCOUNTER — APPOINTMENT (OUTPATIENT)
Dept: GENERAL RADIOLOGY | Facility: CLINIC | Age: 51
End: 2021-10-27
Attending: SURGERY
Payer: COMMERCIAL

## 2021-10-27 ENCOUNTER — ANESTHESIA EVENT (OUTPATIENT)
Dept: SURGERY | Facility: CLINIC | Age: 51
End: 2021-10-27
Payer: COMMERCIAL

## 2021-10-27 ENCOUNTER — ANESTHESIA (OUTPATIENT)
Dept: SURGERY | Facility: CLINIC | Age: 51
End: 2021-10-27
Payer: COMMERCIAL

## 2021-10-27 VITALS
BODY MASS INDEX: 28.91 KG/M2 | HEART RATE: 81 BPM | TEMPERATURE: 98.1 F | DIASTOLIC BLOOD PRESSURE: 81 MMHG | OXYGEN SATURATION: 94 % | WEIGHT: 201.5 LBS | SYSTOLIC BLOOD PRESSURE: 123 MMHG

## 2021-10-27 DIAGNOSIS — R10.84 GENERALIZED ABDOMINAL PAIN: ICD-10-CM

## 2021-10-27 DIAGNOSIS — Z94.0 KIDNEY TRANSPLANT RECIPIENT: ICD-10-CM

## 2021-10-27 DIAGNOSIS — R19.7 DIARRHEA, UNSPECIFIED TYPE: ICD-10-CM

## 2021-10-27 DIAGNOSIS — Z76.82 KIDNEY TRANSPLANT CANDIDATE: ICD-10-CM

## 2021-10-27 DIAGNOSIS — Z48.298 AFTERCARE FOLLOWING ORGAN TRANSPLANT: ICD-10-CM

## 2021-10-27 DIAGNOSIS — T81.49XA SURGICAL SITE INFECTION: Primary | ICD-10-CM

## 2021-10-27 DIAGNOSIS — D84.9 IMMUNOSUPPRESSED STATUS (H): ICD-10-CM

## 2021-10-27 DIAGNOSIS — T86.19 RENAL VEIN THROMBOSIS OF KIDNEY TRANSPLANT (H): Primary | ICD-10-CM

## 2021-10-27 DIAGNOSIS — R33.9 RETENTION OF URINE, UNSPECIFIED: ICD-10-CM

## 2021-10-27 DIAGNOSIS — I82.3 RENAL VEIN THROMBOSIS OF KIDNEY TRANSPLANT (H): ICD-10-CM

## 2021-10-27 DIAGNOSIS — I82.3 RENAL VEIN THROMBOSIS (H): ICD-10-CM

## 2021-10-27 DIAGNOSIS — Z94.0 KIDNEY TRANSPLANT RECIPIENT: Primary | ICD-10-CM

## 2021-10-27 DIAGNOSIS — Z79.899 ENCOUNTER FOR LONG-TERM CURRENT USE OF MEDICATION: ICD-10-CM

## 2021-10-27 DIAGNOSIS — Z94.0 KIDNEY REPLACED BY TRANSPLANT: ICD-10-CM

## 2021-10-27 DIAGNOSIS — R11.10 ACUTE VOMITING: ICD-10-CM

## 2021-10-27 DIAGNOSIS — T86.19 RENAL VEIN THROMBOSIS OF KIDNEY TRANSPLANT (H): ICD-10-CM

## 2021-10-27 DIAGNOSIS — I82.3 RENAL VEIN THROMBOSIS OF KIDNEY TRANSPLANT (H): Primary | ICD-10-CM

## 2021-10-27 DIAGNOSIS — Z20.822 CONTACT WITH AND (SUSPECTED) EXPOSURE TO COVID-19: ICD-10-CM

## 2021-10-27 DIAGNOSIS — Z94.0 KIDNEY REPLACED BY TRANSPLANT: Primary | ICD-10-CM

## 2021-10-27 LAB
ABO/RH(D): NORMAL
ANION GAP SERPL CALCULATED.3IONS-SCNC: 8 MMOL/L (ref 3–14)
ANION GAP SERPL CALCULATED.3IONS-SCNC: 9 MMOL/L (ref 3–14)
ANTIBODY SCREEN: NEGATIVE
APTT PPP: 110 SECONDS (ref 22–38)
BASOPHILS # BLD AUTO: 0 10E3/UL (ref 0–0.2)
BASOPHILS NFR BLD AUTO: 0 %
BLD PROD TYP BPU: NORMAL
BLD PROD TYP BPU: NORMAL
BLOOD COMPONENT TYPE: NORMAL
BLOOD COMPONENT TYPE: NORMAL
BUN SERPL-MCNC: 74 MG/DL (ref 7–30)
BUN SERPL-MCNC: 80 MG/DL (ref 7–30)
CALCIUM SERPL-MCNC: 7.7 MG/DL (ref 8.5–10.1)
CALCIUM SERPL-MCNC: 8.2 MG/DL (ref 8.5–10.1)
CHLORIDE BLD-SCNC: 106 MMOL/L (ref 94–109)
CHLORIDE BLD-SCNC: 107 MMOL/L (ref 94–109)
CO2 SERPL-SCNC: 23 MMOL/L (ref 20–32)
CO2 SERPL-SCNC: 24 MMOL/L (ref 20–32)
CODING SYSTEM: NORMAL
CODING SYSTEM: NORMAL
CREAT SERPL-MCNC: 4.33 MG/DL (ref 0.66–1.25)
CREAT SERPL-MCNC: 4.35 MG/DL (ref 0.66–1.25)
CROSSMATCH: NORMAL
CROSSMATCH: NORMAL
DEPRECATED CALCIDIOL+CALCIFEROL SERPL-MC: 39 UG/L (ref 20–75)
EOSINOPHIL # BLD AUTO: 0 10E3/UL (ref 0–0.7)
EOSINOPHIL NFR BLD AUTO: 0 %
ERYTHROCYTE [DISTWIDTH] IN BLOOD BY AUTOMATED COUNT: 12.3 % (ref 10–15)
ERYTHROCYTE [DISTWIDTH] IN BLOOD BY AUTOMATED COUNT: 13.1 % (ref 10–15)
FERRITIN SERPL-MCNC: 597 NG/ML (ref 26–388)
FIBRINOGEN PPP-MCNC: 546 MG/DL (ref 170–490)
GFR SERPL CREATININE-BSD FRML MDRD: 15 ML/MIN/1.73M2
GFR SERPL CREATININE-BSD FRML MDRD: 15 ML/MIN/1.73M2
GLUCOSE BLD-MCNC: 132 MG/DL (ref 70–99)
GLUCOSE BLD-MCNC: 145 MG/DL (ref 70–99)
HCT VFR BLD AUTO: 24.5 % (ref 40–53)
HCT VFR BLD AUTO: 35.3 % (ref 40–53)
HGB BLD-MCNC: 11.2 G/DL (ref 13.3–17.7)
HGB BLD-MCNC: 7.1 G/DL (ref 13.3–17.7)
HGB BLD-MCNC: 7.6 G/DL (ref 13.3–17.7)
HOLD SPECIMEN: NORMAL
HOLD SPECIMEN: NORMAL
IMM GRANULOCYTES # BLD: 0 10E3/UL
IMM GRANULOCYTES NFR BLD: 0 %
INR PPP: 1.43 (ref 0.85–1.15)
IRON SATN MFR SERPL: 10 % (ref 15–46)
IRON SERPL-MCNC: 19 UG/DL (ref 35–180)
ISSUE DATE AND TIME: NORMAL
ISSUE DATE AND TIME: NORMAL
LMWH PPP CHRO-ACNC: 0.66 IU/ML
LMWH PPP CHRO-ACNC: 0.67 IU/ML
LYMPHOCYTES # BLD AUTO: 0.1 10E3/UL (ref 0.8–5.3)
LYMPHOCYTES NFR BLD AUTO: 2 %
MAGNESIUM SERPL-MCNC: 1.9 MG/DL (ref 1.6–2.3)
MAGNESIUM SERPL-MCNC: 1.9 MG/DL (ref 1.6–2.3)
MCH RBC QN AUTO: 27.7 PG (ref 26.5–33)
MCH RBC QN AUTO: 27.7 PG (ref 26.5–33)
MCHC RBC AUTO-ENTMCNC: 31 G/DL (ref 31.5–36.5)
MCHC RBC AUTO-ENTMCNC: 31.7 G/DL (ref 31.5–36.5)
MCV RBC AUTO: 87 FL (ref 78–100)
MCV RBC AUTO: 89 FL (ref 78–100)
MONOCYTES # BLD AUTO: 0.4 10E3/UL (ref 0–1.3)
MONOCYTES NFR BLD AUTO: 6 %
NEUTROPHILS # BLD AUTO: 6.4 10E3/UL (ref 1.6–8.3)
NEUTROPHILS NFR BLD AUTO: 92 %
NRBC # BLD AUTO: 0 10E3/UL
NRBC BLD AUTO-RTO: 0 /100
PHOSPHATE SERPL-MCNC: 2.2 MG/DL (ref 2.5–4.5)
PHOSPHATE SERPL-MCNC: 5 MG/DL (ref 2.5–4.5)
PLATELET # BLD AUTO: 139 10E3/UL (ref 150–450)
PLATELET # BLD AUTO: 202 10E3/UL (ref 150–450)
POTASSIUM BLD-SCNC: 4.3 MMOL/L (ref 3.4–5.3)
POTASSIUM BLD-SCNC: 4.8 MMOL/L (ref 3.4–5.3)
PTH-INTACT SERPL-MCNC: 149 PG/ML (ref 18–80)
RADIOLOGIST FLAGS: ABNORMAL
RBC # BLD AUTO: 2.74 10E6/UL (ref 4.4–5.9)
RBC # BLD AUTO: 4.05 10E6/UL (ref 4.4–5.9)
SARS-COV-2 RNA RESP QL NAA+PROBE: NEGATIVE
SODIUM SERPL-SCNC: 138 MMOL/L (ref 133–144)
SODIUM SERPL-SCNC: 139 MMOL/L (ref 133–144)
SPECIMEN EXPIRATION DATE: NORMAL
TACROLIMUS BLD-MCNC: 4.2 UG/L (ref 5–15)
TIBC SERPL-MCNC: 197 UG/DL (ref 240–430)
TME LAST DOSE: ABNORMAL H
TME LAST DOSE: ABNORMAL H
UFH PPP CHRO-ACNC: 0.69 IU/ML
UNIT ABO/RH: NORMAL
UNIT ABO/RH: NORMAL
UNIT NUMBER: NORMAL
UNIT NUMBER: NORMAL
UNIT STATUS: NORMAL
UNIT STATUS: NORMAL
UNIT TYPE ISBT: 7300
UNIT TYPE ISBT: 7300
WBC # BLD AUTO: 6.9 10E3/UL (ref 4–11)
WBC # BLD AUTO: 9.3 10E3/UL (ref 4–11)

## 2021-10-27 PROCEDURE — 250N000009 HC RX 250: Performed by: NURSE ANESTHETIST, CERTIFIED REGISTERED

## 2021-10-27 PROCEDURE — 85730 THROMBOPLASTIN TIME PARTIAL: CPT

## 2021-10-27 PROCEDURE — 50380 RNL AUTOTRNSPLJ RIMPLTJ KDN: CPT | Mod: 78 | Performed by: SURGERY

## 2021-10-27 PROCEDURE — 85520 HEPARIN ASSAY: CPT

## 2021-10-27 PROCEDURE — 0TT10ZZ RESECTION OF LEFT KIDNEY, OPEN APPROACH: ICD-10-PCS | Performed by: SURGERY

## 2021-10-27 PROCEDURE — 36415 COLL VENOUS BLD VENIPUNCTURE: CPT | Performed by: PATHOLOGY

## 2021-10-27 PROCEDURE — C1757 CATH, THROMBECTOMY/EMBOLECT: HCPCS | Performed by: SURGERY

## 2021-10-27 PROCEDURE — 88304 TISSUE EXAM BY PATHOLOGIST: CPT | Mod: TC | Performed by: SURGERY

## 2021-10-27 PROCEDURE — 06CD0ZZ EXTIRPATION OF MATTER FROM LEFT COMMON ILIAC VEIN, OPEN APPROACH: ICD-10-PCS | Performed by: SURGERY

## 2021-10-27 PROCEDURE — 250N000011 HC RX IP 250 OP 636: Performed by: STUDENT IN AN ORGANIZED HEALTH CARE EDUCATION/TRAINING PROGRAM

## 2021-10-27 PROCEDURE — 86923 COMPATIBILITY TEST ELECTRIC: CPT | Performed by: SURGERY

## 2021-10-27 PROCEDURE — 99285 EMERGENCY DEPT VISIT HI MDM: CPT | Performed by: EMERGENCY MEDICINE

## 2021-10-27 PROCEDURE — 71045 X-RAY EXAM CHEST 1 VIEW: CPT | Mod: 26 | Performed by: RADIOLOGY

## 2021-10-27 PROCEDURE — 96374 THER/PROPH/DIAG INJ IV PUSH: CPT

## 2021-10-27 PROCEDURE — 96375 TX/PRO/DX INJ NEW DRUG ADDON: CPT | Performed by: EMERGENCY MEDICINE

## 2021-10-27 PROCEDURE — 99000 SPECIMEN HANDLING OFFICE-LAB: CPT | Performed by: PATHOLOGY

## 2021-10-27 PROCEDURE — 85025 COMPLETE CBC W/AUTO DIFF WBC: CPT | Performed by: PATHOLOGY

## 2021-10-27 PROCEDURE — P9041 ALBUMIN (HUMAN),5%, 50ML: HCPCS | Performed by: NURSE ANESTHETIST, CERTIFIED REGISTERED

## 2021-10-27 PROCEDURE — 85520 HEPARIN ASSAY: CPT | Performed by: NURSE ANESTHETIST, CERTIFIED REGISTERED

## 2021-10-27 PROCEDURE — 82306 VITAMIN D 25 HYDROXY: CPT | Mod: 90 | Performed by: PATHOLOGY

## 2021-10-27 PROCEDURE — 0TP90DZ REMOVAL OF INTRALUMINAL DEVICE FROM URETER, OPEN APPROACH: ICD-10-PCS | Performed by: SURGERY

## 2021-10-27 PROCEDURE — 84100 ASSAY OF PHOSPHORUS: CPT | Performed by: PATHOLOGY

## 2021-10-27 PROCEDURE — 6ABT0BZ PERFUSION OF URINARY SYSTEM DONOR ORGAN, SINGLE: ICD-10-PCS | Performed by: SURGERY

## 2021-10-27 PROCEDURE — 999N000063 XR ABDOMEN PORT 1 VIEWS

## 2021-10-27 PROCEDURE — 85610 PROTHROMBIN TIME: CPT

## 2021-10-27 PROCEDURE — 96374 THER/PROPH/DIAG INJ IV PUSH: CPT | Performed by: EMERGENCY MEDICINE

## 2021-10-27 PROCEDURE — 258N000003 HC RX IP 258 OP 636: Performed by: SURGERY

## 2021-10-27 PROCEDURE — 250N000011 HC RX IP 250 OP 636: Performed by: SURGERY

## 2021-10-27 PROCEDURE — 80048 BASIC METABOLIC PNL TOTAL CA: CPT | Performed by: SURGERY

## 2021-10-27 PROCEDURE — U0003 INFECTIOUS AGENT DETECTION BY NUCLEIC ACID (DNA OR RNA); SEVERE ACUTE RESPIRATORY SYNDROME CORONAVIRUS 2 (SARS-COV-2) (CORONAVIRUS DISEASE [COVID-19]), AMPLIFIED PROBE TECHNIQUE, MAKING USE OF HIGH THROUGHPUT TECHNOLOGIES AS DESCRIBED BY CMS-2020-01-R: HCPCS | Performed by: EMERGENCY MEDICINE

## 2021-10-27 PROCEDURE — 96361 HYDRATE IV INFUSION ADD-ON: CPT

## 2021-10-27 PROCEDURE — 250N000011 HC RX IP 250 OP 636: Performed by: NURSE ANESTHETIST, CERTIFIED REGISTERED

## 2021-10-27 PROCEDURE — 84100 ASSAY OF PHOSPHORUS: CPT | Performed by: SURGERY

## 2021-10-27 PROCEDURE — 250N000025 HC SEVOFLURANE, PER MIN: Performed by: SURGERY

## 2021-10-27 PROCEDURE — G0463 HOSPITAL OUTPT CLINIC VISIT: HCPCS | Mod: 25

## 2021-10-27 PROCEDURE — 04CA0ZZ EXTIRPATION OF MATTER FROM LEFT RENAL ARTERY, OPEN APPROACH: ICD-10-PCS | Performed by: SURGERY

## 2021-10-27 PROCEDURE — 272N000001 HC OR GENERAL SUPPLY STERILE: Performed by: SURGERY

## 2021-10-27 PROCEDURE — C9803 HOPD COVID-19 SPEC COLLECT: HCPCS | Performed by: EMERGENCY MEDICINE

## 2021-10-27 PROCEDURE — 99285 EMERGENCY DEPT VISIT HI MDM: CPT | Mod: 25 | Performed by: EMERGENCY MEDICINE

## 2021-10-27 PROCEDURE — 76776 US EXAM K TRANSPL W/DOPPLER: CPT | Performed by: RADIOLOGY

## 2021-10-27 PROCEDURE — 36415 COLL VENOUS BLD VENIPUNCTURE: CPT

## 2021-10-27 PROCEDURE — 85027 COMPLETE CBC AUTOMATED: CPT | Performed by: EMERGENCY MEDICINE

## 2021-10-27 PROCEDURE — 999N000065 XR CHEST PORT 1 VIEW

## 2021-10-27 PROCEDURE — 0T770DZ DILATION OF LEFT URETER WITH INTRALUMINAL DEVICE, OPEN APPROACH: ICD-10-PCS | Performed by: SURGERY

## 2021-10-27 PROCEDURE — 71250 CT THORAX DX C-: CPT | Performed by: RADIOLOGY

## 2021-10-27 PROCEDURE — 85018 HEMOGLOBIN: CPT

## 2021-10-27 PROCEDURE — 74176 CT ABD & PELVIS W/O CONTRAST: CPT | Performed by: RADIOLOGY

## 2021-10-27 PROCEDURE — 80197 ASSAY OF TACROLIMUS: CPT | Mod: 90 | Performed by: PATHOLOGY

## 2021-10-27 PROCEDURE — 710N000010 HC RECOVERY PHASE 1, LEVEL 2, PER MIN: Performed by: SURGERY

## 2021-10-27 PROCEDURE — 99215 OFFICE O/P EST HI 40 MIN: CPT | Mod: 24 | Performed by: INTERNAL MEDICINE

## 2021-10-27 PROCEDURE — 83550 IRON BINDING TEST: CPT | Performed by: PATHOLOGY

## 2021-10-27 PROCEDURE — 250N000013 HC RX MED GY IP 250 OP 250 PS 637: Performed by: SURGERY

## 2021-10-27 PROCEDURE — 250N000011 HC RX IP 250 OP 636: Performed by: INTERNAL MEDICINE

## 2021-10-27 PROCEDURE — 50370 RMVL TRANSPLANTED RNL ALGRFT: CPT | Mod: 78 | Performed by: SURGERY

## 2021-10-27 PROCEDURE — 34151 REMOVAL OF ARTERY CLOT: CPT | Mod: 78 | Performed by: SURGERY

## 2021-10-27 PROCEDURE — 82728 ASSAY OF FERRITIN: CPT | Performed by: PATHOLOGY

## 2021-10-27 PROCEDURE — 06CB0ZZ EXTIRPATION OF MATTER FROM LEFT RENAL VEIN, OPEN APPROACH: ICD-10-PCS | Performed by: SURGERY

## 2021-10-27 PROCEDURE — 370N000017 HC ANESTHESIA TECHNICAL FEE, PER MIN: Performed by: SURGERY

## 2021-10-27 PROCEDURE — 36415 COLL VENOUS BLD VENIPUNCTURE: CPT | Performed by: SURGERY

## 2021-10-27 PROCEDURE — 258N000003 HC RX IP 258 OP 636: Performed by: INTERNAL MEDICINE

## 2021-10-27 PROCEDURE — 74018 RADEX ABDOMEN 1 VIEW: CPT | Mod: 26 | Performed by: RADIOLOGY

## 2021-10-27 PROCEDURE — 83735 ASSAY OF MAGNESIUM: CPT | Performed by: PATHOLOGY

## 2021-10-27 PROCEDURE — 258N000003 HC RX IP 258 OP 636: Performed by: NURSE ANESTHETIST, CERTIFIED REGISTERED

## 2021-10-27 PROCEDURE — 278N000051 HC OR IMPLANT GENERAL: Performed by: SURGERY

## 2021-10-27 PROCEDURE — 51798 US URINE CAPACITY MEASURE: CPT | Performed by: EMERGENCY MEDICINE

## 2021-10-27 PROCEDURE — 76776 US EXAM K TRANSPL W/DOPPLER: CPT

## 2021-10-27 PROCEDURE — 88304 TISSUE EXAM BY PATHOLOGIST: CPT | Mod: 26 | Performed by: PATHOLOGY

## 2021-10-27 PROCEDURE — 76776 US EXAM K TRANSPL W/DOPPLER: CPT | Mod: 26 | Performed by: RADIOLOGY

## 2021-10-27 PROCEDURE — 86900 BLOOD TYPING SEROLOGIC ABO: CPT | Performed by: NURSE ANESTHETIST, CERTIFIED REGISTERED

## 2021-10-27 PROCEDURE — 84132 ASSAY OF SERUM POTASSIUM: CPT | Performed by: SURGERY

## 2021-10-27 PROCEDURE — 85520 HEPARIN ASSAY: CPT | Performed by: SURGERY

## 2021-10-27 PROCEDURE — P9016 RBC LEUKOCYTES REDUCED: HCPCS | Performed by: SURGERY

## 2021-10-27 PROCEDURE — 0TY10Z0 TRANSPLANTATION OF LEFT KIDNEY, ALLOGENEIC, OPEN APPROACH: ICD-10-PCS | Performed by: SURGERY

## 2021-10-27 PROCEDURE — 120N000011 HC R&B TRANSPLANT UMMC

## 2021-10-27 PROCEDURE — 83735 ASSAY OF MAGNESIUM: CPT | Performed by: SURGERY

## 2021-10-27 PROCEDURE — 250N000012 HC RX MED GY IP 250 OP 636 PS 637: Performed by: SURGERY

## 2021-10-27 PROCEDURE — C1769 GUIDE WIRE: HCPCS | Performed by: SURGERY

## 2021-10-27 PROCEDURE — 76998 US GUIDE INTRAOP: CPT | Mod: 26 | Performed by: SURGERY

## 2021-10-27 PROCEDURE — 360N000077 HC SURGERY LEVEL 4, PER MIN: Performed by: SURGERY

## 2021-10-27 PROCEDURE — 80048 BASIC METABOLIC PNL TOTAL CA: CPT | Performed by: PATHOLOGY

## 2021-10-27 PROCEDURE — 86923 COMPATIBILITY TEST ELECTRIC: CPT

## 2021-10-27 PROCEDURE — 85384 FIBRINOGEN ACTIVITY: CPT

## 2021-10-27 PROCEDURE — 250N000011 HC RX IP 250 OP 636: Performed by: EMERGENCY MEDICINE

## 2021-10-27 PROCEDURE — 83970 ASSAY OF PARATHORMONE: CPT | Mod: 90 | Performed by: PATHOLOGY

## 2021-10-27 PROCEDURE — 258N000001 HC RX 258: Performed by: SURGERY

## 2021-10-27 DEVICE — IMPLANTABLE DEVICE
Type: IMPLANTABLE DEVICE | Site: URETER | Status: NON-FUNCTIONAL
Removed: 2021-11-04

## 2021-10-27 RX ORDER — ONDANSETRON 2 MG/ML
4 INJECTION INTRAMUSCULAR; INTRAVENOUS EVERY 30 MIN PRN
Status: DISCONTINUED | OUTPATIENT
Start: 2021-10-27 | End: 2021-10-27 | Stop reason: HOSPADM

## 2021-10-27 RX ORDER — CITALOPRAM HYDROBROMIDE 20 MG/1
20 TABLET ORAL DAILY
Status: DISCONTINUED | OUTPATIENT
Start: 2021-10-28 | End: 2021-11-07 | Stop reason: HOSPADM

## 2021-10-27 RX ORDER — BISACODYL 10 MG
10 SUPPOSITORY, RECTAL RECTAL DAILY PRN
Status: DISCONTINUED | OUTPATIENT
Start: 2021-10-27 | End: 2021-11-07 | Stop reason: HOSPADM

## 2021-10-27 RX ORDER — TACROLIMUS 1 MG/1
4 CAPSULE ORAL
Status: DISCONTINUED | OUTPATIENT
Start: 2021-10-27 | End: 2021-10-28

## 2021-10-27 RX ORDER — NALOXONE HYDROCHLORIDE 0.4 MG/ML
0.2 INJECTION, SOLUTION INTRAMUSCULAR; INTRAVENOUS; SUBCUTANEOUS
Status: CANCELLED | OUTPATIENT
Start: 2021-10-27

## 2021-10-27 RX ORDER — CEFUROXIME SODIUM 1.5 G/16ML
INJECTION, POWDER, FOR SOLUTION INTRAVENOUS PRN
Status: DISCONTINUED | OUTPATIENT
Start: 2021-10-27 | End: 2021-10-27

## 2021-10-27 RX ORDER — ALBUTEROL SULFATE 90 UG/1
1-2 AEROSOL, METERED RESPIRATORY (INHALATION)
Status: CANCELLED
Start: 2021-10-27

## 2021-10-27 RX ORDER — SULFAMETHOXAZOLE AND TRIMETHOPRIM 400; 80 MG/1; MG/1
1 TABLET ORAL
Status: DISCONTINUED | OUTPATIENT
Start: 2021-10-28 | End: 2021-11-07

## 2021-10-27 RX ORDER — DEXTROSE, SODIUM CHLORIDE, SODIUM LACTATE, POTASSIUM CHLORIDE, AND CALCIUM CHLORIDE 5; .6; .31; .03; .02 G/100ML; G/100ML; G/100ML; G/100ML; G/100ML
INJECTION, SOLUTION INTRAVENOUS CONTINUOUS
Status: DISCONTINUED | OUTPATIENT
Start: 2021-10-27 | End: 2021-10-28

## 2021-10-27 RX ORDER — LABETALOL HYDROCHLORIDE 5 MG/ML
10 INJECTION, SOLUTION INTRAVENOUS
Status: DISCONTINUED | OUTPATIENT
Start: 2021-10-27 | End: 2021-10-27 | Stop reason: HOSPADM

## 2021-10-27 RX ORDER — ATORVASTATIN CALCIUM 10 MG/1
10 TABLET, FILM COATED ORAL DAILY
Status: DISCONTINUED | OUTPATIENT
Start: 2021-10-28 | End: 2021-11-07 | Stop reason: HOSPADM

## 2021-10-27 RX ORDER — EPINEPHRINE 1 MG/ML
0.3 INJECTION, SOLUTION INTRAMUSCULAR; SUBCUTANEOUS EVERY 5 MIN PRN
Status: CANCELLED | OUTPATIENT
Start: 2021-10-27

## 2021-10-27 RX ORDER — OXYCODONE HYDROCHLORIDE 5 MG/1
5 TABLET ORAL EVERY 4 HOURS PRN
Status: DISCONTINUED | OUTPATIENT
Start: 2021-10-27 | End: 2021-10-31

## 2021-10-27 RX ORDER — MAGNESIUM OXIDE 400 MG/1
400 TABLET ORAL
Status: DISCONTINUED | OUTPATIENT
Start: 2021-10-29 | End: 2021-11-07 | Stop reason: HOSPADM

## 2021-10-27 RX ORDER — MORPHINE SULFATE 4 MG/ML
4 INJECTION, SOLUTION INTRAMUSCULAR; INTRAVENOUS ONCE
Status: DISCONTINUED | OUTPATIENT
Start: 2021-10-27 | End: 2021-10-27

## 2021-10-27 RX ORDER — ALBUTEROL SULFATE 0.83 MG/ML
2.5 SOLUTION RESPIRATORY (INHALATION)
Status: CANCELLED | OUTPATIENT
Start: 2021-10-27

## 2021-10-27 RX ORDER — VALGANCICLOVIR 450 MG/1
450 TABLET, FILM COATED ORAL
Status: DISCONTINUED | OUTPATIENT
Start: 2021-10-28 | End: 2021-11-07 | Stop reason: HOSPADM

## 2021-10-27 RX ORDER — ONDANSETRON 2 MG/ML
4 INJECTION INTRAMUSCULAR; INTRAVENOUS EVERY 6 HOURS PRN
Status: CANCELLED
Start: 2021-10-27

## 2021-10-27 RX ORDER — ACETAMINOPHEN 325 MG/1
975 TABLET ORAL EVERY 8 HOURS
Status: COMPLETED | OUTPATIENT
Start: 2021-10-27 | End: 2021-10-30

## 2021-10-27 RX ORDER — ONDANSETRON 2 MG/ML
INJECTION INTRAMUSCULAR; INTRAVENOUS PRN
Status: DISCONTINUED | OUTPATIENT
Start: 2021-10-27 | End: 2021-10-27

## 2021-10-27 RX ORDER — FENTANYL CITRATE 50 UG/ML
50 INJECTION, SOLUTION INTRAMUSCULAR; INTRAVENOUS EVERY 5 MIN PRN
Status: DISCONTINUED | OUTPATIENT
Start: 2021-10-27 | End: 2021-10-27 | Stop reason: HOSPADM

## 2021-10-27 RX ORDER — ONDANSETRON 2 MG/ML
4 INJECTION INTRAMUSCULAR; INTRAVENOUS EVERY 6 HOURS PRN
Status: DISCONTINUED | OUTPATIENT
Start: 2021-10-27 | End: 2021-11-07 | Stop reason: HOSPADM

## 2021-10-27 RX ORDER — PROPOFOL 10 MG/ML
INJECTION, EMULSION INTRAVENOUS PRN
Status: DISCONTINUED | OUTPATIENT
Start: 2021-10-27 | End: 2021-10-27

## 2021-10-27 RX ORDER — HEPARIN SODIUM (PORCINE) LOCK FLUSH IV SOLN 100 UNIT/ML 100 UNIT/ML
5 SOLUTION INTRAVENOUS
Status: CANCELLED | OUTPATIENT
Start: 2021-10-27

## 2021-10-27 RX ORDER — POLYETHYLENE GLYCOL 3350 17 G/17G
17 POWDER, FOR SOLUTION ORAL DAILY
Status: DISCONTINUED | OUTPATIENT
Start: 2021-10-28 | End: 2021-11-07 | Stop reason: HOSPADM

## 2021-10-27 RX ORDER — AMOXICILLIN 250 MG
1 CAPSULE ORAL 2 TIMES DAILY
Status: DISCONTINUED | OUTPATIENT
Start: 2021-10-27 | End: 2021-11-07 | Stop reason: HOSPADM

## 2021-10-27 RX ORDER — HYDROMORPHONE HCL IN WATER/PF 6 MG/30 ML
0.4 PATIENT CONTROLLED ANALGESIA SYRINGE INTRAVENOUS
Status: DISCONTINUED | OUTPATIENT
Start: 2021-10-27 | End: 2021-10-29

## 2021-10-27 RX ORDER — NALOXONE HYDROCHLORIDE 0.4 MG/ML
0.2 INJECTION, SOLUTION INTRAMUSCULAR; INTRAVENOUS; SUBCUTANEOUS
Status: DISCONTINUED | OUTPATIENT
Start: 2021-10-27 | End: 2021-11-07 | Stop reason: HOSPADM

## 2021-10-27 RX ORDER — MEPERIDINE HYDROCHLORIDE 25 MG/ML
25 INJECTION INTRAMUSCULAR; INTRAVENOUS; SUBCUTANEOUS EVERY 30 MIN PRN
Status: CANCELLED | OUTPATIENT
Start: 2021-10-27

## 2021-10-27 RX ORDER — OXYCODONE HYDROCHLORIDE 10 MG/1
10 TABLET ORAL EVERY 4 HOURS PRN
Status: DISCONTINUED | OUTPATIENT
Start: 2021-10-27 | End: 2021-10-31

## 2021-10-27 RX ORDER — MYCOPHENOLATE MOFETIL 250 MG/1
750 CAPSULE ORAL
Status: DISCONTINUED | OUTPATIENT
Start: 2021-10-27 | End: 2021-11-07 | Stop reason: HOSPADM

## 2021-10-27 RX ORDER — ONDANSETRON 4 MG/1
4 TABLET, ORALLY DISINTEGRATING ORAL EVERY 6 HOURS PRN
Status: DISCONTINUED | OUTPATIENT
Start: 2021-10-27 | End: 2021-11-07 | Stop reason: HOSPADM

## 2021-10-27 RX ORDER — CARVEDILOL 12.5 MG/1
12.5 TABLET ORAL 2 TIMES DAILY WITH MEALS
Status: DISCONTINUED | OUTPATIENT
Start: 2021-10-27 | End: 2021-10-27

## 2021-10-27 RX ORDER — DIPHENHYDRAMINE HYDROCHLORIDE 50 MG/ML
50 INJECTION INTRAMUSCULAR; INTRAVENOUS
Status: CANCELLED
Start: 2021-10-27

## 2021-10-27 RX ORDER — NALOXONE HYDROCHLORIDE 0.4 MG/ML
0.4 INJECTION, SOLUTION INTRAMUSCULAR; INTRAVENOUS; SUBCUTANEOUS
Status: DISCONTINUED | OUTPATIENT
Start: 2021-10-27 | End: 2021-11-07 | Stop reason: HOSPADM

## 2021-10-27 RX ORDER — SODIUM CHLORIDE, SODIUM LACTATE, POTASSIUM CHLORIDE, CALCIUM CHLORIDE 600; 310; 30; 20 MG/100ML; MG/100ML; MG/100ML; MG/100ML
INJECTION, SOLUTION INTRAVENOUS CONTINUOUS
Status: DISCONTINUED | OUTPATIENT
Start: 2021-10-27 | End: 2021-10-27 | Stop reason: HOSPADM

## 2021-10-27 RX ORDER — ONDANSETRON 4 MG/1
4 TABLET, ORALLY DISINTEGRATING ORAL EVERY 30 MIN PRN
Status: DISCONTINUED | OUTPATIENT
Start: 2021-10-27 | End: 2021-10-27 | Stop reason: HOSPADM

## 2021-10-27 RX ORDER — PROCHLORPERAZINE MALEATE 5 MG
10 TABLET ORAL EVERY 6 HOURS PRN
Status: DISCONTINUED | OUTPATIENT
Start: 2021-10-27 | End: 2021-11-07 | Stop reason: HOSPADM

## 2021-10-27 RX ORDER — HYDROMORPHONE HYDROCHLORIDE 1 MG/ML
0.4 INJECTION, SOLUTION INTRAMUSCULAR; INTRAVENOUS; SUBCUTANEOUS EVERY 5 MIN PRN
Status: DISCONTINUED | OUTPATIENT
Start: 2021-10-27 | End: 2021-10-27 | Stop reason: HOSPADM

## 2021-10-27 RX ORDER — MANNITOL 20 G/100ML
INJECTION, SOLUTION INTRAVENOUS PRN
Status: DISCONTINUED | OUTPATIENT
Start: 2021-10-27 | End: 2021-10-27

## 2021-10-27 RX ORDER — HEPARIN SODIUM,PORCINE 10 UNIT/ML
5 VIAL (ML) INTRAVENOUS
Status: CANCELLED | OUTPATIENT
Start: 2021-10-27

## 2021-10-27 RX ORDER — HEPARIN SODIUM 10000 [USP'U]/100ML
0-5000 INJECTION, SOLUTION INTRAVENOUS CONTINUOUS
Status: DISCONTINUED | OUTPATIENT
Start: 2021-10-27 | End: 2021-10-27

## 2021-10-27 RX ORDER — ACETAMINOPHEN 325 MG/1
650 TABLET ORAL EVERY 4 HOURS PRN
Status: DISCONTINUED | OUTPATIENT
Start: 2021-10-30 | End: 2021-11-07 | Stop reason: HOSPADM

## 2021-10-27 RX ORDER — NALOXONE HYDROCHLORIDE 0.4 MG/ML
0.2 INJECTION, SOLUTION INTRAMUSCULAR; INTRAVENOUS; SUBCUTANEOUS
Status: DISCONTINUED | OUTPATIENT
Start: 2021-10-27 | End: 2021-10-27

## 2021-10-27 RX ORDER — ALBUMIN, HUMAN INJ 5% 5 %
SOLUTION INTRAVENOUS CONTINUOUS PRN
Status: DISCONTINUED | OUTPATIENT
Start: 2021-10-27 | End: 2021-10-27

## 2021-10-27 RX ORDER — CALCIUM CHLORIDE 100 MG/ML
INJECTION INTRAVENOUS; INTRAVENTRICULAR PRN
Status: DISCONTINUED | OUTPATIENT
Start: 2021-10-27 | End: 2021-10-27

## 2021-10-27 RX ORDER — FENTANYL CITRATE 50 UG/ML
INJECTION, SOLUTION INTRAMUSCULAR; INTRAVENOUS PRN
Status: DISCONTINUED | OUTPATIENT
Start: 2021-10-27 | End: 2021-10-27

## 2021-10-27 RX ORDER — FUROSEMIDE 10 MG/ML
INJECTION INTRAMUSCULAR; INTRAVENOUS PRN
Status: DISCONTINUED | OUTPATIENT
Start: 2021-10-27 | End: 2021-10-27

## 2021-10-27 RX ORDER — METHYLPREDNISOLONE SODIUM SUCCINATE 125 MG/2ML
125 INJECTION, POWDER, LYOPHILIZED, FOR SOLUTION INTRAMUSCULAR; INTRAVENOUS
Status: CANCELLED
Start: 2021-10-27

## 2021-10-27 RX ORDER — OXYCODONE HYDROCHLORIDE 5 MG/1
5 TABLET ORAL EVERY 4 HOURS PRN
Status: DISCONTINUED | OUTPATIENT
Start: 2021-10-27 | End: 2021-10-27 | Stop reason: HOSPADM

## 2021-10-27 RX ORDER — LIDOCAINE HYDROCHLORIDE 20 MG/ML
INJECTION, SOLUTION INFILTRATION; PERINEURAL PRN
Status: DISCONTINUED | OUTPATIENT
Start: 2021-10-27 | End: 2021-10-27

## 2021-10-27 RX ORDER — HYDROMORPHONE HCL IN WATER/PF 6 MG/30 ML
0.2 PATIENT CONTROLLED ANALGESIA SYRINGE INTRAVENOUS
Status: DISCONTINUED | OUTPATIENT
Start: 2021-10-27 | End: 2021-10-29

## 2021-10-27 RX ORDER — LIDOCAINE HYDROCHLORIDE 20 MG/ML
JELLY TOPICAL
Status: DISCONTINUED
Start: 2021-10-27 | End: 2021-10-27 | Stop reason: HOSPADM

## 2021-10-27 RX ORDER — NALOXONE HYDROCHLORIDE 0.4 MG/ML
0.4 INJECTION, SOLUTION INTRAMUSCULAR; INTRAVENOUS; SUBCUTANEOUS
Status: DISCONTINUED | OUTPATIENT
Start: 2021-10-27 | End: 2021-10-27

## 2021-10-27 RX ORDER — SODIUM CHLORIDE, SODIUM GLUCONATE, SODIUM ACETATE, POTASSIUM CHLORIDE AND MAGNESIUM CHLORIDE 526; 502; 368; 37; 30 MG/100ML; MG/100ML; MG/100ML; MG/100ML; MG/100ML
INJECTION, SOLUTION INTRAVENOUS CONTINUOUS PRN
Status: DISCONTINUED | OUTPATIENT
Start: 2021-10-27 | End: 2021-10-27

## 2021-10-27 RX ORDER — ONDANSETRON 2 MG/ML
4 INJECTION INTRAMUSCULAR; INTRAVENOUS EVERY 6 HOURS PRN
Status: DISCONTINUED | OUTPATIENT
Start: 2021-10-27 | End: 2021-10-27 | Stop reason: HOSPADM

## 2021-10-27 RX ADMIN — FENTANYL CITRATE 50 MCG: 50 INJECTION, SOLUTION INTRAMUSCULAR; INTRAVENOUS at 16:28

## 2021-10-27 RX ADMIN — ACETAMINOPHEN 975 MG: 325 TABLET, FILM COATED ORAL at 21:54

## 2021-10-27 RX ADMIN — SODIUM CHLORIDE 1000 ML: 9 INJECTION, SOLUTION INTRAVENOUS at 10:09

## 2021-10-27 RX ADMIN — ALBUMIN (HUMAN): 12.5 SOLUTION INTRAVENOUS at 15:34

## 2021-10-27 RX ADMIN — MIDAZOLAM 2 MG: 1 INJECTION INTRAMUSCULAR; INTRAVENOUS at 12:45

## 2021-10-27 RX ADMIN — ROCURONIUM BROMIDE 50 MG: 50 INJECTION, SOLUTION INTRAVENOUS at 12:45

## 2021-10-27 RX ADMIN — FENTANYL CITRATE 50 MCG: 50 INJECTION, SOLUTION INTRAMUSCULAR; INTRAVENOUS at 15:00

## 2021-10-27 RX ADMIN — MANNITOL 25 G: 20 INJECTION, SOLUTION INTRAVENOUS at 14:58

## 2021-10-27 RX ADMIN — ROCURONIUM BROMIDE 20 MG: 50 INJECTION, SOLUTION INTRAVENOUS at 13:57

## 2021-10-27 RX ADMIN — FENTANYL CITRATE 25 MCG: 50 INJECTION, SOLUTION INTRAMUSCULAR; INTRAVENOUS at 18:38

## 2021-10-27 RX ADMIN — ROCURONIUM BROMIDE 30 MG: 50 INJECTION, SOLUTION INTRAVENOUS at 13:08

## 2021-10-27 RX ADMIN — ONDANSETRON 4 MG: 2 INJECTION INTRAMUSCULAR; INTRAVENOUS at 07:20

## 2021-10-27 RX ADMIN — FENTANYL CITRATE 50 MCG: 50 INJECTION, SOLUTION INTRAMUSCULAR; INTRAVENOUS at 13:38

## 2021-10-27 RX ADMIN — PHENYLEPHRINE HYDROCHLORIDE 100 MCG: 10 INJECTION INTRAVENOUS at 13:16

## 2021-10-27 RX ADMIN — SODIUM CHLORIDE, POTASSIUM CHLORIDE, SODIUM LACTATE AND CALCIUM CHLORIDE 2000 ML: 600; 310; 30; 20 INJECTION, SOLUTION INTRAVENOUS at 07:19

## 2021-10-27 RX ADMIN — FENTANYL CITRATE 25 MCG: 50 INJECTION, SOLUTION INTRAMUSCULAR; INTRAVENOUS at 18:21

## 2021-10-27 RX ADMIN — SODIUM CHLORIDE, SODIUM LACTATE, POTASSIUM CHLORIDE, CALCIUM CHLORIDE AND DEXTROSE MONOHYDRATE: 5; 600; 310; 30; 20 INJECTION, SOLUTION INTRAVENOUS at 17:50

## 2021-10-27 RX ADMIN — FENTANYL CITRATE 50 MCG: 50 INJECTION, SOLUTION INTRAMUSCULAR; INTRAVENOUS at 12:45

## 2021-10-27 RX ADMIN — MYCOPHENOLATE MOFETIL 750 MG: 250 CAPSULE ORAL at 21:54

## 2021-10-27 RX ADMIN — TACROLIMUS 4 MG: 1 CAPSULE ORAL at 21:55

## 2021-10-27 RX ADMIN — SUGAMMADEX 200 MG: 100 INJECTION, SOLUTION INTRAVENOUS at 16:44

## 2021-10-27 RX ADMIN — LIDOCAINE HYDROCHLORIDE 100 MG: 20 INJECTION, SOLUTION INFILTRATION; PERINEURAL at 12:45

## 2021-10-27 RX ADMIN — SODIUM CHLORIDE, SODIUM GLUCONATE, SODIUM ACETATE, POTASSIUM CHLORIDE AND MAGNESIUM CHLORIDE: 526; 502; 368; 37; 30 INJECTION, SOLUTION INTRAVENOUS at 15:34

## 2021-10-27 RX ADMIN — FUROSEMIDE 100 MG: 10 INJECTION, SOLUTION INTRAVENOUS at 15:31

## 2021-10-27 RX ADMIN — ROCURONIUM BROMIDE 20 MG: 50 INJECTION, SOLUTION INTRAVENOUS at 14:57

## 2021-10-27 RX ADMIN — FUROSEMIDE 100 MG: 10 INJECTION, SOLUTION INTRAVENOUS at 14:57

## 2021-10-27 RX ADMIN — ONDANSETRON 4 MG: 2 INJECTION INTRAMUSCULAR; INTRAVENOUS at 16:44

## 2021-10-27 RX ADMIN — CALCIUM CHLORIDE 200 MG: 100 INJECTION INTRAVENOUS; INTRAVENTRICULAR at 13:13

## 2021-10-27 RX ADMIN — PROPOFOL 50 MG: 10 INJECTION, EMULSION INTRAVENOUS at 16:55

## 2021-10-27 RX ADMIN — SODIUM CHLORIDE, SODIUM GLUCONATE, SODIUM ACETATE, POTASSIUM CHLORIDE AND MAGNESIUM CHLORIDE: 526; 502; 368; 37; 30 INJECTION, SOLUTION INTRAVENOUS at 12:45

## 2021-10-27 RX ADMIN — FENTANYL CITRATE 50 MCG: 50 INJECTION, SOLUTION INTRAMUSCULAR; INTRAVENOUS at 16:42

## 2021-10-27 RX ADMIN — HEPARIN SODIUM 1650 UNITS/HR: 10000 INJECTION, SOLUTION INTRAVENOUS at 12:25

## 2021-10-27 RX ADMIN — CEFUROXIME 1.5 G: 1.5 INJECTION, POWDER, FOR SOLUTION INTRAVENOUS at 12:56

## 2021-10-27 RX ADMIN — PHENYLEPHRINE HYDROCHLORIDE 100 MCG: 10 INJECTION INTRAVENOUS at 17:02

## 2021-10-27 RX ADMIN — ALBUMIN (HUMAN): 12.5 SOLUTION INTRAVENOUS at 13:27

## 2021-10-27 RX ADMIN — CALCIUM CHLORIDE 500 MG: 100 INJECTION INTRAVENOUS; INTRAVENTRICULAR at 14:59

## 2021-10-27 RX ADMIN — PROPOFOL 150 MG: 10 INJECTION, EMULSION INTRAVENOUS at 12:45

## 2021-10-27 RX ADMIN — PHENYLEPHRINE HYDROCHLORIDE 100 MCG: 10 INJECTION INTRAVENOUS at 17:06

## 2021-10-27 RX ADMIN — ROCURONIUM BROMIDE 10 MG: 50 INJECTION, SOLUTION INTRAVENOUS at 16:14

## 2021-10-27 RX ADMIN — ROCURONIUM BROMIDE 20 MG: 50 INJECTION, SOLUTION INTRAVENOUS at 15:40

## 2021-10-27 RX ADMIN — PHENYLEPHRINE HYDROCHLORIDE 100 MCG: 10 INJECTION INTRAVENOUS at 12:55

## 2021-10-27 RX ADMIN — DOCUSATE SODIUM 50 MG AND SENNOSIDES 8.6 MG 1 TABLET: 8.6; 5 TABLET, FILM COATED ORAL at 21:54

## 2021-10-27 RX ADMIN — CALCIUM CHLORIDE 200 MG: 100 INJECTION INTRAVENOUS; INTRAVENTRICULAR at 13:15

## 2021-10-27 ASSESSMENT — ACTIVITIES OF DAILY LIVING (ADL)
ADLS_ACUITY_SCORE: 7

## 2021-10-27 ASSESSMENT — ENCOUNTER SYMPTOMS
DYSURIA: 1
COUGH: 0
VOMITING: 1
FEVER: 0
CONSTIPATION: 0
NAUSEA: 1
SHORTNESS OF BREATH: 0
DIARRHEA: 0

## 2021-10-27 ASSESSMENT — LIFESTYLE VARIABLES: TOBACCO_USE: 0

## 2021-10-27 NOTE — BRIEF OP NOTE
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]  Post-operative diagnosis Same as pre-operative diagnosis    Procedure: Procedure(s):  Exploratory left lower quadrant of kidney transplant, back table flush, thrombectomy of renal vein, thrombectomy of iliac vein, reimplantation of kidney transplant, with ureteral stent removal and placement of a new ureteral stent, intraoperative ultrasound, intraoperative of transit time flow measurment (VeriQ)  Surgeon: Surgeon(s) and Role:     * Madina Warren MD - Primary     * Gustavo Barton MD - Assisting     * Georges Mattson MD - Fellow - Assisting  Anesthesia: General   Estimated Blood Loss: 200 ml    Drains: None  Specimens:   ID Type Source Tests Collected by Time Destination   1 : renal thrombus Tissue Other SURGICAL PATHOLOGY EXAM Madina Warren MD 10/27/2021  1:56 PM      Findings:   venous congestion with reversible ischemia of transplanted organ, thrombus of renal vein and external iliac vein.  Complications: None.  Implants:   Implant Name Type Inv. Item Serial No.  Lot No. LRB No. Used Action   STENT URETERAL DBL PIGTAIL INLAY [id number] - [id number] Stent STENT URETERAL DBL PIGTAIL INLAY [id number]  [employer] [id number] N/A 1 Explanted   Sof-Flex Double Pigtail Ureteral Stent Set Stent   [facility] [id number] N/A 1 Implanted

## 2021-10-27 NOTE — LETTER
10/27/2021         RE: Raj Pierce  [address]  [place] [address]        Dear Colleague,    Thank you for referring your patient, Raj Pierce, to the [facility]. Please see a copy of my visit note below.    Physician Attestation   I, Rigo Becerra MD, personally examined and evaluated this patient.  I discussed the patient with the resident/fellow and care team, and agree with the assessment and plan of care as documented in the note on 10/27/21 .    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 This was a pre-emptive transplant. Pertinent characteristics (DCD kidney, CIT 21 hours, negative XM, cPRA 67, no pre-transplant DSA, high risk induction with ATG, CMV low risk) seen in [facility] early post transplant today.   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 Concerning in his case is the reversal of diastolic arterial flow.  He is going to the OR today for thrombectomy/urgent management. We appreciate their expertise and assistance.   In review, renal vein thrombosis, while a relatively rare condition (0.3-4.2% of KTRs) is a graft-threatening surgical condition due to the lack of collateral vessels increasing risk for swelling, compromised arterial flow,graft rupture, and further embolic complications. Prompt thrombectomy is the treatment of choice.   There is a case report of a patient diagnosed with renal vein thrombosis 3 days post-transplant (with preserved arterial flow) who underwent thrombectomy, heparin anticoagulation who despite PAVITHRA and peaking of creatinine >6, had recovery and at 17 weeks post thrombectomy had a serum creatinine of 1.74mg/dl  Lerman M, Edilberto EASTMAN, Mauricio VERGARA, et al. Post transplant renal vein thrombosis, with successful thrombectomy and review of the literature. Int J Surg Case Rep. 2019;61:291-293.  Doi:[email or web address]  The inpatient consult service will follow closely in coordination with Transplant Surgery.   I personally reviewed vital signs, medications, labs and imaging.    Rigo Becerra MD  Date of Service (when I saw the patient): 10/27/21          TRANSPLANT NEPHROLOGY EARLY POST TRANSPLANT VISIT      Recommendations   NS 2 Litre  Will do stool work up  - c diff pcr,  Basic enteric  Panel   Will do CT abd/pelvis without contrast stat  Will do Tx Allograft  US stat      Addendum   Renal Allograft  US  Shows Renal vein thrombosis with edematous graft   CT a/p - similar findings . no bowel obstruction . Air in the old kidney graft   repeat /81, HR 81   Continues to have abd bloating and discofmort . No CP/SOB  Patient being transferred to ER for further management .  He has already been accepted by Dr George Zhu , Transplant Surgery Attending   I spoke with  ER physician  Dr Vasquez And gave sign out regarding patient's arrival . Requested Dr Vasquez  To notify Transplant Surgery team stat   Patient remains nauseous and unable to take his morning immunosuppressant medications   Patient being transferred by AMBULANCE to [facility] ER      Assessment & Plan      # DDKT: Trend up. pre-emptive DCD kidney with prolonged CIT-slow graft function   - Baseline Creatinine: ~ TBD   - Proteinuria: Not checked post transplant   - Date DSA Last Checked: Jul/2018      Latest DSA: No   - BK Viremia: No   - Kidney Tx Biopsy: No       ACCESS - his LUE fistula is clotted    Acute abd pain  Nausea  Vomiting  Unclear etiology   Not able to keep his medications down   Will give NS 2 Litre bolus , stat Allograft US , after that CT abdomen pelvis without contrast , will do Stool work up for diarrhea   Will do UA   Discussed with Transplant surgery team fellow Dr Mattson  - patient will be admitted directly under Dr George Tierney , Transplant surgeon  Case discussed with admission dept and also with  Transplant nephrology inpatient service     # Immunosuppression: Tacrolimus immediate release (goal 8-10) and Mycophenolate mofetil (dose 750 mg every 12 hours)   - Induction with Recent Transplant:  PRA 67.  Received high induction per DDF protocol due to prolonged cold ischemic time, DCD kidney   - Continue with intensive monitoring of immunosuppression for efficacy and toxicity.   - Changes: No    # Infection Prophylaxis:   - PJP: Sulfa/TMP (Bactrim)  - CMV: Valganciclovir (Valcyte)    # Hypertension: Hypotensive;  Goal BP: < 150/90   - Volume status: Hypovolemic  EDW    - Changes: Yes - NS 2 Litre fluid bolus       # Anemia in Chronic Renal Disease: Hgb: Stable      NADIYA: No   - Iron studies: Low iron saturation    # Mineral Bone Disorder:   - Secondary renal hyperparathyroidism; PTH level: Not checked recently        On treatment: None  - Vitamin D; level: Not checked recently        On supplement: No  - Calcium; level: Normal        On supplement: No  - Phosphorus; level: Normal        On supplement: No    # Electrolytes:   - Potassium; level: Normal        On supplement: No  - Magnesium; level: Normal        On supplement: No  - Bicarbonate; level: Normal        On supplement: No  - Sodium; level: Normal        # Medical Compliance: Yes    # COVID-19 Virus Review: Discussed COVID-19 virus and the potential medical risks.  Reviewed preventative health recommendations, including wearing a mask where appropriate.  Recommended COVID vaccination should be up to date with either an initial vaccination or booster shot when appropriate.  Asked the patient to inform the transplant center if they are exposed or diagnosed with this virus.    # COVID Vaccination Up To Date: Yes [facility] 10/14/21     # Transplant History:  Etiology of Kidney Failure: Polycystic kidney disease (PKD)  Tx: DDKT  Transplant: 10/23/2021 (Kidney), 10/13/2015 (Kidney)  Donor Type: Donation after Circulatory Death  Donor Class:   Crossmatch at time  of Tx: negative  DSA at time of Tx: No  Significant changes in immunosuppression: None  CMV IgG Ab High Risk Discordance (D+/R-): No  EBV IgG Ab High Risk Discordance (D+/R-): No  Significant transplant-related complications: None    Transplant Office Phone Number: [phone]    Assessment and plan was discussed with the patient and he voiced his understanding and agreement.  Patient seen and staffed with Dr Becerra  Return visit: No follow-ups on file.    Cristina Marr MD    Chief Complaint   Mr. Pierce is a 50 year old here for kidney transplant and immunosuppression management.     History of Present Illness      49 yo M with hx of ESRD 2/2 PKD s/p preemptive DDKT ( 10/23/21)   Pertinent characteristics (DCD kidney, CIT 21 hours, negative XM, cPRA 67, no pre-transplant DSA, high risk induction with ATG, CMV low risk) seen in [facility] early post transplant today.   Patient complains of having generalized abdominal pain, nausea, recurrent vomiting since last night.  He has been feeling weak.  He has loose multiple bowels nonbloody.  No difficulty passing urine.  No fever.  He ate some protein shake yesterday.  No other new food.  He vomited after taking his immunosuppressants since yesterday night.  Upon arrival and [facility] he is hypotensive 85/55 with pulse of 76 temperature 98.1.  His weight is 91.4 kg which is stable.    Labs show creatinine of 4.35 which has increased.  Yesterday it was 3.40.  Electrolytes are unremarkable no acidosis.  Magnesium 1.9, phosphorus 2.2.  H&H: Hemoglobin 11.2 up from 9.7 yesterday, suspect hemoconcentration due to recurrent diarrhea and vomiting.  White count has gone up to 6.9 still within normal range but it was 2.4 yesterday.  This is in consideration after his induction.        Problem List   Patient Active Problem List   Diagnosis     Polycystic kidney, autosomal dominant     Dyslipidemia     Vitamin D deficiency     Hypertension secondary to other renal disorders     Secondary  renal hyperparathyroidism (HCC)     Kidney replaced by transplant     Immunosuppressed status (H)     Acute gouty arthritis     Hypomagnesemia     Aftercare following organ transplant     Kidney transplant candidate     CKD (chronic kidney disease) stage 4, GFR 15-29 ml/min (H)     Kidney transplant recipient       Allergies   No Active Allergies    Medications   Current Outpatient Medications   Medication Sig     acetaminophen (TYLENOL) 325 MG tablet Take 2 tablets (650 mg) by mouth every 4 hours as needed for other (For optimal non-opioid multimodal pain management to improve pain control.)     atorvastatin (LIPITOR) 10 MG tablet Take 10 mg by mouth daily     citalopram (CELEXA) 20 MG tablet Take 20 mg by mouth daily.     Magnesium Oxide 250 MG TABS Take 250 mg by mouth 2 times daily      mycophenolate (GENERIC EQUIVALENT) 250 MG capsule Take 3 capsules (750 mg) by mouth 2 times daily     oxyCODONE (ROXICODONE) 5 MG tablet Take 1 tablet (5 mg) by mouth every 6 hours as needed for moderate to severe pain     polyethylene glycol (MIRALAX) 17 GM/Dose powder Take 17 g by mouth daily     senna-docusate (SENOKOT-S/PERICOLACE) 8.6-50 MG tablet Take 1 tablet by mouth 2 times daily     sulfamethoxazole-trimethoprim (BACTRIM) 400-80 MG tablet Take 1 tablet by mouth daily     tacrolimus (GENERIC EQUIVALENT) 1 MG capsule Take 4 capsules (4 mg) by mouth 2 times daily     valGANciclovir (VALCYTE) 450 MG tablet Take 1 tablet (450 mg) by mouth every other day     vitamin D3 (CHOLECALCIFEROL) 2000 units (50 mcg) tablet Take 1 tablet by mouth daily     No current facility-administered medications for this visit.     Facility-Administered Medications Ordered in Other Visits   Medication     lactated ringers BOLUS 2,000 mL     ondansetron (ZOFRAN) injection 4 mg     There are no discontinued medications.    Physical Exam   Vital Signs: There were no vitals taken for this visit.    GENERAL APPEARANCE: alert and mild distress due to  his current symtpoms   HENT: mouth without ulcers or lesions  LYMPHATICS: no cervical or supraclavicular nodes  RESP: lungs clear to auscultation - no rales, rhonchi or wheezes  CV: regular rhythm, normal rate, no rub, no murmur  EDEMA: no LE edema bilaterally  ABDOMEN: soft, nondistended, nontender, bowel sounds normal. Surgical incision healing well , no tenderness  MS: extremities normal - no gross deformities noted, no evidence of inflammation in joints, no muscle tenderness  SKIN: no rash    Data     Renal Latest Ref Rng & Units 10/27/2021 10/26/2021 10/26/2021   Na 133 - 144 mmol/L 139 - 139   Na (external) 136 - 146 mmol/L - - -   K 3.4 - 5.3 mmol/L 4.3 - 4.7   K (external) 3.5 - 5.1 mmol/L - - -   Cl 94 - 109 mmol/L 107 - 109   Cl (external) 98 - 107 mmol/L - - -   CO2 20 - 32 mmol/L 24 - 23   CO2 (external) 22 - 29 mmol/L - - -   BUN 7 - 30 mg/dL 80(H) - 67(H)   BUN (external) 10.0 - 20.0 mg/dL - - -   Cr 0.66 - 1.25 mg/dL 4.35(H) - 3.40(H)   Cr (external) 0.72 - 1.25 mg/dL - - -   Glucose 70 - 99 mg/dL 145(H) 145(H) 136(H)   Glucose (external) 70 - 100 mg/dL - - -   Ca  8.5 - 10.1 mg/dL 8.2(L) - 8.1(L)   Ca (external) 8.6 - 10.5 mg/dL - - -   Mg 1.6 - 2.3 mg/dL 1.9 - 2.1   Mg (external) 1.8 - 2.6 mg/dL - - -     Bone Health Latest Ref Rng & Units 10/27/2021 10/26/2021 10/25/2021   Phos 2.5 - 4.5 mg/dL 2.2(L) 5.9(H) 6.2(H)   Phos (external) 2.9 - 5.2 mg/dL - - -   PTHi 12 - 72 pg/mL - - -   PTHi (external) 11.0 - 101.0 pg/mL - - -   Vit D Def 20 - 75 ug/L - - -   Vit D Def (external) 30 - 80 ng/mL - - -     Heme Latest Ref Rng & Units 10/27/2021 10/26/2021 10/25/2021   WBC 4.0 - 11.0 10e3/uL 6.9 2.4(L) 5.8   WBC (external) 3.9 - 11.9 10(3)/uL - - -   Hgb 13.3 - 17.7 g/dL 11.2(L) 9.7(L) 9.8(L)   Hgb (external) 13.1 - 17.1 g/dL - - -   Plt 150 - 450 10e3/uL 202 172 187   Plt (external) 179 - 450 10(3)/uL - - -   ABSOLUTE NEUTROPHIL 1.6 - 8.3 10e9/L - - -   ABSOLUTE NEUTROPHILS (EXTERNAL) 1.6 - 8.1 10(3)/uL -  - -   ABSOLUTE LYMPHOCYTES 0.8 - 5.3 10e9/L - - -   ABSOLUTE LYMPHOCYTES (EXTERNAL) 0.9 - 3.5 10(3)/uL - - -   ABSOLUTE MONOCYTES 0.0 - 1.3 10e9/L - - -   ABSOLUTE MONOCYTES (EXTERNAL) 0.0 - 1.1 10(3)/uL - - -   ABSOLUTE EOSINOPHILS 0.0 - 0.7 10e9/L - - -   ABSOLUTE EOSINOPHILS (EXTERNAL) 0.0 - 0.8 10(3)/uL - - -   ABSOLUTE BASOPHILS 0.0 - 0.2 10e9/L - - -   ABSOLUTE BASOPHILS (EXTERNAL) 0.0 - 0.2 10(3)/uL - - -   ABS IMMATURE GRANULOCYTES 0 - 0.4 10e9/L - - -     Liver Latest Ref Rng & Units 10/23/2021 10/11/2021 3/3/2021   AP 40 - 150 U/L 56 - -   AP (external) 38 - 126 U/L - - -   TBili 0.2 - 1.3 mg/dL 0.6 - -   TBili (external) 0.2 - 1.3 mg/dL - - -   DBili (external) 0.0 - 0.4 mg/dL - - -   ALT 0 - 70 U/L 24 - -   ALT (external) 21 - 72 U/L - - -   AST 0 - 45 U/L 23 - -   AST (external) 17 - 59 U/L - - -   Tot Protein 6.8 - 8.8 g/dL 6.5(L) - -   Tot Protein (external) 6.3 - 8.2 g/dL - - -   Albumin 3.4 - 5.0 g/dL 3.6 - -   Albumin (external) 3.5 - 5.0 g/dL - 4.2 4.2     Pancreas Latest Ref Rng & Units 10/23/2021 10/14/2015 10/13/2015   A1C 0.0 - 5.6 % 5.6 4.8 4.8     Iron studies Latest Ref Rng & Units 10/27/2021 3/3/2021 10/17/2015   Iron 35 - 180 ug/dL 19(L) - 13(L)   Iron sat 15 - 46 % 10(L) - 5(L)   Ferritin 26 - 388 ng/mL 597(H) - -   Ferritin (external) 18.0 - 464.0 ng/mL - 383.3 -     [facility] Txp Virology Latest Ref Rng & Units 10/23/2021 7/24/2020 5/14/2020   CVM DNA Quant - - - -   CMV QUANT IU/ML Not Detected IU/mL Not Detected - -   LOG IU/ML OF CMVQNT <2.1 [Log:IU]/mL - - -   BK Spec - - - -   BK Res BKNEG copies/mL - - -   BK Log <2.7 Log copies/mL - - -   BK Quant Result Ext None Detected - None Detected None Detected   BK Quant Spec Ext - - Blood -   EBV VCA IGG ANTIBODY U/mL - - -   EBV CAPSID ANTIBODY IGG No detectable antibody. Positive(A) - -   EBV DNA COPIES/ML EBVNEG [Copies]/mL - - -   EBV DNA LOG OF COPIES <2.7 [Log:copies]/mL - - -   Hep B Core NR - - -   Hep B Core Ext Nonreactive - - -    Hep B Surf - - - -   Hep B Surf Ext  - - - -   HIV 1&2 NEG - - -        Recent Labs   Lab Test 02/19/21  0746 02/19/21  0746 06/25/21  0740 10/11/21  0800 10/25/21  0505 10/26/21  0631   DOSTAC 02/18/2021 2000  --  06/24/21  09:00 am 10/10/2021  --   --    TACROL 4.7*   < > 3.8* 3.3* 6.6 4.8*    < > = values in this interval not displayed.     Recent Labs   Lab Test 10/19/15  0707 10/26/15  0707 11/09/15  0758   DOSMPA 2,000 Not Provided 11/8/15 AT 2030   MPACID 1.27 4.01* 0.45*   MPAG >200.0* 190.0* 20.5*       Again, thank you for allowing me to participate in the care of your patient.        Sincerely,        Rigo Becerra MD

## 2021-10-27 NOTE — OR NURSING
"Dr. Mattson paged \"PACU [place] 18; Stan D: Ot's hemoglobin 7.6 & renal ultrasound complete. -Sarah  [id number] or [phone]\"    Dr. Mattson called and notified pt's pre-op Hg 11.2 and post-op Hg 7.6. Last /62 with MAP 76 and that pt has had a total of 70 mLs out from NATHEN drain since 1750. No blood ordered at this time, said Hg will be redrawn in 4 hours, at 2200.  "

## 2021-10-27 NOTE — ANESTHESIA CARE TRANSFER NOTE
Patient: Raj Pierce    Procedure: Procedure(s):  Exploratory left lower quadrant of kidney transplant, back table flush, thrombectomy of renal vein, thrombectomy of iliac vein, reimplantation of kidney transplant, with ureteral stent removal and placement of a new ureteral stent, intraoperative ultrasound, intraoperative of transit time flow measurment (VeriQ)       Diagnosis: Renal vein thrombosis (H) [I82.3]  Diagnosis Additional Information: No value filed.    Anesthesia Type:   General     Note:    Oropharynx: oropharynx clear of all foreign objects  Level of Consciousness: awake  Oxygen Supplementation: face mask  Level of Supplemental Oxygen (L/min / FiO2): 6  Independent Airway: airway patency satisfactory and stable  Dentition: dentition unchanged  Vital Signs Stable: post-procedure vital signs reviewed and stable  Report to RN Given: handoff report given  Patient transferred to: PACU  Comments:   -on 6L O2 via FM, Pt Spont. breathing, awake & alert, monitors placed, VSS, RN at bedside        Vitals:  Vitals Value Taken Time   /58 10/27/21 1753   Temp     Pulse 87 10/27/21 1759   Resp     SpO2 96 % 10/27/21 1759   Vitals shown include unvalidated device data.    Electronically Signed By: GUY Ovalle CRNA  October 27, 2021  6:01 PM

## 2021-10-27 NOTE — TELEPHONE ENCOUNTER
Dinane the patient's wife called via triage line. She stated that Raj was discharged from the hospital earlier today and that he has vomited 3 times since being home (he did not have any emesis in the hospital). He took his evening meds at 2000 and vomited at 2020 but no pills were visible. Talked to Dr. Mattson; if he is vomiting all night he should go to the ED, otherwise he can go to his clinic appointment at 0600 in the morning and hopefully get treated with IV fluids/antiemetics in the clinic or get admitted if needed.

## 2021-10-27 NOTE — PROGRESS NOTES
"Raj Pierce came to [facility] today for a lab and assess following a kidney transplant on 10/23.      Discharge date: 10/26  Transplant coordinator: Deborah Sotelo  Phone number patient can be reached at: [phone] (M)       Physical Assessment:  See physical assessment located under \"Document Flowsheets\".  Incision site: c/d/i steri strips covering but no s/s infection  Lines: N/A  Wade: N/A  Urine clarity: hematuria per pt report  Hydration: very poor. Emesis x7-8 overnight, little to no PO intake  Nutrition: Per wife, has not eaten since yesterday morning.   Last BM: this morning, loose  Pain: 3/10 \"bloating\" discomfort, has not taken pain med since yesterday      Laboratory tests: Standard labs drawn.    Plan of care for today: Pt arrived to [facility] looking quite unwell, reports vomiting 7-8x overnight including 20 mins after evening medications. Per spouse, has not kept anything down since yesterday. Weight down a kilo since yesterday, BP 85/55 sitting, 73/53 standing; very symptomatic/dizzy. Dr. Becerra paged immediately and orders received for 2 L LR and 4 mg IV Zofran; administered per orders. MD to bedside with additional orders for ULT, CT scan, and admission to hosp. [place] [facility] [place]. No education completed today due to pt feeling so unwell; second transplant so wife demonstrated good understanding of medication regimen. Imaging reveals renal vein thrombosis; [facility] transport set up to transfer pt to ER to await bed placement. Pt transported via [facility] EMS at ~1120. Dr. Evans called physician report to ER MD.       Medication changes: none    Medications administered:       Administrations This Visit     0.9% sodium chloride BOLUS     Admin Date  10/27/2021 Action  New Bag Dose  1,000 mL Rate  75 mL/hr Route  Intravenous Administered By  Ayse Kwong RN          lactated ringers BOLUS 2,000 mL     Admin Date  10/27/2021 Action  New Bag " Dose  2,000 mL Rate  1,000 mL/hr Route  Intravenous Administered By  Barbara Reed, ISHA          ondansetron (ZOFRAN) injection 4 mg     Admin Date  10/27/2021 Action  Given Dose  4 mg Route  Intravenous Administered By  Barbara Reed, ISHA              Nursing time: 60 mins      Patient education:    The following teaching topics were addressed: teaching was not completed today due to pt condition   spouse verbalized understanding and all questions answered.    Drug level:  To be followed inpatient. Pt vomited after evening dose of medications per wife and was unable to take morning meds due to nausea.     Discharge Plan    Pt will follow up with [facility] tomorrow for LBA  Discharge instructions reviewed with patient: YES  Patient/Representative verbalized understanding, all questions answered: YES    Discharged from unit at 1120 with whom: EMS to ER.    Barbara Reed RN

## 2021-10-27 NOTE — TELEPHONE ENCOUNTER
Raj is a post-kidney transplant patient who discharged 10/26/21. This is his second kidney transplant.    Patient will use local pharmacy or other mail order for outpatient medications. His insurance precludes [place] pharmacies from shipping to him.    Jordyn Contreras [facility]  [phone]

## 2021-10-27 NOTE — ED TRIAGE NOTES
"Pt presents post L kidney transplant 4 days ago with hypotension at follow-up visit at clinic. Pt reports around 10 episode of vomiting overnight as well as some abd distention. Per pt, ultrasound performed at clinic and \"blockage\" discovered. Pt sent here for admission.  "

## 2021-10-27 NOTE — ANESTHESIA PROCEDURE NOTES
Airway       Patient location during procedure: OR       Procedure Start/Stop Times: 10/27/2021 12:49 PM  Staff -        Other Anesthesia Staff: Olayinka Alvarez       Performed By: SRNA  Consent for Airway        Urgency: elective  Indications and Patient Condition       Indications for airway management: rodrigo-procedural       Induction type:intravenous       Mask difficulty assessment: 1 - vent by mask    Final Airway Details       Final airway type: endotracheal airway       Successful airway: ETT - single  Endotracheal Airway Details        ETT size (mm): 8.0       Cuffed: yes       Successful intubation technique: video laryngoscopy       VL Blade Size: MAC 4       Grade View of Cords: 1       Adjucts: stylet       Position: Right       Measured from: gums/teeth       Secured at (cm): 23       Bite block used: None    Post intubation assessment        Placement verified by: capnometry, equal breath sounds and chest rise        Number of attempts at approach: 1       Secured with: pink tape       Ease of procedure: easy       Dentition: Intact and Unchanged

## 2021-10-27 NOTE — ED PROVIDER NOTES
[facility] ([facility])  10/27/21  History     Chief Complaint   Patient presents with     Hypotension     The history is provided by the patient and medical records.     Raj Pierce is a 50 year old male with a past medical history significant for end stage kidney disease secondary to PCKD s/p kidney transplant (2015) c/b poor function (small graft), HTN, hyperparathyroidism, and SILVIA (with cpap). who presents to the Emergency Department for evaluation of nausea and vomiting since last night.  He has not been able to keep his transplant medications down since last night.  Patient was discharged s/p kidney transplant yesterday.  Here in the ED he endorses pressure in his abdomen and urgency. He has urinated here in the ED but did not feel like he was able to completely empty.  He notes hematuria with blood clots last night.  He denies cough, chest pain, shortness of breath, constipation, diarrhea, or leg swelling.  He has passed a bowel movement since his surgery.  Patient has been using oxycodone for pain control but has not received any pain medication yet this morning.  He was given Zofran and 2.5 L of fluid in clinic.    Per review of the patient's medical record, patient received kidney re-transplant on 10/23 with Dr. Warren (ureteral stent placed).     Past Medical History:   Diagnosis Date     Acidosis      Chronic kidney disease, stage IV (severe) (H)      Disorders of phosphorus metabolism      HTN (hypertension)     15 years     Hyperparathyroidism      Kidney replaced by transplant 10/23/2021    DCD re-transplant. Induction with thymoglobulin 6mg/kg and steroids.     Kidney replaced by transplant 10/13/2015     Polycystic kidney, autosomal dominant      Pure hypercholesterolemia      Sleep apnea     cpap     Vitamin D deficiency        Past Surgical History:   Procedure Laterality Date     BENCH KIDNEY N/A 10/13/2015    Procedure: BENCH KIDNEY;  Surgeon:  Dariel Chavez MD;  Location: [facility] OR     CYSTOSCOPY, REMOVE STENT(S), COMBINED Right 2015    Procedure: COMBINED CYSTOSCOPY, REMOVE STENT(S);  Surgeon: Dariel Chavez MD;  Location: [facility] OR     HERNIORRHAPHY INGUINAL CHILD      right     TONSILLECTOMY       TRANSPLANT KIDNEY RECIPIENT  DONOR N/A 10/13/2015    Procedure: TRANSPLANT KIDNEY RECIPIENT  DONOR;  Surgeon: Dariel Chavez MD;  Location: [facility] OR     TRANSPLANT KIDNEY RECIPIENT  DONOR N/A 10/23/2021    Procedure: TRANSPLANT, KIDNEY, RECIPIENT,  DONOR, URETERAL STENT PLACEMENT;  Surgeon: Madina Warren MD;  Location: [facility] OR       Family History   Problem Relation Age of Onset     Gastrointestinal Disease Mother         ESRD PCKD     Hypertension Mother      C.A.D. Father      Hypertension Father      Gastrointestinal Disease Sister         PCKD       Social History     Tobacco Use     Smoking status: Never Smoker     Smokeless tobacco: Never Used   Substance Use Topics     Alcohol use: Yes     Alcohol/week: 10.0 standard drinks     Types: 10 Standard drinks or equivalent per week     Comment: 1 drink daily        Current Facility-Administered Medications   Medication     heparin 25,000 units in 0.45% NaCl 250 mL ANTICOAGULANT infusion     [Auto Hold] morphine (PF) injection 4 mg     Facility-Administered Medications Ordered in Other Encounters   Medication     Plasma-Lyte A (electrolyte A) solution      No Known Allergies    I have reviewed the Medications, Allergies, Past Medical and Surgical History, and Social History in the Epic system.    Review of Systems   Constitutional: Negative for fever.   Respiratory: Negative for cough and shortness of breath.    Cardiovascular: Negative for chest pain and leg swelling.   Gastrointestinal: Positive for nausea and vomiting. Negative for constipation and diarrhea.   Genitourinary: Positive for dysuria and urgency.   All other systems reviewed and are  negative.    A complete review of systems was performed with pertinent positives and negatives noted in the HPI, and all other systems negative.    Physical Exam   BP: 121/64  Pulse: 86  Temp: 98.2  F (36.8  C)  Resp: 18  SpO2: 97 %      Physical Exam  General: awake, alert, NAD  Head: normal cephalic  HEENT: pupils equal, conjugate gaze intact  Neck: Supple  CV: regular rate and rhythm without murmur  Lungs: clear to auscultation  Abd: soft, patient has a new surgical incision over his left lower quadrant.  Appears clean, dry, intact, no purulent drainage.  No surrounding redness.  He has moderate tenderness around his left lower quadrant but no guarding, no peritoneal signs  EXT: lower extremities without swelling or edema  Neuro: awake, answers questions appropriately. No focal deficits noted       ED Course     At 12:00 PM the patient was seen and examined by Robbie Vann MD in Room ED12.        Procedures    The medical record was reviewed and interpreted.  Current labs reviewed and interpreted.  Previous labs reviewed and interpreted.     Results for orders placed or performed in visit on 10/27/21 (from the past 24 hour(s))    Renal Transplant   Result Value Ref Range    Radiologist flags Renal vein thrombosis (Urgent)     Narrative    EXAMINATION: US RENAL TRANSPLANT, 10/27/2021 9:46 AM     COMPARISON: None.    HISTORY: Postop renal transplant 10/23/2021    TECHNIQUE:  Grey-scale, color Doppler and spectral flow analysis.    FINDINGS:  The transplant kidney is located in left lower quadrant, and measures  14.8 cm length. Parenchyma is of the second and mildly heterogeneous.  No focal lesions. No hydronephrosis. No perinephric fluid collection.      Renal Vein Flow: Not visualized.    Upper Pole Arcuate artery: Not visualized.    Mid pole Arcuate artery:  1.0 resistive index.     Lower Pole Arcuate artery:  1.0 resistive index.     Renal artery flow: Reversal of diastolic flow demonstrated throughout  the  renal artery, resistive indices of 1.0 throughout the renal  artery.  137 cm/sec peak systolic at hilum  163 cm/sec peak systolic at anastomosis.    Iliac artery flow:  137 cm/sec peak systolic above anastomosis.  153 cm/sec peak systolic below anastomosis.    Iliac vein flow: Patent above and below the anastomosis    Urinary bladder is unremarkable. Partially visualized ureteral stent.      Impression    IMPRESSION: In this patient with new left lower quadrant transplant  kidney:  1. Reversal of diastolic flow throughout the renal transplant artery  and absent flow within renal vein, findings consistent with renal vein  thrombosis in the acute postoperative setting.      [Urgent Result: Renal vein thrombosis]    Finding was identified on 10/27/2021 9:56 AM.     Dr. Evans was contacted by Dr. Garcia at 10/27/2021 10:00 AM and  verbalized understanding of the urgent finding.     I have personally reviewed the examination and initial interpretation  and I agree with the findings.    MAYO CASE MD         SYSTEM ID:  [id number]     Medications   heparin 25,000 units in 0.45% NaCl 250 mL ANTICOAGULANT infusion (1,650 Units/hr Intravenous Rate/Dose Change 10/27/21 1240)   morphine (PF) injection 4 mg ( Intravenous Auto Hold 10/27/21 1239)   heparin ANTICOAGULANT Loading dose for HIGH INTENSITY TREATMENT * Give BEFORE starting heparin infusion (7,300 Units Intravenous Given 10/27/21 1223)             Assessments & Plan (with Medical Decision Making)   Ant's a 50-year-old male past medical history significant for recent renal transplant who presents from clinic with nausea vomiting and abdominal pain.    I spoke with the clinic doctor, reviewed previous work-up.  Work-up has been notable for a renal vein thrombosis of the transplanted kidney.  He was initially hypotensive though received 2-1/2 L of fluid in clinic, is normotensive here and will withhold on additional fluid at this time.  Vital signs are otherwise  stable.  Will give morphine for pain control.    Patient also notes that he had some clots in his urine and now feels as though he cannot empty his bladder he endorses significant pain over his bladder feeling like he needs to urinate.  Bedside ultrasound showed 350 cc of urine, plan was to straight cath for comfort.    Reviewed labs that were drawn earlier today.  Normal white count, he stable hemoglobin, increasing creatinine    Discussed case with transplant fellow.  Will start high intensity heparin drip given the new occlusion.  Patient was transferred to the operating room shortly after arrival to the ER.     Patient remained vitally stable in the emergency department.    I have reviewed the nursing notes.    I have reviewed the findings, diagnosis, plan and need for follow up with the patient.    Current Discharge Medication List          Final diagnoses:   Renal vein thrombosis (H)       IPark am serving as a trained medical scribe to document services personally performed by Robbie Vasquez MD, based on the provider's statements to me.      I, Robbie Vasquez MD, was physically present and have reviewed and verified the accuracy of this note documented by Park Oconnell.     Robbie Vasquez MD  10/27/2021   [facility] EMERGENCY DEPARTMENT     Robbie Vasquez MD  10/27/21 0949

## 2021-10-27 NOTE — OR NURSING
OR procedure note:    Clamp time of kidney: 1335  Flush time of kidney: 1341  Anastomosis of kidney: 1430  Reperfusion time: 1457

## 2021-10-27 NOTE — ANESTHESIA PREPROCEDURE EVALUATION
Anesthesia Pre-Procedure Evaluation    Patient: Raj Pierce   MRN: [id number] : 1970        Preoperative Diagnosis: Renal vein thrombosis (H) [I82.3]    Procedure : Procedure(s):  left retroperitoneal wound exploration  possible thrombectomy          Past Medical History:   Diagnosis Date     Acidosis      Chronic kidney disease, stage IV (severe) (H)      Disorders of phosphorus metabolism      HTN (hypertension)     15 years     Hyperparathyroidism      Kidney replaced by transplant 10/23/2021    DCD re-transplant. Induction with thymoglobulin 6mg/kg and steroids.     Kidney replaced by transplant 10/13/2015     Polycystic kidney, autosomal dominant      Pure hypercholesterolemia      Sleep apnea     cpap     Vitamin D deficiency       Past Surgical History:   Procedure Laterality Date     BENCH KIDNEY N/A 10/13/2015    Procedure: BENCH KIDNEY;  Surgeon: Dariel Chavez MD;  Location: [facility] OR     CYSTOSCOPY, REMOVE STENT(S), COMBINED Right 2015    Procedure: COMBINED CYSTOSCOPY, REMOVE STENT(S);  Surgeon: Dariel Chavez MD;  Location: [facility] OR     HERNIORRHAPHY INGUINAL CHILD      right     TONSILLECTOMY       TRANSPLANT KIDNEY RECIPIENT  DONOR N/A 10/13/2015    Procedure: TRANSPLANT KIDNEY RECIPIENT  DONOR;  Surgeon: Dariel Chavez MD;  Location: [facility] OR     TRANSPLANT KIDNEY RECIPIENT  DONOR N/A 10/23/2021    Procedure: TRANSPLANT, KIDNEY, RECIPIENT,  DONOR, URETERAL STENT PLACEMENT;  Surgeon: Madina Warren MD;  Location: [facility] OR      No Known Allergies   Social History     Tobacco Use     Smoking status: Never Smoker     Smokeless tobacco: Never Used   Substance Use Topics     Alcohol use: Yes     Alcohol/week: 10.0 standard drinks     Types: 10 Standard drinks or equivalent per week     Comment: 1 drink daily       Wt Readings from Last 1 Encounters:   10/27/21 91.4 kg (201 lb 8 oz)        Anesthesia Evaluation   Pt has had prior anesthetic.      No history of anesthetic complications       ROS/MED HX  ENT/Pulmonary:     (+) sleep apnea, uses CPAP,  (-) tobacco use   Neurologic:  - neg neurologic ROS     Cardiovascular:     (+) Dyslipidemia hypertension-----Previous cardiac testing   Echo: Date: 11/2018 Results:  LV EF 60-65%  RV normal  Stress Test: Date: 12/14/2020 Results:    The nuclear stress test is negative for inducible myocardial ischemia or infarction.    LVEDv 105ml. LVESv 21ml. LV EF80%.  ECG Reviewed: Date: Results:    Cath:  Date: Results:   (-) CAD and CHF   METS/Exercise Tolerance:     Hematologic:     (+) anemia,     Musculoskeletal: Comment: History of gout  (+) arthritis,     GI/Hepatic:  - neg GI/hepatic ROS     Renal/Genitourinary: Comment: History of polycystic kidney disease s/p kidney transplant 2015, re-transplant 10/23/21. Now with concern for renal vein thrombosis.    (+) renal disease, type: CRI, Pt does not require dialysis, Pt has history of transplant, date: 10/23/21, Nephrolithiasis ,     Endo: Comment: History of hyperparathyroidism    (+) Obesity,  (-) Type II DM   Psychiatric/Substance Use:     (+) psychiatric history depression     Infectious Disease:  - neg infectious disease ROS     Malignancy:  - neg malignancy ROS     Other:               OUTSIDE LABS:  CBC:   Lab Results   Component Value Date    WBC 6.9 10/27/2021    WBC 2.4 (L) 10/26/2021    HGB 11.2 (L) 10/27/2021    HGB 9.7 (L) 10/26/2021    HCT 35.3 (L) 10/27/2021    HCT 31.3 (L) 10/26/2021     10/27/2021     10/26/2021     BMP:   Lab Results   Component Value Date     10/27/2021     10/26/2021    POTASSIUM 4.3 10/27/2021    POTASSIUM 4.7 10/26/2021    CHLORIDE 107 10/27/2021    CHLORIDE 109 10/26/2021    CO2 24 10/27/2021    CO2 23 10/26/2021    BUN 80 (H) 10/27/2021    BUN 67 (H) 10/26/2021    CR 4.35 (H) 10/27/2021    CR 3.40 (H) 10/26/2021     (H) 10/27/2021     (H) 10/26/2021     COAGS:   Lab Results   Component  Value Date    PTT 28 10/23/2021    INR 1.07 10/23/2021     POC:   Lab Results   Component Value Date    BGM 90 12/18/2019     HEPATIC:   Lab Results   Component Value Date    ALBUMIN 3.6 10/23/2021    PROTTOTAL 6.5 (L) 10/23/2021    ALT 24 10/23/2021    AST 23 10/23/2021    ALKPHOS 56 10/23/2021    BILITOTAL 0.6 10/23/2021     OTHER:   Lab Results   Component Value Date    LACT 1.2 10/23/2021    A1C 5.6 10/23/2021    LILIANA 8.2 (L) 10/27/2021    PHOS 2.2 (L) 10/27/2021    MAG 1.9 10/27/2021       Anesthesia Plan    ASA Status:  3, emergent    NPO Status:  NPO Appropriate    Anesthesia Type: General.     - Airway: ETT   Induction: Intravenous.   Maintenance: Balanced.   Techniques and Equipment:     - Lines/Monitors: 2nd IV     Consents    Anesthesia Plan(s) and associated risks, benefits, and realistic alternatives discussed. Questions answered and patient/representative(s) expressed understanding.     - Discussed with:  Patient         Postoperative Care    Pain management: IV analgesics, Oral pain medications.   PONV prophylaxis: Ondansetron (or other 5HT-3), Dexamethasone or Solumedrol     Comments:                Geeta Guallpa MD

## 2021-10-27 NOTE — LETTER
Transition Communication Hand-off for Care Transitions to Next Level of Care Provider    Name: Raj Pierce  : 1970  MRN #: [id number]  Primary Care Provider: Do Guerrero     Primary Clinic: [address] [address]     Reason for Hospitalization:  Renal vein thrombosis (H) [I82.3]  Admit Date/Time: 10/27/2021 11:36 AM  Discharge Date: 2021  Payor Source: Payor: BCBS / Plan: BCBS OUT OF STATE / Product Type: Indemnity /     Readmission Assessment Measure (ADINA) Risk Score/category: Moderate         Reason for Communication Hand-off Referral: Other [facility] Standard of Care    Discharge Plan: Return home with NEW warfarin management and resumption of previous transplant labs     Concern for non-adherence with plan of care: No    Discharge Needs Assessment:   Needs      Most Recent Value   Anticipated Changes Related to Illness none   Equipment Currently Used at Home none   # of Referrals Placed by [facility] External Care Coordination        Follow-up specialty is recommended: Yes    Follow-up plan:    Future Appointments   Date Time Provider Department Center          2021 12:00 PM Madina Warren MD [facility]   2021 10:00 AM [facility]   2021  9:00 AM Karena Roy NP [facility]   2021  9:30 AM [facility]   2021  1:00 PM Silas Nino, [facility]   2022 10:00 AM Transplant, [facility]   2022 11:00 AM Ladi Malin, [facility]   2022 10:00 AM Transplant, [facility]       Any outstanding tests or procedures:  Transplant labs; initiate care at warfarin clinic ([place])         Key Recommendations:  Patient hospitalized for repeat kidney transplant 10/23/2021.  Unfortunately, this failed (renal vein thrombosis) and was removed 2021.  Still has functioning 2015 transplanted kidney.  Now on  warfarin. Please see discharge summary for details.    Jaleesa Moody, RN, PHN  RN Care Coordinator   [address] [address]   [email or web address]   Casual Employee - Weekend Coverage only  To contact weekend RNCC, dial [phone] and enter pager number [phone] at prompt OR use Gogo to text page.  This pager can not be contacted by outside line.

## 2021-10-27 NOTE — PROGRESS NOTES
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 no bowel obstruction . Air in the old kidney graft   repeat /81, HR 81   Continues to have abd bloating and discofmort . No CP/SOB  Patient being transferred to ER for further management .  He has already been accepted by Dr George Zhu , Transplant Surgery Attending   I spoke with  ER physician  Dr Vasquez And gave sign out regarding patient's arrival . Requested Dr Vasquez  To notify Transplant Surgery team stat   Patient remains nauseous and unable to take his morning immunosuppressant medications   Patient being transferred by AMBULANCE to [facility] ER      Assessment & Plan      # DDKT: Trend up. pre-emptive DCD kidney with prolonged CIT-slow graft function   - Baseline Creatinine: ~ TBD   - Proteinuria: Not checked post transplant   - Date DSA Last Checked: Jul/2018      Latest DSA: No   - BK Viremia: No   - Kidney Tx Biopsy: No       ACCESS - his LUE fistula is clotted    Acute abd pain  Nausea  Vomiting  Unclear etiology   Not able to keep his medications down   Will give NS 2 Litre bolus , stat Allograft US , after that CT abdomen pelvis without contrast , will do Stool work up for diarrhea   Will do UA   Discussed with Transplant surgery team fellow Dr Mattson  - patient will be admitted directly under Dr George Tierney , Transplant surgeon  Case discussed with admission dept and also with Transplant nephrology inpatient service     # Immunosuppression: Tacrolimus immediate release (goal 8-10) and Mycophenolate mofetil (dose 750 mg every 12 hours)   - Induction with Recent Transplant:  PRA 67.  Received high induction per DDF protocol due to prolonged cold ischemic  time, DCD kidney   - Continue with intensive monitoring of immunosuppression for efficacy and toxicity.   - Changes: No    # Infection Prophylaxis:   - PJP: Sulfa/TMP (Bactrim)  - CMV: Valganciclovir (Valcyte)    # Hypertension: Hypotensive;  Goal BP: < 150/90   - Volume status: Hypovolemic  EDW    - Changes: Yes - NS 2 Litre fluid bolus       # Anemia in Chronic Renal Disease: Hgb: Stable      NADIYA: No   - Iron studies: Low iron saturation    # Mineral Bone Disorder:   - Secondary renal hyperparathyroidism; PTH level: Not checked recently        On treatment: None  - Vitamin D; level: Not checked recently        On supplement: No  - Calcium; level: Normal        On supplement: No  - Phosphorus; level: Normal        On supplement: No    # Electrolytes:   - Potassium; level: Normal        On supplement: No  - Magnesium; level: Normal        On supplement: No  - Bicarbonate; level: Normal        On supplement: No  - Sodium; level: Normal        # Medical Compliance: Yes    # COVID-19 Virus Review: Discussed COVID-19 virus and the potential medical risks.  Reviewed preventative health recommendations, including wearing a mask where appropriate.  Recommended COVID vaccination should be up to date with either an initial vaccination or booster shot when appropriate.  Asked the patient to inform the transplant center if they are exposed or diagnosed with this virus.    # COVID Vaccination Up To Date: Yes [facility] 10/14/21     # Transplant History:  Etiology of Kidney Failure: Polycystic kidney disease (PKD)  Tx: DDKT  Transplant: 10/23/2021 (Kidney), 10/13/2015 (Kidney)  Donor Type: Donation after Circulatory Death  Donor Class:   Crossmatch at time of Tx: negative  DSA at time of Tx: No  Significant changes in immunosuppression: None  CMV IgG Ab High Risk Discordance (D+/R-): No  EBV IgG Ab High Risk Discordance (D+/R-): No  Significant transplant-related complications: None    Transplant Office Phone Number:  [phone]    Assessment and plan was discussed with the patient and he voiced his understanding and agreement.  Patient seen and staffed with Dr Becerra  Return visit: No follow-ups on file.    Cristina Marr MD    Chief Complaint   Mr. Pierce is a 50 year old here for kidney transplant and immunosuppression management.     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   Patient complains of having generalized abdominal pain, nausea, recurrent vomiting since last night.  He has been feeling weak.  He has loose multiple bowels nonbloody.  No difficulty passing urine.  No fever.  He ate some protein shake yesterday.  No other new food.  He vomited after taking his immunosuppressants since yesterday night.  Upon arrival and [facility] he is hypotensive 85/55 with pulse of 76 temperature 98.1.  His weight is 91.4 kg which is stable.    Labs show creatinine of 4.35 which has increased.  Yesterday it was 3.40.  Electrolytes are unremarkable no acidosis.  Magnesium 1.9, phosphorus 2.2.  H&H: Hemoglobin 11.2 up from 9.7 yesterday, suspect hemoconcentration due to recurrent diarrhea and vomiting.  White count has gone up to 6.9 still within normal range but it was 2.4 yesterday.  This is in consideration after his induction.        Problem List   Patient Active Problem List   Diagnosis     Polycystic kidney, autosomal dominant     Dyslipidemia     Vitamin D deficiency     Hypertension secondary to other renal disorders     Secondary renal hyperparathyroidism (HCC)     Kidney replaced by transplant     Immunosuppressed status (H)     Acute gouty arthritis     Hypomagnesemia     Aftercare following organ transplant     Kidney transplant candidate     CKD (chronic kidney disease) stage 4, GFR  15-29 ml/min (H)     Kidney transplant recipient       Allergies   No Active Allergies    Medications   Current Outpatient Medications   Medication Sig     acetaminophen (TYLENOL) 325 MG tablet Take 2 tablets (650 mg) by mouth every 4 hours as needed for other (For optimal non-opioid multimodal pain management to improve pain control.)     atorvastatin (LIPITOR) 10 MG tablet Take 10 mg by mouth daily     citalopram (CELEXA) 20 MG tablet Take 20 mg by mouth daily.     Magnesium Oxide 250 MG TABS Take 250 mg by mouth 2 times daily      mycophenolate (GENERIC EQUIVALENT) 250 MG capsule Take 3 capsules (750 mg) by mouth 2 times daily     oxyCODONE (ROXICODONE) 5 MG tablet Take 1 tablet (5 mg) by mouth every 6 hours as needed for moderate to severe pain     polyethylene glycol (MIRALAX) 17 GM/Dose powder Take 17 g by mouth daily     senna-docusate (SENOKOT-S/PERICOLACE) 8.6-50 MG tablet Take 1 tablet by mouth 2 times daily     sulfamethoxazole-trimethoprim (BACTRIM) 400-80 MG tablet Take 1 tablet by mouth daily     tacrolimus (GENERIC EQUIVALENT) 1 MG capsule Take 4 capsules (4 mg) by mouth 2 times daily     valGANciclovir (VALCYTE) 450 MG tablet Take 1 tablet (450 mg) by mouth every other day     vitamin D3 (CHOLECALCIFEROL) 2000 units (50 mcg) tablet Take 1 tablet by mouth daily     No current facility-administered medications for this visit.     Facility-Administered Medications Ordered in Other Visits   Medication     lactated ringers BOLUS 2,000 mL     ondansetron (ZOFRAN) injection 4 mg     There are no discontinued medications.    Physical Exam   Vital Signs: There were no vitals taken for this visit.    GENERAL APPEARANCE: alert and mild distress due to his current symtpoms   HENT: mouth without ulcers or lesions  LYMPHATICS: no cervical or supraclavicular nodes  RESP: lungs clear to auscultation - no rales, rhonchi or wheezes  CV: regular rhythm, normal rate, no rub, no murmur  EDEMA: no LE edema  bilaterally  ABDOMEN: soft, nondistended, nontender, bowel sounds normal. Surgical incision healing well , no tenderness  MS: extremities normal - no gross deformities noted, no evidence of inflammation in joints, no muscle tenderness  SKIN: no rash    Data     Renal Latest Ref Rng & Units 10/27/2021 10/26/2021 10/26/2021   Na 133 - 144 mmol/L 139 - 139   Na (external) 136 - 146 mmol/L - - -   K 3.4 - 5.3 mmol/L 4.3 - 4.7   K (external) 3.5 - 5.1 mmol/L - - -   Cl 94 - 109 mmol/L 107 - 109   Cl (external) 98 - 107 mmol/L - - -   CO2 20 - 32 mmol/L 24 - 23   CO2 (external) 22 - 29 mmol/L - - -   BUN 7 - 30 mg/dL 80(H) - 67(H)   BUN (external) 10.0 - 20.0 mg/dL - - -   Cr 0.66 - 1.25 mg/dL 4.35(H) - 3.40(H)   Cr (external) 0.72 - 1.25 mg/dL - - -   Glucose 70 - 99 mg/dL 145(H) 145(H) 136(H)   Glucose (external) 70 - 100 mg/dL - - -   Ca  8.5 - 10.1 mg/dL 8.2(L) - 8.1(L)   Ca (external) 8.6 - 10.5 mg/dL - - -   Mg 1.6 - 2.3 mg/dL 1.9 - 2.1   Mg (external) 1.8 - 2.6 mg/dL - - -     Bone Health Latest Ref Rng & Units 10/27/2021 10/26/2021 10/25/2021   Phos 2.5 - 4.5 mg/dL 2.2(L) 5.9(H) 6.2(H)   Phos (external) 2.9 - 5.2 mg/dL - - -   PTHi 12 - 72 pg/mL - - -   PTHi (external) 11.0 - 101.0 pg/mL - - -   Vit D Def 20 - 75 ug/L - - -   Vit D Def (external) 30 - 80 ng/mL - - -     Heme Latest Ref Rng & Units 10/27/2021 10/26/2021 10/25/2021   WBC 4.0 - 11.0 10e3/uL 6.9 2.4(L) 5.8   WBC (external) 3.9 - 11.9 10(3)/uL - - -   Hgb 13.3 - 17.7 g/dL 11.2(L) 9.7(L) 9.8(L)   Hgb (external) 13.1 - 17.1 g/dL - - -   Plt 150 - 450 10e3/uL 202 172 187   Plt (external) 179 - 450 10(3)/uL - - -   ABSOLUTE NEUTROPHIL 1.6 - 8.3 10e9/L - - -   ABSOLUTE NEUTROPHILS (EXTERNAL) 1.6 - 8.1 10(3)/uL - - -   ABSOLUTE LYMPHOCYTES 0.8 - 5.3 10e9/L - - -   ABSOLUTE LYMPHOCYTES (EXTERNAL) 0.9 - 3.5 10(3)/uL - - -   ABSOLUTE MONOCYTES 0.0 - 1.3 10e9/L - - -   ABSOLUTE MONOCYTES (EXTERNAL) 0.0 - 1.1 10(3)/uL - - -   ABSOLUTE EOSINOPHILS 0.0 - 0.7 10e9/L  - - -   ABSOLUTE EOSINOPHILS (EXTERNAL) 0.0 - 0.8 10(3)/uL - - -   ABSOLUTE BASOPHILS 0.0 - 0.2 10e9/L - - -   ABSOLUTE BASOPHILS (EXTERNAL) 0.0 - 0.2 10(3)/uL - - -   ABS IMMATURE GRANULOCYTES 0 - 0.4 10e9/L - - -     Liver Latest Ref Rng & Units 10/23/2021 10/11/2021 3/3/2021   AP 40 - 150 U/L 56 - -   AP (external) 38 - 126 U/L - - -   TBili 0.2 - 1.3 mg/dL 0.6 - -   TBili (external) 0.2 - 1.3 mg/dL - - -   DBili (external) 0.0 - 0.4 mg/dL - - -   ALT 0 - 70 U/L 24 - -   ALT (external) 21 - 72 U/L - - -   AST 0 - 45 U/L 23 - -   AST (external) 17 - 59 U/L - - -   Tot Protein 6.8 - 8.8 g/dL 6.5(L) - -   Tot Protein (external) 6.3 - 8.2 g/dL - - -   Albumin 3.4 - 5.0 g/dL 3.6 - -   Albumin (external) 3.5 - 5.0 g/dL - 4.2 4.2     Pancreas Latest Ref Rng & Units 10/23/2021 10/14/2015 10/13/2015   A1C 0.0 - 5.6 % 5.6 4.8 4.8     Iron studies Latest Ref Rng & Units 10/27/2021 3/3/2021 10/17/2015   Iron 35 - 180 ug/dL 19(L) - 13(L)   Iron sat 15 - 46 % 10(L) - 5(L)   Ferritin 26 - 388 ng/mL 597(H) - -   Ferritin (external) 18.0 - 464.0 ng/mL - 383.3 -     [facility] Txp Virology Latest Ref Rng & Units 10/23/2021 7/24/2020 5/14/2020   CVM DNA Quant - - - -   CMV QUANT IU/ML Not Detected IU/mL Not Detected - -   LOG IU/ML OF CMVQNT <2.1 [Log:IU]/mL - - -   BK Spec - - - -   BK Res BKNEG copies/mL - - -   BK Log <2.7 Log copies/mL - - -   BK Quant Result Ext None Detected - None Detected None Detected   BK Quant Spec Ext - - Blood -   EBV VCA IGG ANTIBODY U/mL - - -   EBV CAPSID ANTIBODY IGG No detectable antibody. Positive(A) - -   EBV DNA COPIES/ML EBVNEG [Copies]/mL - - -   EBV DNA LOG OF COPIES <2.7 [Log:copies]/mL - - -   Hep B Core NR - - -   Hep B Core Ext Nonreactive - - -   Hep B Surf - - - -   Hep B Surf Ext  - - - -   HIV 1&2 NEG - - -        Recent Labs   Lab Test 02/19/21  0746 02/19/21  0746 06/25/21  0740 10/11/21  0800 10/25/21  0505 10/26/21  0631   DOSTAC 02/18/2021 2000  --  06/24/21  09:00 am 10/10/2021  --    --    TACROL 4.7*   < > 3.8* 3.3* 6.6 4.8*    < > = values in this interval not displayed.     Recent Labs   Lab Test 10/19/15  0707 10/26/15  0707 11/09/15  0758   DOSMPA 2,000 Not Provided 11/8/15 AT 2030   MPACID 1.27 4.01* 0.45*   MPAG >200.0* 190.0* 20.5*

## 2021-10-27 NOTE — PROGRESS NOTES
Physician Attestation   I, Rigo Becerra MD, personally examined and evaluated this patient.  I discussed the patient with the resident/fellow and care team, and agree with the assessment and plan of care as documented in the note on 10/27/21 .    49 yo M with hx of ESRD 2/2 PKD 4 days out from his second kidney transplant. This was a pre-emptive transplant. Pertinent characteristics (DCD kidney, CIT 21 hours, negative XM, cPRA 67, no pre-transplant DSA, high risk induction with ATG, CMV low risk) seen in [facility] early post transplant today.   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 Concerning in his case is the reversal of diastolic arterial flow.  He is going to the OR today for thrombectomy/urgent management. We appreciate their expertise and assistance.   In review, renal vein thrombosis, while a relatively rare condition (0.3-4.2% of KTRs) is a graft-threatening surgical condition due to the lack of collateral vessels increasing risk for swelling, compromised arterial flow,graft rupture, and further embolic complications. Prompt thrombectomy is the treatment of choice.   There is a case report of a patient diagnosed with renal vein thrombosis 3 days post-transplant (with preserved arterial flow) who underwent thrombectomy, heparin anticoagulation who despite PAVITHRA and peaking of creatinine >6, had recovery and at 17 weeks post thrombectomy had a serum creatinine of 1.74mg/dl  Lerman M, Edilberto EASTMAN, Mauricio VERGARA, et al. Post transplant renal vein thrombosis, with successful thrombectomy and review of the literature. Int J Surg Case Rep. 2019;61:291-293. Doi:[email or web address]  The inpatient consult service will follow closely in coordination with Transplant Surgery.   I personally reviewed vital signs, medications, labs and imaging.    Rigo Becerra MD  Date of Service (when I saw the patient): 10/27/21

## 2021-10-27 NOTE — LETTER
"    10/27/2021         RE: Raj Pierce  [address]  [place] [address]        Dear Colleague,    Thank you for referring your patient, Raj Pierce, to the [facility]. Please see a copy of my visit note below.    Raj Pierce came to [facility] today for a lab and assess following a kidney transplant on 10/23.      Discharge date: 10/26  Transplant coordinator: Deborah Sotelo  Phone number patient can be reached at: [phone] (M)       Physical Assessment:  See physical assessment located under \"Document Flowsheets\".  Incision site: c/d/i steri strips covering but no s/s infection  Lines: N/A  Wade: N/A  Urine clarity: hematuria per pt report  Hydration: very poor. Emesis x7-8 overnight, little to no PO intake  Nutrition: Per wife, has not eaten since yesterday morning.   Last BM: this morning, loose  Pain: 3/10 \"bloating\" discomfort, has not taken pain med since yesterday      Laboratory tests: Standard labs drawn.    Plan of care for today: Pt arrived to [facility] looking quite unwell, reports vomiting 7-8x overnight including 20 mins after evening medications. Per spouse, has not kept anything down since yesterday. Weight down a kilo since yesterday, BP 85/55 sitting, 73/53 standing; very symptomatic/dizzy. Dr. Becerra paged immediately and orders received for 2 L LR and 4 mg IV Zofran; administered per orders. MD to bedside with additional orders for ULT, CT scan, and admission to hosp. Imaging scheduled in [facility] and completed per orders. No education completed today due to pt feeling so unwell; second transplant so wife demonstrated good understanding of medication regimen. Imaging reveals renal vein thrombosis; [facility] transport set up to transfer pt to ER to await bed placement. Pt transported via [facility] at ~1120. Dr. Evans called physician report to ER MD.       Medication changes: none    Medications administered: "       Administrations This Visit     0.9% sodium chloride BOLUS     Admin Date  10/27/2021 Action  New Bag Dose  1,000 mL Rate  75 mL/hr Route  Intravenous Administered By  Ayse Kwong RN          lactated ringers BOLUS 2,000 mL     Admin Date  10/27/2021 Action  New Bag Dose  2,000 mL Rate  1,000 mL/hr Route  Intravenous Administered By  Barbara Reed RN          ondansetron (ZOFRAN) injection 4 mg     Admin Date  10/27/2021 Action  Given Dose  4 mg Route  Intravenous Administered By  Barbara Reed RN              Nursing time: 60 mins      Patient education:    The following teaching topics were addressed: teaching was not completed today due to pt condition   spouse verbalized understanding and all questions answered.    Drug level:  To be followed inpatient. Pt vomited after evening dose of medications per wife and was unable to take morning meds due to nausea.     Discharge Plan    Pt will follow up with [facility] tomorrow for LBA  Discharge instructions reviewed with patient: YES  Patient/Representative verbalized understanding, all questions answered: YES    Discharged from unit at 1120 with whom: EMS to ER.    Barbara Reed RN          Again, thank you for allowing me to participate in the care of your patient.        Sincerely,        [facility]

## 2021-10-28 ENCOUNTER — APPOINTMENT (OUTPATIENT)
Dept: ULTRASOUND IMAGING | Facility: CLINIC | Age: 51
End: 2021-10-28
Attending: PHYSICIAN ASSISTANT
Payer: COMMERCIAL

## 2021-10-28 LAB
ANION GAP SERPL CALCULATED.3IONS-SCNC: 10 MMOL/L (ref 3–14)
ATRIAL RATE - MUSE: 78 BPM
BASOPHILS # BLD AUTO: 0 10E3/UL (ref 0–0.2)
BASOPHILS NFR BLD AUTO: 0 %
BLD PROD TYP BPU: NORMAL
BLD PROD TYP BPU: NORMAL
BLOOD COMPONENT TYPE: NORMAL
BLOOD COMPONENT TYPE: NORMAL
BUN SERPL-MCNC: 80 MG/DL (ref 7–30)
CALCIUM SERPL-MCNC: 7.3 MG/DL (ref 8.5–10.1)
CHLORIDE BLD-SCNC: 106 MMOL/L (ref 94–109)
CO2 SERPL-SCNC: 22 MMOL/L (ref 20–32)
CODING SYSTEM: NORMAL
CODING SYSTEM: NORMAL
CREAT SERPL-MCNC: 4.98 MG/DL (ref 0.66–1.25)
CROSSMATCH: NORMAL
CROSSMATCH: NORMAL
DIASTOLIC BLOOD PRESSURE - MUSE: NORMAL MMHG
EOSINOPHIL # BLD AUTO: 0 10E3/UL (ref 0–0.7)
EOSINOPHIL NFR BLD AUTO: 0 %
ERYTHROCYTE [DISTWIDTH] IN BLOOD BY AUTOMATED COUNT: 13.8 % (ref 10–15)
GFR SERPL CREATININE-BSD FRML MDRD: 12 ML/MIN/1.73M2
GLUCOSE BLD-MCNC: 211 MG/DL (ref 70–99)
GLUCOSE BLDC GLUCOMTR-MCNC: 140 MG/DL (ref 70–99)
GLUCOSE BLDC GLUCOMTR-MCNC: 178 MG/DL (ref 70–99)
HCT VFR BLD AUTO: 23.2 % (ref 40–53)
HGB BLD-MCNC: 6.7 G/DL (ref 13.3–17.7)
HGB BLD-MCNC: 6.8 G/DL (ref 13.3–17.7)
HGB BLD-MCNC: 7.4 G/DL (ref 13.3–17.7)
HGB BLD-MCNC: 7.4 G/DL (ref 13.3–17.7)
HGB BLD-MCNC: 7.6 G/DL (ref 13.3–17.7)
HGB BLD-MCNC: 9 G/DL (ref 13.3–17.7)
IMM GRANULOCYTES # BLD: 0.1 10E3/UL
IMM GRANULOCYTES NFR BLD: 1 %
INTERPRETATION ECG - MUSE: NORMAL
ISSUE DATE AND TIME: NORMAL
ISSUE DATE AND TIME: NORMAL
LYMPHOCYTES # BLD AUTO: 0.2 10E3/UL (ref 0.8–5.3)
LYMPHOCYTES NFR BLD AUTO: 3 %
MAGNESIUM SERPL-MCNC: 1.9 MG/DL (ref 1.6–2.3)
MCH RBC QN AUTO: 28.6 PG (ref 26.5–33)
MCHC RBC AUTO-ENTMCNC: 31.9 G/DL (ref 31.5–36.5)
MCV RBC AUTO: 90 FL (ref 78–100)
MONOCYTES # BLD AUTO: 0.8 10E3/UL (ref 0–1.3)
MONOCYTES NFR BLD AUTO: 10 %
NEUTROPHILS # BLD AUTO: 7 10E3/UL (ref 1.6–8.3)
NEUTROPHILS NFR BLD AUTO: 86 %
NRBC # BLD AUTO: 0 10E3/UL
NRBC BLD AUTO-RTO: 0 /100
P AXIS - MUSE: 54 DEGREES
PHOSPHATE SERPL-MCNC: 4.6 MG/DL (ref 2.5–4.5)
PLATELET # BLD AUTO: 103 10E3/UL (ref 150–450)
POTASSIUM BLD-SCNC: 4.2 MMOL/L (ref 3.4–5.3)
POTASSIUM BLD-SCNC: 4.3 MMOL/L (ref 3.4–5.3)
POTASSIUM BLD-SCNC: 4.5 MMOL/L (ref 3.4–5.3)
POTASSIUM BLD-SCNC: 4.8 MMOL/L (ref 3.4–5.3)
POTASSIUM BLD-SCNC: 6.1 MMOL/L (ref 3.4–5.3)
PR INTERVAL - MUSE: 142 MS
QRS DURATION - MUSE: 100 MS
QT - MUSE: 404 MS
QTC - MUSE: 460 MS
R AXIS - MUSE: 9 DEGREES
RBC # BLD AUTO: 2.59 10E6/UL (ref 4.4–5.9)
SODIUM SERPL-SCNC: 138 MMOL/L (ref 133–144)
SYSTOLIC BLOOD PRESSURE - MUSE: NORMAL MMHG
T AXIS - MUSE: 1 DEGREES
UFH PPP CHRO-ACNC: 0.32 IU/ML
UFH PPP CHRO-ACNC: <0.1 IU/ML
UNIT ABO/RH: NORMAL
UNIT ABO/RH: NORMAL
UNIT NUMBER: NORMAL
UNIT NUMBER: NORMAL
UNIT STATUS: NORMAL
UNIT STATUS: NORMAL
UNIT TYPE ISBT: 7300
UNIT TYPE ISBT: 7300
VENTRICULAR RATE- MUSE: 78 BPM
WBC # BLD AUTO: 8.2 10E3/UL (ref 4–11)

## 2021-10-28 PROCEDURE — 82310 ASSAY OF CALCIUM: CPT | Performed by: SURGERY

## 2021-10-28 PROCEDURE — 120N000003 HC R&B IMCU UMMC

## 2021-10-28 PROCEDURE — 999N000127 HC STATISTIC PERIPHERAL IV START W US GUIDANCE

## 2021-10-28 PROCEDURE — 85025 COMPLETE CBC W/AUTO DIFF WBC: CPT | Performed by: SURGERY

## 2021-10-28 PROCEDURE — 83735 ASSAY OF MAGNESIUM: CPT | Performed by: SURGERY

## 2021-10-28 PROCEDURE — P9041 ALBUMIN (HUMAN),5%, 50ML: HCPCS

## 2021-10-28 PROCEDURE — 94640 AIRWAY INHALATION TREATMENT: CPT

## 2021-10-28 PROCEDURE — 99255 IP/OBS CONSLTJ NEW/EST HI 80: CPT | Mod: GC | Performed by: INTERNAL MEDICINE

## 2021-10-28 PROCEDURE — 250N000012 HC RX MED GY IP 250 OP 636 PS 637: Performed by: SURGERY

## 2021-10-28 PROCEDURE — 250N000009 HC RX 250

## 2021-10-28 PROCEDURE — 76776 US EXAM K TRANSPL W/DOPPLER: CPT | Mod: 26 | Performed by: STUDENT IN AN ORGANIZED HEALTH CARE EDUCATION/TRAINING PROGRAM

## 2021-10-28 PROCEDURE — 80048 BASIC METABOLIC PNL TOTAL CA: CPT | Performed by: SURGERY

## 2021-10-28 PROCEDURE — 87040 BLOOD CULTURE FOR BACTERIA: CPT | Performed by: PHYSICIAN ASSISTANT

## 2021-10-28 PROCEDURE — P9041 ALBUMIN (HUMAN),5%, 50ML: HCPCS | Performed by: PHYSICIAN ASSISTANT

## 2021-10-28 PROCEDURE — 999N000157 HC STATISTIC RCP TIME EA 10 MIN

## 2021-10-28 PROCEDURE — 250N000011 HC RX IP 250 OP 636: Performed by: PHYSICIAN ASSISTANT

## 2021-10-28 PROCEDURE — 86923 COMPATIBILITY TEST ELECTRIC: CPT | Performed by: PHYSICIAN ASSISTANT

## 2021-10-28 PROCEDURE — 36415 COLL VENOUS BLD VENIPUNCTURE: CPT | Performed by: SURGERY

## 2021-10-28 PROCEDURE — 36415 COLL VENOUS BLD VENIPUNCTURE: CPT | Performed by: PHYSICIAN ASSISTANT

## 2021-10-28 PROCEDURE — 85520 HEPARIN ASSAY: CPT | Performed by: SURGERY

## 2021-10-28 PROCEDURE — 85004 AUTOMATED DIFF WBC COUNT: CPT | Performed by: SURGERY

## 2021-10-28 PROCEDURE — 85520 HEPARIN ASSAY: CPT

## 2021-10-28 PROCEDURE — 84132 ASSAY OF SERUM POTASSIUM: CPT | Performed by: PHYSICIAN ASSISTANT

## 2021-10-28 PROCEDURE — P9016 RBC LEUKOCYTES REDUCED: HCPCS

## 2021-10-28 PROCEDURE — 93005 ELECTROCARDIOGRAM TRACING: CPT

## 2021-10-28 PROCEDURE — 85018 HEMOGLOBIN: CPT | Performed by: PHYSICIAN ASSISTANT

## 2021-10-28 PROCEDURE — P9016 RBC LEUKOCYTES REDUCED: HCPCS | Performed by: PHYSICIAN ASSISTANT

## 2021-10-28 PROCEDURE — 258N000003 HC RX IP 258 OP 636: Performed by: STUDENT IN AN ORGANIZED HEALTH CARE EDUCATION/TRAINING PROGRAM

## 2021-10-28 PROCEDURE — 258N000003 HC RX IP 258 OP 636: Performed by: PHYSICIAN ASSISTANT

## 2021-10-28 PROCEDURE — 36415 COLL VENOUS BLD VENIPUNCTURE: CPT

## 2021-10-28 PROCEDURE — 76776 US EXAM K TRANSPL W/DOPPLER: CPT

## 2021-10-28 PROCEDURE — 250N000013 HC RX MED GY IP 250 OP 250 PS 637: Performed by: SURGERY

## 2021-10-28 PROCEDURE — 85018 HEMOGLOBIN: CPT

## 2021-10-28 PROCEDURE — 84132 ASSAY OF SERUM POTASSIUM: CPT

## 2021-10-28 PROCEDURE — 84100 ASSAY OF PHOSPHORUS: CPT | Performed by: SURGERY

## 2021-10-28 PROCEDURE — 250N000011 HC RX IP 250 OP 636: Performed by: SURGERY

## 2021-10-28 PROCEDURE — 86923 COMPATIBILITY TEST ELECTRIC: CPT

## 2021-10-28 PROCEDURE — 250N000011 HC RX IP 250 OP 636

## 2021-10-28 PROCEDURE — 258N000001 HC RX 258

## 2021-10-28 PROCEDURE — 93010 ELECTROCARDIOGRAM REPORT: CPT | Performed by: INTERNAL MEDICINE

## 2021-10-28 RX ORDER — ALBUMIN, HUMAN INJ 5% 5 %
500 SOLUTION INTRAVENOUS ONCE
Status: COMPLETED | OUTPATIENT
Start: 2021-10-28 | End: 2021-10-28

## 2021-10-28 RX ORDER — DEXTROSE MONOHYDRATE 25 G/50ML
25 INJECTION, SOLUTION INTRAVENOUS ONCE
Status: COMPLETED | OUTPATIENT
Start: 2021-10-28 | End: 2021-10-28

## 2021-10-28 RX ORDER — SODIUM CHLORIDE, SODIUM LACTATE, POTASSIUM CHLORIDE, CALCIUM CHLORIDE 600; 310; 30; 20 MG/100ML; MG/100ML; MG/100ML; MG/100ML
INJECTION, SOLUTION INTRAVENOUS CONTINUOUS
Status: DISCONTINUED | OUTPATIENT
Start: 2021-10-28 | End: 2021-10-30

## 2021-10-28 RX ORDER — NICOTINE POLACRILEX 4 MG
15-30 LOZENGE BUCCAL
Status: DISCONTINUED | OUTPATIENT
Start: 2021-10-28 | End: 2021-11-02

## 2021-10-28 RX ORDER — DEXTROSE MONOHYDRATE 25 G/50ML
25-50 INJECTION, SOLUTION INTRAVENOUS
Status: DISCONTINUED | OUTPATIENT
Start: 2021-10-28 | End: 2021-11-02

## 2021-10-28 RX ORDER — ALBUMIN, HUMAN INJ 5% 5 %
250 SOLUTION INTRAVENOUS ONCE
Status: COMPLETED | OUTPATIENT
Start: 2021-10-28 | End: 2021-10-28

## 2021-10-28 RX ORDER — TACROLIMUS 5 MG/1
5 CAPSULE ORAL
Status: DISCONTINUED | OUTPATIENT
Start: 2021-10-29 | End: 2021-10-29

## 2021-10-28 RX ORDER — ALBUMIN, HUMAN INJ 5% 5 %
250 SOLUTION INTRAVENOUS ONCE
Status: DISCONTINUED | OUTPATIENT
Start: 2021-10-28 | End: 2021-10-28

## 2021-10-28 RX ORDER — ALBUTEROL SULFATE 5 MG/ML
10 SOLUTION RESPIRATORY (INHALATION) ONCE
Status: COMPLETED | OUTPATIENT
Start: 2021-10-28 | End: 2021-10-28

## 2021-10-28 RX ORDER — CALCIUM GLUCONATE 94 MG/ML
1 INJECTION, SOLUTION INTRAVENOUS ONCE
Status: COMPLETED | OUTPATIENT
Start: 2021-10-28 | End: 2021-10-28

## 2021-10-28 RX ORDER — HEPARIN SODIUM 10000 [USP'U]/100ML
500 INJECTION, SOLUTION INTRAVENOUS CONTINUOUS
Status: DISCONTINUED | OUTPATIENT
Start: 2021-10-28 | End: 2021-10-28

## 2021-10-28 RX ORDER — HEPARIN SODIUM 10000 [USP'U]/100ML
1100 INJECTION, SOLUTION INTRAVENOUS CONTINUOUS
Status: DISCONTINUED | OUTPATIENT
Start: 2021-10-28 | End: 2021-10-30

## 2021-10-28 RX ORDER — DEXTROSE MONOHYDRATE 100 MG/ML
INJECTION, SOLUTION INTRAVENOUS CONTINUOUS
Status: DISCONTINUED | OUTPATIENT
Start: 2021-10-28 | End: 2021-10-28

## 2021-10-28 RX ADMIN — OXYCODONE HYDROCHLORIDE 10 MG: 10 TABLET ORAL at 13:39

## 2021-10-28 RX ADMIN — DOCUSATE SODIUM 50 MG AND SENNOSIDES 8.6 MG 1 TABLET: 8.6; 5 TABLET, FILM COATED ORAL at 20:17

## 2021-10-28 RX ADMIN — OXYCODONE HYDROCHLORIDE 5 MG: 5 TABLET ORAL at 08:30

## 2021-10-28 RX ADMIN — ALBUTEROL SULFATE 10 MG: 2.5 SOLUTION RESPIRATORY (INHALATION) at 03:19

## 2021-10-28 RX ADMIN — SULFAMETHOXAZOLE AND TRIMETHOPRIM 1 TABLET: 400; 80 TABLET ORAL at 08:32

## 2021-10-28 RX ADMIN — TACROLIMUS 4 MG: 1 CAPSULE ORAL at 08:24

## 2021-10-28 RX ADMIN — ATORVASTATIN CALCIUM 10 MG: 10 TABLET, FILM COATED ORAL at 08:24

## 2021-10-28 RX ADMIN — SODIUM CHLORIDE, POTASSIUM CHLORIDE, SODIUM LACTATE AND CALCIUM CHLORIDE: 600; 310; 30; 20 INJECTION, SOLUTION INTRAVENOUS at 14:54

## 2021-10-28 RX ADMIN — SODIUM CHLORIDE, POTASSIUM CHLORIDE, SODIUM LACTATE AND CALCIUM CHLORIDE 1000 ML: 600; 310; 30; 20 INJECTION, SOLUTION INTRAVENOUS at 02:02

## 2021-10-28 RX ADMIN — TACROLIMUS 4 MG: 1 CAPSULE ORAL at 18:57

## 2021-10-28 RX ADMIN — CALCIUM GLUCONATE 1 G: 98 INJECTION, SOLUTION INTRAVENOUS at 03:33

## 2021-10-28 RX ADMIN — SODIUM CHLORIDE, SODIUM LACTATE, POTASSIUM CHLORIDE, CALCIUM CHLORIDE AND DEXTROSE MONOHYDRATE: 5; 600; 310; 30; 20 INJECTION, SOLUTION INTRAVENOUS at 05:06

## 2021-10-28 RX ADMIN — SODIUM CHLORIDE, POTASSIUM CHLORIDE, SODIUM LACTATE AND CALCIUM CHLORIDE: 600; 310; 30; 20 INJECTION, SOLUTION INTRAVENOUS at 23:54

## 2021-10-28 RX ADMIN — HEPARIN SODIUM 500 UNITS/HR: 10000 INJECTION, SOLUTION INTRAVENOUS at 05:19

## 2021-10-28 RX ADMIN — ALBUMIN HUMAN 500 ML: 0.05 INJECTION, SOLUTION INTRAVENOUS at 21:09

## 2021-10-28 RX ADMIN — VALGANCICLOVIR 450 MG: 450 TABLET, FILM COATED ORAL at 08:32

## 2021-10-28 RX ADMIN — ONDANSETRON 4 MG: 2 INJECTION INTRAMUSCULAR; INTRAVENOUS at 08:51

## 2021-10-28 RX ADMIN — HEPARIN SODIUM 700 UNITS/HR: 10000 INJECTION, SOLUTION INTRAVENOUS at 13:38

## 2021-10-28 RX ADMIN — ALBUMIN HUMAN 500 ML: 0.05 INJECTION, SOLUTION INTRAVENOUS at 06:37

## 2021-10-28 RX ADMIN — ACETAMINOPHEN 975 MG: 325 TABLET, FILM COATED ORAL at 13:39

## 2021-10-28 RX ADMIN — DEXTROSE MONOHYDRATE 25 G: 500 INJECTION PARENTERAL at 04:16

## 2021-10-28 RX ADMIN — CITALOPRAM HYDROBROMIDE 20 MG: 20 TABLET ORAL at 08:23

## 2021-10-28 RX ADMIN — ACETAMINOPHEN 975 MG: 325 TABLET, FILM COATED ORAL at 05:31

## 2021-10-28 RX ADMIN — ALBUMIN HUMAN 250 ML: 0.05 INJECTION, SOLUTION INTRAVENOUS at 18:12

## 2021-10-28 RX ADMIN — MYCOPHENOLATE MOFETIL 750 MG: 250 CAPSULE ORAL at 18:57

## 2021-10-28 RX ADMIN — MYCOPHENOLATE MOFETIL 750 MG: 250 CAPSULE ORAL at 08:23

## 2021-10-28 RX ADMIN — ACETAMINOPHEN 975 MG: 325 TABLET, FILM COATED ORAL at 22:06

## 2021-10-28 RX ADMIN — DOCUSATE SODIUM 50 MG AND SENNOSIDES 8.6 MG 1 TABLET: 8.6; 5 TABLET, FILM COATED ORAL at 08:32

## 2021-10-28 ASSESSMENT — ACTIVITIES OF DAILY LIVING (ADL)
ADLS_ACUITY_SCORE: 7
ADLS_ACUITY_SCORE: 9
ADLS_ACUITY_SCORE: 7
ADLS_ACUITY_SCORE: 7
ADLS_ACUITY_SCORE: 9
ADLS_ACUITY_SCORE: 7
DOING_ERRANDS_INDEPENDENTLY_DIFFICULTY: NO
ADLS_ACUITY_SCORE: 5
DIFFICULTY_EATING/SWALLOWING: NO
DRESSING/BATHING_DIFFICULTY: NO
ADLS_ACUITY_SCORE: 7
ADLS_ACUITY_SCORE: 7
DIFFICULTY_COMMUNICATING: NO
CONCENTRATING,_REMEMBERING_OR_MAKING_DECISIONS_DIFFICULTY: NO
ADLS_ACUITY_SCORE: 9
ADLS_ACUITY_SCORE: 9
ADLS_ACUITY_SCORE: 7
WALKING_OR_CLIMBING_STAIRS_DIFFICULTY: NO
FALL_HISTORY_WITHIN_LAST_SIX_MONTHS: YES
ADLS_ACUITY_SCORE: 7
WEAR_GLASSES_OR_BLIND: NO
ADLS_ACUITY_SCORE: 7
TOILETING_ISSUES: NO
ADLS_ACUITY_SCORE: 7
ADLS_ACUITY_SCORE: 9
ADLS_ACUITY_SCORE: 7

## 2021-10-28 NOTE — PLAN OF CARE
BP 95/55 (BP Location: Left arm, Cuff Size: Adult Regular)   Pulse 77   Temp 98.2  F (36.8  C) (Oral)   Resp 16   SpO2 96%     Shift: 1021-5815  VS: Hypotensive most of the shift on RA, afebrile  Neuro: AOx4  Labs: Q4h Xa and hgb.  Respiratory: WDL.   Pain/Nausea/PRN: Denied pain and nausea.   Diet: NPO - due to possible OR overnight   LDA: NATHEN to bulb suction. RLQ incision UTV. 2 PIV SL. L AVF.  GI/: No BM this shift. Voiding spontaneously.   Skin: Pale, otherwise intact.   Mobility: Assist x1.   Changes: Gave one unit of blood - tolerated well. Gave 1000mL bolus of LR.   Plan: Left the floor around 0300 to transfer to . Transferred due to soft pressures - systolic in 70s and K+ of 6.1.    Handoff given to following RN.

## 2021-10-28 NOTE — CONSULTS
[facility]  Transplant Nephrology Consult  Date of Admission:  10/27/2021  Today's Date: 10/28/2021  Requesting physician: Madina Warren MD    Recommendations:  - closely monitor hemodynamic status & maintain SBP>100  - serial H/H, check lactate  - serial US +doppler or NM renogram per transplant surgery team  - Follow tacrolimus level. Run goal closer to 8 than 10 given expected slow graft function, h/o hyperkalemia    Assessment & Plan   # DDKT: Trend up. 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  Exploratory left lower quadrant of kidney transplant, back table flush, thrombectomy of renal vein, thrombectomy of iliac vein, reimplantation of kidney transplant, with ureteral stent removal and placement of a new ureteral stent  On heparin with close monitoring of hemodynamic status    US+doppler limited nonvisualization of renal artery and vein anastomosis;    # Acute bloss anemia  Related to above, continue to check serial Hgb     # Immunosuppression: Tacrolimus immediate release (goal 8-10) and Mycophenolate mofetil (dose 750 mg every 12 hours)              - Induction with Recent Transplant:  PRA 67.  Received high induction per DDF protocol due to prolonged cold ischemic time, DCD kidney              - Continue with intensive monitoring of immunosuppression for efficacy and toxicity.              - Changes: No     # Infection Prophylaxis:   - PJP: Sulfa/TMP (Bactrim)  - CMV: discordant Valganciclovir (Valcyte)x6 months     # Hypertension: Hypotensive;           Goal BP: < 150/90               - Volume status: Hypovolemic              - Changes: No, continue resuscitation    # Anemia in Chronic Renal Disease: Hgb: Stable      NADIYA: No              - Iron studies: Low iron saturation     # Mineral Bone Disorder:   - Secondary renal hyperparathyroidism; PTH level: Not checked recently        On treatment: None  - Vitamin D; level: Not checked recently        On supplement: No  - Calcium; level: Normal        On supplement: No  - Phosphorus; level: Normal        On supplement: No     # Electrolytes:   - Potassium; level: Normal        On supplement: No  - Magnesium; level: Normal        On supplement: No  - Bicarbonate; level: Normal        On supplement: No  - Sodium; level: Normal    # Transplant History:  Etiology of Kidney Failure: Polycystic kidney disease (PKD)  Tx: DDKT  Transplant: 10/23/2021 (Kidney), 10/13/2015 (Kidney)  Donor Type: Donation after Circulatory Death  Donor Class:   Crossmatch at time of Tx: negative  DSA at time of Tx: No  Significant changes in immunosuppression: None  CMV IgG Ab High Risk Discordance (D+/R-): No  EBV IgG Ab High Risk Discordance (D+/R-): No  Significant transplant-related complications: None    Recommendations were communicated to the primary team verbally.    Seen and discussed with Dr. 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 Feels okay this morning but BP remains low. Continues to make urine and denies significant pain or SOB.  US kidney showed multiple perinephric fluid collections  with     Review of Systems    The 10 point Review of Systems is negative other than noted in the HPI    Past Medical History    I have reviewed this patient's medical history and updated it with pertinent information if needed.   Past Medical History:   Diagnosis Date     Acidosis      Chronic kidney disease, stage IV (severe) (H)      Disorders of phosphorus metabolism      HTN (hypertension)     15 years     Hyperparathyroidism      Kidney replaced by transplant 10/23/2021    DCD re-transplant. Induction with thymoglobulin 6mg/kg and steroids.     Kidney replaced by transplant 10/13/2015     Polycystic kidney, autosomal dominant      Pure hypercholesterolemia      Sleep apnea     cpap     Vitamin D deficiency        Past Surgical History   I have reviewed this patient's surgical history and updated it with pertinent information if needed.  Past Surgical History:   Procedure Laterality Date     BENCH KIDNEY N/A 10/13/2015    Procedure: BENCH KIDNEY;  Surgeon: Dariel Chavez MD;  Location: [facility] OR     CYSTOSCOPY, REMOVE STENT(S), COMBINED Right 2015    Procedure: COMBINED CYSTOSCOPY, REMOVE STENT(S);  Surgeon: Dariel hCavez MD;  Location: [facility] OR     HERNIORRHAPHY INGUINAL CHILD      right     TONSILLECTOMY       TRANSPLANT KIDNEY RECIPIENT  DONOR N/A 10/13/2015    Procedure: TRANSPLANT KIDNEY RECIPIENT  DONOR;  Surgeon: Dariel Chavez MD;  Location: [facility] OR     TRANSPLANT KIDNEY RECIPIENT  DONOR N/A 10/23/2021    Procedure: TRANSPLANT, KIDNEY, RECIPIENT,  DONOR, URETERAL STENT PLACEMENT;  Surgeon: Madina Warren MD;  Location: [facility] OR       Family History   I have reviewed this patient's family history and updated it with pertinent information if needed.   Family History   Problem Relation Age of Onset     Gastrointestinal Disease Mother         ESRD PCKD     Hypertension Mother      C.A.D. Father      Hypertension Father      Gastrointestinal Disease  Sister         PCKD     Social History   I have reviewed this patient's social history and updated it with pertinent information if needed. Raj Pierce  reports that he has never smoked. He has never used smokeless tobacco. He reports current alcohol use of about 10.0 standard drinks of alcohol per week. He reports that he does not use drugs.    Allergies   No Known Allergies  Prior to Admission Medications     acetaminophen  975 mg Oral Q8H     atorvastatin  10 mg Oral Daily     citalopram  20 mg Oral Daily     insulin regular (HumuLIN R,NovoLIN R) for IV use  0.1 Units/kg Intravenous Once     [START ON 10/29/2021] magnesium oxide  400 mg Oral Daily with lunch     mycophenolate  750 mg Oral BID IS     polyethylene glycol  17 g Oral Daily     senna-docusate  1 tablet Oral BID     sulfamethoxazole-trimethoprim  1 tablet Oral Once per day on  Sat     tacrolimus  4 mg Oral BID IS     valGANciclovir  450 mg Oral Once per day on        dextrose 10% Stopped (10/28/21 0351)     dextrose 5% lactated ringers 150 mL/hr at 10/28/21 0800     heparin         Physical Exam   Temp  Av.3  F (36.8  C)  Min: 95  F (35  C)  Max: 101.5  F (38.6  C)      Pulse  Av.2  Min: 54  Max: 108 Resp  Avg: 15.9  Min: 12  Max: 22  SpO2  Av.8 %  Min: 90 %  Max: 100 %     /61   Pulse 86   Temp 98.5  F (36.9  C) (Oral)   Resp 18   SpO2 98%    Date 10/28/21 0700 - 10/29/21 0659   Shift 6642-7512 0146-0817 5878-6832 24 Hour Total   INTAKE   I.V. 1226.68   1226.68   Shift Total 1226.68   1226.68   OUTPUT   Urine 270   270   Drains 10   10   Shift Total 280   280   Weight (kg)            GENERAL APPEARANCE: alert and no distress  HENT: mouth without ulcers or lesions  RESP: breathing non-labored  CV: regular rhythm, normal rate  EDEMA: no LE edema bilaterally  ABDOMEN: soft, nondistended  MS: extremities normal - no gross deformities noted  SKIN: no rash on exposed surfaces  : no spears    Data   CMP  Recent  Labs   Lab 10/28/21  0921 10/28/21  0523 10/28/21  0443 10/28/21  0350 10/28/21  0321 10/28/21  0134 10/27/21  2336 10/27/21  1815 10/27/21  0621 10/27/21  0621 10/26/21  0737 10/26/21  0631 10/23/21  1500 10/23/21  0358   NA  --   --  138  --   --   --   --  138  --  139  --  139   < > 141   POTASSIUM 4.2  --  4.2  4.2  --  4.8 6.1*   < > 4.8   < > 4.3   < > 4.7   < > 4.5   CHLORIDE  --   --  106  --   --   --   --  106  --  107  --  109   < > 108   CO2  --   --  22  --   --   --   --  23  --  24  --  23   < > 26   ANIONGAP  --   --  10  --   --   --   --  9  --  8  --  7   < > 7   GLC  --  178* 211* 140*  --   --   --  132*   < > 145*   < > 136*   < > 92   BUN  --   --  80*  --   --   --   --  74*  --  80*  --  67*   < > 60*   CR  --   --  4.98*  --   --   --   --  4.33*  --  4.35*  --  3.40*   < > 3.90*   GFRESTIMATED  --   --  12*  --   --   --   --  15*  --  15*  --  20*   < > 17*   LILIANA  --   --  7.3*  --   --   --   --  7.7*  --  8.2*  --  8.1*   < > 8.5   MAG  --   --  1.9  --   --   --   --  1.9  --  1.9  --  2.1   < >  --    PHOS  --   --  4.6*  --   --   --   --  5.0*  --  2.2*  --  5.9*   < >  --    PROTTOTAL  --   --   --   --   --   --   --   --   --   --   --   --   --  6.5*   ALBUMIN  --   --   --   --   --   --   --   --   --   --   --   --   --  3.6   BILITOTAL  --   --   --   --   --   --   --   --   --   --   --   --   --  0.6   ALKPHOS  --   --   --   --   --   --   --   --   --   --   --   --   --  56   AST  --   --   --   --   --   --   --   --   --   --   --   --   --  23   ALT  --   --   --   --   --   --   --   --   --   --   --   --   --  24    < > = values in this interval not displayed.     CBC  Recent Labs   Lab 10/28/21  1104 10/28/21  0443 10/28/21  0134 10/27/21  2336 10/27/21  2153 10/27/21  1815 10/27/21  0621 10/27/21  0621 10/26/21  0630 10/26/21  0630   HGB 6.7* 7.4* 9.0* 7.4*   < > 7.6*   < > 11.2*   < > 9.7*   WBC  --  8.2  --   --   --  9.3  --  6.9  --  2.4*   RBC  --  2.59*   --   --   --  2.74*  --  4.05*  --  3.46*   HCT  --  23.2*  --   --   --  24.5*  --  35.3*  --  31.3*   MCV  --  90  --   --   --  89  --  87  --  91   MCH  --  28.6  --   --   --  27.7  --  27.7  --  28.0   MCHC  --  31.9  --   --   --  31.0*  --  31.7  --  31.0*   RDW  --  13.8  --   --   --  13.1  --  12.3  --  12.4   PLT  --  103*  --   --   --  139*  --  202  --  172    < > = values in this interval not displayed.     INR  Recent Labs   Lab 10/27/21  2153 10/23/21  0358   INR 1.43* 1.07   * 28     ABG  Recent Labs   Lab 10/23/21  1647 10/23/21  1500   O2PER 42.0 33.0      Urine Studies  Recent Labs   Lab Test 10/23/21  0526 11/09/15  1300 10/28/15  0702 10/17/15  0500   COLOR Straw Yellow Yellow Light Yellow   APPEARANCE Clear Clear Clear Clear   URINEGLC Negative Negative Negative Negative   URINEBILI Negative Negative Negative Negative   URINEKETONE Negative Negative Negative Negative   SG 1.009 1.008 1.008 1.008   UBLD Negative Negative Negative Large*   URINEPH 7.0 5.5 6.5 5.5   PROTEIN 50 * 10* 10* 30*   NITRITE Negative Negative Negative Negative   LEUKEST Negative Negative Negative Negative   RBCU 0 <1 1 >182*   WBCU 1 1 1 3*     Recent Labs   Lab Test 10/23/21  0526 06/05/17  1253 01/25/16  1602 10/28/15  0702 10/13/15  1035   UTPG 1.37* 1.30* 0.36* 0.45* 0.55*     PTH  Recent Labs   Lab Test 10/27/21  0621 08/01/16  1638 01/25/16  1601 10/15/15  0520   PTHI 149* 90* 121* 434*     Iron Studies  Recent Labs   Lab Test 10/27/21  0621 10/17/15  0455   IRON 19* 13*   * 241   IRONSAT 10* 5*   TERRANCE 597*  --        IMAGING:  All imaging studies reviewed by me.    This patient has been seen and evaluated by me, Alondra Roy MD.  I have reviewed the note and agree with plan of care as documented by the fellow.  Alondra Roy MD on 10/28/2021 at 2:15 PM

## 2021-10-28 NOTE — PHARMACY-ADMISSION MEDICATION HISTORY
Admission Medication History Completed by Pharmacy    See Psychiatric Admission Navigator for allergy information, preferred outpatient pharmacy, prior to admission medications and immunization status.     Medication History Sources:     Patient readmitted within 24hr of discharge. Last dose of medications reviewed with patient by med scribe    Changes made to PTA medication list (reason):    Added: None    Deleted: None    Changed: None    Additional Information:    None    Prior to Admission medications    Medication Sig Last Dose Taking? Auth Provider   atorvastatin (LIPITOR) 10 MG tablet Take 10 mg by mouth daily 10/26/2021 at 0800 Yes Reported, Patient   citalopram (CELEXA) 20 MG tablet Take 20 mg by mouth daily. 10/26/2021 at 0800 Yes Reported, Patient   Magnesium Oxide 250 MG TABS Take 250 mg by mouth 2 times daily  10/26/2021 at 1200 Yes Reported, Patient   mycophenolate (GENERIC EQUIVALENT) 250 MG capsule Take 3 capsules (750 mg) by mouth 2 times daily 10/26/2021 at 2000 Yes Rika Sam PA-C   oxyCODONE (ROXICODONE) 5 MG tablet Take 1 tablet (5 mg) by mouth every 6 hours as needed for moderate to severe pain  at prn Yes Rika Sam PA-C   polyethylene glycol (MIRALAX) 17 GM/Dose powder Take 17 g by mouth daily 10/26/2021 at 0800 Yes Rika Sam PA-C   senna-docusate (SENOKOT-S/PERICOLACE) 8.6-50 MG tablet Take 1 tablet by mouth 2 times daily 10/26/2021 at 2000 Yes Rika Sam PA-C   sulfamethoxazole-trimethoprim (BACTRIM) 400-80 MG tablet Take 1 tablet by mouth daily 10/26/2021 at 0800 Yes Rika Sam PA-C   tacrolimus (GENERIC EQUIVALENT) 1 MG capsule Take 4 capsules (4 mg) by mouth 2 times daily 10/26/2021 at 2000 Yes Rika Sam PA-C   valGANciclovir (VALCYTE) 450 MG tablet Take 1 tablet (450 mg) by mouth every other day 10/26/2021 at 0800 Yes Rika Sam PA-C   acetaminophen (TYLENOL) 325 MG tablet Take 2 tablets (650 mg) by mouth every 4 hours as needed for  other (For optimal non-opioid multimodal pain management to improve pain control.)  Patient not taking: Reported on 10/27/2021 Not Taking at Unknown time  Rika Sam PA-C   vitamin D3 (CHOLECALCIFEROL) 2000 units (50 mcg) tablet Take 1 tablet by mouth daily  Patient not taking: Reported on 10/27/2021 Not Taking at Unknown time  Reported, Patient       Date completed: 10/28/21    Medication history completed by:Gracie Cobian, Pharm.D., [facility]  Pager [phone]

## 2021-10-28 NOTE — ANESTHESIA POSTPROCEDURE EVALUATION
Patient: Raj Pierce    Procedure: Procedure(s):  Exploratory left lower quadrant of kidney transplant, back table flush, thrombectomy of renal vein, thrombectomy of iliac vein, reimplantation of kidney transplant, with ureteral stent removal and placement of a new ureteral stent, intraoperative ultrasound, intraoperative of transit time flow measurment (VeriQ)       Diagnosis:Renal vein thrombosis (H) [I82.3]  Diagnosis Additional Information: No value filed.    Anesthesia Type:  General    Note:  Disposition: Admission   Postop Pain Control: Uneventful            Sign Out: Well controlled pain   PONV: No   Neuro/Psych: Uneventful            Sign Out: Acceptable/Baseline neuro status   Airway/Respiratory: Uneventful            Sign Out: Acceptable/Baseline resp. status   CV/Hemodynamics: Uneventful            Sign Out: Acceptable CV status; No obvious hypovolemia; No obvious fluid overload   Other NRE: NONE   DID A NON-ROUTINE EVENT OCCUR? No           Last vitals:  Vitals Value Taken Time   BP 99/62 10/27/21 1950   Temp 38.6  C (101.5  F) 10/27/21 1900   Pulse 96 10/27/21 2000   Resp 17 10/27/21 1900   SpO2 97 % 10/27/21 2000   Vitals shown include unvalidated device data.    Electronically Signed By: Lala Sutton MD  October 27, 2021  8:00 PM

## 2021-10-28 NOTE — PROVIDER NOTIFICATION
Reported K+ of 6.1 to surgery cross cover . Waiting for further orders as well as preparing to transport to  due to hypotension and increasing bloody output from NATHEN.

## 2021-10-28 NOTE — PROVIDER NOTIFICATION
"Paged MD \"FYI -  NATHEN output 70cc in the last 20min.\"    MD came to bedside to assess; MD Cheng gave verbal order to decrease heparin gtt rate from 1400units per hour to 1300units per hour. Will continue to monitor.  "

## 2021-10-28 NOTE — PROGRESS NOTES
Vascular access RN attempted PIV start in R arm. Unsuccessful. Pt has two 18G IVS both of which are working. Blood return noted in the hand IV which was causing the pt some discomfort. Vascular access RN was able to flush the IV without the pt reporting pain. Vascular explained to the primary nurse that after one failed attempt with no further prospects, it would be best to use the two PIVs present and alert the team/perhaps have a PICC in the morning. Primary nurse verbalized understanding

## 2021-10-28 NOTE — PLAN OF CARE
Transfer  Transferred from: 7A  Via:bed  Reason for transfer: Pt appropriate for 6B- worsened patient condition  Family: Aware of transfer  Belongings: Received with pt  Chart: Received with pt  Medications: Meds received from old unit with pt  Code Status verified on armband: yes  2 RN Skin Assessment Completed By: ISHA Tavera and Linsey RN  Med rec completed: yes  Bed surface reassessed with algorithm and charted: yes  New bed surface ordered: no  Suction/Ambu bag/Flowmeter at bedside: yes    Report received from: Silvia  Pt status: Stable

## 2021-10-28 NOTE — PLAN OF CARE
Neuro: A&Ox4.   Cardiac: SR. SBP 's. Team notified of lower BP's.   Respiratory: Sating >96% on RA. Oxygen applied when sleeping. Clear/diminished throughout.   GI/: 25-50 ml UO via spears, pink in color. No BM, passing flatus. Int nausea, prn zofran x1.   Diet/appetite: Clear liquid, 2g K.   Activity:  Assist of 1, up in chair today. Declined to ambulate due to pain/nausea.   Pain: PRN oxycodone given with moderate relief. Pt reluctant to take pain meds.   Skin: Abd dressing changed per kidney team.   LDA's: R PIV x2. L Fistula. L NATHEN with minimal output.  ml/hr . Heparin 700 units/hr.     Plan: 1 unit of blood given. Renal US completed. Plan for renogram tomorrow. Labs ordered for 1600. Continue with POC. Notify primary team with changes.  BP 97/63   Pulse 93   Temp 98.8  F (37.1  C) (Oral)   Resp 16   SpO2 94%

## 2021-10-28 NOTE — PLAN OF CARE
Admitted/transferred from: PACU  Time of arrival on unit 2050  2 RN full  skin assessment completed byAnel WALTER And Julienne GRAY  Skin assessment finding: issues found RLQ incision UTV.   Interventions/actions: skin interventions no skin adjustments necessary at this time.     Will continue to monitor.

## 2021-10-28 NOTE — PLAN OF CARE
Neuro: A&Ox4. Able to make needs known.   Cardiac: SR HR 70s-80s. BP soft. SBP 80s-90s/60s. Last re check 102/64.    Respiratory: Desats while sleeping on RA, applied 2 L NC and O2 >92%.   GI/: Wade in place. Low urine output. Providers aware. Red/pink urine.   Diet/appetite: NPO  Activity:  Assist of 1-2 with GB   Pain: At acceptable level on current regimen. Scheduled tylenol and also utilizing ice packs.   Skin: No new deficits noted. Incision covered with primi pore.   LDA's: 2 PIV R arm   Upper arm infusing Albumin  Hand infusing maintenance and heparin gtt at 500 units/hr    Other: Paged surgery CC regarding potassium re check of 4.8, orders to hold insulin IV push. Also orders to re start maintenance fluid. Talked to surgeon MD Warren at 0545 regarding UO, Bps, and Heparin Xa. Verbal order given to change heparin gtt to start at 500 units/hr. Also wanted albumin ordered. Surgery CC was notified, and orders for 500 ml of albumin placed and infused.   Plan: Continue with POC. Notify primary team with changes.

## 2021-10-28 NOTE — PROVIDER NOTIFICATION
"Paged MD \"FYI - Please discontinue continuous cardiac monitoring and CBP orders. If wanting cardiac monitoring please order med/surg tele.\"  "

## 2021-10-28 NOTE — PROGRESS NOTES
"CLINICAL NUTRITION SERVICES - ASSESSMENT NOTE     Nutrition Prescription    Malnutrition Status:    Patient does not meet two of the established criteria necessary for diagnosing malnutrition but is at risk for malnutrition    Recommendations already ordered by Registered Dietitian (RD):  None at this time    Future/Additional Recommendations:  Monitor for diet advancement, tolerance of PO intake, and need for nutrition supplements.      REASON FOR ASSESSMENT  Raj Pierce is a 50 year old male seen by the dietitian for MD Order- Assess & Educate post-op SOT    NUTRITION HISTORY  Clinical History: end stage kidney disease secondary to PCKD s/p kidney transplant (2015) c/b poor function (small graft), HTN, hyperparathyroidism, and SILVIA (with cpap). who presents to the Emergency Department for evaluation of nausea and vomiting since last night.  He has not been able to keep his transplant medications down since last night. Patient was discharged s/p kidney transplant 10/26.     Obtained nutrition history from patient and spouse. Pt with nausea and vomiting since the night of 10/26 and last ate a meal while in the hospital the morning of 10/26. Pt had a good appetite prior to transplant.     CURRENT NUTRITION ORDERS  Diet: Clear Liquid   Intake/Tolerance: Pt has had sips of water so far today.     LABS  Labs reviewed  Cr 4.98 (H)  K+ 4.2 (WNL- previously high)  Phos 4.6 (H)  BG 10/27-10/28: 132-211    MEDICATIONS  Medications reviewed  Insulin   Miralax  Senokot  Tacrolimus  D10 @ 75 mL/hr for prevention of hypoglycemia   D5 @ 150 mL/hr- maintenance rate     ANTHROPOMETRICS  Height: 5' 10\"  Most Recent Weight:   Wt Readings from Last 1 Encounters:   10/27/21 91.4 kg (201 lb 8 oz)   IBW: 75.5 kg (121%)   BMI: Overweight BMI 25-29.9  Weight History: Weight stable   Wt Readings from Last 3 Encounters:   10/27/21 91.4 kg (201 lb 8 oz)   10/26/21 92.8 kg (204 lb 8 oz)   12/14/20 93.3 kg (205 lb 9.6 oz)     87.8 kg (193 " lb 9.6 oz) 03/03/2021     Dosing Weight: 79 kg (adjusted based on most recent weight of 91.4 kg and IBW)     ASSESSED NUTRITION NEEDS:  Estimated Energy Needs: 7912-8955 kcals (30-35 Kcal/Kg)  Justification: increased needs post-op SOT  Estimated Protein Needs: 105-160 grams protein (1.3-2 gm/Kg)  Justification: increased needs post op SOT  Estimated Fluid Needs: 4488-5961  mL (25-30 mL/Kg)  Justification: maintenance, or per MD pending fluid status and adequate UOP    PHYSICAL FINDINGS  See malnutrition section below.    MALNUTRITION  % Intake: Decreased intake does not meet criteria  % Weight Loss: None noted  Subcutaneous Fat Loss: None observed  Muscle Loss: Temporal and Thoracic region (clavicle, acromium bone, deltoid, trapezius, pectoral): Mild   Fluid Accumulation/Edema: None noted  Malnutrition Diagnosis: Patient does not meet two of the established criteria necessary for diagnosing malnutrition but is at risk for malnutrition    NUTRITION DIAGNOSIS:  Inadequate oral intake related to decreased appetite 2/2 nausea and vomiting as evidenced by pt report of no PO intake x2 days    INTERVENTIONS  Implementation  Nutrition Education: Discussed recent PO intake, N/V, and clear liquid diet options.     Goals  Diet adv v nutrition support within 2-3 days.    Monitoring/Evaluation  Progress toward goals will be monitored and evaluated per protocol.    Deborah Inman, RD, LD  6B RD pager [phone]

## 2021-10-28 NOTE — PROGRESS NOTES
Brief Progress note / post op check    Post op checked Mr. Pierce once he arrived to . Initially, he denied any abdominal pain. Denied headache, dizziness, cp, or shortness of breath. Denied feeling weak. Vitals initially with bp 98/63, hr 90s, satting upper 90s on 2L with RR 18. Exam showed a soft abdomen, non-tender, nondistended with NATHEN in LLQ showing sanguinous output. In the 40 minutes since he arrived to  his NATHEN returned ~220 ml. The remainder of his exam of benign.     1pRBC was already running from PACU. Due to concern for acute blood loss anemia from heparin causing hypotension, dose was decreased from 1450 to 1400 to 1300 and subsequently turned off. Another 1u of pRBC was ordered for continued hypotension and low hgb. Xa levels and hgb were also ordered q4. Hgb increased throughout the night (7.6 to 7.1 to 7.4 to 9.0) with administration of blood product.     At approximately 1:30AM that patient continued to slowly become more hypotensive (75/53) and slightly tachycardic (102). Exam remained unchanged. NATHEN output since OR ~800cc and sanguinous. Recheck hgb resulted 9.0, K 6.1. due to concern for hypovolemia patient was given a 1L fluid challenge and orders were made to shift his K due to hyperkalemia (caclium, insulin, and albuterol). Decision was also made to transfer to [facility] (6B) for higher level of cares.    ~300AM patient blood pressure has now improved (91/58). Continues to be asymptomatic. We will continue to monitor him for any changes.     Current plan:  Monitor hemodynamic status, drain output, urine output  Continue mIVF at 150  q4 K+, Xa levels, and hgb  Shift potassium  If next Xa is <0.3 - will restart heparin ggt at 1000 unit(s) (low dose without bolus)    Decisions and changes were discussed with fellow and melissa resident. Staff aware at beginning of     Tapan Cheng  General Surgery PGY1

## 2021-10-28 NOTE — PROGRESS NOTES
Vascular access RN spoke with primary RN and charge RN of 6b who decided to continue to use the current 18G IVs as vascular would not be able to place a third IV in this pt.

## 2021-10-28 NOTE — PROGRESS NOTES
Transplant Surgery  Inpatient Daily Progress Note  10/28/2021    Assessment & Plan: Raj Pierce is a 50 year old male  with a PMH significant for ESRD 2/2 PCKD s/p kidney transplant () c/b poor function (small graft). Other PMH includes HTN, hyperparathyroidism, and SILVIA (with cpap). Now status post pre-emptive  donor (DCD) kidney re-transplant on 10/23. Discharged 10/26 POD 3. Readmitted 10/27 after emergent exploratory laparotomy due to renal vein thrombosis.    S/p exploratory left lower quadrant of kidney transplant, back table flush, thrombectomy of renal vein, thrombectomy of iliac vein, reimplantation of kidney transplant, with ureteral stent removal and placement of a new ureteral stent, intraoperative ultrasound, intraoperative of transit time flow measurment. POD 1.    Graft function: uncertain. Cr 4.98 this AM <-4.3 prior to OR<-3.4 POD 3. UO 20-30 ml/hr, light red, no clots. US this AM with improved flow, 3 small perinephric fluid collections. NATHEN sanguinous overnight, ~ 900 ml. NATHEN output now decreased, serosanguinous. Continue to strip drain PRN.   Immunosuppression management: High risk protocol due to DGF    mg BID  Tac 4 mg BID. Goal 8-10. 10/27 4.2. Increase 5 mg BID. Level scheduled for tomorrow AM.    Complexity of management:High. Contributing factors: anemia, thrombocytopenia  Hematology:   Acute blood loss, chronic anemia: Received 2 units RBC overnight. Hgb 6.7 (7.4). Give 1 unit RBCs today. Check hgb after transfusion. If stable, check hgb q 6 hrs.   Anticoagulation: Renal vein thrombosis, thrombectomy of iliac vein, reimplantation of kidney transplant. Heparin gtt straight rate 700 ml/hr. Check anti 10A q 4 hours. Rate adjustment per surgeon.   Cardiorespiratory: Stable on RA. Encourage frequent IS  Hypotension, secondary to bleeding, third spacin unit RBCs, LR 1L, albumin 500 ml, RBC 1 unit, MIV increased 150 ml/hr. Improved with blood transfusions, fluid  replacement. Continue to check Hgb.   GI/Nutrition: JIGAR - low K diet to start.   Endocrine: Stress hyperglycemia. . Monitor. Remove dextrose in MIV.   Fluid/Electrolytes: MIVF: D5  ml/hr. Switch to  ml/hr. Electrolytes acceptable.   : Wade to remain due to new surgical anastomosis  Infectious disease: Tmax 101. BC x 2  Prophylaxis: DVT (heparin), valcyte x 6 months (D+/R-), bactrim  Disposition: 6B     Medical Decision Making: High  Subsequent visit [id number] (high level decision making)    JATINDER/Fellow/Resident Provider: Rika Sam PA-C    Faculty: George Zhu MD    Attestation: I saw and examined the patient with Rika Sam PA-C, and the transplant team. I independently reviewed all pertinent laboratory and imaging results. I agree with the findings and plan as documented in this note.     -George Zhu MD    _________________________________________________________________  Transplant History: Admitted 10/27/2021 for renal vein thrombosis.  10/23/2021 (Kidney), 10/13/2015 (Kidney), Postoperative day: 2207 (Kidney), 5 (Kidney)     Interval History: History is obtained from the patient  Overnight events: Pain LLQ adequate control with oxycodone. Lightheaded. Denies SOB.    ROS:   A 10-point review of systems was negative except as noted above.    Meds:    acetaminophen  975 mg Oral Q8H     atorvastatin  10 mg Oral Daily     citalopram  20 mg Oral Daily     insulin regular (HumuLIN R,NovoLIN R) for IV use  0.1 Units/kg Intravenous Once     [START ON 10/29/2021] magnesium oxide  400 mg Oral Daily with lunch     mycophenolate  750 mg Oral BID IS     polyethylene glycol  17 g Oral Daily     senna-docusate  1 tablet Oral BID     sulfamethoxazole-trimethoprim  1 tablet Oral Once per day on Tue Thu Sat     tacrolimus  4 mg Oral BID IS     valGANciclovir  450 mg Oral Once per day on Mon Thu       Physical Exam:     Admit      Current vitals:   /61   Pulse 86   Temp 98.5   F (36.9  C) (Oral)   Resp 18   SpO2 98%          Vital sign ranges:    Temp:  [98  F (36.7  C)-101.5  F (38.6  C)] 98.5  F (36.9  C)  Pulse:  [] 86  Resp:  [14-20] 18  BP: ()/(46-86) 102/61  SpO2:  [95 %-100 %] 98 %  Patient Vitals for the past 24 hrs:   BP Temp Temp src Pulse Resp SpO2   10/28/21 1215 102/61 98.5  F (36.9  C) Oral 86 -- --   10/28/21 1200 109/59 98.5  F (36.9  C) Oral 80 18 98 %   10/28/21 1110 -- 98.5  F (36.9  C) Oral -- 18 --   10/28/21 1100 95/86 -- -- 90 -- 96 %   10/28/21 1000 97/62 -- -- 87 -- --   10/28/21 0900 94/65 -- -- 83 -- --   10/28/21 0800 97/63 98.7  F (37.1  C) Oral 82 20 96 %   10/28/21 0700 102/64 98.4  F (36.9  C) Oral 88 16 99 %   10/28/21 0600 92/66 98.6  F (37  C) Oral 89 14 100 %   10/28/21 0500 (!) 85/57 98.4  F (36.9  C) Oral 95 14 100 %   10/28/21 0400 96/58 -- -- 94 20 100 %   10/28/21 0319 -- -- -- -- -- 96 %   10/28/21 0309 95/55 98.2  F (36.8  C) Oral 77 16 98 %   10/28/21 0244 91/58 98.4  F (36.9  C) -- 80 16 99 %   10/28/21 0221 (!) 83/70 -- -- 95 -- --   10/28/21 0147 (!) 81/54 -- -- 86 -- --   10/28/21 0142 (!) 81/54 -- -- -- -- --   10/28/21 0130 (!) 78/52 -- -- -- -- --   10/28/21 0115 (!) 71/50 98.1  F (36.7  C) Oral 95 16 96 %   10/28/21 0100 (!) 77/53 -- -- -- -- --   10/28/21 0044 (!) 75/53 100  F (37.8  C) Axillary 102 16 96 %   10/28/21 0043 (!) 75/53 -- -- -- -- --   10/28/21 0028 (!) 84/46 99.1  F (37.3  C) -- 105 16 97 %   10/27/21 2358 (!) 86/59 98.9  F (37.2  C) Oral 108 18 98 %   10/27/21 2324 90/64 98.8  F (37.1  C) Oral 96 18 99 %   10/27/21 2255 92/64 98  F (36.7  C) Oral 95 -- 99 %   10/27/21 2230 90/56 -- -- 102 -- 96 %   10/27/21 2100 98/63 98.2  F (36.8  C) Oral 94 -- 100 %   10/27/21 2050 90/59 99.4  F (37.4  C) Oral 101 18 99 %   10/27/21 2021 91/61 98.5  F (36.9  C) Oral 101 -- 97 %   10/27/21 2000 94/64 -- -- 96 -- 97 %   10/27/21 1915 95/65 -- -- 98 16 96 %   10/27/21 1900 100/62 (!) 101.5  F (38.6  C) Axillary 91 17 96 %    10/27/21 1845 104/71 -- -- 96 18 97 %   10/27/21 1830 99/61 -- -- 93 14 95 %   10/27/21 1815 106/69 -- -- 88 15 95 %   10/27/21 1800 108/66 -- -- 87 17 96 %   10/27/21 1755 102/58 98.8  F (37.1  C) Oral 91 14 99 %     General Appearance: in mild distress.   Skin: normal, dry  Heart: regular rate and rhythm  Lungs: clear to auscultation. NLB on RA  Abdomen: The abdomen is rounded, and  mildly tender, LLQ. he is tender over the kidney graft. The wound is well approximated.  : spears is present.   Urine thin red, no clots.   Extremities: edema: present bilateral.   Neurologic: awake, alert and oriented.    PIV x 2     Data:   CMP  Recent Labs   Lab 10/28/21  0921 10/28/21  0523 10/28/21  0443 10/27/21  2336 10/27/21  1815 10/23/21  2002 10/23/21  1647 10/23/21  1500 10/23/21  1500 10/23/21  0358   NA  --   --  138  --  138   < > 140   < > 143 141   POTASSIUM 4.2  --  4.2  4.2   < > 4.8   < > 4.6   < > 4.4 4.5   CHLORIDE  --   --  106  --  106   < >  --   --   --  108   CO2  --   --  22  --  23   < >  --   --   --  26   GLC  --  178* 211*   < > 132*   < > 174*   < > 94 92   BUN  --   --  80*  --  74*   < >  --   --   --  60*   CR  --   --  4.98*  --  4.33*   < >  --   --   --  3.90*   GFRESTIMATED  --   --  12*  --  15*   < >  --   --   --  17*   LILIANA  --   --  7.3*  --  7.7*   < >  --   --   --  8.5   ICAW  --   --   --   --   --   --  4.6  --  4.6  --    MAG  --   --  1.9  --  1.9   < >  --   --   --   --    PHOS  --   --  4.6*  --  5.0*   < >  --   --   --   --    ALBUMIN  --   --   --   --   --   --   --   --   --  3.6   BILITOTAL  --   --   --   --   --   --   --   --   --  0.6   ALKPHOS  --   --   --   --   --   --   --   --   --  56   AST  --   --   --   --   --   --   --   --   --  23   ALT  --   --   --   --   --   --   --   --   --  24    < > = values in this interval not displayed.     CBC  Recent Labs   Lab 10/28/21  1104 10/28/21  0443 10/27/21  2153 10/27/21  1815 10/23/21  1500 10/23/21  7038   HGB  6.7* 7.4*   < > 7.6*   < > 11.8*   WBC  --  8.2  --  9.3   < > 6.7   PLT  --  103*  --  139*   < > 264   A1C  --   --   --   --   --  5.6    < > = values in this interval not displayed.     COAGS  Recent Labs   Lab 10/27/21  2153 10/23/21  0358   INR 1.43* 1.07   * 28      Urinalysis  Recent Labs   Lab Test 10/23/21  0526 06/05/17  1253 01/25/16  1602 11/09/15  1300   COLOR Straw  --   --  Yellow   APPEARANCE Clear  --   --  Clear   URINEGLC Negative  --   --  Negative   URINEBILI Negative  --   --  Negative   URINEKETONE Negative  --   --  Negative   SG 1.009  --   --  1.008   UBLD Negative  --   --  Negative   URINEPH 7.0  --   --  5.5   PROTEIN 50 *  --   --  10*   NITRITE Negative  --   --  Negative   LEUKEST Negative  --   --  Negative   RBCU 0  --   --  <1   WBCU 1  --   --  1   UTPG 1.37* 1.30*   < >  --     < > = values in this interval not displayed.     Virology:  CMV DNA Quantitation Specimen   Date Value Ref Range Status   08/01/2016   Corrected    Plasma  CORRECTED ON 08/02 AT 1044: PREVIOUSLY REPORTED AS Blood       CMV IgG Antibody   Date Value Ref Range Status   07/10/2013 <0.20  Negative for anti-CMV IgG U/mL Final     EBV VCA IgG Antibody   Date Value Ref Range Status   07/10/2013 >750.00  Positive, suggests immunologic exposure. U/mL Final     Hepatitis C Antibody   Date Value Ref Range Status   10/23/2021 Nonreactive Nonreactive Final   04/25/2016  NR Final    Nonreactive   Assay performance characteristics have not been established for newborns,   infants, and children       Hep B Surface Loni   Date Value Ref Range Status   07/10/2013 0.0  Final     Comment:     Negative, No antibody detected when the value is less than 5.0 mlU/mL.     BK Virus Result   Date Value Ref Range Status   06/05/2017 BK Virus DNA Not Detected BKNEG copies/mL Final   10/28/2015 BK Virus DNA Not Detected BKNEG copies/mL Final     BK Virus DNA copies/mL   Date Value Ref Range Status   10/23/2021 Not Detected Not  Detected copies/mL Final

## 2021-10-28 NOTE — OR NURSING
NIGHAT Sutton notified patient's pre-op Hg 11.2 and post-op Hg 7.6. Last /62 with MAP 76, no new orders but to notify patient's surgical team, see note.

## 2021-10-28 NOTE — PROVIDER NOTIFICATION
"Paged MD-  \"FYI - last BP 86/57 ; 108bpm  Pt states feeling hot but last temp 98.9  - Still waiting for the second unit of blood.\"  "

## 2021-10-29 ENCOUNTER — APPOINTMENT (OUTPATIENT)
Dept: NUCLEAR MEDICINE | Facility: CLINIC | Age: 51
End: 2021-10-29
Attending: PHYSICIAN ASSISTANT
Payer: COMMERCIAL

## 2021-10-29 ENCOUNTER — APPOINTMENT (OUTPATIENT)
Dept: ULTRASOUND IMAGING | Facility: CLINIC | Age: 51
End: 2021-10-29
Attending: SURGERY
Payer: COMMERCIAL

## 2021-10-29 LAB
ANION GAP SERPL CALCULATED.3IONS-SCNC: 7 MMOL/L (ref 3–14)
BASOPHILS # BLD MANUAL: 0 10E3/UL (ref 0–0.2)
BASOPHILS NFR BLD MANUAL: 0 %
BUN SERPL-MCNC: 82 MG/DL (ref 7–30)
CALCIUM SERPL-MCNC: 7.3 MG/DL (ref 8.5–10.1)
CHLORIDE BLD-SCNC: 104 MMOL/L (ref 94–109)
CO2 SERPL-SCNC: 22 MMOL/L (ref 20–32)
CREAT SERPL-MCNC: 5.73 MG/DL (ref 0.66–1.25)
EOSINOPHIL # BLD MANUAL: 0 10E3/UL (ref 0–0.7)
EOSINOPHIL NFR BLD MANUAL: 0 %
ERYTHROCYTE [DISTWIDTH] IN BLOOD BY AUTOMATED COUNT: 14.2 % (ref 10–15)
GFR SERPL CREATININE-BSD FRML MDRD: 10 ML/MIN/1.73M2
GLUCOSE BLD-MCNC: 118 MG/DL (ref 70–99)
HCT VFR BLD AUTO: 25.8 % (ref 40–53)
HGB BLD-MCNC: 7.2 G/DL (ref 13.3–17.7)
HGB BLD-MCNC: 7.4 G/DL (ref 13.3–17.7)
HGB BLD-MCNC: 7.4 G/DL (ref 13.3–17.7)
HGB BLD-MCNC: 7.8 G/DL (ref 13.3–17.7)
HGB BLD-MCNC: 8 G/DL (ref 13.3–17.7)
HGB BLD-MCNC: 8.1 G/DL (ref 13.3–17.7)
LYMPHOCYTES # BLD MANUAL: 0.1 10E3/UL (ref 0.8–5.3)
LYMPHOCYTES NFR BLD MANUAL: 1 %
MAGNESIUM SERPL-MCNC: 2.1 MG/DL (ref 1.6–2.3)
MCH RBC QN AUTO: 28.8 PG (ref 26.5–33)
MCHC RBC AUTO-ENTMCNC: 31.4 G/DL (ref 31.5–36.5)
MCV RBC AUTO: 92 FL (ref 78–100)
METAMYELOCYTES # BLD MANUAL: 0.1 10E3/UL
METAMYELOCYTES NFR BLD MANUAL: 1 %
MONOCYTES # BLD MANUAL: 0.4 10E3/UL (ref 0–1.3)
MONOCYTES NFR BLD MANUAL: 3 %
NEUTROPHILS # BLD MANUAL: 11.1 10E3/UL (ref 1.6–8.3)
NEUTROPHILS NFR BLD MANUAL: 95 %
PATH REPORT.COMMENTS IMP SPEC: NORMAL
PATH REPORT.COMMENTS IMP SPEC: NORMAL
PATH REPORT.FINAL DX SPEC: NORMAL
PATH REPORT.GROSS SPEC: NORMAL
PATH REPORT.MICROSCOPIC SPEC OTHER STN: NORMAL
PHOSPHATE SERPL-MCNC: 4.1 MG/DL (ref 2.5–4.5)
PHOTO IMAGE: NORMAL
PLAT MORPH BLD: ABNORMAL
PLATELET # BLD AUTO: 126 10E3/UL (ref 150–450)
POTASSIUM BLD-SCNC: 4.4 MMOL/L (ref 3.4–5.3)
RBC # BLD AUTO: 2.81 10E6/UL (ref 4.4–5.9)
RBC MORPH BLD: ABNORMAL
SODIUM SERPL-SCNC: 133 MMOL/L (ref 133–144)
TACROLIMUS BLD-MCNC: 10.1 UG/L (ref 5–15)
TME LAST DOSE: NORMAL H
TME LAST DOSE: NORMAL H
UFH PPP CHRO-ACNC: <0.1 IU/ML
WBC # BLD AUTO: 11.7 10E3/UL (ref 4–11)

## 2021-10-29 PROCEDURE — 120N000011 HC R&B TRANSPLANT UMMC

## 2021-10-29 PROCEDURE — 250N000012 HC RX MED GY IP 250 OP 636 PS 637: Performed by: NURSE PRACTITIONER

## 2021-10-29 PROCEDURE — A9562 TC99M MERTIATIDE: HCPCS | Performed by: SURGERY

## 2021-10-29 PROCEDURE — 83735 ASSAY OF MAGNESIUM: CPT | Performed by: SURGERY

## 2021-10-29 PROCEDURE — 85018 HEMOGLOBIN: CPT

## 2021-10-29 PROCEDURE — 258N000003 HC RX IP 258 OP 636: Performed by: NURSE PRACTITIONER

## 2021-10-29 PROCEDURE — 78707 K FLOW/FUNCT IMAGE W/O DRUG: CPT | Mod: 26 | Performed by: RADIOLOGY

## 2021-10-29 PROCEDURE — 36415 COLL VENOUS BLD VENIPUNCTURE: CPT | Performed by: SURGERY

## 2021-10-29 PROCEDURE — 250N000012 HC RX MED GY IP 250 OP 636 PS 637: Performed by: SURGERY

## 2021-10-29 PROCEDURE — 78707 K FLOW/FUNCT IMAGE W/O DRUG: CPT

## 2021-10-29 PROCEDURE — 85520 HEPARIN ASSAY: CPT | Performed by: NURSE PRACTITIONER

## 2021-10-29 PROCEDURE — 80048 BASIC METABOLIC PNL TOTAL CA: CPT | Performed by: SURGERY

## 2021-10-29 PROCEDURE — 85520 HEPARIN ASSAY: CPT

## 2021-10-29 PROCEDURE — 250N000013 HC RX MED GY IP 250 OP 250 PS 637: Performed by: NURSE PRACTITIONER

## 2021-10-29 PROCEDURE — 76776 US EXAM K TRANSPL W/DOPPLER: CPT

## 2021-10-29 PROCEDURE — 36415 COLL VENOUS BLD VENIPUNCTURE: CPT | Performed by: NURSE PRACTITIONER

## 2021-10-29 PROCEDURE — 80197 ASSAY OF TACROLIMUS: CPT | Performed by: SURGERY

## 2021-10-29 PROCEDURE — 250N000011 HC RX IP 250 OP 636: Performed by: SURGERY

## 2021-10-29 PROCEDURE — 85520 HEPARIN ASSAY: CPT | Performed by: SURGERY

## 2021-10-29 PROCEDURE — 85018 HEMOGLOBIN: CPT | Performed by: SURGERY

## 2021-10-29 PROCEDURE — 343N000001 HC RX 343: Performed by: SURGERY

## 2021-10-29 PROCEDURE — 250N000011 HC RX IP 250 OP 636

## 2021-10-29 PROCEDURE — 84100 ASSAY OF PHOSPHORUS: CPT | Performed by: SURGERY

## 2021-10-29 PROCEDURE — 76776 US EXAM K TRANSPL W/DOPPLER: CPT | Mod: 26 | Performed by: RADIOLOGY

## 2021-10-29 PROCEDURE — 250N000012 HC RX MED GY IP 250 OP 636 PS 637: Performed by: PHYSICIAN ASSISTANT

## 2021-10-29 PROCEDURE — 36415 COLL VENOUS BLD VENIPUNCTURE: CPT

## 2021-10-29 PROCEDURE — 99233 SBSQ HOSP IP/OBS HIGH 50: CPT | Mod: GC | Performed by: INTERNAL MEDICINE

## 2021-10-29 PROCEDURE — 250N000013 HC RX MED GY IP 250 OP 250 PS 637: Performed by: SURGERY

## 2021-10-29 RX ORDER — LIDOCAINE 4 G/G
1-2 PATCH TOPICAL
Status: DISCONTINUED | OUTPATIENT
Start: 2021-10-29 | End: 2021-11-05

## 2021-10-29 RX ORDER — TACROLIMUS 1 MG/1
4 CAPSULE ORAL
Status: DISCONTINUED | OUTPATIENT
Start: 2021-10-29 | End: 2021-10-30

## 2021-10-29 RX ORDER — BISACODYL 10 MG
10 SUPPOSITORY, RECTAL RECTAL ONCE
Status: DISCONTINUED | OUTPATIENT
Start: 2021-10-29 | End: 2021-10-30 | Stop reason: CLARIF

## 2021-10-29 RX ADMIN — DOCUSATE SODIUM 50 MG AND SENNOSIDES 8.6 MG 1 TABLET: 8.6; 5 TABLET, FILM COATED ORAL at 08:16

## 2021-10-29 RX ADMIN — Medication 400 MG: at 11:56

## 2021-10-29 RX ADMIN — CITALOPRAM HYDROBROMIDE 20 MG: 20 TABLET ORAL at 08:16

## 2021-10-29 RX ADMIN — ACETAMINOPHEN 975 MG: 325 TABLET, FILM COATED ORAL at 06:18

## 2021-10-29 RX ADMIN — OXYCODONE HYDROCHLORIDE 10 MG: 10 TABLET ORAL at 06:19

## 2021-10-29 RX ADMIN — ATORVASTATIN CALCIUM 10 MG: 10 TABLET, FILM COATED ORAL at 08:16

## 2021-10-29 RX ADMIN — LIDOCAINE 2 PATCH: 560 PATCH PERCUTANEOUS; TOPICAL; TRANSDERMAL at 08:16

## 2021-10-29 RX ADMIN — MYCOPHENOLATE MOFETIL 750 MG: 250 CAPSULE ORAL at 18:14

## 2021-10-29 RX ADMIN — TECHNESCAN TC 99M MERTIATIDE 9.9 MCI.: 1 INJECTION, POWDER, LYOPHILIZED, FOR SOLUTION INTRAVENOUS at 10:21

## 2021-10-29 RX ADMIN — HEPARIN SODIUM 1100 UNITS/HR: 10000 INJECTION, SOLUTION INTRAVENOUS at 12:55

## 2021-10-29 RX ADMIN — ONDANSETRON 4 MG: 4 TABLET, ORALLY DISINTEGRATING ORAL at 13:48

## 2021-10-29 RX ADMIN — OXYCODONE HYDROCHLORIDE 5 MG: 5 TABLET ORAL at 12:00

## 2021-10-29 RX ADMIN — MYCOPHENOLATE MOFETIL 750 MG: 250 CAPSULE ORAL at 08:16

## 2021-10-29 RX ADMIN — TACROLIMUS 5 MG: 5 CAPSULE ORAL at 08:16

## 2021-10-29 RX ADMIN — SODIUM CHLORIDE, POTASSIUM CHLORIDE, SODIUM LACTATE AND CALCIUM CHLORIDE: 600; 310; 30; 20 INJECTION, SOLUTION INTRAVENOUS at 07:39

## 2021-10-29 RX ADMIN — ACETAMINOPHEN 975 MG: 325 TABLET, FILM COATED ORAL at 13:48

## 2021-10-29 RX ADMIN — OXYCODONE HYDROCHLORIDE 5 MG: 5 TABLET ORAL at 23:01

## 2021-10-29 RX ADMIN — DOCUSATE SODIUM 50 MG AND SENNOSIDES 8.6 MG 1 TABLET: 8.6; 5 TABLET, FILM COATED ORAL at 21:09

## 2021-10-29 RX ADMIN — ACETAMINOPHEN 975 MG: 325 TABLET, FILM COATED ORAL at 23:01

## 2021-10-29 RX ADMIN — SODIUM CHLORIDE, POTASSIUM CHLORIDE, SODIUM LACTATE AND CALCIUM CHLORIDE: 600; 310; 30; 20 INJECTION, SOLUTION INTRAVENOUS at 18:23

## 2021-10-29 RX ADMIN — TACROLIMUS 4 MG: 1 CAPSULE ORAL at 18:14

## 2021-10-29 RX ADMIN — OXYCODONE HYDROCHLORIDE 10 MG: 10 TABLET ORAL at 00:08

## 2021-10-29 RX ADMIN — OXYCODONE HYDROCHLORIDE 5 MG: 5 TABLET ORAL at 18:23

## 2021-10-29 ASSESSMENT — ACTIVITIES OF DAILY LIVING (ADL)
ADLS_ACUITY_SCORE: 7

## 2021-10-29 NOTE — PROVIDER NOTIFICATION
Time of notification: 8:48PM  Provider notified: Surg Cross cover  Patient status: hgb 6.8  Temp:  [98.2  F (36.8  C)-99.1  F (37.3  C)] 98.8  F (37.1  C)  Pulse:  [77-96] 87  Resp:  [14-20] 16  BP: ()/(54-86) 111/73  SpO2:  [91 %-100 %] 91 %  Orders received: transfuse 1u PRBC

## 2021-10-29 NOTE — PROGRESS NOTES
[facility]   Transplant Nephrology Progress Note  Date of Admission:  10/27/2021  Today's Date: 10/29/2021    Recommendations:  - Agree with renogram   - maintain SBP>100  - Serial Hgb monitoring  -check ionized Ca, albumin, start oscal 1 tab po bid  - Follow tacrolimus level. Run goal closer to 8 than 10 given expected slow graft function, h/o hyperkalemia    Assessment & Plan   # DDKT: Trend up. Pre-emptive DCD kidney with prolonged CIT-slow graft function, course also complicated by renal vein thrombosis (see below)              - Baseline Creatinine: ~ TBD              - Proteinuria: Not checked post transplant              - Date DSA Last Checked: Jul/2018      Latest DSA: No              - BK Viremia: No              - Kidney Tx Biopsy: No      ACCESS - LUE fistula is clotted     # Transplant vein thrombosis, s/p thrombectomy, explant and re-implantation 10/27  Exploration LLQ kidney transplant, back table flush, thrombectomy of renal vein, thrombectomy of iliac vein, reimplantation of kidney transplant, with ureteral stent removal and placement of a new ureteral stent  On heparin with close monitoring of hemodynamic status     # Acute bloss anemia  Related to above, continue to check serial Hgb     # Immunosuppression: Tacrolimus immediate release (goal 8-10) and Mycophenolate mofetil (dose 750 mg every 12 hours)              - Induction with Recent Transplant:  PRA 67.  Received high induction per DDF protocol due to prolonged cold ischemic time, DCD kidney              - Changes: No     # Infection Prophylaxis:   - PJP: Sulfa/TMP (Bactrim)  - CMV: discordant Valganciclovir (Valcyte) x6 months     # Hypertension: Hypotensive;           Goal BP: < 150/90              - Volume status: Hypovolemic              - Changes: No, continue resuscitation     # Anemia in Chronic Renal Disease: Hgb: Stable      NADIYA: No              - Iron studies: Low iron saturation     #  Mineral Bone Disorder:   - Secondary renal hyperparathyroidism; PTH level: Not checked recently        On treatment: None  - Vitamin D; level: Not checked recently        On supplement: No  - Calcium; level: low      On supplement: No check iCa & albumin & replete if needed  - Phosphorus; level: Normal        On supplement: No     # Electrolytes:   - Potassium; level: Normal        On supplement: No  - Magnesium; level: Normal        On supplement: Yes  - Bicarbonate; level: Normal        On supplement: No  - Sodium; level: Normal     # Transplant History:  Etiology of Kidney Failure: Polycystic kidney disease (PKD)  Tx: DDKT  Transplant: 10/23/2021 (Kidney), 10/13/2015 (Kidney)  Donor Type: Donation after Circulatory Death  Donor Class:   Crossmatch at time of Tx: negative  DSA at time of Tx: No  Significant changes in immunosuppression: None  CMV IgG Ab High Risk Discordance (D+/R-): No  EBV IgG Ab High Risk Discordance (D+/R-): No  Significant transplant-related complications: None     Recommendations were communicated to the primary team verbally.     Seen and discussed with Dr. Kirill Bailey MD  Pager: [phone]    Interval History   No acute events noted. BP and UOP improved. Transfused again this morning    Review of Systems   4 point ROS was obtained and negative except as noted in the Interval History.    MEDICATIONS:    acetaminophen  975 mg Oral Q8H     atorvastatin  10 mg Oral Daily     bisacodyl  10 mg Rectal Once     citalopram  20 mg Oral Daily     lidocaine  1-2 patch Transdermal Q24H     lidocaine   Transdermal Q8H     magnesium oxide  400 mg Oral Daily with lunch     mycophenolate  750 mg Oral BID IS     polyethylene glycol  17 g Oral Daily     senna-docusate  1 tablet Oral BID     sulfamethoxazole-trimethoprim  1 tablet Oral Once per day on Tue Thu Sat     tacrolimus  4 mg Oral BID IS     valGANciclovir  450 mg Oral Once per day on Mon Thu       heparin 1,100 Units/hr (10/29/21 1255)      lactated ringers 75 mL/hr at 10/29/21 1200       Physical Exam   Temp  Av.4  F (36.9  C)  Min: 95  F (35  C)  Max: 101.5  F (38.6  C)      Pulse  Av.8  Min: 54  Max: 108 Resp  Avg: 15.9  Min: 12  Max: 22  SpO2  Av.7 %  Min: 90 %  Max: 100 %     /66 (BP Location: Right arm)   Pulse 78   Temp 98  F (36.7  C) (Oral)   Resp 20   Wt 97 kg (213 lb 13.5 oz)   SpO2 95%   BMI 30.68 kg/m     Date 10/29/21 0700 - 10/30/21 0659   Shift 0501-7894 7245-0204 4121-8884 24 Hour Total   INTAKE   P.O. 400   400   I.V. 958.35   958.35   Shift Total(mL/kg) 1358.35(14)   1358.35(14)   OUTPUT   Urine 285   285   Drains 30   30   Shift Total(mL/kg) 315(3.25)   315(3.25)   Weight (kg) 97 97 97 97     Admit Weight: 93.5 kg (206 lb 2.1 oz)    GENERAL APPEARANCE: alert and no distress  HENT: mouth without ulcers or lesions  RESP: breathing non-labored  CV: regular rhythm, normal rate  EDEMA: no LE edema bilaterally  ABDOMEN: soft, nondistended  MS: extremities normal - no gross deformities noted  SKIN: no rash on exposed surfaces    Data   All labs reviewed by me.  CMP  Recent Labs   Lab 10/29/21  0604 10/28/21  1631 10/28/21  0921 10/28/21  0523 10/28/21  0443 10/28/21  0350 10/28/21  0321 10/27/21  2336 10/27/21  1815 10/27/21  0621 10/27/21  0621 10/23/21  1500 10/23/21  0358     --   --   --  138  --   --   --  138  --  139   < > 141   POTASSIUM 4.4 4.3 4.2  --  4.2  4.2  --    < >   < > 4.8   < > 4.3   < > 4.5   CHLORIDE 104  --   --   --  106  --   --   --  106  --  107   < > 108   CO2 22  --   --   --  22  --   --   --  23  --  24   < > 26   ANIONGAP 7  --   --   --  10  --   --   --  9  --  8   < > 7   *  --   --  178* 211* 140*  --   --  132*   < > 145*   < > 92   BUN 82*  --   --   --  80*  --   --   --  74*  --  80*   < > 60*   CR 5.73*  --   --   --  4.98*  --   --   --  4.33*  --  4.35*   < > 3.90*   GFRESTIMATED 10*  --   --   --  12*  --   --   --  15*  --  15*   < > 17*   LILIANA 7.3*   --   --   --  7.3*  --   --   --  7.7*  --  8.2*   < > 8.5   MAG 2.1  --   --   --  1.9  --   --   --  1.9  --  1.9   < >  --    PHOS 4.1  --   --   --  4.6*  --   --   --  5.0*  --  2.2*   < >  --    PROTTOTAL  --   --   --   --   --   --   --   --   --   --   --   --  6.5*   ALBUMIN  --   --   --   --   --   --   --   --   --   --   --   --  3.6   BILITOTAL  --   --   --   --   --   --   --   --   --   --   --   --  0.6   ALKPHOS  --   --   --   --   --   --   --   --   --   --   --   --  56   AST  --   --   --   --   --   --   --   --   --   --   --   --  23   ALT  --   --   --   --   --   --   --   --   --   --   --   --  24    < > = values in this interval not displayed.     CBC  Recent Labs   Lab 10/29/21  1132 10/29/21  0604 10/29/21  0351 10/29/21  0013 10/28/21  1104 10/28/21  0443 10/27/21  2153 10/27/21  1815 10/27/21  0621 10/27/21  0621   HGB 8.0* 8.1* 7.4* 7.8*   < > 7.4*   < > 7.6*   < > 11.2*   WBC  --  11.7*  --   --   --  8.2  --  9.3  --  6.9   RBC  --  2.81*  --   --   --  2.59*  --  2.74*  --  4.05*   HCT  --  25.8*  --   --   --  23.2*  --  24.5*  --  35.3*   MCV  --  92  --   --   --  90  --  89  --  87   MCH  --  28.8  --   --   --  28.6  --  27.7  --  27.7   MCHC  --  31.4*  --   --   --  31.9  --  31.0*  --  31.7   RDW  --  14.2  --   --   --  13.8  --  13.1  --  12.3   PLT  --  126*  --   --   --  103*  --  139*  --  202    < > = values in this interval not displayed.     INR  Recent Labs   Lab 10/27/21  2153 10/23/21  0358   INR 1.43* 1.07   * 28     ABG  Recent Labs   Lab 10/23/21  1647 10/23/21  1500   O2PER 42.0 33.0      Urine Studies  Recent Labs   Lab Test 10/23/21  0526 11/09/15  1300 10/28/15  0702 10/17/15  0500   COLOR Straw Yellow Yellow Light Yellow   APPEARANCE Clear Clear Clear Clear   URINEGLC Negative Negative Negative Negative   URINEBILI Negative Negative Negative Negative   URINEKETONE Negative Negative Negative Negative   SG 1.009 1.008 1.008 1.008   UBLD  Negative Negative Negative Large*   URINEPH 7.0 5.5 6.5 5.5   PROTEIN 50 * 10* 10* 30*   NITRITE Negative Negative Negative Negative   LEUKEST Negative Negative Negative Negative   RBCU 0 <1 1 >182*   WBCU 1 1 1 3*     Recent Labs   Lab Test 10/23/21  0526 06/05/17  1253 01/25/16  1602 10/28/15  0702 10/13/15  1035   UTPG 1.37* 1.30* 0.36* 0.45* 0.55*     PTH  Recent Labs   Lab Test 10/27/21  0621 08/01/16  1638 01/25/16  1601 10/15/15  0520   PTHI 149* 90* 121* 434*     Iron Studies  Recent Labs   Lab Test 10/27/21  0621 10/17/15  0455   IRON 19* 13*   * 241   IRONSAT 10* 5*   TERRANCE 597*  --        IMAGING:  All imaging studies reviewed by me.    This patient has been seen and evaluated by me, Alondra Roy MD.  I have reviewed the note and agree with plan of care as documented by the fellow.  Alondra Roy MD

## 2021-10-29 NOTE — PROGRESS NOTES
Transplant Surgery  Inpatient Daily Progress Note  10/29/2021    Assessment & Plan: Raj Pierce is a 50 year old male  with a PMH significant for ESRD 2/2 PCKD s/p kidney transplant () c/b poor function (small graft). Other PMH includes HTN, hyperparathyroidism, and SILVIA (with cpap). Now status post pre-emptive  donor (DCD) kidney re-transplant on 10/23. Discharged 10/26 POD 3. Readmitted 10/27 for emergent exploratory laparotomy due to renal vein thrombosis.    S/p exploratory left lower quadrant of kidney transplant, back table flush, thrombectomy of renal vein, thrombectomy of iliac vein, reimplantation of kidney transplant, with ureteral stent removal and placement of a new ureteral stent, intraoperative ultrasound, intraoperative of transit time flow measurment. POD 2.    Graft function: uncertain. Cr 5.73 this AM <-4.3 prior to OR<-3.4 POD 3. UOP improving slightly now at 50 ml/hr, urine lightly serosanguinous in color, no clots present. US continues to show postoperative fluid collections, although small and stable. Continues to have high resistance waveform in the renal and arcuate arteries, although decreased in velocity today. Renogram shows poor excretion of radiotracer, potential ATN vs vasomotor nephropathy. NATHEN drain in place with a small amount of sanguinous fluid in bulb, 230 ml output past day, total of 80 ml today. Continue to strip drain PRN. Repeat renal US tomorrow.     Immunosuppression management: High risk protocol due to DGF    mg BID  Tac 4 mg BID. Goal 8-10. 10/29 10.1. Continue 5 mg BID.    Complexity of management:High. Contributing factors: anemia, thrombocytopenia     Hematology:   Acute blood loss, chronic anemia: Received 2 units RBC 10/27. Hgb 6.7 (7.4). Gave 1 unit RBCs yesterday. Another low hgb last night (6.8) and transfused 1 unit RBCs early this morning. Repeat Hgb this afternoon 7.4. Will continue to monitor Hgb q4h.   Anticoagulation: Renal vein  thrombosis, thrombectomy of iliac vein, reimplantation of kidney transplant. Heparin gtt straight rate increased to 1100 ml/hr (700 ml/hr) due to level < 0.10. Check anti 10A q4h. Rate adjustment per surgeon.     Cardiorespiratory: Stable on RA. Encourage frequent IS.  Hypotension, secondary to bleeding, third spacin unit RBCs, LR 1L, albumin 500 ml, RBC 1 unit, MIV increased 150 ml/hr. Improved with blood transfusions, fluid replacement. MIV fluids decreased to 75 ml/hr.      GI/Nutrition: JIGAR - continue low K diet. Potassium level wnl at this time.     Endocrine: Stress hyperglycemia. BG up and down, currently 118. Monitor. Remove dextrose in MIV.     Fluid/Electrolytes: MIVF: LR 75/hr. Electrolytes acceptable.     : Wade to remain due to new surgical anastomosis. Stent in place, remove in 4 weeks.     Infectious disease: Febrile on 10/28. BC x 2, NGTD. Afebrile today with Tmax 99.1.     Prophylaxis: DVT (heparin), valcyte x 6 months (D+/R-), bactrim  Disposition: 6B     Medical Decision Making: High  Subsequent visit 14480 (high level decision making)    Resident Provider: Nette Pardo MD    Faculty: George Zhu MD    _________________________________________________________________  Transplant History: Admitted 10/27/2021 for renal vein thrombosis.  10/23/2021 (Kidney), 10/13/2015 (Kidney), Postoperative day:  (Kidney), 6 (Kidney)     Interval History: History is obtained from the patient  Overnight events: Cross cover surgery resident notified for hgb of 6.8. --> transfused 1 unit pRBCs. Patient reports feeling well today and repeat hgb improved to 7.4. No fever/chills or pain. Has tolerated PO intake include approximately 5 glasses of water. Passing flatus and one small bowel movement.     ROS:   A 10-point review of systems was negative except as noted above.    Meds:    acetaminophen  975 mg Oral Q8H     atorvastatin  10 mg Oral Daily     bisacodyl  10 mg Rectal Once     citalopram  20  mg Oral Daily     lidocaine  1-2 patch Transdermal Q24H     lidocaine   Transdermal Q8H     magnesium oxide  400 mg Oral Daily with lunch     mycophenolate  750 mg Oral BID IS     polyethylene glycol  17 g Oral Daily     senna-docusate  1 tablet Oral BID     sulfamethoxazole-trimethoprim  1 tablet Oral Once per day on Tue Thu Sat     tacrolimus  5 mg Oral BID IS     valGANciclovir  450 mg Oral Once per day on Mon Thu     Physical Exam:   General Appearance: lying in bed comfortably, NAD, non-toxic  Skin: pale in color, similar to prior  Heart: regular rate and rhythm  Lungs: normal rate and work of breathing  Abdomen: soft, minimal tenderness in LLQ around incision and drain otherwise non-tender, non-distended. Incision C/D/I and healing well with staples in place. NATHEN drain in LLQ draining a small amount of sanguinous fluid.   : spears in place with small amount of light serosanguinous fluid in bag, no clots present.  Extremities: warm and well perfused, palpable radial and PT pulses.   Neurologic: alert and oriented, no gross deficits.   Lines/Tubes: L forearm AV fistula, PIV x2, Spears, LLQ NATHEN drain.    Current vitals:   /70 (BP Location: Right arm)   Pulse 80   Temp 98.4  F (36.9  C) (Oral)   Resp 16   Wt 97 kg (213 lb 13.5 oz)   SpO2 95%   BMI 30.68 kg/m      Vital sign ranges:    Temp:  [98.2  F (36.8  C)-99.1  F (37.3  C)] 98.4  F (36.9  C)  Pulse:  [73-96] 80  Resp:  [16-18] 16  BP: ()/(54-86) 103/70  SpO2:  [91 %-99 %] 95 %  Patient Vitals for the past 24 hrs:   BP Temp Temp src Pulse Resp SpO2 Weight   10/29/21 0909 103/70 -- -- 80 -- 95 % --   10/29/21 0752 99/70 98.4  F (36.9  C) Oral 73 16 94 % --   10/29/21 0600 -- -- -- -- -- -- 97 kg (213 lb 13.5 oz)   10/29/21 0400 107/73 98.6  F (37  C) Oral 83 16 93 % --   10/29/21 0300 105/71 -- -- 81 -- -- --   10/29/21 0100 111/73 -- -- 87 -- -- --   10/29/21 0000 110/76 98.8  F (37.1  C) Oral 83 16 91 % --   10/28/21 2300 104/72 -- -- 85 -- --  --   10/28/21 2204 111/70 -- -- 85 -- -- --   10/28/21 2149 111/68 -- -- 84 16 -- --   10/28/21 2138 110/72 98.9  F (37.2  C) -- 83 16 -- --   10/28/21 2100 107/66 -- -- 79 -- -- --   10/28/21 2000 101/64 -- -- 79 -- -- --   10/28/21 1909 102/62 98.5  F (36.9  C) Oral 83 16 99 % --   10/28/21 1800 96/59 -- -- 88 -- -- --   10/28/21 1720 -- -- -- -- -- -- 93.5 kg (206 lb 2.1 oz)   10/28/21 1600 99/63 -- -- 93 -- -- --   10/28/21 1528 103/61 99.1  F (37.3  C) Oral 91 18 97 % --   10/28/21 1500 101/62 -- -- 93 -- -- --   10/28/21 1431 97/63 98.8  F (37.1  C) Oral 93 16 94 % --   10/28/21 1404 90/54 -- -- 96 -- -- --   10/28/21 1400 (!) 87/55 98.7  F (37.1  C) -- 90 -- -- --   10/28/21 1300 112/67 98.2  F (36.8  C) -- 89 -- 98 % --   10/28/21 1215 102/61 98.5  F (36.9  C) Oral 86 -- -- --   10/28/21 1200 109/59 98.5  F (36.9  C) Oral 80 18 98 % --   10/28/21 1110 -- 98.5  F (36.9  C) Oral -- 18 -- --   10/28/21 1100 95/86 -- -- 90 -- 96 % --     Data:   [facility]  Recent Labs   Lab 10/29/21  0604 10/28/21  1631 10/28/21  0921 10/28/21  0523 10/28/21  0443 10/28/21  0350 10/23/21  2002 10/23/21  1647 10/23/21  1500 10/23/21  1500 10/23/21  0358     --   --   --  138  --    < > 140   < > 143 141   POTASSIUM 4.4 4.3   < >  --  4.2  4.2  --    < > 4.6   < > 4.4 4.5   CHLORIDE 104  --   --   --  106  --    < >  --   --   --  108   CO2 22  --   --   --  22  --    < >  --   --   --  26   *  --   --  178* 211*   < >   < > 174*   < > 94 92   BUN 82*  --   --   --  80*  --    < >  --   --   --  60*   CR 5.73*  --   --   --  4.98*  --    < >  --   --   --  3.90*   GFRESTIMATED 10*  --   --   --  12*  --    < >  --   --   --  17*   LILIANA 7.3*  --   --   --  7.3*  --    < >  --   --   --  8.5   ICAW  --   --   --   --   --   --   --  4.6  --  4.6  --    MAG 2.1  --   --   --  1.9  --    < >  --   --   --   --    PHOS 4.1  --   --   --  4.6*  --    < >  --   --   --   --    ALBUMIN  --   --   --   --   --   --   --   --   --    --  3.6   BILITOTAL  --   --   --   --   --   --   --   --   --   --  0.6   ALKPHOS  --   --   --   --   --   --   --   --   --   --  56   AST  --   --   --   --   --   --   --   --   --   --  23   ALT  --   --   --   --   --   --   --   --   --   --  24    < > = values in this interval not displayed.     CBC  Recent Labs   Lab 10/29/21  0604 10/29/21  0351 10/28/21  1104 10/28/21  0443 10/23/21  1500 10/23/21  0358   HGB 8.1* 7.4*   < > 7.4*   < > 11.8*   WBC 11.7*  --   --  8.2   < > 6.7   *  --   --  103*   < > 264   A1C  --   --   --   --   --  5.6    < > = values in this interval not displayed.     COAGS  Recent Labs   Lab 10/27/21  2153 10/23/21  0358   INR 1.43* 1.07   * 28      Urinalysis  Recent Labs   Lab Test 10/23/21  0526 06/05/17  1253 01/25/16  1602 11/09/15  1300   COLOR Straw  --   --  Yellow   APPEARANCE Clear  --   --  Clear   URINEGLC Negative  --   --  Negative   URINEBILI Negative  --   --  Negative   URINEKETONE Negative  --   --  Negative   SG 1.009  --   --  1.008   UBLD Negative  --   --  Negative   URINEPH 7.0  --   --  5.5   PROTEIN 50 *  --   --  10*   NITRITE Negative  --   --  Negative   LEUKEST Negative  --   --  Negative   RBCU 0  --   --  <1   WBCU 1  --   --  1   UTPG 1.37* 1.30*   < >  --     < > = values in this interval not displayed.     Virology:  CMV DNA Quantitation Specimen   Date Value Ref Range Status   08/01/2016   Corrected    Plasma  CORRECTED ON 08/02 AT 1044: PREVIOUSLY REPORTED AS Blood       CMV IgG Antibody   Date Value Ref Range Status   07/10/2013 <0.20  Negative for anti-CMV IgG U/mL Final     EBV VCA IgG Antibody   Date Value Ref Range Status   07/10/2013 >750.00  Positive, suggests immunologic exposure. U/mL Final     Hepatitis C Antibody   Date Value Ref Range Status   10/23/2021 Nonreactive Nonreactive Final   04/25/2016  NR Final    Nonreactive   Assay performance characteristics have not been established for newborns,   infants, and  children       Hep B Surface Loni   Date Value Ref Range Status   07/10/2013 0.0  Final     Comment:     Negative, No antibody detected when the value is less than 5.0 mlU/mL.     BK Virus Result   Date Value Ref Range Status   06/05/2017 BK Virus DNA Not Detected BKNEG copies/mL Final   10/28/2015 BK Virus DNA Not Detected BKNEG copies/mL Final     BK Virus DNA copies/mL   Date Value Ref Range Status   10/23/2021 Not Detected Not Detected copies/mL Final     Discussed with the fellow and attending, who agree with my assessment and plan.     Nette Pardo  PGY1 N Surgery  10/29/2021

## 2021-10-29 NOTE — PLAN OF CARE
Transfer  Transferred to: 7A  Via: wheelchair  Reason for transfer:Pt no longer appropriate for 6B- improved patient condition  Family: Aware of transfer  Belongings: Packed and sent with pt  Chart: Delivered with pt to next unit  Medications: Meds sent to new unit with pt  Report given to: WADE RN  Pt status:   Neuro: A&Ox4.   Cardiac: SR. VSS. B/P: 100/70, T: 98, P: 87, R: 18  Respiratory: O2 sats stable on RA.  GI/: Wade with ~50ml/h urine output. Urine donato/pink colored. Passing gas, small BM this shift  Diet/appetite: Tolerating regular/ 2g K diet.  Activity:  Assist of 1. Up to chair. Ambulate in halls.  Pain: At acceptable level on current regimen. PRN oxycodone given x1  Skin: No new deficits noted. Dressing CDI  LDA's: PIV x2, NATHEN, Wade    LR @ 75ml/h  Heparin gtt @ 1100 units/h - MD managed.    Plan: Continue with POC. Notify primary team with changes.

## 2021-10-29 NOTE — PLAN OF CARE
/71   Pulse 81   Temp 98.8  F (37.1  C) (Oral)   Resp 16   Wt 93.5 kg (206 lb 2.1 oz)   SpO2 91%   BMI 29.58 kg/m      Hours of Care: 8809-4908    Neuro: A&Ox4. Pleasant and cooperative.  Cardiac: SR 70-80  Respiratory:  Sating 98% on RA, 2L HS  GI/: voiding via spears, 10-75mL/hr, no BM, passing gas.   Diet/appetite:  Regular diet orders, minimal appetite.   Activity:  Assist of 1. Up to chair and bathroom.   Pain: Using tylenol and oxycodone for pain relief  Skin: No new deficits yet.  LDA's: PIX x2, infusing heparin, LR.     Plan: 1u PRBC, 750mL albumin infused. Stable K. Urine output increasing throughout shift.  Nursing will continue to follow the POC and update the MD team with concerns.    Johanna Burns RN  6B Intermediate Care     Unique Flap 1 Text: A SMAS flap was marked with reference to the zygomatic arch. The flap boundary was incised and the flap was raised over the parotid-masseteric fascia,being careful to avoid injury to the facial nerve and the parotid gland. A leaf of this flap was transposed to the mastoid region for suspension of the neck. The edges were then approximated with 2-0 PDS suture.

## 2021-10-29 NOTE — PLAN OF CARE
/79 (BP Location: Right arm)   Pulse 83   Temp 98.8  F (37.1  C) (Oral)   Resp 18   Wt 97 kg (213 lb 13.5 oz)   SpO2 98%   BMI 30.68 kg/m       4721-0500  Patient transferred from  at 1800. Left abdominal incision; stapled- clean dry and intact. Left NATHEN to bulb suction; 15mL serosanguinous output. Adequate urine output; continues to have spears in- urine remains donato in color. Team is aware. LR at 75 ml/hr into right PIV. PIV infusing with straight rate 1100 units/hr. Left arm fistula. Up with SB. PRN oxy given 1x this evening. Denies nausea. 2g K+ diet. Continue plan of care, please notify MD with any changes.

## 2021-10-30 ENCOUNTER — APPOINTMENT (OUTPATIENT)
Dept: PHYSICAL THERAPY | Facility: CLINIC | Age: 51
End: 2021-10-30
Attending: SURGERY
Payer: COMMERCIAL

## 2021-10-30 ENCOUNTER — APPOINTMENT (OUTPATIENT)
Dept: ULTRASOUND IMAGING | Facility: CLINIC | Age: 51
End: 2021-10-30
Payer: COMMERCIAL

## 2021-10-30 LAB
ANION GAP SERPL CALCULATED.3IONS-SCNC: 11 MMOL/L (ref 3–14)
BASOPHILS # BLD MANUAL: 0 10E3/UL (ref 0–0.2)
BASOPHILS NFR BLD MANUAL: 0 %
BUN SERPL-MCNC: 83 MG/DL (ref 7–30)
CALCIUM SERPL-MCNC: 7.3 MG/DL (ref 8.5–10.1)
CHLORIDE BLD-SCNC: 105 MMOL/L (ref 94–109)
CO2 SERPL-SCNC: 18 MMOL/L (ref 20–32)
CREAT FLD-MCNC: NORMAL MG/DL
CREAT FLD-MCNC: NORMAL MG/DL
CREAT SERPL-MCNC: 5.69 MG/DL (ref 0.66–1.25)
EOSINOPHIL # BLD MANUAL: 0.3 10E3/UL (ref 0–0.7)
EOSINOPHIL NFR BLD MANUAL: 3 %
ERYTHROCYTE [DISTWIDTH] IN BLOOD BY AUTOMATED COUNT: 14.5 % (ref 10–15)
GFR SERPL CREATININE-BSD FRML MDRD: 11 ML/MIN/1.73M2
GLUCOSE BLD-MCNC: 100 MG/DL (ref 70–99)
HCT VFR BLD AUTO: 23.6 % (ref 40–53)
HGB BLD-MCNC: 7.2 G/DL (ref 13.3–17.7)
HGB BLD-MCNC: 7.4 G/DL (ref 13.3–17.7)
HGB BLD-MCNC: 7.4 G/DL (ref 13.3–17.7)
HGB BLD-MCNC: 7.8 G/DL (ref 13.3–17.7)
INR PPP: 1.03 (ref 0.85–1.15)
LYMPHOCYTES # BLD MANUAL: 0.4 10E3/UL (ref 0.8–5.3)
LYMPHOCYTES NFR BLD MANUAL: 4 %
MAGNESIUM SERPL-MCNC: 2.3 MG/DL (ref 1.6–2.3)
MCH RBC QN AUTO: 29.2 PG (ref 26.5–33)
MCHC RBC AUTO-ENTMCNC: 31.4 G/DL (ref 31.5–36.5)
MCV RBC AUTO: 93 FL (ref 78–100)
MONOCYTES # BLD MANUAL: 0.7 10E3/UL (ref 0–1.3)
MONOCYTES NFR BLD MANUAL: 7 %
NEUTROPHILS # BLD MANUAL: 8.9 10E3/UL (ref 1.6–8.3)
NEUTROPHILS NFR BLD MANUAL: 86 %
PHOSPHATE SERPL-MCNC: 3.6 MG/DL (ref 2.5–4.5)
PLAT MORPH BLD: ABNORMAL
PLATELET # BLD AUTO: 158 10E3/UL (ref 150–450)
POTASSIUM BLD-SCNC: 4.1 MMOL/L (ref 3.4–5.3)
RBC # BLD AUTO: 2.53 10E6/UL (ref 4.4–5.9)
RBC MORPH BLD: ABNORMAL
SODIUM SERPL-SCNC: 134 MMOL/L (ref 133–144)
TACROLIMUS BLD-MCNC: 14.2 UG/L (ref 5–15)
TME LAST DOSE: NORMAL H
TME LAST DOSE: NORMAL H
UFH PPP CHRO-ACNC: 0.42 IU/ML
UFH PPP CHRO-ACNC: <0.1 IU/ML
WBC # BLD AUTO: 10.3 10E3/UL (ref 4–11)

## 2021-10-30 PROCEDURE — 85520 HEPARIN ASSAY: CPT | Performed by: STUDENT IN AN ORGANIZED HEALTH CARE EDUCATION/TRAINING PROGRAM

## 2021-10-30 PROCEDURE — 85520 HEPARIN ASSAY: CPT | Performed by: SURGERY

## 2021-10-30 PROCEDURE — 85018 HEMOGLOBIN: CPT | Performed by: SURGERY

## 2021-10-30 PROCEDURE — 250N000011 HC RX IP 250 OP 636

## 2021-10-30 PROCEDURE — 80048 BASIC METABOLIC PNL TOTAL CA: CPT | Performed by: SURGERY

## 2021-10-30 PROCEDURE — 76776 US EXAM K TRANSPL W/DOPPLER: CPT

## 2021-10-30 PROCEDURE — 99233 SBSQ HOSP IP/OBS HIGH 50: CPT | Mod: 24 | Performed by: INTERNAL MEDICINE

## 2021-10-30 PROCEDURE — 250N000013 HC RX MED GY IP 250 OP 250 PS 637: Performed by: NURSE PRACTITIONER

## 2021-10-30 PROCEDURE — 36415 COLL VENOUS BLD VENIPUNCTURE: CPT | Performed by: NURSE PRACTITIONER

## 2021-10-30 PROCEDURE — 83735 ASSAY OF MAGNESIUM: CPT | Performed by: SURGERY

## 2021-10-30 PROCEDURE — 80197 ASSAY OF TACROLIMUS: CPT | Performed by: NURSE PRACTITIONER

## 2021-10-30 PROCEDURE — 120N000011 HC R&B TRANSPLANT UMMC

## 2021-10-30 PROCEDURE — 84100 ASSAY OF PHOSPHORUS: CPT | Performed by: SURGERY

## 2021-10-30 PROCEDURE — 250N000012 HC RX MED GY IP 250 OP 636 PS 637: Performed by: SURGERY

## 2021-10-30 PROCEDURE — 97161 PT EVAL LOW COMPLEX 20 MIN: CPT | Mod: GP

## 2021-10-30 PROCEDURE — 250N000012 HC RX MED GY IP 250 OP 636 PS 637: Performed by: NURSE PRACTITIONER

## 2021-10-30 PROCEDURE — 250N000011 HC RX IP 250 OP 636: Performed by: SURGERY

## 2021-10-30 PROCEDURE — 36415 COLL VENOUS BLD VENIPUNCTURE: CPT | Performed by: SURGERY

## 2021-10-30 PROCEDURE — 250N000011 HC RX IP 250 OP 636: Performed by: STUDENT IN AN ORGANIZED HEALTH CARE EDUCATION/TRAINING PROGRAM

## 2021-10-30 PROCEDURE — 85520 HEPARIN ASSAY: CPT | Performed by: NURSE PRACTITIONER

## 2021-10-30 PROCEDURE — 82570 ASSAY OF URINE CREATININE: CPT | Performed by: NURSE PRACTITIONER

## 2021-10-30 PROCEDURE — 97530 THERAPEUTIC ACTIVITIES: CPT | Mod: GP

## 2021-10-30 PROCEDURE — 76776 US EXAM K TRANSPL W/DOPPLER: CPT | Mod: 26 | Performed by: RADIOLOGY

## 2021-10-30 PROCEDURE — 85610 PROTHROMBIN TIME: CPT | Performed by: SURGERY

## 2021-10-30 PROCEDURE — 85027 COMPLETE CBC AUTOMATED: CPT | Performed by: SURGERY

## 2021-10-30 PROCEDURE — 250N000013 HC RX MED GY IP 250 OP 250 PS 637: Performed by: SURGERY

## 2021-10-30 RX ORDER — HEPARIN SODIUM 10000 [USP'U]/100ML
0-5000 INJECTION, SOLUTION INTRAVENOUS CONTINUOUS
Status: DISCONTINUED | OUTPATIENT
Start: 2021-10-30 | End: 2021-11-02

## 2021-10-30 RX ORDER — TACROLIMUS 1 MG/1
3 CAPSULE ORAL
Status: COMPLETED | OUTPATIENT
Start: 2021-10-30 | End: 2021-10-30

## 2021-10-30 RX ORDER — WARFARIN SODIUM 4 MG/1
4 TABLET ORAL
Status: DISCONTINUED | OUTPATIENT
Start: 2021-10-30 | End: 2021-10-30 | Stop reason: CLARIF

## 2021-10-30 RX ORDER — HEPARIN SODIUM 10000 [USP'U]/100ML
0-5000 INJECTION, SOLUTION INTRAVENOUS CONTINUOUS
Status: DISCONTINUED | OUTPATIENT
Start: 2021-10-30 | End: 2021-10-30

## 2021-10-30 RX ORDER — FUROSEMIDE 10 MG/ML
40 INJECTION INTRAMUSCULAR; INTRAVENOUS ONCE
Status: COMPLETED | OUTPATIENT
Start: 2021-10-30 | End: 2021-10-30

## 2021-10-30 RX ADMIN — OXYCODONE HYDROCHLORIDE 5 MG: 5 TABLET ORAL at 05:24

## 2021-10-30 RX ADMIN — HEPARIN SODIUM 1100 UNITS/HR: 10000 INJECTION, SOLUTION INTRAVENOUS at 07:37

## 2021-10-30 RX ADMIN — OXYCODONE HYDROCHLORIDE 5 MG: 5 TABLET ORAL at 20:08

## 2021-10-30 RX ADMIN — MYCOPHENOLATE MOFETIL 750 MG: 250 CAPSULE ORAL at 18:01

## 2021-10-30 RX ADMIN — OXYCODONE HYDROCHLORIDE 5 MG: 5 TABLET ORAL at 14:11

## 2021-10-30 RX ADMIN — SULFAMETHOXAZOLE AND TRIMETHOPRIM 1 TABLET: 400; 80 TABLET ORAL at 07:49

## 2021-10-30 RX ADMIN — ACETAMINOPHEN 975 MG: 325 TABLET, FILM COATED ORAL at 14:02

## 2021-10-30 RX ADMIN — TACROLIMUS 4 MG: 1 CAPSULE ORAL at 07:42

## 2021-10-30 RX ADMIN — ONDANSETRON 4 MG: 4 TABLET, ORALLY DISINTEGRATING ORAL at 18:03

## 2021-10-30 RX ADMIN — MYCOPHENOLATE MOFETIL 750 MG: 250 CAPSULE ORAL at 07:42

## 2021-10-30 RX ADMIN — Medication 400 MG: at 14:02

## 2021-10-30 RX ADMIN — LIDOCAINE 1 PATCH: 560 PATCH PERCUTANEOUS; TOPICAL; TRANSDERMAL at 07:43

## 2021-10-30 RX ADMIN — ACETAMINOPHEN 975 MG: 325 TABLET, FILM COATED ORAL at 05:24

## 2021-10-30 RX ADMIN — ATORVASTATIN CALCIUM 10 MG: 10 TABLET, FILM COATED ORAL at 07:43

## 2021-10-30 RX ADMIN — DOCUSATE SODIUM 50 MG AND SENNOSIDES 8.6 MG 1 TABLET: 8.6; 5 TABLET, FILM COATED ORAL at 20:08

## 2021-10-30 RX ADMIN — HEPARIN SODIUM 1200 UNITS/HR: 10000 INJECTION, SOLUTION INTRAVENOUS at 10:42

## 2021-10-30 RX ADMIN — CITALOPRAM HYDROBROMIDE 20 MG: 20 TABLET ORAL at 07:42

## 2021-10-30 RX ADMIN — DOCUSATE SODIUM 50 MG AND SENNOSIDES 8.6 MG 1 TABLET: 8.6; 5 TABLET, FILM COATED ORAL at 07:43

## 2021-10-30 RX ADMIN — ONDANSETRON 4 MG: 4 TABLET, ORALLY DISINTEGRATING ORAL at 10:52

## 2021-10-30 RX ADMIN — FUROSEMIDE 40 MG: 10 INJECTION, SOLUTION INTRAMUSCULAR; INTRAVENOUS at 10:46

## 2021-10-30 RX ADMIN — TACROLIMUS 3 MG: 1 CAPSULE ORAL at 18:01

## 2021-10-30 ASSESSMENT — ACTIVITIES OF DAILY LIVING (ADL)
ADLS_ACUITY_SCORE: 7
ADLS_ACUITY_SCORE: 5
ADLS_ACUITY_SCORE: 7
ADLS_ACUITY_SCORE: 5
ADLS_ACUITY_SCORE: 5
ADLS_ACUITY_SCORE: 7
ADLS_ACUITY_SCORE: 7
ADLS_ACUITY_SCORE: 5
ADLS_ACUITY_SCORE: 7
ADLS_ACUITY_SCORE: 5
ADLS_ACUITY_SCORE: 5
ADLS_ACUITY_SCORE: 7
ADLS_ACUITY_SCORE: 5
ADLS_ACUITY_SCORE: 5
ADLS_ACUITY_SCORE: 7

## 2021-10-30 NOTE — PLAN OF CARE
/77 (BP Location: Right arm)   Pulse 81   Temp 98.6  F (37  C) (Oral)   Resp 18   Wt 97 kg (213 lb 13.5 oz)   SpO2 97%   BMI 30.68 kg/m      Shift: 1202-8232  Isolation Status: NA  VS: AVSS on RA  Neuro: A&O x4  Behaviors: Calm, cooperative, and pleasant. Follows commands  BG: NA  Respiratory: WDL  Cardiac: WDL  Pain/Nausea/PRN: Abdominal pain manged with scheduled tylenol and oxy 5 mg x2  Diet: Regular diet  LDA: 2 PIV in R Arm, L NATHEN to small bulb, 30 mLs serosang output, Wade cath.  Infusion(s): LR @ 75 mL/hr, Hep SR @ 1100 U/hr.  GI/: No BM this shift. UOP of 700, orange/yellow.   Skin: WDL ex abdominal incision and old scar from previous KT (2015)  Mobility: Up with SBA & gait belt  Plan: Continue to support and monitor as graft begins to function.

## 2021-10-30 NOTE — PROGRESS NOTES
"   10/30/21 0854   Quick Adds   Type of Visit Initial PT Evaluation   Living Environment   People in home spouse;child(ned), adult   Current Living Arrangements house   Home Accessibility stairs to enter home   Number of Stairs, Main Entrance 4   Stair Railings, Main Entrance railing on right side (ascending)   Living Environment Comments All needs met main level, bathroom includes tub shower, standard height toilet.    Self-Care   Usual Activity Tolerance good   Current Activity Tolerance moderate   Equipment Currently Used at Home none   Activity/Exercise/Self-Care Comment Independent with mobility without AD. 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    General Information   Onset of Illness/Injury or Date of Surgery 10/27/21   Referring Physician Raj Pierce   Patient/Family Therapy Goals Statement (PT) not stated   Pertinent History of Current Problem (include personal factors and/or comorbidities that impact the POC) Per chart: \"Raj Pierce is a 50 year old male  with a PMH significant for ESRD 2/2 PCKD s/p kidney transplant () c/b poor function (small graft). Other PMH includes HTN, hyperparathyroidism, and SILVIA (with cpap). Now status post pre-emptive  donor (DCD) kidney re-transplant on 10/23. Discharged 10/26 POD 3. Readmitted 10/27 for emergent exploratory laparotomy due to renal vein thrombosis.\"   Existing Precautions/Restrictions abdominal   General Observations Patient is pleasant & agreeable to PT, spouse present & supportive   Cognition   Orientation Status (Cognition) oriented x 4   Pain Assessment   Patient Currently in Pain Yes, see Vital Sign flowsheet  (3-4/10 incision site)   Integumentary/Edema   Integumentary/Edema Comments incision not visualized, noted NATHEN drain   Posture    Posture Forward head position   Range of Motion (ROM)   ROM Comment LE & UE ROM appears WFL with functional mobility; trunk ROM somewhat reduced due " to abdominal precautions   Strength   Strength Comments Strength WFL with mobility   Bed Mobility   Comment (Bed Mobility) Supine>sit with log roll with reliance on bed rail from flat bed with S   Transfers   Transfer Safety Comments Sit<>stand ind from EOB   Gait/Stairs (Locomotion)   Negotiation (Stairs) number of steps;handrail location;stairs independence   [place] (Stairs) modified independence  (assist for lines only)   Handrail Location (Stairs) right side (ascending)   Number of Steps (Stairs) 4   Comment (Gait/Stairs) Ambulated in room and weems without AD with SBA-S, near ind level but slow-moderate speed, pt reports feeling below baseline/ feeling below level he was at at recent discharge on 10/26   Balance   Balance Comments Fairly steady without AD on flat surface but higher level balance not assessed   Sensory Examination   Sensory Perception Comments denies numbness/tingling   Clinical Impression   Criteria for Skilled Therapeutic Intervention yes, treatment indicated   PT Diagnosis (PT) decreased functional mobility   Influenced by the following impairments pain, abdominal precautions, post-op, deconditioning   Functional limitations due to impairments decreased independence with bed mobility, transfers, ambulation   Clinical Presentation Stable/Uncomplicated   Clinical Presentation Rationale clinical judgement   Clinical Decision Making (Complexity) low complexity   Therapy Frequency (PT) 3x/week   Predicted Duration of Therapy Intervention (days/wks) 1 week   Planned Therapy Interventions (PT) bed mobility training;balance training;gait training;home exercise program;patient/family education;transfer training;progressive activity/exercise;home program guidelines   Risk & Benefits of therapy have been explained evaluation/treatment results reviewed;care plan/treatment goals reviewed;participants included;patient;spouse/significant other   PT Discharge Planning    PT Discharge  Recommendation (DC Rec) home with assist   PT Rationale for DC Rec Patient demos mobility appropriate for discharge home with assist of spouse/family for household tasks as needed due to abdominal precautions. He is below baseline with activity tolerance and functional mobility and will benefit from PT in hospital to progress these toward baseline.    PT Brief overview of current status  SBA; encourage ambulation 3-4x/day, OK to ambulate with family in weems   Total Evaluation Time   Total Evaluation Time (Minutes) 8

## 2021-10-30 NOTE — PROGRESS NOTES
Transplant Surgery  Inpatient Daily Progress Note  10/30/2021    Assessment & Plan: Raj Pierce is a 50 year old male  with a PMH significant for ESRD 2/2 PCKD s/p kidney transplant () c/b poor function (small graft). Other PMH includes HTN, hyperparathyroidism, and SILVIA (with cpap). Now status post pre-emptive  donor (DCD) kidney re-transplant on 10/23. Discharged 10/26 POD 3. Readmitted 10/27 for emergent exploratory laparotomy due to renal vein thrombosis.    S/p exploratory left lower quadrant of kidney transplant, back table flush, thrombectomy of renal vein, thrombectomy of iliac vein, reimplantation of kidney transplant, with ureteral stent removal and placement of a new ureteral stent, intraoperative ultrasound, intraoperative of transit time flow measurment. POD 3.    Graft function: uncertain. Cr 5.69 this AM. Baseline 3. UOP slowly improving ml/hr, urine lightly serosanguinous in color, no clots present. US continues to show postoperative fluid collections, although small and stable. Continues to have high resistance waveform in the renal and arcuate arteries, although decreased in velocity. Renogram shows poor excretion of radiotracer, potential ATN vs vasomotor nephropathy. Unsure whether new kidney is making urine or this is from his other kidney, as he was making urine prior to surgery. NATHEN drain in place with a small amount of sanguinous fluid in bulb, decrease in total output, 95/24hrs (230 prior). Continue to strip drain PRN. Repeat renal US today.     Immunosuppression management: High risk protocol due to DGF    mg BID  Tac 4 mg BID. Goal 8-10. 10/29 10.1. Continue 5 mg BID.    Complexity of management:High. Contributing factors: anemia, thrombocytopenia     Hematology:   Acute blood loss, chronic anemia: Received 2 units RBC 10/27. Hgb 6.7 (7.4). Gave 1 unit RBCs 10/28. Hemoglobin has been stable over the last 24 hours, hovering above 7. Will continue to monitor Hgb q4h.      Anticoagulation: Renal vein thrombosis, thrombectomy of iliac vein, reimplantation of kidney transplant. Start low intensity heparin drip today without bolus, initial rate of 1200/hr, as well as Warfarin dosed by pharmacy with goal INR 2-2.5.     Cardiorespiratory: Stable on RA. Encourage frequent IS.  Hypotension, secondary to bleeding, third spacing. Improved with blood transfusions and fluid replacement. Weight up 4 kgs (97 from 93) and LE with +1 non-pitting edema. mIVF discontinued today and 40mg IV Lasix once.     GI/Nutrition: JIGAR - continue low K diet. Potassium level wnl at this time.     Endocrine: Stress hyperglycemia. BG up and down, currently 118. Monitor.    Fluid/Electrolytes: MIVF: None. Electrolytes acceptable.     : Wade to remain due to new surgical anastomosis. Stent in place, remove in 4 weeks.     Infectious disease: Febrile on 10/28. BC x 2, NGTD. Continues to be afebrile since.     Prophylaxis: DVT (heparin), valcyte x 6 months (D+/R-), bactrim    Disposition: 7A     Medical Decision Making: High  Subsequent visit 95510 (high level decision making)    Resident Provider: Nette Pardo MD    Faculty: George Zhu MD    _________________________________________________________________  Transplant History: Admitted 10/27/2021 for renal vein thrombosis.  10/23/2021 (Kidney), 10/13/2015 (Kidney), Postoperative day: 2209 (Kidney), 7 (Kidney)     Interval History: History is obtained from the patient  Overnight events: NAEO. Patient reports feeling well overall today. Pain managed, ambulating consistently, tolerating regular diet. Does feel puffy and more edematous today compared to yesterday.    ROS:   A 10-point review of systems was negative except as noted above.    Meds:    acetaminophen  975 mg Oral Q8H     atorvastatin  10 mg Oral Daily     bisacodyl  10 mg Rectal Once     citalopram  20 mg Oral Daily     lidocaine  1-2 patch Transdermal Q24H     lidocaine   Transdermal Q8H      magnesium oxide  400 mg Oral Daily with lunch     mycophenolate  750 mg Oral BID IS     polyethylene glycol  17 g Oral Daily     senna-docusate  1 tablet Oral BID     sulfamethoxazole-trimethoprim  1 tablet Oral Once per day on Tue Thu Sat     tacrolimus  4 mg Oral BID IS     valGANciclovir  450 mg Oral Once per day on Mon Thu     Physical Exam:  General Appearance: sitting in chair comfortably, NAD, non-toxic and appears improved compared to yesterday  Skin: normal in color, improved  Heart: regular rate and rhythm  Lungs: normal rate and work of breathing  Abdomen: soft, non-tender, non-distended. Incision C/D/I and healing well with staples in place. NATHEN drain in LLQ draining a small amount of sanguinous fluid.   : spears in place with small amount of light serosanguinous fluid in bag, no clots present.  Extremities: +1 non-pitting edema of the bilateral lower extremities. Warm and well perfused.  Neurologic: alert and oriented, no gross deficits.   Lines/Tubes: L forearm AV fistula, PIV x2, Spears, LLQ NATHEN drain.    Current vitals:   /82 (BP Location: Right arm)   Pulse 77   Temp 98.7  F (37.1  C) (Oral)   Resp 16   Wt 97 kg (213 lb 13.5 oz)   SpO2 94%   BMI 30.68 kg/m      Vital sign ranges:    Temp:  [98  F (36.7  C)-98.8  F (37.1  C)] 98.7  F (37.1  C)  Pulse:  [77-87] 77  Resp:  [16-20] 16  BP: (100-122)/(66-82) 120/82  SpO2:  [94 %-98 %] 94 %  Patient Vitals for the past 24 hrs:   BP Temp Temp src Pulse Resp SpO2   10/30/21 0724 120/82 98.7  F (37.1  C) Oral 77 16 94 %   10/30/21 0016 115/77 98.6  F (37  C) Oral 81 18 97 %   10/29/21 1750 122/79 98.8  F (37.1  C) Oral 83 18 98 %   10/29/21 1515 100/70 98  F (36.7  C) Oral 87 18 97 %   10/29/21 1122 119/66 98  F (36.7  C) Oral 78 20 95 %   10/29/21 0909 103/70 -- -- 80 -- 95 %     Data:   CMP  Recent Labs   Lab 10/30/21  0613 10/29/21  0604 10/23/21  2002 10/23/21  1647 10/23/21  1500 10/23/21  1500    133   < > 140   < > 143   POTASSIUM 4.1  4.4   < > 4.6   < > 4.4   CHLORIDE 105 104   < >  --   --   --    CO2 18* 22   < >  --   --   --    * 118*   < > 174*   < > 94   BUN 83* 82*   < >  --   --   --    CR 5.69* 5.73*   < >  --   --   --    GFRESTIMATED 11* 10*   < >  --   --   --    LILIANA 7.3* 7.3*   < >  --   --   --    ICAW  --   --   --  4.6  --  4.6   MAG 2.3 2.1   < >  --   --   --    PHOS 3.6 4.1   < >  --   --   --     < > = values in this interval not displayed.     CBC  Recent Labs   Lab 10/30/21  0613 10/30/21  0004 10/29/21  1132 10/29/21  0604   HGB 7.4* 7.2*   < > 8.1*   WBC 10.3  --   --  11.7*     --   --  126*    < > = values in this interval not displayed.     COAGS  Recent Labs   Lab 10/27/21  2153   INR 1.43*   *      Urinalysis  Recent Labs   Lab Test 10/23/21  0526 06/05/17  1253 01/25/16  1602 11/09/15  1300   COLOR Straw  --   --  Yellow   APPEARANCE Clear  --   --  Clear   URINEGLC Negative  --   --  Negative   URINEBILI Negative  --   --  Negative   URINEKETONE Negative  --   --  Negative   SG 1.009  --   --  1.008   UBLD Negative  --   --  Negative   URINEPH 7.0  --   --  5.5   PROTEIN 50 *  --   --  10*   NITRITE Negative  --   --  Negative   LEUKEST Negative  --   --  Negative   RBCU 0  --   --  <1   WBCU 1  --   --  1   UTPG 1.37* 1.30*   < >  --     < > = values in this interval not displayed.     Virology:  CMV DNA Quantitation Specimen   Date Value Ref Range Status   08/01/2016   Corrected    Plasma  CORRECTED ON 08/02 AT 1044: PREVIOUSLY REPORTED AS Blood       CMV IgG Antibody   Date Value Ref Range Status   07/10/2013 <0.20  Negative for anti-CMV IgG U/mL Final     EBV VCA IgG Antibody   Date Value Ref Range Status   07/10/2013 >750.00  Positive, suggests immunologic exposure. U/mL Final     Hepatitis C Antibody   Date Value Ref Range Status   10/23/2021 Nonreactive Nonreactive Final   04/25/2016  NR Final    Nonreactive   Assay performance characteristics have not been established for newborns,    infants, and children       Hep B Surface Loni   Date Value Ref Range Status   07/10/2013 0.0  Final     Comment:     Negative, No antibody detected when the value is less than 5.0 mlU/mL.     BK Virus Result   Date Value Ref Range Status   06/05/2017 BK Virus DNA Not Detected BKNEG copies/mL Final   10/28/2015 BK Virus DNA Not Detected BKNEG copies/mL Final     BK Virus DNA copies/mL   Date Value Ref Range Status   10/23/2021 Not Detected Not Detected copies/mL Final     Discussed with the transplant fellow, who agree with my assessment and plan.     Nette Pardo  PGY1 [facility]  10/30/2021

## 2021-10-30 NOTE — PHARMACY-ANTICOAGULATION SERVICE
Clinical Pharmacy - Warfarin Dosing Consult     Pharmacy has been consulted to manage this patient s warfarin therapy.  Indication: Renal vein thrombosis  Therapy Goal: INR 2-2.5  Warfarin Prior to Admission: No  Significant drug interactions: Bactrim, Heparin IV  Recent documented change in oral intake/nutrition: No      INR   Date Value Ref Range Status   10/30/2021 1.03 0.85 - 1.15 Final   10/27/2021 1.43 (H) 0.85 - 1.15 Final     Hold warfarin dose today, 10/30/2021 for possible IR procedure on 10/31/2021 per Radha Amezcua NP.  Pharmacy will monitor Raj Pierce daily and order warfarin doses to achieve specified goal.      Please contact pharmacy as soon as possible if the warfarin needs to be held for a procedure or if the warfarin goals change.      Gracie Cobian, Pharm.D., [facility]  Pager [phone]

## 2021-10-30 NOTE — PLAN OF CARE
/67 (BP Location: Right arm)   Pulse 90   Temp 99.7  F (37.6  C) (Oral)   Resp 18   Wt 99.2 kg (218 lb 11.2 oz)   SpO2 94%   BMI 31.38 kg/m       [id number]  Low grade temp; 99.7. All other VSS on RA. PRN oxycodone given for abdominal pain. Nausea managed with PRN PO zofran given x1. No emesis. Fair appetite and PO intake. Remains on 2g K+ diet. Anticipated to be NPO at midnight with possibility of patient having fluid collection (seen on ultrasound today) drained. Family updated and aware of plan. Wife by bedside- good support system. Right PIV infusing with heparin drip RN MANAGED; started at 1200 units/hr. Heparin Xa drawn at 1600; 0.42- therapeutic. Heparin 10a will be drawn again at 2300 and adjusted per protocol if needed. IV fluids stopped this AM. Great urine output; yellow; spears remains in place. No BM but continues to pass flatus. Ambulated in hallway with wife today. Up ad rowan in room. Left abdominal incision- clean dry and intact. Left NATHEN drain to bulb suction. Continue plan of care, please notify MD with any changes.

## 2021-10-30 NOTE — PROGRESS NOTES
[facility]   Transplant Nephrology Progress Note  Date of Admission:  10/27/2021  Today's Date: 10/30/2021    Recommendations:  -No indication for HD at this time; he does not have access.  -Appreciate transplant surgery management/IR opinion re: hematomas, findings on ultrasound today.   -Agree with tacrolimus change   -Low threshold for blood transfusion.       Assessment & Plan   # DDKT: Trend up. Pre-emptive DCD kidney with prolonged CIT-slow graft function, course also complicated by renal vein thrombosis (see below)   -No indication at this time for HD; remains fairly likely german from clearance standpoint.               - Baseline Creatinine: ~ TBD              - Proteinuria: Not checked post transplant              - Date DSA Last Checked: Jul/2018      Latest DSA: No              - BK Viremia: No              - Kidney Tx Biopsy: No    -Now with perinephric fluid collections favoring hematoma. Agree with IR consultation, possible sampling to differentiate hematoma, seroma, urinoma.      ACCESS - LUE fistula is clotted     # Transplant vein thrombosis, s/p thrombectomy, explant and re-implantation 10/27  Exploration LLQ kidney transplant, back table flush, thrombectomy of renal vein, thrombectomy of iliac vein, reimplantation of kidney transplant, with ureteral stent removal and placement of a new ureteral stent  On heparin with close monitoring of hemodynamic status     # Acute bloss anemia  Related to above, continue to check serial Hgb     # Immunosuppression: Tacrolimus immediate release (goal 8-10) and Mycophenolate mofetil (dose 750 mg every 12 hours)              - Induction with Recent Transplant:  PRA 67.  Received high induction per DDF protocol due to prolonged cold ischemic time, DCD kidney              - Changes: yes; reduced to 3 mg this evening then 3.5mg BID. Given immunologic risk, gentle change appropriate at this time. Would repeat level on 11/1.       # Infection Prophylaxis:   - PJP: Sulfa/TMP (Bactrim)  - CMV: discordant Valganciclovir (Valcyte) x6 months     # Hypertension: Hypotensive;           Goal BP: < 150/90              - Volume status: Hypovolemic              - Changes: No, continue resuscitation     # Anemia in Chronic Renal Disease: Hgb: stable, low; likely slow bleed with heparin for RVT treatment/patency    NADIYA: No              - Iron studies: Low iron saturation     # Mineral Bone Disorder:   - Secondary renal hyperparathyroidism; PTH level: Not checked recently        On treatment: None  - Vitamin D; level: Not checked recently        On supplement: No  - Calcium; level: low      On supplement: No check iCa & albumin & replete if needed  - Phosphorus; level: Normal        On supplement: No     # Electrolytes:   - Potassium; level: Normal        On supplement: No  - Magnesium; level: Normal        On supplement: Yes  - Bicarbonate; level: Low       On supplement: No; can consider bicarbonate tomorrow.   - Sodium; level: Normal     # Transplant History:  Etiology of Kidney Failure: Polycystic kidney disease (PKD)  Tx: DDKT  Transplant: 10/23/2021 (Kidney), 10/13/2015 (Kidney)  Donor Type: Donation after Circulatory Death  Donor Class:   Crossmatch at time of Tx: negative  DSA at time of Tx: No  Significant changes in immunosuppression: None  CMV IgG Ab High Risk Discordance (D+/R-): No  EBV IgG Ab High Risk Discordance (D+/R-): No  Significant transplant-related complications: None     Recommendations were communicated to the primary team verbally.     Rigo Becerra MD  Transplant Nephrology   Pager: [phone]      Interval History   No acute events noted. Continues to have abdominal bloating. H/H relatively stable. Ultrasound however with worsening fluid collections. He is making urine though was making significant urine pre-transplant. He does not have a viable HD access.     He denies nausea, vomiting, pruritis, hiccups, belching,  insomnia or other uremic symptoms.     Review of Systems   4 point ROS was obtained and negative except as noted in the Interval History.    MEDICATIONS:    atorvastatin  10 mg Oral Daily     citalopram  20 mg Oral Daily     lidocaine  1-2 patch Transdermal Q24H     lidocaine   Transdermal Q8H     magnesium oxide  400 mg Oral Daily with lunch     mycophenolate  750 mg Oral BID IS     polyethylene glycol  17 g Oral Daily     senna-docusate  1 tablet Oral BID     sulfamethoxazole-trimethoprim  1 tablet Oral Once per day on  Sat     tacrolimus  3 mg Oral ONCE at 18:00     [START ON 10/31/2021] tacrolimus  3.5 mg Oral BID IS     valGANciclovir  450 mg Oral Once per day on      warfarin-No DOSE today  1 each Does not apply no dose today (warfarin)       heparin 1,200 Units/hr (10/30/21 1042)     [Held by provider] Warfarin Therapy Reminder         Physical Exam   Temp  Av.4  F (36.9  C)  Min: 95  F (35  C)  Max: 101.5  F (38.6  C)      Pulse  Av.8  Min: 54  Max: 108 Resp  Avg: 15.9  Min: 12  Max: 22  SpO2  Av.7 %  Min: 90 %  Max: 100 %     /78 (BP Location: Right arm)   Pulse 78   Temp 98.3  F (36.8  C) (Oral)   Resp 18   Wt 99.2 kg (218 lb 11.2 oz)   SpO2 93%   BMI 31.38 kg/m     Date 10/29/21 0700 - 10/30/21 0659   Shift 5966-2216 9476-0675 0219-8618 24 Hour Total   INTAKE   P.O. 400   400   I.V. 958.35   958.35   Shift Total(mL/kg) 1358.35(14)   1358.35(14)   OUTPUT   Urine 285   285   Drains 30   30   Shift Total(mL/kg) 315(3.25)   315(3.25)   Weight (kg) 97 97 97 97     Admit Weight: 93.5 kg (206 lb 2.1 oz)    GENERAL APPEARANCE: alert and no distress  HENT: mouth without ulcers or lesions  RESP: breathing non-labored  CV: regular rhythm, normal rate  EDEMA: no LE edema bilaterally  ABDOMEN: soft, nondistended  MS: extremities normal - no gross deformities noted  SKIN: no rash on exposed surfaces  ACCESS: Clotted, non-viable AVF   Neuro: A&Ox4. No asterixis.     Data   All  labs reviewed by me.  CMP  Recent Labs   Lab 10/30/21  0613 10/29/21  0604 10/28/21  1631 10/28/21  0921 10/28/21  0523 10/28/21  0443 10/28/21  0443 10/27/21  2336 10/27/21  1815    133  --   --   --   --  138  --  138   POTASSIUM 4.1 4.4 4.3 4.2  --    < > 4.2  4.2   < > 4.8   CHLORIDE 105 104  --   --   --   --  106  --  106   CO2 18* 22  --   --   --   --  22  --  23   ANIONGAP 11 7  --   --   --   --  10  --  9   * 118*  --   --  178*  --  211*   < > 132*   BUN 83* 82*  --   --   --   --  80*  --  74*   CR 5.69* 5.73*  --   --   --   --  4.98*  --  4.33*   GFRESTIMATED 11* 10*  --   --   --   --  12*  --  15*   LILIANA 7.3* 7.3*  --   --   --   --  7.3*  --  7.7*   MAG 2.3 2.1  --   --   --   --  1.9  --  1.9   PHOS 3.6 4.1  --   --   --   --  4.6*  --  5.0*    < > = values in this interval not displayed.     CBC  Recent Labs   Lab 10/30/21  1115 10/30/21  0613 10/30/21  0004 10/29/21  2103 10/29/21  1132 10/29/21  0604 10/28/21  1104 10/28/21  0443 10/27/21  2153 10/27/21  1815   HGB 7.4* 7.4* 7.2* 7.2*   < > 8.1*   < > 7.4*   < > 7.6*   WBC  --  10.3  --   --   --  11.7*  --  8.2  --  9.3   RBC  --  2.53*  --   --   --  2.81*  --  2.59*  --  2.74*   HCT  --  23.6*  --   --   --  25.8*  --  23.2*  --  24.5*   MCV  --  93  --   --   --  92  --  90  --  89   MCH  --  29.2  --   --   --  28.8  --  28.6  --  27.7   MCHC  --  31.4*  --   --   --  31.4*  --  31.9  --  31.0*   RDW  --  14.5  --   --   --  14.2  --  13.8  --  13.1   PLT  --  158  --   --   --  126*  --  103*  --  139*    < > = values in this interval not displayed.     INR  Recent Labs   Lab 10/30/21  0613 10/27/21  2153   INR 1.03 1.43*   PTT  --  110*     ABG  Recent Labs   Lab 10/23/21  1647   O2PER 42.0      Urine Studies  Recent Labs   Lab Test 10/23/21  0526 11/09/15  1300 10/28/15  0702 10/17/15  0500   COLOR Straw Yellow Yellow Light Yellow   APPEARANCE Clear Clear Clear Clear   URINEGLC Negative Negative Negative Negative    URINEBILI Negative Negative Negative Negative   URINEKETONE Negative Negative Negative Negative   SG 1.009 1.008 1.008 1.008   UBLD Negative Negative Negative Large*   URINEPH 7.0 5.5 6.5 5.5   PROTEIN 50 * 10* 10* 30*   NITRITE Negative Negative Negative Negative   LEUKEST Negative Negative Negative Negative   RBCU 0 <1 1 >182*   WBCU 1 1 1 3*     Recent Labs   Lab Test 10/23/21  0526 06/05/17  1253 01/25/16  1602 10/28/15  0702 10/13/15  1035   UTPG 1.37* 1.30* 0.36* 0.45* 0.55*     PTH  Recent Labs   Lab Test 10/27/21  0621 08/01/16  1638 01/25/16  1601 10/15/15  0520   PTHI 149* 90* 121* 434*     Iron Studies  Recent Labs   Lab Test 10/27/21  0621 10/17/15  0455   IRON 19* 13*   * 241   IRONSAT 10* 5*   TERRANCE 597*  --        IMAGING:  All imaging studies reviewed by me.

## 2021-10-31 LAB
ANION GAP SERPL CALCULATED.3IONS-SCNC: 8 MMOL/L (ref 3–14)
BASOPHILS # BLD MANUAL: 0 10E3/UL (ref 0–0.2)
BASOPHILS NFR BLD MANUAL: 0 %
BUN SERPL-MCNC: 79 MG/DL (ref 7–30)
CALCIUM SERPL-MCNC: 7.3 MG/DL (ref 8.5–10.1)
CHLORIDE BLD-SCNC: 104 MMOL/L (ref 94–109)
CO2 SERPL-SCNC: 22 MMOL/L (ref 20–32)
CREAT SERPL-MCNC: 5.69 MG/DL (ref 0.66–1.25)
EOSINOPHIL # BLD MANUAL: 0.1 10E3/UL (ref 0–0.7)
EOSINOPHIL NFR BLD MANUAL: 1 %
ERYTHROCYTE [DISTWIDTH] IN BLOOD BY AUTOMATED COUNT: 14.6 % (ref 10–15)
GFR SERPL CREATININE-BSD FRML MDRD: 11 ML/MIN/1.73M2
GLUCOSE BLD-MCNC: 86 MG/DL (ref 70–99)
HCT VFR BLD AUTO: 23 % (ref 40–53)
HGB BLD-MCNC: 7.1 G/DL (ref 13.3–17.7)
HGB BLD-MCNC: 7.2 G/DL (ref 13.3–17.7)
HGB BLD-MCNC: 7.3 G/DL (ref 13.3–17.7)
HGB BLD-MCNC: 7.5 G/DL (ref 13.3–17.7)
INR PPP: 1.28 (ref 0.85–1.15)
LYMPHOCYTES # BLD MANUAL: 0 10E3/UL (ref 0.8–5.3)
LYMPHOCYTES NFR BLD MANUAL: 0 %
MAGNESIUM SERPL-MCNC: 2.3 MG/DL (ref 1.6–2.3)
MCH RBC QN AUTO: 28.6 PG (ref 26.5–33)
MCHC RBC AUTO-ENTMCNC: 31.3 G/DL (ref 31.5–36.5)
MCV RBC AUTO: 91 FL (ref 78–100)
METAMYELOCYTES # BLD MANUAL: 0.1 10E3/UL
METAMYELOCYTES NFR BLD MANUAL: 1 %
MONOCYTES # BLD MANUAL: 0.5 10E3/UL (ref 0–1.3)
MONOCYTES NFR BLD MANUAL: 4 %
NEUTROPHILS # BLD MANUAL: 11.2 10E3/UL (ref 1.6–8.3)
NEUTROPHILS NFR BLD MANUAL: 94 %
PHOSPHATE SERPL-MCNC: 2.7 MG/DL (ref 2.5–4.5)
PLAT MORPH BLD: ABNORMAL
PLATELET # BLD AUTO: 217 10E3/UL (ref 150–450)
POTASSIUM BLD-SCNC: 4.4 MMOL/L (ref 3.4–5.3)
RBC # BLD AUTO: 2.52 10E6/UL (ref 4.4–5.9)
RBC MORPH BLD: ABNORMAL
SODIUM SERPL-SCNC: 134 MMOL/L (ref 133–144)
TACROLIMUS BLD-MCNC: 15.2 UG/L (ref 5–15)
TME LAST DOSE: ABNORMAL H
TME LAST DOSE: ABNORMAL H
UFH PPP CHRO-ACNC: 0.1 IU/ML
UFH PPP CHRO-ACNC: 0.13 IU/ML
UFH PPP CHRO-ACNC: 0.26 IU/ML
UFH PPP CHRO-ACNC: <0.1 IU/ML
UFH PPP CHRO-ACNC: <0.1 IU/ML
WBC # BLD AUTO: 11.9 10E3/UL (ref 4–11)

## 2021-10-31 PROCEDURE — 36415 COLL VENOUS BLD VENIPUNCTURE: CPT | Performed by: SURGERY

## 2021-10-31 PROCEDURE — 80197 ASSAY OF TACROLIMUS: CPT | Performed by: SURGERY

## 2021-10-31 PROCEDURE — 120N000011 HC R&B TRANSPLANT UMMC

## 2021-10-31 PROCEDURE — 250N000011 HC RX IP 250 OP 636: Performed by: STUDENT IN AN ORGANIZED HEALTH CARE EDUCATION/TRAINING PROGRAM

## 2021-10-31 PROCEDURE — 82374 ASSAY BLOOD CARBON DIOXIDE: CPT | Performed by: SURGERY

## 2021-10-31 PROCEDURE — 85018 HEMOGLOBIN: CPT | Performed by: SURGERY

## 2021-10-31 PROCEDURE — 250N000012 HC RX MED GY IP 250 OP 636 PS 637: Performed by: NURSE PRACTITIONER

## 2021-10-31 PROCEDURE — 85520 HEPARIN ASSAY: CPT | Performed by: SURGERY

## 2021-10-31 PROCEDURE — 85610 PROTHROMBIN TIME: CPT

## 2021-10-31 PROCEDURE — 83735 ASSAY OF MAGNESIUM: CPT | Performed by: SURGERY

## 2021-10-31 PROCEDURE — 250N000013 HC RX MED GY IP 250 OP 250 PS 637: Performed by: NURSE PRACTITIONER

## 2021-10-31 PROCEDURE — 250N000013 HC RX MED GY IP 250 OP 250 PS 637: Performed by: SURGERY

## 2021-10-31 PROCEDURE — 84100 ASSAY OF PHOSPHORUS: CPT | Performed by: SURGERY

## 2021-10-31 PROCEDURE — 250N000011 HC RX IP 250 OP 636: Performed by: SURGERY

## 2021-10-31 PROCEDURE — 85520 HEPARIN ASSAY: CPT | Performed by: STUDENT IN AN ORGANIZED HEALTH CARE EDUCATION/TRAINING PROGRAM

## 2021-10-31 PROCEDURE — 36415 COLL VENOUS BLD VENIPUNCTURE: CPT | Performed by: STUDENT IN AN ORGANIZED HEALTH CARE EDUCATION/TRAINING PROGRAM

## 2021-10-31 PROCEDURE — 250N000012 HC RX MED GY IP 250 OP 636 PS 637: Performed by: SURGERY

## 2021-10-31 RX ORDER — WARFARIN SODIUM 3 MG/1
3 TABLET ORAL
Status: COMPLETED | OUTPATIENT
Start: 2021-10-31 | End: 2021-10-31

## 2021-10-31 RX ORDER — TACROLIMUS 1 MG/1
2 CAPSULE ORAL
Status: DISCONTINUED | OUTPATIENT
Start: 2021-11-01 | End: 2021-11-02

## 2021-10-31 RX ORDER — TACROLIMUS 1 MG/1
1 CAPSULE ORAL
Status: COMPLETED | OUTPATIENT
Start: 2021-10-31 | End: 2021-10-31

## 2021-10-31 RX ORDER — OXYCODONE HYDROCHLORIDE 5 MG/1
5 TABLET ORAL EVERY 4 HOURS PRN
Status: DISCONTINUED | OUTPATIENT
Start: 2021-10-31 | End: 2021-11-06

## 2021-10-31 RX ADMIN — ACETAMINOPHEN 650 MG: 325 TABLET, FILM COATED ORAL at 23:13

## 2021-10-31 RX ADMIN — DOCUSATE SODIUM 50 MG AND SENNOSIDES 8.6 MG 1 TABLET: 8.6; 5 TABLET, FILM COATED ORAL at 09:14

## 2021-10-31 RX ADMIN — CEPHALEXIN 500 MG: 250 CAPSULE ORAL at 20:13

## 2021-10-31 RX ADMIN — HEPARIN SODIUM 1800 UNITS/HR: 10000 INJECTION, SOLUTION INTRAVENOUS at 23:01

## 2021-10-31 RX ADMIN — TACROLIMUS 1 MG: 1 CAPSULE ORAL at 17:53

## 2021-10-31 RX ADMIN — ATORVASTATIN CALCIUM 10 MG: 10 TABLET, FILM COATED ORAL at 09:14

## 2021-10-31 RX ADMIN — WARFARIN SODIUM 3 MG: 3 TABLET ORAL at 17:53

## 2021-10-31 RX ADMIN — Medication 400 MG: at 12:02

## 2021-10-31 RX ADMIN — TACROLIMUS 3.5 MG: 1 CAPSULE ORAL at 09:14

## 2021-10-31 RX ADMIN — ACETAMINOPHEN 650 MG: 325 TABLET, FILM COATED ORAL at 14:16

## 2021-10-31 RX ADMIN — ONDANSETRON 4 MG: 4 TABLET, ORALLY DISINTEGRATING ORAL at 10:11

## 2021-10-31 RX ADMIN — OXYCODONE HYDROCHLORIDE 5 MG: 5 TABLET ORAL at 17:52

## 2021-10-31 RX ADMIN — ONDANSETRON 4 MG: 4 TABLET, ORALLY DISINTEGRATING ORAL at 17:51

## 2021-10-31 RX ADMIN — MYCOPHENOLATE MOFETIL 750 MG: 250 CAPSULE ORAL at 17:53

## 2021-10-31 RX ADMIN — DOCUSATE SODIUM 50 MG AND SENNOSIDES 8.6 MG 1 TABLET: 8.6; 5 TABLET, FILM COATED ORAL at 20:13

## 2021-10-31 RX ADMIN — MYCOPHENOLATE MOFETIL 750 MG: 250 CAPSULE ORAL at 09:14

## 2021-10-31 RX ADMIN — HEPARIN SODIUM 1500 UNITS/HR: 10000 INJECTION, SOLUTION INTRAVENOUS at 07:57

## 2021-10-31 RX ADMIN — CITALOPRAM HYDROBROMIDE 20 MG: 20 TABLET ORAL at 09:14

## 2021-10-31 RX ADMIN — OXYCODONE HYDROCHLORIDE 5 MG: 5 TABLET ORAL at 04:31

## 2021-10-31 ASSESSMENT — ACTIVITIES OF DAILY LIVING (ADL)
ADLS_ACUITY_SCORE: 5

## 2021-10-31 NOTE — PLAN OF CARE
/81 (BP Location: Right arm)   Pulse 86   Temp 99  F (37.2  C) (Oral)   Resp 18   Wt 98 kg (216 lb)   SpO2 95%   BMI 30.99 kg/m      Shift: 6774-1401  Isolation Status: NA  VS: AVSS on RA.  Neuro: A&O x4.  Behaviors: Calm, cooperative, and pleasant.  BG: NA  Respiratory: WDL  Cardiac: WDL  Pain/Nausea/PRN: Oxy x1. Zofran x1  Diet: Regular diet (NPO all night for potential IR trip.)  LDA: 2 RPIV  Infusion(s): Hep @ 1500 U/hr.  GI/: Wade cath with adequate output.  Skin: WDL ex abdominal incisions.  Mobility: UAL  Plan: IR trip to drain fluid in perinephritic space

## 2021-10-31 NOTE — PROGRESS NOTES
Transplant Surgery  Inpatient Daily Progress Note  10/31/2021    Assessment & Plan: Raj Pierce is a 50 year old male  with a PMH significant for ESRD 2/2 PCKD s/p kidney transplant () c/b poor function (small graft). Other PMH includes HTN, hyperparathyroidism, and SILVIA (with cpap). Now status post pre-emptive  donor (DCD) kidney re-transplant on 10/23. Discharged 10/26 POD 3. Readmitted 10/27 for emergent exploratory laparotomy due to renal vein thrombosis.    S/p exploratory left lower quadrant of kidney transplant, back table flush, thrombectomy of renal vein, thrombectomy of iliac vein, reimplantation of kidney transplant, with ureteral stent removal and placement of a new ureteral stent, intraoperative ultrasound, intraoperative of transit time flow measurment. POD 4.    Graft function:   Kidney graft, renal vein thrombosis: Cr stable over last 3 days, 5.69 this AM. Baseline 3. UOP improving now with 3240 mL/24 hours. 10/29 renogram showed that current function primarily from 2015 graft. Repeat US 10/30 showed increase in size of likely perinephric hematomas, largest measuring approximately [id number]. Hemodynamically stable.  Surgical site infection: Erythema at incision, progressing this afternoon. Start keflex.    Immunosuppression management: High risk protocol due to DGF    mg BID  Tac 4 mg BID. Goal 8-10. Level 15.2, decrease dose.  Complexity of management: High. Contributing factors: anemia, thrombocytopenia     Hematology:    Acute blood loss, chronic anemia: Received 2 units RBC 10/27. Hgb 7.2, last transfusion 10/28. Hemoglobin has been stable over the last 48 hours, hovering slightly above 7. Will continue to monitor Hgb q6h.     Anticoagulation for renal vein thrombosis: Renal vein thrombosis, thrombectomy of iliac vein, reimplantation of kidney transplant. Low intensity heparin drip, currently at 1200/hr, transitioning to Warfarin dosed by pharmacy with goal INR 2-2.5. Hep  levels continue to be <0.10, INR 1.28 today.     Cardiorespiratory: Stable on RA. Encourage frequent IS.  Hypotension, secondary to bleeding, third spacing: Resolved with blood transfusions and fluid replacement. Weight up 4 kgs (98 from 93) despite discontinuation of IVF and lasix yesterday.     GI/Nutrition: JIGAR - continue low K diet. Potassium level wnl at this time. Not much PO intake yesterday, will continue to encourage.     Endocrine: Stress hyperglycemia. BG up and down, currently normal at 88. Continue to monitor.    Fluid/Electrolytes: MIVF: None. Electrolytes acceptable.     : Wade to remain x 14d due to new surgical anastomosis. Stent in place, remove in 4 weeks.     Infectious disease: Afebrile. WBC 11.9  Surgical site infection: Keflex added 10/31. Monitor.    Prophylaxis: DVT (heparin), valcyte x 6 months (D+/R-), bactrim    Disposition: 7A     Medical Decision Making: Medium    Fellow/JATINDER/Resident Provider: Nette Pardo MD / Radha Amezcua NP     Faculty: George Zhu MD    Attestation: I saw and examined this patient with Radha Amezcua NP, and the transplant team. I independently reviewed all pertinent laboratory and imaging results. I agree with the findings and plan as documented in the note above.  -George Zhu MD    _________________________________________________________________  Transplant History: Admitted 10/27/2021 for renal vein thrombosis.  10/23/2021 (Kidney), 10/13/2015 (Kidney), Postoperative day: 2210 (Kidney), 8 (Kidney)     Interval History: History is obtained from the patient  Overnight events: Tired. Marginal appetite.    ROS:   A 10-point review of systems was negative except as noted above.    Meds:    atorvastatin  10 mg Oral Daily     citalopram  20 mg Oral Daily     lidocaine  1-2 patch Transdermal Q24H     lidocaine   Transdermal Q8H     magnesium oxide  400 mg Oral Daily with lunch     mycophenolate  750 mg Oral BID IS     polyethylene glycol  17 g Oral  Daily     senna-docusate  1 tablet Oral BID     sulfamethoxazole-trimethoprim  1 tablet Oral Once per day on Tue Thu Sat     tacrolimus  3.5 mg Oral BID IS     valGANciclovir  450 mg Oral Once per day on Mon Thu     Physical Exam:  General Appearance: sitting in chair comfortably, NAD, non-toxic  Skin: normal in color  Heart: perfused  Lungs: normal rate and work of breathing  Abdomen: soft, non-tender, non-distended. Incision with blanchable erythema, marked. NATHEN drain in LLQ draining a small amount of sanguinous fluid.   : spears in place, yellow urine  Extremities: +1 non-pitting edema of the bilateral lower extremities. Warm and well perfused.  Neurologic: alert and oriented x4, no gross deficits.     Current vitals:   /81 (BP Location: Right arm)   Pulse 86   Temp 99  F (37.2  C) (Oral)   Resp 18   Wt 98 kg (216 lb)   SpO2 95%   BMI 30.99 kg/m      Vital sign ranges:    Temp:  [98.3  F (36.8  C)-99.7  F (37.6  C)] 99  F (37.2  C)  Pulse:  [78-90] 86  Resp:  [18] 18  BP: (105-124)/(67-81) 118/81  SpO2:  [93 %-96 %] 95 %  Patient Vitals for the past 24 hrs:   BP Temp Temp src Pulse Resp SpO2 Weight   10/31/21 0757 118/81 99  F (37.2  C) Oral 86 18 95 % --   10/31/21 0100 -- -- -- -- -- -- 98 kg (216 lb)   10/30/21 2345 119/79 98.9  F (37.2  C) Oral 86 18 96 % --   10/30/21 1925 110/72 98.4  F (36.9  C) Oral 89 18 95 % --   10/30/21 1523 105/67 99.7  F (37.6  C) Oral 90 18 94 % --   10/30/21 1132 124/78 98.3  F (36.8  C) Oral 78 18 93 % --   10/30/21 1018 -- -- -- -- -- -- 99.2 kg (218 lb 11.2 oz)     Data:   CMP  Recent Labs   Lab 10/31/21  0552 10/30/21  0613    134   POTASSIUM 4.4 4.1   CHLORIDE 104 105   CO2 22 18*   GLC 86 100*   BUN 79* 83*   CR 5.69* 5.69*   GFRESTIMATED 11* 11*   LILIANA 7.3* 7.3*   MAG 2.3 2.3   PHOS 2.7 3.6     CBC  Recent Labs   Lab 10/31/21  0552 10/31/21  0017 10/30/21  1115 10/30/21  0613   HGB 7.2* 7.1*   < > 7.4*   WBC 11.9*  --   --  10.3     --   --  158    <  > = values in this interval not displayed.     COAGS  Recent Labs   Lab 10/31/21  0552 10/30/21  0613 10/27/21  2153 10/27/21  2153   INR 1.28* 1.03   < > 1.43*   PTT  --   --   --  110*    < > = values in this interval not displayed.      Urinalysis  Recent Labs   Lab Test 10/23/21  0526 06/05/17  1253 01/25/16  1602 11/09/15  1300   COLOR Straw  --   --  Yellow   APPEARANCE Clear  --   --  Clear   URINEGLC Negative  --   --  Negative   URINEBILI Negative  --   --  Negative   URINEKETONE Negative  --   --  Negative   SG 1.009  --   --  1.008   UBLD Negative  --   --  Negative   URINEPH 7.0  --   --  5.5   PROTEIN 50 *  --   --  10*   NITRITE Negative  --   --  Negative   LEUKEST Negative  --   --  Negative   RBCU 0  --   --  <1   WBCU 1  --   --  1   UTPG 1.37* 1.30*   < >  --     < > = values in this interval not displayed.     Virology:  CMV DNA Quantitation Specimen   Date Value Ref Range Status   08/01/2016   Corrected    Plasma  CORRECTED ON 08/02 AT 1044: PREVIOUSLY REPORTED AS Blood       CMV IgG Antibody   Date Value Ref Range Status   07/10/2013 <0.20  Negative for anti-CMV IgG U/mL Final     EBV VCA IgG Antibody   Date Value Ref Range Status   07/10/2013 >750.00  Positive, suggests immunologic exposure. U/mL Final     Hepatitis C Antibody   Date Value Ref Range Status   10/23/2021 Nonreactive Nonreactive Final   04/25/2016  NR Final    Nonreactive   Assay performance characteristics have not been established for newborns,   infants, and children       Hep B Surface Loni   Date Value Ref Range Status   07/10/2013 0.0  Final     Comment:     Negative, No antibody detected when the value is less than 5.0 mlU/mL.     BK Virus Result   Date Value Ref Range Status   06/05/2017 BK Virus DNA Not Detected BKNEG copies/mL Final   10/28/2015 BK Virus DNA Not Detected BKNEG copies/mL Final     BK Virus DNA copies/mL   Date Value Ref Range Status   10/23/2021 Not Detected Not Detected copies/mL Final

## 2021-10-31 NOTE — PLAN OF CARE
/80 (BP Location: Right arm)   Pulse 99   Temp 98.2  F (36.8  C) (Oral)   Resp 16   Wt 98 kg (216 lb)   SpO2 95%   BMI 30.99 kg/m       2817-6233  AVSS on RA. Abdominal pain managed with PRN tylenol and oxy. PRN zofran given for nausea. No emesis. Heparin drip RN MANAGED increased to 1800 units/hr; next hep 10a will be drawn at 2115 along with hemoglobin. Patient is very hard stick; will ask team to retime so these labs can be drawn at same time. 2g K+ diet; fair appetite and PO intake. Left stapled incision with left NATHEN drain; small output. Wade remains in place; adequate output. Up ad rowan. Wife is a great support system. Will be started on antibiotics tonight due to redness around incision that has increased in diameter as shift continued. Ambulated in hallway with wife; no SOB stated or noted during and/or after activity. Continue plan of care, please notify MD with any changes.

## 2021-11-01 ENCOUNTER — APPOINTMENT (OUTPATIENT)
Dept: PHYSICAL THERAPY | Facility: CLINIC | Age: 51
End: 2021-11-01
Payer: COMMERCIAL

## 2021-11-01 LAB
ABO/RH(D): NORMAL
ALBUMIN SERPL-MCNC: 2 G/DL (ref 3.4–5)
ANION GAP SERPL CALCULATED.3IONS-SCNC: 10 MMOL/L (ref 3–14)
ANTIBODY SCREEN: NEGATIVE
BLD PROD TYP BPU: NORMAL
BLOOD COMPONENT TYPE: NORMAL
BUN SERPL-MCNC: 80 MG/DL (ref 7–30)
CALCIUM SERPL-MCNC: 7 MG/DL (ref 8.5–10.1)
CHLORIDE BLD-SCNC: 105 MMOL/L (ref 94–109)
CO2 SERPL-SCNC: 19 MMOL/L (ref 20–32)
CODING SYSTEM: NORMAL
CREAT SERPL-MCNC: 5.43 MG/DL (ref 0.66–1.25)
CROSSMATCH: NORMAL
ERYTHROCYTE [DISTWIDTH] IN BLOOD BY AUTOMATED COUNT: 14.6 % (ref 10–15)
GFR SERPL CREATININE-BSD FRML MDRD: 11 ML/MIN/1.73M2
GLUCOSE BLD-MCNC: 97 MG/DL (ref 70–99)
HCT VFR BLD AUTO: 22.5 % (ref 40–53)
HGB BLD-MCNC: 6.9 G/DL (ref 13.3–17.7)
HGB BLD-MCNC: 6.9 G/DL (ref 13.3–17.7)
HGB BLD-MCNC: 7.3 G/DL (ref 13.3–17.7)
HGB BLD-MCNC: 8.4 G/DL (ref 13.3–17.7)
HOLD SPECIMEN: NORMAL
INR PPP: 1.16 (ref 0.85–1.15)
ISSUE DATE AND TIME: NORMAL
MCH RBC QN AUTO: 28.6 PG (ref 26.5–33)
MCHC RBC AUTO-ENTMCNC: 30.7 G/DL (ref 31.5–36.5)
MCV RBC AUTO: 93 FL (ref 78–100)
PHOSPHATE SERPL-MCNC: 3.2 MG/DL (ref 2.5–4.5)
PLATELET # BLD AUTO: 263 10E3/UL (ref 150–450)
POTASSIUM BLD-SCNC: 4.4 MMOL/L (ref 3.4–5.3)
RBC # BLD AUTO: 2.41 10E6/UL (ref 4.4–5.9)
SODIUM SERPL-SCNC: 134 MMOL/L (ref 133–144)
SPECIMEN EXPIRATION DATE: NORMAL
TACROLIMUS BLD-MCNC: 15.3 UG/L (ref 5–15)
TME LAST DOSE: ABNORMAL H
TME LAST DOSE: ABNORMAL H
UFH PPP CHRO-ACNC: 0.2 IU/ML
UFH PPP CHRO-ACNC: 0.76 IU/ML
UFH PPP CHRO-ACNC: >1.1 IU/ML
UNIT ABO/RH: NORMAL
UNIT NUMBER: NORMAL
UNIT STATUS: NORMAL
UNIT TYPE ISBT: 7300
WBC # BLD AUTO: 13.3 10E3/UL (ref 4–11)

## 2021-11-01 PROCEDURE — 36415 COLL VENOUS BLD VENIPUNCTURE: CPT | Performed by: SURGERY

## 2021-11-01 PROCEDURE — 250N000013 HC RX MED GY IP 250 OP 250 PS 637: Performed by: SURGERY

## 2021-11-01 PROCEDURE — 97116 GAIT TRAINING THERAPY: CPT | Mod: GP | Performed by: PHYSICAL THERAPIST

## 2021-11-01 PROCEDURE — 85520 HEPARIN ASSAY: CPT | Performed by: SURGERY

## 2021-11-01 PROCEDURE — 85018 HEMOGLOBIN: CPT

## 2021-11-01 PROCEDURE — 999N000128 HC STATISTIC PERIPHERAL IV START W/O US GUIDANCE

## 2021-11-01 PROCEDURE — 80069 RENAL FUNCTION PANEL: CPT | Performed by: SURGERY

## 2021-11-01 PROCEDURE — 85610 PROTHROMBIN TIME: CPT

## 2021-11-01 PROCEDURE — 250N000011 HC RX IP 250 OP 636: Performed by: PHYSICIAN ASSISTANT

## 2021-11-01 PROCEDURE — 84100 ASSAY OF PHOSPHORUS: CPT | Performed by: SURGERY

## 2021-11-01 PROCEDURE — 86923 COMPATIBILITY TEST ELECTRIC: CPT | Performed by: PHYSICIAN ASSISTANT

## 2021-11-01 PROCEDURE — 85018 HEMOGLOBIN: CPT | Performed by: SURGERY

## 2021-11-01 PROCEDURE — 85014 HEMATOCRIT: CPT | Performed by: NURSE PRACTITIONER

## 2021-11-01 PROCEDURE — 250N000011 HC RX IP 250 OP 636: Performed by: SURGERY

## 2021-11-01 PROCEDURE — 99233 SBSQ HOSP IP/OBS HIGH 50: CPT | Mod: GC | Performed by: INTERNAL MEDICINE

## 2021-11-01 PROCEDURE — 85018 HEMOGLOBIN: CPT | Performed by: PHYSICIAN ASSISTANT

## 2021-11-01 PROCEDURE — 80197 ASSAY OF TACROLIMUS: CPT | Performed by: PHYSICIAN ASSISTANT

## 2021-11-01 PROCEDURE — P9016 RBC LEUKOCYTES REDUCED: HCPCS | Performed by: PHYSICIAN ASSISTANT

## 2021-11-01 PROCEDURE — 86900 BLOOD TYPING SEROLOGIC ABO: CPT | Performed by: SURGERY

## 2021-11-01 PROCEDURE — 250N000013 HC RX MED GY IP 250 OP 250 PS 637: Performed by: NURSE PRACTITIONER

## 2021-11-01 PROCEDURE — 250N000012 HC RX MED GY IP 250 OP 636 PS 637: Performed by: SURGERY

## 2021-11-01 PROCEDURE — 250N000012 HC RX MED GY IP 250 OP 636 PS 637: Performed by: NURSE PRACTITIONER

## 2021-11-01 PROCEDURE — 250N000011 HC RX IP 250 OP 636: Performed by: STUDENT IN AN ORGANIZED HEALTH CARE EDUCATION/TRAINING PROGRAM

## 2021-11-01 PROCEDURE — 36415 COLL VENOUS BLD VENIPUNCTURE: CPT

## 2021-11-01 PROCEDURE — 120N000011 HC R&B TRANSPLANT UMMC

## 2021-11-01 RX ORDER — FUROSEMIDE 10 MG/ML
40 INJECTION INTRAMUSCULAR; INTRAVENOUS ONCE
Status: COMPLETED | OUTPATIENT
Start: 2021-11-01 | End: 2021-11-01

## 2021-11-01 RX ORDER — WARFARIN SODIUM 3 MG/1
3 TABLET ORAL
Status: COMPLETED | OUTPATIENT
Start: 2021-11-01 | End: 2021-11-01

## 2021-11-01 RX ORDER — BISACODYL 10 MG
10 SUPPOSITORY, RECTAL RECTAL ONCE
Status: DISCONTINUED | OUTPATIENT
Start: 2021-11-01 | End: 2021-11-02 | Stop reason: CLARIF

## 2021-11-01 RX ADMIN — MYCOPHENOLATE MOFETIL 750 MG: 250 CAPSULE ORAL at 18:34

## 2021-11-01 RX ADMIN — HEPARIN SODIUM 1950 UNITS/HR: 10000 INJECTION, SOLUTION INTRAVENOUS at 11:13

## 2021-11-01 RX ADMIN — ACETAMINOPHEN 650 MG: 325 TABLET, FILM COATED ORAL at 03:53

## 2021-11-01 RX ADMIN — POLYETHYLENE GLYCOL 3350 17 G: 17 POWDER, FOR SOLUTION ORAL at 11:13

## 2021-11-01 RX ADMIN — TACROLIMUS 2 MG: 1 CAPSULE ORAL at 18:34

## 2021-11-01 RX ADMIN — VALGANCICLOVIR 450 MG: 450 TABLET, FILM COATED ORAL at 07:46

## 2021-11-01 RX ADMIN — WARFARIN SODIUM 3 MG: 3 TABLET ORAL at 18:34

## 2021-11-01 RX ADMIN — TACROLIMUS 2 MG: 1 CAPSULE ORAL at 07:46

## 2021-11-01 RX ADMIN — ONDANSETRON 4 MG: 4 TABLET, ORALLY DISINTEGRATING ORAL at 15:36

## 2021-11-01 RX ADMIN — Medication 400 MG: at 11:13

## 2021-11-01 RX ADMIN — CITALOPRAM HYDROBROMIDE 20 MG: 20 TABLET ORAL at 07:46

## 2021-11-01 RX ADMIN — DOCUSATE SODIUM 50 MG AND SENNOSIDES 8.6 MG 1 TABLET: 8.6; 5 TABLET, FILM COATED ORAL at 07:46

## 2021-11-01 RX ADMIN — CEPHALEXIN 500 MG: 250 CAPSULE ORAL at 07:46

## 2021-11-01 RX ADMIN — MYCOPHENOLATE MOFETIL 750 MG: 250 CAPSULE ORAL at 07:46

## 2021-11-01 RX ADMIN — FUROSEMIDE 40 MG: 10 INJECTION, SOLUTION INTRAVENOUS at 11:12

## 2021-11-01 RX ADMIN — ATORVASTATIN CALCIUM 10 MG: 10 TABLET, FILM COATED ORAL at 07:46

## 2021-11-01 RX ADMIN — CEPHALEXIN 500 MG: 250 CAPSULE ORAL at 19:53

## 2021-11-01 RX ADMIN — LIDOCAINE 2 PATCH: 560 PATCH PERCUTANEOUS; TOPICAL; TRANSDERMAL at 07:45

## 2021-11-01 ASSESSMENT — ACTIVITIES OF DAILY LIVING (ADL)
ADLS_ACUITY_SCORE: 5

## 2021-11-01 NOTE — PROVIDER NOTIFICATION
"Lab called author to report a critical lab value. Pt's hemoglobin was 6.9 with AM lab draw. Transplant surgery on-call was paged the following:    \"Alvaro Pierce:    Critical lab value: Hemoglobin 6.9.    Kris, [phone]\"    No new orders have been placed at this time.   "

## 2021-11-01 NOTE — PROGRESS NOTES
Transplant Surgery  Inpatient Daily Progress Note  2021    Assessment & Plan: Raj Pierce is a 50 year old male  with a PMH significant for ESRD 2/2 PCKD s/p kidney transplant () c/b poor function (small graft). Other PMH includes HTN, hyperparathyroidism, and SILVIA (with cpap). Now status post pre-emptive  donor (DCD) kidney re-transplant on 10/23. Discharged 10/26 POD 3. Readmitted 10/27 for emergent exploratory laparotomy due to renal vein thrombosis.    S/p exploratory left lower quadrant of kidney transplant, back table flush, thrombectomy of renal vein, thrombectomy of iliac vein, reimplantation of kidney transplant, with ureteral stent removal and placement of a new ureteral stent, intraoperative ultrasound, intraoperative of transit time flow measurment.     POD 5    Today's changes  - transfuse 1 unit pRBCs  - 40 mg IV lasix  - Dulcolax suppository x1    Graft function:   Kidney graft, renal vein thrombosis: Cr stable over last 4 days, improved slightly to 5.43 this AM. Baseline 3. UOP improving now with 100 ml/hr for the last 2 days. 10/29 renogram showed that current function primarily from 2015 graft. Repeat US 10/30 showed increase in size of likely perinephric hematomas, largest measuring approximately [id number].   Surgical site infection: Erythema at incision, appears similar to prior today. Continue keflex.    Immunosuppression management: High risk protocol due to DGF    mg BID  Tac 4 mg BID. Goal 8-10. Level 15.3 (from 15.2), decreased dose yesterday. Will repeat level tomorrow.   Complexity of management: High. Contributing factors: anemia, thrombocytopenia     Hematology:    Acute blood loss, chronic anemia: Received 2 units RBC 10/27, 1 unit RBC on 10/28. Has been stable hovering slightly above 7 since then. This morning, Hgb of 6.9 --> transfuse 1 unit RBC. Will repeat hgb this afternoon and then monitor qday.    Anticoagulation for renal vein thrombosis: Renal vein  thrombosis, thrombectomy of iliac vein, reimplantation of kidney transplant. Low intensity heparin drip, currently at 1950/hr, transitioning to Warfarin dosed by pharmacy with goal INR 2-2.5. Hep < 0.20, INR 1.16.    Cardiorespiratory: Stable on RA. Encourage frequent IS.  Hypotension, secondary to bleeding, third spacing: Resolved with blood transfusions and fluid replacement. Weight continues to be up, currently 97.7 (93.5 at admit), but slowly improving. Will give 40 IV lasix today after blood transfusion.      GI/Nutrition: JIGAR - continue low K diet. Potassium level wnl at this time. Continue to encourage PO intake.    Endocrine: Stress hyperglycemia, has improved over the last 24 hours with BG ranging from 80s - 100s.     Fluid/Electrolytes: MIVF: None. Electrolytes acceptable.     : Wade to remain x 14d due to new surgical anastomosis. Stent in place, remove in 4 weeks.     Infectious disease: Afebrile. WBC 13.3 (11.9), will continue to treat potential incisional infection.   Surgical site infection: Keflex added 10/31. Incision appears mildly erythematous .    Prophylaxis: DVT (heparin + warfarin), valcyte x 6 months (D+/R-), bactrim    Disposition: 7A     Medical Decision Making: Medium    Fellow/JATINDER/Resident Provider: Nette Pardo MD    Faculty: George Zhu MD    _________________________________________________________________  Transplant History: Admitted 10/27/2021 for renal vein thrombosis.  10/23/2021 (Kidney), 10/13/2015 (Kidney), Postoperative day: 2211 (Kidney), 9 (Kidney)     Interval History: History is obtained from the patient  Overnight events: Hgb 6.9 overnight. No other acute events. Patient reports feeling well overall, tolerating a regular diet, passing flatus. Up walking some. Will continue to walk and use IS as able.     ROS:   A 10-point review of systems was negative except as noted above.    Meds:    atorvastatin  10 mg Oral Daily     cephALEXin  500 mg Oral Q12H DONITA      citalopram  20 mg Oral Daily     lidocaine  1-2 patch Transdermal Q24H     lidocaine   Transdermal Q8H     magnesium oxide  400 mg Oral Daily with lunch     mycophenolate  750 mg Oral BID IS     polyethylene glycol  17 g Oral Daily     senna-docusate  1 tablet Oral BID     sulfamethoxazole-trimethoprim  1 tablet Oral Once per day on Tue Thu Sat     tacrolimus  2 mg Oral BID IS     valGANciclovir  450 mg Oral Once per day on Mon Thu     Physical Exam:  General Appearance: NAD, non-toxic, sitting up in chair  Skin: normal  Heart: perfused  Lungs: normal rate and work of breathing  Abdomen: soft, non-tender, non-distended. LLQ incision with mild surrounding erythema. No active drainage. Staples in place.   : Wade in place draining yellow urine.   Neurologic: alert and oriented, no gross deficit.     Current vitals:   /76 (BP Location: Right arm)   Pulse 85   Temp 98.4  F (36.9  C) (Oral)   Resp 16   Wt 97.7 kg (215 lb 4.8 oz)   SpO2 95%   BMI 30.89 kg/m      Vital sign ranges:    Temp:  [98.2  F (36.8  C)-99.9  F (37.7  C)] 98.4  F (36.9  C)  Pulse:  [85-99] 85  Resp:  [16-18] 16  BP: (118-123)/(76-81) 120/76  SpO2:  [93 %-96 %] 95 %  Patient Vitals for the past 24 hrs:   BP Temp Temp src Pulse Resp SpO2 Weight   11/01/21 0658 -- -- -- -- -- -- 97.7 kg (215 lb 4.8 oz)   11/01/21 0326 120/76 98.4  F (36.9  C) Oral 85 16 95 % --   10/31/21 2308 122/81 99.2  F (37.3  C) Oral 90 17 93 % --   10/31/21 1933 119/78 99.9  F (37.7  C) Oral 94 18 94 % --   10/31/21 1515 123/80 98.2  F (36.8  C) Oral 99 16 95 % --   10/31/21 1207 118/81 99  F (37.2  C) Oral 91 16 96 % --   10/31/21 0757 118/81 99  F (37.2  C) Oral 86 18 95 % --     Data:   CMP  Recent Labs   Lab 11/01/21  0440 10/31/21  0552 10/30/21  0613 10/30/21  0613    134   < > 134   POTASSIUM 4.4 4.4   < > 4.1   CHLORIDE 105 104   < > 105   CO2 19* 22   < > 18*   GLC 97 86   < > 100*   BUN 80* 79*   < > 83*   CR 5.43* 5.69*   < > 5.69*   GFRESTIMATED  11* 11*   < > 11*   LILIANA 7.0* 7.3*   < > 7.3*   MAG  --  2.3  --  2.3   PHOS 3.2 2.7   < > 3.6    < > = values in this interval not displayed.     CBC  Recent Labs   Lab 11/01/21  0440 10/31/21  2110 10/31/21  1133 10/31/21  0552   HGB 6.9*  6.9* 7.3*   < > 7.2*   WBC 13.3*  --   --  11.9*     --   --  217    < > = values in this interval not displayed.     COAGS  Recent Labs   Lab 11/01/21  0440 10/31/21  0552 10/30/21  0613 10/27/21  2153   INR 1.16* 1.28*   < > 1.43*   PTT  --   --   --  110*    < > = values in this interval not displayed.      Urinalysis  Recent Labs   Lab Test 10/23/21  0526 06/05/17  1253 01/25/16  1602 11/09/15  1300   COLOR Straw  --   --  Yellow   APPEARANCE Clear  --   --  Clear   URINEGLC Negative  --   --  Negative   URINEBILI Negative  --   --  Negative   URINEKETONE Negative  --   --  Negative   SG 1.009  --   --  1.008   UBLD Negative  --   --  Negative   URINEPH 7.0  --   --  5.5   PROTEIN 50 *  --   --  10*   NITRITE Negative  --   --  Negative   LEUKEST Negative  --   --  Negative   RBCU 0  --   --  <1   WBCU 1  --   --  1   UTPG 1.37* 1.30*   < >  --     < > = values in this interval not displayed.     Virology:  CMV DNA Quantitation Specimen   Date Value Ref Range Status   08/01/2016   Corrected    Plasma  CORRECTED ON 08/02 AT 1044: PREVIOUSLY REPORTED AS Blood       CMV IgG Antibody   Date Value Ref Range Status   07/10/2013 <0.20  Negative for anti-CMV IgG U/mL Final     EBV VCA IgG Antibody   Date Value Ref Range Status   07/10/2013 >750.00  Positive, suggests immunologic exposure. U/mL Final     Hepatitis C Antibody   Date Value Ref Range Status   10/23/2021 Nonreactive Nonreactive Final   04/25/2016  NR Final    Nonreactive   Assay performance characteristics have not been established for newborns,   infants, and children       Hep B Surface Loni   Date Value Ref Range Status   07/10/2013 0.0  Final     Comment:     Negative, No antibody detected when the value is  less than 5.0 mlU/mL.     BK Virus Result   Date Value Ref Range Status   06/05/2017 BK Virus DNA Not Detected BKNEG copies/mL Final   10/28/2015 BK Virus DNA Not Detected BKNEG copies/mL Final     BK Virus DNA copies/mL   Date Value Ref Range Status   10/23/2021 Not Detected Not Detected copies/mL Final     Discussed with the fellow and attending, who agree with my assessment and plan.     Nette Pardo  PGY1 N Surgery  11/1/2021

## 2021-11-01 NOTE — PLAN OF CARE
/76 (BP Location: Right arm)   Pulse 85   Temp 98.4  F (36.9  C) (Oral)   Resp 16   Wt 97.7 kg (215 lb 4.8 oz)   SpO2 95%   BMI 30.89 kg/m      Shift: 1900 - 0730  VS: Stable on RA, afebrile  Neuro: AOx4  Labs: Creatinine: 5.43; Hgb: 6.9 (On-call notified)  Respiratory: No shortness of breath reported. No cough noted   Pain/Nausea/PRN: PRN tylenol x2  Diet: 2 gram K diet  LDA: R PIV (Infusing heparin @ 18 mL/hr); L Fistula (Nonfunctional); Wade catheter; L NATHEN drain  GI/: Wade catheter is positional with adequate urine output. LBM: 10/29, pt reports he is passing flatus  Skin: No new findings this shift; L abdominal incision (erythema around incision)  Mobility: UAL  Plan: Continue to monitor heparin Xa levels (gtt is RN managed), hemoglobin levels to be checked q6h    Will give report to oncoming nurse. Pt left in stable condition, care relinquished at this time.

## 2021-11-01 NOTE — OP NOTE
Transplant Surgery  Operative Note    Preop Dx:  Renal vein thrombosis s/p kidney transplant  Postop Dx: same  Procedure: Left retroperitoneal wound exploration, transplant nephrectomy, back table flush of renal allograft and renal vein thrombectomy, re-implantation of kidney transplant, iliac vein thrombectomy, ureteral stent placement, intra-operative flow monitoring and ultrasound  Surgeon: Madina Warren MD primary  Gustavo Barton MD assisted with nephrectomy, back table preparation, re-implantation, and thrombectomy and flow monitoring and ultrasound  ASSISTANT:  Georges turner assisted with all portions of the operation. There was no qualified general surgery resident available to assist during this procedure.   Anesthesia: General  EBL: 200 ml  Fluids: 1600 mL crystalloid, 500 mL albumin 5%  UO: 500 cc  Drains: Jared-Peacock drain  Specimen: renal vein, iliac thrombus.  Complications: None  Findings:  Congested transplant allograft, renal vein/iliac vein thrombus  Complications: None.    Indication: The patient had  donor (DCD) kidney transplant on 10/23/21 and presented to the ED post discharge on 10/27 with hematuria and pain found to have renal vein thrombosis on ultrasound.  After discussing the risks and benefits of surgery and potential complications, the patient provided informed consent.     DETAILS OF PROCEDURE:  The patient was brought to the operating room emergently, placed in a supine position.  Perioperative prophylactic IV antibiotics were given.  Anesthesia was adminisitered. The left flank wound site was prepped and draped in the usual sterile fashion.  Time out was performed.    The prior left Esparza incision over the renal allograft was opened sharply and the fascial sutures were removed opening the fascia. Upon exploration of the retroperitoneal cavity, a purple, dusky, and congested/engorged kidney allograft was encountered. The renal vein and iliac renal confluence.  A small amount of clot was evacuated. A 1 cm crack was found across the spine of the kidney that had evidence of old blood. As we elevated the kidney, we noted that the parenchyma became more pink and viable and the kidney itself began to bleed. Arterial inflow was pulsatile and intact. At this point in time, we asked Dr. Barton to join the operation to help evaluate the viability of the graft. We felt the graft to be viable and opted to explant to better flush thrombus from the kidney. A back table set-up was arranged prior to explant. Proximal and distal control of the external iliac artery and vein was achieved and the kidney was explanted and brought to the back table after the ureter was freed from the bladder mucosa directly.    On the back table, the kidney was flushed with 2L of UW solution via both the renal artery and vein to remove any gross thrombus from the graft. A thrombectomy was prformed with nancy catheter which did not reveal much clot extending up into the kidney.    The iliac artery and vein were evaluated within the wound. Ultrasound noted clot within the iliac vein system that appeared to originate from an acute turn of the common to external iliac vein. The internal iliac artery and a tethering internal iliac vein were ligated to better free the vein and alleviate any thrombogenic positional sources. A thrombectomy of the iliac venous system was then performed with nancy catheters and the thrombus was sent for path. The kidney was brought back into the field and anastomosed to the external iliac artery and vein in traditional end to side fashion with 6-0 prolene suture. Flow monitoring was obtained that noted arterial flows of 200. The kidney appeared pink and had appropriate signal by doppler. The ureter was then anastomosed to the bladder at its former site in Liche fashion with 6-0 PDS suture. A 19 Fr NATHEN drain was placed given anticoagualation of the patient and technical  difficulty of ureteral re-anastomosis. This was fixated to the skin with 3-0 nylon stitch. Once hemostasis was obtained (including at the capsular crack), the fascia was closed with running 1 prolene suture. The subcutaneous tissue was approximated with 3-0 vicryl and skin stapled. The wound was then cleaned and dressed.    All needle, sponge, and instrument counts were accurate.  The patient tolerated the procedure well without apparent complications and was trasfered to the PACU in good condition.  Faculty was present for critical portions of the procedure.    Physician Attestation   I was present for the entire procedure between opening and closing.    Madina Warren MD  Date of Service (when I saw the patient): 10/27/2021

## 2021-11-01 NOTE — CONSULTS
Care Management Initial Consult    General Information  Assessment completed with: Patient, Spouse or significant other,    Type of CM/SW Visit: Initial Assessment    Primary Care Provider verified and updated as needed: Yes   Readmission within the last 30 days: other (see comments)      Reason for Consult: care coordination/care conference, discharge planning  Advance Care Planning:            Communication Assessment  Patient's communication style: spoken language (English or Bilingual)    Hearing Difficulty or Deaf: no   Wear Glasses or Blind: no    Cognitive  Cognitive/Neuro/Behavioral: WDL  Level of Consciousness: alert  Arousal Level: opens eyes spontaneously  Orientation: oriented x 4  Mood/Behavior: calm, cooperative  Best Language: 0 - No aphasia  Speech: clear, spontaneous, logical    Living Environment:   People in home: spouse     Current living Arrangements: house      Able to return to prior arrangements: yes       Family/Social Support:  Care provided by: self, spouse/significant other  Provides care for: no one  Marital Status:   Wife          Description of Support System: Supportive, Involved    Support Assessment: Adequate family and caregiver support    Current Resources:   Patient receiving home care services: No     Community Resources: None  Equipment currently used at home: none  Supplies currently used at home: None    Employment/Financial:  Employment Status:          Financial Concerns: No concerns identified           Lifestyle & Psychosocial Needs:  Social Determinants of Health     Tobacco Use: Low Risk      Smoking Tobacco Use: Never Smoker     Smokeless Tobacco Use: Never Used   Alcohol Use:      Frequency of Alcohol Consumption:      Average Number of Drinks:      Frequency of Binge Drinking:    Financial Resource Strain:      Difficulty of Paying Living Expenses:    Food Insecurity:      Worried About Running Out of Food in the Last Year:      Ran Out of Food in the Last  Year:    Transportation Needs:      Lack of Transportation (Medical):      Lack of Transportation (Non-Medical):    Physical Activity:      Days of Exercise per Week:      Minutes of Exercise per Session:    Stress:      Feeling of Stress :    Social Connections:      Frequency of Communication with Friends and Family:      Frequency of Social Gatherings with Friends and Family:      Attends Restoration Services:      Active Member of Clubs or Organizations:      Attends Club or Organization Meetings:      Marital Status:    Intimate Partner Violence:      Fear of Current or Ex-Partner:      Emotionally Abused:      Physically Abused:      Sexually Abused:    Depression: Not at risk     PHQ-2 Score: 0   Housing Stability:      Unable to Pay for Housing in the Last Year:      Number of Places Lived in the Last Year:      Unstable Housing in the Last Year:        Functional Status:  Prior to admission patient needed assistance:    Independent with all his own care needs.           Additional Information:  Pt s/p kidney transplant 10/23 readmitted for emergent exploratory lap d/t renal vein thrombosis.  Pt with a medical history significant for ESRD 2/2 PCKD s/p kidney transplant (2015) c/b poor function (small graft). Other PMH includes HTN, hyperparathyroidism, and SILVIA (with cpap).  Pt started on coumadin.    Pt status reviewed/discussed during care team rounds. Team anticipates patient will discharge once INR therapeutic.  Pt will need ATC arranged/confirmed.  Reviewed that when pt discharged on 10/26 he had declined home care.  Will need to confirm that PCP would be willing to manage coumadin post hospitalization, if not transplant team will manage.    Spoke with [facility].  Per Dolores they will be able to provide coumadin management but will need patient PCP to place anticoagulation clinic referral.    Reviewed with [facility], who agreed  to f/u with pt PCP Do Guerrero NP regarding anticoagulation referral request.      Met with pt.  Introduced RNCC role.  Reviewed anticipated plan for discharge. Pt confirmed that he and his wife will plan to stay at a local hotel for ATC appointments before returning home.  Declines need for home care. Agreed to f/u with pt once final plan for discharge has been confirmed.     1605:  Received VM from [facility] confirming that INR referral has been placed.     Nadia Painting RN BSN, PHN, ACM-RN  7A RN Care Coordinator  Phone: [phone]  Pager [phone]  To contact the weekend RNCC, Page: [phone]    11/1/2021 1:53 PM

## 2021-11-01 NOTE — DISCHARGE SUMMARY
[facility]    Discharge Summary  Transplant Surgery    Date of Admission:  10/27/2021  Date of Discharge:  11/7/2021  Discharging Provider: Elif Monique NP / Eulalio Emanuel MD PhD    Discharge Diagnoses   Active Problems:    Kidney replaced by transplant    Immunosuppressed status (H)    Anemia of chronic renal failure    Hypomagnesemia    Renal vein thrombosis (H)    S/p nephrectomy    Moderate malnutrition (H)    Chronic kidney disease-mineral and bone disorder    Low bicarbonate    Leukocytosis    Surgical site infection    Hyperkalemia    Procedure/Surgery Information     Procedure date:  10/23/21    Preoperative diagnosis:  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 Right kidney DCD transplant,  Donation after Circulatory Death , Left iliac fossa, with venous reconstruction. A J-J ureteral stent was placed.  2. Kidney allograft preparation on Back Table       Surgeon:  MARIYA CLAYTON    Fellow/Assistant:  Georges Mattson, fellow, There was no qualified resident to assist with this procedure.    Anesthesia:  General    Specimen:  None    Drains:  no drain     10/27/21  Preop Dx:  Renal vein thrombosis s/p kidney transplant  Procedure: Left retroperitoneal wound exploration, transplant nephrectomy, back table flush of renal allograft and renal vein thrombectomy, re-implantation, iliac vein thrombectomy, ureteral stent placement, intra-operative flow monitoring and ultrasound  Surgeon: Mariya Clayton MD primary  Gustavo Barton MD assisted with nephrectomy, back table preparation, re-implantation, and thrombectomy and flow monitoring and ultrasound  ASSISTANT:  Georges Mattson fellow assisted with all portions of the operation.     11/4/21  Procedure: Procedure(s):  NEPHRECTOMY, LEFT, Ureteral stent explant   Surgeon(s): Surgeon(s) and Role:     * Eulalio Emanuel MD - Primary     * Norma  Uday Woods MD - Fellow - Assisting     * Georges Mattson MD - Fellow - Assisting     History of Present Illness   Raj Pierce is a 50 year old male  with a PMH significant for ESRD 2/2 PCKD s/p kidney transplant () c/b poor function (small graft). Other PMH includes HTN, hyperparathyroidism, and SILVIA (with cpap). Now status post pre-emptive  donor (DCD) kidney re-transplant on 10/23. Discharged 10/26 POD 3. Readmitted 10/27 for emergent exploratory laparotomy due to renal vein thrombosis. S/p exploratory left lower quadrant of kidney transplant, back table flush, thrombectomy of renal vein, thrombectomy of iliac vein, reimplantation of kidney transplant, with ureteral stent removal and placement of a new ureteral stent, intraoperative ultrasound, intraoperative of transit time flow measurment. Non-functioning graft. S/p nephrectomy and removal of ureteral stent 2021.      Hospital Course   Raj Pierce was admitted on 10/27/2021.  The following problems were addressed during his hospitalization:    S/p DDKT 10/23, renal vein thrombosis 10/27, graft nephrectomy : US  showed non-occlusive clot in the left transplant renal vein with persistent elevated RI of 1.0. RTOR for non-functioning transplant kidney, s/p nephrectomy and ureteral stent removal. He will discharge with NATHEN drain.    Immunosuppression:  Induction immunosuppression via high risk protocol d/t DGF. Maintenance immunosuppression with  mg BID and tacrolimus 0.5 mg BID (Goal level 4-6).  Infectious prophylaxis with Valcyte (x6 months); Bactrim on hold d/t hyperkalemia-received Pentamidine 21.      Transplant coordinator: Deborah Sotelo, [phone]  Donor type:  DCD  DSA at time of transplant: NO  CMV:  Donor + / Recipient -  EBV:  Donor + / Recipient +  Thymoglobulin:  6 mg/kg  Anticoagulation: Warfarin x 1 month, INR goal 2-2.5.    Hematology:    Acute blood loss, chronic anemia: Received 2 units RBC 10/27, 1  unit RBC on 10/28, 1 unit RBC on 11/1. Has since been hemodynamically stable and required no additional product. Hgb ~7 on discharge, stable. Continue to monitor outpatient.   Anticoagulation for renal vein thrombosis: Renal vein thrombosis, thrombectomy of iliac vein, reimplantation of kidney transplant, nephrectomy. Start low intensity heparin drip, transitioning to warfarin with goal INR 2-2.5. Continue warfarin for 1 month.     GI/Nutrition:   Moderate malnutrition in the context of acute illness: Renal/Low K diet. Appreciate dietician recommendations.      Endocrine:   Mineral bone disorder: Started Ca supplement.     Fluid/Electrolytes:   Low bicarbonate: Continue sodium bicarbonate 650 mg BID.   Hypomagnesemia: Continue Mg Oxide 400 mg daily.  Hyperkalemia: Potassium elevated post-nephrectomy. Treated with fluids and IV lasix. Will discharge on lasix 20 mg PO daily. Hold Bactrim. Monitor labs twice weekly.      Infectious disease:   Leukocytosis: WBC trended up to 16.4, although likely secondary to stress from RTOR. Treating potential incisional infection. UA/UC, BC, CXR all negative. Decreased size of fluid collections on renal US, no new collections concerning for abscess under incision. WBC 11.8, now trending down, improving.    Surgical site infection: Keflex added 10/31. Incisional erythema improving. Continue Keflex for additional week after discharge.         Resolved Problems:   Hypotension, secondary to bleeding, third spacing: Resolved with blood transfusions and fluid replacement. 11/3 Duplex US DVT study negative.  Stress hyperglycemia: . Resolved.  Hypervolemia, LLE edema: L>R 11/3, LE DVT US negative. Patient already anticoagulated.     Discharge Disposition   Discharged to home   Condition at discharge: Stable    Pending Results   Final pathology results:   Case Report   Surgical Pathology Report                         Case: [id number]                                   Authorizing  Provider:  Madina Warren MD  Collected:           10/27/2021 01:56 PM           Ordering Location:     [facility] MAIN OR                 Received:            10/28/2021 08:31 AM           Pathologist:           Gaye Mena MD                                                            Specimen:    Other, Renal thrombus                                                                      Final Diagnosis   Renal thrombus, excision:  - Fibrin and blood elements, consistent with thrombus     These results will be followed up by transplant team  Unresulted Labs Ordered in the Past 30 Days of this Admission     Date and Time Order Name Status Description    11/4/2021  4:15 PM Prepare red blood cells (unit) Preliminary     11/4/2021  1:55 PM Surgical Pathology Exam In process     11/2/2021 10:11 AM Blood Culture Arm, Right Preliminary     11/2/2021 10:11 AM Blood Culture Hand, Right Preliminary           Primary Care Physician   Do Guerrero    Physical Exam   Temp: 97.3  F (36.3  C) Temp src: Oral BP: 126/89 Pulse: 72   Resp: 18 SpO2: 96 % O2 Device: None (Room air)    Vitals:    11/03/21 1354 11/04/21 0343 11/07/21 1100   Weight: 95.7 kg (211 lb) 95.1 kg (209 lb 11.2 oz) 90.6 kg (199 lb 11.2 oz)     Vital Signs with Ranges  Temp:  [97.3  F (36.3  C)-98.3  F (36.8  C)] 97.3  F (36.3  C)  Pulse:  [66-76] 72  Resp:  [18] 18  BP: (121-138)/(86-93) 126/89  SpO2:  [96 %-98 %] 96 %  I/O last 3 completed shifts:  In: -   Out: 3800 [Urine:3675; Drains:125]    Constitutional: appears comfortable  Eyes: EOMI  Respiratory: unlabored on RA  Cardiovascular: perfused  GI: abdomen soft, rounded. Lennie-incisional erythema noted, closed with staples, covered. NATHEN with serosanguinous output.  Genitourinary: Wade with donato/yellow UOP  Skin: warm, dry  Musculoskeletal: strength 5/5  Neurologic: A&Ox4  Neuropsychiatric: calm, cooperative    Consultations This Hospital Stay   PHARMACY IP CONSULT  PHARMACY IP CONSULT  SOT MEDICATION  HISTORY IP PHARMACY CONSULT  SOCIAL WORK IP CONSULT  PHARMACY IP CONSULT  NUTRITION SERVICES ADULT IP CONSULT  NEPHROLOGY KIDNEY/PANCREAS TRANSPLANT ADULT IP CONSULT  PHYSICAL THERAPY ADULT IP CONSULT  VASCULAR ACCESS CARE ADULT IP CONSULT  VASCULAR ACCESS CARE ADULT IP CONSULT  PHARMACY IP CONSULT  PHARMACY IP CONSULT  VASCULAR ACCESS CARE ADULT IP CONSULT  PHARMACY TO DOSE WARFARIN  PHARMACY IP CONSULT  PHARMACY IP CONSULT  INTERVENTIONAL RADIOLOGY ADULT/PEDS IP CONSULT  VASCULAR ACCESS CARE ADULT IP CONSULT  CARE MANAGEMENT / SOCIAL WORK IP CONSULT  PHARMACY IP CONSULT  PHARMACY IP CONSULT  PHARMACY IP CONSULT  PHARMACY IP CONSULT  PATIENT LEARNING CENTER IP CONSULT    Time Spent on this Encounter   I have spent greater than 30 minutes on this discharge.    Discharge Orders      Follow Up and recommended labs and tests    Coumadin Management:  [facility]  [phone], Fax [phone]  Indication: Renal vein thrombosis  Therapy Goal: INR 2-2.5     Reason for your hospital stay    You were admitted for a kidney transplant. Unfortunately, a renal vein thrombosis developed and the graft was removed. Your post-op course was complicated by wound infection and high potassium levels. You are discharging home in stable condition with close follow up.     Activity    Your activity upon discharge: Walk at least four times a day, lift no greater than 10 pounds for 6-8 weeks from the time of surgery.  No driving while taking narcotics or 3 weeks after surgery.     When to contact your care team    WHEN TO CONTACT YOUR  COORDINATOR:  Transplant Coordinator [phone]  Notify your coordinator if you have pain over your kidney, increased redness or drainage from your incision, fever greater than 100.5F, or decreased urine output.  Notify your coordinator immediately if you are ever unable to take your immunosuppressive medications for any reason.  If it is outside of office hours, please call the  [facility] switchboard at [phone] and ask to have the kidney transplant surgery fellow paged for urgent medical questions, or present to the emergency department.     Discharge Instructions    People with weakened immune systems are at higher risk of getting severely sick from SARS-CoV-2, the virus that causes COVID-19. They may also remain infectious for a longer period of time than others with COVID-19, but we cannot confirm this until we learn more about this new virus.    If you are immunocompromised, the best way to prevent COVID-19 is to avoid being exposed to this virus. For details, see CDC's advice for what you can do to prepare for COVID-19 and how to protect yourself and others.    Wash your hands often with soap and water for at least 20 seconds especially after you have been in a public place, or after blowing your nose, coughing, or sneezing.   If soap and water are not readily available, use a hand  that contains at least 60% alcohol. Cover all surfaces of your hands and rub them together until they feel dry.  Avoid leaving home as much as possible and practice social distancing.   If you must leave home, avoid other people as much as possible by practicing social distancing. Maintain a distance of at least 6 feet (2 meters) between you and people outside your household.  Avoid large gatherings or places where people congregate.  Have supplies, food, and medicine delivered to your home.  Cover your mouth and nose with a cloth face covering when around others to protect other people in case you are infected, and ask others to do the same.   Clean AND disinfect frequently touched surfaces. This includes tables, doorknobs, light switches, countertops, handles, desks, phones, keyboards, toilets, faucets, and sinks.     Wound care and dressings    Instructions to care for your wound at home: Keep your incision clean and dry. Wash daily with soap and water in the shower, but do not soak or  scrub. If you have staples in place, they will be removed in 3 weeks after operation.     Tubes and drains    You are going home with the following tubes or drains:  NATHEN drain: Please monitor color and amount of output.     Monitor and record    Monitor blood pressure and weight daily initially post transplant.     [facility] Follow-up and recommended labs and tests    LABS:  CBC, BMP, Mg, Phos, INR to be drawn daily every Monday, Thursday by an outpatient lab.   Tacrolimus levels (12 hours after administration) daily while in ATC then 2 times weekly.     FOLLOW UP APPOINTMENTS:  Remember to always bring an updated medication list to all appointments.     Follow up with Karena Roy NP in clinic on Thursday 11/11 at 10 am.   An appointment with your transplant surgeon will be scheduled for approximately 2 weeks following discharge from the hospital.  Your transplant surgeon is: Dr. Warren.  You will follow up with transplant nephrology in clinic as scheduled.   Follow up with primary care provider in 4-8 weeks. (Pt to schedule)  Call scheduling at [phone] if you have not heard about your appointments within 48 hours after discharge.     Diet    Diet recommendations post-transplant: Heart healthy dietary habits long term (low saturated/trans fat, low sodium). High protein diet x 8 weeks. Practice food safety precautions.     Discharge Medications   Current Discharge Medication List      START taking these medications    Details   calcium carbonate-vitamin D (OS-LILIANA WITH D) 500-200 MG-UNIT tablet Take 1 tablet by mouth 2 times daily  Qty: 60 tablet, Refills: 2    Associated Diagnoses: Kidney transplant recipient      cephALEXin (KEFLEX) 500 MG capsule Take 1 capsule (500 mg) by mouth every 12 hours for 7 days  Qty: 14 capsule, Refills: 0    Associated Diagnoses: Surgical site infection      furosemide (LASIX) 20 MG tablet Take 1 tablet (20 mg) by mouth daily  Qty: 30 tablet, Refills: 2    Associated  Diagnoses: Kidney transplant recipient      Lidocaine (LIDOCARE) 4 % Patch Place 1 patch onto the skin every 24 hours To prevent lidocaine toxicity, patient should be patch free for 12 hrs daily.  Qty: 15 patch, Refills: 0    Associated Diagnoses: Kidney transplant recipient      ondansetron (ZOFRAN-ODT) 4 MG ODT tab Take 1 tablet (4 mg) by mouth every 6 hours as needed for nausea or vomiting  Qty: 15 tablet, Refills: 0    Associated Diagnoses: Kidney transplant recipient      sodium bicarbonate 650 MG tablet Take 2 tablets (1,300 mg) by mouth 2 times daily  Qty: 120 tablet, Refills: 2    Associated Diagnoses: Kidney transplant recipient      !! warfarin ANTICOAGULANT (COUMADIN) 1 MG tablet Take 1 tablet (1 mg) by mouth daily Total dose = 6 mg daily  Qty: 7 tablet, Refills: 0    Associated Diagnoses: Renal vein thrombosis of kidney transplant (H)      !! warfarin ANTICOAGULANT (COUMADIN) 5 MG tablet Take 1 tablet (5 mg) by mouth daily Total dose = 6 mg daily  Qty: 7 tablet, Refills: 0    Associated Diagnoses: Renal vein thrombosis of kidney transplant (H)       !! - Potential duplicate medications found. Please discuss with provider.      CONTINUE these medications which have CHANGED    Details   tacrolimus (GENERIC EQUIVALENT) 0.5 MG capsule Take 1 capsule (0.5 mg) by mouth 2 times daily  Qty: 60 capsule, Refills: 11    Associated Diagnoses: Immunosuppressed status (H)      tacrolimus (GENERIC EQUIVALENT) 1 MG capsule Take 4 capsules (4 mg) by mouth 2 times daily To have available for dose adjustments. Discharge dose will be 0.5 mg BID.  Qty: 240 capsule, Refills: 11    Associated Diagnoses: Kidney transplant candidate      valGANciclovir (VALCYTE) 450 MG tablet Take 1 tablet (450 mg) by mouth twice a week  Qty: 60 tablet, Refills: 5    Associated Diagnoses: Kidney transplant recipient         CONTINUE these medications which have NOT CHANGED    Details   atorvastatin (LIPITOR) 10 MG tablet Take 10 mg by mouth  daily      citalopram (CELEXA) 20 MG tablet Take 20 mg by mouth daily.      Magnesium Oxide 250 MG TABS Take 250 mg by mouth 2 times daily       mycophenolate (GENERIC EQUIVALENT) 250 MG capsule Take 3 capsules (750 mg) by mouth 2 times daily  Qty: 180 capsule, Refills: 11    Associated Diagnoses: Kidney replaced by transplant; Secondary renal hyperparathyroidism (H)      oxyCODONE (ROXICODONE) 5 MG tablet Take 1 tablet (5 mg) by mouth every 6 hours as needed for moderate to severe pain  Qty: 20 tablet, Refills: 0    Associated Diagnoses: Kidney transplant recipient      polyethylene glycol (MIRALAX) 17 GM/Dose powder Take 17 g by mouth daily  Qty: 510 g, Refills: 0    Associated Diagnoses: Kidney transplant recipient      senna-docusate (SENOKOT-S/PERICOLACE) 8.6-50 MG tablet Take 1 tablet by mouth 2 times daily  Qty: 60 tablet, Refills: 0    Associated Diagnoses: Kidney transplant recipient      acetaminophen (TYLENOL) 325 MG tablet Take 2 tablets (650 mg) by mouth every 4 hours as needed for other (For optimal non-opioid multimodal pain management to improve pain control.)    Associated Diagnoses: Kidney transplant recipient         STOP taking these medications       sulfamethoxazole-trimethoprim (BACTRIM) 400-80 MG tablet Comments:   Reason for Stopping:         vitamin D3 (CHOLECALCIFEROL) 2000 units (50 mcg) tablet Comments:   Reason for Stopping:             Allergies   No Known Allergies  Data   Most Recent 3 CBC's:  Recent Labs   Lab Test 11/07/21  0200 11/06/21  0601 11/05/21  0958   WBC 11.8* 12.8* 16.4*   HGB 7.3* 7.3* 7.7*   MCV 94 92 93   * 491* 441      Most Recent 3 BMP's:  Recent Labs   Lab Test 11/07/21  1208 11/07/21  0629 11/07/21  0200 11/06/21  0601 11/05/21  1406 11/05/21  0649   NA  --   --  135 135  --  135   POTASSIUM 5.5* 5.3 5.5* 4.9  4.9   < > 5.7*   CHLORIDE  --   --  109 106  --  108   CO2  --   --  20 20  --  17*   BUN  --   --  83* 90*  --  86*   CR  --   --  5.31* 5.75*  --   5.50*   ANIONGAP  --   --  6 9  --  10   LILIANA  --   --  7.9* 7.9*  --  8.0*   GLC  --   --  103* 129*  --  132*    < > = values in this interval not displayed.     Most Recent 2 LFT's:  Recent Labs   Lab Test 10/23/21  0358 10/13/15  0937   AST 23 30   ALT 24 28   ALKPHOS 56 89   BILITOTAL 0.6 0.9     Most Recent INR's and Anticoagulation Dosing History:  Anticoagulation Dose History     Recent Dosing and Labs Latest Ref Rng & Units 11/3/2021 11/4/2021 11/4/2021 11/4/2021 11/5/2021 11/6/2021 11/7/2021    Warfarin 5 mg - - - - 5 mg - - -    Warfarin 6 mg - 6 mg - - - - 6 mg -    Warfarin 7.5 mg - - - - - 7.5 mg - -    INR 0.85 - 1.15 1.23(H) 1.45(H) 1.40(H) - 1.60(H) 1.84(H) 1.88(H)        Most Recent 3 Troponin's:No lab results found.  Most Recent Cholesterol Panel:  Recent Labs   Lab Test 10/23/21  0358   CHOL 162   LDL 77   HDL 64   TRIG 105     Most Recent 6 Bacteria Isolates From Any Culture (See EPIC Reports for Culture Details):  Recent Labs   Lab Test 10/17/15  1030 10/17/15  0506 10/17/15  0502 10/17/15  0455   CULT No growth Normal harley No growth No growth     Most Recent TSH, T4 and A1c Labs:  Recent Labs   Lab Test 10/23/21  0358   A1C 5.6     Results for orders placed or performed during the hospital encounter of 10/27/21   XR Abdomen Port 1 View    Narrative    Exam: XR ABDOMEN PORT 1 VIEWS, 10/27/2021 5:45 PM    Indication: Emergent case needs x-ray before closed.    Comparison: CT abdomen and pelvis 10/27/2021.    Findings:   AP portable supine abdominal film. NATHEN drain tip terminates over the  left lower mid abdomen. Left lower quadrant transplant kidney with  ureteral stent in position. Skin staples projecting over the left  lower quadrant and pelvis. No radiopaque foreign density.    Gas-filled loops of small and large bowel. No pneumatosis or portal  venous gas. Visualized soft tissues are unremarkable. Bilateral pelvic  phleboliths.    Osseous structures are intact.      Impression    Impression:    1. No radiopaque foreign object.  2. NATHEN drain tip terminates over the low abdomen and left lower  quadrant transplant kidney ureteral stent.  3. Nonobstructive bowel gas pattern.     I have personally reviewed the examination and initial interpretation  and I agree with the findings.    LEOPOLDO JOYA MD         SYSTEM ID:  [id number]   XR Chest Port 1 View    Narrative    XR CHEST PORT 1 VIEW  10/27/2021 6:40 PM      HISTORY: central line    COMPARISON: CT chest, abdomen and pelvis 10/27/2021, chest x-ray  10/23/2021.    FINDINGS:   Portable AP 20 degrees upright chest x-ray. Interval removal of right  internal jugular central catheter.    Trachea is midline. Cardiac silhouette is within normal limits.  Pulmonary vasculature is distinct. No focal airspace opacity, pleural  effusion, pneumothorax.    Bony structures appear intact.      Impression    IMPRESSION:   No acute cardiopulmonary pathology.    I have personally reviewed the examination and initial interpretation  and I agree with the findings.    LEOPOLDO JOYA MD         SYSTEM ID:  [id number]   US Renal Transplant     Value    Radiologist flags Persistent elevation of resistive indices (Urgent)    Narrative    EXAMINATION: US RENAL TRANSPLANT,  10/27/2021 6:42 PM     COMPARISON: 10/27/2021    HISTORY: Post thrombectomy, explant and re-transplant into the left  lower quadrant.    TECHNIQUE:  Grey-scale, color Doppler and spectral flow analysis.    FINDINGS:  The transplant kidney is located in left lower quadrant, and measures  13.0 cm. Parenchyma is of normal thickness and echogenicity. No focal  lesions. No hydronephrosis. Perinephric fluid collection positioned  inferior lateral measuring 5.2 x 2.0 x 1.6 cm.    Renal artery flow:   48 cm/sec peak systolic at hilum.  258 cm/sec peak systolic at anastomosis.  Arcuate artery resistive indices (upper to lower): 1.0, 1.0, 1.0    Delayed uptake and spectral widening of the waveforms of the  upper  arcuate. Spectral widening of the renal artery at the hilum.    Renal Vein Flow:  33 cm/sat hilum.   Not visualized at the level of the anastomosis.     Iliac artery flow:  107 cm/s peak systolic above anastomosis.  156 cm/s peak systolic below anastomosis.    Iliac vein flow:  Patent above and below the anastomosis.      Impression    IMPRESSION: Post surgical changes of left lower quadrant renal  transplant with elevated velocities within the renal artery and  resistive indices of 1.0. The renal vein at the hilum is patent and  demonstrates flow of 33 cm/s, the vein is not visualized at the level  of the anastomosis.    [Urgent Result: Persistent elevation of resistive indices]    Finding was identified on 10/27/2021 6:40 PM.     Dr. Mattson was contacted by Dr. Lyons at 10/27/2021 6:58 PM and  verbalized understanding of the urgent finding.     I have personally reviewed the examination and initial interpretation  and I agree with the findings.    LEOPOLDO JOYA MD         SYSTEM ID:  [id number]   US Renal Transplant Portable    Narrative    EXAMINATION: US RENAL TRANSPLANT PORTABLE  10/28/2021 9:01 AM      CLINICAL HISTORY: Evalaute doppler. Evaluate for hematoma    COMPARISON: Ultrasound 10/27/2021      FINDINGS:     There is a left lower quadrant renal transplant which measures 11.9  cm, previously 13.0 cm. The transplant kidney demonstrates normal  echogenicity. No hydronephrosis. Ultrasound of transplant kidney time  collection that measures There is perinephric fluid collection  positioned inferior lateral measuring 1.8 x 2.6 x 2.3 cm, previously  5.2 x 2.0 x 1.6 cm. Superior medial perinephric fluid collection  measuring 1.5 x 1.7 x 1.6 cm. More superiorly there is additional  fluid collection measuring 0.5 x 3.6 cm.     The urinary bladder is  obscured by overlying bowel gas.     The arcuate artery resistive indices are 1, 0.71, and 0.74.   The renal artery and renal vein at the anastomoses are  not visualized.  There are no abnormal waveforms in the renal artery at the renal hilum  and in intrarenal arteries.   The renal vein is patent at the hilum.   The iliac artery and vein are patent below the anastomosis. The iliac  artery and vein are not visualized above the anastomosis.      Impression    IMPRESSION:     1. Left lower quadrant renal transplant with normal grayscale  appearance.  2. Difficult Doppler examination with nonvisualization of renal artery  and vein anastomosis; visualized renal artery at the hilum and  intrarenal arteries show elevated resistive index. Improved resistive  index at the renal hilum, mid and upper arcuate arteries compared to  prior ultrasound 10/27/2021.  2. Multiple perinephric fluid collections, as described.    I have personally reviewed the examination and initial interpretation  and I agree with the findings.    DEIRDRE AVALOS MD         SYSTEM ID:  [id number]   NM Renogram    Narrative    Examination:  NM RENOGRAM 10/29/2021 11:38 AM     Comparison: Ultrasound renal transplant 10/29/2021    History: Renal transplant dysfunction    Additional history from chart: Renal transplant on 10/23/2021, history  of additional renal transplant on the left in 2015 which is no longer  functional.    Dose: 9.9 mCi Tc-99m MAG3.    Findings: Evaluation of initial blood flow images demonstrates prompt  radiotracer uptake by the transplanted kidney within the right pelvis.  Delayed images demonstrate reduced excretion of radiotracer which  appears to flow freely to the bladder.       Impression    Impression:   1. Poor excretion of radiotracer which may indicate acute tubular  necrosis (or vasomotor nephropathy).  2. No hydronephrosis. No urinary leak. Patent urinary flow into the  bladder.  3. Normal perfusion.    I have personally reviewed the examination and initial interpretation  and I agree with the findings.    MAYO CASE MD         SYSTEM ID:  [id number]    Renal  Transplant    Narrative    EXAMINATION: US RENAL TRANSPLANT EXAMINATION: US RENAL TRANSPLANT,   10/29/2021 10:22 AM     COMPARISON: None.      HISTORY: Hx renal vein thrombosis. POD #2 reimplant. Doppler and eval  fluid collections.    TECHNIQUE:  Grey-scale, color Doppler and spectral flow analysis.    FINDINGS:  The transplant kidney is located left lower quadrant, and measures 11  cm. Parenchyma is of normal thickness and echogenicity. No focal  lesions. No hydronephrosis. Multiple perinephric fluid collections  this includes anterior fluid collection measuring 1.5 x 3.2 x 5.5 cm  just under the surface of the skin. Additional 1.3 x 2.2 x 2.6 cm  fluid collection under the anterior abdominal wall and anterior to the  kidney. Superior and posterior to the transplant kidney is a 2.7 x 3.5  x 3.0 cm fluid collection. These are all likely postsurgical.    Renal artery flow:   36 cm/sec peak systolic at hilum.  32 peak systolic at anastomosis.  Arcuate artery resistive indices (upper to lower): 0.75, 0.72, 0.66  (likely higher due to a baseline of artifact)    Renal Vein Flow:  20 cm/s at hilum.   74 cm/s at anastomosis.    Iliac artery flow:  110 cm/s peak systolic above anastomosis.  96 cm/s peak systolic below anastomosis.    Iliac vein flow:  Patent above and below the anastomosis.      Impression    IMPRESSION:   1. Relatively stable postoperative fluid collections.  2. Patent Doppler exam but with significant resistance to flow. Still  relatively high resistance waveform in the renal artery and arcuate  arteries. Decreased velocity from 62-36 compared to yesterday. Renal  vein is patent although this does not exclude renal vein thrombosis at  the venule level. Differential includes acute tubular necrosis.  3. Elevated velocity at the renal vein on anastomosis compared to the  hilum. Likely postoperative edema. Attention on follow-up.    RANJIT ELLIS MD         SYSTEM ID:  [id number]   US Renal Transplant     Narrative    ULTRASOUND RENAL TRANSPLANT  10/30/2021 9:55 AM    CLINICAL HISTORY: Left re-transplant kidney postoperative day 2.    COMPARISONS: Renal transplant ultrasound 10/29/2021, renal scan  10/29/2021, CT CAP 10/27/2021    REFERRING PROVIDER: ALVIN BARNES    TECHNIQUE: Left lower quadrant renal transplant evaluated with  grayscale, color Doppler, and Doppler waveform ultrasound. Transplant  renal vasculature evaluated with color Doppler and Doppler waveform  ultrasound.    FINDINGS  LEFT LOWER QUADRANT RENAL TRANSPLANT         Fluid collections: Multiple peritransplant fluid collections  again demonstrated, as follows:  -Anterior to the transplant, 11.4 x 2.2 x 4.9 cm  -Medial to the transplant, 2.2 x 1.5 x 2.2 cm  -Adjacent to the upper pole, 3.3 x 4.2 x 3.1 cm         Renal artery:            Hilum: 33 cm/s            Anastomosis: 32 cm/s         Renal vein:            Hilum: 15 cm/s            Anastomosis: 85 cm/s         Length: 11.3 cm         Arcuate artery resistive index (superior / mid / inferior): 0.75  / 0.72 / 0.73         Parenchyma: Normal. No stone, mass, or hydronephrosis.    Iliac artery:       Above anastomosis: 42 cm/s       Below anastomosis: 99 cm/s    Iliac vein:       Above anastomosis: 37 cm/s       Below anastomosis: 35 cm/s    Bladder: Not visualized.       Impression    IMPRESSION:   1. Increasing peritransplant fluid collections with the largest  located anterior to the transplant kidney and measuring up to 11.4 x  4.9 cm, suspected to represent hematoma. The differential diagnosis  also includes seroma and urinoma.  2. Patent renal transplant artery and vein without evidence of  significant stenosis. Stable mildly elevated arcuate artery resistance  indices.    GUIDELINES:  For native kidneys:       Diagnostic criteria suggestive of > 60% diameter stenosis             Renal artery:             Peak systolic velocity > 180 cm/s             RAR > 3.5             Turbulent flow          Resistive Index Normal < 0.7    For renal transplants:       Renal transplant peak systolic velocity criteria range from > 180  cm/s to > 400 cm/s       Source suggests using:            Peak systolic velocity >/=300 cm/s            Spectral broadening            Intraparenchymal arterial acceleration time >/= 0.1 s     Source: Edith TIPTON, Neelam SENA, Juan AGUIRRE, et al. Screening for  transplant renal artery stenosis: Ultrasound-based stenosis  probability stratification. American Journal of Roentgenology.  2017;209: 8536-9805. 10.2214/AJR.[id number]    I have personally reviewed the examination and initial interpretation  and I agree with the findings.    ELEANOR VAUGHN MD         SYSTEM ID:  [id number]   XR Chest 2 Views    Addendum: 11/2/2021    No radiographic evidence of tuberculosis.    I have personally reviewed the examination and initial interpretation  and I agree with the findings.    KALYANI NOLASCO MD         SYSTEM ID:  [id number]      Addendum: 11/2/2021    No radiographic evidence of tuberculosis.      Narrative    Exam:  Chest X-ray 11/2/2021 11:56 AM    History: evaluate for pneumonia    Comparison: Chest radiograph and CT 10/20/2010.    Findings:   Frontal and lateral views of the chest. Unchanged right lower lobe  subsegmental atelectasis. No airspace opacity. The cardiac silhouette  is within normal limits.  No pleural effusion. No pneumothorax.  Visualized upper abdomen is unremarkable. No acute osseous  abnormality.       Impression    Impression:   1.  Unchanged right lower lobe subsegmental atelectasis. No new  airspace opacity.    KALYANI NOLASCO MD         SYSTEM ID:  [id number]   US Renal Transplant    Narrative    EXAMINATION: US RENAL TRANSPLANT,  11/2/2021 1:54 PM     COMPARISON: Transplant ultrasound 10/30/2021    HISTORY: Incisional erythema. Evaluate soft tissue for fluid  collection.    TECHNIQUE:  Grey-scale, color Doppler and spectral flow analysis.    FINDINGS:  The transplant  kidney is located in the left lower quadrant, and  measures 11.8 cm. Parenchyma is of normal thickness and echogenicity.  No focal lesions. No hydronephrosis.     There are multiple peritransplant fluid collections as follows:  -Subincisional collection anterior to the transplant, 5.9 x 1.7 x 3.1  cm, previously 6.6 x 1.6 x 3.2 cm. Mildly size of the fluid collection  has decreased.   -Medial to the transplant, 2 x 1.4 x 2.1 cm, previously 2.2 x 1.3 x  2.7 cm.  -Adjacent to the upper pole, 2.7 x 2.7 x 4 cm, previously 3 x 2.7 x  3.6 cm.    Renal artery flow:   56 cm/sec peak systolic at hilum.  135 peak systolic at anastomosis.  Arcuate artery resistive indices (upper to lower): 0.77, 0.67, 0.74    Renal Vein Flow:  21 cm/s at hilum.   69 cm/s at anastomosis.    Iliac artery flow:  132 cm/s peak systolic above anastomosis.  125 cm/s peak systolic below anastomosis.    Iliac vein flow:  Patent above and below the anastomosis.      Impression    IMPRESSION:  1. The subincisional fluid collection anterior to the transplant  kidney, is mildly decreased measures 5.9 x 1.7 x 2.1 cm, previously  6.6 x 1.6 x 3.2 cm. The fluid collections medial to the transplant and  adjacent to the upper pole of the transplant kidney are not  significantly changed compared to prior.  2. Patent renal transplant artery and vein. Persistent elevated  resistive indexes. Recommend attention on follow-up.    I have personally reviewed the examination and initial interpretation  and I agree with the findings.    DEIRDRE AVALOS MD         SYSTEM ID:  [id number]   US Lower Extremity Venous Duplex Left    Narrative    EXAMINATION: DOPPLER VENOUS ULTRASOUND OF THE LEFT LOWER EXTREMITY,  11/3/2021 1:35 PM     COMPARISON: None.    HISTORY: Left lower extremity swelling. Evaluate for DVT.    TECHNIQUE:  Gray-scale evaluation with compression, spectral flow, and  color Doppler assessment of the deep venous system of the left leg  from groin to knee, and  then at the ankle.    FINDINGS:  In the left lower extremity, the common femoral, femoral, popliteal  and posterior tibial veins demonstrate normal compressibility and  blood flow.      Impression    IMPRESSION:  No evidence left lower extremity deep venous thrombosis.    ELEANOR VAUGHN MD         SYSTEM ID:  [id number]   NM Renogram    Narrative    Examination:  NM RENOGRAM 11/3/2021 3:05 PM     Comparison: NM renogram 10/29/2021    History: Renal transplant dysfunction; 10/23/21 s/p kidney transplant  LLQ c/b renal vein thrombosis, 2015 kidney transplant RLQ. Evaluate  function of kidneys.    Additional history from chart: Renal transplant on 10/23/2021, history  of additional renal transplant on the left in 2015 which is no longer  functional.    Dose: 10 mCi Tc-99m MAG3.    Findings: Evaluation of initial blood flow images demonstrates normal  perfusion of the right lower quadrant transplant kidney. No flow to  the left lower quadrant transplant.     The remaining 30 minutes images demonstrate continuous accumulation  and retention of the radiotracer by the right lower quadrant  transplant kidney. Negligible excretion from the transplant. No  evidence of tracer uptake by the left lower quadrant transplant  kidney.     Post void images demonstrate mild residual radiotracer within the  bladder.      Impression    Impression:   1. Normal perfusion of right lower quadrant transplant kidney with  prolonged retention of radiotracer, most compatible with vasomotor  nephropathy (ATN).  2. No flow to left lower quadrant transplant. No uptake of the tracer  throughout the 30 minutes imaging. Findings can be seen with renal  vein thrombosis.      I have personally reviewed the examination and initial interpretation  and I agree with the findings.    REGGIE COOLEY MD         SYSTEM ID:  [id number]   US Renal Transplant    Narrative    EXAMINATION: US RENAL TRANSPLANT,  11/3/2021 11:05 PM     COMPARISON:  10/26/2015    HISTORY: eval new kidney transplant (RLQ), previous in LLQ with hx of  thrombosis    TECHNIQUE:  Grey-scale, color Doppler and spectral flow analysis.    FINDINGS:  The transplant kidney is located in the right lower quadrant, and  measures 9.3. Slightly lobulated and hyperechoic appearance of the  kidney. No hydronephrosis.    Renal artery flow:   58 cm/sec peak systolic at hilum.  23 peak systolic at anastomosis.  Arcuate artery resistive indices (upper to lower): 0.68, 0.70, 0.69    Renal Vein Flow:  17at hilum.   23 at anastomosis.    Iliac artery flow:  147 peak systolic above anastomosis.  126 peak systolic below anastomosis.    Iliac vein flow:  Patent above and below the anastomosis.      Impression    IMPRESSION: Note, this exam was done of the old right lower quadrant  renal transplant. Slightly atrophic and hyperechoic appearance of the  old right lower quadrant renal transplant. Patent Doppler evaluation.  If the desire was to evaluate the new left lower quadrant renal  transplant, recommend repeat order ultrasound.    I have personally reviewed the examination and initial interpretation  and I agree with the findings.    MAYO CASE MD         SYSTEM ID:  [id number]   US Renal Transplant    Narrative    EXAMINATION: US RENAL TRANSPLANT,  11/4/2021 12:33 AM     COMPARISON: 11/2/2021    HISTORY: please eval new kidney transplant    TECHNIQUE:  Grey-scale, color Doppler and spectral flow analysis.    FINDINGS:  The transplant kidney is located left lower quadrant, and measures  10.7 cm. The parenchyma is slightly more hypodense than prior,  although this may be technique related. There is, however, a  wedge-shaped hypodense defect at the superior pole concerning for  infarction and necrosis. There is a difficult exam to measure the  fluid collections, although the perinephric fluid collections appear  grossly unchanged from prior. There is no hydronephrosis.    Renal artery flow:   40  cm/sec peak systolic at hilum.  36 peak systolic at anastomosis.  Arcuate artery resistive indices (upper to lower): 1.0, 1.0, 1.0    Renal Vein Flow:  15 cm/sat hilum.   42 cm/s at anastomosis.  35 cm/s mid.  There is some nonocclusive filling defects near the anastomosis.    Iliac artery flow:  130 peak systolic above anastomosis.  178 peak systolic below anastomosis.    Iliac vein flow:  Patent above and below the anastomosis.    Bladder is decompressed with Wade catheter in place.      Impression    IMPRESSION:   1. There appears to be residual nonocclusive thrombus in the left  lower quadrant renal transplant vein near the anastomosis.  2. Elevated resistive indices at 1.0 cm. Nonspecific, although this  may be secondary to elevated backflow pressure from the above  described nonocclusive renal vein thrombus.  3. Slightly heterogeneous appearance of the renal transplant with  wedge-shaped defect at the superior pole concerning for hypoperfusion  with area of infarct.  4. Transplant renal artery is patent, although with slower flow than  would be expected. Attention on follow-up.    Vein thrombus, elevated resistive indices, and wedge-shaped defect  discussed with Dr. Cheng by Dr. Bettencourt at 12:50 AM on 11/4/2021.    I have personally reviewed the examination and initial interpretation  and I agree with the findings.    MAYO CASE MD         SYSTEM ID:  [id number]   POC US Guidance Needle Placement    Impression    Left side Erector Spinae Block   US Lower Extremity Venous Duplex Left    Narrative    EXAMINATION: DOPPLER VENOUS ULTRASOUND OF THE LEFT LOWER EXTREMITY,  11/5/2021 11:30 AM     COMPARISON: 11/3/2021    HISTORY: Status post left lower quadrant transplant nephrectomy  (history of iliac thrombectomy) rule out DVT     TECHNIQUE:  Gray-scale evaluation with compression, spectral flow, and  color Doppler assessment of the deep venous system of the left leg  from groin to knee, and then at the  ankle.    FINDINGS:  In the left lower extremity, the common femoral, femoral, popliteal  and posterior tibial veins demonstrate normal compressibility and  blood flow. Subcutaneous edema about the ankle.      Impression    IMPRESSION:  No evidence left lower extremity deep venous thrombosis. Subcutaneous  edema at the left ankle.     I have personally reviewed the examination and initial interpretation  and I agree with the findings.    SELENA SCRUGGS MD         SYSTEM ID:  [id number]

## 2021-11-01 NOTE — PROGRESS NOTES
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               - Baseline Creatinine: ~ TBD              - Proteinuria: Not checked post transplant              - Date DSA Last Checked: Jul/2018      Latest DSA: No              - BK Viremia: No              - Kidney Tx Biopsy: No              - Now with perinephric fluid collections favoring hematoma. Agree with IR consultation, possible sampling to differentiate hematoma, seroma, urinoma.    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  Exploration LLQ kidney transplant, back table flush, thrombectomy of renal vein, thrombectomy of iliac vein, reimplantation of kidney transplant, with ureteral stent removal and placement of a new ureteral stent  - On heparin with close monitoring of hemodynamic status  - transitioning to Warfarin dosed by pharmacy with goal INR 2-2.5    # Surgical site infection   Erythema at incision, started keflex 10/31    # Acute bloss anemia  Related to above, continue to check serial Hgb    # Immunosuppression: Tacrolimus immediate release (goal 8-10) and Mycophenolate mofetil (dose 750 mg every 12 hours)              - Induction with Recent Transplant:  PRA 67. Received high induction per DDF protocol due to prolonged cold ischemic time, DCD kidney              - Changes:  Yes; tacrolimus level high and dose adjusted to 2 mg BID    # Infection Prophylaxis:  - PJP: Sulfa/TMP (Bactrim)  - CMV: discordant Valganciclovir (Valcyte) x6 months    # Hypertension: Hypotensive;           Goal BP: < 150/90              - Volume status: Hypovolemic              - Changes: No, continue resuscitation    # Anemia in Chronic Renal Disease: Hgb: stable, low; likely slow bleed with heparin for RVT treatment/patency    NADIYA: No              - Iron studies: Low iron saturation    # Mineral Bone Disorder:  - Secondary renal hyperparathyroidism; PTH level: Not checked recently        On treatment: None  - Vitamin D; level: Not checked recently        On supplement: No  - Calcium; level: Low, but corrected normal      On supplement: No  - Phosphorus; level: Normal        On supplement: No     # Electrolytes:   - Potassium; level: Normal        On supplement: No  - Magnesium; level: Normal        On supplement: Yes  - Bicarbonate; level: Low       On supplement: No; can consider bicarbonate tomorrow  - Sodium; level: Normal     # Transplant History:  Etiology of Kidney Failure: Polycystic kidney disease (PKD)  Tx: DDKT  Transplant: 10/23/2021 (Kidney), 10/13/2015 (Kidney)  Donor Type: Donation after Circulatory Death  Crossmatch at time of Tx: negative  DSA at time of Tx: No  Significant changes in immunosuppression: None  CMV IgG Ab High Risk Discordance (D+/R-): No  EBV IgG Ab High Risk Discordance (D+/R-): No  Significant transplant-related complications: None     Recommendations were communicated to the primary team verbally.    Seen and discussed with Dr. Alexis Bailey MD   Pager: [phone]    Attestation:  This patient has been seen and evaluated by me, Francis Espinoza MD.  I have reviewed the note and agree with plan of care as documented by the fellow.       Interval History   Denies any new concerns. Has been passing gas and adequate UOP noted. Cr a little improved compared to  yesterday    Review of Systems   4 point ROS was obtained and negative except as noted in the Interval History.    MEDICATIONS:    atorvastatin  10 mg Oral Daily     bisacodyl  10 mg Rectal Once     cephALEXin  500 mg Oral Q12H DONITA     citalopram  20 mg Oral Daily     lidocaine  1-2 patch Transdermal Q24H     lidocaine   Transdermal Q8H     magnesium oxide  400 mg Oral Daily with lunch     mycophenolate  750 mg Oral BID IS     polyethylene glycol  17 g Oral Daily     senna-docusate  1 tablet Oral BID     sulfamethoxazole-trimethoprim  1 tablet Oral Once per day on  Sat     tacrolimus  2 mg Oral BID IS     valGANciclovir  450 mg Oral Once per day on      warfarin ANTICOAGULANT  3 mg Oral ONCE at 18:00       heparin 1,450 Units/hr (21 1242)     Warfarin Therapy Reminder         Physical Exam   Temp  Av.4  F (36.9  C)  Min: 95  F (35  C)  Max: 101.5  F (38.6  C)      Pulse  Av.7  Min: 54  Max: 108 Resp  Av.1  Min: 12  Max: 22  SpO2  Av.7 %  Min: 90 %  Max: 100 %     /88 (BP Location: Right arm)   Pulse 88   Temp 98.3  F (36.8  C) (Oral)   Resp 16   Wt 97.7 kg (215 lb 4.8 oz)   SpO2 95%   BMI 30.89 kg/m     Date 21 07 - 21 0659   Shift 3930-0151 8703-4063 8921-3200 24 Hour Total   INTAKE   Blood Components  0  0   Shift Total(mL/kg)  0(0)  0(0)   OUTPUT   Urine 1375   1375   Shift Total(mL/kg) 1375(14.08)   1375(14.08)   Weight (kg) 97.66 97.66 97.66 97.66      Admit Weight: 93.5 kg (206 lb 2.1 oz)     GENERAL APPEARANCE: alert and no distress  HENT: mouth without ulcers or lesions  RESP: breathing non-labored  CV: regular rhythm, normal rate  EDEMA: no LE edema bilaterally  ABDOMEN: soft, nondistended  MS: extremities normal - no gross deformities noted  SKIN: no rash on exposed surfaces  ACCESS: Clotted, non-viable AVF   Neuro: A&Ox4, normal speech    Data   All labs reviewed by me.  CMP  Recent Labs   Lab 21  0440 10/31/21  0552 10/30/21  0613  10/29/21  0604 10/28/21  0523 10/28/21  0443    134 134 133   < > 138   POTASSIUM 4.4 4.4 4.1 4.4   < > 4.2  4.2   CHLORIDE 105 104 105 104   < > 106   CO2 19* 22 18* 22   < > 22   ANIONGAP 10 8 11 7   < > 10   GLC 97 86 100* 118*   < > 211*   BUN 80* 79* 83* 82*   < > 80*   CR 5.43* 5.69* 5.69* 5.73*   < > 4.98*   GFRESTIMATED 11* 11* 11* 10*   < > 12*   LILIANA 7.0* 7.3* 7.3* 7.3*   < > 7.3*   MAG  --  2.3 2.3 2.1  --  1.9   PHOS 3.2 2.7 3.6 4.1   < > 4.6*   ALBUMIN 2.0*  --   --   --   --   --     < > = values in this interval not displayed.     CBC  Recent Labs   Lab 11/01/21  1653 11/01/21  1029 11/01/21  0440 10/31/21  2110 10/31/21  1133 10/31/21  0552 10/30/21  1115 10/30/21  0613 10/29/21  1132 10/29/21  0604   HGB 8.4* 7.3* 6.9*  6.9* 7.3*   < > 7.2*   < > 7.4*   < > 8.1*   WBC  --   --  13.3*  --   --  11.9*  --  10.3  --  11.7*   RBC  --   --  2.41*  --   --  2.52*  --  2.53*  --  2.81*   HCT  --   --  22.5*  --   --  23.0*  --  23.6*  --  25.8*   MCV  --   --  93  --   --  91  --  93  --  92   MCH  --   --  28.6  --   --  28.6  --  29.2  --  28.8   MCHC  --   --  30.7*  --   --  31.3*  --  31.4*  --  31.4*   RDW  --   --  14.6  --   --  14.6  --  14.5  --  14.2   PLT  --   --  263  --   --  217  --  158  --  126*    < > = values in this interval not displayed.     INR  Recent Labs   Lab 11/01/21  0440 10/31/21  0552 10/30/21  0613 10/27/21  2153   INR 1.16* 1.28* 1.03 1.43*   PTT  --   --   --  110*     ABGNo lab results found in last 7 days.   Urine Studies  Recent Labs   Lab Test 10/23/21  0526 11/09/15  1300 10/28/15  0702 10/17/15  0500   COLOR Straw Yellow Yellow Light Yellow   APPEARANCE Clear Clear Clear Clear   URINEGLC Negative Negative Negative Negative   URINEBILI Negative Negative Negative Negative   URINEKETONE Negative Negative Negative Negative   SG 1.009 1.008 1.008 1.008   UBLD Negative Negative Negative Large*   URINEPH 7.0 5.5 6.5 5.5   PROTEIN 50 * 10* 10* 30*   NITRITE Negative  Negative Negative Negative   LEUKEST Negative Negative Negative Negative   RBCU 0 <1 1 >182*   WBCU 1 1 1 3*     Recent Labs   Lab Test 10/23/21  0526 06/05/17  1253 01/25/16  1602 10/28/15  0702 10/13/15  1035   UTPG 1.37* 1.30* 0.36* 0.45* 0.55*     PTH  Recent Labs   Lab Test 10/27/21  0621 08/01/16  1638 01/25/16  1601 10/15/15  0520   PTHI 149* 90* 121* 434*     Iron Studies  Recent Labs   Lab Test 10/27/21  0621 10/17/15  0455   IRON 19* 13*   * 241   IRONSAT 10* 5*   TERRANCE 597*  --        IMAGING:  All imaging studies reviewed by me.

## 2021-11-02 ENCOUNTER — APPOINTMENT (OUTPATIENT)
Dept: GENERAL RADIOLOGY | Facility: CLINIC | Age: 51
End: 2021-11-02
Attending: PHYSICIAN ASSISTANT
Payer: COMMERCIAL

## 2021-11-02 ENCOUNTER — APPOINTMENT (OUTPATIENT)
Dept: ULTRASOUND IMAGING | Facility: CLINIC | Age: 51
End: 2021-11-02
Payer: COMMERCIAL

## 2021-11-02 LAB
ALBUMIN UR-MCNC: 70 MG/DL
ANION GAP SERPL CALCULATED.3IONS-SCNC: 12 MMOL/L (ref 3–14)
APPEARANCE UR: CLEAR
BACTERIA #/AREA URNS HPF: ABNORMAL /HPF
BACTERIA BLD CULT: NO GROWTH
BACTERIA BLD CULT: NO GROWTH
BILIRUB UR QL STRIP: NEGATIVE
BUN SERPL-MCNC: 84 MG/DL (ref 7–30)
CALCIUM SERPL-MCNC: 7.3 MG/DL (ref 8.5–10.1)
CHLORIDE BLD-SCNC: 103 MMOL/L (ref 94–109)
CO2 SERPL-SCNC: 18 MMOL/L (ref 20–32)
COLOR UR AUTO: ABNORMAL
CREAT SERPL-MCNC: 5.26 MG/DL (ref 0.66–1.25)
ERYTHROCYTE [DISTWIDTH] IN BLOOD BY AUTOMATED COUNT: 14.6 % (ref 10–15)
GFR SERPL CREATININE-BSD FRML MDRD: 12 ML/MIN/1.73M2
GLUCOSE BLD-MCNC: 91 MG/DL (ref 70–99)
GLUCOSE UR STRIP-MCNC: NEGATIVE MG/DL
HCT VFR BLD AUTO: 25.4 % (ref 40–53)
HGB BLD-MCNC: 8 G/DL (ref 13.3–17.7)
HGB UR QL STRIP: ABNORMAL
INR PPP: 1.23 (ref 0.85–1.15)
KETONES UR STRIP-MCNC: NEGATIVE MG/DL
LEUKOCYTE ESTERASE UR QL STRIP: NEGATIVE
MAGNESIUM SERPL-MCNC: 2.3 MG/DL (ref 1.6–2.3)
MCH RBC QN AUTO: 29.3 PG (ref 26.5–33)
MCHC RBC AUTO-ENTMCNC: 31.5 G/DL (ref 31.5–36.5)
MCV RBC AUTO: 93 FL (ref 78–100)
MUCOUS THREADS #/AREA URNS LPF: PRESENT /LPF
NITRATE UR QL: NEGATIVE
PH UR STRIP: 6 [PH] (ref 5–7)
PHOSPHATE SERPL-MCNC: 3.6 MG/DL (ref 2.5–4.5)
PLATELET # BLD AUTO: 293 10E3/UL (ref 150–450)
POTASSIUM BLD-SCNC: 4.6 MMOL/L (ref 3.4–5.3)
RBC # BLD AUTO: 2.73 10E6/UL (ref 4.4–5.9)
RBC URINE: 1 /HPF
SODIUM SERPL-SCNC: 133 MMOL/L (ref 133–144)
SP GR UR STRIP: 1.01 (ref 1–1.03)
TACROLIMUS BLD-MCNC: 19.7 UG/L (ref 5–15)
TME LAST DOSE: ABNORMAL H
TME LAST DOSE: ABNORMAL H
UFH PPP CHRO-ACNC: 0.11 IU/ML
UFH PPP CHRO-ACNC: <0.1 IU/ML
UFH PPP CHRO-ACNC: <0.1 IU/ML
UROBILINOGEN UR STRIP-MCNC: NORMAL MG/DL
WBC # BLD AUTO: 15.4 10E3/UL (ref 4–11)
WBC URINE: 1 /HPF

## 2021-11-02 PROCEDURE — 99233 SBSQ HOSP IP/OBS HIGH 50: CPT | Performed by: INTERNAL MEDICINE

## 2021-11-02 PROCEDURE — 120N000011 HC R&B TRANSPLANT UMMC

## 2021-11-02 PROCEDURE — 250N000013 HC RX MED GY IP 250 OP 250 PS 637: Performed by: PHYSICIAN ASSISTANT

## 2021-11-02 PROCEDURE — 85610 PROTHROMBIN TIME: CPT

## 2021-11-02 PROCEDURE — 85041 AUTOMATED RBC COUNT: CPT | Performed by: NURSE PRACTITIONER

## 2021-11-02 PROCEDURE — 250N000013 HC RX MED GY IP 250 OP 250 PS 637: Performed by: SURGERY

## 2021-11-02 PROCEDURE — 80197 ASSAY OF TACROLIMUS: CPT | Performed by: SURGERY

## 2021-11-02 PROCEDURE — 84100 ASSAY OF PHOSPHORUS: CPT | Performed by: SURGERY

## 2021-11-02 PROCEDURE — 250N000011 HC RX IP 250 OP 636: Performed by: PHYSICIAN ASSISTANT

## 2021-11-02 PROCEDURE — 250N000011 HC RX IP 250 OP 636: Performed by: SURGERY

## 2021-11-02 PROCEDURE — 85520 HEPARIN ASSAY: CPT | Performed by: SURGERY

## 2021-11-02 PROCEDURE — 250N000012 HC RX MED GY IP 250 OP 636 PS 637: Performed by: PHYSICIAN ASSISTANT

## 2021-11-02 PROCEDURE — 250N000012 HC RX MED GY IP 250 OP 636 PS 637: Performed by: NURSE PRACTITIONER

## 2021-11-02 PROCEDURE — 71046 X-RAY EXAM CHEST 2 VIEWS: CPT

## 2021-11-02 PROCEDURE — 85520 HEPARIN ASSAY: CPT | Performed by: PHYSICIAN ASSISTANT

## 2021-11-02 PROCEDURE — 250N000012 HC RX MED GY IP 250 OP 636 PS 637: Performed by: SURGERY

## 2021-11-02 PROCEDURE — 80048 BASIC METABOLIC PNL TOTAL CA: CPT | Performed by: SURGERY

## 2021-11-02 PROCEDURE — 76776 US EXAM K TRANSPL W/DOPPLER: CPT

## 2021-11-02 PROCEDURE — 250N000013 HC RX MED GY IP 250 OP 250 PS 637: Performed by: NURSE PRACTITIONER

## 2021-11-02 PROCEDURE — 36415 COLL VENOUS BLD VENIPUNCTURE: CPT | Performed by: PHYSICIAN ASSISTANT

## 2021-11-02 PROCEDURE — 71046 X-RAY EXAM CHEST 2 VIEWS: CPT | Mod: 26 | Performed by: RADIOLOGY

## 2021-11-02 PROCEDURE — 81001 URINALYSIS AUTO W/SCOPE: CPT | Performed by: PHYSICIAN ASSISTANT

## 2021-11-02 PROCEDURE — 76776 US EXAM K TRANSPL W/DOPPLER: CPT | Mod: 26 | Performed by: STUDENT IN AN ORGANIZED HEALTH CARE EDUCATION/TRAINING PROGRAM

## 2021-11-02 PROCEDURE — 83735 ASSAY OF MAGNESIUM: CPT | Performed by: NURSE PRACTITIONER

## 2021-11-02 PROCEDURE — 87040 BLOOD CULTURE FOR BACTERIA: CPT | Performed by: PHYSICIAN ASSISTANT

## 2021-11-02 PROCEDURE — 250N000011 HC RX IP 250 OP 636: Performed by: STUDENT IN AN ORGANIZED HEALTH CARE EDUCATION/TRAINING PROGRAM

## 2021-11-02 PROCEDURE — 36415 COLL VENOUS BLD VENIPUNCTURE: CPT | Performed by: SURGERY

## 2021-11-02 PROCEDURE — 87086 URINE CULTURE/COLONY COUNT: CPT | Performed by: PHYSICIAN ASSISTANT

## 2021-11-02 RX ORDER — HEPARIN SODIUM 10000 [USP'U]/100ML
0-5000 INJECTION, SOLUTION INTRAVENOUS CONTINUOUS
Status: DISCONTINUED | OUTPATIENT
Start: 2021-11-02 | End: 2021-11-04

## 2021-11-02 RX ORDER — SODIUM BICARBONATE 650 MG/1
650 TABLET ORAL 2 TIMES DAILY
Status: DISCONTINUED | OUTPATIENT
Start: 2021-11-02 | End: 2021-11-05

## 2021-11-02 RX ORDER — WARFARIN SODIUM 4 MG/1
4 TABLET ORAL
Status: DISCONTINUED | OUTPATIENT
Start: 2021-11-02 | End: 2021-11-02

## 2021-11-02 RX ORDER — TACROLIMUS 1 MG/1
1 CAPSULE ORAL ONCE
Status: COMPLETED | OUTPATIENT
Start: 2021-11-02 | End: 2021-11-02

## 2021-11-02 RX ORDER — WARFARIN SODIUM 5 MG/1
5 TABLET ORAL
Status: COMPLETED | OUTPATIENT
Start: 2021-11-02 | End: 2021-11-02

## 2021-11-02 RX ORDER — TACROLIMUS 1 MG/1
2 CAPSULE ORAL
Status: DISCONTINUED | OUTPATIENT
Start: 2021-11-03 | End: 2021-11-03

## 2021-11-02 RX ADMIN — TACROLIMUS 1 MG: 1 CAPSULE ORAL at 18:20

## 2021-11-02 RX ADMIN — ONDANSETRON 4 MG: 4 TABLET, ORALLY DISINTEGRATING ORAL at 18:25

## 2021-11-02 RX ADMIN — ATORVASTATIN CALCIUM 10 MG: 10 TABLET, FILM COATED ORAL at 08:19

## 2021-11-02 RX ADMIN — Medication 400 MG: at 13:44

## 2021-11-02 RX ADMIN — CITALOPRAM HYDROBROMIDE 20 MG: 20 TABLET ORAL at 08:19

## 2021-11-02 RX ADMIN — CEPHALEXIN 500 MG: 250 CAPSULE ORAL at 08:19

## 2021-11-02 RX ADMIN — MYCOPHENOLATE MOFETIL 750 MG: 250 CAPSULE ORAL at 08:19

## 2021-11-02 RX ADMIN — MYCOPHENOLATE MOFETIL 750 MG: 250 CAPSULE ORAL at 18:20

## 2021-11-02 RX ADMIN — SULFAMETHOXAZOLE AND TRIMETHOPRIM 1 TABLET: 400; 80 TABLET ORAL at 08:19

## 2021-11-02 RX ADMIN — Medication 1 TABLET: at 20:05

## 2021-11-02 RX ADMIN — TACROLIMUS 2 MG: 1 CAPSULE ORAL at 08:20

## 2021-11-02 RX ADMIN — HEPARIN SODIUM 1900 UNITS/HR: 10000 INJECTION, SOLUTION INTRAVENOUS at 22:51

## 2021-11-02 RX ADMIN — DOCUSATE SODIUM 50 MG AND SENNOSIDES 8.6 MG 1 TABLET: 8.6; 5 TABLET, FILM COATED ORAL at 08:19

## 2021-11-02 RX ADMIN — WARFARIN SODIUM 5 MG: 5 TABLET ORAL at 18:20

## 2021-11-02 RX ADMIN — CEPHALEXIN 500 MG: 250 CAPSULE ORAL at 20:05

## 2021-11-02 RX ADMIN — SODIUM BICARBONATE 650 MG: 650 TABLET ORAL at 20:05

## 2021-11-02 RX ADMIN — OXYCODONE HYDROCHLORIDE 5 MG: 5 TABLET ORAL at 03:40

## 2021-11-02 RX ADMIN — HEPARIN SODIUM 1450 UNITS/HR: 10000 INJECTION, SOLUTION INTRAVENOUS at 05:07

## 2021-11-02 RX ADMIN — SODIUM BICARBONATE 650 MG: 650 TABLET ORAL at 13:44

## 2021-11-02 RX ADMIN — ONDANSETRON 4 MG: 4 TABLET, ORALLY DISINTEGRATING ORAL at 08:19

## 2021-11-02 RX ADMIN — HEPARIN SODIUM 1450 UNITS/HR: 10000 INJECTION, SOLUTION INTRAVENOUS at 08:18

## 2021-11-02 RX ADMIN — ONDANSETRON 4 MG: 4 TABLET, ORALLY DISINTEGRATING ORAL at 22:54

## 2021-11-02 RX ADMIN — DOCUSATE SODIUM 50 MG AND SENNOSIDES 8.6 MG 1 TABLET: 8.6; 5 TABLET, FILM COATED ORAL at 20:05

## 2021-11-02 ASSESSMENT — ACTIVITIES OF DAILY LIVING (ADL)
ADLS_ACUITY_SCORE: 5

## 2021-11-02 NOTE — PROGRESS NOTES
Transplant Surgery  Inpatient Daily Progress Note  2021    Assessment & Plan: Raj Pierce is a 50 year old male  with a PMH significant for ESRD 2/2 PCKD s/p kidney transplant () c/b poor function (small graft). Other PMH includes HTN, hyperparathyroidism, and SILVIA (with cpap). Now status post pre-emptive  donor (DCD) kidney re-transplant on 10/23. Discharged 10/26 POD 3. Readmitted 10/27 for emergent exploratory laparotomy due to renal vein thrombosis.    S/p exploratory left lower quadrant of kidney transplant, back table flush, thrombectomy of renal vein, thrombectomy of iliac vein, reimplantation of kidney transplant, with ureteral stent removal and placement of a new ureteral stent, intraoperative ultrasound, intraoperative of transit time flow measurment.     DDKT, POD 10  RTOR POD 6    Today's changes  - evaluate for infection    Graft function:   Kidney graft, renal vein thrombosis: Cr improved slightly to 5.3 this AM. Baseline 3. UOP 3L yesterday. 10/29 renogram showed that current function primarily from 2015 graft. Repeat US 10/30 showed increase in size of likely perinephric hematomas, largest measuring approximately [id number]. Will discuss with team regarding repeat imaging.   Surgical site infection: Erythema at incision, appears similar to prior. No drainage. Continue keflex.    Immunosuppression management: High risk protocol due to DGF    mg BID  Tac 2.5 mg BID. Goal 8-10. Level 15.3 (from 15.2), decreased dose. Check level today.   Complexity of management: High. Contributing factors: anemia, thrombocytopenia     Hematology:    Acute blood loss, chronic anemia: Received 2 units RBC 10/27, 1 unit RBC on 10/28, 1 unit RBC on . Hgb 8 (8.4).     Anticoagulation for renal vein thrombosis: Renal vein thrombosis, thrombectomy of iliac vein, reimplantation of kidney transplant. Low intensity heparin drip, currently at 1950/hr, transitioning to Warfarin dosed by pharmacy with  goal INR 2-2.5. Hep < 0.20, INR 1.23.    Cardiorespiratory: Stable on RA. Encourage frequent IS.  Hypotension, secondary to bleeding, third spacing: Resolved with blood transfusions and fluid replacement.     GI/Nutrition: Regular diet  Bowel regimen: senna BID, miralax daily.    Endocrine: Stress hyperglycemia, has improved over the last 24 hours with BG ranging from 80s - 100s.     Fluid/Electrolytes:   -Start sodium bicarbonate 650 mg BID  -Start calcium carbonate with vit D BID in between meals  -Continue Mg Oxide 400 mg daily    : Wade to remain x 14d due to new surgical anastomosis. Stent in place, remove in 4 weeks.     Infectious disease: Afebrile.   Leukocytosis: WBC trending up, today 15.4. Continue to treat potential incisional infection. UA/UC, BC, CXR. Will discuss imaging incision.   Surgical site infection: Keflex added 10/31. Incision appears mildly erythematous, unchanged, no drainage, non tender.    Prophylaxis: DVT (heparin + warfarin), valcyte x 6 months (D+/R-), bactrim    Disposition: 7A     Medical Decision Making: Medium    Fellow/JATINDER/Resident Provider: KRIS Rhoades [id number]    Faculty: Eulalio Emanuel M.D., Ph.D.      _________________________________________________________________  Transplant History: Admitted 10/27/2021 for renal vein thrombosis.  10/23/2021 (Kidney), 10/13/2015 (Kidney), Postoperative day: 2212 (Kidney), 10 (Kidney)     Interval History: History is obtained from the patient  Overnight events: No c/o. Mild nausea. Mild pain around incision. Fatigued.    ROS:   A 10-point review of systems was negative except as noted above.    Meds:    atorvastatin  10 mg Oral Daily     cephALEXin  500 mg Oral Q12H DOINTA     citalopram  20 mg Oral Daily     lidocaine  1-2 patch Transdermal Q24H     lidocaine   Transdermal Q8H     magnesium oxide  400 mg Oral Daily with lunch     mycophenolate  750 mg Oral BID IS     polyethylene glycol  17 g Oral Daily     senna-docusate  1 tablet Oral BID      sulfamethoxazole-trimethoprim  1 tablet Oral Once per day on Tue Thu Sat     tacrolimus  2 mg Oral BID IS     valGANciclovir  450 mg Oral Once per day on Mon Thu     warfarin ANTICOAGULANT  4 mg Oral ONCE at 18:00     Physical Exam:  General Appearance: NAD  Skin: normal  Heart: perfused  Lungs: normal rate and work of breathing  Abdomen: soft, non-tender, non-distended. LLQ incision with mild surrounding erythema. No active drainage. Missy in place. NATHEN dark serosang drainage  : Wade in place draining yellow urine.   Neurologic: alert and oriented, no gross deficit.     Current vitals:   /79 (BP Location: Right arm)   Pulse 80   Temp 99.5  F (37.5  C) (Oral)   Resp 16   Wt 96.3 kg (212 lb 3.2 oz)   SpO2 95%   BMI 30.45 kg/m      Vital sign ranges:    Temp:  [97.9  F (36.6  C)-99.6  F (37.6  C)] 99.5  F (37.5  C)  Pulse:  [78-88] 80  Resp:  [16] 16  BP: (123-133)/(79-88) 123/79  SpO2:  [93 %-97 %] 95 %  Patient Vitals for the past 24 hrs:   BP Temp Temp src Pulse Resp SpO2 Weight   11/02/21 1005 -- -- -- -- -- -- 96.3 kg (212 lb 3.2 oz)   11/02/21 0713 123/79 99.5  F (37.5  C) Oral 80 16 95 % --   11/02/21 0342 125/80 99.6  F (37.6  C) Oral 84 16 95 % --   11/02/21 0011 126/81 99  F (37.2  C) Oral 85 16 97 % --   11/01/21 1947 130/82 97.9  F (36.6  C) Oral 87 16 95 % --   11/01/21 1540 133/88 98.3  F (36.8  C) Oral 88 16 95 % --   11/01/21 1226 123/81 98.2  F (36.8  C) Oral 78 16 -- --   11/01/21 1205 125/84 98  F (36.7  C) Oral 80 16 93 % --   11/01/21 1100 125/82 98  F (36.7  C) Oral 80 16 96 % --     Data:   CMP  Recent Labs   Lab 11/02/21  0403 11/01/21  0440 10/31/21  0552 10/31/21  0552    134   < > 134   POTASSIUM 4.6 4.4   < > 4.4   CHLORIDE 103 105   < > 104   CO2 18* 19*   < > 22   GLC 91 97   < > 86   BUN 84* 80*   < > 79*   CR 5.26* 5.43*   < > 5.69*   GFRESTIMATED 12* 11*   < > 11*   LILIANA 7.3* 7.0*   < > 7.3*   MAG 2.3  --   --  2.3   PHOS 3.6 3.2   < > 2.7   ALBUMIN  --  2.0*   --   --     < > = values in this interval not displayed.     CBC  Recent Labs   Lab 11/02/21  0403 11/01/21  1653 11/01/21  1029 11/01/21  0440   HGB 8.0* 8.4*   < > 6.9*  6.9*   WBC 15.4*  --   --  13.3*     --   --  263    < > = values in this interval not displayed.     COAGS  Recent Labs   Lab 11/02/21  0403 11/01/21  0440 10/30/21  0613 10/27/21  2153   INR 1.23* 1.16*   < > 1.43*   PTT  --   --   --  110*    < > = values in this interval not displayed.      Urinalysis  Recent Labs   Lab Test 10/23/21  0526 06/05/17  1253 01/25/16  1602 11/09/15  1300   COLOR Straw  --   --  Yellow   APPEARANCE Clear  --   --  Clear   URINEGLC Negative  --   --  Negative   URINEBILI Negative  --   --  Negative   URINEKETONE Negative  --   --  Negative   SG 1.009  --   --  1.008   UBLD Negative  --   --  Negative   URINEPH 7.0  --   --  5.5   PROTEIN 50 *  --   --  10*   NITRITE Negative  --   --  Negative   LEUKEST Negative  --   --  Negative   RBCU 0  --   --  <1   WBCU 1  --   --  1   UTPG 1.37* 1.30*   < >  --     < > = values in this interval not displayed.     Virology:  CMV DNA Quantitation Specimen   Date Value Ref Range Status   08/01/2016   Corrected    Plasma  CORRECTED ON 08/02 AT 1044: PREVIOUSLY REPORTED AS Blood       CMV IgG Antibody   Date Value Ref Range Status   07/10/2013 <0.20  Negative for anti-CMV IgG U/mL Final     EBV VCA IgG Antibody   Date Value Ref Range Status   07/10/2013 >750.00  Positive, suggests immunologic exposure. U/mL Final     Hepatitis C Antibody   Date Value Ref Range Status   10/23/2021 Nonreactive Nonreactive Final   04/25/2016  NR Final    Nonreactive   Assay performance characteristics have not been established for newborns,   infants, and children       Hep B Surface Loni   Date Value Ref Range Status   07/10/2013 0.0  Final     Comment:     Negative, No antibody detected when the value is less than 5.0 mlU/mL.     BK Virus Result   Date Value Ref Range Status   06/05/2017 BK  Virus DNA Not Detected BKNEG copies/mL Final   10/28/2015 BK Virus DNA Not Detected BKNEG copies/mL Final     BK Virus DNA copies/mL   Date Value Ref Range Status   10/23/2021 Not Detected Not Detected copies/mL Final     Discussed with the fellow and attending, who agree with my assessment and plan.     Nette Pardo  PGY1 N Surgery  11/1/2021

## 2021-11-02 NOTE — PLAN OF CARE
VS: /88 (BP Location: Right arm)   Pulse 88   Temp 98.3  F (36.8  C) (Oral)   Resp 16   Wt 97.7 kg (215 lb 4.8 oz)   SpO2 95%   BMI 30.89 kg/m      Cares: [id number]    Current condition: Stable on RA  Neuro: WDL  Cardio: WDL  Respiratory: WDL  GI/: adequate output through spears. Last BM 11/1  Skin: WDL  Diet:   Regular diet, fair appetite  Labs: Hep 10a: 1.1, stopped infusion for one hour, restarted at 1450 units/hr. Recheck processing now.   LDA:  PIV saline locked, other PIV with heparin.  Mobility:  UAL  Pain: denies.  Plan of Care:Will continue to monitor and assess for any changes.

## 2021-11-02 NOTE — PROGRESS NOTES
Care Management Follow Up    Length of Stay (days): 6    Expected Discharge Date: 11/05/2021     Concerns to be Addressed: care coordination/care conferences, discharge planning     Patient plan of care discussed at interdisciplinary rounds: Yes    Anticipated Discharge Disposition:  (Pt will stay locally for a couple of days and than home)     Anticipated Discharge Services: None  Anticipated Discharge DME: None    Patient/family educated on Medicare website which has current facility and service quality ratings: no  Education Provided on the Discharge Plan:    Patient/Family in Agreement with the Plan: yes    Referrals Placed by CM/SW: External Care Coordination, Anticoagulation management    Additional Information:  Pt status reviewed/discussed during care team rounds.  Team anticipates discharge end of week pending therapeutic INR.  Pt is considered DGF and will need to remain locally for two weeks.  SW working on confirming local hotel.  As previously noted pt declined need for home care RN.  Pt will need ATC appointments confirmed prior to discharge.   INR will be managed by [facility].      Coumadin Management:  [facility]  [phone], Fax [phone]  Indication: Renal vein thrombosis  Therapy Goal: INR 2-2.5      Nadia Painting RN BSN, PHN, ACM-RN  7A RN Care Coordinator  Phone: [phone]  Pager [phone]  To contact the weekend RNCC, Page: [phone]    11/2/2021 3:44 PM

## 2021-11-02 NOTE — PLAN OF CARE
VS: /78 (BP Location: Right arm)   Pulse 84   Temp 98.7  F (37.1  C) (Oral)   Resp 16   Wt 96.3 kg (212 lb 3.2 oz)   SpO2 96%   BMI 30.45 kg/m      Cares: 5852-4509    Current condition: Stable on RA  Neuro: WDL  Cardio: WDL  Respiratory: WDL  GI/: Adequate output into spears. Last BM 11/1  Skin: WDL, abd inc stapled BRIJESH, reddened.   Diet:   Regular poor appetite.   Labs: Hep 10A: less then 0.1. Next recheck. 2045.   LDA:  PIV with heparin at 1750units/hour   Mobility:  UAL  Pain: Denies.   PRN medications: None given.   Changes: Blood cultures, UA/UC, Xray, Ultrasound completed.   Plan of Care: Will continue to monitor and assess for any changes

## 2021-11-02 NOTE — PROGRESS NOTES
[facility]   Transplant Nephrology Progress Note  Date of Admission:  10/27/2021  Today's Date: 11/02/2021    Recommendations:  - Would recommend starting sodium bicarbonate 650 mg bid.   - Would start calcium carbonate 500 mg bid.    Assessment & Plan   # DDKT: Slight trend down in creatinine to ~ 5.3 today.  Very good urine output.  No acute indications for dialysis.  This was a preemptive DCD kidney with prolonged CIT and initial SGF.  Post operative course c/b renal vein thrombosis, s/p surgical thrombectomy with presumed ATN injury.  Kidney transplant ultrasound shows patent blood vessels 10/30.  Renogram 10/30 showed ATN.   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  Doppler showed renal vein thrombosis on 10/27 and patient to OR following this.  He has been on anticoagulation with heparin following surgery with plans for warfarin.  Daily ultrasounds had showed patent renal transplant artery and vein, last being done 10/30.    # Immunosuppression: Tacrolimus immediate release (goal 8-10) and Mycophenolate mofetil (dose 750 mg every 12 hours)   - Induction with Recent Transplant:  High Intensity   - Changes: No    # Infection Prophylaxis:   - PJP: Sulfa/TMP (Bactrim)  - CMV: Valganciclovir (Valcyte); Patient is CMV IgG Ab discordant (D+/R-) and will continue on Valcyte x 6 months, then check CMV PCR monthly until 12 months post transplant.    # Blood Pressure: Controlled;  Goal BP: < 150/90   - Volume status: Mildly hypervolemic  EDW ~ 93.5 kg   - Changes: No    # Anemia in Chronic Renal Disease: Hgb:  Stable, low      NADIYA: No   - Iron studies: Low iron saturation, but high ferritin    # Mineral Bone Disorder:   - Secondary renal hyperparathyroidism; PTH level: Minimally elevated ( pg/ml)        On treatment: None  - Vitamin D; level: Normal        On supplement: No  - Calcium; level: Low normal when corrected for low serum albumin        On supplement: No; Recommend starting calcium carbonate 500 mg bid.  - Phosphorus; level: Normal        On binder: No    # Electrolytes:   - Potassium; level: Normal        On supplement: No  - Magnesium; level: Normal        On supplement: Yes  - Bicarbonate; level: Low        On supplement: No; Recommend starting sodium bicarbonate 650 mg bid.    # Leukocytosis with Low Grade Fever: Patient has a presumed incisional cellulitis and started on cephalexin 10/31.  Trend up in WBC.   - Would consider CXR with leukocytosis and occasional cough.    # Transplant History:  Etiology of Kidney Failure: Polycystic kidney disease (PKD)  Tx: DDKT  Transplant: 10/23/2021 (Kidney), 10/13/2015 (Kidney)  Donor Type: Donation after Circulatory Death  Donor Class:   Crossmatch at time of Tx: negative  DSA at time of Tx: No  Significant changes in immunosuppression: None  Significant transplant-related complications: None    Recommendations were communicated to the primary team via this note.    Francis Espinoza MD   Pager: [phone]    Interval History   Mr. Pierce's kidney function is slightly improved, down to ~ 5.3 with good urine output.  Patient reports feeling okay.  He does have increased WBC and low grade fever to 99.6, but he denies any fever, sweats or chills.  No chest pain or shortness of breath.  Does report a slight cough.  No nausea or vomiting.  Some soft stools.  Slight leg swelling, which is stable.    Review of Systems   4 point ROS was obtained and negative except as noted in the Interval History.    MEDICATIONS:    atorvastatin  10 mg Oral Daily     cephALEXin  500  mg Oral Q12H DONITA     citalopram  20 mg Oral Daily     lidocaine  1-2 patch Transdermal Q24H     lidocaine   Transdermal Q8H     magnesium oxide  400 mg Oral Daily with lunch     mycophenolate  750 mg Oral BID IS     polyethylene glycol  17 g Oral Daily     senna-docusate  1 tablet Oral BID     sulfamethoxazole-trimethoprim  1 tablet Oral Once per day on  Sat     tacrolimus  2 mg Oral BID IS     valGANciclovir  450 mg Oral Once per day on      warfarin ANTICOAGULANT  4 mg Oral ONCE at 18:00       heparin 1,450 Units/hr (21 0818)     Warfarin Therapy Reminder         Physical Exam   Temp  Av.5  F (36.9  C)  Min: 95  F (35  C)  Max: 101.5  F (38.6  C)      Pulse  Av.9  Min: 54  Max: 108 Resp  Av.1  Min: 12  Max: 22  SpO2  Av.7 %  Min: 90 %  Max: 100 %     /79 (BP Location: Right arm)   Pulse 80   Temp 99.5  F (37.5  C) (Oral)   Resp 16   Wt 96.3 kg (212 lb 3.2 oz)   SpO2 95%   BMI 30.45 kg/m      Admit Weight: 93.5 kg (206 lb 2.1 oz)     GENERAL APPEARANCE: alert and no distress  HENT: mouth without ulcers or lesions  RESP: lungs clear to auscultation - no rales, rhonchi or wheezes  CV: regular rhythm, normal rate, no rub, no murmur  EDEMA: trace to 1+ LE edema bilaterally, left slightly more than right  ABDOMEN: soft, nondistended, nontender, bowel sounds normal  MS: extremities normal - no gross deformities noted, no evidence of inflammation in joints, no muscle tenderness  SKIN: no rash  TX KIDNEY: mild TTP  DIALYSIS ACCESS:  LUE AV fistula decreased thrill    Data   All labs reviewed by me.  CMP  Recent Labs   Lab 21  0403 21  0440 10/31/21  0552 10/30/21  0613 10/29/21  0604 10/29/21  0604    134 134 134   < > 133   POTASSIUM 4.6 4.4 4.4 4.1   < > 4.4   CHLORIDE 103 105 104 105   < > 104   CO2 18* 19* 22 18*   < > 22   ANIONGAP 12 10 8 11   < > 7   GLC 91 97 86 100*   < > 118*   BUN 84* 80* 79* 83*   < > 82*   CR 5.26* 5.43* 5.69* 5.69*   < >  5.73*   GFRESTIMATED 12* 11* 11* 11*   < > 10*   LILIANA 7.3* 7.0* 7.3* 7.3*   < > 7.3*   MAG 2.3  --  2.3 2.3  --  2.1   PHOS 3.6 3.2 2.7 3.6   < > 4.1   ALBUMIN  --  2.0*  --   --   --   --     < > = values in this interval not displayed.     CBC  Recent Labs   Lab 11/02/21  0403 11/01/21  1653 11/01/21  1029 11/01/21  0440 10/31/21  1133 10/31/21  0552 10/30/21  1115 10/30/21  0613   HGB 8.0* 8.4* 7.3* 6.9*  6.9*   < > 7.2*   < > 7.4*   WBC 15.4*  --   --  13.3*  --  11.9*  --  10.3   RBC 2.73*  --   --  2.41*  --  2.52*  --  2.53*   HCT 25.4*  --   --  22.5*  --  23.0*  --  23.6*   MCV 93  --   --  93  --  91  --  93   MCH 29.3  --   --  28.6  --  28.6  --  29.2   MCHC 31.5  --   --  30.7*  --  31.3*  --  31.4*   RDW 14.6  --   --  14.6  --  14.6  --  14.5     --   --  263  --  217  --  158    < > = values in this interval not displayed.     INR  Recent Labs   Lab 11/02/21  0403 11/01/21  0440 10/31/21  0552 10/30/21  0613 10/27/21  2153 10/27/21  2153   INR 1.23* 1.16* 1.28* 1.03   < > 1.43*   PTT  --   --   --   --   --  110*    < > = values in this interval not displayed.     ABGNo lab results found in last 7 days.   Urine Studies  Recent Labs   Lab Test 10/23/21  0526 11/09/15  1300 10/28/15  0702 10/17/15  0500   COLOR Straw Yellow Yellow Light Yellow   APPEARANCE Clear Clear Clear Clear   URINEGLC Negative Negative Negative Negative   URINEBILI Negative Negative Negative Negative   URINEKETONE Negative Negative Negative Negative   SG 1.009 1.008 1.008 1.008   UBLD Negative Negative Negative Large*   URINEPH 7.0 5.5 6.5 5.5   PROTEIN 50 * 10* 10* 30*   NITRITE Negative Negative Negative Negative   LEUKEST Negative Negative Negative Negative   RBCU 0 <1 1 >182*   WBCU 1 1 1 3*     Recent Labs   Lab Test 10/23/21  0526 06/05/17  1253 01/25/16  1602 10/28/15  0702 10/13/15  1035   UTPG 1.37* 1.30* 0.36* 0.45* 0.55*     PTH  Recent Labs   Lab Test 10/27/21  0621 08/01/16  1638 01/25/16  1601 10/15/15  0520    PTHI 149* 90* 121* 434*     Iron Studies  Recent Labs   Lab Test 10/27/21  0621 10/17/15  0455   IRON 19* 13*   * 241   IRONSAT 10* 5*   TERRANCE 597*  --        IMAGING:  All imaging studies reviewed by me.

## 2021-11-02 NOTE — PROGRESS NOTES
Care Management Follow Up    Length of Stay (days): 6    Expected Discharge Date: 11/05/2021     Concerns to be Addressed: care coordination/care conferences, discharge planning     Patient plan of care discussed at interdisciplinary rounds: Yes    Anticipated Discharge Disposition:  Pt will stay locally for a couple of days and than home     Anticipated Discharge Services: None  Anticipated Discharge DME: None    Patient/family educated on Medicare website which has current facility and service quality ratings: N/A  Education Provided on the Discharge Plan: yes  Patient/Family in Agreement with the Plan: yes    Referrals Placed by CM/SW: External Care Coordination  Private pay costs discussed: Not applicable    Additional Information:  Treatment Team: Patient in DGF and needs to stay locally for closer monitoring and potential HD. They anticipate he will be ready for discharge in 2-3 days pending therapeutic INR.     Rosie/Yazmin in Accommodations: Email sent to accommodations specialist regarding need for DGF hotel and anticipated time frame.    Patient, Wife Dianne: SW met with patient and wife Dianne at bedside to talk about DGF hotel. They are thankful for this option as they had a difficult time finding a local hotel. SW to keep them informed of hotel location once closer to discharge.     VIRGILIO Rob, LGDANAE  7A   Ph: [phone]  Pager: [phone]

## 2021-11-02 NOTE — PLAN OF CARE
1900 - 0730    /80 (BP Location: Right arm)   Pulse 84   Temp 99.6  F (37.6  C) (Oral)   Resp 16   Wt 97.7 kg (215 lb 4.8 oz)   SpO2 95%   BMI 30.89 kg/m      VS: Tmax 99.6, OVSS on RA  BG: n/a  Labs: Xa recheck 0.76 & <0.10, INR 1.23, and Hgb 8.0  Pain/Nausea/PRN: PRN oxycodone x 1 - denied nausea  Diet: Regular  LDA: PIV to heparin gtt, NATHEN 10 mL bloody output  GI: BM on 11/1   : Wade 1800 mL  Skin: incision reddened, open to air  Mobility: up ad rowan  Plan: continue to monitor

## 2021-11-03 ENCOUNTER — APPOINTMENT (OUTPATIENT)
Dept: ULTRASOUND IMAGING | Facility: CLINIC | Age: 51
End: 2021-11-03
Attending: PHYSICIAN ASSISTANT
Payer: COMMERCIAL

## 2021-11-03 ENCOUNTER — APPOINTMENT (OUTPATIENT)
Dept: ULTRASOUND IMAGING | Facility: CLINIC | Age: 51
End: 2021-11-03
Payer: COMMERCIAL

## 2021-11-03 ENCOUNTER — APPOINTMENT (OUTPATIENT)
Dept: NUCLEAR MEDICINE | Facility: CLINIC | Age: 51
End: 2021-11-03
Attending: PHYSICIAN ASSISTANT
Payer: COMMERCIAL

## 2021-11-03 LAB
ANION GAP SERPL CALCULATED.3IONS-SCNC: 10 MMOL/L (ref 3–14)
APTT PPP: 51 SECONDS (ref 22–38)
BACTERIA UR CULT: NO GROWTH
BUN SERPL-MCNC: 82 MG/DL (ref 7–30)
CALCIUM SERPL-MCNC: 7.6 MG/DL (ref 8.5–10.1)
CHLORIDE BLD-SCNC: 104 MMOL/L (ref 94–109)
CO2 SERPL-SCNC: 18 MMOL/L (ref 20–32)
CREAT SERPL-MCNC: 5.26 MG/DL (ref 0.66–1.25)
ERYTHROCYTE [DISTWIDTH] IN BLOOD BY AUTOMATED COUNT: 14.6 % (ref 10–15)
GFR SERPL CREATININE-BSD FRML MDRD: 12 ML/MIN/1.73M2
GLUCOSE BLD-MCNC: 102 MG/DL (ref 70–99)
HCT VFR BLD AUTO: 25 % (ref 40–53)
HGB BLD-MCNC: 7.9 G/DL (ref 13.3–17.7)
INR PPP: 1.23 (ref 0.85–1.15)
MCH RBC QN AUTO: 29.2 PG (ref 26.5–33)
MCHC RBC AUTO-ENTMCNC: 31.6 G/DL (ref 31.5–36.5)
MCV RBC AUTO: 92 FL (ref 78–100)
PHOSPHATE SERPL-MCNC: 3.7 MG/DL (ref 2.5–4.5)
PLATELET # BLD AUTO: 347 10E3/UL (ref 150–450)
POTASSIUM BLD-SCNC: 4.5 MMOL/L (ref 3.4–5.3)
RBC # BLD AUTO: 2.71 10E6/UL (ref 4.4–5.9)
SARS-COV-2 RNA RESP QL NAA+PROBE: NEGATIVE
SODIUM SERPL-SCNC: 132 MMOL/L (ref 133–144)
TACROLIMUS BLD-MCNC: 19.5 UG/L (ref 5–15)
TME LAST DOSE: ABNORMAL H
TME LAST DOSE: ABNORMAL H
UFH PPP CHRO-ACNC: 0.16 IU/ML
UFH PPP CHRO-ACNC: 0.37 IU/ML
UFH PPP CHRO-ACNC: <0.1 IU/ML
WBC # BLD AUTO: 14.9 10E3/UL (ref 4–11)

## 2021-11-03 PROCEDURE — 250N000013 HC RX MED GY IP 250 OP 250 PS 637: Performed by: SURGERY

## 2021-11-03 PROCEDURE — 80048 BASIC METABOLIC PNL TOTAL CA: CPT | Performed by: SURGERY

## 2021-11-03 PROCEDURE — 85520 HEPARIN ASSAY: CPT | Performed by: SURGERY

## 2021-11-03 PROCEDURE — 84100 ASSAY OF PHOSPHORUS: CPT | Performed by: SURGERY

## 2021-11-03 PROCEDURE — 85027 COMPLETE CBC AUTOMATED: CPT | Performed by: NURSE PRACTITIONER

## 2021-11-03 PROCEDURE — 250N000012 HC RX MED GY IP 250 OP 636 PS 637: Performed by: PHYSICIAN ASSISTANT

## 2021-11-03 PROCEDURE — U0003 INFECTIOUS AGENT DETECTION BY NUCLEIC ACID (DNA OR RNA); SEVERE ACUTE RESPIRATORY SYNDROME CORONAVIRUS 2 (SARS-COV-2) (CORONAVIRUS DISEASE [COVID-19]), AMPLIFIED PROBE TECHNIQUE, MAKING USE OF HIGH THROUGHPUT TECHNOLOGIES AS DESCRIBED BY CMS-2020-01-R: HCPCS | Performed by: PHYSICIAN ASSISTANT

## 2021-11-03 PROCEDURE — 99233 SBSQ HOSP IP/OBS HIGH 50: CPT | Performed by: INTERNAL MEDICINE

## 2021-11-03 PROCEDURE — 76776 US EXAM K TRANSPL W/DOPPLER: CPT | Mod: 26 | Performed by: RADIOLOGY

## 2021-11-03 PROCEDURE — 93971 EXTREMITY STUDY: CPT | Mod: 26 | Performed by: RADIOLOGY

## 2021-11-03 PROCEDURE — 93971 EXTREMITY STUDY: CPT | Mod: LT

## 2021-11-03 PROCEDURE — 250N000011 HC RX IP 250 OP 636: Performed by: PHYSICIAN ASSISTANT

## 2021-11-03 PROCEDURE — 78707 K FLOW/FUNCT IMAGE W/O DRUG: CPT | Mod: 26 | Performed by: RADIOLOGY

## 2021-11-03 PROCEDURE — 343N000001 HC RX 343: Performed by: SURGERY

## 2021-11-03 PROCEDURE — 36415 COLL VENOUS BLD VENIPUNCTURE: CPT | Performed by: NURSE PRACTITIONER

## 2021-11-03 PROCEDURE — 250N000013 HC RX MED GY IP 250 OP 250 PS 637: Performed by: PHYSICIAN ASSISTANT

## 2021-11-03 PROCEDURE — 78707 K FLOW/FUNCT IMAGE W/O DRUG: CPT

## 2021-11-03 PROCEDURE — 80197 ASSAY OF TACROLIMUS: CPT | Performed by: PHYSICIAN ASSISTANT

## 2021-11-03 PROCEDURE — 36415 COLL VENOUS BLD VENIPUNCTURE: CPT | Performed by: SURGERY

## 2021-11-03 PROCEDURE — 85730 THROMBOPLASTIN TIME PARTIAL: CPT | Performed by: PHYSICIAN ASSISTANT

## 2021-11-03 PROCEDURE — 120N000011 HC R&B TRANSPLANT UMMC

## 2021-11-03 PROCEDURE — A9562 TC99M MERTIATIDE: HCPCS | Performed by: SURGERY

## 2021-11-03 PROCEDURE — 250N000012 HC RX MED GY IP 250 OP 636 PS 637: Performed by: SURGERY

## 2021-11-03 PROCEDURE — 76776 US EXAM K TRANSPL W/DOPPLER: CPT | Mod: 76

## 2021-11-03 PROCEDURE — 85610 PROTHROMBIN TIME: CPT

## 2021-11-03 PROCEDURE — 250N000013 HC RX MED GY IP 250 OP 250 PS 637: Performed by: NURSE PRACTITIONER

## 2021-11-03 PROCEDURE — 76776 US EXAM K TRANSPL W/DOPPLER: CPT

## 2021-11-03 RX ORDER — TACROLIMUS 1 MG/1
1 CAPSULE ORAL
Status: DISCONTINUED | OUTPATIENT
Start: 2021-11-04 | End: 2021-11-04

## 2021-11-03 RX ORDER — WARFARIN SODIUM 6 MG/1
6 TABLET ORAL
Status: COMPLETED | OUTPATIENT
Start: 2021-11-03 | End: 2021-11-03

## 2021-11-03 RX ADMIN — CEPHALEXIN 500 MG: 250 CAPSULE ORAL at 19:56

## 2021-11-03 RX ADMIN — DOCUSATE SODIUM 50 MG AND SENNOSIDES 8.6 MG 1 TABLET: 8.6; 5 TABLET, FILM COATED ORAL at 08:07

## 2021-11-03 RX ADMIN — Medication 1 TABLET: at 11:43

## 2021-11-03 RX ADMIN — SODIUM BICARBONATE 650 MG: 650 TABLET ORAL at 19:56

## 2021-11-03 RX ADMIN — ATORVASTATIN CALCIUM 10 MG: 10 TABLET, FILM COATED ORAL at 08:07

## 2021-11-03 RX ADMIN — HEPARIN SODIUM 2200 UNITS/HR: 10000 INJECTION, SOLUTION INTRAVENOUS at 11:56

## 2021-11-03 RX ADMIN — Medication 400 MG: at 11:43

## 2021-11-03 RX ADMIN — WARFARIN SODIUM 6 MG: 6 TABLET ORAL at 17:47

## 2021-11-03 RX ADMIN — CEPHALEXIN 500 MG: 250 CAPSULE ORAL at 08:06

## 2021-11-03 RX ADMIN — LIDOCAINE 2 PATCH: 560 PATCH PERCUTANEOUS; TOPICAL; TRANSDERMAL at 11:44

## 2021-11-03 RX ADMIN — ACETAMINOPHEN 650 MG: 325 TABLET, FILM COATED ORAL at 15:00

## 2021-11-03 RX ADMIN — TACROLIMUS 2 MG: 1 CAPSULE ORAL at 08:07

## 2021-11-03 RX ADMIN — Medication 1 TABLET: at 19:56

## 2021-11-03 RX ADMIN — MYCOPHENOLATE MOFETIL 750 MG: 250 CAPSULE ORAL at 17:47

## 2021-11-03 RX ADMIN — HEPARIN SODIUM 2350 UNITS/HR: 10000 INJECTION, SOLUTION INTRAVENOUS at 23:06

## 2021-11-03 RX ADMIN — MYCOPHENOLATE MOFETIL 750 MG: 250 CAPSULE ORAL at 08:06

## 2021-11-03 RX ADMIN — CITALOPRAM HYDROBROMIDE 20 MG: 20 TABLET ORAL at 08:07

## 2021-11-03 RX ADMIN — SODIUM BICARBONATE 650 MG: 650 TABLET ORAL at 08:07

## 2021-11-03 RX ADMIN — TECHNESCAN TC 99M MERTIATIDE 10 MILLICURIE: 1 INJECTION, POWDER, LYOPHILIZED, FOR SOLUTION INTRAVENOUS at 14:19

## 2021-11-03 ASSESSMENT — ACTIVITIES OF DAILY LIVING (ADL)
ADLS_ACUITY_SCORE: 5

## 2021-11-03 NOTE — PROGRESS NOTES
"CLINICAL NUTRITION SERVICES - REASSESSMENT NOTE     Nutrition Prescription      Malnutrition Status:    Moderate malnutrition in the context of acute illness    Recommendations already ordered by Registered Dietitian (RD):  Trial of oral supplements: Ensure Clear and Nepro     Future/Additional Recommendations:  Encourage small/frequent meals, oral supplements, soft/bland protein sources.      Keep diet liberalized as able to allow more choices.     Consider calorie counts.     EVALUATION OF THE PROGRESS TOWARD GOALS   Diet: Regular (previously renal)    Intake: Poor appetite and \"nothing tastes good\". Some nausea, controlled with zofran.  Appears uncomfortable.  Intake mostly recorded as 0-50%.  He ate 1/4 of a pizza, bites of orange sherbet for dinner last night and bites of a cookie for breakfast.      NEW FINDINGS   Weight: Weight up ~6# from admission weight.     Labs: Na 132 (low), K+ 4.5, Phos 3.7    Meds: Tacrolimus, calcium carbonate + vitamin D, miralax, senokot BID, mycophenolate    GI: Having BM. Nauseated.     MALNUTRITION  % Intake: </= 50% for >/= 5 days (severe)  % Weight Loss: None noted  Subcutaneous Fat Loss: None observed  Muscle Loss: None observed  Fluid Accumulation/Edema: Mild  Malnutrition Diagnosis: Moderate malnutrition in the context of acute illness    Previous Goals   Diet adv v nutrition support within 2-3 days.  Evaluation: Met    Previous Nutrition Diagnosis  Inadequate oral intake  Evaluation: No change    CURRENT NUTRITION DIAGNOSIS  Inadequate oral intake related to nausea, taste changes as evidenced by patient consuming <50% of meals.     INTERVENTIONS  Implementation  Medical food supplement therapy - see above  Nutrition education for recommended modifications - encouraged soft/bland protein sources on the menu    Goals  Pt to consume at least % of meals (or the equivalent with oral supplements).     Monitoring/Evaluation  Progress toward goals will be monitored and " evaluated per protocol.    Elif Howell MS, RD, LD, CCTD  7A/Obs units, pager [phone]

## 2021-11-03 NOTE — PLAN OF CARE
/82 (BP Location: Right arm)   Pulse 91   Temp 98.5  F (36.9  C) (Oral)   Resp 16   Wt 96.3 kg (212 lb 3.2 oz)   SpO2 100%   BMI 30.45 kg/m      Shift: 2754-8815  Isolation Status: NA  VS: AVSS on RA  Neuro: A&O x4  Behaviors: Calm, cooperative, and pleasant.  BG: NA  Respiratory: WDL  Cardiac: WDL  Pain/Nausea/PRN: No reported pain. Nausea managed with Zofran x1.  Diet: Regular diet  LDA: Right PIV and Wade  Infusion(s): Heparin running at 1900 U/hr  GI/: Poor appetite. BM on 11/1. Wade output of 1L+ of yellow urine.  Skin: WDL w/ abd incision  Mobility: UAL  Plan: Renogram today.

## 2021-11-03 NOTE — PLAN OF CARE
0700 - 1500:   /81 (BP Location: Right arm)   Pulse 85   Temp 97.9  F (36.6  C) (Oral)   Resp 16   Wt 96.3 kg (212 lb 3.2 oz)   SpO2 95%   BMI 30.45 kg/m    On heparin drip at 2,350 units/hr, next hep 10A level check at 1930.  A&O, AVSS on RA, denies pain/nausea, strict I&O, minimal appetite, only been drinking clear ensure.  Tacrolimus adjusted to 1mg from 2mg starting PM dose.  regan HARMON patent w/ GUOP, had a bm this morning.  Pt going down to nuclear medicine for renogram to evaluate his kidney function.  Pt is due for a new covid swab when back from renogram.  Continue with poc..

## 2021-11-03 NOTE — PROGRESS NOTES
[facility]   Transplant Nephrology Progress Note  Date of Admission:  10/27/2021  Today's Date: 11/03/2021    Recommendations:  - Would recommend lower extremity doppler to rule out DVT with L>R edema.   - Would recommend repeat renogram to confirm good blood flow to new allograft.    Assessment & Plan   # DDKT: Stable, elevated creatinine at ~ 5.3 today.  Good urine output.  No acute indications for dialysis.  This was a preemptive DCD kidney with prolonged CIT and initial SGF.  Post operative course c/b renal vein thrombosis, s/p surgical thrombectomy with presumed ATN injury.  Kidney transplant ultrasound shows patent blood vessels 10/30.  Renogram 10/30 showed ATN.  Would recommend repeating renogram to confirm good flow.   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  Doppler showed renal vein thrombosis on 10/27 and patient to OR following this.  He has been on anticoagulation with heparin following surgery and started warfarin, but INR not therapeutic yet.  Daily ultrasounds had showed patent renal transplant artery and vein, last being done 10/30.  Would recommend repeating renogram.    # Immunosuppression: Tacrolimus immediate release (goal 8-10) and Mycophenolate mofetil (dose 750 mg every 12 hours)   - Induction with Recent Transplant:  High Intensity   - Changes: No    # Infection Prophylaxis:   - PJP: Sulfa/TMP (Bactrim)  - CMV: Valganciclovir (Valcyte); Patient is CMV IgG Ab discordant (D+/R-) and will continue on Valcyte x 6 months, then check CMV PCR monthly until 12 months post transplant.    #  Blood Pressure: Controlled;  Goal BP: < 150/90   - Volume status: Mildly hypervolemic  EDW ~ 93.5 kg   - Changes: No    # Anemia in Chronic Renal Disease: Hgb: Stable, low      NADIYA: No   - Iron studies: Low iron saturation, but high ferritin    # Mineral Bone Disorder:   - Secondary renal hyperparathyroidism; PTH level: Minimally elevated ( pg/ml)        On treatment: None  - Vitamin D; level: Normal        On supplement: No  - Calcium; level: Low normal when corrected for low serum albumin        On supplement: Yes  - Phosphorus; level: Normal        On binder: No    # Electrolytes:   - Potassium; level: Normal        On supplement: No  - Magnesium; level: Normal        On supplement: Yes  - Bicarbonate; level: Low        On supplement: Yes; If still low tomorrow, would increase sodium bicarbonate to 1300 mg bid.    # Leukocytosis with Low Grade Fever: Patient has a presumed incisional cellulitis and started on cephalexin 10/31.  CXR unremarkable.  Slightly trend down in WBC and patient has now been afebrile.    # Left LE Edema: Patient with worsening edema on left versus right leg.  Would seem unlikely to have DVT while on anticoagulation, but cannot completely rule out.   - Would recommend LE doppler to rule out DVT.    # Transplant History:  Etiology of Kidney Failure: Polycystic kidney disease (PKD)  Tx: DDKT  Transplant: 10/23/2021 (Kidney), 10/13/2015 (Kidney)  Donor Type: Donation after Circulatory Death  Donor Class:   Crossmatch at time of Tx: negative  DSA at time of Tx: No  Significant changes in immunosuppression: None  Significant transplant-related complications: None    Recommendations were communicated to the primary team via this note.    Francis Espinoza MD   Pager: [phone]    Interval History   Mr. Michelle kidney function is stable with creatinine ~ 5.3.  Good urine output.  Patient feels pretty good, ambulating on the floor.  Denies any chest pain, but a little shortness of breath  with exertion and just gets tired.  No nausea, vomiting or diarrhea.  No fever, sweats or chills.  Some increase in leg swelling, especially on the left.    Review of Systems   4 point ROS was obtained and negative except as noted in the Interval History.    MEDICATIONS:    atorvastatin  10 mg Oral Daily     calcium carbonate-vitamin D  1 tablet Oral BID     cephALEXin  500 mg Oral Q12H DONITA     citalopram  20 mg Oral Daily     lidocaine  1-2 patch Transdermal Q24H     lidocaine   Transdermal Q8H     magnesium oxide  400 mg Oral Daily with lunch     mycophenolate  750 mg Oral BID IS     polyethylene glycol  17 g Oral Daily     senna-docusate  1 tablet Oral BID     sodium bicarbonate  650 mg Oral BID     sulfamethoxazole-trimethoprim  1 tablet Oral Once per day on  Sat     tacrolimus  2 mg Oral BID IS     valGANciclovir  450 mg Oral Once per day on      warfarin ANTICOAGULANT  6 mg Oral ONCE at 18:00       heparin 2,200 Units/hr (21 0804)     Warfarin Therapy Reminder         Physical Exam   Temp  Av.5  F (36.9  C)  Min: 95  F (35  C)  Max: 101.5  F (38.6  C)      Pulse  Av.9  Min: 54  Max: 108 Resp  Av.1  Min: 12  Max: 22  SpO2  Av.7 %  Min: 90 %  Max: 100 %     /86 (BP Location: Right arm)   Pulse 85   Temp 98.3  F (36.8  C) (Oral)   Resp 18   Wt 96.3 kg (212 lb 3.2 oz)   SpO2 96%   BMI 30.45 kg/m      Admit Weight: 93.5 kg (206 lb 2.1 oz)     GENERAL APPEARANCE: alert and no distress  HENT: mouth without ulcers or lesions  RESP: lungs clear to auscultation - no rales, rhonchi or wheezes  CV: regular rhythm, normal rate, no rub, no murmur  EDEMA: trace LE edema on right, 2+ LE edema on left  ABDOMEN: soft, nondistended, nontender, bowel sounds normal, overnight  MS: extremities normal - no gross deformities noted, no evidence of inflammation in joints, no muscle tenderness  SKIN: no rash  TX KIDNEY: mild TTP  DIALYSIS ACCESS:  LUE AV fistula decreased  thrill    Data   All labs reviewed by me.  CMP  Recent Labs   Lab 11/03/21  0626 11/02/21  0403 11/01/21  0440 10/31/21  0552 10/30/21  0613 10/30/21  0613 10/29/21  0604 10/29/21  0604   * 133 134 134   < > 134   < > 133   POTASSIUM 4.5 4.6 4.4 4.4   < > 4.1   < > 4.4   CHLORIDE 104 103 105 104   < > 105   < > 104   CO2 18* 18* 19* 22   < > 18*   < > 22   ANIONGAP 10 12 10 8   < > 11   < > 7   * 91 97 86   < > 100*   < > 118*   BUN 82* 84* 80* 79*   < > 83*   < > 82*   CR 5.26* 5.26* 5.43* 5.69*   < > 5.69*   < > 5.73*   GFRESTIMATED 12* 12* 11* 11*   < > 11*   < > 10*   LILIANA 7.6* 7.3* 7.0* 7.3*   < > 7.3*   < > 7.3*   MAG  --  2.3  --  2.3  --  2.3  --  2.1   PHOS 3.7 3.6 3.2 2.7   < > 3.6   < > 4.1   ALBUMIN  --   --  2.0*  --   --   --   --   --     < > = values in this interval not displayed.     CBC  Recent Labs   Lab 11/03/21  0626 11/02/21  0403 11/01/21  1653 11/01/21  1029 11/01/21  0440 11/01/21  0440 10/31/21  1133 10/31/21  0552   HGB 7.9* 8.0* 8.4* 7.3*   < > 6.9*  6.9*   < > 7.2*   WBC 14.9* 15.4*  --   --   --  13.3*  --  11.9*   RBC 2.71* 2.73*  --   --   --  2.41*  --  2.52*   HCT 25.0* 25.4*  --   --   --  22.5*  --  23.0*   MCV 92 93  --   --   --  93  --  91   MCH 29.2 29.3  --   --   --  28.6  --  28.6   MCHC 31.6 31.5  --   --   --  30.7*  --  31.3*   RDW 14.6 14.6  --   --   --  14.6  --  14.6    293  --   --   --  263  --  217    < > = values in this interval not displayed.     INR  Recent Labs   Lab 11/03/21  0626 11/02/21  0403 11/01/21  0440 10/31/21  0552 10/30/21  0613 10/27/21  2153   INR 1.23* 1.23* 1.16* 1.28*   < > 1.43*   PTT  --   --   --   --   --  110*    < > = values in this interval not displayed.     ABGNo lab results found in last 7 days.   Urine Studies  Recent Labs   Lab Test 11/02/21  1121 10/23/21  0526 11/09/15  1300 10/28/15  0702   COLOR Light Yellow Straw Yellow Yellow   APPEARANCE Clear Clear Clear Clear   URINEGLC Negative Negative Negative  Negative   URINEBILI Negative Negative Negative Negative   URINEKETONE Negative Negative Negative Negative   SG 1.011 1.009 1.008 1.008   UBLD Moderate* Negative Negative Negative   URINEPH 6.0 7.0 5.5 6.5   PROTEIN 70 * 50 * 10* 10*   NITRITE Negative Negative Negative Negative   LEUKEST Negative Negative Negative Negative   RBCU 1 0 <1 1   WBCU 1 1 1 1     Recent Labs   Lab Test 10/23/21  0526 06/05/17  1253 01/25/16  1602 10/28/15  0702 10/13/15  1035   UTPG 1.37* 1.30* 0.36* 0.45* 0.55*     PTH  Recent Labs   Lab Test 10/27/21  0621 08/01/16  1638 01/25/16  1601 10/15/15  0520   PTHI 149* 90* 121* 434*     Iron Studies  Recent Labs   Lab Test 10/27/21  0621 10/17/15  0455   IRON 19* 13*   * 241   IRONSAT 10* 5*   TERRANCE 597*  --        IMAGING:  All imaging studies reviewed by me.

## 2021-11-03 NOTE — PROGRESS NOTES
Transplant Surgery  Inpatient Daily Progress Note  2021    Assessment & Plan: Raj Pierce is a 50 year old male  with a PMH significant for ESRD 2/2 PCKD s/p kidney transplant () c/b poor function (small graft). Other PMH includes HTN, hyperparathyroidism, and SILVIA (with cpap). Now status post pre-emptive  donor (DCD) kidney re-transplant on 10/23. Discharged 10/26 POD 3. Readmitted 10/27 for emergent exploratory laparotomy due to renal vein thrombosis.    S/p exploratory left lower quadrant of kidney transplant, back table flush, thrombectomy of renal vein, thrombectomy of iliac vein, reimplantation of kidney transplant, with ureteral stent removal and placement of a new ureteral stent, intraoperative ultrasound, intraoperative of transit time flow measurment.     DDKT, POD 11  RTOR POD 7    Today's changes  - renogram  - US LLE to r/o DVT  - add nutritional supplements.    Graft function:   Kidney graft, renal vein thrombosis: Cr 5.3 (5.3). Baseline 3. Good UO. 10/29 renogram showed that current function primarily from  graft. Repeat US 10/30 showed increase in size of likely perinephric hematomas, largest measuring approximately [id number]. US  showed decrease in size of hematomas, patent renal vein and artery. Persistent elevated RIs. NATHEN 10 ml, continue until after spears   Surgical site infection: Erythema at incision, slightly improved. No drainage. Continue keflex.     Immunosuppression management: High risk protocol due to DGF    mg BID  Tac 2 mg BID. Goal 8-10. Level 19.7. Dose decreased. Check level today.   Complexity of management: High. Contributing factors: anemia, thrombocytopenia     Hematology:    Acute blood loss, chronic anemia: Received 2 units RBC 10/27, 1 unit RBC on 10/28, 1 unit RBC on . Hgb 7.9 (8).     Anticoagulation for renal vein thrombosis: Renal vein thrombosis, thrombectomy of iliac vein, reimplantation of kidney transplant. Low intensity heparin  drip, transitioning to Warfarin dosed by pharmacy with goal INR 2-2.5, INR 1.23.    Cardiorespiratory: Stable on RA. Encourage frequent IS.  Hypotension, secondary to bleeding, third spacing: Resolved with blood transfusions and fluid replacement.     GI/Nutrition:   Moderate malnutrition in the context of acute illness: Regular diet. Add supplements. Dietitian consulted.   Bowel regimen: senna BID, miralax daily.    Endocrine:   Stress hyperglycemia: Improved.     Mineral bone disorder:  Started Ca supplement.    Fluid/Electrolytes:   Metabolic acidosis: Started sodium bicarbonate 650 mg BID  Hypomagnesemia: Continue Mg Oxide 400 mg daily    Hypervolemia, LLE edema: L>R. LE doppler to rule out DVT. Pt already on anticoagulation. Compression stocking.     : Wade to remain x 14d due to new surgical anastomosis. Stent in place, remove in 4 weeks.     Infectious disease: Afebrile.   Leukocytosis: WBC trending up, today 15.4. Continue to treat potential incisional infection. UA/UC, BC, CXR. Will discuss imaging incision.   Surgical site infection: Keflex added 10/31. Incision appears mildly erythematous, unchanged, no drainage, non tender.    Prophylaxis: DVT (heparin + warfarin), valcyte x 6 months (D+/R-), bactrim    Disposition: 7A     Medical Decision Making: Medium    Fellow/JATINDER/Resident Provider: KRIS Rhoades [id number]    Faculty: Eulalio Emanuel M.D., Ph.D.      _________________________________________________________________  Transplant History: Admitted 10/27/2021 for renal vein thrombosis.  10/23/2021 (Kidney), 10/13/2015 (Kidney), Postoperative day: 2213 (Kidney), 11 (Kidney)     Interval History: History is obtained from the patient  Overnight events: No c/o. Denies pain. Mild nausea, intermittent. Poor intake.     ROS:   A 10-point review of systems was negative except as noted above.    Meds:    atorvastatin  10 mg Oral Daily     calcium carbonate-vitamin D  1 tablet Oral BID     cephALEXin  500 mg  Oral Q12H [facility]     citalopram  20 mg Oral Daily     lidocaine  1-2 patch Transdermal Q24H     lidocaine   Transdermal Q8H     magnesium oxide  400 mg Oral Daily with lunch     mycophenolate  750 mg Oral BID IS     polyethylene glycol  17 g Oral Daily     senna-docusate  1 tablet Oral BID     sodium bicarbonate  650 mg Oral BID     sulfamethoxazole-trimethoprim  1 tablet Oral Once per day on Tue Thu Sat     tacrolimus  2 mg Oral BID IS     valGANciclovir  450 mg Oral Once per day on Mon Thu     warfarin ANTICOAGULANT  6 mg Oral ONCE at 18:00     Physical Exam:  General Appearance: NAD  Skin: normal  Heart: perfused  Lungs: normal rate and work of breathing  Abdomen: soft, non-tender, non-distended. LLQ incision with mild surrounding erythema. No active drainage. Drummond in place. NATHEN dark serosang drainage  : Wade in place draining yellow urine.   Ext: LLE > RLE edema  Neurologic: alert and oriented, no gross deficit.   PIV    Current vitals:   /86 (BP Location: Right arm)   Pulse 85   Temp 98.3  F (36.8  C) (Oral)   Resp 18   Wt 96.3 kg (212 lb 3.2 oz)   SpO2 96%   BMI 30.45 kg/m      Vital sign ranges:    Temp:  [98.2  F (36.8  C)-99  F (37.2  C)] 98.3  F (36.8  C)  Pulse:  [82-91] 85  Resp:  [16-18] 18  BP: (120-138)/(76-86) 138/86  SpO2:  [95 %-100 %] 96 %  Patient Vitals for the past 24 hrs:   BP Temp Temp src Pulse Resp SpO2 Weight   11/03/21 0745 138/86 98.3  F (36.8  C) Oral 85 18 96 % --   11/03/21 0033 126/82 98.5  F (36.9  C) Oral 91 16 100 % --   11/02/21 2024 130/78 99  F (37.2  C) Oral 89 16 97 % --   11/02/21 1632 131/78 98.7  F (37.1  C) Oral 84 16 96 % --   11/02/21 1107 120/76 98.2  F (36.8  C) Oral 82 16 95 % --   11/02/21 1005 -- -- -- -- -- -- 96.3 kg (212 lb 3.2 oz)     Data:   CMP  Recent Labs   Lab 11/03/21  0626 11/02/21  0403 11/01/21  0440 11/01/21  0440 10/31/21  0552 10/31/21  0552   * 133   < > 134   < > 134   POTASSIUM 4.5 4.6   < > 4.4   < > 4.4   CHLORIDE 104 103    < > 105   < > 104   CO2 18* 18*   < > 19*   < > 22   * 91   < > 97   < > 86   BUN 82* 84*   < > 80*   < > 79*   CR 5.26* 5.26*   < > 5.43*   < > 5.69*   GFRESTIMATED 12* 12*   < > 11*   < > 11*   LILIANA 7.6* 7.3*   < > 7.0*   < > 7.3*   MAG  --  2.3  --   --   --  2.3   PHOS 3.7 3.6   < > 3.2   < > 2.7   ALBUMIN  --   --   --  2.0*  --   --     < > = values in this interval not displayed.     CBC  Recent Labs   Lab 11/03/21  0626 11/02/21  0403   HGB 7.9* 8.0*   WBC 14.9* 15.4*    293     COAGS  Recent Labs   Lab 11/03/21  0626 11/02/21  0403 10/30/21  0613 10/27/21  2153   INR 1.23* 1.23*   < > 1.43*   PTT  --   --   --  110*    < > = values in this interval not displayed.      Urinalysis  Recent Labs   Lab Test 11/02/21  1121 10/23/21  0526 06/05/17  1253   COLOR Light Yellow Straw  --    APPEARANCE Clear Clear  --    URINEGLC Negative Negative  --    URINEBILI Negative Negative  --    URINEKETONE Negative Negative  --    SG 1.011 1.009  --    UBLD Moderate* Negative  --    URINEPH 6.0 7.0  --    PROTEIN 70 * 50 *  --    NITRITE Negative Negative  --    LEUKEST Negative Negative  --    RBCU 1 0  --    WBCU 1 1  --    UTPG  --  1.37* 1.30*     Virology:  CMV DNA Quantitation Specimen   Date Value Ref Range Status   08/01/2016   Corrected    Plasma  CORRECTED ON 08/02 AT 1044: PREVIOUSLY REPORTED AS Blood       CMV IgG Antibody   Date Value Ref Range Status   07/10/2013 <0.20  Negative for anti-CMV IgG U/mL Final     EBV VCA IgG Antibody   Date Value Ref Range Status   07/10/2013 >750.00  Positive, suggests immunologic exposure. U/mL Final     Hepatitis C Antibody   Date Value Ref Range Status   10/23/2021 Nonreactive Nonreactive Final   04/25/2016  NR Final    Nonreactive   Assay performance characteristics have not been established for newborns,   infants, and children       Hep B Surface Loni   Date Value Ref Range Status   07/10/2013 0.0  Final     Comment:     Negative, No antibody detected when the  value is less than 5.0 mlU/mL.     BK Virus Result   Date Value Ref Range Status   06/05/2017 BK Virus DNA Not Detected BKNEG copies/mL Final   10/28/2015 BK Virus DNA Not Detected BKNEG copies/mL Final     BK Virus DNA copies/mL   Date Value Ref Range Status   10/23/2021 Not Detected Not Detected copies/mL Final

## 2021-11-04 ENCOUNTER — ANCILLARY PROCEDURE (OUTPATIENT)
Dept: ULTRASOUND IMAGING | Facility: CLINIC | Age: 51
End: 2021-11-04
Payer: COMMERCIAL

## 2021-11-04 ENCOUNTER — ANESTHESIA EVENT (OUTPATIENT)
Dept: SURGERY | Facility: CLINIC | Age: 51
End: 2021-11-04
Payer: COMMERCIAL

## 2021-11-04 ENCOUNTER — ANESTHESIA (OUTPATIENT)
Dept: SURGERY | Facility: CLINIC | Age: 51
End: 2021-11-04
Payer: COMMERCIAL

## 2021-11-04 LAB
ABO/RH(D): NORMAL
ANION GAP SERPL CALCULATED.3IONS-SCNC: 9 MMOL/L (ref 3–14)
ANION GAP SERPL CALCULATED.3IONS-SCNC: 9 MMOL/L (ref 3–14)
ANTIBODY SCREEN: NEGATIVE
APTT PPP: 35 SECONDS (ref 22–38)
BASOPHILS # BLD MANUAL: 0 10E3/UL (ref 0–0.2)
BASOPHILS NFR BLD MANUAL: 0 %
BLD PROD TYP BPU: NORMAL
BLOOD COMPONENT TYPE: NORMAL
BUN SERPL-MCNC: 80 MG/DL (ref 7–30)
BUN SERPL-MCNC: 83 MG/DL (ref 7–30)
BURR CELLS BLD QL SMEAR: SLIGHT
CALCIUM SERPL-MCNC: 7.9 MG/DL (ref 8.5–10.1)
CALCIUM SERPL-MCNC: 7.9 MG/DL (ref 8.5–10.1)
CHLORIDE BLD-SCNC: 105 MMOL/L (ref 94–109)
CHLORIDE BLD-SCNC: 107 MMOL/L (ref 94–109)
CO2 SERPL-SCNC: 19 MMOL/L (ref 20–32)
CO2 SERPL-SCNC: 20 MMOL/L (ref 20–32)
CODING SYSTEM: NORMAL
CREAT SERPL-MCNC: 4.95 MG/DL (ref 0.66–1.25)
CREAT SERPL-MCNC: 4.97 MG/DL (ref 0.66–1.25)
CROSSMATCH: NORMAL
EOSINOPHIL # BLD MANUAL: 0 10E3/UL (ref 0–0.7)
EOSINOPHIL NFR BLD MANUAL: 0 %
ERYTHROCYTE [DISTWIDTH] IN BLOOD BY AUTOMATED COUNT: 14.6 % (ref 10–15)
ERYTHROCYTE [DISTWIDTH] IN BLOOD BY AUTOMATED COUNT: 14.6 % (ref 10–15)
GFR SERPL CREATININE-BSD FRML MDRD: 12 ML/MIN/1.73M2
GFR SERPL CREATININE-BSD FRML MDRD: 12 ML/MIN/1.73M2
GLUCOSE BLD-MCNC: 116 MG/DL (ref 70–99)
GLUCOSE BLD-MCNC: 130 MG/DL (ref 70–99)
GLUCOSE BLDC GLUCOMTR-MCNC: 107 MG/DL (ref 70–99)
HCT VFR BLD AUTO: 23.6 % (ref 40–53)
HCT VFR BLD AUTO: 25 % (ref 40–53)
HGB BLD-MCNC: 7.4 G/DL (ref 13.3–17.7)
HGB BLD-MCNC: 7.8 G/DL (ref 13.3–17.7)
HGB BLD-MCNC: 8 G/DL (ref 13.3–17.7)
INR PPP: 1.4 (ref 0.85–1.15)
INR PPP: 1.45 (ref 0.85–1.15)
LYMPHOCYTES # BLD MANUAL: 0.1 10E3/UL (ref 0.8–5.3)
LYMPHOCYTES NFR BLD MANUAL: 1 %
MAGNESIUM SERPL-MCNC: 2.5 MG/DL (ref 1.6–2.3)
MCH RBC QN AUTO: 28.8 PG (ref 26.5–33)
MCH RBC QN AUTO: 29 PG (ref 26.5–33)
MCHC RBC AUTO-ENTMCNC: 31.2 G/DL (ref 31.5–36.5)
MCHC RBC AUTO-ENTMCNC: 31.4 G/DL (ref 31.5–36.5)
MCV RBC AUTO: 92 FL (ref 78–100)
MCV RBC AUTO: 93 FL (ref 78–100)
METAMYELOCYTES # BLD MANUAL: 0.4 10E3/UL
METAMYELOCYTES NFR BLD MANUAL: 3 %
MONOCYTES # BLD MANUAL: 0.4 10E3/UL (ref 0–1.3)
MONOCYTES NFR BLD MANUAL: 3 %
NEUTROPHILS # BLD MANUAL: 13.7 10E3/UL (ref 1.6–8.3)
NEUTROPHILS NFR BLD MANUAL: 93 %
PHOSPHATE SERPL-MCNC: 3.9 MG/DL (ref 2.5–4.5)
PLAT MORPH BLD: ABNORMAL
PLATELET # BLD AUTO: 398 10E3/UL (ref 150–450)
PLATELET # BLD AUTO: 412 10E3/UL (ref 150–450)
POLYCHROMASIA BLD QL SMEAR: SLIGHT
POTASSIUM BLD-SCNC: 4.5 MMOL/L (ref 3.4–5.3)
POTASSIUM BLD-SCNC: 5.2 MMOL/L (ref 3.4–5.3)
RBC # BLD AUTO: 2.55 10E6/UL (ref 4.4–5.9)
RBC # BLD AUTO: 2.71 10E6/UL (ref 4.4–5.9)
RBC MORPH BLD: ABNORMAL
SODIUM SERPL-SCNC: 134 MMOL/L (ref 133–144)
SODIUM SERPL-SCNC: 135 MMOL/L (ref 133–144)
SPECIMEN EXPIRATION DATE: NORMAL
TACROLIMUS BLD-MCNC: 17.1 UG/L (ref 5–15)
TME LAST DOSE: ABNORMAL H
TME LAST DOSE: ABNORMAL H
UFH PPP CHRO-ACNC: 0.39 IU/ML
UNIT ABO/RH: NORMAL
UNIT NUMBER: NORMAL
UNIT STATUS: NORMAL
UNIT TYPE ISBT: 7300
WBC # BLD AUTO: 13.7 10E3/UL (ref 4–11)
WBC # BLD AUTO: 14.7 10E3/UL (ref 4–11)

## 2021-11-04 PROCEDURE — 272N000001 HC OR GENERAL SUPPLY STERILE: Performed by: TRANSPLANT SURGERY

## 2021-11-04 PROCEDURE — 85610 PROTHROMBIN TIME: CPT

## 2021-11-04 PROCEDURE — 360N000077 HC SURGERY LEVEL 4, PER MIN: Performed by: TRANSPLANT SURGERY

## 2021-11-04 PROCEDURE — 80197 ASSAY OF TACROLIMUS: CPT | Performed by: PHYSICIAN ASSISTANT

## 2021-11-04 PROCEDURE — 250N000012 HC RX MED GY IP 250 OP 636 PS 637: Performed by: SURGERY

## 2021-11-04 PROCEDURE — 250N000011 HC RX IP 250 OP 636: Performed by: PHYSICIAN ASSISTANT

## 2021-11-04 PROCEDURE — 250N000013 HC RX MED GY IP 250 OP 250 PS 637: Performed by: SURGERY

## 2021-11-04 PROCEDURE — 250N000013 HC RX MED GY IP 250 OP 250 PS 637: Performed by: PHYSICIAN ASSISTANT

## 2021-11-04 PROCEDURE — 99233 SBSQ HOSP IP/OBS HIGH 50: CPT | Performed by: INTERNAL MEDICINE

## 2021-11-04 PROCEDURE — 250N000009 HC RX 250: Performed by: STUDENT IN AN ORGANIZED HEALTH CARE EDUCATION/TRAINING PROGRAM

## 2021-11-04 PROCEDURE — 250N000011 HC RX IP 250 OP 636: Performed by: ANESTHESIOLOGY

## 2021-11-04 PROCEDURE — 250N000011 HC RX IP 250 OP 636: Performed by: STUDENT IN AN ORGANIZED HEALTH CARE EDUCATION/TRAINING PROGRAM

## 2021-11-04 PROCEDURE — 999N000141 HC STATISTIC PRE-PROCEDURE NURSING ASSESSMENT: Performed by: TRANSPLANT SURGERY

## 2021-11-04 PROCEDURE — 370N000017 HC ANESTHESIA TECHNICAL FEE, PER MIN: Performed by: TRANSPLANT SURGERY

## 2021-11-04 PROCEDURE — 84100 ASSAY OF PHOSPHORUS: CPT | Performed by: SURGERY

## 2021-11-04 PROCEDURE — 85520 HEPARIN ASSAY: CPT | Performed by: SURGERY

## 2021-11-04 PROCEDURE — 258N000003 HC RX IP 258 OP 636: Performed by: STUDENT IN AN ORGANIZED HEALTH CARE EDUCATION/TRAINING PROGRAM

## 2021-11-04 PROCEDURE — 85730 THROMBOPLASTIN TIME PARTIAL: CPT | Performed by: SURGERY

## 2021-11-04 PROCEDURE — 36415 COLL VENOUS BLD VENIPUNCTURE: CPT | Performed by: PHYSICIAN ASSISTANT

## 2021-11-04 PROCEDURE — 85610 PROTHROMBIN TIME: CPT | Performed by: SURGERY

## 2021-11-04 PROCEDURE — 120N000011 HC R&B TRANSPLANT UMMC

## 2021-11-04 PROCEDURE — 36415 COLL VENOUS BLD VENIPUNCTURE: CPT | Performed by: SURGERY

## 2021-11-04 PROCEDURE — 0TP90DZ REMOVAL OF INTRALUMINAL DEVICE FROM URETER, OPEN APPROACH: ICD-10-PCS | Performed by: TRANSPLANT SURGERY

## 2021-11-04 PROCEDURE — 36415 COLL VENOUS BLD VENIPUNCTURE: CPT | Performed by: STUDENT IN AN ORGANIZED HEALTH CARE EDUCATION/TRAINING PROGRAM

## 2021-11-04 PROCEDURE — 82374 ASSAY BLOOD CARBON DIOXIDE: CPT | Performed by: SURGERY

## 2021-11-04 PROCEDURE — C9290 INJ, BUPIVACAINE LIPOSOME: HCPCS | Performed by: ANESTHESIOLOGY

## 2021-11-04 PROCEDURE — 88307 TISSUE EXAM BY PATHOLOGIST: CPT | Mod: TC | Performed by: TRANSPLANT SURGERY

## 2021-11-04 PROCEDURE — 250N000012 HC RX MED GY IP 250 OP 636 PS 637: Performed by: PHYSICIAN ASSISTANT

## 2021-11-04 PROCEDURE — 85027 COMPLETE CBC AUTOMATED: CPT | Performed by: NURSE PRACTITIONER

## 2021-11-04 PROCEDURE — 83735 ASSAY OF MAGNESIUM: CPT | Performed by: NURSE PRACTITIONER

## 2021-11-04 PROCEDURE — 250N000025 HC SEVOFLURANE, PER MIN: Performed by: TRANSPLANT SURGERY

## 2021-11-04 PROCEDURE — 85018 HEMOGLOBIN: CPT | Performed by: PHYSICIAN ASSISTANT

## 2021-11-04 PROCEDURE — 85027 COMPLETE CBC AUTOMATED: CPT | Performed by: SURGERY

## 2021-11-04 PROCEDURE — 80048 BASIC METABOLIC PNL TOTAL CA: CPT | Performed by: SURGERY

## 2021-11-04 PROCEDURE — 0TT10ZZ RESECTION OF LEFT KIDNEY, OPEN APPROACH: ICD-10-PCS | Performed by: TRANSPLANT SURGERY

## 2021-11-04 PROCEDURE — 710N000010 HC RECOVERY PHASE 1, LEVEL 2, PER MIN: Performed by: TRANSPLANT SURGERY

## 2021-11-04 PROCEDURE — 86900 BLOOD TYPING SEROLOGIC ABO: CPT | Performed by: ANESTHESIOLOGY

## 2021-11-04 PROCEDURE — 50370 RMVL TRANSPLANTED RNL ALGRFT: CPT | Mod: 78 | Performed by: TRANSPLANT SURGERY

## 2021-11-04 PROCEDURE — 250N000013 HC RX MED GY IP 250 OP 250 PS 637: Performed by: NURSE PRACTITIONER

## 2021-11-04 PROCEDURE — 88307 TISSUE EXAM BY PATHOLOGIST: CPT | Mod: 26 | Performed by: PATHOLOGY

## 2021-11-04 RX ORDER — POLYETHYLENE GLYCOL 3350 17 G/17G
17 POWDER, FOR SOLUTION ORAL DAILY
Status: DISCONTINUED | OUTPATIENT
Start: 2021-11-05 | End: 2021-11-04

## 2021-11-04 RX ORDER — ONDANSETRON 2 MG/ML
4 INJECTION INTRAMUSCULAR; INTRAVENOUS EVERY 6 HOURS PRN
Status: DISCONTINUED | OUTPATIENT
Start: 2021-11-04 | End: 2021-11-04

## 2021-11-04 RX ORDER — ONDANSETRON 4 MG/1
4 TABLET, ORALLY DISINTEGRATING ORAL EVERY 30 MIN PRN
Status: DISCONTINUED | OUTPATIENT
Start: 2021-11-04 | End: 2021-11-04 | Stop reason: HOSPADM

## 2021-11-04 RX ORDER — HYDROMORPHONE HCL IN WATER/PF 6 MG/30 ML
0.2 PATIENT CONTROLLED ANALGESIA SYRINGE INTRAVENOUS
Status: DISCONTINUED | OUTPATIENT
Start: 2021-11-04 | End: 2021-11-06

## 2021-11-04 RX ORDER — FLUMAZENIL 0.1 MG/ML
0.2 INJECTION, SOLUTION INTRAVENOUS
Status: DISCONTINUED | OUTPATIENT
Start: 2021-11-04 | End: 2021-11-04 | Stop reason: HOSPADM

## 2021-11-04 RX ORDER — FENTANYL CITRATE 50 UG/ML
INJECTION, SOLUTION INTRAMUSCULAR; INTRAVENOUS PRN
Status: DISCONTINUED | OUTPATIENT
Start: 2021-11-04 | End: 2021-11-04

## 2021-11-04 RX ORDER — NEOSTIGMINE METHYLSULFATE 1 MG/ML
VIAL (ML) INJECTION PRN
Status: DISCONTINUED | OUTPATIENT
Start: 2021-11-04 | End: 2021-11-04

## 2021-11-04 RX ORDER — TACROLIMUS 1 MG/1
1 CAPSULE ORAL
Status: DISCONTINUED | OUTPATIENT
Start: 2021-11-05 | End: 2021-11-05

## 2021-11-04 RX ORDER — ONDANSETRON 2 MG/ML
INJECTION INTRAMUSCULAR; INTRAVENOUS PRN
Status: DISCONTINUED | OUTPATIENT
Start: 2021-11-04 | End: 2021-11-04

## 2021-11-04 RX ORDER — NALOXONE HYDROCHLORIDE 0.4 MG/ML
0.4 INJECTION, SOLUTION INTRAMUSCULAR; INTRAVENOUS; SUBCUTANEOUS
Status: DISCONTINUED | OUTPATIENT
Start: 2021-11-04 | End: 2021-11-04 | Stop reason: HOSPADM

## 2021-11-04 RX ORDER — HYDROMORPHONE HYDROCHLORIDE 4 MG/1
4 TABLET ORAL EVERY 4 HOURS PRN
Status: DISCONTINUED | OUTPATIENT
Start: 2021-11-04 | End: 2021-11-04

## 2021-11-04 RX ORDER — CEFUROXIME SODIUM 1.5 G/16ML
INJECTION, POWDER, FOR SOLUTION INTRAVENOUS PRN
Status: DISCONTINUED | OUTPATIENT
Start: 2021-11-04 | End: 2021-11-04

## 2021-11-04 RX ORDER — WARFARIN SODIUM 7.5 MG/1
7.5 TABLET ORAL
Status: DISCONTINUED | OUTPATIENT
Start: 2021-11-04 | End: 2021-11-04

## 2021-11-04 RX ORDER — LIDOCAINE 40 MG/G
CREAM TOPICAL
Status: DISCONTINUED | OUTPATIENT
Start: 2021-11-04 | End: 2021-11-04 | Stop reason: HOSPADM

## 2021-11-04 RX ORDER — ONDANSETRON 4 MG/1
4 TABLET, ORALLY DISINTEGRATING ORAL EVERY 6 HOURS PRN
Status: DISCONTINUED | OUTPATIENT
Start: 2021-11-04 | End: 2021-11-04

## 2021-11-04 RX ORDER — HYDROMORPHONE HCL IN WATER/PF 6 MG/30 ML
0.2 PATIENT CONTROLLED ANALGESIA SYRINGE INTRAVENOUS EVERY 5 MIN PRN
Status: DISCONTINUED | OUTPATIENT
Start: 2021-11-04 | End: 2021-11-04 | Stop reason: HOSPADM

## 2021-11-04 RX ORDER — FENTANYL CITRATE 50 UG/ML
25-50 INJECTION, SOLUTION INTRAMUSCULAR; INTRAVENOUS
Status: DISCONTINUED | OUTPATIENT
Start: 2021-11-04 | End: 2021-11-04 | Stop reason: HOSPADM

## 2021-11-04 RX ORDER — SODIUM CHLORIDE, SODIUM LACTATE, POTASSIUM CHLORIDE, CALCIUM CHLORIDE 600; 310; 30; 20 MG/100ML; MG/100ML; MG/100ML; MG/100ML
INJECTION, SOLUTION INTRAVENOUS CONTINUOUS
Status: DISCONTINUED | OUTPATIENT
Start: 2021-11-04 | End: 2021-11-04 | Stop reason: HOSPADM

## 2021-11-04 RX ORDER — BUPIVACAINE HYDROCHLORIDE 2.5 MG/ML
INJECTION, SOLUTION EPIDURAL; INFILTRATION; INTRACAUDAL
Status: COMPLETED | OUTPATIENT
Start: 2021-11-04 | End: 2021-11-04

## 2021-11-04 RX ORDER — PROPOFOL 10 MG/ML
INJECTION, EMULSION INTRAVENOUS PRN
Status: DISCONTINUED | OUTPATIENT
Start: 2021-11-04 | End: 2021-11-04

## 2021-11-04 RX ORDER — GLYCOPYRROLATE 0.2 MG/ML
INJECTION, SOLUTION INTRAMUSCULAR; INTRAVENOUS PRN
Status: DISCONTINUED | OUTPATIENT
Start: 2021-11-04 | End: 2021-11-04

## 2021-11-04 RX ORDER — NALOXONE HYDROCHLORIDE 0.4 MG/ML
0.2 INJECTION, SOLUTION INTRAMUSCULAR; INTRAVENOUS; SUBCUTANEOUS
Status: DISCONTINUED | OUTPATIENT
Start: 2021-11-04 | End: 2021-11-04 | Stop reason: HOSPADM

## 2021-11-04 RX ORDER — DEXAMETHASONE SODIUM PHOSPHATE 4 MG/ML
INJECTION, SOLUTION INTRA-ARTICULAR; INTRALESIONAL; INTRAMUSCULAR; INTRAVENOUS; SOFT TISSUE PRN
Status: DISCONTINUED | OUTPATIENT
Start: 2021-11-04 | End: 2021-11-04

## 2021-11-04 RX ORDER — ONDANSETRON 2 MG/ML
4 INJECTION INTRAMUSCULAR; INTRAVENOUS EVERY 30 MIN PRN
Status: DISCONTINUED | OUTPATIENT
Start: 2021-11-04 | End: 2021-11-04 | Stop reason: HOSPADM

## 2021-11-04 RX ORDER — WARFARIN SODIUM 5 MG/1
5 TABLET ORAL
Status: COMPLETED | OUTPATIENT
Start: 2021-11-04 | End: 2021-11-04

## 2021-11-04 RX ORDER — HEPARIN SODIUM 10000 [USP'U]/100ML
400 INJECTION, SOLUTION INTRAVENOUS CONTINUOUS
Status: DISCONTINUED | OUTPATIENT
Start: 2021-11-04 | End: 2021-11-05

## 2021-11-04 RX ORDER — AMOXICILLIN 250 MG
1 CAPSULE ORAL 2 TIMES DAILY
Status: DISCONTINUED | OUTPATIENT
Start: 2021-11-04 | End: 2021-11-04

## 2021-11-04 RX ORDER — OXYCODONE HYDROCHLORIDE 5 MG/1
5 TABLET ORAL EVERY 4 HOURS PRN
Status: DISCONTINUED | OUTPATIENT
Start: 2021-11-04 | End: 2021-11-04 | Stop reason: HOSPADM

## 2021-11-04 RX ORDER — HYDROMORPHONE HYDROCHLORIDE 2 MG/1
2 TABLET ORAL EVERY 4 HOURS PRN
Status: DISCONTINUED | OUTPATIENT
Start: 2021-11-04 | End: 2021-11-04

## 2021-11-04 RX ORDER — ACETAMINOPHEN 325 MG/1
975 TABLET ORAL EVERY 8 HOURS
Status: COMPLETED | OUTPATIENT
Start: 2021-11-04 | End: 2021-11-07

## 2021-11-04 RX ORDER — PROCHLORPERAZINE MALEATE 5 MG
10 TABLET ORAL EVERY 6 HOURS PRN
Status: DISCONTINUED | OUTPATIENT
Start: 2021-11-04 | End: 2021-11-04

## 2021-11-04 RX ORDER — ACETAMINOPHEN 325 MG/1
650 TABLET ORAL EVERY 4 HOURS PRN
Status: DISCONTINUED | OUTPATIENT
Start: 2021-11-07 | End: 2021-11-04

## 2021-11-04 RX ORDER — FENTANYL CITRATE 50 UG/ML
25 INJECTION, SOLUTION INTRAMUSCULAR; INTRAVENOUS EVERY 5 MIN PRN
Status: DISCONTINUED | OUTPATIENT
Start: 2021-11-04 | End: 2021-11-04 | Stop reason: HOSPADM

## 2021-11-04 RX ORDER — BISACODYL 10 MG
10 SUPPOSITORY, RECTAL RECTAL DAILY PRN
Status: DISCONTINUED | OUTPATIENT
Start: 2021-11-04 | End: 2021-11-04

## 2021-11-04 RX ORDER — HYDROMORPHONE HCL IN WATER/PF 6 MG/30 ML
0.4 PATIENT CONTROLLED ANALGESIA SYRINGE INTRAVENOUS
Status: DISCONTINUED | OUTPATIENT
Start: 2021-11-04 | End: 2021-11-06

## 2021-11-04 RX ADMIN — ACETAMINOPHEN 650 MG: 325 TABLET, FILM COATED ORAL at 08:08

## 2021-11-04 RX ADMIN — ONDANSETRON 4 MG: 2 INJECTION INTRAMUSCULAR; INTRAVENOUS at 14:34

## 2021-11-04 RX ADMIN — WARFARIN SODIUM 5 MG: 5 TABLET ORAL at 18:18

## 2021-11-04 RX ADMIN — FENTANYL CITRATE 75 MCG: 50 INJECTION, SOLUTION INTRAMUSCULAR; INTRAVENOUS at 12:54

## 2021-11-04 RX ADMIN — GLYCOPYRROLATE 0.2 MG: 0.2 INJECTION, SOLUTION INTRAMUSCULAR; INTRAVENOUS at 15:01

## 2021-11-04 RX ADMIN — CEFUROXIME 1.5 G: 1.5 INJECTION, POWDER, FOR SOLUTION INTRAVENOUS at 12:40

## 2021-11-04 RX ADMIN — OXYCODONE HYDROCHLORIDE 5 MG: 5 TABLET ORAL at 17:47

## 2021-11-04 RX ADMIN — CISATRACURIUM BESYLATE 2 MG: 2 INJECTION INTRAVENOUS at 13:53

## 2021-11-04 RX ADMIN — FENTANYL CITRATE 50 MCG: 50 INJECTION, SOLUTION INTRAMUSCULAR; INTRAVENOUS at 14:37

## 2021-11-04 RX ADMIN — DEXAMETHASONE SODIUM PHOSPHATE 4 MG: 4 INJECTION, SOLUTION INTRA-ARTICULAR; INTRALESIONAL; INTRAMUSCULAR; INTRAVENOUS; SOFT TISSUE at 14:03

## 2021-11-04 RX ADMIN — HEPARIN SODIUM 2350 UNITS/HR: 10000 INJECTION, SOLUTION INTRAVENOUS at 09:26

## 2021-11-04 RX ADMIN — NEOSTIGMINE METHYLSULFATE 2.5 MG: 1 INJECTION, SOLUTION INTRAVENOUS at 14:55

## 2021-11-04 RX ADMIN — SODIUM BICARBONATE 650 MG: 650 TABLET ORAL at 19:55

## 2021-11-04 RX ADMIN — CEPHALEXIN 500 MG: 250 CAPSULE ORAL at 08:09

## 2021-11-04 RX ADMIN — PROPOFOL 150 MG: 10 INJECTION, EMULSION INTRAVENOUS at 12:54

## 2021-11-04 RX ADMIN — GLYCOPYRROLATE 0.4 MG: 0.2 INJECTION, SOLUTION INTRAMUSCULAR; INTRAVENOUS at 14:55

## 2021-11-04 RX ADMIN — FENTANYL CITRATE 50 MCG: 50 INJECTION, SOLUTION INTRAMUSCULAR; INTRAVENOUS at 15:06

## 2021-11-04 RX ADMIN — CISATRACURIUM BESYLATE 14 MG: 2 INJECTION INTRAVENOUS at 12:55

## 2021-11-04 RX ADMIN — HEPARIN SODIUM 400 UNITS/HR: 10000 INJECTION, SOLUTION INTRAVENOUS at 17:40

## 2021-11-04 RX ADMIN — BUPIVACAINE 20 ML: 13.3 INJECTION, SUSPENSION, LIPOSOMAL INFILTRATION at 12:10

## 2021-11-04 RX ADMIN — CISATRACURIUM BESYLATE 2 MG: 2 INJECTION INTRAVENOUS at 14:14

## 2021-11-04 RX ADMIN — VALGANCICLOVIR 450 MG: 450 TABLET, FILM COATED ORAL at 08:09

## 2021-11-04 RX ADMIN — SULFAMETHOXAZOLE AND TRIMETHOPRIM 1 TABLET: 400; 80 TABLET ORAL at 08:19

## 2021-11-04 RX ADMIN — ACETAMINOPHEN 650 MG: 325 TABLET, FILM COATED ORAL at 00:17

## 2021-11-04 RX ADMIN — TACROLIMUS 1 MG: 1 CAPSULE ORAL at 08:09

## 2021-11-04 RX ADMIN — FENTANYL CITRATE 50 MCG: 50 INJECTION, SOLUTION INTRAMUSCULAR; INTRAVENOUS at 11:59

## 2021-11-04 RX ADMIN — Medication 1 TABLET: at 19:55

## 2021-11-04 RX ADMIN — PHENYLEPHRINE HYDROCHLORIDE 50 MCG: 10 INJECTION INTRAVENOUS at 14:45

## 2021-11-04 RX ADMIN — FENTANYL CITRATE 75 MCG: 50 INJECTION, SOLUTION INTRAMUSCULAR; INTRAVENOUS at 14:10

## 2021-11-04 RX ADMIN — CITALOPRAM HYDROBROMIDE 20 MG: 20 TABLET ORAL at 08:09

## 2021-11-04 RX ADMIN — ATORVASTATIN CALCIUM 10 MG: 10 TABLET, FILM COATED ORAL at 08:10

## 2021-11-04 RX ADMIN — SODIUM CHLORIDE, POTASSIUM CHLORIDE, SODIUM LACTATE AND CALCIUM CHLORIDE: 600; 310; 30; 20 INJECTION, SOLUTION INTRAVENOUS at 12:39

## 2021-11-04 RX ADMIN — DOCUSATE SODIUM 50 MG AND SENNOSIDES 8.6 MG 1 TABLET: 8.6; 5 TABLET, FILM COATED ORAL at 19:55

## 2021-11-04 RX ADMIN — ACETAMINOPHEN 975 MG: 325 TABLET, FILM COATED ORAL at 16:58

## 2021-11-04 RX ADMIN — PHENYLEPHRINE HYDROCHLORIDE 100 MCG: 10 INJECTION INTRAVENOUS at 14:51

## 2021-11-04 RX ADMIN — CEPHALEXIN 500 MG: 250 CAPSULE ORAL at 19:55

## 2021-11-04 RX ADMIN — MYCOPHENOLATE MOFETIL 750 MG: 250 CAPSULE ORAL at 17:47

## 2021-11-04 RX ADMIN — MYCOPHENOLATE MOFETIL 750 MG: 250 CAPSULE ORAL at 08:09

## 2021-11-04 RX ADMIN — SODIUM BICARBONATE 650 MG: 650 TABLET ORAL at 08:09

## 2021-11-04 RX ADMIN — PHENYLEPHRINE HYDROCHLORIDE 100 MCG: 10 INJECTION INTRAVENOUS at 14:36

## 2021-11-04 RX ADMIN — BUPIVACAINE HYDROCHLORIDE 20 ML: 2.5 INJECTION, SOLUTION EPIDURAL; INFILTRATION; INTRACAUDAL; PERINEURAL at 12:10

## 2021-11-04 RX ADMIN — DOCUSATE SODIUM 50 MG AND SENNOSIDES 8.6 MG 1 TABLET: 8.6; 5 TABLET, FILM COATED ORAL at 08:10

## 2021-11-04 ASSESSMENT — ACTIVITIES OF DAILY LIVING (ADL)
ADLS_ACUITY_SCORE: 5
ADLS_ACUITY_SCORE: 7
ADLS_ACUITY_SCORE: 5
ADLS_ACUITY_SCORE: 5
ADLS_ACUITY_SCORE: 7
ADLS_ACUITY_SCORE: 5
ADLS_ACUITY_SCORE: 7
ADLS_ACUITY_SCORE: 5
ADLS_ACUITY_SCORE: 7
ADLS_ACUITY_SCORE: 5
ADLS_ACUITY_SCORE: 7
ADLS_ACUITY_SCORE: 5

## 2021-11-04 NOTE — PLAN OF CARE
[id number]  VS: stable, on room air.    BG: none  Labs: hep 10 a 0.39.  Therapeutic X 2.  Heparin gtt continues at 2350u/hr.   Kidney US completed.    Pain/Nausea: Complains of back pain. Tylenol given. Denies nausea.    Diet: Regular diet.  Poor appetite.    LDA: PIV  infusing heparin gtt.  NATHEN w/ scant bloody drainage.    GI: last BM 11/3  : Wade w/ 450cc out.    Skin: Abdominal incision w/ staples red.    Mobility: up w/  standby assist.    Plan: Pt didn't sleep, appears down.  Try melatonin tonight.  Eye mask and ear plugs provided.

## 2021-11-04 NOTE — PROGRESS NOTES
[facility]   Transplant Nephrology Progress Note  Date of Admission:  10/27/2021  Today's Date: 11/04/2021    Recommendations:  - No new recommendations at this time.   - Agree with exploration of new kidney transplant with possible explant.    Assessment & Plan   # DDKT: Trend down, but elevated creatinine at ~ 5.0 today.  Good urine output.  No acute indications for dialysis.  This was a preemptive DCD kidney with prolonged CIT and initial SGF.  Post operative course c/b renal vein thrombosis, s/p surgical thrombectomy with presumed ATN injury.  Kidney transplant ultrasound shows nonocclusive renal vein thrombosis again after previously having good flow.  Renogram 11/3 showed no uptake.  Concern that this kidney is not functioning and plan for Transplant Surgery to due probable explant today.   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  Doppler showed renal vein thrombosis on 10/27 and patient to OR following this.  He has been on anticoagulation with heparin following surgery and started warfarin, but INR not therapeutic yet.  Daily ultrasounds had showed patent renal transplant artery and vein, last being done 10/30.  Repeat ultrasound showed nonocclusive renal vein clot again and renogram showed no uptake in LLQ kidney transplant, suggesting no function.    # Immunosuppression: Tacrolimus immediate release (goal 8-10) and Mycophenolate mofetil (dose 750 mg every 12 hours)   - Induction with Recent Transplant:  High Intensity   - Changes: No    # Infection Prophylaxis:   - PJP: Sulfa/TMP (Bactrim)  - CMV:  Valganciclovir (Valcyte); Patient is CMV IgG Ab discordant (D+/R-) and will continue on Valcyte x 6 months, then check CMV PCR monthly until 12 months post transplant.    # Blood Pressure: Controlled;  Goal BP: < 150/90   - Volume status: Mildly hypervolemic  EDW ~ 93.5 kg   - Changes: No    # Anemia in Chronic Renal Disease: Hgb: Stable, low      NADIYA: No   - Iron studies: Low iron saturation, but high ferritin    # Mineral Bone Disorder:   - Secondary renal hyperparathyroidism; PTH level: Minimally elevated ( pg/ml)        On treatment: None  - Vitamin D; level: Normal        On supplement: No  - Calcium; level: Low normal when corrected for low serum albumin        On supplement: Yes  - Phosphorus; level: Normal        On binder: No    # Electrolytes:   - Potassium; level: Normal        On supplement: No  - Magnesium; level: Normal        On supplement: Yes  - Bicarbonate; level: Low normal        On supplement: Yes; If still low tomorrow, would increase sodium bicarbonate to 1300 mg bid.    # Leukocytosis: Patient has a presumed incisional cellulitis and started on cephalexin 10/31.  CXR unremarkable.  Slightly trend down in WBC and patient has now been afebrile and with probably failed kidney transplant, that could be contributing.  Plan probable explant today.    # Left LE Edema: Patient with worsening edema on left versus right leg.  Doppler showed no DVT.    # Transplant History:  Etiology of Kidney Failure: Polycystic kidney disease (PKD)  Tx: DDKT  Transplant: 10/23/2021 (Kidney), 10/13/2015 (Kidney)  Donor Type: Donation after Circulatory Death  Donor Class:   Crossmatch at time of Tx: negative  DSA at time of Tx: No  Significant changes in immunosuppression: None  Significant transplant-related complications: None    Recommendations were communicated to the primary team via this note.    Francis Espinoza MD   Pager: [phone]    Interval History   Mr. Pierce's kidney function is stable with  creatinine ~ 5.0.  Good urine output.  Renogram showed no uptake in new left lower quadrant kidney transplant and ultrasound showed a nonocclusive clot in the renal vein of that kidney.  Patient is feeling okay.  Denies any chest pain or shortness of breath.  No nausea, vomiting or diarrhea.  No fever, sweats or chills, but feels cold.  Stable leg swelling.    Review of Systems   4 point ROS was obtained and negative except as noted in the Interval History.    MEDICATIONS:    atorvastatin  10 mg Oral Daily     calcium carbonate-vitamin D  1 tablet Oral BID     cephALEXin  500 mg Oral Q12H DONITA     citalopram  20 mg Oral Daily     lidocaine  1-2 patch Transdermal Q24H     lidocaine   Transdermal Q8H     magnesium oxide  400 mg Oral Daily with lunch     mycophenolate  750 mg Oral BID IS     polyethylene glycol  17 g Oral Daily     senna-docusate  1 tablet Oral BID     sodium bicarbonate  650 mg Oral BID     sulfamethoxazole-trimethoprim  1 tablet Oral Once per day on  Sat     [START ON 2021] tacrolimus  1 mg Oral BID IS     valGANciclovir  450 mg Oral Once per day on      warfarin-No DOSE today  1 each Does not apply no dose today (warfarin)       heparin 2,350 Units/hr (21 0926)     Warfarin Therapy Reminder         Physical Exam   Temp  Av.5  F (36.9  C)  Min: 95  F (35  C)  Max: 101.5  F (38.6  C)      Pulse  Av.9  Min: 54  Max: 108 Resp  Av.1  Min: 12  Max: 22  SpO2  Av.7 %  Min: 90 %  Max: 100 %     /88 (BP Location: Right arm)   Pulse 75   Temp 97.7  F (36.5  C) (Oral)   Resp 16   Wt 95.1 kg (209 lb 11.2 oz)   SpO2 97%   BMI 30.09 kg/m      Admit Weight: 93.5 kg (206 lb 2.1 oz)     GENERAL APPEARANCE: alert and no distress  HENT: mouth without ulcers or lesions  RESP: lungs clear to auscultation - no rales, rhonchi or wheezes  CV: regular rhythm, normal rate, no rub, no murmur  EDEMA: trace LE edema on right, 2+ LE edema on left  ABDOMEN: soft,  nondistended, nontender, bowel sounds normal, overweight  MS: extremities normal - no gross deformities noted, no evidence of inflammation in joints, no muscle tenderness  SKIN: no rash  TX KIDNEY: mild TTP  DIALYSIS ACCESS:  LUE AV fistula decreased thrill    Data   All labs reviewed by me.  CMP  Recent Labs   Lab 11/04/21  0616 11/03/21  0626 11/02/21  0403 11/01/21  0440 10/31/21  0552 10/31/21  0552 10/30/21  0613 10/30/21  0613    132* 133 134   < > 134   < > 134   POTASSIUM 4.5 4.5 4.6 4.4   < > 4.4   < > 4.1   CHLORIDE 105 104 103 105   < > 104   < > 105   CO2 20 18* 18* 19*   < > 22   < > 18*   ANIONGAP 9 10 12 10   < > 8   < > 11   * 102* 91 97   < > 86   < > 100*   BUN 83* 82* 84* 80*   < > 79*   < > 83*   CR 4.97* 5.26* 5.26* 5.43*   < > 5.69*   < > 5.69*   GFRESTIMATED 12* 12* 12* 11*   < > 11*   < > 11*   LILIANA 7.9* 7.6* 7.3* 7.0*   < > 7.3*   < > 7.3*   MAG 2.5*  --  2.3  --   --  2.3  --  2.3   PHOS 3.9 3.7 3.6 3.2   < > 2.7   < > 3.6   ALBUMIN  --   --   --  2.0*  --   --   --   --     < > = values in this interval not displayed.     CBC  Recent Labs   Lab 11/04/21  0616 11/03/21  0626 11/02/21  0403 11/01/21  1653 11/01/21  1029 11/01/21 0440   HGB 7.8* 7.9* 8.0* 8.4*   < > 6.9*  6.9*   WBC 13.7* 14.9* 15.4*  --   --  13.3*   RBC 2.71* 2.71* 2.73*  --   --  2.41*   HCT 25.0* 25.0* 25.4*  --   --  22.5*   MCV 92 92 93  --   --  93   MCH 28.8 29.2 29.3  --   --  28.6   MCHC 31.2* 31.6 31.5  --   --  30.7*   RDW 14.6 14.6 14.6  --   --  14.6    347 293  --   --  263    < > = values in this interval not displayed.     INR  Recent Labs   Lab 11/04/21  0616 11/03/21  1206 11/03/21  0626 11/02/21  0403 11/01/21  0440   INR 1.45*  --  1.23* 1.23* 1.16*   PTT  --  51*  --   --   --      ABGNo lab results found in last 7 days.   Urine Studies  Recent Labs   Lab Test 11/02/21  1121 10/23/21  0526 11/09/15  1300 10/28/15  0702   COLOR Light Yellow Straw Yellow Yellow   APPEARANCE Clear Clear  Clear Clear   URINEGLC Negative Negative Negative Negative   URINEBILI Negative Negative Negative Negative   URINEKETONE Negative Negative Negative Negative   SG 1.011 1.009 1.008 1.008   UBLD Moderate* Negative Negative Negative   URINEPH 6.0 7.0 5.5 6.5   PROTEIN 70 * 50 * 10* 10*   NITRITE Negative Negative Negative Negative   LEUKEST Negative Negative Negative Negative   RBCU 1 0 <1 1   WBCU 1 1 1 1     Recent Labs   Lab Test 10/23/21  0526 06/05/17  1253 01/25/16  1602 10/28/15  0702 10/13/15  1035   UTPG 1.37* 1.30* 0.36* 0.45* 0.55*     PTH  Recent Labs   Lab Test 10/27/21  0621 08/01/16  1638 01/25/16  1601 10/15/15  0520   PTHI 149* 90* 121* 434*     Iron Studies  Recent Labs   Lab Test 10/27/21  0621 10/17/15  0455   IRON 19* 13*   * 241   IRONSAT 10* 5*   TERRANCE 597*  --        IMAGING:  All imaging studies reviewed by me.

## 2021-11-04 NOTE — ANESTHESIA PROCEDURE NOTES
Airway       Patient location during procedure: OR       Procedure Start/Stop Times: 11/4/2021 12:58 PM  Staff -        Anesthesiologist:  Melvin Jane MD       Resident/Fellow: Edgard Sousa MD       Performed By: anesthesiologist and resident  Consent for Airway        Urgency: elective  Indications and Patient Condition       Indications for airway management: rodrigo-procedural       Induction type:intravenous       Mask difficulty assessment: 2 - vent by mask + OA or adjuvant +/- NMBA    Final Airway Details       Final airway type: endotracheal airway       Successful airway: ETT - single  Endotracheal Airway Details        ETT size (mm): 8.0       Cuffed: yes       Successful intubation technique: direct laryngoscopy       DL Blade Type: MAC 4       Grade View of Cords: 1       Adjucts: stylet       Position: Right       Measured from: lips       Secured at (cm): 24       Bite block used: None    Post intubation assessment        Placement verified by: capnometry, equal breath sounds and chest rise        Number of attempts at approach: 1       Secured with: pink tape       Ease of procedure: easy       Dentition: Intact

## 2021-11-04 NOTE — PROGRESS NOTES
Transplant Surgery  Inpatient Daily Progress Note  2021    Assessment & Plan: Raj Pierce is a 50 year old male  with a PMH significant for ESRD 2/2 PCKD s/p kidney transplant () c/b poor function (small graft). Other PMH includes HTN, hyperparathyroidism, and SILVIA (with cpap). Now status post pre-emptive  donor (DCD) kidney re-transplant on 10/23. Discharged 10/26 POD 3. Readmitted 10/27 for emergent exploratory laparotomy due to renal vein thrombosis.    S/p exploratory left lower quadrant of kidney transplant, back table flush, thrombectomy of renal vein, thrombectomy of iliac vein, reimplantation of kidney transplant, with ureteral stent removal and placement of a new ureteral stent, intraoperative ultrasound, intraoperative of transit time flow measurment.     DDKT, POD 12  RTOR POD 8    Today's changes  - NPO for potential nephrectomy  - Nephrectomy today or tomorrow pending surgeon decision  - Hold tonight Tacrolimus dose; will resume tomorrow    Graft function:   Kidney graft, renal vein thrombosis: Cr 5.26 (5.26). Baseline 3. Good UO. 10/29 renogram showed that current function primarily from 2015 graft. Repeat renogram 11/3 shows right kidney ATN, left renal vein thrombosis. Repeat US 10/30 showed increase in size of likely perinephric hematomas, largest measuring approximately [id number]. US  showed decrease in size of hematomas, patent renal vein and artery. US  showed non-occlusive clot in the left transplant renal vein with persistent elevated RI of 1.0. Will need nephrectomy for non-functioning transplant kidney. NATHEN 15 ml, continue until after spears removed.    Surgical site infection: Erythema at incision, slightly improved. No drainage. Continue keflex.     Immunosuppression management: High risk protocol due to DGF    mg BID  Tac 2 mg BID. Goal 8-10. Level 19.5 (19.7). Dose decreased yesterday. Check level tomorrow.   Complexity of management: High. Contributing  factors: anemia, thrombocytopenia     Hematology:    Acute blood loss, chronic anemia: Received 2 units RBC 10/27, 1 unit RBC on 10/28, 1 unit RBC on 11/1. Hgb 7.9 (8). Has since been hemodynamically stable and required no additional product, continues to be anemic although above 7.     Anticoagulation for renal vein thrombosis: Renal vein thrombosis, thrombectomy of iliac vein, reimplantation of kidney transplant. Low intensity heparin drip, transitioning to Warfarin dosed by pharmacy with goal INR 2-2.5, INR 1.45.    Cardiorespiratory: Stable on RA. Encourage frequent IS.  Hypotension, secondary to bleeding, third spacing: Resolved with blood transfusions and fluid replacement. 11/3 Duplex US DVT study negative.     GI/Nutrition:   Moderate malnutrition in the context of acute illness: Regular diet plus supplements. NPO for potential surgery, if surgery tomorrow then NPO at midnight. Appreciate dietician recommendations.   Bowel regimen: senna BID, miralax daily.    Endocrine:   Stress hyperglycemia: Improved. .    Mineral bone disorder:  Started Ca supplement.    Fluid/Electrolytes:   Metabolic acidosis: Continue sodium bicarbonate 650 mg BID  Hypomagnesemia: Continue Mg Oxide 400 mg daily    Hypervolemia, LLE edema: L>R 11/3, LE DVT US negative. Patient already anticoagulated.     : Wade to remain x 14d due to new surgical anastomosis. Stent in place, remove in 4 weeks.     Infectious disease: Afebrile.   Leukocytosis: WBC trending down, 13.7 from 15.4. Continue to treat potential incisional infection. UA/UC, BC, CXR all negative. Decreased size of fluid collections on renal US, no new collections concerning for abscess under incision. Erythema surrounding incision improved compared to yesterday.    Surgical site infection: Keflex added 10/31. Incision appears mildly erythematous, slightly improved compared to yesterday, no drainage, non tender.    Prophylaxis: DVT (heparin + warfarin), valcyte x 6  months (D+/R-), bactrim    Disposition: 7A     Medical Decision Making: Medium    Fellow/JATINDER/Resident Provider: Nette Pardo [phone]    Faculty: Eulalio Emanuel M.D., Ph.D.  _________________________________________________________________  Transplant History: Admitted 10/27/2021 for renal vein thrombosis.  10/23/2021 (Kidney), 10/13/2015 (Kidney), Postoperative day: 2214 (Kidney), 12 (Kidney)     Interval History: History is obtained from the patient  Overnight events: NAEO. Repeat US shows left renal vein thrombosis. Hemodynamically stable, Cr stable, continues to have good urine output. Patient reports feeling well, tolerating PO intake, continues to pass flatus and has had small bowel movements.     ROS:   A 10-point review of systems was negative except as noted above.    Meds:    atorvastatin  10 mg Oral Daily     calcium carbonate-vitamin D  1 tablet Oral BID     cephALEXin  500 mg Oral Q12H DONITA     citalopram  20 mg Oral Daily     lidocaine  1-2 patch Transdermal Q24H     lidocaine   Transdermal Q8H     magnesium oxide  400 mg Oral Daily with lunch     mycophenolate  750 mg Oral BID IS     polyethylene glycol  17 g Oral Daily     senna-docusate  1 tablet Oral BID     sodium bicarbonate  650 mg Oral BID     sulfamethoxazole-trimethoprim  1 tablet Oral Once per day on Tue Thu Sat     tacrolimus  1 mg Oral BID IS     valGANciclovir  450 mg Oral Once per day on Mon Thu     Physical Exam:  General Appearance: NAD, sitting up in chair  Skin: normal  Heart: perfused  Lungs: normal rate and work of breathing  Abdomen: soft, non-tender, non-distended. LLQ incision with mild surrounding erythema. No active drainage. Staples in place. NATHEN light serosang drainage.  : Wade in place draining yellow urine.   Ext: Lower extremity edema bilaterally 1+.   Neurologic: alert and oriented, no gross deficit.   PIV    Current vitals:   BP (!) 131/90 (BP Location: Right arm)   Pulse 72   Temp 98.8  F (37.1  C) (Oral)    Resp 16   Wt 95.1 kg (209 lb 11.2 oz)   SpO2 94%   BMI 30.09 kg/m      Vital sign ranges:    Temp:  [97.9  F (36.6  C)-98.8  F (37.1  C)] 98.8  F (37.1  C)  Pulse:  [72-85] 72  Resp:  [16-18] 16  BP: (118-138)/(81-90) 131/90  SpO2:  [94 %-97 %] 94 %  Patient Vitals for the past 24 hrs:   BP Temp Temp src Pulse Resp SpO2 Weight   11/04/21 0343 (!) 131/90 98.8  F (37.1  C) Oral 72 16 94 % 95.1 kg (209 lb 11.2 oz)   11/04/21 0016 132/83 98.6  F (37  C) Oral 81 16 97 % --   11/03/21 1930 133/83 98.2  F (36.8  C) Oral 82 18 96 % --   11/03/21 1626 118/83 98.2  F (36.8  C) Oral 81 16 96 % --   11/03/21 1354 -- -- -- -- -- -- 95.7 kg (211 lb)   11/03/21 1109 129/81 97.9  F (36.6  C) Oral 85 16 95 % --   11/03/21 0745 138/86 98.3  F (36.8  C) Oral 85 18 96 % --     Data:   CMP  Recent Labs   Lab 11/04/21  0616 11/03/21  0626 11/02/21  0403 11/02/21  0403 11/01/21  0440 11/01/21  0440    132*   < > 133   < > 134   POTASSIUM 4.5 4.5   < > 4.6   < > 4.4   CHLORIDE 105 104   < > 103   < > 105   CO2 20 18*   < > 18*   < > 19*   * 102*   < > 91   < > 97   BUN 83* 82*   < > 84*   < > 80*   CR 4.97* 5.26*   < > 5.26*   < > 5.43*   GFRESTIMATED 12* 12*   < > 12*   < > 11*   LILIANA 7.9* 7.6*   < > 7.3*   < > 7.0*   MAG 2.5*  --   --  2.3  --   --    PHOS 3.9 3.7   < > 3.6   < > 3.2   ALBUMIN  --   --   --   --   --  2.0*    < > = values in this interval not displayed.     CBC  Recent Labs   Lab 11/04/21 0616 11/03/21 0626   HGB 7.8* 7.9*   WBC 13.7* 14.9*    347     COAGS  Recent Labs   Lab 11/04/21 0616 11/03/21  1206 11/03/21  0626   INR 1.45*  --  1.23*   PTT  --  51*  --       Urinalysis  Recent Labs   Lab Test 11/02/21  1121 10/23/21  0526 06/05/17  1253   COLOR Light Yellow Straw  --    APPEARANCE Clear Clear  --    URINEGLC Negative Negative  --    URINEBILI Negative Negative  --    URINEKETONE Negative Negative  --    SG 1.011 1.009  --    UBLD Moderate* Negative  --    URINEPH 6.0 7.0  --    PROTEIN 70  * 50 *  --    NITRITE Negative Negative  --    LEUKEST Negative Negative  --    RBCU 1 0  --    WBCU 1 1  --    UTPG  --  1.37* 1.30*     Virology:  CMV DNA Quantitation Specimen   Date Value Ref Range Status   08/01/2016   Corrected    Plasma  CORRECTED ON 08/02 AT 1044: PREVIOUSLY REPORTED AS Blood       CMV IgG Antibody   Date Value Ref Range Status   07/10/2013 <0.20  Negative for anti-CMV IgG U/mL Final     EBV VCA IgG Antibody   Date Value Ref Range Status   07/10/2013 >750.00  Positive, suggests immunologic exposure. U/mL Final     Hepatitis C Antibody   Date Value Ref Range Status   10/23/2021 Nonreactive Nonreactive Final   04/25/2016  NR Final    Nonreactive   Assay performance characteristics have not been established for newborns,   infants, and children       Hep B Surface Loni   Date Value Ref Range Status   07/10/2013 0.0  Final     Comment:     Negative, No antibody detected when the value is less than 5.0 mlU/mL.     BK Virus Result   Date Value Ref Range Status   06/05/2017 BK Virus DNA Not Detected BKNEG copies/mL Final   10/28/2015 BK Virus DNA Not Detected BKNEG copies/mL Final     BK Virus DNA copies/mL   Date Value Ref Range Status   10/23/2021 Not Detected Not Detected copies/mL Final     Nette Pardo  PGY1 [facility]  11/4/2021

## 2021-11-04 NOTE — OR NURSING
Temp:  [97.7  F (36.5  C)-98.8  F (37.1  C)] 98.1  F (36.7  C)  Pulse:  [72-95] 95  Resp:  [14-18] 14  BP: (118-142)/(83-96) 142/93  SpO2:  [94 %-100 %] 100 %    L erector Spinae Block block performed without complications, 50mcg fentanyl given.  NSR, VSS, 2L O2 via NC.  Pt tolerated well.  Will continue to monitor.

## 2021-11-04 NOTE — PROGRESS NOTES
CLINICAL NUTRITION SERVICES - BRIEF NOTE     Nutrition Prescription      Recommendations already ordered by Registered Dietitian (RD):  Increase Ensure Clear to 4x/day.     Future/Additional Recommendations:  Minimize diet restrictions as able d/t high calorie/protein needs post-transplant.  Oral supplements as needed to help meet nutritional needs.     High protein food choices with meals to help meet high needs post-transplant over the next 6-8 weeks.     Heart-healthy diet (low saturated fat, low sodium, high fiber) and food safety precautions long term due to immunosuppression regimen post-transplant         EVALUATION OF THE PROGRESS TOWARD GOALS   Diet: NPO    Intake: Patient drank/tolerated the Ensure Clear supplements (2 yesterday).  Did not try Nepro supplement.  Not feeling much better than yesterday with regard to eating. Did have 1 chicken strip yesterday in addition to supplements.      INTERVENTIONS  Modified oral supplements d/t patient preferences.  Encouraged/reviewed need for increased protein intake and reviewed sources.     Monitoring/Evaluation  Progress toward goals will be monitored and evaluated per protocol.     Elif Howell MS, RD, LD, CCTD  7A/Obs units, pager [phone]

## 2021-11-04 NOTE — ANESTHESIA PROCEDURE NOTES
Erector spinae Procedure Note    Pre-Procedure   Staff -        Anesthesiologist:  Stan Mccarthy MD       Resident/Fellow: Daniela Polanco MD       Performed By: resident       Location: pre-op       Procedure Start/Stop Times: 11/4/2021 11:55 AM and 11/4/2021 12:10 PM       Pre-Anesthestic Checklist: patient identified, IV checked, site marked, risks and benefits discussed, informed consent, monitors and equipment checked, pre-op evaluation, at physician/surgeon's request and post-op pain management  Timeout:       Correct Patient: Yes        Correct Procedure: Yes        Correct Site: Yes        Correct Position: Yes        Correct Laterality: Yes        Site Marked: Yes  Procedure Documentation  Procedure: Erector spinae       Laterality: left       Patient Position: sitting       Patient Prep/Sterile Barriers: sterile gloves, mask       Skin prep: Chloraprep       Insertion Site: T9-10.       Needle Type: short bevel       Needle Gauge: 21.        Needle Length (millimeters): 110        Ultrasound guided       1. Ultrasound was used to identify targeted nerve, plexus, vascular marker, or fascial plane and place a needle adjacent to it in real-time.       2. Ultrasound was used to visualize the spread of anesthetic in close proximity to the above referenced structure.       3. A permanent image is entered into the patient's record.    Assessment/Narrative         The placement was negative for: blood aspirated, painful injection and site bleeding       Paresthesias: No.     Bolus given via needle..        Secured via.        Insertion/Infusion Method: Single Shot       Complications: none    Medication(s) Administered   Bupivacaine 0.25% PF (Infiltration), 20 mL  Bupivacaine liposome (Exparel) 1.3% LA inj susp (Infiltration), 20 mL  Medication Administration Time: 11/4/2021 12:10 PM     Comments:  Left side Erector Spinae Block

## 2021-11-04 NOTE — PLAN OF CARE
BP (!) 128/96   Pulse 93   Temp 98.1  F (36.7  C) (Oral)   Resp 14   Wt 95.1 kg (209 lb 11.2 oz)   SpO2 100%   BMI 30.09 kg/m      Assumed cares [id number]  Neuro: A/Ox4; flat affect, pt disappointed by news of need for transplant nephrectomy  Pain/Nausea: Tylenol x1 for abdominal pain  Cardiac: rate and rhythm regular  Resp: lung sounds clear, equal bilaterally  GI/: Wade catheter- good output; no BM today  Diet/Appetite: NPO since 0800  Skin: L hockey stick incision- erythema, closed w/ staples  Access: PIV- hep gtt @2350; PIV- saline locked  Drains: Wade; NATHEN  Activity: up ad rowan  Procedures: pt went down for transplant nephrectomy around 1100; wife went to third floor waiting room  Plan:   Will continue with plan of care and notify team of any changes.?    Johanna Salazar RN on 11/4/2021 at 2:30 PM

## 2021-11-04 NOTE — ANESTHESIA CARE TRANSFER NOTE
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    Anesthesia Type:   General     Note:    Oropharynx: spontaneously breathing  Level of Consciousness: awake  Oxygen Supplementation: nasal cannula  Level of Supplemental Oxygen (L/min / FiO2): 4  Independent Airway: airway patency satisfactory and stable  Dentition: dentition unchanged  Vital Signs Stable: post-procedure vital signs reviewed and stable    Patient transferred to: PACU    Handoff Report: Identifed the Patient, Identified the Reponsible Provider, Reviewed the pertinent medical history, Discussed the surgical course, Reviewed Intra-OP anesthesia mangement and issues during anesthesia, Set expectations for post-procedure period and Allowed opportunity for questions and acknowledgement of understanding      Vitals:  Vitals Value Taken Time   /77 11/04/21 1530   Temp     Pulse 76 11/04/21 1533   Resp     SpO2 94 % 11/04/21 1533   Vitals shown include unvalidated device data.    Electronically Signed By: Edgard Sousa MD  November 4, 2021  3:34 PM

## 2021-11-04 NOTE — ANESTHESIA POSTPROCEDURE EVALUATION
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    Anesthesia Type:  General    Note:  Disposition: Inpatient   Postop Pain Control: Uneventful            Sign Out: Well controlled pain   PONV: No   Neuro/Psych: Uneventful            Sign Out: Acceptable/Baseline neuro status   Airway/Respiratory: Uneventful            Sign Out: Acceptable/Baseline resp. status   CV/Hemodynamics: Uneventful            Sign Out: Acceptable CV status; No obvious hypovolemia; No obvious fluid overload   Other NRE: NONE   DID A NON-ROUTINE EVENT OCCUR? No           Last vitals:  Vitals Value Taken Time   /93 11/04/21 1545   Temp 36.8  C (98.3  F) 11/04/21 1521   Pulse 78 11/04/21 1552   Resp 16 11/04/21 1545   SpO2 90 % 11/04/21 1552   Vitals shown include unvalidated device data.    Electronically Signed By: Jere Quintana DO  November 4, 2021  3:52 PM

## 2021-11-04 NOTE — OR NURSING
Raj took off his oxygen tubing. He is working on deep  breathing and states he has IS in his room he can use. Sats 90-91.

## 2021-11-04 NOTE — ANESTHESIA POSTPROCEDURE EVALUATION
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    Anesthesia Type:  General    Note:  Anesthesia Post Evaluation    Last vitals:  Vitals Value Taken Time   /93 11/04/21 1545   Temp 36.8  C (98.3  F) 11/04/21 1521   Pulse 78 11/04/21 1553   Resp 16 11/04/21 1545   SpO2 90 % 11/04/21 1553   Vitals shown include unvalidated device data.    Electronically Signed By: Melvin Jane MD  November 4, 2021  3:55 PM

## 2021-11-04 NOTE — ANESTHESIA PREPROCEDURE EVALUATION
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 Induction with thymoglobulin 6mg/kg and steroids.     Kidney replaced by transplant 10/13/2015     Polycystic kidney, autosomal dominant      Pure hypercholesterolemia      Sleep apnea     cpap     Vitamin D deficiency       Past Surgical History:   Procedure Laterality Date     BENCH KIDNEY N/A 10/13/2015    Procedure: BENCH KIDNEY;  Surgeon: Dariel Chavez MD;  Location: [facility] OR     BRONCHOSCOPY, DILATE BRONCHUS, STENT BRONCHUS, COMBINED  10/27/2021    Procedure: Bronchoscopy, dilate bronchus, stent bronchus, combined;  Surgeon: Madina Warren MD;  Location: [facility] OR     CYSTOSCOPY, REMOVE STENT(S), COMBINED Right 2015    Procedure: COMBINED CYSTOSCOPY, REMOVE STENT(S);  Surgeon: Dariel Chavez MD;  Location: [facility] OR     HERNIORRHAPHY INGUINAL CHILD      right     LAPAROTOMY EXPLORATORY N/A 10/27/2021    Procedure: Exploratory left lower quadrant of kidney transplant, back table flush, reimplantation of kidney transplant, with ureteral stent removal and placement of a new ureteral stent, intraoperative ultrasound, intraoperative of transit time flow measurment (VeriQ);  Surgeon: Madina Warren MD;  Location: [facility] OR     THROMBECTOMY ABDOMEN N/A 10/27/2021    Procedure: Thrombectomy of iliac and renal veins;  Surgeon: Madina Warren MD;  Location: [facility] OR     TONSILLECTOMY       TRANSPLANT KIDNEY RECIPIENT  DONOR N/A 10/13/2015    Procedure: TRANSPLANT KIDNEY RECIPIENT   DONOR;  Surgeon: Dariel Chavez MD;  Location: [facility] OR     TRANSPLANT KIDNEY RECIPIENT  DONOR N/A 10/23/2021    Procedure: TRANSPLANT, KIDNEY, RECIPIENT,  DONOR, URETERAL STENT PLACEMENT;  Surgeon: Madina Warren MD;  Location: [facility] OR      No Known Allergies   Social History     Tobacco Use     Smoking status: Never Smoker     Smokeless tobacco: Never Used   Substance Use Topics     Alcohol use: Yes     Alcohol/week: 10.0 standard drinks     Types: 10 Standard drinks or equivalent per week     Comment: 1 drink daily       Wt Readings from Last 1 Encounters:   21 95.1 kg (209 lb 11.2 oz)        Anesthesia Evaluation   Pt has had prior anesthetic.     No history of anesthetic complications       ROS/MED HX  ENT/Pulmonary:     (+) sleep apnea, uses CPAP,     Neurologic:       Cardiovascular:     (+) Dyslipidemia hypertension-----Previous cardiac testing   Echo: Date:  Results:  Normal left ventricular size, thickness, and function. EF 55-60%.  Normal right ventricular size and function.  No hemodynamically significant valvular dysfunction.  Mild dilatation of the ascending aorta (4.1 cm) that is unchanged from  16  Stress Test: Date: Results:    ECG Reviewed: Date: 2021 Results:  NSR, No acute ischemic changes  Cath: Date: Results:      METS/Exercise Tolerance:     Hematologic:     (+) History of blood clots (renal vein thrombosis), pt is anticoagulated, anemia,     Musculoskeletal: Comment: Gout      GI/Hepatic:  - neg GI/hepatic ROS     Renal/Genitourinary: Comment: PKD-->status post pre-emptive  donor (DCD) kidney re-transplant on 10/23. Discharged 10/26 POD 3. Readmitted 10/27 for emergent exploratory laparotomy due to renal vein thrombosis.    (+) renal disease (ESRD 2/2 PCKD s/p kidney transplant () c/b poor function (small graft)., was relisted; KTP 10/27/2021 c/b iliac vein and renal vein thrombosis s/p thrombectomy, but now with nonviable kidney),  type: ESRD, Nephrolithiasis ,     Endo: Comment: hyperparathyroidism ,         Psychiatric/Substance Use:     (+) psychiatric history depression     Infectious Disease:       Malignancy:       Other:            Physical Exam    Airway        Mallampati: II   TM distance: > 3 FB   Neck ROM: full   Mouth opening: > 3 cm    Respiratory Devices and Support         Dental  no notable dental history         Cardiovascular          Rhythm and rate: regular and normal   (+) peripheral edema       Pulmonary           breath sounds clear to auscultation           OUTSIDE LABS:  CBC:   Lab Results   Component Value Date    WBC 13.7 (H) 11/04/2021    WBC 14.9 (H) 11/03/2021    HGB 7.8 (L) 11/04/2021    HGB 7.9 (L) 11/03/2021    HCT 25.0 (L) 11/04/2021    HCT 25.0 (L) 11/03/2021     11/04/2021     11/03/2021     BMP:   Lab Results   Component Value Date     11/04/2021     (L) 11/03/2021    POTASSIUM 4.5 11/04/2021    POTASSIUM 4.5 11/03/2021    CHLORIDE 105 11/04/2021    CHLORIDE 104 11/03/2021    CO2 20 11/04/2021    CO2 18 (L) 11/03/2021    BUN 83 (H) 11/04/2021    BUN 82 (H) 11/03/2021    CR 4.97 (H) 11/04/2021    CR 5.26 (H) 11/03/2021     (H) 11/04/2021     (H) 11/03/2021     COAGS:   Lab Results   Component Value Date    PTT 51 (H) 11/03/2021    INR 1.45 (H) 11/04/2021    FIBR 546 (H) 10/27/2021     POC:   Lab Results   Component Value Date    BGM 90 12/18/2019     HEPATIC:   Lab Results   Component Value Date    ALBUMIN 2.0 (L) 11/01/2021    PROTTOTAL 6.5 (L) 10/23/2021    ALT 24 10/23/2021    AST 23 10/23/2021    ALKPHOS 56 10/23/2021    BILITOTAL 0.6 10/23/2021     OTHER:   Lab Results   Component Value Date    LACT 1.2 10/23/2021    A1C 5.6 10/23/2021    LILIANA 7.9 (L) 11/04/2021    PHOS 3.9 11/04/2021    MAG 2.5 (H) 11/04/2021       Anesthesia Plan    ASA Status:  3   NPO Status:  NPO Appropriate    Anesthesia Type: General.     - Airway: ETT   Induction: Intravenous.    Maintenance: Balanced.   Techniques and Equipment:     - Lines/Monitors: BIS     Consents    Anesthesia Plan(s) and associated risks, benefits, and realistic alternatives discussed. Questions answered and patient/representative(s) expressed understanding.     - Discussed with:  Patient      - Extended Intubation/Ventilatory Support Discussed: No.      - Patient is DNR/DNI Status: No    Use of blood products discussed: Yes.     - Discussed with: Patient.     - Consented: consented to blood products            Reason for refusal: other.     Postoperative Care    Pain management: IV analgesics, Oral pain medications, Multi-modal analgesia.   PONV prophylaxis: Ondansetron (or other 5HT-3), Dexamethasone or Solumedrol     Comments:                Edgard Sousa MD

## 2021-11-04 NOTE — PROGRESS NOTES
Care Management Follow Up    Length of Stay (days): 8    Expected Discharge Date: 11/05/2021     Concerns to be Addressed: care coordination/care conferences, discharge planning     Patient plan of care discussed at interdisciplinary rounds: Yes    Anticipated Discharge Disposition:  (Pt will stay locally for a couple of days and than home)     Anticipated Discharge Services: None  Anticipated Discharge DME: None    Patient/family educated on Medicare website which has current facility and service quality ratings: no  Education Provided on the Discharge Plan:    Patient/Family in Agreement with the Plan: yes    Referrals Placed by CM/SW: External Care Coordination    Additional Information:  Pt scheduled for nephrectomy (removal of recent donated kidney).  Team anticipates patient will discharge home with outpatient follow up.  Pt does not need to stay locally. Per discussion with provider team pt will not need outpatient dialysis arranged.   Patient will discharge on coumadin which is new for this patient.  Outpatient Coumadin Management has been confirmed/arranged with pt PCP and [facility].      Coumadin Management:  [facility]  [phone], Fax [phone]  Indication: Renal vein thrombosis  Therapy Goal: INR 2-2.5    Nadia Painting RN BSN, PHN, ACM-RN  7A RN Care Coordinator  Phone: [phone]  Pager [phone]  To contact the weekend RNCC, Page: [phone]    11/4/2021 2:51 PM

## 2021-11-04 NOTE — DISCHARGE INSTRUCTIONS
Nursing please fax final discharge orders to:  [facility] [phone], Fax [phone]

## 2021-11-04 NOTE — BRIEF OP NOTE
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  Complications: None.  Implants:   Implant Name Type Inv. Item Serial No.  Lot No. LRB No. Used Action   Sof-Flex Double Pigtail Ureteral Stent Set Stent   [employer] [id number] N/A 1 Explanted

## 2021-11-04 NOTE — PLAN OF CARE
/83 (BP Location: Right arm)   Pulse 82   Temp 98.2  F (36.8  C) (Oral)   Resp 18   Wt 95.7 kg (211 lb)   SpO2 96%   BMI 30.28 kg/m      Shift: 1500 - 2330  VS: Stable on RA, afebrile  Neuro: AOx4  Labs: Hep X1: 0.37 (WDL); COVID test was negative; Renogram completed today  Respiratory: Pt denies dyspnea, no cough noted  Pain/Nausea/PRN: Pt denies pain and nausea  Diet: Regular diet  LDA: R PIV x2 (SL; Infusing Heparin gtt @ 23.5 mL/hr); L Fistula; L NATHEN drain; Indwelling spears catheter  GI/: LBM: 11/3; Spears in place, adequate output  Skin: No new findings this shift; Incisional care completed  Mobility: UAL  Plan: Renal ultrasound to be completed tonight. Pending discharge once INR is within therapeutic range.     Will give report to oncoming nurse. Pt left in stable condition, care relinquished at this time.

## 2021-11-05 ENCOUNTER — APPOINTMENT (OUTPATIENT)
Dept: PHYSICAL THERAPY | Facility: CLINIC | Age: 51
End: 2021-11-05
Payer: COMMERCIAL

## 2021-11-05 ENCOUNTER — APPOINTMENT (OUTPATIENT)
Dept: ULTRASOUND IMAGING | Facility: CLINIC | Age: 51
End: 2021-11-05
Attending: PHYSICIAN ASSISTANT
Payer: COMMERCIAL

## 2021-11-05 LAB
ANION GAP SERPL CALCULATED.3IONS-SCNC: 10 MMOL/L (ref 3–14)
BUN SERPL-MCNC: 86 MG/DL (ref 7–30)
CALCIUM SERPL-MCNC: 8 MG/DL (ref 8.5–10.1)
CHLORIDE BLD-SCNC: 108 MMOL/L (ref 94–109)
CO2 SERPL-SCNC: 17 MMOL/L (ref 20–32)
CREAT SERPL-MCNC: 5.5 MG/DL (ref 0.66–1.25)
ERYTHROCYTE [DISTWIDTH] IN BLOOD BY AUTOMATED COUNT: 14.7 % (ref 10–15)
ERYTHROCYTE [DISTWIDTH] IN BLOOD BY AUTOMATED COUNT: 14.7 % (ref 10–15)
GFR SERPL CREATININE-BSD FRML MDRD: 11 ML/MIN/1.73M2
GLUCOSE BLD-MCNC: 132 MG/DL (ref 70–99)
HCT VFR BLD AUTO: 24 % (ref 40–53)
HCT VFR BLD AUTO: 24.8 % (ref 40–53)
HGB BLD-MCNC: 7.5 G/DL (ref 13.3–17.7)
HGB BLD-MCNC: 7.7 G/DL (ref 13.3–17.7)
INR PPP: 1.6 (ref 0.85–1.15)
MAGNESIUM SERPL-MCNC: 2.6 MG/DL (ref 1.6–2.3)
MCH RBC QN AUTO: 28.9 PG (ref 26.5–33)
MCH RBC QN AUTO: 29.1 PG (ref 26.5–33)
MCHC RBC AUTO-ENTMCNC: 31 G/DL (ref 31.5–36.5)
MCHC RBC AUTO-ENTMCNC: 31.3 G/DL (ref 31.5–36.5)
MCV RBC AUTO: 93 FL (ref 78–100)
MCV RBC AUTO: 93 FL (ref 78–100)
PHOSPHATE SERPL-MCNC: 7.4 MG/DL (ref 2.5–4.5)
PLATELET # BLD AUTO: 407 10E3/UL (ref 150–450)
PLATELET # BLD AUTO: 441 10E3/UL (ref 150–450)
POTASSIUM BLD-SCNC: 5.2 MMOL/L (ref 3.4–5.3)
POTASSIUM BLD-SCNC: 5.5 MMOL/L (ref 3.4–5.3)
POTASSIUM BLD-SCNC: 5.7 MMOL/L (ref 3.4–5.3)
RBC # BLD AUTO: 2.58 10E6/UL (ref 4.4–5.9)
RBC # BLD AUTO: 2.66 10E6/UL (ref 4.4–5.9)
SODIUM SERPL-SCNC: 135 MMOL/L (ref 133–144)
TACROLIMUS BLD-MCNC: 13.5 UG/L (ref 5–15)
TME LAST DOSE: NORMAL H
TME LAST DOSE: NORMAL H
UFH PPP CHRO-ACNC: <0.1 IU/ML
WBC # BLD AUTO: 15.5 10E3/UL (ref 4–11)
WBC # BLD AUTO: 16.4 10E3/UL (ref 4–11)

## 2021-11-05 PROCEDURE — 97530 THERAPEUTIC ACTIVITIES: CPT | Mod: GP

## 2021-11-05 PROCEDURE — 999N000157 HC STATISTIC RCP TIME EA 10 MIN

## 2021-11-05 PROCEDURE — 84100 ASSAY OF PHOSPHORUS: CPT | Performed by: SURGERY

## 2021-11-05 PROCEDURE — 36415 COLL VENOUS BLD VENIPUNCTURE: CPT | Performed by: PHYSICIAN ASSISTANT

## 2021-11-05 PROCEDURE — 85610 PROTHROMBIN TIME: CPT | Performed by: SURGERY

## 2021-11-05 PROCEDURE — 258N000003 HC RX IP 258 OP 636: Performed by: PHYSICIAN ASSISTANT

## 2021-11-05 PROCEDURE — 250N000013 HC RX MED GY IP 250 OP 250 PS 637: Performed by: SURGERY

## 2021-11-05 PROCEDURE — 120N000011 HC R&B TRANSPLANT UMMC

## 2021-11-05 PROCEDURE — 250N000012 HC RX MED GY IP 250 OP 636 PS 637: Performed by: SURGERY

## 2021-11-05 PROCEDURE — 86923 COMPATIBILITY TEST ELECTRIC: CPT | Performed by: STUDENT IN AN ORGANIZED HEALTH CARE EDUCATION/TRAINING PROGRAM

## 2021-11-05 PROCEDURE — 99233 SBSQ HOSP IP/OBS HIGH 50: CPT | Performed by: INTERNAL MEDICINE

## 2021-11-05 PROCEDURE — 85520 HEPARIN ASSAY: CPT | Performed by: SURGERY

## 2021-11-05 PROCEDURE — 36415 COLL VENOUS BLD VENIPUNCTURE: CPT | Performed by: SURGERY

## 2021-11-05 PROCEDURE — 80197 ASSAY OF TACROLIMUS: CPT | Performed by: SURGERY

## 2021-11-05 PROCEDURE — 85520 HEPARIN ASSAY: CPT | Performed by: PHYSICIAN ASSISTANT

## 2021-11-05 PROCEDURE — 97116 GAIT TRAINING THERAPY: CPT | Mod: GP

## 2021-11-05 PROCEDURE — 80048 BASIC METABOLIC PNL TOTAL CA: CPT | Performed by: SURGERY

## 2021-11-05 PROCEDURE — 84132 ASSAY OF SERUM POTASSIUM: CPT | Performed by: PHYSICIAN ASSISTANT

## 2021-11-05 PROCEDURE — 93971 EXTREMITY STUDY: CPT | Mod: 26 | Performed by: RADIOLOGY

## 2021-11-05 PROCEDURE — 250N000013 HC RX MED GY IP 250 OP 250 PS 637: Performed by: PHYSICIAN ASSISTANT

## 2021-11-05 PROCEDURE — 250N000013 HC RX MED GY IP 250 OP 250 PS 637

## 2021-11-05 PROCEDURE — 85027 COMPLETE CBC AUTOMATED: CPT | Performed by: SURGERY

## 2021-11-05 PROCEDURE — 250N000011 HC RX IP 250 OP 636: Performed by: SURGERY

## 2021-11-05 PROCEDURE — 250N000011 HC RX IP 250 OP 636: Performed by: PHYSICIAN ASSISTANT

## 2021-11-05 PROCEDURE — 93971 EXTREMITY STUDY: CPT | Mod: LT

## 2021-11-05 PROCEDURE — 83735 ASSAY OF MAGNESIUM: CPT | Performed by: SURGERY

## 2021-11-05 PROCEDURE — 85027 COMPLETE CBC AUTOMATED: CPT | Performed by: PHYSICIAN ASSISTANT

## 2021-11-05 RX ORDER — SODIUM BICARBONATE 650 MG/1
1300 TABLET ORAL 2 TIMES DAILY
Status: DISCONTINUED | OUTPATIENT
Start: 2021-11-05 | End: 2021-11-07 | Stop reason: HOSPADM

## 2021-11-05 RX ORDER — FUROSEMIDE 10 MG/ML
40 INJECTION INTRAMUSCULAR; INTRAVENOUS ONCE
Status: COMPLETED | OUTPATIENT
Start: 2021-11-05 | End: 2021-11-05

## 2021-11-05 RX ORDER — WARFARIN SODIUM 7.5 MG/1
7.5 TABLET ORAL
Status: COMPLETED | OUTPATIENT
Start: 2021-11-05 | End: 2021-11-05

## 2021-11-05 RX ORDER — TACROLIMUS 0.5 MG/1
0.5 CAPSULE ORAL
Status: DISCONTINUED | OUTPATIENT
Start: 2021-11-06 | End: 2021-11-07 | Stop reason: HOSPADM

## 2021-11-05 RX ORDER — LIDOCAINE 4 G/G
1 PATCH TOPICAL
Status: DISCONTINUED | OUTPATIENT
Start: 2021-11-05 | End: 2021-11-07 | Stop reason: HOSPADM

## 2021-11-05 RX ORDER — LANOLIN ALCOHOL/MO/W.PET/CERES
3 CREAM (GRAM) TOPICAL
Status: DISCONTINUED | OUTPATIENT
Start: 2021-11-05 | End: 2021-11-07 | Stop reason: HOSPADM

## 2021-11-05 RX ORDER — HEPARIN SODIUM 10000 [USP'U]/100ML
0-5000 INJECTION, SOLUTION INTRAVENOUS CONTINUOUS
Status: DISCONTINUED | OUTPATIENT
Start: 2021-11-05 | End: 2021-11-07

## 2021-11-05 RX ADMIN — HEPARIN SODIUM 1750 UNITS/HR: 10000 INJECTION, SOLUTION INTRAVENOUS at 23:44

## 2021-11-05 RX ADMIN — SODIUM BICARBONATE 1300 MG: 650 TABLET ORAL at 20:29

## 2021-11-05 RX ADMIN — ACETAMINOPHEN 975 MG: 325 TABLET, FILM COATED ORAL at 23:44

## 2021-11-05 RX ADMIN — ACETAMINOPHEN 975 MG: 325 TABLET, FILM COATED ORAL at 00:03

## 2021-11-05 RX ADMIN — MYCOPHENOLATE MOFETIL 750 MG: 250 CAPSULE ORAL at 17:30

## 2021-11-05 RX ADMIN — SODIUM CHLORIDE 500 ML: 9 INJECTION, SOLUTION INTRAVENOUS at 15:57

## 2021-11-05 RX ADMIN — ONDANSETRON 4 MG: 4 TABLET, ORALLY DISINTEGRATING ORAL at 10:31

## 2021-11-05 RX ADMIN — FUROSEMIDE 40 MG: 10 INJECTION, SOLUTION INTRAVENOUS at 09:02

## 2021-11-05 RX ADMIN — FUROSEMIDE 40 MG: 10 INJECTION, SOLUTION INTRAVENOUS at 15:56

## 2021-11-05 RX ADMIN — DOCUSATE SODIUM 50 MG AND SENNOSIDES 8.6 MG 1 TABLET: 8.6; 5 TABLET, FILM COATED ORAL at 20:29

## 2021-11-05 RX ADMIN — MYCOPHENOLATE MOFETIL 750 MG: 250 CAPSULE ORAL at 09:02

## 2021-11-05 RX ADMIN — CEPHALEXIN 500 MG: 250 CAPSULE ORAL at 09:02

## 2021-11-05 RX ADMIN — ACETAMINOPHEN 975 MG: 325 TABLET, FILM COATED ORAL at 09:01

## 2021-11-05 RX ADMIN — Medication 3 MG: at 21:37

## 2021-11-05 RX ADMIN — Medication 1 TABLET: at 20:29

## 2021-11-05 RX ADMIN — SODIUM BICARBONATE 650 MG: 650 TABLET ORAL at 09:03

## 2021-11-05 RX ADMIN — Medication 3 MG: at 00:18

## 2021-11-05 RX ADMIN — DOCUSATE SODIUM 50 MG AND SENNOSIDES 8.6 MG 1 TABLET: 8.6; 5 TABLET, FILM COATED ORAL at 09:02

## 2021-11-05 RX ADMIN — OXYCODONE HYDROCHLORIDE 5 MG: 5 TABLET ORAL at 10:31

## 2021-11-05 RX ADMIN — ATORVASTATIN CALCIUM 10 MG: 10 TABLET, FILM COATED ORAL at 09:03

## 2021-11-05 RX ADMIN — CITALOPRAM HYDROBROMIDE 20 MG: 20 TABLET ORAL at 09:02

## 2021-11-05 RX ADMIN — WARFARIN SODIUM 7.5 MG: 7.5 TABLET ORAL at 17:30

## 2021-11-05 RX ADMIN — ACETAMINOPHEN 650 MG: 325 TABLET, FILM COATED ORAL at 20:34

## 2021-11-05 RX ADMIN — Medication 400 MG: at 12:42

## 2021-11-05 RX ADMIN — ACETAMINOPHEN 975 MG: 325 TABLET, FILM COATED ORAL at 15:56

## 2021-11-05 RX ADMIN — OXYCODONE HYDROCHLORIDE 5 MG: 5 TABLET ORAL at 00:03

## 2021-11-05 RX ADMIN — TACROLIMUS 1 MG: 1 CAPSULE ORAL at 09:03

## 2021-11-05 RX ADMIN — LIDOCAINE 2 PATCH: 560 PATCH PERCUTANEOUS; TOPICAL; TRANSDERMAL at 09:01

## 2021-11-05 RX ADMIN — CEPHALEXIN 500 MG: 250 CAPSULE ORAL at 20:29

## 2021-11-05 RX ADMIN — Medication 1 TABLET: at 10:00

## 2021-11-05 ASSESSMENT — ACTIVITIES OF DAILY LIVING (ADL)
ADLS_ACUITY_SCORE: 8
ADLS_ACUITY_SCORE: 7
ADLS_ACUITY_SCORE: 8
ADLS_ACUITY_SCORE: 7
ADLS_ACUITY_SCORE: 7
ADLS_ACUITY_SCORE: 8
ADLS_ACUITY_SCORE: 7
ADLS_ACUITY_SCORE: 8
ADLS_ACUITY_SCORE: 8
ADLS_ACUITY_SCORE: 7
ADLS_ACUITY_SCORE: 8
ADLS_ACUITY_SCORE: 8
ADLS_ACUITY_SCORE: 7
ADLS_ACUITY_SCORE: 7

## 2021-11-05 NOTE — PLAN OF CARE
[id number]    VS: on 1l/O2, will try to wean this morning.    Neuro: alert and oriented.  Flat affect,  understandably sad regarding losing his kidney.    BG: none  Labs: pending collection  Pain/Nausea: oxy 5mg X 1 and scheduled tylenol with good relief.  Pt appeared to sleep after melatonin.    Denies nausea.   Diet: NPO.  Taking sips of water with meds  LDA: heparin gtt at 400u/hr.  NATHEN with 30cc bloody output.    GI: last BM 11/3.  + BS  : Wade with 250cc out.   Skin: Incision covered with primapores.  Drainage marked on evenings and has spread beyond.  Mobility: has not been out of bed since OR  Plan: ambulation.

## 2021-11-05 NOTE — PROGRESS NOTES
Post-Op Check     S: Doing well after surgery. Tolerating small sips with meds without n/v. Denies ha, dizziness, cp, sob, or significant abdominal pain. Pain is well controlled.     O:  /88 (BP Location: Right arm)   Pulse 74   Temp 98  F (36.7  C) (Oral)   Resp 14   Wt 95.1 kg (209 lb 11.2 oz)   SpO2 96%   BMI 30.09 kg/m     Gen: NAD  CV: RRR  Pulm: Normal WOB on 2L  GI: soft, non-distended, appropriately tender in LLQ  Incisions: c/d/I with stable strike through over dressing  Drains: s/s output   : spears in place with urine in bag  Extremities: WWP, 2+ pulses throughout    A/P:  50 year old male now POD0 from a transplant nephrectomy and ureteral stent explant. Doing well and recovering appropriately. Will continue plan of care. +meltonin for sleep.     Tapan Cheng  General Surgery PGY1  Night float

## 2021-11-05 NOTE — PROGRESS NOTES
/65 (BP Location: Right arm)   Pulse 65   Temp 97.5  F (36.4  C) (Oral)   Resp 16   Wt 95.1 kg (209 lb 11.2 oz)   SpO2 94%   BMI 30.09 kg/m      Shift: 5056-9525  VS: -110s/60-80s. OVSS on RA. Capno off.  Neuro: AOx4  Labs: K 5.5; q4h checks. Heparin Xa q6h, last < 0.10. Cr 5.50  Respiratory: WDL  Pain/Nausea/PRN: Zofran x1 for nausea w/ food. Abdominal pain controlled w/ PRN oxy x1, scheduled lidocaine patches and tylenol  Diet: Renal diet, poor appetite  LDA: PIV x2 w/ heparin gtt @ 1450 unit(s)/hr; low intensity. Wade w/ adequate UO. L NATHEN w/ bloody output.  GI/: No BM since surgery, not passing gas. Wade patent.  Skin: Stapled abdominal incision w/ operative dressing; small dried drainage  Mobility: SBA; ambulated halls x2 today. Up in chair.  Plan: LLE US negative. Continue to encourage PO intake and ambulation.     Handoff given to following RN.

## 2021-11-05 NOTE — PROGRESS NOTES
[facility]   Transplant Nephrology Progress Note  Date of Admission:  10/27/2021  Today's Date: 11/05/2021    Recommendations:  - No acute indications for dialysis right now, but will assess daily.   - Patient has a clotted LUE AV fistula and would need dialysis access if he needs to dialyze.  - Recommend decreasing tacrolimus dose for new goal 4-6.  - Recommend furosemide 40 mg bid today and will reassess tomorrow.  - Would increase sodium bicarbonate to 1300 mg bid.    Assessment & Plan   # DDKT: Increased creatinine at ~ 5.5 today after recent kidney transplant explant yesterday due to renal vein thrombosis.  Patient still has previous kidney transplant working.  Still good urine output.  No acute indications for dialysis at this time, but patient may have some mild uremic symptoms.  Will assess for dialysis daily.  He will require a new dialysis access if he needs to dialyze as his AV fistula is clotted.   - Baseline Creatinine: ~ 4.0-4.5 (prior to this recent transplant)   - Proteinuria: Not checked post transplant   - Date DSA Last Checked: Oct/2021      Latest DSA: No DSA at time of transplant   - BK Viremia: Not checked post transplant   - Kidney Tx Biopsy: No    # Immunosuppression: Tacrolimus immediate release (goal 4-6) and Mycophenolate mofetil (dose 750 mg every 12 hours)   - Induction with Recent Transplant:  High Intensity   - Changes: Yes - Would lower tacrolimus dose with lower goal plan now after explant.    # Infection Prophylaxis:   - PJP: Sulfa/TMP (Bactrim)  - CMV: Valganciclovir (Valcyte); Patient is CMV IgG Ab discordant (D+/R-) and will continue on Valcyte x 6 months, then check CMV PCR monthly until 12 months post transplant.    # Blood Pressure: Controlled;  Goal BP: < 140/90   - Volume status: Mildly hypervolemic  EDW ~ 93.5 kg   - Changes: Yes - Would recommend furosemide 40 mg IV bid today.    # Anemia in Chronic Renal Disease: Hgb: Stable,  low      NADIYA: No   - Iron studies: Low iron saturation, but high ferritin    # Mineral Bone Disorder:   - Secondary renal hyperparathyroidism; PTH level: Minimally elevated ( pg/ml)        On treatment: None  - Vitamin D; level: Normal        On supplement: No  - Calcium; level: Low normal when corrected for low serum albumin        On supplement: Yes  - Phosphorus; level: Normal        On binder: No    # Electrolytes:   - Potassium; level: High        On supplement: No; Will give diuretics and assess  - Magnesium; level: High        On supplement: Yes  - Bicarbonate; level: Low        On supplement: Yes; Would increase sodium bicarbonate to 1300 mg bid.    # Leukocytosis: Slightly trend up in WBC after kidney transplant explant yesterday.    # Transplant History:  Etiology of Kidney Failure: Polycystic kidney disease (PKD)  Tx: DDKT  Transplant: 10/23/2021 (Kidney), 10/13/2015 (Kidney)  Donor Type: Donation after Circulatory Death  Donor Class:   Crossmatch at time of Tx: negative  DSA at time of Tx: No  Significant changes in immunosuppression: None  Significant transplant-related complications: None    Recommendations were communicated to the primary team via this note.    Francis Espinoza MD   Pager: [phone]    Interval History   Mr. Michelle kidney function is slightly worse with creatinine ~ 5.5.  Good urine output.  Patient underwent nephrectomy of his latest kidney transplant yesterday.  He reports feeling okay.  Denies any chest pain or shortness of breath.  No nausea or vomiting.  Not passing any flatus yet.  No fever, sweats or chills.  Less leg swelling.  Patient's wife reports patient is a bit more confused at times, but doing okay right now.    Review of Systems   4 point ROS was obtained and negative except as noted in the Interval History.    MEDICATIONS:    acetaminophen  975 mg Oral Q8H     atorvastatin  10 mg Oral Daily     calcium carbonate-vitamin D  1 tablet Oral BID      "cephALEXin  500 mg Oral Q12H DONITA     citalopram  20 mg Oral Daily     lidocaine  1 patch Transdermal Q24H     lidocaine   Transdermal Q8H     magnesium oxide  400 mg Oral Daily with lunch     mycophenolate  750 mg Oral BID IS     polyethylene glycol  17 g Oral Daily     senna-docusate  1 tablet Oral BID     sodium bicarbonate  650 mg Oral BID     sodium chloride (PF)  3 mL Intravenous Q8H     sulfamethoxazole-trimethoprim  1 tablet Oral Once per day on  Sat     tacrolimus  1 mg Oral BID IS     valGANciclovir  450 mg Oral Once per day on      warfarin ANTICOAGULANT  7.5 mg Oral ONCE at 18:00       bupivacaine liposome (EXPAREL) LA inj was given in the infiltration site to produce post-op analgesia. Duration of action is up to 72 hours. Other \"ilene\" meds should not be given for 96 hours except for lidocaine 4% patch. This is for INFORMATION ONLY.       heparin 1,150 Units/hr (21 1015)     Warfarin Therapy Reminder         Physical Exam   Temp  Av.5  F (36.9  C)  Min: 95  F (35  C)  Max: 101.5  F (38.6  C)      Pulse  Av.9  Min: 54  Max: 108 Resp  Av.1  Min: 12  Max: 22  SpO2  Av.7 %  Min: 90 %  Max: 100 %     /65 (BP Location: Right arm)   Pulse 65   Temp 97.5  F (36.4  C)   Resp 16   Wt 95.1 kg (209 lb 11.2 oz)   SpO2 94%   BMI 30.09 kg/m      Admit Weight: 93.5 kg (206 lb 2.1 oz)     GENERAL APPEARANCE: alert and no distress  HENT: mouth without ulcers or lesions  RESP: lungs clear to auscultation - no rales, rhonchi or wheezes  CV: regular rhythm, normal rate, no rub, no murmur  EDEMA: no LE edema on right, trace to 1+ LE edema on left  ABDOMEN: soft, nondistended, nontender, bowel sounds normal, overweight  MS: extremities normal - no gross deformities noted, no evidence of inflammation in joints, no muscle tenderness  SKIN: no rash  TX KIDNEY: mild TTP  DIALYSIS ACCESS:  LUE AV fistula that is pulsatile, but not a good thrill    Data   All labs reviewed by " me.  CMP  Recent Labs   Lab 11/05/21  0649 11/04/21  1600 11/04/21  1119 11/04/21  0616 11/03/21  0626 11/03/21  0626 11/02/21  0403 11/02/21  0403 11/01/21  0440 11/01/21  0440 10/31/21  0552 10/31/21  0552    135  --  134  --  132*   < > 133   < > 134   < > 134   POTASSIUM 5.7* 5.2  --  4.5  --  4.5   < > 4.6   < > 4.4   < > 4.4   CHLORIDE 108 107  --  105  --  104   < > 103   < > 105   < > 104   CO2 17* 19*  --  20  --  18*   < > 18*   < > 19*   < > 22   ANIONGAP 10 9  --  9  --  10   < > 12   < > 10   < > 8   * 130* 107* 116*   < > 102*   < > 91   < > 97   < > 86   BUN 86* 80*  --  83*  --  82*   < > 84*   < > 80*   < > 79*   CR 5.50* 4.95*  --  4.97*  --  5.26*   < > 5.26*   < > 5.43*   < > 5.69*   GFRESTIMATED 11* 12*  --  12*  --  12*   < > 12*   < > 11*   < > 11*   LILIANA 8.0* 7.9*  --  7.9*  --  7.6*   < > 7.3*   < > 7.0*   < > 7.3*   MAG 2.6*  --   --  2.5*  --   --   --  2.3  --   --   --  2.3   PHOS 7.4*  --   --  3.9  --  3.7  --  3.6   < > 3.2   < > 2.7   ALBUMIN  --   --   --   --   --   --   --   --   --  2.0*  --   --     < > = values in this interval not displayed.     CBC  Recent Labs   Lab 11/05/21  0958 11/05/21  0649 11/04/21  2141 11/04/21  1600 11/04/21  0616 11/04/21 0616   HGB 7.7* 7.5* 8.0* 7.4*   < > 7.8*   WBC 16.4* 15.5*  --  14.7*  --  13.7*   RBC 2.66* 2.58*  --  2.55*  --  2.71*   HCT 24.8* 24.0*  --  23.6*  --  25.0*   MCV 93 93  --  93  --  92   MCH 28.9 29.1  --  29.0  --  28.8   MCHC 31.0* 31.3*  --  31.4*  --  31.2*   RDW 14.7 14.7  --  14.6  --  14.6    407  --  398  --  412    < > = values in this interval not displayed.     INR  Recent Labs   Lab 11/05/21  0649 11/04/21  1600 11/04/21  0616 11/03/21  1206 11/03/21  0626   INR 1.60* 1.40* 1.45*  --  1.23*   PTT  --  35  --  51*  --      ABGNo lab results found in last 7 days.   Urine Studies  Recent Labs   Lab Test 11/02/21  1121 10/23/21  0526 11/09/15  1300 10/28/15  0702   COLOR Light Yellow Straw Yellow  Yellow   APPEARANCE Clear Clear Clear Clear   URINEGLC Negative Negative Negative Negative   URINEBILI Negative Negative Negative Negative   URINEKETONE Negative Negative Negative Negative   SG 1.011 1.009 1.008 1.008   UBLD Moderate* Negative Negative Negative   URINEPH 6.0 7.0 5.5 6.5   PROTEIN 70 * 50 * 10* 10*   NITRITE Negative Negative Negative Negative   LEUKEST Negative Negative Negative Negative   RBCU 1 0 <1 1   WBCU 1 1 1 1     Recent Labs   Lab Test 10/23/21  0526 06/05/17  1253 01/25/16  1602 10/28/15  0702 10/13/15  1035   UTPG 1.37* 1.30* 0.36* 0.45* 0.55*     PTH  Recent Labs   Lab Test 10/27/21  0621 08/01/16  1638 01/25/16  1601 10/15/15  0520   PTHI 149* 90* 121* 434*     Iron Studies  Recent Labs   Lab Test 10/27/21  0621 10/17/15  0455   IRON 19* 13*   * 241   IRONSAT 10* 5*   TERRANCE 597*  --        IMAGING:  All imaging studies reviewed by me.

## 2021-11-05 NOTE — PROGRESS NOTES
Transplant Surgery  Inpatient Daily Progress Note  2021    Assessment & Plan: Raj Pierce is a 50 year old male  with a PMH significant for ESRD 2/2 PCKD s/p kidney transplant () c/b poor function (small graft). Other PMH includes HTN, hyperparathyroidism, and SILVIA (with cpap). Now status post pre-emptive  donor (DCD) kidney re-transplant on 10/23. Discharged 10/26 POD 3. Readmitted 10/27 for emergent exploratory laparotomy due to renal vein thrombosis.    S/p exploratory left lower quadrant of kidney transplant, back table flush, thrombectomy of renal vein, thrombectomy of iliac vein, reimplantation of kidney transplant, with ureteral stent removal and placement of a new ureteral stent, intraoperative ultrasound, intraoperative of transit time flow measurment.     Non-functioning graft. S/p nephrectomy and removal of ureteral stent 2021.     DDKT, POD 13  RTOR POD 9  RTOR nephrectomy POD1    Today's changes  - Lasix IV for hyperkalemia; recheck at 1200, shift if still elevated  - Low intensity heparin protocol  - Warfarin dosed per pharmacy; continue for 1 month; goal 2 - 2.5  - Wade in for 3 days (remove )  - Drain in until output < 20 ml/day for 2 days or surgeon decision  - f/u in 2 weeks with transplant team  - staple removal in 3 weeks    Graft function:   Kidney graft, renal vein thrombosis: US  showed non-occlusive clot in the left transplant renal vein with persistent elevated RI of 1.0. RTOR for non-functioning transplant kidney, s/p nephrectomy and ureteral stent removal. Cr. 4.95. K 5.7, given 40 mg IV lasix, recheck this afternoon. If still elevated, will shift. NATHEN 125 ml, continue until output < 20 ml/day for 2 days or surgeon decision.    Surgical site infection: Incision covered with dressing, will remove tomorrow. No surrounding erythema or purulent drainage. Continue keflex.     Immunosuppression management: High risk protocol due to DGF    mg BID  Tac 2  mg BID. Goal 8-10. Level 19.5 (19.7). Dose decreased yesterday. Level pending. Repeat level tomorrow. Complexity of management: High. Contributing factors: thrombocytopenia, anemia, organ dysfunction and anemia, thrombocytopenia.     Hematology:    Acute blood loss, chronic anemia: Received 2 units RBC 10/27, 1 unit RBC on 10/28, 1 unit RBC on 11/1. Hgb 7.9 (8). Has since been hemodynamically stable and required no additional product. Post op hemoglobin 8.0, 7.5 today.      Anticoagulation for renal vein thrombosis: Renal vein thrombosis, thrombectomy of iliac vein, reimplantation of kidney transplant, nephrectomy. Start low intensity heparin drip, transitioning to Warfarin dosed by pharmacy with goal INR 2-2.5, INR 1.60. Continue warfarin for 1 month.     Cardiorespiratory: Stable on RA. Encourage frequent IS.  Hypotension, secondary to bleeding, third spacing: Resolved with blood transfusions and fluid replacement. 11/3 Duplex US DVT study negative.     GI/Nutrition:   Moderate malnutrition in the context of acute illness: Renal/Low K diet. Appreciate dietician recommendations.   Bowel regimen: senna BID, miralax daily.    Endocrine:   Stress hyperglycemia: Improved. .    Mineral bone disorder:  Started Ca supplement.    Fluid/Electrolytes:   Metabolic acidosis: Continue sodium bicarbonate 650 mg BID  Hypomagnesemia: Continue Mg Oxide 400 mg daily    Hypervolemia, LLE edema: L>R 11/3, LE DVT US negative. Patient already anticoagulated.     : Wade to remain for 3 days. May remove 11/8. No stent in place.      Infectious disease: Afebrile.   Leukocytosis: Trending up, now 14.7 (13.7) although likely secondary to stress from RTOR yesterday. Continue to treat potential incisional infection. UA/UC, BC, CXR all negative. Decreased size of fluid collections on renal US, no new collections concerning for abscess under incision. Erythema surrounding incision improved yesterday. Will continue abx, remove dressing  tomorrow and reassess incision site.     Surgical site infection: Keflex added 10/31. Incisional erythema improved yesterday. Reassess tomorrow.     Prophylaxis: DVT (heparin + warfarin), valcyte x 6 months (D+/R-), bactrim    Disposition: 7A     Medical Decision Making: Medium    Fellow/JATINDER/Resident Provider: Nette Pardo [phone]    Faculty: Eulalio Emanuel M.D., Ph.D.  _________________________________________________________________  Transplant History: Admitted 10/27/2021 for renal vein thrombosis.  10/23/2021 (Kidney), 10/13/2015 (Kidney), Postoperative day: 2215 (Kidney), 13 (Kidney)     Interval History: History is obtained from the patient  Overnight events: NAEO. Post op hgb 8.0. Potassium 5.7 today, given 40 mg IV lasix. Will recheck this afternoon and shift if remaining elevated. Patient reports feeling well. No abdominal pain.     ROS:   A 10-point review of systems was negative except as noted above.    Meds:    acetaminophen  975 mg Oral Q8H     atorvastatin  10 mg Oral Daily     calcium carbonate-vitamin D  1 tablet Oral BID     cephALEXin  500 mg Oral Q12H DONITA     citalopram  20 mg Oral Daily     lidocaine  1-2 patch Transdermal Q24H     lidocaine   Transdermal Q8H     magnesium oxide  400 mg Oral Daily with lunch     mycophenolate  750 mg Oral BID IS     polyethylene glycol  17 g Oral Daily     senna-docusate  1 tablet Oral BID     sodium bicarbonate  650 mg Oral BID     sodium chloride (PF)  3 mL Intravenous Q8H     sulfamethoxazole-trimethoprim  1 tablet Oral Once per day on Tue Thu Sat     tacrolimus  1 mg Oral BID IS     valGANciclovir  450 mg Oral Once per day on Mon Thu     warfarin ANTICOAGULANT  7.5 mg Oral ONCE at 18:00     Physical Exam:  General Appearance: NAD, non-toxic, lying in bed comfortably  Skin: normal  Heart: perfused  Lungs: normal rate and work of breathing  Abdomen: soft, non-tender, non-distended. LLQ incision with dressing in place. No surrounding erythema or  drainage.NATHEN with sanguinous drainage.  : Wade in place.   Ext: Lower extremity edema bilaterally 1+.   Neurologic: alert and oriented, no gross deficit.   PIV    Current vitals:   /88 (BP Location: Right arm)   Pulse 63   Temp 98.1  F (36.7  C) (Oral)   Resp 16   Wt 95.1 kg (209 lb 11.2 oz)   SpO2 95%   BMI 30.09 kg/m      Vital sign ranges:    Temp:  [97.9  F (36.6  C)-98.5  F (36.9  C)] 98.1  F (36.7  C)  Pulse:  [61-96] 63  Resp:  [12-16] 16  BP: (111-142)/(72-96) 112/88  SpO2:  [90 %-100 %] 95 %  Patient Vitals for the past 24 hrs:   BP Temp Temp src Pulse Resp SpO2   11/05/21 0901 -- -- -- -- -- 95 %   11/05/21 0743 112/88 98.1  F (36.7  C) Oral 63 16 98 %   11/05/21 0353 118/86 97.9  F (36.6  C) Oral 61 15 96 %   11/05/21 0000 (!) 130/90 98.5  F (36.9  C) Oral 69 15 96 %   11/04/21 2200 116/88 -- -- 74 -- --   11/04/21 2100 (!) 128/92 -- -- 62 -- --   11/04/21 2000 131/89 98  F (36.7  C) Oral 63 14 96 %   11/04/21 1900 (!) 140/92 -- -- 64 -- --   11/04/21 1800 (!) 131/93 -- -- 65 -- 95 %   11/04/21 1730 132/89 -- -- 66 -- --   11/04/21 1700 134/89 -- -- 70 13 95 %   11/04/21 1645 122/89 -- -- 71 -- 93 %   11/04/21 1630 120/86 -- -- 66 -- 94 %   11/04/21 1615 122/86 98.2  F (36.8  C) Oral 71 13 93 %   11/04/21 1545 (!) 119/93 98.5  F (36.9  C) -- 77 16 90 %   11/04/21 1530 111/77 -- -- 76 16 94 %   11/04/21 1521 111/72 98.3  F (36.8  C) Oral 75 12 95 %   11/04/21 1210 133/82 -- -- 84 14 98 %   11/04/21 1205 (!) 135/93 -- -- 96 16 99 %   11/04/21 1200 (!) 142/93 -- -- 95 16 100 %   11/04/21 1155 (!) 128/96 -- -- 93 16 100 %   11/04/21 1145 125/89 -- -- 77 -- 100 %   11/04/21 1126 139/87 98.1  F (36.7  C) Oral 73 14 97 %     Data:   [facility]  Recent Labs   Lab 11/05/21  0649 11/04/21  1600 11/04/21  1119 11/04/21  0616 11/02/21  0403 11/01/21  0440    135  --  134   < > 134   POTASSIUM 5.7* 5.2  --  4.5   < > 4.4   CHLORIDE 108 107  --  105   < > 105   CO2 17* 19*  --  20   < > 19*   * 130*    < > 116*   < > 97   BUN 86* 80*  --  83*   < > 80*   CR 5.50* 4.95*  --  4.97*   < > 5.43*   GFRESTIMATED 11* 12*  --  12*   < > 11*   LILIANA 8.0* 7.9*  --  7.9*   < > 7.0*   MAG 2.6*  --   --  2.5*   < >  --    PHOS 7.4*  --   --  3.9   < > 3.2   ALBUMIN  --   --   --   --   --  2.0*    < > = values in this interval not displayed.     CBC  Recent Labs   Lab 11/05/21  0649 11/04/21  2141 11/04/21  1600 11/04/21  1600   HGB 7.5* 8.0*   < > 7.4*   WBC 15.5*  --   --  14.7*     --   --  398    < > = values in this interval not displayed.     COAGS  Recent Labs   Lab 11/05/21  0649 11/04/21  1600 11/04/21  0616 11/03/21  1206   INR 1.60* 1.40*   < >  --    PTT  --  35  --  51*    < > = values in this interval not displayed.      Urinalysis  Recent Labs   Lab Test 11/02/21  1121 10/23/21  0526 06/05/17  1253   COLOR Light Yellow Straw  --    APPEARANCE Clear Clear  --    URINEGLC Negative Negative  --    URINEBILI Negative Negative  --    URINEKETONE Negative Negative  --    SG 1.011 1.009  --    UBLD Moderate* Negative  --    URINEPH 6.0 7.0  --    PROTEIN 70 * 50 *  --    NITRITE Negative Negative  --    LEUKEST Negative Negative  --    RBCU 1 0  --    WBCU 1 1  --    UTPG  --  1.37* 1.30*     Virology:  CMV DNA Quantitation Specimen   Date Value Ref Range Status   08/01/2016   Corrected    Plasma  CORRECTED ON 08/02 AT 1044: PREVIOUSLY REPORTED AS Blood       CMV IgG Antibody   Date Value Ref Range Status   07/10/2013 <0.20  Negative for anti-CMV IgG U/mL Final     EBV VCA IgG Antibody   Date Value Ref Range Status   07/10/2013 >750.00  Positive, suggests immunologic exposure. U/mL Final     Hepatitis C Antibody   Date Value Ref Range Status   10/23/2021 Nonreactive Nonreactive Final   04/25/2016  NR Final    Nonreactive   Assay performance characteristics have not been established for newborns,   infants, and children       Hep B Surface Loni   Date Value Ref Range Status   07/10/2013 0.0  Final     Comment:      Negative, No antibody detected when the value is less than 5.0 mlU/mL.     BK Virus Result   Date Value Ref Range Status   06/05/2017 BK Virus DNA Not Detected BKNEG copies/mL Final   10/28/2015 BK Virus DNA Not Detected BKNEG copies/mL Final     BK Virus DNA copies/mL   Date Value Ref Range Status   10/23/2021 Not Detected Not Detected copies/mL Final

## 2021-11-05 NOTE — PLAN OF CARE
/88 (BP Location: Right arm)   Pulse 74   Temp 98  F (36.7  C) (Oral)   Resp 14   Wt 95.1 kg (209 lb 11.2 oz)   SpO2 96%   BMI 30.09 kg/m      Shift: 1500 - 2330  VS: Stable on 2 L nasal cannula, afebrile  Neuro: AOx4  Labs: Hgb: 8.0; Creatinine: 4.95; WBC: 14.7  Respiratory: Pt denies dyspnea, no cough noted  Pain/Nausea/PRN: Pt denies nausea. PRN oxycodone x1, scheduled tylenol  Diet: NPO  LDA: R PIV x2 (SL, Infusing heparin gtt @ 4 mL/hr)  GI/: LBM: 11/3 (prior to most recent surgery). Wade catheter in place, adequate urine output  Skin: Abdominal incision covered with surgical dressing, drainage present has been outlined  Mobility: SBA  Plan: Possible renal ultrasound tomorrow. Continue to monitor pt's hgb and hep Xa levels.    Will give report to oncoming nurse. Pt left in stable condition, care relinquished at this time.

## 2021-11-06 LAB
ANION GAP SERPL CALCULATED.3IONS-SCNC: 9 MMOL/L (ref 3–14)
BUN SERPL-MCNC: 90 MG/DL (ref 7–30)
CALCIUM SERPL-MCNC: 7.9 MG/DL (ref 8.5–10.1)
CHLORIDE BLD-SCNC: 106 MMOL/L (ref 94–109)
CO2 SERPL-SCNC: 20 MMOL/L (ref 20–32)
CREAT SERPL-MCNC: 5.75 MG/DL (ref 0.66–1.25)
ERYTHROCYTE [DISTWIDTH] IN BLOOD BY AUTOMATED COUNT: 14.8 % (ref 10–15)
GFR SERPL CREATININE-BSD FRML MDRD: 10 ML/MIN/1.73M2
GLUCOSE BLD-MCNC: 129 MG/DL (ref 70–99)
HCT VFR BLD AUTO: 23.5 % (ref 40–53)
HGB BLD-MCNC: 7.3 G/DL (ref 13.3–17.7)
HOLD SPECIMEN: NORMAL
INR PPP: 1.84 (ref 0.85–1.15)
MAGNESIUM SERPL-MCNC: 2.6 MG/DL (ref 1.6–2.3)
MCH RBC QN AUTO: 28.6 PG (ref 26.5–33)
MCHC RBC AUTO-ENTMCNC: 31.1 G/DL (ref 31.5–36.5)
MCV RBC AUTO: 92 FL (ref 78–100)
PHOSPHATE SERPL-MCNC: 5.7 MG/DL (ref 2.5–4.5)
PLATELET # BLD AUTO: 491 10E3/UL (ref 150–450)
POTASSIUM BLD-SCNC: 4.9 MMOL/L (ref 3.4–5.3)
RBC # BLD AUTO: 2.55 10E6/UL (ref 4.4–5.9)
SODIUM SERPL-SCNC: 135 MMOL/L (ref 133–144)
TACROLIMUS BLD-MCNC: 8.6 UG/L (ref 5–15)
TME LAST DOSE: NORMAL H
TME LAST DOSE: NORMAL H
UFH PPP CHRO-ACNC: 0.16 IU/ML
UFH PPP CHRO-ACNC: 0.29 IU/ML
UFH PPP CHRO-ACNC: 0.43 IU/ML
WBC # BLD AUTO: 12.8 10E3/UL (ref 4–11)

## 2021-11-06 PROCEDURE — 250N000013 HC RX MED GY IP 250 OP 250 PS 637: Performed by: SURGERY

## 2021-11-06 PROCEDURE — 85520 HEPARIN ASSAY: CPT | Performed by: SURGERY

## 2021-11-06 PROCEDURE — 80048 BASIC METABOLIC PNL TOTAL CA: CPT | Performed by: PHYSICIAN ASSISTANT

## 2021-11-06 PROCEDURE — 85048 AUTOMATED LEUKOCYTE COUNT: CPT | Performed by: SURGERY

## 2021-11-06 PROCEDURE — 83735 ASSAY OF MAGNESIUM: CPT | Performed by: SURGERY

## 2021-11-06 PROCEDURE — 84100 ASSAY OF PHOSPHORUS: CPT | Performed by: SURGERY

## 2021-11-06 PROCEDURE — 250N000011 HC RX IP 250 OP 636: Performed by: PHYSICIAN ASSISTANT

## 2021-11-06 PROCEDURE — 250N000013 HC RX MED GY IP 250 OP 250 PS 637: Performed by: PHYSICIAN ASSISTANT

## 2021-11-06 PROCEDURE — 120N000011 HC R&B TRANSPLANT UMMC

## 2021-11-06 PROCEDURE — 99233 SBSQ HOSP IP/OBS HIGH 50: CPT | Performed by: INTERNAL MEDICINE

## 2021-11-06 PROCEDURE — 80197 ASSAY OF TACROLIMUS: CPT | Performed by: SURGERY

## 2021-11-06 PROCEDURE — 250N000011 HC RX IP 250 OP 636: Performed by: NURSE PRACTITIONER

## 2021-11-06 PROCEDURE — 250N000012 HC RX MED GY IP 250 OP 636 PS 637: Performed by: PHYSICIAN ASSISTANT

## 2021-11-06 PROCEDURE — 82947 ASSAY GLUCOSE BLOOD QUANT: CPT | Performed by: SURGERY

## 2021-11-06 PROCEDURE — 36415 COLL VENOUS BLD VENIPUNCTURE: CPT | Performed by: PHYSICIAN ASSISTANT

## 2021-11-06 PROCEDURE — 36415 COLL VENOUS BLD VENIPUNCTURE: CPT | Performed by: SURGERY

## 2021-11-06 PROCEDURE — 250N000012 HC RX MED GY IP 250 OP 636 PS 637: Performed by: SURGERY

## 2021-11-06 PROCEDURE — 85610 PROTHROMBIN TIME: CPT | Performed by: SURGERY

## 2021-11-06 PROCEDURE — 250N000013 HC RX MED GY IP 250 OP 250 PS 637: Performed by: NURSE PRACTITIONER

## 2021-11-06 RX ORDER — WARFARIN SODIUM 6 MG/1
6 TABLET ORAL
Status: COMPLETED | OUTPATIENT
Start: 2021-11-06 | End: 2021-11-06

## 2021-11-06 RX ORDER — OXYCODONE HYDROCHLORIDE 5 MG/1
5 TABLET ORAL EVERY 6 HOURS PRN
Status: DISCONTINUED | OUTPATIENT
Start: 2021-11-06 | End: 2021-11-07 | Stop reason: HOSPADM

## 2021-11-06 RX ORDER — FUROSEMIDE 10 MG/ML
40 INJECTION INTRAMUSCULAR; INTRAVENOUS ONCE
Status: COMPLETED | OUTPATIENT
Start: 2021-11-06 | End: 2021-11-06

## 2021-11-06 RX ADMIN — FUROSEMIDE 40 MG: 10 INJECTION, SOLUTION INTRAVENOUS at 09:23

## 2021-11-06 RX ADMIN — ATORVASTATIN CALCIUM 10 MG: 10 TABLET, FILM COATED ORAL at 07:37

## 2021-11-06 RX ADMIN — MYCOPHENOLATE MOFETIL 750 MG: 250 CAPSULE ORAL at 07:36

## 2021-11-06 RX ADMIN — TACROLIMUS 0.5 MG: 0.5 CAPSULE ORAL at 07:36

## 2021-11-06 RX ADMIN — Medication 1 TABLET: at 19:19

## 2021-11-06 RX ADMIN — CITALOPRAM HYDROBROMIDE 20 MG: 20 TABLET ORAL at 07:37

## 2021-11-06 RX ADMIN — SULFAMETHOXAZOLE AND TRIMETHOPRIM 1 TABLET: 400; 80 TABLET ORAL at 07:37

## 2021-11-06 RX ADMIN — MYCOPHENOLATE MOFETIL 750 MG: 250 CAPSULE ORAL at 18:02

## 2021-11-06 RX ADMIN — Medication 1 TABLET: at 09:23

## 2021-11-06 RX ADMIN — DOCUSATE SODIUM 50 MG AND SENNOSIDES 8.6 MG 1 TABLET: 8.6; 5 TABLET, FILM COATED ORAL at 19:19

## 2021-11-06 RX ADMIN — Medication 400 MG: at 12:18

## 2021-11-06 RX ADMIN — WARFARIN SODIUM 6 MG: 6 TABLET ORAL at 18:02

## 2021-11-06 RX ADMIN — SODIUM BICARBONATE 1300 MG: 650 TABLET ORAL at 19:19

## 2021-11-06 RX ADMIN — ACETAMINOPHEN 975 MG: 325 TABLET, FILM COATED ORAL at 16:52

## 2021-11-06 RX ADMIN — CEPHALEXIN 500 MG: 250 CAPSULE ORAL at 07:37

## 2021-11-06 RX ADMIN — LIDOCAINE 1 PATCH: 246 PATCH TOPICAL at 07:37

## 2021-11-06 RX ADMIN — DOCUSATE SODIUM 50 MG AND SENNOSIDES 8.6 MG 1 TABLET: 8.6; 5 TABLET, FILM COATED ORAL at 07:37

## 2021-11-06 RX ADMIN — ACETAMINOPHEN 975 MG: 325 TABLET, FILM COATED ORAL at 09:22

## 2021-11-06 RX ADMIN — TACROLIMUS 0.5 MG: 0.5 CAPSULE ORAL at 18:03

## 2021-11-06 RX ADMIN — OXYCODONE HYDROCHLORIDE 5 MG: 5 TABLET ORAL at 15:13

## 2021-11-06 RX ADMIN — CEPHALEXIN 500 MG: 250 CAPSULE ORAL at 19:19

## 2021-11-06 RX ADMIN — HEPARIN SODIUM 1900 UNITS/HR: 10000 INJECTION, SOLUTION INTRAVENOUS at 14:56

## 2021-11-06 RX ADMIN — SODIUM BICARBONATE 1300 MG: 650 TABLET ORAL at 07:37

## 2021-11-06 ASSESSMENT — ACTIVITIES OF DAILY LIVING (ADL)
ADLS_ACUITY_SCORE: 7

## 2021-11-06 NOTE — PLAN OF CARE
VS: /76 (BP Location: Right arm)   Pulse 70   Temp 97.8  F (36.6  C) (Oral)   Resp 18   Wt 95.1 kg (209 lb 11.2 oz)   SpO2 97%   BMI 30.09 kg/m      Cares: 4490-7628     Current condition: VSS on RA, calm and cooperative.  Neuro: A/Ox4  Cardio: WDL  Respiratory: WDL  GI/: Wade in place adequate output, passing flatus.   Skin: Incisions CDI  Labs: K trending down - 4.9     LDA: Sheri, NTAHEN, PIV x2  Mobility: SBA    Pain: Controlled with scheduled tylenol   Continue with current POC

## 2021-11-06 NOTE — PROGRESS NOTES
[facility]   Transplant Nephrology Progress Note  Date of Admission:  10/27/2021  Today's Date: 11/06/2021    Recommendations:  - No acute indications for dialysis right now, but will assess daily.   - Patient has a clotted LUE AV fistula and would need dialysis access if he needs to dialyze.  - Recommend furosemide 40 mg once today    Assessment & Plan   # DDKT: Increased creatinine at ~ 5.8 today after recent kidney transplant explant yesterday due to renal vein thrombosis.  Patient still has previous kidney transplant working.  Still good urine output.  No acute indications for dialysis at this time, but patient may have some mild uremic symptoms.  Will assess for dialysis daily.  He will require a new dialysis access if he needs to dialyze as his AV fistula is clotted.   - Baseline Creatinine: ~ 4.0-4.5 (prior to this recent transplant)   - Proteinuria: Not checked post transplant   - Date DSA Last Checked: Oct/2021      Latest DSA: No DSA at time of transplant   - BK Viremia: Not checked post transplant   - Kidney Tx Biopsy: No    # Immunosuppression: Tacrolimus immediate release (goal 4-6) and Mycophenolate mofetil (dose 750 mg every 12 hours)   - Induction with Recent Transplant:  High Intensity   - Changes: Not at this time    # Infection Prophylaxis:   - PJP: Sulfa/TMP (Bactrim)  - CMV: Valganciclovir (Valcyte); Patient is CMV IgG Ab discordant (D+/R-) and will continue on Valcyte x 6 months, then check CMV PCR monthly until 12 months post transplant.    # Blood Pressure: Controlled;  Goal BP: < 140/90   - Volume status: Mildly hypervolemic  EDW ~ 93.5 kg   - Changes: Yes - Furosemide 40mg IV today    # Anemia in Chronic Renal Disease: Hgb: Trend down      NADIYA: No   - Iron studies: Low iron saturation, but high ferritin    # Mineral Bone Disorder:   - Secondary renal hyperparathyroidism; PTH level: Minimally elevated ( pg/ml)        On treatment: None  -  Vitamin D; level: Normal        On supplement: No  - Calcium; level: Low normal when corrected for low serum albumin        On supplement: Yes  - Phosphorus; level: Normal        On binder: No    # Electrolytes:   - Potassium; level: High        On supplement: No; Will give diuretics and assess  - Magnesium; level: High        On supplement: Yes  - Bicarbonate; level: Normal        On supplement: Yes    # Leukocytosis: Slightly trend up in WBC after kidney transplant explant yesterday.    # Transplant History:  Etiology of Kidney Failure: Polycystic kidney disease (PKD)  Tx: DDKT  Transplant: 10/23/2021 (Kidney), 10/13/2015 (Kidney)  Donor Type: Donation after Circulatory Death  Donor Class:   Crossmatch at time of Tx: negative  DSA at time of Tx: No  Significant changes in immunosuppression: None  Significant transplant-related complications: None    Recommendations were communicated to the primary team via this note.    Karena Roy NP   Pager: [phone]    Physician Attestation   I, Francis Espinoza, saw and evaluated Raj Pierce as part of a shared visit.  I have reviewed and discussed with the advanced practice provider their history, physical and plan.    I personally reviewed the vital signs, medications and labs.    My key history or physical exam findings: Slight increase in serum creatinine.  Patient still feels a bit foggy and tremors.    Key management decisions made by me: Will continue on present immunosuppression with tacrolimus goal 4-6.  No acute indications for dialysis at this time.    Francis Espinoza  Date of Service (when I saw the patient): 11/6/21    Interval History   Mr. Pierce's kidney function is slightly worse with creatinine ~ 5.8.  Good urine output.  Patient underwent nephrectomy of his latest kidney transplant yesterday.  He reports feeling okay.  Denies any chest pain or shortness of breath.  No nausea or vomiting.  Not passing any flatus yet.  No fever, sweats  "or chills.  Less leg swelling.  Patient's wife reports patient is a bit more confused at times, but doing okay right now. She is concerned he will need dialysis.  Incision is oozing, but stable, covered  at this time    Review of Systems   4 point ROS was obtained and negative except as noted in the Interval History.    MEDICATIONS:    acetaminophen  975 mg Oral Q8H     atorvastatin  10 mg Oral Daily     calcium carbonate-vitamin D  1 tablet Oral BID     cephALEXin  500 mg Oral Q12H DONITA     citalopram  20 mg Oral Daily     lidocaine  1 patch Transdermal Q24H     lidocaine   Transdermal Q8H     magnesium oxide  400 mg Oral Daily with lunch     mycophenolate  750 mg Oral BID IS     polyethylene glycol  17 g Oral Daily     senna-docusate  1 tablet Oral BID     sodium bicarbonate  1,300 mg Oral BID     sodium chloride (PF)  3 mL Intravenous Q8H     sulfamethoxazole-trimethoprim  1 tablet Oral Once per day on  Sat     tacrolimus  0.5 mg Oral BID IS     valGANciclovir  450 mg Oral Once per day on u     warfarin ANTICOAGULANT  6 mg Oral ONCE at 18:00       bupivacaine liposome (EXPAREL) LA inj was given in the infiltration site to produce post-op analgesia. Duration of action is up to 72 hours. Other \"ilene\" meds should not be given for 96 hours except for lidocaine 4% patch. This is for INFORMATION ONLY.       heparin 1,900 Units/hr (21 0744)     Warfarin Therapy Reminder         Physical Exam   Temp  Av.5  F (36.9  C)  Min: 95  F (35  C)  Max: 101.5  F (38.6  C)      Pulse  Av.9  Min: 54  Max: 108 Resp  Av.1  Min: 12  Max: 22  SpO2  Av.7 %  Min: 90 %  Max: 100 %     /71 (BP Location: Right arm)   Pulse 71   Temp 98.6  F (37  C) (Oral)   Resp 18   Wt 95.1 kg (209 lb 11.2 oz)   SpO2 97%   BMI 30.09 kg/m      Admit Weight: 93.5 kg (206 lb 2.1 oz)     GENERAL APPEARANCE: alert and no distress  HENT: mouth without ulcers or lesions  RESP: lungs clear to auscultation - no " rales, rhonchi or wheezes  CV: regular rhythm, normal rate, no rub, no murmur  EDEMA: no LE edema on right, trace to 1+ LE edema on left  ABDOMEN: soft, nondistended, nontender, bowel sounds normal, overweight  MS: extremities normal - no gross deformities noted, no evidence of inflammation in joints, no muscle tenderness  SKIN: no rash  TX KIDNEY: mild TTP  DIALYSIS ACCESS:  LUE AV fistula that is pulsatile, but not a good thrill    Data   All labs reviewed by me.  CMP  Recent Labs   Lab 11/06/21  0601 11/06/21  0117 11/05/21  2136 11/05/21  1406 11/05/21  0649 11/05/21  0649 11/04/21  1600 11/04/21  1600 11/04/21  1119 11/04/21  0616 11/04/21  0616 11/03/21  0626 11/03/21  0626 11/02/21  0403 11/02/21  0403 11/01/21  0440 11/01/21  0440     --   --   --   --  135  --  135  --   --  134   < > 132*   < > 133   < > 134   POTASSIUM 4.9  4.9 4.9 5.2 5.5*   < > 5.7*   < > 5.2  --    < > 4.5   < > 4.5   < > 4.6   < > 4.4   CHLORIDE 106  --   --   --   --  108  --  107  --   --  105   < > 104   < > 103   < > 105   CO2 20  --   --   --   --  17*  --  19*  --   --  20   < > 18*   < > 18*   < > 19*   ANIONGAP 9  --   --   --   --  10  --  9  --   --  9   < > 10   < > 12   < > 10   *  --   --   --   --  132*  --  130* 107*  --  116*   < > 102*   < > 91   < > 97   BUN 90*  --   --   --   --  86*  --  80*  --   --  83*   < > 82*   < > 84*   < > 80*   CR 5.75*  --   --   --   --  5.50*  --  4.95*  --   --  4.97*   < > 5.26*   < > 5.26*   < > 5.43*   GFRESTIMATED 10*  --   --   --   --  11*  --  12*  --   --  12*   < > 12*   < > 12*   < > 11*   LILIANA 7.9*  --   --   --   --  8.0*  --  7.9*  --   --  7.9*   < > 7.6*   < > 7.3*   < > 7.0*   MAG 2.6*  --   --   --   --  2.6*  --   --   --   --  2.5*  --   --   --  2.3  --   --    PHOS 5.7*  --   --   --   --  7.4*  --   --   --   --  3.9  --  3.7   < > 3.6   < > 3.2   ALBUMIN  --   --   --   --   --   --   --   --   --   --   --   --   --   --   --   --  2.0*    < > =  values in this interval not displayed.     CBC  Recent Labs   Lab 11/06/21  0601 11/05/21  0958 11/05/21  0649 11/04/21  2141 11/04/21  1600 11/04/21  1600   HGB 7.3* 7.7* 7.5* 8.0*   < > 7.4*   WBC 12.8* 16.4* 15.5*  --   --  14.7*   RBC 2.55* 2.66* 2.58*  --   --  2.55*   HCT 23.5* 24.8* 24.0*  --   --  23.6*   MCV 92 93 93  --   --  93   MCH 28.6 28.9 29.1  --   --  29.0   MCHC 31.1* 31.0* 31.3*  --   --  31.4*   RDW 14.8 14.7 14.7  --   --  14.6   * 441 407  --   --  398    < > = values in this interval not displayed.     INR  Recent Labs   Lab 11/06/21  0601 11/05/21  0649 11/04/21  1600 11/04/21  0616 11/03/21  1206 11/03/21  0626   INR 1.84* 1.60* 1.40* 1.45*  --    < >   PTT  --   --  35  --  51*  --     < > = values in this interval not displayed.     ABGNo lab results found in last 7 days.   Urine Studies  Recent Labs   Lab Test 11/02/21  1121 10/23/21  0526 11/09/15  1300 10/28/15  0702   COLOR Light Yellow Straw Yellow Yellow   APPEARANCE Clear Clear Clear Clear   URINEGLC Negative Negative Negative Negative   URINEBILI Negative Negative Negative Negative   URINEKETONE Negative Negative Negative Negative   SG 1.011 1.009 1.008 1.008   UBLD Moderate* Negative Negative Negative   URINEPH 6.0 7.0 5.5 6.5   PROTEIN 70 * 50 * 10* 10*   NITRITE Negative Negative Negative Negative   LEUKEST Negative Negative Negative Negative   RBCU 1 0 <1 1   WBCU 1 1 1 1     Recent Labs   Lab Test 10/23/21  0526 06/05/17  1253 01/25/16  1602 10/28/15  0702 10/13/15  1035   UTPG 1.37* 1.30* 0.36* 0.45* 0.55*     PTH  Recent Labs   Lab Test 10/27/21  0621 08/01/16  1638 01/25/16  1601 10/15/15  0520   PTHI 149* 90* 121* 434*     Iron Studies  Recent Labs   Lab Test 10/27/21  0621 10/17/15  0455   IRON 19* 13*   * 241   IRONSAT 10* 5*   TERRANCE 597*  --        IMAGING:  All imaging studies reviewed by me.

## 2021-11-06 NOTE — PLAN OF CARE
/86 (BP Location: Right arm)   Pulse 66   Temp 97.9  F (36.6  C) (Oral)   Resp 18   Wt 95.1 kg (209 lb 11.2 oz)   SpO2 98%   BMI 30.09 kg/m       Patient alert and oriented. VS stable. Patient on room air. Patient complains of pain. Scheduled Tylenol given (See MAR). PRN oxy given (See MAR). Patient denies nausea. Urine Output - spears catheter in use. Bowel Function - No BM reported. Nutrition - Renal diet. PIV - heparin gtt @ 1900 units/hr. 10A recheck scheduled for 1900. PIV - saline locked. NATHEN Drain - bright red/bloody output. Activity - SBA. Patient ambulated in hallway with wife. Plan of Care - Will continue to monitor and notify care team of any changes. Possible discharge tomorrow.

## 2021-11-06 NOTE — PROGRESS NOTES
Transplant Surgery  Inpatient Daily Progress Note  2021    Assessment & Plan: Raj Pierce is a 50 year old male  with a PMH significant for ESRD 2/2 PCKD s/p kidney transplant () c/b poor function (small graft). Other PMH includes HTN, hyperparathyroidism, and SILVIA (with cpap). Now status post pre-emptive  donor (DCD) kidney re-transplant on 10/23. Discharged 10/26 POD 3. Readmitted 10/27 for emergent exploratory laparotomy due to renal vein thrombosis.    S/p exploratory left lower quadrant of kidney transplant, back table flush, thrombectomy of renal vein, thrombectomy of iliac vein, reimplantation of kidney transplant, with ureteral stent removal and placement of a new ureteral stent, intraoperative ultrasound, intraoperative of transit time flow measurment.     Non-functioning graft. S/p nephrectomy and removal of ureteral stent 2021.     DDKT, POD 14  RTOR POD 10  RTOR nephrectomy POD 2    Today's changes  - Awaiting INR goal for discharge  - Continue low intensity heparin protocol  - Warfarin dosed per pharmacy; continue for 1 month; goal 2 - 2.5  - Wade in for 3 days (remove )  - Drain in until output < 30-40 ml/day for 2 days or surgeon decision  - f/u in 2 weeks with transplant team  - staple removal in 3 weeks    Graft function:   Kidney graft, renal vein thrombosis: US  showed non-occlusive clot in the left transplant renal vein with persistent elevated RI of 1.0. RTOR for non-functioning transplant kidney, s/p nephrectomy and ureteral stent removal. Cr. 5.75. K 4.9, given 40 mg IV lasix. NATHEN 110 ml, continue until output < 20 ml/day for 2 days or surgeon decision.  Patient continues to make urine, no indication for dialysis at this time.   Surgical site infection: Incision clean/dry/intact and healing well. Minimal surrounding erythema today.     Immunosuppression management: High risk protocol due to DGF    mg BID  Tac 2 mg BID. Goal 8-10. Level 13.5 (19.5). PTA  dose of Tacrolimus. Repeat level tomorrow. Complexity of management: High. Contributing factors: thrombocytopenia, anemia, organ dysfunction and anemia, thrombocytopenia.     Hematology:    Acute blood loss, chronic anemia: Received 2 units RBC 10/27, 1 unit RBC on 10/28, 1 unit RBC on 11/1. Hgb 7.9 (8). Has since been hemodynamically stable and required no additional product. Post op hemoglobin 8.0, 7.3 today.      Anticoagulation for renal vein thrombosis: Renal vein thrombosis, thrombectomy of iliac vein, reimplantation of kidney transplant, nephrectomy. Start low intensity heparin drip, transitioning to Warfarin dosed by pharmacy with goal INR 2-2.5, INR 1.84. Continue warfarin for 1 month.     Cardiorespiratory: Stable on RA. Encourage frequent IS.  Hypotension, secondary to bleeding, third spacing: Resolved with blood transfusions and fluid replacement. 11/3 Duplex US DVT study negative. 11/5 repeat DVT study negative. Lower extremity edema improving.     GI/Nutrition:   Moderate malnutrition in the context of acute illness: Renal/Low K diet. Appreciate dietician recommendations.   Bowel regimen: senna BID, miralax daily.    Endocrine:   Stress hyperglycemia: Improved.  this morning.    Mineral bone disorder:  Started Ca supplement.    Fluid/Electrolytes:   Metabolic acidosis: Continue sodium bicarbonate 1300 mg BID  Hypomagnesemia: Resolved. Continue Mg Oxide 400 mg daily    Hypervolemia, LLE edema: L>R 11/3, LE DVT US negative. Patient already anticoagulated.     : Wade to remain for 3 days. May remove 11/7. No stent in place.      Infectious disease: Afebrile.   Leukocytosis: Trending down, currently 11.8 (peak 16.4).  UA/UC, BC, CXR all negative. Decreased size of fluid collections on renal US, no new collections concerning for abscess under incision. Erythema surrounding incision improving. Will continue abx while inpatient, re-evaluate at discharge.      Surgical site infection: Keflex added  10/31. Incisional erythema improved yesterday. Reassess tomorrow.     Prophylaxis: DVT (heparin + warfarin), valcyte x 6 months (D+/R-), bactrim    Disposition: 7A     Medical Decision Making: Medium    Fellow/JATINDER/Resident Provider: Nette Pardo [phone]    Faculty: Eulalio Emanuel M.D., Ph.D.  _________________________________________________________________  Transplant History: Admitted 10/27/2021 for renal vein thrombosis.  10/23/2021 (Kidney), 10/13/2015 (Kidney), Postoperative day: 2216 (Kidney), 14 (Kidney)     Interval History: History is obtained from the patient  Overnight events: NAEO. Potassium improved after IV lasix. Patient eating, drinking, voiding, having bowel movements, and ambulating. Wife does not that he seems to be very briefly and intermittently delirious at times.     ROS:   A 10-point review of systems was negative except as noted above.    Meds:    acetaminophen  975 mg Oral Q8H     atorvastatin  10 mg Oral Daily     calcium carbonate-vitamin D  1 tablet Oral BID     cephALEXin  500 mg Oral Q12H DONITA     citalopram  20 mg Oral Daily     lidocaine  1 patch Transdermal Q24H     lidocaine   Transdermal Q8H     magnesium oxide  400 mg Oral Daily with lunch     mycophenolate  750 mg Oral BID IS     polyethylene glycol  17 g Oral Daily     senna-docusate  1 tablet Oral BID     sodium bicarbonate  1,300 mg Oral BID     sodium chloride (PF)  3 mL Intravenous Q8H     sulfamethoxazole-trimethoprim  1 tablet Oral Once per day on Tue Thu Sat     tacrolimus  0.5 mg Oral BID IS     valGANciclovir  450 mg Oral Once per day on Mon Thu     warfarin ANTICOAGULANT  6 mg Oral ONCE at 18:00     Physical Exam:  General Appearance: NAD, non-toxic, sitting in chair comfortably  Skin: normal  Heart: perfused  Lungs: normal rate and work of breathing  Abdomen: soft, non-tender, non-distended. LLQ incision with dressing in place. No surrounding erythema or drainage.NATHEN with sanguinous drainage.  : Waed in  place draining light yellow urine.   Ext: Trace lower extremity edema bilaterally, improved compared to prior.   Neurologic: alert and oriented, no gross deficit.   PIV    Current vitals:   /87 (BP Location: Right arm)   Pulse 72   Temp 98  F (36.7  C) (Oral)   Resp 18   Wt 95.1 kg (209 lb 11.2 oz)   SpO2 96%   BMI 30.09 kg/m      Vital sign ranges:    Temp:  [97.5  F (36.4  C)-98  F (36.7  C)] 98  F (36.7  C)  Pulse:  [65-73] 72  Resp:  [16-20] 18  BP: (101-131)/(65-87) 125/87  SpO2:  [90 %-98 %] 96 %  Patient Vitals for the past 24 hrs:   BP Temp Temp src Pulse Resp SpO2   11/06/21 0800 125/87 98  F (36.7  C) Oral 72 18 96 %   11/06/21 0351 131/78 97.8  F (36.6  C) Oral 69 20 98 %   11/06/21 0004 110/76 97.8  F (36.6  C) Oral 70 18 97 %   11/05/21 1943 123/82 97.7  F (36.5  C) Oral 73 16 98 %   11/05/21 1616 101/73 97.8  F (36.6  C) Oral 70 16 90 %   11/05/21 1201 109/65 97.5  F (36.4  C) Oral 65 16 94 %     Data:   CMP  Recent Labs   Lab 11/06/21  0601 11/06/21  0117 11/05/21  1406 11/05/21  0649 11/02/21  0403 11/01/21  0440     --   --  135   < > 134   POTASSIUM 4.9  4.9 4.9   < > 5.7*   < > 4.4   CHLORIDE 106  --   --  108   < > 105   CO2 20  --   --  17*   < > 19*   *  --   --  132*   < > 97   BUN 90*  --   --  86*   < > 80*   CR 5.75*  --   --  5.50*   < > 5.43*   GFRESTIMATED 10*  --   --  11*   < > 11*   LILIANA 7.9*  --   --  8.0*   < > 7.0*   MAG 2.6*  --   --  2.6*   < >  --    PHOS 5.7*  --   --  7.4*   < > 3.2   ALBUMIN  --   --   --   --   --  2.0*    < > = values in this interval not displayed.     CBC  Recent Labs   Lab 11/06/21  0601 11/05/21  0958   HGB 7.3* 7.7*   WBC 12.8* 16.4*   * 441     COAGS  Recent Labs   Lab 11/06/21  0601 11/05/21  0649 11/04/21  1600 11/04/21  1600 11/04/21  0616 11/03/21  1206   INR 1.84* 1.60*   < > 1.40*   < >  --    PTT  --   --   --  35  --  51*    < > = values in this interval not displayed.      Urinalysis  Recent Labs   Lab Test  11/02/21  1121 10/23/21  0526 06/05/17  1253   COLOR Light Yellow Straw  --    APPEARANCE Clear Clear  --    URINEGLC Negative Negative  --    URINEBILI Negative Negative  --    URINEKETONE Negative Negative  --    SG 1.011 1.009  --    UBLD Moderate* Negative  --    URINEPH 6.0 7.0  --    PROTEIN 70 * 50 *  --    NITRITE Negative Negative  --    LEUKEST Negative Negative  --    RBCU 1 0  --    WBCU 1 1  --    UTPG  --  1.37* 1.30*     Virology:  CMV DNA Quantitation Specimen   Date Value Ref Range Status   08/01/2016   Corrected    Plasma  CORRECTED ON 08/02 AT 1044: PREVIOUSLY REPORTED AS Blood       CMV IgG Antibody   Date Value Ref Range Status   07/10/2013 <0.20  Negative for anti-CMV IgG U/mL Final     EBV VCA IgG Antibody   Date Value Ref Range Status   07/10/2013 >750.00  Positive, suggests immunologic exposure. U/mL Final     Hepatitis C Antibody   Date Value Ref Range Status   10/23/2021 Nonreactive Nonreactive Final   04/25/2016  NR Final    Nonreactive   Assay performance characteristics have not been established for newborns,   infants, and children       Hep B Surface Loni   Date Value Ref Range Status   07/10/2013 0.0  Final     Comment:     Negative, No antibody detected when the value is less than 5.0 mlU/mL.     BK Virus Result   Date Value Ref Range Status   06/05/2017 BK Virus DNA Not Detected BKNEG copies/mL Final   10/28/2015 BK Virus DNA Not Detected BKNEG copies/mL Final     BK Virus DNA copies/mL   Date Value Ref Range Status   10/23/2021 Not Detected Not Detected copies/mL Final

## 2021-11-06 NOTE — PLAN OF CARE
Vitals:/82 (BP Location: Right arm)   Pulse 73   Temp 97.7  F (36.5  C) (Oral)   Resp 16   Wt 95.1 kg (209 lb 11.2 oz)   SpO2 98%   BMI 30.09 kg/m    Endocrine: No BG checks  Labs: Xa and K+ collected at 2130. Will watch for results or pass to next shift.   Pain: Controlled with Tylenol.  PRN's: Tylenol given x1 for pain. Melatonin given at bedtime per pt request.   Diet: Renal. Fair appetite.   LDA: Wade in place, NATHEN, PIV x2  GI: No BM on this shift. Scheduled senna given.   : Wade. Adequate output.   Skin: Abd incision  Neuro: Intact  Mobility: Up with SBA  Plan: Continue with current POC. Will update provider with any changes in condition.

## 2021-11-07 ENCOUNTER — APPOINTMENT (OUTPATIENT)
Dept: EDUCATION SERVICES | Facility: CLINIC | Age: 51
End: 2021-11-07
Attending: SURGERY
Payer: COMMERCIAL

## 2021-11-07 ENCOUNTER — APPOINTMENT (OUTPATIENT)
Dept: EDUCATION SERVICES | Facility: CLINIC | Age: 51
End: 2021-11-07
Payer: COMMERCIAL

## 2021-11-07 VITALS
RESPIRATION RATE: 18 BRPM | OXYGEN SATURATION: 96 % | TEMPERATURE: 97.3 F | SYSTOLIC BLOOD PRESSURE: 126 MMHG | DIASTOLIC BLOOD PRESSURE: 89 MMHG | BODY MASS INDEX: 28.65 KG/M2 | HEART RATE: 72 BPM | WEIGHT: 199.7 LBS

## 2021-11-07 PROBLEM — E83.9 CHRONIC KIDNEY DISEASE-MINERAL AND BONE DISORDER: Status: ACTIVE | Noted: 2021-11-07

## 2021-11-07 PROBLEM — E44.0 MODERATE MALNUTRITION (H): Status: ACTIVE | Noted: 2021-11-07

## 2021-11-07 PROBLEM — D72.829 LEUKOCYTOSIS: Status: ACTIVE | Noted: 2021-11-07

## 2021-11-07 PROBLEM — T81.49XA SURGICAL SITE INFECTION: Status: ACTIVE | Noted: 2021-11-07

## 2021-11-07 PROBLEM — M89.9 CHRONIC KIDNEY DISEASE-MINERAL AND BONE DISORDER: Status: ACTIVE | Noted: 2021-11-07

## 2021-11-07 PROBLEM — E87.8 LOW BICARBONATE: Status: ACTIVE | Noted: 2021-11-07

## 2021-11-07 PROBLEM — E87.5 HYPERKALEMIA: Status: ACTIVE | Noted: 2021-11-07

## 2021-11-07 PROBLEM — Z90.5 S/P NEPHRECTOMY: Status: ACTIVE | Noted: 2021-11-07

## 2021-11-07 PROBLEM — N18.9 CHRONIC KIDNEY DISEASE-MINERAL AND BONE DISORDER: Status: ACTIVE | Noted: 2021-11-07

## 2021-11-07 LAB
ANION GAP SERPL CALCULATED.3IONS-SCNC: 6 MMOL/L (ref 3–14)
BACTERIA BLD CULT: NO GROWTH
BACTERIA BLD CULT: NO GROWTH
BUN SERPL-MCNC: 83 MG/DL (ref 7–30)
CALCIUM SERPL-MCNC: 7.9 MG/DL (ref 8.5–10.1)
CHLORIDE BLD-SCNC: 109 MMOL/L (ref 94–109)
CO2 SERPL-SCNC: 20 MMOL/L (ref 20–32)
CREAT SERPL-MCNC: 5.31 MG/DL (ref 0.66–1.25)
ERYTHROCYTE [DISTWIDTH] IN BLOOD BY AUTOMATED COUNT: 14.8 % (ref 10–15)
GFR SERPL CREATININE-BSD FRML MDRD: 11 ML/MIN/1.73M2
GLUCOSE BLD-MCNC: 103 MG/DL (ref 70–99)
HCT VFR BLD AUTO: 23.7 % (ref 40–53)
HGB BLD-MCNC: 7.3 G/DL (ref 13.3–17.7)
HOLD SPECIMEN: NORMAL
INR PPP: 1.88 (ref 0.85–1.15)
INR PPP: 1.93 (ref 0.85–1.15)
MAGNESIUM SERPL-MCNC: 2.4 MG/DL (ref 1.6–2.3)
MCH RBC QN AUTO: 29 PG (ref 26.5–33)
MCHC RBC AUTO-ENTMCNC: 30.8 G/DL (ref 31.5–36.5)
MCV RBC AUTO: 94 FL (ref 78–100)
PHOSPHATE SERPL-MCNC: 4.8 MG/DL (ref 2.5–4.5)
PLATELET # BLD AUTO: 507 10E3/UL (ref 150–450)
POTASSIUM BLD-SCNC: 5.3 MMOL/L (ref 3.4–5.3)
POTASSIUM BLD-SCNC: 5.5 MMOL/L (ref 3.4–5.3)
POTASSIUM BLD-SCNC: 5.5 MMOL/L (ref 3.4–5.3)
RBC # BLD AUTO: 2.52 10E6/UL (ref 4.4–5.9)
SODIUM SERPL-SCNC: 135 MMOL/L (ref 133–144)
TACROLIMUS BLD-MCNC: 5.7 UG/L (ref 5–15)
TME LAST DOSE: NORMAL H
TME LAST DOSE: NORMAL H
UFH PPP CHRO-ACNC: 0.32 IU/ML
WBC # BLD AUTO: 11.8 10E3/UL (ref 4–11)

## 2021-11-07 PROCEDURE — 36415 COLL VENOUS BLD VENIPUNCTURE: CPT | Performed by: STUDENT IN AN ORGANIZED HEALTH CARE EDUCATION/TRAINING PROGRAM

## 2021-11-07 PROCEDURE — 84132 ASSAY OF SERUM POTASSIUM: CPT | Performed by: STUDENT IN AN ORGANIZED HEALTH CARE EDUCATION/TRAINING PROGRAM

## 2021-11-07 PROCEDURE — 250N000013 HC RX MED GY IP 250 OP 250 PS 637: Performed by: SURGERY

## 2021-11-07 PROCEDURE — 250N000009 HC RX 250: Performed by: NURSE PRACTITIONER

## 2021-11-07 PROCEDURE — 250N000011 HC RX IP 250 OP 636: Performed by: NURSE PRACTITIONER

## 2021-11-07 PROCEDURE — 250N000012 HC RX MED GY IP 250 OP 636 PS 637: Performed by: SURGERY

## 2021-11-07 PROCEDURE — 94640 AIRWAY INHALATION TREATMENT: CPT

## 2021-11-07 PROCEDURE — 250N000013 HC RX MED GY IP 250 OP 250 PS 637: Performed by: PHYSICIAN ASSISTANT

## 2021-11-07 PROCEDURE — 80048 BASIC METABOLIC PNL TOTAL CA: CPT | Performed by: SURGERY

## 2021-11-07 PROCEDURE — 36415 COLL VENOUS BLD VENIPUNCTURE: CPT | Performed by: SURGERY

## 2021-11-07 PROCEDURE — 94642 AEROSOL INHALATION TREATMENT: CPT

## 2021-11-07 PROCEDURE — 85610 PROTHROMBIN TIME: CPT | Performed by: STUDENT IN AN ORGANIZED HEALTH CARE EDUCATION/TRAINING PROGRAM

## 2021-11-07 PROCEDURE — 84132 ASSAY OF SERUM POTASSIUM: CPT

## 2021-11-07 PROCEDURE — 99232 SBSQ HOSP IP/OBS MODERATE 35: CPT | Performed by: INTERNAL MEDICINE

## 2021-11-07 PROCEDURE — 250N000013 HC RX MED GY IP 250 OP 250 PS 637: Performed by: NURSE PRACTITIONER

## 2021-11-07 PROCEDURE — 84100 ASSAY OF PHOSPHORUS: CPT | Performed by: SURGERY

## 2021-11-07 PROCEDURE — 85520 HEPARIN ASSAY: CPT | Performed by: SURGERY

## 2021-11-07 PROCEDURE — 83735 ASSAY OF MAGNESIUM: CPT | Performed by: SURGERY

## 2021-11-07 PROCEDURE — 250N000012 HC RX MED GY IP 250 OP 636 PS 637: Performed by: PHYSICIAN ASSISTANT

## 2021-11-07 PROCEDURE — 85027 COMPLETE CBC AUTOMATED: CPT | Performed by: SURGERY

## 2021-11-07 PROCEDURE — 85610 PROTHROMBIN TIME: CPT | Performed by: SURGERY

## 2021-11-07 PROCEDURE — 80197 ASSAY OF TACROLIMUS: CPT | Performed by: SURGERY

## 2021-11-07 RX ORDER — PENTAMIDINE ISETHIONATE 300 MG/300MG
300 INHALANT RESPIRATORY (INHALATION)
Status: COMPLETED | OUTPATIENT
Start: 2021-11-07 | End: 2021-11-07

## 2021-11-07 RX ORDER — SULFAMETHOXAZOLE AND TRIMETHOPRIM 400; 80 MG/1; MG/1
1 TABLET ORAL
Qty: 30 TABLET | Refills: 11 | Status: SHIPPED | OUTPATIENT
Start: 2021-11-09 | End: 2021-11-07

## 2021-11-07 RX ORDER — SODIUM BICARBONATE 650 MG/1
1300 TABLET ORAL 2 TIMES DAILY
Qty: 120 TABLET | Refills: 2 | Status: SHIPPED | OUTPATIENT
Start: 2021-11-07 | End: 2021-11-11

## 2021-11-07 RX ORDER — WARFARIN SODIUM 7.5 MG/1
7.5 TABLET ORAL
Status: DISCONTINUED | OUTPATIENT
Start: 2021-11-07 | End: 2021-11-07 | Stop reason: HOSPADM

## 2021-11-07 RX ORDER — TACROLIMUS 0.5 MG/1
0.5 CAPSULE ORAL 2 TIMES DAILY
Qty: 60 CAPSULE | Refills: 11 | Status: SHIPPED | OUTPATIENT
Start: 2021-11-07 | End: 2021-11-15

## 2021-11-07 RX ORDER — FUROSEMIDE 20 MG
20 TABLET ORAL DAILY
Status: DISCONTINUED | OUTPATIENT
Start: 2021-11-08 | End: 2021-11-07 | Stop reason: HOSPADM

## 2021-11-07 RX ORDER — VALGANCICLOVIR 450 MG/1
450 TABLET, FILM COATED ORAL
Qty: 60 TABLET | Refills: 5 | Status: ON HOLD
Start: 2021-11-08 | End: 2022-12-08

## 2021-11-07 RX ORDER — SULFAMETHOXAZOLE AND TRIMETHOPRIM 400; 80 MG/1; MG/1
1 TABLET ORAL 3 TIMES DAILY
Qty: 30 TABLET | Refills: 11
Start: 2021-11-07 | End: 2021-11-07

## 2021-11-07 RX ORDER — LIDOCAINE 4 G/G
1 PATCH TOPICAL EVERY 24 HOURS
Qty: 15 PATCH | Refills: 0 | Status: SHIPPED | OUTPATIENT
Start: 2021-11-07 | End: 2022-03-18

## 2021-11-07 RX ORDER — CEPHALEXIN 500 MG/1
500 CAPSULE ORAL EVERY 12 HOURS
Qty: 14 CAPSULE | Refills: 0 | Status: SHIPPED | OUTPATIENT
Start: 2021-11-07 | End: 2021-11-14

## 2021-11-07 RX ORDER — WARFARIN SODIUM 1 MG/1
1 TABLET ORAL DAILY
Qty: 7 TABLET | Refills: 0 | Status: SHIPPED | OUTPATIENT
Start: 2021-11-07 | End: 2021-12-02

## 2021-11-07 RX ORDER — FUROSEMIDE 10 MG/ML
40 INJECTION INTRAMUSCULAR; INTRAVENOUS ONCE
Status: COMPLETED | OUTPATIENT
Start: 2021-11-07 | End: 2021-11-07

## 2021-11-07 RX ORDER — ONDANSETRON 4 MG/1
4 TABLET, ORALLY DISINTEGRATING ORAL EVERY 6 HOURS PRN
Qty: 15 TABLET | Refills: 0 | Status: SHIPPED | OUTPATIENT
Start: 2021-11-07 | End: 2022-03-18

## 2021-11-07 RX ORDER — FUROSEMIDE 20 MG
20 TABLET ORAL DAILY
Qty: 30 TABLET | Refills: 2 | Status: ON HOLD | OUTPATIENT
Start: 2021-11-08 | End: 2022-12-08

## 2021-11-07 RX ORDER — TACROLIMUS 1 MG/1
4 CAPSULE ORAL 2 TIMES DAILY
Qty: 240 CAPSULE | Refills: 11
Start: 2021-11-07 | End: 2021-11-15

## 2021-11-07 RX ORDER — ALBUTEROL SULFATE 0.83 MG/ML
2.5 SOLUTION RESPIRATORY (INHALATION)
Status: COMPLETED | OUTPATIENT
Start: 2021-11-07 | End: 2021-11-07

## 2021-11-07 RX ORDER — WARFARIN SODIUM 5 MG/1
5 TABLET ORAL DAILY
Qty: 7 TABLET | Refills: 0 | Status: SHIPPED | OUTPATIENT
Start: 2021-11-07 | End: 2022-03-18

## 2021-11-07 RX ADMIN — PENTAMIDINE ISETHIONATE 300 MG: 300 INHALANT RESPIRATORY (INHALATION) at 14:45

## 2021-11-07 RX ADMIN — FUROSEMIDE 40 MG: 10 INJECTION, SOLUTION INTRAVENOUS at 13:34

## 2021-11-07 RX ADMIN — FUROSEMIDE 40 MG: 10 INJECTION, SOLUTION INTRAVENOUS at 07:56

## 2021-11-07 RX ADMIN — SODIUM BICARBONATE 1300 MG: 650 TABLET ORAL at 07:50

## 2021-11-07 RX ADMIN — Medication 1 TABLET: at 10:10

## 2021-11-07 RX ADMIN — ACETAMINOPHEN 975 MG: 325 TABLET, FILM COATED ORAL at 00:24

## 2021-11-07 RX ADMIN — ALBUTEROL SULFATE 2.5 MG: 2.5 SOLUTION RESPIRATORY (INHALATION) at 14:41

## 2021-11-07 RX ADMIN — ATORVASTATIN CALCIUM 10 MG: 10 TABLET, FILM COATED ORAL at 07:50

## 2021-11-07 RX ADMIN — CITALOPRAM HYDROBROMIDE 20 MG: 20 TABLET ORAL at 07:50

## 2021-11-07 RX ADMIN — TACROLIMUS 0.5 MG: 0.5 CAPSULE ORAL at 07:50

## 2021-11-07 RX ADMIN — MYCOPHENOLATE MOFETIL 750 MG: 250 CAPSULE ORAL at 07:50

## 2021-11-07 RX ADMIN — Medication 400 MG: at 11:36

## 2021-11-07 RX ADMIN — DOCUSATE SODIUM 50 MG AND SENNOSIDES 8.6 MG 1 TABLET: 8.6; 5 TABLET, FILM COATED ORAL at 07:49

## 2021-11-07 RX ADMIN — LIDOCAINE 1 PATCH: 246 PATCH TOPICAL at 07:49

## 2021-11-07 RX ADMIN — OXYCODONE HYDROCHLORIDE 5 MG: 5 TABLET ORAL at 08:13

## 2021-11-07 RX ADMIN — ACETAMINOPHEN 975 MG: 325 TABLET, FILM COATED ORAL at 07:50

## 2021-11-07 RX ADMIN — CEPHALEXIN 500 MG: 250 CAPSULE ORAL at 07:50

## 2021-11-07 ASSESSMENT — ACTIVITIES OF DAILY LIVING (ADL)
ADLS_ACUITY_SCORE: 7

## 2021-11-07 NOTE — PLAN OF CARE
Vitals: stable room air   Blood glucose: NA  Pain/nausea: sched tylenol.   Diet: renal, fair appetite  Lines: PIV SL, PIV infusing Heparin @ 1900, Xa @ 1900 therapeutic, Xa 0230 0.32 (no change rate). Xa recheck tomorrow 11/8 am labs.   : Spears OP good. Cares done. Day RN will educate if discharging on spears and NATHEN.   GI: last BM yesterday.   Drains: NATHEN OP 55 bloody red. CDI.   Skin: incision leaky on days, dressing applied, CDI. (dried drainage).   Mobility: up with SBA. On bed alarm for fall risk.   Education : put in warfarin PLC. Needs spears and NATHEN education today.

## 2021-11-07 NOTE — PROGRESS NOTES
Care Management Discharge Note    Discharge Date: 11/07/2021       Discharge Disposition: Home, self-care    Discharge Services: Anticoagulation Management     Discharge DME: NA    Discharge Transportation: Private car; family or friend will provide    Private pay costs discussed: Not applicable    PAS Confirmation Code:  NA  Patient/family educated on Medicare website which has current facility and service quality ratings: NA    Education Provided on the Discharge Plan: Yes, by phone  Persons Notified of Discharge Plans: Patient, provider ([place], Transplant JATINDER)  Patient/Family in Agreement with the Plan: yes    Handoff Referral Completed: Yes    Additional Information: Patient admitted with renal vein thrombosis after recent kidney transplant 10/23, surgically addressed with nephrectomy of most recent transplanted kidney and re-graft of previous transplanted kidney. May resume labs as for 2015 kidney transplant, no need for new transplant labs.  Per Transplant service JATINDER, will have warfarin and other lab orders for Monday and Thursday weekly for now, may be delayed until Tuesday this week if clinic unable to schedule tomorrow.      I called and left voice mail for [facility] (referral initiated by 7A RN RYLEY Painting last week) to notify of plan to discharge today.  I spoke with patient today to give instructions on scheduling warfarin follow-up.  Patient verbalized understanding of this and need to resume transplant labs.  He will contact his clinic tomorrow to follow up on scheduling first INR draw and resumed transplant lab draws.    Jaleesa Moody, RN, PHN  RN Care Coordinator   [address] [address]   [email or web address]   Casual Employee - Weekend Coverage only  To contact weekend RNCC, dial [phone] and enter pager number [phone] at prompt OR use [facility] to text page.  This pager can not be contacted by outside  line.

## 2021-11-07 NOTE — PHARMACY-ANTICOAGULATION SERVICE
Clinical Pharmacy- Warfarin Discharge Note  This patient is currently on warfarin for the treatment of renal vein thrombosis.  INR Goal= 2-2.5  Expected length of therapy 4 weeks.  Warfarin Prior to Admission: No  Significant drug interactions: Bactrim, Heparin IV  Recent documented change in oral intake/nutrition: No    Anticoagulation Dose History     Recent Dosing and Labs Latest Ref Rng & Units 11/4/2021 11/4/2021 11/4/2021 11/5/2021 11/6/2021 11/7/2021 11/7/2021    Warfarin 5 mg - - - 5 mg - - - -    Warfarin 6 mg - - - - - 6 mg - -    Warfarin 7.5 mg - - - - 7.5 mg - - -    INR 0.85 - 1.15 1.45(H) 1.40(H) - 1.60(H) 1.84(H) 1.88(H) 1.93(H)          Vitamin K doses administered during the last 7 days: none  FFP administered during the last 7 days: none  Recommend discharging the patient on a warfarin regimen of 6 mg daily for 2 days until next INR check with prescription for 5 mg and 1 mg tablets for future dose adjustments.   Current plan discussed with Transplant Surgery team is for twice weekly labs.      The patient will have an INR check in 2 days in clinic (11/9/2021)     Los PorterD   [phone]

## 2021-11-07 NOTE — PROGRESS NOTES
[facility]   Transplant Nephrology Progress Note  Date of Admission:  10/27/2021  Today's Date: 11/07/2021    Recommendations:  - Will arrange for outpatient CKD management and follow up.   - Patient has a clotted LUE AV fistula and would need dialysis access if he needs to dialyze.  - Would discharge on furosemide 20 mg daily.  - With high serum potassium would hold Bactrim and give a dose of inhaled pentamidine.  - Recommend low potassium diet.    Assessment & Plan   # DDKT: Increased creatinine at ~ 5.3 today after recent kidney transplant explant this past week due to renal vein thrombosis.  Patient still has previous kidney transplant working.  Still good urine output.  No acute indications for dialysis at this time, but patient may have some mild uremic symptoms.  Will assess for dialysis daily.  He will require a new dialysis access if he needs to dialyze as his AV fistula is clotted.  Will set up for outpatient CKD management.   - Baseline Creatinine: ~ 4.0-4.5 (prior to this recent transplant)   - Proteinuria: Not checked post transplant   - Date DSA Last Checked: Oct/2021      Latest DSA: No DSA at time of transplant   - BK Viremia: Not checked post transplant   - Kidney Tx Biopsy: No    # Immunosuppression: Tacrolimus immediate release (goal 4-6) and Mycophenolate mofetil (dose 750 mg every 12 hours)   - Induction with Recent Transplant:  High Intensity   - Changes: Not at this time    # Infection Prophylaxis:   - PJP: Sulfa/TMP (Bactrim); Recommend holding and giving one dose of inhaled pentamidine.  - CMV: Valganciclovir (Valcyte); Patient is CMV IgG Ab discordant (D+/R-) and will continue on Valcyte x 6 months, then check CMV PCR monthly until 12 months post transplant.    # Blood Pressure: Controlled;  Goal BP: < 140/90   - Volume status: Mildly hypervolemic  EDW ~ 93.5 kg   - Changes: No    # Anemia in Chronic Renal Disease: Hgb: Trend down      NADIYA:  No   - Iron studies: Low iron saturation, but high ferritin    # Mineral Bone Disorder:   - Secondary renal hyperparathyroidism; PTH level: Minimally elevated ( pg/ml)        On treatment: None  - Vitamin D; level: Normal        On supplement: No  - Calcium; level: Low normal when corrected for low serum albumin        On supplement: Yes  - Phosphorus; level: High        On binder: No    # Electrolytes:   - Potassium; level: High        On supplement: No; Will give diuretics and hold Bactrim.  Recommend low potassium diet.  - Magnesium; level: High        On supplement: Yes  - Bicarbonate; level: Low normal        On supplement: Yes    # Leukocytosis: Slightly trend up in WBC after kidney transplant explant yesterday.    # Transplant History:  Etiology of Kidney Failure: Polycystic kidney disease (PKD)  Tx: DDKT  Transplant: 10/23/2021 (Kidney), 10/13/2015 (Kidney)  Donor Type: Donation after Circulatory Death  Donor Class:   Crossmatch at time of Tx: negative  DSA at time of Tx: No  Significant changes in immunosuppression: None  Significant transplant-related complications: None    Recommendations were communicated to the primary team via this note.    Karena Roy NP   Pager: [phone]    Physician Attestation   I, Francis Espinoza, saw and evaluated Raj Pierce as part of a shared visit.  I have reviewed and discussed with the advanced practice provider their history, physical and plan.    I personally reviewed the vital signs, medications and labs.    My key history or physical exam findings: Creatinine is stable.  Higher serum potassium.  Patient reports feeling a bit better.  Still some extra fluid on board.    Key management decisions made by me: Would continue with present immunosuppression.  With high serum potassium will hold Bactrim and recommend low potassium diet.  Will arrange for outpatient CKD management.    Francis Espinoza  Date of Service (when I saw the patient):  "21    Interval History   Mr. Michelle kidney function is slightly improved with creatinine ~ 5.3.  Good urine output.  Patient underwent nephrectomy of his latest kidney transplant this past week.  He reports feeling okay.  Denies any chest pain or shortness of breath.  No nausea or vomiting.  Not passing any flatus yet.  No fever, sweats or chills.  Less leg swelling.  Patient's wife reports patient is a less confused.   Incision is oozing, but stable, covered  at this time    Review of Systems   4 point ROS was obtained and negative except as noted in the Interval History.    MEDICATIONS:    atorvastatin  10 mg Oral Daily     calcium carbonate-vitamin D  1 tablet Oral BID     cephALEXin  500 mg Oral Q12H DONITA     citalopram  20 mg Oral Daily     lidocaine  1 patch Transdermal Q24H     lidocaine   Transdermal Q8H     magnesium oxide  400 mg Oral Daily with lunch     mycophenolate  750 mg Oral BID IS     polyethylene glycol  17 g Oral Daily     senna-docusate  1 tablet Oral BID     sodium bicarbonate  1,300 mg Oral BID     sodium chloride (PF)  3 mL Intravenous Q8H     sulfamethoxazole-trimethoprim  1 tablet Oral Once per day on  Sat     tacrolimus  0.5 mg Oral BID IS     valGANciclovir  450 mg Oral Once per day on u       bupivacaine liposome (EXPAREL) LA inj was given in the infiltration site to produce post-op analgesia. Duration of action is up to 72 hours. Other \"ilene\" meds should not be given for 96 hours except for lidocaine 4% patch. This is for INFORMATION ONLY.       heparin 1,900 Units/hr (21 0200)     Warfarin Therapy Reminder         Physical Exam   Temp  Av.5  F (36.9  C)  Min: 95  F (35  C)  Max: 101.5  F (38.6  C)      Pulse  Av.9  Min: 54  Max: 108 Resp  Av.1  Min: 12  Max: 22  SpO2  Av.7 %  Min: 90 %  Max: 100 %     BP (!) 131/92 (BP Location: Right arm)   Pulse 71   Temp 97.5  F (36.4  C) (Oral)   Resp 18   Wt 95.1 kg (209 lb 11.2 oz)   SpO2 97%  "  BMI 30.09 kg/m      Admit Weight: 93.5 kg (206 lb 2.1 oz)     GENERAL APPEARANCE: alert and no distress  HENT: mouth without ulcers or lesions  RESP: lungs clear to auscultation - no rales, rhonchi or wheezes  CV: regular rhythm, normal rate, no rub, no murmur  EDEMA: no LE edema on right, trace to 1+ LE edema on left  ABDOMEN: soft, nondistended, nontender, bowel sounds normal, overweight  MS: extremities normal - no gross deformities noted, no evidence of inflammation in joints, no muscle tenderness  SKIN: no rash  TX KIDNEY: mild TTP  DIALYSIS ACCESS:  LUE AV fistula that is pulsatile, but not a good thrill    Data   All labs reviewed by me.  CMP  Recent Labs   Lab 11/07/21  0629 11/07/21  0200 11/06/21  0601 11/06/21  0117 11/05/21  1406 11/05/21  0649 11/04/21  1600 11/04/21  1119 11/04/21  0616 11/02/21  0403 11/01/21  0440   NA  --  135 135  --   --  135 135  --  134   < > 134   POTASSIUM 5.3 5.5* 4.9  4.9 4.9   < > 5.7* 5.2  --  4.5   < > 4.4   CHLORIDE  --  109 106  --   --  108 107  --  105   < > 105   CO2  --  20 20  --   --  17* 19*  --  20   < > 19*   ANIONGAP  --  6 9  --   --  10 9  --  9   < > 10   GLC  --  103* 129*  --   --  132* 130*   < > 116*   < > 97   BUN  --  83* 90*  --   --  86* 80*  --  83*   < > 80*   CR  --  5.31* 5.75*  --   --  5.50* 4.95*  --  4.97*   < > 5.43*   GFRESTIMATED  --  11* 10*  --   --  11* 12*  --  12*   < > 11*   LILIANA  --  7.9* 7.9*  --   --  8.0* 7.9*  --  7.9*   < > 7.0*   MAG 2.4*  --  2.6*  --   --  2.6*  --   --  2.5*   < >  --    PHOS  --  4.8* 5.7*  --   --  7.4*  --   --  3.9   < > 3.2   ALBUMIN  --   --   --   --   --   --   --   --   --   --  2.0*    < > = values in this interval not displayed.     CBC  Recent Labs   Lab 11/07/21  0200 11/06/21  0601 11/05/21  0958 11/05/21  0649   HGB 7.3* 7.3* 7.7* 7.5*   WBC 11.8* 12.8* 16.4* 15.5*   RBC 2.52* 2.55* 2.66* 2.58*   HCT 23.7* 23.5* 24.8* 24.0*   MCV 94 92 93 93   MCH 29.0 28.6 28.9 29.1   MCHC 30.8* 31.1*  31.0* 31.3*   RDW 14.8 14.8 14.7 14.7   * 491* 441 407     INR  Recent Labs   Lab 11/07/21  0200 11/06/21  0601 11/05/21  0649 11/04/21  1600 11/04/21  0616 11/03/21  1206   INR 1.88* 1.84* 1.60* 1.40*   < >  --    PTT  --   --   --  35  --  51*    < > = values in this interval not displayed.     ABGNo lab results found in last 7 days.   Urine Studies  Recent Labs   Lab Test 11/02/21  1121 10/23/21  0526 11/09/15  1300 10/28/15  0702   COLOR Light Yellow Straw Yellow Yellow   APPEARANCE Clear Clear Clear Clear   URINEGLC Negative Negative Negative Negative   URINEBILI Negative Negative Negative Negative   URINEKETONE Negative Negative Negative Negative   SG 1.011 1.009 1.008 1.008   UBLD Moderate* Negative Negative Negative   URINEPH 6.0 7.0 5.5 6.5   PROTEIN 70 * 50 * 10* 10*   NITRITE Negative Negative Negative Negative   LEUKEST Negative Negative Negative Negative   RBCU 1 0 <1 1   WBCU 1 1 1 1     Recent Labs   Lab Test 10/23/21  0526 06/05/17  1253 01/25/16  1602 10/28/15  0702 10/13/15  1035   UTPG 1.37* 1.30* 0.36* 0.45* 0.55*     PTH  Recent Labs   Lab Test 10/27/21  0621 08/01/16  1638 01/25/16  1601 10/15/15  0520   PTHI 149* 90* 121* 434*     Iron Studies  Recent Labs   Lab Test 10/27/21  0621 10/17/15  0455   IRON 19* 13*   * 241   IRONSAT 10* 5*   TERRANCE 597*  --        IMAGING:  All imaging studies reviewed by me.

## 2021-11-07 NOTE — PLAN OF CARE
DISCHARGE:  Patient with orders to discharge to home.     Education Provided:   Med Card - updated and given to patient  Lab Book - updated by patient's wife  Handouts - drain care and warfarin education   Specialty Pharmacy - Rx filled and picked up by patient  LDAs - PIV x 2 removed    Discharge instructions, medications & follow ups reviewed with patient and spouse. Copy of discharge summary given to patient. PIV x 2 removed.    Patient in stable condition. AVSS. Patient had no further questions regarding discharge instructions and medications. Patient transferred out by wheelchair & left with spouse.  Plan for follow up in clinic.

## 2021-11-07 NOTE — CONSULTS
Coumadin and Drain Education with the [facility]      PLC met patient and spouse at bedside for Coumadin and drain class. All teachback questions answered. Reviewed s/s bleeding and clotting and signs of a stroke. Spouse, who is a Physical Therapist verbalizes confidence in emptying norah drain and changing bandage every other day.      Literature given: Guide to Warfarin Therapy, Warfarin Therapy Calendar  Handwashing and Skin Care, Drainage Tube Home Care Instructions, Flushing Your Drain with Saline.

## 2021-11-08 ENCOUNTER — LAB (OUTPATIENT)
Dept: LAB | Facility: CLINIC | Age: 51
End: 2021-11-08
Payer: COMMERCIAL

## 2021-11-08 ENCOUNTER — TELEPHONE (OUTPATIENT)
Dept: TRANSPLANT | Facility: CLINIC | Age: 51
End: 2021-11-08

## 2021-11-08 DIAGNOSIS — Z79.899 ENCOUNTER FOR LONG-TERM CURRENT USE OF MEDICATION: ICD-10-CM

## 2021-11-08 DIAGNOSIS — Z48.298 AFTERCARE FOLLOWING ORGAN TRANSPLANT: ICD-10-CM

## 2021-11-08 DIAGNOSIS — Z94.0 KIDNEY REPLACED BY TRANSPLANT: Primary | ICD-10-CM

## 2021-11-08 DIAGNOSIS — Z94.0 KIDNEY REPLACED BY TRANSPLANT: ICD-10-CM

## 2021-11-08 PROCEDURE — 80197 ASSAY OF TACROLIMUS: CPT

## 2021-11-08 NOTE — PLAN OF CARE
Physical Therapy Discharge Summary    Reason for therapy discharge:    All goals and outcomes met, no further needs identified.    Progress towards therapy goal(s). See goals on Care Plan in [facility] electronic health record for goal details.  Goals met    Therapy recommendation(s):    No further therapy is recommended.  Continue home exercise program.

## 2021-11-08 NOTE — TELEPHONE ENCOUNTER
Call pts wife back  In his discharge paperwork they want him to be seen with Karena Roy and talking with Dr Espinoza he does not have to be sen this week  Needs to clarify   And she has other questions to review with Deborah

## 2021-11-09 ENCOUNTER — TELEPHONE (OUTPATIENT)
Dept: TRANSPLANT | Facility: CLINIC | Age: 51
End: 2021-11-09
Payer: COMMERCIAL

## 2021-11-09 ENCOUNTER — DOCUMENTATION ONLY (OUTPATIENT)
Dept: TRANSPLANT | Facility: CLINIC | Age: 51
End: 2021-11-09

## 2021-11-09 LAB
PATH REPORT.COMMENTS IMP SPEC: NORMAL
PATH REPORT.COMMENTS IMP SPEC: NORMAL
PATH REPORT.FINAL DX SPEC: NORMAL
PATH REPORT.GROSS SPEC: NORMAL
PHOTO IMAGE: NORMAL
TACROLIMUS BLD-MCNC: 4.8 UG/L (ref 5–15)
TME LAST DOSE: ABNORMAL H
TME LAST DOSE: ABNORMAL H

## 2021-11-09 NOTE — TELEPHONE ENCOUNTER
Patient's wife had questions about follow up appt.   Scheduled for follow up w/ Dr Warren on 11/16  Requested to schedule appt w/ txp nephrologist on Wed/Thurs    Patient's wife to call [employer] help desk to try and help her get his [employer] account active prior to that appt, she will notify [facility] if not possible.    Patient's wife had questions about JJ stent, note from inpatient [facility] states stent was removed on 11/4. Op note not signed yet. Patient is no longer scheduled for cystoscopy.

## 2021-11-09 NOTE — PROGRESS NOTES
Received notification of failed kidney transplant from [employer] [facility] [employer] team.  Fail is indicated by transplant nephrectomy.  Date of organ failure was reported as 10/27/2021.  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  TIS verfication is complete

## 2021-11-10 NOTE — TELEPHONE ENCOUNTER
Patient scheduled for clinic visit 11/11 @ 415. Timing confirmed w/ patient and patient's wife. She was able to get into [employer] account yesterday, so should be all set for video visit.

## 2021-11-11 ENCOUNTER — VIRTUAL VISIT (OUTPATIENT)
Dept: NEPHROLOGY | Facility: CLINIC | Age: 51
End: 2021-11-11
Attending: INTERNAL MEDICINE
Payer: COMMERCIAL

## 2021-11-11 ENCOUNTER — LAB (OUTPATIENT)
Dept: LAB | Facility: CLINIC | Age: 51
End: 2021-11-11
Attending: INTERNAL MEDICINE
Payer: COMMERCIAL

## 2021-11-11 ENCOUNTER — TELEPHONE (OUTPATIENT)
Dept: TRANSPLANT | Facility: CLINIC | Age: 51
End: 2021-11-11

## 2021-11-11 DIAGNOSIS — T86.19 RENAL VEIN THROMBOSIS OF KIDNEY TRANSPLANT (H): ICD-10-CM

## 2021-11-11 DIAGNOSIS — Z48.298 AFTERCARE FOLLOWING ORGAN TRANSPLANT: ICD-10-CM

## 2021-11-11 DIAGNOSIS — I15.1 HTN, KIDNEY TRANSPLANT RELATED: ICD-10-CM

## 2021-11-11 DIAGNOSIS — Z94.0 KIDNEY TRANSPLANT RECIPIENT: ICD-10-CM

## 2021-11-11 DIAGNOSIS — D84.9 IMMUNOSUPPRESSION (H): ICD-10-CM

## 2021-11-11 DIAGNOSIS — Z94.0 KIDNEY REPLACED BY TRANSPLANT: Primary | ICD-10-CM

## 2021-11-11 DIAGNOSIS — Z79.899 ENCOUNTER FOR LONG-TERM CURRENT USE OF MEDICATION: ICD-10-CM

## 2021-11-11 DIAGNOSIS — Z94.0 KIDNEY REPLACED BY TRANSPLANT: ICD-10-CM

## 2021-11-11 DIAGNOSIS — I82.3 RENAL VEIN THROMBOSIS OF KIDNEY TRANSPLANT (H): ICD-10-CM

## 2021-11-11 DIAGNOSIS — Z94.0 HTN, KIDNEY TRANSPLANT RELATED: ICD-10-CM

## 2021-11-11 PROCEDURE — 80197 ASSAY OF TACROLIMUS: CPT

## 2021-11-11 PROCEDURE — 99024 POSTOP FOLLOW-UP VISIT: CPT | Mod: 95

## 2021-11-11 RX ORDER — SODIUM BICARBONATE 650 MG/1
1300 TABLET ORAL 3 TIMES DAILY
Qty: 120 TABLET | Refills: 2 | Status: ON HOLD | OUTPATIENT
Start: 2021-11-11 | End: 2022-12-08

## 2021-11-11 ASSESSMENT — PAIN SCALES - GENERAL: PAINLEVEL: MILD PAIN (3)

## 2021-11-11 NOTE — PATIENT INSTRUCTIONS
Stop magnesium oxide   Increase sodium bicarbonate to 1300mg three times daily  Twice weekly labs  See general nephrologist within the next 1 month

## 2021-11-11 NOTE — PROGRESS NOTES
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    - Proteinuria: Moderate (1-3 grams)   - Date DSA Last Checked: Oct/2021      Latest DSA: No, will have to recheck in 2 months due to allograft nephrectomy of 2nd txp   - BK Viremia: No. Trend monthly due to rATG treatment   - Kidney Tx Biopsy: Nov 04, 2021; Result:  transplant nephrectomy of 2nd txp showed significant intersitial hemorrhage concerning for severe acute vascular rejection    # CKD Management: Patient is nearing need for renal replacement therapy with CKD Stage 5.   - Renal Replacement Therapy Discussion Completed by Provider: Yes   - Transplant Referral: Yes, will get his time back and work on listing again in coming months   - CKD Dialysis Journey Referral: No   - CKD Management Provider: raza    - Dialysis Modality Preference: In Center Hemodialysis   - H/O Previous Dialysis: In Center Hemodialysis   - Contraindications for Any Dialysis Modality: No   - Has Working Dialysis Access: No, unfortunately clotted during hospitalization   - Recommended Timing for Dialysis Access: Would ideally like to try to transplant again soon so would hold off on AVF placement      # Immunosuppression: Tacrolimus immediate release (goal 4-6) and Mycophenolate mofetil (dose 750 mg every 12 hours)   - Induction with Recent Transplant:  High Intensity   - Continue with intensive monitoring of immunosuppression for efficacy and toxicity.   - Changes: Not at this time. Back on baseline immunosuppression from 1st txp    # Infection Prophylaxis:   - PJP: Inhaled pentamidine, check G6PD. At 6m post txp check T cell subset  - CMV: Valganciclovir (Valcyte) x6 months for CMV +/- transplant despite nephrectomy. 2x/wk     # Hypertension: Controlled;  Goal BP: < 130/80   - Volume status: Euvolemic     - Changes: Not at this time. Continue lasix 20mg daily    #  Anemia in Chronic Renal Disease: Hgb: Trend up      NADIYA: No, would not give 2/2 recent renal venous thrombosis   - Iron studies: Replete.    -Received 2U PRBC 10/27, 1U 10/28, 1U 11/1.     # Renal vein thrombosis:   -S/p thrombectomy of iliac vein, reimplantation of kidney transplant, and then subsequent transplant nephrectomy. On warfarin goal INR 2-2.5 for 1 month, thru 12/7/21   -Will require peritransplant anticoagulation for next txp    # Mineral Bone Disorder:   - Secondary renal hyperparathyroidism; PTH level: Minimally elevated ( pg/ml)        On treatment: None  - Vitamin D; level: Normal        On supplement: Yes  - Calcium; level: Normal        On supplement: Yes  - Phosphorus; level: Normal        On supplement: No    # Electrolytes:   - Potassium; level: High        On supplement: No  - Magnesium; level: Normal        On supplement: Yes, stop   - Bicarbonate; level: Low normal        On supplement: Yes, increase to 1300mg tid     # Surgical site infection:    -On keflex thru 11/14    # Medical Compliance: Yes    # COVID-19 Virus Review: Discussed COVID-19 virus and the potential medical risks.  Reviewed preventative health recommendations, including wearing a mask where appropriate.  Recommended COVID vaccination should be up to date with either an initial vaccination or booster shot when appropriate.  Asked the patient to inform the transplant center if they are exposed or diagnosed with this virus.    # COVID Vaccination Up To Date: Yes    # Transplant History:  Etiology of Kidney Failure: Polycystic kidney disease (PKD)  Tx: DDKT  Transplant: 10/13/2015 (Kidney), 10/23/2021 (Kidney)  Donor Type: Donation after Brain Death Donor Class:   Crossmatch at time of Tx: negative  DSA at time of Tx: No  Significant changes in immunosuppression: None  CMV IgG Ab High Risk Discordance (D+/R-): Yes  EBV IgG Ab High Risk Discordance (D+/R-): No  Significant transplant-related complications: clotted renal  vein, transplant nephrectomy of 2nd allograft    Transplant Office Phone Number: [phone]    Assessment and plan was discussed with the patient and he voiced his understanding and agreement.    Return visit: Return in about 3 months (around 2/11/2022).    Georges Quach MD    Chief Complaint   Mr. Pierce is a 51 year old here for kidney transplant, immunosuppression management and hospital follow up after kidney transplant.     History of Present Illness   The patient is tired but otherwise ok. He denies N/V/D, fever, chills, SOB, chest pain, LE edema. Pain is 4/10, improved with oxycodone.     Home BP: 110s systolic today, 130 usually    Problem List   Patient Active Problem List   Diagnosis     Polycystic kidney, autosomal dominant     Dyslipidemia     Vitamin D deficiency     Hypertension secondary to other renal disorders     Secondary renal hyperparathyroidism (HCC)     Kidney replaced by transplant     Immunosuppressed status (H)     Acute gouty arthritis     Anemia of chronic renal failure     Hypomagnesemia     Aftercare following organ transplant     Kidney transplant candidate     CKD (chronic kidney disease) stage 4, GFR 15-29 ml/min (H)     Kidney transplant recipient     Renal vein thrombosis (H)     Renal vein thrombosis of kidney transplant (H)     S/p nephrectomy     Moderate malnutrition (H)     Chronic kidney disease-mineral and bone disorder     Low bicarbonate     Leukocytosis     Surgical site infection     Hyperkalemia       Allergies   No Known Allergies    Medications   Current Outpatient Medications   Medication Sig     acetaminophen (TYLENOL) 325 MG tablet Take 2 tablets (650 mg) by mouth every 4 hours as needed for other (For optimal non-opioid multimodal pain management to improve pain control.)     atorvastatin (LIPITOR) 10 MG tablet Take 10 mg by mouth daily     calcium carbonate-vitamin D (OS-LILIANA WITH D) 500-200 MG-UNIT tablet Take 1 tablet by mouth 2 times daily     cephALEXin  (KEFLEX) 500 MG capsule Take 1 capsule (500 mg) by mouth every 12 hours for 7 days     citalopram (CELEXA) 20 MG tablet Take 20 mg by mouth daily.     furosemide (LASIX) 20 MG tablet Take 1 tablet (20 mg) by mouth daily     Lidocaine (LIDOCARE) 4 % Patch Place 1 patch onto the skin every 24 hours To prevent lidocaine toxicity, patient should be patch free for 12 hrs daily.     mycophenolate (GENERIC EQUIVALENT) 250 MG capsule Take 3 capsules (750 mg) by mouth 2 times daily     ondansetron (ZOFRAN-ODT) 4 MG ODT tab Take 1 tablet (4 mg) by mouth every 6 hours as needed for nausea or vomiting     oxyCODONE (ROXICODONE) 5 MG tablet Take 1 tablet (5 mg) by mouth every 6 hours as needed for moderate to severe pain     polyethylene glycol (MIRALAX) 17 GM/Dose powder Take 17 g by mouth daily     senna-docusate (SENOKOT-S/PERICOLACE) 8.6-50 MG tablet Take 1 tablet by mouth 2 times daily     sodium bicarbonate 650 MG tablet Take 2 tablets (1,300 mg) by mouth 2 times daily     tacrolimus (GENERIC EQUIVALENT) 0.5 MG capsule Take 1 capsule (0.5 mg) by mouth 2 times daily     tacrolimus (GENERIC EQUIVALENT) 1 MG capsule Take 4 capsules (4 mg) by mouth 2 times daily To have available for dose adjustments. Discharge dose will be 0.5 mg BID.     valGANciclovir (VALCYTE) 450 MG tablet Take 1 tablet (450 mg) by mouth twice a week     warfarin ANTICOAGULANT (COUMADIN) 1 MG tablet Take 1 tablet (1 mg) by mouth daily Total dose = 6 mg daily     warfarin ANTICOAGULANT (COUMADIN) 5 MG tablet Take 1 tablet (5 mg) by mouth daily Total dose = 6 mg daily     No current facility-administered medications for this visit.     Medications Discontinued During This Encounter   Medication Reason     Magnesium Oxide 250 MG TABS        Physical Exam   Vital Signs: There were no vitals taken for this visit.    GENERAL APPEARANCE: alert and no distress  HENT: no obvious abnormalities on appearance  RESP: breathing appears unremarkable with normal rate, no  audible wheezing or cough and no apparent shortness of breath with conversation  MS: extremities normal - no gross deformities noted  SKIN: no apparent rash and normal skin tone  NEURO: speech is clear with no obvious neurological deficits  PSYCH: mentation appears normal and affect normal    Data     Renal Latest Ref Rng & Units 11/8/2021 11/7/2021 11/7/2021   Na 133 - 144 mmol/L - - -   Na (external) 136 - 145 meq/L 139 - -   K 3.4 - 5.3 mmol/L - 5.5(H) 5.3   K (external) 3.5 - 5.1 meq/L 5.5(H) - -   Cl 94 - 109 mmol/L - - -   Cl (external) 98 - 109 meq/L 105 - -   CO2 20 - 32 mmol/L - - -   CO2 (external) 20 - 29 meq/L 20 - -   BUN 7 - 30 mg/dL - - -   BUN (external) 6 - 22 mg/dL 82(H) - -   Cr 0.66 - 1.25 mg/dL - - -   Cr (external) 0.70 - 1.30 mg/dL 5.11(H) - -   Glucose 70 - 99 mg/dL - - -   Glucose (external) 70 - 99 mg/dL 110(H) - -   Ca  8.5 - 10.1 mg/dL - - -   Ca (external) 8.5 - 10.5 mg/dL 10.0 - -   Mg 1.6 - 2.3 mg/dL - - 2.4(H)   Mg (external) 1.6 - 2.6 mg/dL 2.1 - -     Bone Health Latest Ref Rng & Units 11/8/2021 11/7/2021 11/6/2021   Phos 2.5 - 4.5 mg/dL - 4.8(H) 5.7(H)   Phos (external) 2.3 - 4.7 mg/dL 4.7 - -   PTHi 18 - 80 pg/mL - - -   PTHi (external) 11.0 - 101.0 pg/mL - - -   Vit D Def 20 - 75 ug/L - - -   Vit D Def (external) 30 - 80 ng/mL - - -     Heme Latest Ref Rng & Units 11/8/2021 11/7/2021 11/6/2021   WBC 4.0 - 11.0 10e3/uL - 11.8(H) 12.8(H)   WBC (external) 4.0 - 11.0 K/uL 11.1(H) - -   Hgb 13.3 - 17.7 g/dL - 7.3(L) 7.3(L)   Hgb (external) 13.5 - 17.5 g/dL 8.5(L) - -   Plt 150 - 450 10e3/uL - 507(H) 491(H)   Plt (external) 140 - 400 K/uL 661(H) - -   ABSOLUTE NEUTROPHIL 1.6 - 8.3 10e3/uL - - -   ABSOLUTE NEUTROPHILS (EXTERNAL) 1.6 - 8.1 10(3)/uL - - -   ABSOLUTE LYMPHOCYTES 0.8 - 5.3 10e3/uL - - -   ABSOLUTE LYMPHOCYTES (EXTERNAL) 0.9 - 3.5 10(3)/uL - - -   ABSOLUTE MONOCYTES 0.0 - 1.3 10e3/uL - - -   ABSOLUTE MONOCYTES (EXTERNAL) 0.0 - 1.1 10(3)/uL - - -   ABSOLUTE EOSINOPHILS 0.0 -  0.7 10e3/uL - - -   ABSOLUTE EOSINOPHILS (EXTERNAL) 0.0 - 0.8 10(3)/uL - - -   ABSOLUTE BASOPHILS 0.0 - 0.2 10e9/L - - -   ABSOLUTE BASOPHILS (EXTERNAL) 0.0 - 0.2 10(3)/uL - - -   ABS IMMATURE GRANULOCYTES 0 - 0.4 10e9/L - - -     Liver Latest Ref Rng & Units 11/1/2021 10/23/2021 10/11/2021   AP 40 - 150 U/L - 56 -   AP (external) 38 - 126 U/L - - -   TBili 0.2 - 1.3 mg/dL - 0.6 -   TBili (external) 0.2 - 1.3 mg/dL - - -   DBili (external) 0.0 - 0.4 mg/dL - - -   ALT 0 - 70 U/L - 24 -   ALT (external) 21 - 72 U/L - - -   AST 0 - 45 U/L - 23 -   AST (external) 17 - 59 U/L - - -   Tot Protein 6.8 - 8.8 g/dL - 6.5(L) -   Tot Protein (external) 6.3 - 8.2 g/dL - - -   Albumin 3.4 - 5.0 g/dL 2.0(L) 3.6 -   Albumin (external) 3.5 - 5.0 g/dL - - 4.2     Pancreas Latest Ref Rng & Units 10/23/2021 10/14/2015 10/13/2015   A1C 0.0 - 5.6 % 5.6 4.8 4.8     Iron studies Latest Ref Rng & Units 10/27/2021 3/3/2021 10/17/2015   Iron 35 - 180 ug/dL 19(L) - 13(L)   Iron sat 15 - 46 % 10(L) - 5(L)   Ferritin 26 - 388 ng/mL 597(H) - -   Ferritin (external) 18.0 - 464.0 ng/mL - 383.3 -     [facility] Txp Virology Latest Ref Rng & Units 10/23/2021 7/24/2020 5/14/2020   CVM DNA Quant - - - -   CMV QUANT IU/ML Not Detected IU/mL Not Detected - -   LOG IU/ML OF CMVQNT <2.1 [Log:IU]/mL - - -   BK Spec - - - -   BK Res BKNEG copies/mL - - -   BK Log <2.7 Log copies/mL - - -   BK Quant Result Ext None Detected - None Detected None Detected   BK Quant Spec Ext - - Blood -   EBV VCA IGG ANTIBODY U/mL - - -   EBV CAPSID ANTIBODY IGG No detectable antibody. Positive(A) - -   EBV DNA COPIES/ML EBVNEG [Copies]/mL - - -   EBV DNA LOG OF COPIES <2.7 [Log:copies]/mL - - -   Hep B Core NR - - -   Hep B Core Ext Nonreactive - - -   Hep B Surf - - - -   Hep B Surf Ext  - - - -   HIV 1&2 NEG - - -        Recent Labs   Lab Test 10/11/21  0800 10/25/21  0505 10/27/21  0621 10/29/21  0604 11/06/21  0601 11/07/21  0629 11/08/21  0740   DOSTAC 10/10/2021  --  10/26/2021   --   --   --  11/7/2021   TACROL 3.3*   < > 4.2*   < > 8.6 5.7 4.8*    < > = values in this interval not displayed.     Recent Labs   Lab Test 10/19/15  0707 10/26/15  0707 11/09/15  0758   DOSMPA 2,000 Not Provided 11/8/15 AT 2030   MPACID 1.27 4.01* 0.45*   MPAG >200.0* 190.0* 20.5*

## 2021-11-11 NOTE — LETTER
11/11/2021     RE: Raj Pierce  [address]  [place] [address]     Dear Colleague,    Thank you for referring your patient, Raj Pierce, to the [facility] [place] at [facility]. Please see a copy of my visit note below.    Raj is a 51 year old who is being evaluated via a billable video visit.      ** patient can do Doximity **    How would you like to obtain your AVS? MyChart  If the video visit is dropped, the invitation should be resent by: Text to cell phone: [phone]  Will anyone else be joining your video visit? No    Video Start Time: 2:34 PM  Video-Visit Details    Type of service:  Video Visit    Video End Time:3:02 PM    Originating Location (pt. Location): Home    Distant Location (provider location):  [facility] [place]     Platform used for Video Visit: Yeapoo      TRANSPLANT NEPHROLOGY EARLY POST TRANSPLANT VISIT    Assessment & Plan   # DDKT: Trend down to 4.64 today   - Baseline Creatinine: ~ 4-4.5 prior to recent transplant.    - Proteinuria: Moderate (1-3 grams)   - Date DSA Last Checked: Oct/2021      Latest DSA: No, will have to recheck in 2 months due to allograft nephrectomy of 2nd txp   - BK Viremia: No. Trend monthly due to rATG treatment   - Kidney Tx Biopsy: Nov 04, 2021; Result:  transplant nephrectomy of 2nd txp showed significant intersitial hemorrhage concerning for severe acute vascular rejection    # CKD Management: Patient is nearing need for renal replacement therapy with CKD Stage 5.   - Renal Replacement Therapy Discussion Completed by Provider: Yes   - Transplant Referral: Yes, will get his time back and work on listing again in coming months   - CKD Dialysis Journey Referral: No   - CKD Management Provider: [facility]    - Dialysis Modality Preference: In Center Hemodialysis   - H/O Previous Dialysis: In Center Hemodialysis   - Contraindications  for Any Dialysis Modality: No   - Has Working Dialysis Access: No, unfortunately clotted during hospitalization   - Recommended Timing for Dialysis Access: Would ideally like to try to transplant again soon so would hold off on AVF placement      # Immunosuppression: Tacrolimus immediate release (goal 4-6) and Mycophenolate mofetil (dose 750 mg every 12 hours)   - Induction with Recent Transplant:  High Intensity   - Continue with intensive monitoring of immunosuppression for efficacy and toxicity.   - Changes: Not at this time. Back on baseline immunosuppression from 1st txp    # Infection Prophylaxis:   - PJP: Inhaled pentamidine, check G6PD. At 6m post txp check T cell subset  - CMV: Valganciclovir (Valcyte) x6 months for CMV +/- transplant despite nephrectomy. 2x/wk     # Hypertension: Controlled;  Goal BP: < 130/80   - Volume status: Euvolemic     - Changes: Not at this time. Continue lasix 20mg daily    # Anemia in Chronic Renal Disease: Hgb: Trend up      NADIYA: No, would not give 2/2 recent renal venous thrombosis   - Iron studies: Replete.    -Received 2U PRBC 10/27, 1U 10/28, 1U 11/1.     # Renal vein thrombosis:   -S/p thrombectomy of iliac vein, reimplantation of kidney transplant, and then subsequent transplant nephrectomy. On warfarin goal INR 2-2.5 for 1 month, thru 12/7/21   -Will require peritransplant anticoagulation for next txp    # Mineral Bone Disorder:   - Secondary renal hyperparathyroidism; PTH level: Minimally elevated ( pg/ml)        On treatment: None  - Vitamin D; level: Normal        On supplement: Yes  - Calcium; level: Normal        On supplement: Yes  - Phosphorus; level: Normal        On supplement: No    # Electrolytes:   - Potassium; level: High        On supplement: No  - Magnesium; level: Normal        On supplement: Yes, stop   - Bicarbonate; level: Low normal        On supplement: Yes, increase to 1300mg tid     # Surgical site infection:    -On keflex thru 11/14    #  Medical Compliance: Yes    # COVID-19 Virus Review: Discussed COVID-19 virus and the potential medical risks.  Reviewed preventative health recommendations, including wearing a mask where appropriate.  Recommended COVID vaccination should be up to date with either an initial vaccination or booster shot when appropriate.  Asked the patient to inform the transplant center if they are exposed or diagnosed with this virus.    # COVID Vaccination Up To Date: Yes    # Transplant History:  Etiology of Kidney Failure: Polycystic kidney disease (PKD)  Tx: DDKT  Transplant: 10/13/2015 (Kidney), 10/23/2021 (Kidney)  Donor Type: Donation after Brain Death Donor Class:   Crossmatch at time of Tx: negative  DSA at time of Tx: No  Significant changes in immunosuppression: None  CMV IgG Ab High Risk Discordance (D+/R-): Yes  EBV IgG Ab High Risk Discordance (D+/R-): No  Significant transplant-related complications: clotted renal vein, transplant nephrectomy of 2nd allograft    Transplant Office Phone Number: [phone]    Assessment and plan was discussed with the patient and he voiced his understanding and agreement.    Return visit: Return in about 3 months (around 2/11/2022).    Georges Quach MD    Chief Complaint   Mr. Pierce is a 51 year old here for kidney transplant, immunosuppression management and hospital follow up after kidney transplant.     History of Present Illness   The patient is tired but otherwise ok. He denies N/V/D, fever, chills, SOB, chest pain, LE edema. Pain is 4/10, improved with oxycodone.     Home BP: 110s systolic today, 130 usually    Problem List   Patient Active Problem List   Diagnosis     Polycystic kidney, autosomal dominant     Dyslipidemia     Vitamin D deficiency     Hypertension secondary to other renal disorders     Secondary renal hyperparathyroidism (HCC)     Kidney replaced by transplant     Immunosuppressed status (H)     Acute gouty arthritis     Anemia of chronic renal failure      Hypomagnesemia     Aftercare following organ transplant     Kidney transplant candidate     CKD (chronic kidney disease) stage 4, GFR 15-29 ml/min (H)     Kidney transplant recipient     Renal vein thrombosis (H)     Renal vein thrombosis of kidney transplant (H)     S/p nephrectomy     Moderate malnutrition (H)     Chronic kidney disease-mineral and bone disorder     Low bicarbonate     Leukocytosis     Surgical site infection     Hyperkalemia       Allergies   No Known Allergies    Medications   Current Outpatient Medications   Medication Sig     acetaminophen (TYLENOL) 325 MG tablet Take 2 tablets (650 mg) by mouth every 4 hours as needed for other (For optimal non-opioid multimodal pain management to improve pain control.)     atorvastatin (LIPITOR) 10 MG tablet Take 10 mg by mouth daily     calcium carbonate-vitamin D (OS-LILIANA WITH D) 500-200 MG-UNIT tablet Take 1 tablet by mouth 2 times daily     cephALEXin (KEFLEX) 500 MG capsule Take 1 capsule (500 mg) by mouth every 12 hours for 7 days     citalopram (CELEXA) 20 MG tablet Take 20 mg by mouth daily.     furosemide (LASIX) 20 MG tablet Take 1 tablet (20 mg) by mouth daily     Lidocaine (LIDOCARE) 4 % Patch Place 1 patch onto the skin every 24 hours To prevent lidocaine toxicity, patient should be patch free for 12 hrs daily.     mycophenolate (GENERIC EQUIVALENT) 250 MG capsule Take 3 capsules (750 mg) by mouth 2 times daily     ondansetron (ZOFRAN-ODT) 4 MG ODT tab Take 1 tablet (4 mg) by mouth every 6 hours as needed for nausea or vomiting     oxyCODONE (ROXICODONE) 5 MG tablet Take 1 tablet (5 mg) by mouth every 6 hours as needed for moderate to severe pain     polyethylene glycol (MIRALAX) 17 GM/Dose powder Take 17 g by mouth daily     senna-docusate (SENOKOT-S/PERICOLACE) 8.6-50 MG tablet Take 1 tablet by mouth 2 times daily     sodium bicarbonate 650 MG tablet Take 2 tablets (1,300 mg) by mouth 2 times daily     tacrolimus (GENERIC EQUIVALENT) 0.5 MG  capsule Take 1 capsule (0.5 mg) by mouth 2 times daily     tacrolimus (GENERIC EQUIVALENT) 1 MG capsule Take 4 capsules (4 mg) by mouth 2 times daily To have available for dose adjustments. Discharge dose will be 0.5 mg BID.     valGANciclovir (VALCYTE) 450 MG tablet Take 1 tablet (450 mg) by mouth twice a week     warfarin ANTICOAGULANT (COUMADIN) 1 MG tablet Take 1 tablet (1 mg) by mouth daily Total dose = 6 mg daily     warfarin ANTICOAGULANT (COUMADIN) 5 MG tablet Take 1 tablet (5 mg) by mouth daily Total dose = 6 mg daily     No current facility-administered medications for this visit.     Medications Discontinued During This Encounter   Medication Reason     Magnesium Oxide 250 MG TABS        Physical Exam   Vital Signs: There were no vitals taken for this visit.    GENERAL APPEARANCE: alert and no distress  HENT: no obvious abnormalities on appearance  RESP: breathing appears unremarkable with normal rate, no audible wheezing or cough and no apparent shortness of breath with conversation  MS: extremities normal - no gross deformities noted  SKIN: no apparent rash and normal skin tone  NEURO: speech is clear with no obvious neurological deficits  PSYCH: mentation appears normal and affect normal    Data     Renal Latest Ref Rng & Units 11/8/2021 11/7/2021 11/7/2021   Na 133 - 144 mmol/L - - -   Na (external) 136 - 145 meq/L 139 - -   K 3.4 - 5.3 mmol/L - 5.5(H) 5.3   K (external) 3.5 - 5.1 meq/L 5.5(H) - -   Cl 94 - 109 mmol/L - - -   Cl (external) 98 - 109 meq/L 105 - -   CO2 20 - 32 mmol/L - - -   CO2 (external) 20 - 29 meq/L 20 - -   BUN 7 - 30 mg/dL - - -   BUN (external) 6 - 22 mg/dL 82(H) - -   Cr 0.66 - 1.25 mg/dL - - -   Cr (external) 0.70 - 1.30 mg/dL 5.11(H) - -   Glucose 70 - 99 mg/dL - - -   Glucose (external) 70 - 99 mg/dL 110(H) - -   Ca  8.5 - 10.1 mg/dL - - -   Ca (external) 8.5 - 10.5 mg/dL 10.0 - -   Mg 1.6 - 2.3 mg/dL - - 2.4(H)   Mg (external) 1.6 - 2.6 mg/dL 2.1 - -     Bone Health Latest  Ref Rng & Units 11/8/2021 11/7/2021 11/6/2021   Phos 2.5 - 4.5 mg/dL - 4.8(H) 5.7(H)   Phos (external) 2.3 - 4.7 mg/dL 4.7 - -   PTHi 18 - 80 pg/mL - - -   PTHi (external) 11.0 - 101.0 pg/mL - - -   Vit D Def 20 - 75 ug/L - - -   Vit D Def (external) 30 - 80 ng/mL - - -     Heme Latest Ref Rng & Units 11/8/2021 11/7/2021 11/6/2021   WBC 4.0 - 11.0 10e3/uL - 11.8(H) 12.8(H)   WBC (external) 4.0 - 11.0 K/uL 11.1(H) - -   Hgb 13.3 - 17.7 g/dL - 7.3(L) 7.3(L)   Hgb (external) 13.5 - 17.5 g/dL 8.5(L) - -   Plt 150 - 450 10e3/uL - 507(H) 491(H)   Plt (external) 140 - 400 K/uL 661(H) - -   ABSOLUTE NEUTROPHIL 1.6 - 8.3 10e3/uL - - -   ABSOLUTE NEUTROPHILS (EXTERNAL) 1.6 - 8.1 10(3)/uL - - -   ABSOLUTE LYMPHOCYTES 0.8 - 5.3 10e3/uL - - -   ABSOLUTE LYMPHOCYTES (EXTERNAL) 0.9 - 3.5 10(3)/uL - - -   ABSOLUTE MONOCYTES 0.0 - 1.3 10e3/uL - - -   ABSOLUTE MONOCYTES (EXTERNAL) 0.0 - 1.1 10(3)/uL - - -   ABSOLUTE EOSINOPHILS 0.0 - 0.7 10e3/uL - - -   ABSOLUTE EOSINOPHILS (EXTERNAL) 0.0 - 0.8 10(3)/uL - - -   ABSOLUTE BASOPHILS 0.0 - 0.2 10e9/L - - -   ABSOLUTE BASOPHILS (EXTERNAL) 0.0 - 0.2 10(3)/uL - - -   ABS IMMATURE GRANULOCYTES 0 - 0.4 10e9/L - - -     Liver Latest Ref Rng & Units 11/1/2021 10/23/2021 10/11/2021   AP 40 - 150 U/L - 56 -   AP (external) 38 - 126 U/L - - -   TBili 0.2 - 1.3 mg/dL - 0.6 -   TBili (external) 0.2 - 1.3 mg/dL - - -   DBili (external) 0.0 - 0.4 mg/dL - - -   ALT 0 - 70 U/L - 24 -   ALT (external) 21 - 72 U/L - - -   AST 0 - 45 U/L - 23 -   AST (external) 17 - 59 U/L - - -   Tot Protein 6.8 - 8.8 g/dL - 6.5(L) -   Tot Protein (external) 6.3 - 8.2 g/dL - - -   Albumin 3.4 - 5.0 g/dL 2.0(L) 3.6 -   Albumin (external) 3.5 - 5.0 g/dL - - 4.2     Pancreas Latest Ref Rng & Units 10/23/2021 10/14/2015 10/13/2015   A1C 0.0 - 5.6 % 5.6 4.8 4.8     Iron studies Latest Ref Rng & Units 10/27/2021 3/3/2021 10/17/2015   Iron 35 - 180 ug/dL 19(L) - 13(L)   Iron sat 15 - 46 % 10(L) - 5(L)   Ferritin 26 - 388 ng/mL  597(H) - -   Ferritin (external) 18.0 - 464.0 ng/mL - 383.3 -     [facility] Txp Virology Latest Ref Rng & Units 10/23/2021 7/24/2020 5/14/2020   CVM DNA Quant - - - -   CMV QUANT IU/ML Not Detected IU/mL Not Detected - -   LOG IU/ML OF CMVQNT <2.1 [Log:IU]/mL - - -   BK Spec - - - -   BK Res BKNEG copies/mL - - -   BK Log <2.7 Log copies/mL - - -   BK Quant Result Ext None Detected - None Detected None Detected   BK Quant Spec Ext - - Blood -   EBV VCA IGG ANTIBODY U/mL - - -   EBV CAPSID ANTIBODY IGG No detectable antibody. Positive(A) - -   EBV DNA COPIES/ML EBVNEG [Copies]/mL - - -   EBV DNA LOG OF COPIES <2.7 [Log:copies]/mL - - -   Hep B Core NR - - -   Hep B Core Ext Nonreactive - - -   Hep B Surf - - - -   Hep B Surf Ext  - - - -   HIV 1&2 NEG - - -        Recent Labs   Lab Test 10/11/21  0800 10/25/21  0505 10/27/21  0621 10/29/21  0604 11/06/21  0601 11/07/21  0629 11/08/21  0740   DOSTAC 10/10/2021  --  10/26/2021  --   --   --  11/7/2021   TACROL 3.3*   < > 4.2*   < > 8.6 5.7 4.8*    < > = values in this interval not displayed.     Recent Labs   Lab Test 10/19/15  0707 10/26/15  0707 11/09/15  0758   DOSMPA 2,000 Not Provided 11/8/15 AT 2030   MPACID 1.27 4.01* 0.45*   MPAG >200.0* 190.0* 20.5*     Again, thank you for allowing me to participate in the care of your patient.      Sincerely,     [facility]PL[facility]

## 2021-11-11 NOTE — PROGRESS NOTES
Raj is a 51 year old who is being evaluated via a billable video visit.      ** patient can do Doximity **    How would you like to obtain your AVS? MyChart  If the video visit is dropped, the invitation should be resent by: Text to cell phone: [phone]  Will anyone else be joining your video visit? No    Video Start Time: 2:34 PM  Video-Visit Details    Type of service:  Video Visit    Video End Time:3:02 PM    Originating Location (pt. Location): Home    Distant Location (provider location):  [facility] NEPH[facility]     Platform used for Video Visit: BotScanner

## 2021-11-11 NOTE — TELEPHONE ENCOUNTER
Thanksgiving week they will do labs Mon/Fri- lab closed on Thurs  Drug mailers will be sent to home address

## 2021-11-12 ENCOUNTER — VIRTUAL VISIT (OUTPATIENT)
Dept: PHARMACY | Facility: CLINIC | Age: 51
End: 2021-11-12

## 2021-11-12 DIAGNOSIS — N28.0 RENAL ARTERIAL THROMBOSIS (H): ICD-10-CM

## 2021-11-12 DIAGNOSIS — G89.18 ACUTE POST-OPERATIVE PAIN: ICD-10-CM

## 2021-11-12 DIAGNOSIS — Z94.0 KIDNEY TRANSPLANT RECIPIENT: Primary | ICD-10-CM

## 2021-11-12 PROCEDURE — 99605 MTMS BY PHARM NP 15 MIN: CPT | Performed by: PHARMACIST

## 2021-11-12 PROCEDURE — 99607 MTMS BY PHARM ADDL 15 MIN: CPT | Performed by: PHARMACIST

## 2021-11-12 NOTE — PROGRESS NOTES
Medication Therapy Management (MTM) Encounter    ASSESSMENT:                            Medication Adherence/Access: simplified patient's dosing schedule so he is not taking medications so frequently throughout the day.     Renal Transplant: Chart mentions a check in G6PD to see if patient is eligible for dapsone.  I do not see an order I will contact the coordinator to make sure this is ordered.    Pain: Stable.    Renal Thrombosis of transplanted kidney: Stable    PLAN:                            1. You can take Warfarin at 8 PM, and calcium at 8 am and 8pm.     Follow-up: as needed      SUBJECTIVE/OBJECTIVE:                          Raj Pierce is a 51 year old male called for a transitions of care visit. He was discharged from [facility] on 11/7 for post renal txp (failed). Patient was accompanied by Dianne his wife.     Reason for visit: initial post txp, has been removed.     Allergies/ADRs: Reviewed in chart  Past Medical History: Reviewed in chart  Tobacco: He reports that he has never smoked. He has never used smokeless tobacco.  Alcohol: not currently using    Medication Adherence/Access: Patient uses pill box(es), Dianne has been helping him with his medications.  Patient takes medications 3-4 time(s) per day. 8, 10, 6, 8  Per patient, misses medication 0 times per week. Has taken Calcium late a few times. He is dosing this at 10 am.     Renal Transplant:  Current immunosuppressants include Tacrolimus 0.5mg twice daily and Mycophenolate Mofetil 750mg twice daily.  Pt reports no side effects  Transplant date: 10/23/21 failed- removed graft, 1st txp 10/13/15  Estimated Creatinine Clearance: 18.2 mL/min (A) (based on SCr of 5.31 mg/dL (H)).  CMV prophylaxis: CrCl 10 to 24 mL/minute: Valcyte 450 mg twice weekly Donor (+), Recipient (-), treat 6 months post tx   PCP prophylaxis: Inhaled Pentamidine 300mg q 4 weeks  Incisional infection: Keflex 500mg twice daily, finishing tomorrow.   Supplements: Calcium Carbonate/D  twice daily, Sodium Bicarb 1300mg TID .  Tx Coordinator: Deborah Sotelo, Using Med Card: Yes  Immunizations: annual flu shot due; Pneumovax 23:  2013; Prevnar 13: 2017; TDaP:  2014; Shingrix: unknown, HBV: immunity per last titer, COVID: Albert 2021x1     Pain: Acetaminophen 650mg couple times daily. Pain is 3-4/10 at incision. Has Lidocaine patches and oxycodone as needed, but has not used in the past day.     Renal Thrombosis of transplanted kidney: Pt is taking Warfarin 6mg every evening at 6PM. No bleed sx per patient.     Today's Vitals: There were no vitals taken for this visit.  ----------------  Post Discharge Medication Reconciliation Status: discharge medications reconciled and changed, per note/orders.    I spent 20 minutes with this patient today. I offer these suggestions for consideration by txp team. A copy of the visit note was provided to the patient's referring provider.    The patient was sent via [employer] a summary of these recommendations.     Silas Nino, PharmD  [facility] Pharmacist    Phone: [phone]     Telemedicine Visit Details  Type of service:  Telephone visit  Start Time: 2:37 PM  End Time: 2:56 PM  Originating Location (patient location): Home  Distant Location (provider location):  [facility]     Medication Therapy Recommendations  Kidney replaced by transplant    Current Medication: calcium carbonate-vitamin D (OS-LILIANA WITH D) 500-200 MG-UNIT tablet   Rationale: Patient forgets to take - Adherence - Adherence   Recommendation: Provide Adherence Intervention   Status: Patient Agreed - Adherence/Education

## 2021-11-12 NOTE — PATIENT INSTRUCTIONS
Recommendations from today's MTM visit:                                                       1. You can take Warfarin at 8 PM, and calcium at 8 am and 8pm.     Follow-up: as needed    It was great to speak with you today.  I value your experience and would be very thankful for your time with providing feedback on our clinic survey. You may receive a survey via email or text message in the next few days.     To schedule another MTM appointment, please call the clinic directly or you may call the MTM scheduling line at [phone] or toll-free at [phone].     My Clinical Pharmacist's contact information:                                                      Please feel free to contact me with any questions or concerns you have.      Silas Nino, PharmNELY  MTM Pharmacist    Phone: [phone]

## 2021-11-14 LAB
TACROLIMUS BLD-MCNC: <3 UG/L (ref 5–15)
TME LAST DOSE: ABNORMAL H
TME LAST DOSE: ABNORMAL H

## 2021-11-15 ENCOUNTER — LAB (OUTPATIENT)
Dept: LAB | Facility: CLINIC | Age: 51
End: 2021-11-15
Payer: COMMERCIAL

## 2021-11-15 DIAGNOSIS — Z48.298 AFTERCARE FOLLOWING ORGAN TRANSPLANT: ICD-10-CM

## 2021-11-15 DIAGNOSIS — D84.9 IMMUNOSUPPRESSED STATUS (H): ICD-10-CM

## 2021-11-15 DIAGNOSIS — Z94.0 KIDNEY REPLACED BY TRANSPLANT: ICD-10-CM

## 2021-11-15 DIAGNOSIS — Z76.82 KIDNEY TRANSPLANT CANDIDATE: Primary | ICD-10-CM

## 2021-11-15 DIAGNOSIS — Z79.899 ENCOUNTER FOR LONG-TERM CURRENT USE OF MEDICATION: ICD-10-CM

## 2021-11-15 PROCEDURE — 80197 ASSAY OF TACROLIMUS: CPT

## 2021-11-15 RX ORDER — TACROLIMUS 1 MG/1
1 CAPSULE ORAL 2 TIMES DAILY
Qty: 60 CAPSULE | Refills: 11 | Status: SHIPPED | OUTPATIENT
Start: 2021-11-15 | End: 2021-11-22

## 2021-11-15 RX ORDER — TACROLIMUS 0.5 MG/1
0.5 CAPSULE ORAL 2 TIMES DAILY
Qty: 60 CAPSULE | Refills: 11
Start: 2021-11-15 | End: 2021-11-22

## 2021-11-15 NOTE — TELEPHONE ENCOUNTER
ISSUE:   Tacrolimus IR level <3 on 11/11, goal 4-6, dose 0.5 mg BID.    PLAN:   Please call patient and confirm this was an accurate 12-hour trough. Verify Tacrolimus IR dose 0.5 mg BID. Confirm no new medications or illness. Confirm no missed doses. If accurate trough and accurate dose, increase Tacrolimus IR dose to 1 mg BID and repeat labs in 3 days.    Deborah Sotelo RN BSN  Transplant Care Coordinator    OUTCOME:   Spoke with patient, they confirm accurate trough level and current dose 0.5 mg BID. Patient confirmed dose change to 1 mg BID and to repeat labs in 3 days. Orders sent to preferred pharmacy for dose change and lab for repeat labs. Patient voiced understanding of plan.

## 2021-11-16 ENCOUNTER — TELEPHONE (OUTPATIENT)
Dept: TRANSPLANT | Facility: CLINIC | Age: 51
End: 2021-11-16
Payer: COMMERCIAL

## 2021-11-16 ENCOUNTER — OFFICE VISIT (OUTPATIENT)
Dept: TRANSPLANT | Facility: CLINIC | Age: 51
End: 2021-11-16
Attending: SURGERY
Payer: COMMERCIAL

## 2021-11-16 VITALS
HEART RATE: 90 BPM | WEIGHT: 180.2 LBS | OXYGEN SATURATION: 97 % | BODY MASS INDEX: 25.86 KG/M2 | SYSTOLIC BLOOD PRESSURE: 119 MMHG | DIASTOLIC BLOOD PRESSURE: 81 MMHG

## 2021-11-16 DIAGNOSIS — Z79.01 ANTICOAGULATED: ICD-10-CM

## 2021-11-16 DIAGNOSIS — T86.19 RENAL VEIN THROMBOSIS OF KIDNEY TRANSPLANT (H): ICD-10-CM

## 2021-11-16 DIAGNOSIS — I82.3 RENAL VEIN THROMBOSIS OF KIDNEY TRANSPLANT (H): ICD-10-CM

## 2021-11-16 DIAGNOSIS — D84.9 IMMUNOSUPPRESSION (H): Primary | ICD-10-CM

## 2021-11-16 PROCEDURE — 99214 OFFICE O/P EST MOD 30 MIN: CPT | Mod: 24 | Performed by: SURGERY

## 2021-11-16 ASSESSMENT — PAIN SCALES - GENERAL: PAINLEVEL: NO PAIN (0)

## 2021-11-16 NOTE — LETTER
11/16/2021     RE: Raj Pierce  [address]  [place] [address]    Dear Colleague,    Thank you for referring your patient, Raj Pierce, to the [facility]. Please see a copy of my visit note below.    Transplant Surgery Progress Note    Transplants:  10/13/2015 (Kidney), 10/23/2021 (Kidney); Postoperative day:  2226  S: 52 yo male who had a kidney transplant on 10/13/2021.  He recovered well but the day after discharged he presented with abdominal pain, N/V.  Ultrasound showed venous thrombosis of the kidney.  He was emergently taken back to the OR, the kidney explanted, flushed, and retransplanted. Unfortunately, the kidney never regained function and did not light up on renogram x 2.  The kidney was then explanted to prevent sensitization.  He is here today for follow up.  He's feeling well.  Still has the drain in place which he says is draining 20ml per day over the past few days.  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   Transplant Date:  10/13/2015 (Kidney), 10/23/2021 (Kidney)   Ureteral Stent:  Yes but removed when kidney explanted   Crossmatch:  negative   DSA at Tx:  No  Baseline Cr: N/A   DeNovo DSA: No    Acute Rejection Hx:  No    Present Maintenance Immunosuppression:  Tacrolimus and Mycophenolate mofetil    CMV IgG Ab Discordance:  No  EBV IgG Ab Discordance:  No    BK Viremia:  No  EBV Viremia:  No    Transplant Coordinator: Deborah Sotelo     Transplant Office Phone Number: [phone]     Immunosuppressant Medications     Immunosuppressive Agents Disp Start End     mycophenolate (GENERIC EQUIVALENT) 250 MG capsule    180 capsule 10/26/2021     Sig - Route: Take 3 capsules (750 mg) by mouth 2 times daily - Oral    Class: E-Prescribe     tacrolimus (GENERIC EQUIVALENT) 0.5 MG capsule    60 capsule 11/15/2021     Sig - Route: Take 1 capsule (0.5 mg) by mouth 2 times daily - Oral    Class: No Print Out     Notes to Pharmacy: HOLD     tacrolimus (GENERIC EQUIVALENT) 1 MG capsule    60 capsule 11/15/2021     Sig - Route: Take 1 capsule (1 mg) by mouth 2 times daily - Oral    Class: E-Prescribe          Possible Immunosuppression-related side effects:   []             headache  []             vivid dreams  []             irritability  []             cognitive difficuties  []             fine tremor  []             nausea  []             diarrhea  []             neuropathy      []             edema  []             renal calcineurin toxicity  []             hyperkalemia  []             post-transplant diabetes  []             decreased appetite  []             increased appetite  []             other:  []             none    Prescription Medications as of 11/16/2021       Rx Number Disp Refills Start End Last Dispensed Date Next Fill Date Owning Pharmacy    acetaminophen (TYLENOL) 325 MG tablet    10/26/2021        Sig: Take 2 tablets (650 mg) by mouth every 4 hours as needed for other (For optimal non-opioid multimodal pain management to improve pain control.)    Class: OTC    Route: Oral    atorvastatin (LIPITOR) 10 MG tablet        [id number] [address]    Sig: Take 10 mg by mouth daily    Class: Historical    Route: Oral    calcium carbonate-vitamin D (OS-LILIANA WITH D) 500-200 MG-UNIT tablet  60 tablet 2 11/7/2021    [address]    Sig: Take 1 tablet by mouth 2 times daily    Class: E-Prescribe    Route: Oral    citalopram (CELEXA) 20 MG tablet            Sig: Take 20 mg by mouth daily.    Class: Historical    Route: Oral    furosemide (LASIX) 20 MG tablet  30 tablet 2 11/8/2021    [address]    Sig: Take 1 tablet (20 mg) by mouth daily    Class: E-Prescribe    Route: Oral    mycophenolate (GENERIC EQUIVALENT) 250 MG capsule  180 capsule  11 10/26/2021    [address]    Sig: Take 3 capsules (750 mg) by mouth 2 times daily    Class: E-Prescribe    Route: Oral    polyethylene glycol (MIRALAX) 17 GM/Dose powder  510 g 0 10/26/2021    [address]    Sig: Take 17 g by mouth daily    Class: E-Prescribe    Route: Oral    senna-docusate (SENOKOT-S/PERICOLACE) 8.6-50 MG tablet  60 tablet 0 10/26/2021    [address]    Sig: Take 1 tablet by mouth 2 times daily    Class: E-Prescribe    Route: Oral    sodium bicarbonate 650 MG tablet  120 tablet 2 11/11/2021    [id number] - [place], [place] - [address] [facility]    Sig: Take 2 tablets (1,300 mg) by mouth 3 times daily    Class: E-Prescribe    Route: Oral    tacrolimus (GENERIC EQUIVALENT) 0.5 MG capsule  60 capsule 11 11/15/2021        Sig: Take 1 capsule (0.5 mg) by mouth 2 times daily    Class: No Print Out    Notes to Pharmacy: HOLD    Route: Oral    tacrolimus (GENERIC EQUIVALENT) 1 MG capsule  60 capsule 11 11/15/2021    [facility] [place] [address] [facility]    Sig: Take 1 capsule (1 mg) by mouth 2 times daily    Class: E-Prescribe    Route: Oral    valGANciclovir (VALCYTE) 450 MG tablet  60 tablet 5 11/8/2021    [address]    Sig: Take 1 tablet (450 mg) by mouth twice a week    Class: No Print Out    Route: Oral    warfarin ANTICOAGULANT (COUMADIN) 1 MG tablet  7 tablet 0 11/7/2021    [address]    Sig: Take 1 tablet (1 mg) by mouth daily Total dose = 6 mg daily    Class: E-Prescribe    Route: Oral    warfarin ANTICOAGULANT (COUMADIN) 5 MG tablet  7 tablet 0 11/7/2021    [address]    Sig: Take 1 tablet (5 mg)  by mouth daily Total dose = 6 mg daily    Class: E-Prescribe    Route: Oral    Lidocaine (LIDOCARE) 4 % Patch  15 patch 0 11/7/2021    [address]    Sig: Place 1 patch onto the skin every 24 hours To prevent lidocaine toxicity, patient should be patch free for 12 hrs daily.    Class: E-Prescribe    Route: Transdermal    ondansetron (ZOFRAN-ODT) 4 MG ODT tab  15 tablet 0 11/7/2021    [address]    Sig: Take 1 tablet (4 mg) by mouth every 6 hours as needed for nausea or vomiting    Class: E-Prescribe    Route: Oral    oxyCODONE (ROXICODONE) 5 MG tablet  20 tablet 0 10/26/2021    [address]    Sig: Take 1 tablet (5 mg) by mouth every 6 hours as needed for moderate to severe pain    Class: E-Prescribe    Earliest Fill Date: 10/26/2021    Route: Oral          O:   Pulse:  [90] 90  BP: (119)/(81) 119/81  SpO2:  [97 %] 97 %  General Appearance: in no apparent distress.   Skin: Normal, no rashes or jaundice  Heart: regular rate and rhythm  Lungs: easy respirations, no audible wheezing.  Abdomen: flat, The wound is dry and intact, without hernia. The abdomen is non-tender. The kidney graft is not tender.  There is no ascites.  Extremities: Tremor absent.   Edema: absent.     Transplant Immunosuppression Labs Latest Ref Rng & Units 11/7/2021 11/6/2021 11/5/2021 11/5/2021 11/4/2021   Tacro Level 5.0 - 15.0 ug/L - - - - -   Tacro Level - - - - - -   Creat 0.66 - 1.25 mg/dL 5.31(H) 5.75(H) - 5.50(H) 4.95(H)   BUN 7 - 30 mg/dL 83(H) 90(H) - 86(H) 80(H)   WBC 4.0 - 11.0 10e3/uL 11.8(H) 12.8(H) 16.4(H) 15.5(H) 14.7(H)   Neutrophil % - - - - 93   ANEU 1.6 - 8.3 10e3/uL - - - - 13.7(H)       Chemistries:   Recent Labs   Lab Test 11/07/21  0200   BUN 83*   CR 5.31*   GFRESTIMATED 11*   *     Lab Results   Component Value Date    A1C 5.6 10/23/2021     A1C 4.8 10/14/2015     Recent Labs   Lab Test 11/01/21  0440 10/23/21  0358   ALBUMIN 2.0* 3.6   BILITOTAL  --  0.6   ALKPHOS  --  56   AST  --  23   ALT  --  24     Urine Studies:  Recent Labs   Lab Test 11/02/21  1121   COLOR Light Yellow   APPEARANCE Clear   URINEGLC Negative   URINEBILI Negative   URINEKETONE Negative   SG 1.011   UBLD Moderate*   URINEPH 6.0   PROTEIN 70 *   NITRITE Negative   LEUKEST Negative   RBCU 1   WBCU 1     Recent Labs   Lab Test 10/23/21  0526 06/05/17  1253   UTPG 1.37* 1.30*     Hematology:   Recent Labs   Lab Test 11/07/21  0200 11/06/21  0601 11/05/21  0958   HGB 7.3* 7.3* 7.7*   * 491* 441   WBC 11.8* 12.8* 16.4*     Coags:   Recent Labs   Lab Test 11/07/21  1208 11/07/21  0200   INR 1.93* 1.88*     HLA antibodies:   SA1 Hi Risk Sana   Date Value Ref Range Status   06/25/2021 None  Final     SA1 HI RISK SANA   Date Value Ref Range Status   10/23/2021 None  Final     SA1 Mod Risk Sana   Date Value Ref Range Status   06/25/2021   Final    B:18 35 37 45 46 49 50 51 52 53 54 55 56 58 59 62 63 71 72 75 77 78 82     SA1 MOD RISK SANA   Date Value Ref Range Status   10/23/2021 B:35 37 46 49 51 53 54 55 71 75 77 78  Final     SA2 Hi Risk Saan   Date Value Ref Range Status   06/25/2021 None  Final     SA2 HI RISK SANA   Date Value Ref Range Status   10/23/2021 None  Final     SA2 Mod Risk Sana   Date Value Ref Range Status   06/25/2021 None  Final     SA2 MOD RISK SANA   Date Value Ref Range Status   10/23/2021 None  Final       Assessment: Raj Pierce is doing well s/p DDKT and explant:  Issues we addressed during his visit include:    Plan:    1. Graft function: previous kidney transplant function improving  2. Immunosuppression Management: Changed Increased tac given low levels on labs yesterda .  Complexity of management:High.  Contributing factors: thrombosed kidney with remaining functioning previous transplant kidney  3. PRA: should get PRA drawn in 1-2 months to see if he got  sensitized from this last kidney transplant  4. We discussed re-listing when he's ready.  I would wait 3-6 months given he had thymo  Followup: PRN with me  5. Continue Coumadin x 3 months total  6. Should see hematology for hypercoagulable work up.  Likely a mechanical issue but should be sure  7. We talked about AVF creation.  Moving forward with fistula creation will depend on PRA and his desire to move forward with transplant    Total Time: 30 min,         Madina Warren MD FACS  Assistant Professor of Surgery  Director, Living Kidney Donor Program.

## 2021-11-16 NOTE — LETTER
PHYSICIAN ORDERS      DATE & TIME ISSUED: 2021 5:07 PM  PATIENT NAME: Raj Pierce   : 1970     [facility] MR# [if applicable]: [id number]     DIAGNOSIS:  s/p kidney transplant   ICD-10 CODE: Z94.0     Please obtain the following labs in addition to routine standing orders (due ):  G6PD    Any questions please call: [phone]  Please fax results to [phone]      Georges Quach MD

## 2021-11-16 NOTE — TELEPHONE ENCOUNTER
Reviewed Dr Arnold's note. States patient needs follow up appt in 3 months.   Patient also needs G6PD checked. Orders faxed to local lab to be drawn on Thursday.     He does not need appt next week. Patient's wife notified.

## 2021-11-16 NOTE — PROGRESS NOTES
Transplant Surgery Progress Note    Transplants:  10/13/2015 (Kidney), 10/23/2021 (Kidney); Postoperative day:  2226  S: 52 yo male who had a kidney transplant on 10/13/2021.  He recovered well but the day after discharged he presented with abdominal pain, N/V.  Ultrasound showed venous thrombosis of the kidney.  He was emergently taken back to the OR, the kidney explanted, flushed, and retransplanted. Unfortunately, the kidney never regained function and did not light up on renogram x 2.  The kidney was then explanted to prevent sensitization.  He is here today for follow up.  He's feeling well.  Still has the drain in place which he says is draining 20ml per day over the past few days.  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   Transplant Date:  10/13/2015 (Kidney), 10/23/2021 (Kidney)   Ureteral Stent:  Yes but removed when kidney explanted   Crossmatch:  negative   DSA at Tx:  No  Baseline Cr: N/A   DeNovo DSA: No    Acute Rejection Hx:  No    Present Maintenance Immunosuppression:  Tacrolimus and Mycophenolate mofetil    CMV IgG Ab Discordance:  No  EBV IgG Ab Discordance:  No    BK Viremia:  No  EBV Viremia:  No    Transplant Coordinator: Deborah Sotelo     Transplant Office Phone Number: [phone]     Immunosuppressant Medications     Immunosuppressive Agents Disp Start End     mycophenolate (GENERIC EQUIVALENT) 250 MG capsule    180 capsule 10/26/2021     Sig - Route: Take 3 capsules (750 mg) by mouth 2 times daily - Oral    Class: E-Prescribe     tacrolimus (GENERIC EQUIVALENT) 0.5 MG capsule    60 capsule 11/15/2021     Sig - Route: Take 1 capsule (0.5 mg) by mouth 2 times daily - Oral    Class: No Print Out    Notes to Pharmacy: HOLD     tacrolimus (GENERIC EQUIVALENT) 1 MG capsule    60 capsule 11/15/2021     Sig - Route: Take 1 capsule (1 mg) by mouth 2 times daily - Oral    Class: E-Prescribe          Possible Immunosuppression-related side effects:    []             headache  []             vivid dreams  []             irritability  []             cognitive difficuties  []             fine tremor  []             nausea  []             diarrhea  []             neuropathy      []             edema  []             renal calcineurin toxicity  []             hyperkalemia  []             post-transplant diabetes  []             decreased appetite  []             increased appetite  []             other:  []             none    Prescription Medications as of 11/16/2021       Rx Number Disp Refills Start End Last Dispensed Date Next Fill Date Owning Pharmacy    acetaminophen (TYLENOL) 325 MG tablet    10/26/2021        Sig: Take 2 tablets (650 mg) by mouth every 4 hours as needed for other (For optimal non-opioid multimodal pain management to improve pain control.)    Class: OTC    Route: Oral    atorvastatin (LIPITOR) 10 MG tablet        [id number] [address] [facility]    Sig: Take 10 mg by mouth daily    Class: Historical    Route: Oral    calcium carbonate-vitamin D (OS-LILIANA WITH D) 500-200 MG-UNIT tablet  60 tablet 2 11/7/2021    [address]    Sig: Take 1 tablet by mouth 2 times daily    Class: E-Prescribe    Route: Oral    citalopram (CELEXA) 20 MG tablet            Sig: Take 20 mg by mouth daily.    Class: Historical    Route: Oral    furosemide (LASIX) 20 MG tablet  30 tablet 2 11/8/2021    [address]    Sig: Take 1 tablet (20 mg) by mouth daily    Class: E-Prescribe    Route: Oral    mycophenolate (GENERIC EQUIVALENT) 250 MG capsule  180 capsule 11 10/26/2021    [address]    Sig: Take 3 capsules (750 mg) by mouth 2 times daily    Class: E-Prescribe    Route: Oral    polyethylene glycol (MIRALAX) 17 GM/Dose powder  510 g 0  10/26/2021    [address]    Sig: Take 17 g by mouth daily    Class: E-Prescribe    Route: Oral    senna-docusate (SENOKOT-S/PERICOLACE) 8.6-50 MG tablet  60 tablet 0 10/26/2021    [address]    Sig: Take 1 tablet by mouth 2 times daily    Class: E-Prescribe    Route: Oral    sodium bicarbonate 650 MG tablet  120 tablet 2 11/11/2021    [facility][id number] - [place] - [address] [facility]    Sig: Take 2 tablets (1,300 mg) by mouth 3 times daily    Class: E-Prescribe    Route: Oral    tacrolimus (GENERIC EQUIVALENT) 0.5 MG capsule  60 capsule 11 11/15/2021        Sig: Take 1 capsule (0.5 mg) by mouth 2 times daily    Class: No Print Out    Notes to Pharmacy: HOLD    Route: Oral    tacrolimus (GENERIC EQUIVALENT) 1 MG capsule  60 capsule 11 11/15/2021    [facility] [place] [address] [facility]    Sig: Take 1 capsule (1 mg) by mouth 2 times daily    Class: E-Prescribe    Route: Oral    valGANciclovir (VALCYTE) 450 MG tablet  60 tablet 5 11/8/2021    [address]    Sig: Take 1 tablet (450 mg) by mouth twice a week    Class: No Print Out    Route: Oral    warfarin ANTICOAGULANT (COUMADIN) 1 MG tablet  7 tablet 0 11/7/2021    [address]    Sig: Take 1 tablet (1 mg) by mouth daily Total dose = 6 mg daily    Class: E-Prescribe    Route: Oral    warfarin ANTICOAGULANT (COUMADIN) 5 MG tablet  7 tablet 0 11/7/2021    [address]    Sig: Take 1 tablet (5 mg) by mouth daily Total dose = 6 mg daily    Class: E-Prescribe    Route: Oral    Lidocaine (LIDOCARE) 4 % Patch  15 patch 0 11/7/2021    [address]    Sig: Place 1 patch onto the skin  every 24 hours To prevent lidocaine toxicity, patient should be patch free for 12 hrs daily.    Class: E-Prescribe    Route: Transdermal    ondansetron (ZOFRAN-ODT) 4 MG ODT tab  15 tablet 0 11/7/2021    [address]    Sig: Take 1 tablet (4 mg) by mouth every 6 hours as needed for nausea or vomiting    Class: E-Prescribe    Route: Oral    oxyCODONE (ROXICODONE) 5 MG tablet  20 tablet 0 10/26/2021    [address]    Sig: Take 1 tablet (5 mg) by mouth every 6 hours as needed for moderate to severe pain    Class: E-Prescribe    Earliest Fill Date: 10/26/2021    Route: Oral          O:   Pulse:  [90] 90  BP: (119)/(81) 119/81  SpO2:  [97 %] 97 %  General Appearance: in no apparent distress.   Skin: Normal, no rashes or jaundice  Heart: regular rate and rhythm  Lungs: easy respirations, no audible wheezing.  Abdomen: flat, The wound is dry and intact, without hernia. The abdomen is non-tender. The kidney graft is not tender.  There is no ascites.  Extremities: Tremor absent.   Edema: absent.     Transplant Immunosuppression Labs Latest Ref Rng & Units 11/7/2021 11/6/2021 11/5/2021 11/5/2021 11/4/2021   Tacro Level 5.0 - 15.0 ug/L - - - - -   Tacro Level - - - - - -   Creat 0.66 - 1.25 mg/dL 5.31(H) 5.75(H) - 5.50(H) 4.95(H)   BUN 7 - 30 mg/dL 83(H) 90(H) - 86(H) 80(H)   WBC 4.0 - 11.0 10e3/uL 11.8(H) 12.8(H) 16.4(H) 15.5(H) 14.7(H)   Neutrophil % - - - - 93   ANEU 1.6 - 8.3 10e3/uL - - - - 13.7(H)       Chemistries:   Recent Labs   Lab Test 11/07/21  0200   BUN 83*   CR 5.31*   GFRESTIMATED 11*   *     Lab Results   Component Value Date    A1C 5.6 10/23/2021    A1C 4.8 10/14/2015     Recent Labs   Lab Test 11/01/21  0440 10/23/21  0358   ALBUMIN 2.0* 3.6   BILITOTAL  --  0.6   ALKPHOS  --  56   AST  --  23   ALT  --  24     Urine Studies:  Recent Labs   Lab Test 11/02/21  1121   COLOR Light Yellow    APPEARANCE Clear   URINEGLC Negative   URINEBILI Negative   URINEKETONE Negative   SG 1.011   UBLD Moderate*   URINEPH 6.0   PROTEIN 70 *   NITRITE Negative   LEUKEST Negative   RBCU 1   WBCU 1     Recent Labs   Lab Test 10/23/21  0526 06/05/17  1253   UTPG 1.37* 1.30*     Hematology:   Recent Labs   Lab Test 11/07/21  0200 11/06/21  0601 11/05/21  0958   HGB 7.3* 7.3* 7.7*   * 491* 441   WBC 11.8* 12.8* 16.4*     Coags:   Recent Labs   Lab Test 11/07/21  1208 11/07/21  0200   INR 1.93* 1.88*     HLA antibodies:   SA1 Hi Risk Sana   Date Value Ref Range Status   06/25/2021 None  Final     SA1 HI RISK SANA   Date Value Ref Range Status   10/23/2021 None  Final     SA1 Mod Risk Sana   Date Value Ref Range Status   06/25/2021   Final    B:18 35 37 45 46 49 50 51 52 53 54 55 56 58 59 62 63 71 72 75 77 78 82     SA1 MOD RISK SANA   Date Value Ref Range Status   10/23/2021 B:35 37 46 49 51 53 54 55 71 75 77 78  Final     SA2 Hi Risk Sana   Date Value Ref Range Status   06/25/2021 None  Final     SA2 HI RISK SANA   Date Value Ref Range Status   10/23/2021 None  Final     SA2 Mod Risk Sana   Date Value Ref Range Status   06/25/2021 None  Final     SA2 MOD RISK SANA   Date Value Ref Range Status   10/23/2021 None  Final       Assessment: Raj Pierce is doing well s/p DDKT and explant:  Issues we addressed during his visit include:    Plan:    1. Graft function: previous kidney transplant function improving  2. Immunosuppression Management: Changed Increased tac given low levels on labs yesterda .  Complexity of management:High.  Contributing factors: thrombosed kidney with remaining functioning previous transplant kidney  3. PRA: should get PRA drawn in 1-2 months to see if he got sensitized from this last kidney transplant  4. We discussed re-listing when he's ready.  I would wait 3-6 months given he had thymo  Followup: PRN with me  5. Continue Coumadin x 3 months total  6. Should see hematology for hypercoagulable  work up.  Likely a mechanical issue but should be sure  7. We talked about AVF creation.  Moving forward with fistula creation will depend on PRA and his desire to move forward with transplant    Total Time: 30 min,         Madina Warren MD FACS  Assistant Professor of Surgery  Director, Living Kidney Donor Program.

## 2021-11-16 NOTE — TELEPHONE ENCOUNTER
Patient wife Dianne called regarding the appt on 11/18/21 if they need it and wife has other questions. Patient had a Video visit last week the Dr Quach and didn't think they needed the appointment.

## 2021-11-17 LAB
TACROLIMUS BLD-MCNC: <3 UG/L (ref 5–15)
TME LAST DOSE: ABNORMAL H
TME LAST DOSE: ABNORMAL H

## 2021-11-17 NOTE — OP NOTE
Op Note:  Preop DX:  Thrombosed renal transplant  Postop DX:  Same  Procedure Transplant nephrectomy and removal of ureteral stent.  Surgeon:  Eulalio Emanuel  Assist:  Uday Green and Georges Mattson  Anesthesia: General  Findings:  Ischemic kidney transplant.  Clot in renal vein of transplant  Specimens:  Transplant kidney, ureteral stent  EBL:  50  Indications:  The patient has recently undergone kidney transplant in the left lower quadrant.  He developed renal vein thrombosis and had an initial thrombectomy.  He was maintained on anticoagulation but subsequent renogram shows no flow to kidney and US showed thrombus on US.  After discussion with patient and the surgeons who did transplant, we decided he would recover faster if we removed the kidney.    Description of procedure:  After induction of general anesthesia the abdomen was prepped and draped in sterile fashion.  The left lower quadrant transplant incision was opened and the kidney was exposed.  It was quite enlarged and obviously ischemic.  We place retractors to expose the anastomoses. Vascular clamps were placed to occlude inflow and outflow.  The renal artery and vein were transected.   There was moderate clot within the renal vein.  We performed suction thrombectomy and backflused blood without evidence of further clot. The artery was flushed and there was no evidence of arterial clot. The artery and vein were trimmed and oversewn close to the iliac vessels.  The ureter anastomosis was taken down and the ureteral stent removed.  The cystotomy was repaired with two layers of 4-0 PDS suture.  A drain was left in place and the incision was closed.    I was present from incision to closure of the Fascia.  Dr. Mattson assisted with the nephrectomy.  Dr. Green assisted with the bladder repair and closure.    Eulalio Emanuel MD, PhD  Associate Professor of Surgery

## 2021-11-18 ENCOUNTER — LAB (OUTPATIENT)
Dept: LAB | Facility: CLINIC | Age: 51
End: 2021-11-18
Payer: MEDICARE

## 2021-11-20 ENCOUNTER — HOSPITAL ENCOUNTER (OUTPATIENT)
Facility: CLINIC | Age: 51
Discharge: HOME | End: 2021-11-20
Admitting: INTERNAL MEDICINE
Payer: COMMERCIAL

## 2021-11-20 PROCEDURE — 36415 COLL VENOUS BLD VENIPUNCTURE: CPT | Performed by: INTERNAL MEDICINE

## 2021-11-20 PROCEDURE — 80197 ASSAY OF TACROLIMUS: CPT | Performed by: INTERNAL MEDICINE

## 2021-11-21 LAB
TACROLIMUS BLD-MCNC: <3 UG/L (ref 5–15)
TME LAST DOSE: ABNORMAL H
TME LAST DOSE: ABNORMAL H

## 2021-11-22 ENCOUNTER — LAB (OUTPATIENT)
Dept: LAB | Facility: CLINIC | Age: 51
End: 2021-11-22
Payer: COMMERCIAL

## 2021-11-22 DIAGNOSIS — D84.9 IMMUNOSUPPRESSED STATUS (H): ICD-10-CM

## 2021-11-22 DIAGNOSIS — Z76.82 KIDNEY TRANSPLANT CANDIDATE: Primary | ICD-10-CM

## 2021-11-22 DIAGNOSIS — Z94.0 KIDNEY REPLACED BY TRANSPLANT: ICD-10-CM

## 2021-11-22 DIAGNOSIS — Z48.298 AFTERCARE FOLLOWING ORGAN TRANSPLANT: ICD-10-CM

## 2021-11-22 DIAGNOSIS — Z79.899 ENCOUNTER FOR LONG-TERM CURRENT USE OF MEDICATION: ICD-10-CM

## 2021-11-22 PROCEDURE — 80197 ASSAY OF TACROLIMUS: CPT

## 2021-11-22 RX ORDER — TACROLIMUS 0.5 MG/1
CAPSULE ORAL
Qty: 60 CAPSULE | Refills: 11
Start: 2021-11-22 | End: 2021-12-02

## 2021-11-22 RX ORDER — TACROLIMUS 1 MG/1
2 CAPSULE ORAL 2 TIMES DAILY
Qty: 120 CAPSULE | Refills: 11 | Status: SHIPPED | OUTPATIENT
Start: 2021-11-22 | End: 2021-12-02

## 2021-11-22 NOTE — TELEPHONE ENCOUNTER
ISSUE:   Tacrolimus IR level <3.0 on 11/20, goal 4-6, dose 1 mg BID.    PLAN:   Please call patient and confirm this was an accurate 12-hour trough. Verify Tacrolimus IR dose 1 mg BID. Confirm no new medications or illness. Confirm no missed doses. If accurate trough and accurate dose, increase Tacrolimus IR dose to 1.5 mg BID and repeat labs in 1 week.    Deborah Sotelo RN BSN  Transplant Care Coordinator    OUTCOME:   Spoke with patient, they confirm accurate trough level and current dose 1.5 mg BID. Patient confirmed dose change to 2 mg BID and to repeat labs in 1 weeks. Orders sent to preferred pharmacy for dose change and lab for repeat labs. Patient voiced understanding of plan.

## 2021-11-23 ENCOUNTER — TELEPHONE (OUTPATIENT)
Dept: TRANSPLANT | Facility: CLINIC | Age: 51
End: 2021-11-23
Payer: COMMERCIAL

## 2021-11-23 DIAGNOSIS — Z94.0 KIDNEY TRANSPLANTED: Primary | ICD-10-CM

## 2021-11-23 RX ORDER — DAPSONE 25 MG/1
50 TABLET ORAL DAILY
Qty: 60 TABLET | Refills: 11 | Status: SHIPPED | OUTPATIENT
Start: 2021-11-23 | End: 2022-10-13

## 2021-11-23 NOTE — TELEPHONE ENCOUNTER
Spoke to patient who confirmed the following:  Ensured patient has not been taking bactrim  Instructed patient to start dapsone 50mg daily

## 2021-11-23 NOTE — TELEPHONE ENCOUNTER
ISSUE:  G6PD WNL    PLAN:  Ensure patient has not been taking bactrim  Instruct patient to start dapsone 50mg daily    Check T cell subset in 6 months.     ----- Message from Georges Quach MD sent at 11/22/2021 11:57 AM CST -----  Yes, ok to start dapsone 50mg daily. Check T cell subset 6m post txp please    Deborah Sotelo RN BSN  Transplant Care Coordinator

## 2021-11-23 NOTE — LETTER
PHYSICIAN ORDERS      DATE & TIME ISSUED: 2021 3:40 PM  PATIENT NAME: Raj Pierce   : 1970     [facility] MR# [if applicable]: [id number]     DIAGNOSIS:  Kidney Transplant  ICD-10 CODE: Z94.0     Please complete the following lab in 6 months:  T cell subset (CD4 count)    Any questions please call: [phone]  Please fax lab results to [phone].      Georges Quach MD

## 2021-11-23 NOTE — LETTER
PHYSICIAN ORDERS      DATE & TIME ISSUED: 2021 12:00 PM  PATIENT NAME: Raj Pierce   : 1970     [facility] MR# [if applicable]: [id number]     DIAGNOSIS:  Kidney Transplant  ICD-10 CODE: Z94.0     Please complete the following lab in 6 months:  T cell subset    Any questions please call: [phone]  Please fax lab results to [phone].      Georges Quach MD

## 2021-11-24 LAB
TACROLIMUS BLD-MCNC: <3 UG/L (ref 5–15)
TME LAST DOSE: ABNORMAL H
TME LAST DOSE: ABNORMAL H

## 2021-11-26 ENCOUNTER — LAB (OUTPATIENT)
Dept: LAB | Facility: CLINIC | Age: 51
End: 2021-11-26
Payer: COMMERCIAL

## 2021-11-26 DIAGNOSIS — Z48.298 AFTERCARE FOLLOWING ORGAN TRANSPLANT: ICD-10-CM

## 2021-11-26 DIAGNOSIS — Z94.0 KIDNEY REPLACED BY TRANSPLANT: ICD-10-CM

## 2021-11-26 DIAGNOSIS — Z79.899 ENCOUNTER FOR LONG-TERM CURRENT USE OF MEDICATION: ICD-10-CM

## 2021-11-26 PROCEDURE — 80197 ASSAY OF TACROLIMUS: CPT

## 2021-11-29 ENCOUNTER — LAB (OUTPATIENT)
Dept: LAB | Facility: CLINIC | Age: 51
End: 2021-11-29
Payer: COMMERCIAL

## 2021-11-29 DIAGNOSIS — Z94.0 KIDNEY REPLACED BY TRANSPLANT: ICD-10-CM

## 2021-11-29 DIAGNOSIS — Z48.298 AFTERCARE FOLLOWING ORGAN TRANSPLANT: ICD-10-CM

## 2021-11-29 DIAGNOSIS — Z79.899 ENCOUNTER FOR LONG-TERM CURRENT USE OF MEDICATION: ICD-10-CM

## 2021-11-29 LAB
TACROLIMUS BLD-MCNC: 3.4 UG/L (ref 5–15)
TME LAST DOSE: ABNORMAL H
TME LAST DOSE: ABNORMAL H

## 2021-11-29 PROCEDURE — 80197 ASSAY OF TACROLIMUS: CPT

## 2021-12-01 LAB
TACROLIMUS BLD-MCNC: 3.2 UG/L (ref 5–15)
TME LAST DOSE: ABNORMAL H
TME LAST DOSE: ABNORMAL H

## 2021-12-02 ENCOUNTER — LAB (OUTPATIENT)
Dept: LAB | Facility: CLINIC | Age: 51
End: 2021-12-02
Payer: COMMERCIAL

## 2021-12-02 DIAGNOSIS — D84.9 IMMUNOSUPPRESSED STATUS (H): ICD-10-CM

## 2021-12-02 DIAGNOSIS — Z76.82 KIDNEY TRANSPLANT CANDIDATE: ICD-10-CM

## 2021-12-02 DIAGNOSIS — Z94.0 KIDNEY TRANSPLANTED: Primary | ICD-10-CM

## 2021-12-02 DIAGNOSIS — I82.3 RENAL VEIN THROMBOSIS OF KIDNEY TRANSPLANT (H): ICD-10-CM

## 2021-12-02 DIAGNOSIS — T86.19 RENAL VEIN THROMBOSIS OF KIDNEY TRANSPLANT (H): ICD-10-CM

## 2021-12-02 PROCEDURE — 80197 ASSAY OF TACROLIMUS: CPT | Performed by: INTERNAL MEDICINE

## 2021-12-02 PROCEDURE — 36415 COLL VENOUS BLD VENIPUNCTURE: CPT | Performed by: INTERNAL MEDICINE

## 2021-12-02 RX ORDER — TACROLIMUS 1 MG/1
2 CAPSULE ORAL 2 TIMES DAILY
Qty: 120 CAPSULE | Refills: 11 | Status: SHIPPED | OUTPATIENT
Start: 2021-12-02 | End: 2022-01-14

## 2021-12-02 RX ORDER — TACROLIMUS 0.5 MG/1
0.5 CAPSULE ORAL 2 TIMES DAILY
Qty: 60 CAPSULE | Refills: 11 | Status: SHIPPED | OUTPATIENT
Start: 2021-12-02 | End: 2022-01-14

## 2021-12-02 RX ORDER — WARFARIN SODIUM 1 MG/1
1 TABLET ORAL DAILY
Qty: 7 TABLET | Refills: 0 | Status: SHIPPED | OUTPATIENT
Start: 2021-12-02 | End: 2022-03-18

## 2021-12-02 NOTE — TELEPHONE ENCOUNTER
ISSUE:   Tacrolimus IR level 3.2 on 11/29, goal 4-6, dose 2 mg BID.    PLAN:   Please call patient and confirm this was an accurate 12-hour trough. Verify Tacrolimus IR dose 2 mg BID. Confirm no new medications or illness. Confirm no missed doses. If accurate trough and accurate dose, increase Tacrolimus IR dose to 2.5 mg BID and repeat labs in 1 weeks.    Deborah Sotelo RN BSN  Transplant Care Coordinator      OUTCOME:   Spoke with patient, they confirm accurate trough level and current dose 2 mg BID. Patient confirmed dose change to 2.5 mg BID and to repeat labs in 1 weeks. Orders sent to preferred pharmacy for dose change and lab for repeat labs. Patient voiced understanding of plan.     Patient also has question as to how long will he have to continue taking Warfarin?  Please advise.

## 2021-12-04 LAB
TACROLIMUS BLD-MCNC: 3.3 UG/L (ref 5–15)
TME LAST DOSE: ABNORMAL H
TME LAST DOSE: ABNORMAL H

## 2021-12-06 ENCOUNTER — LAB (OUTPATIENT)
Dept: LAB | Facility: CLINIC | Age: 51
End: 2021-12-06
Payer: COMMERCIAL

## 2021-12-06 DIAGNOSIS — Z48.298 AFTERCARE FOLLOWING ORGAN TRANSPLANT: ICD-10-CM

## 2021-12-06 DIAGNOSIS — Z79.899 ENCOUNTER FOR LONG-TERM CURRENT USE OF MEDICATION: ICD-10-CM

## 2021-12-06 DIAGNOSIS — Z94.0 KIDNEY REPLACED BY TRANSPLANT: ICD-10-CM

## 2021-12-06 PROCEDURE — 80197 ASSAY OF TACROLIMUS: CPT

## 2021-12-08 DIAGNOSIS — Z94.0 KIDNEY TRANSPLANTED: Primary | ICD-10-CM

## 2021-12-08 LAB
TACROLIMUS BLD-MCNC: 4.6 UG/L (ref 5–15)
TME LAST DOSE: ABNORMAL H
TME LAST DOSE: ABNORMAL H

## 2021-12-08 NOTE — TELEPHONE ENCOUNTER
Ref. Range 11/8/2021 07:36 11/15/2021 07:38 11/22/2021 07:34 11/29/2021 07:37 12/6/2021 07:34   Magnesium (External) Latest Ref Range: 1.6 - 2.6 mg/dL 2.1 1.9 1.7 1.5 (L) 1.4 (L)     Francis Espinoza MD [place]um, Teresa, RN  Would start magnesium oxide 400 mg daily with lunch.       ----- Message -----   From: Shawna Sylvester RN   Sent: 12/8/2021   9:49 AM CST   To: Francis Espinoza MD     Phos, WNL.   Mag continues to trend down, not on supplement.   Result sent to Dr. Espinoza.     LPN task:  Please discuss Dr. Espinoza's recommendations to start Magnesium Oxide 400 mg daily with lunch.

## 2021-12-09 ENCOUNTER — LAB (OUTPATIENT)
Dept: LAB | Facility: CLINIC | Age: 51
End: 2021-12-09
Payer: COMMERCIAL

## 2021-12-09 DIAGNOSIS — Z79.899 ENCOUNTER FOR LONG-TERM CURRENT USE OF MEDICATION: ICD-10-CM

## 2021-12-09 DIAGNOSIS — Z48.298 AFTERCARE FOLLOWING ORGAN TRANSPLANT: ICD-10-CM

## 2021-12-09 DIAGNOSIS — Z94.0 KIDNEY REPLACED BY TRANSPLANT: ICD-10-CM

## 2021-12-09 PROCEDURE — 80197 ASSAY OF TACROLIMUS: CPT | Performed by: INTERNAL MEDICINE

## 2021-12-12 LAB
TACROLIMUS BLD-MCNC: 3.8 UG/L (ref 5–15)
TME LAST DOSE: ABNORMAL H
TME LAST DOSE: ABNORMAL H

## 2021-12-13 ENCOUNTER — LAB (OUTPATIENT)
Dept: LAB | Facility: CLINIC | Age: 51
End: 2021-12-13
Payer: COMMERCIAL

## 2021-12-13 DIAGNOSIS — Z48.298 AFTERCARE FOLLOWING ORGAN TRANSPLANT: ICD-10-CM

## 2021-12-13 DIAGNOSIS — Z79.899 ENCOUNTER FOR LONG-TERM CURRENT USE OF MEDICATION: ICD-10-CM

## 2021-12-13 DIAGNOSIS — Z94.0 KIDNEY REPLACED BY TRANSPLANT: ICD-10-CM

## 2021-12-13 PROCEDURE — 80197 ASSAY OF TACROLIMUS: CPT

## 2021-12-15 DIAGNOSIS — Z94.0 KIDNEY REPLACED BY TRANSPLANT: Primary | ICD-10-CM

## 2021-12-15 DIAGNOSIS — N25.81 SECONDARY RENAL HYPERPARATHYROIDISM (H): ICD-10-CM

## 2021-12-15 LAB
TACROLIMUS BLD-MCNC: 4.4 UG/L (ref 5–15)
TME LAST DOSE: ABNORMAL H
TME LAST DOSE: ABNORMAL H

## 2021-12-15 RX ORDER — MYCOPHENOLATE MOFETIL 250 MG/1
750 CAPSULE ORAL 2 TIMES DAILY
Qty: 180 CAPSULE | Refills: 11 | Status: SHIPPED | OUTPATIENT
Start: 2021-12-15 | End: 2022-01-14

## 2021-12-16 ENCOUNTER — LAB (OUTPATIENT)
Dept: LAB | Facility: CLINIC | Age: 51
End: 2021-12-16
Payer: COMMERCIAL

## 2021-12-16 DIAGNOSIS — Z79.899 ENCOUNTER FOR LONG-TERM CURRENT USE OF MEDICATION: ICD-10-CM

## 2021-12-16 DIAGNOSIS — Z48.298 AFTERCARE FOLLOWING ORGAN TRANSPLANT: ICD-10-CM

## 2021-12-16 DIAGNOSIS — Z94.0 KIDNEY REPLACED BY TRANSPLANT: ICD-10-CM

## 2021-12-16 PROCEDURE — 80197 ASSAY OF TACROLIMUS: CPT

## 2021-12-19 LAB
TACROLIMUS BLD-MCNC: 4.7 UG/L (ref 5–15)
TME LAST DOSE: ABNORMAL H
TME LAST DOSE: ABNORMAL H

## 2021-12-20 ENCOUNTER — LAB (OUTPATIENT)
Dept: LAB | Facility: CLINIC | Age: 51
End: 2021-12-20
Payer: COMMERCIAL

## 2021-12-20 DIAGNOSIS — Z48.298 AFTERCARE FOLLOWING ORGAN TRANSPLANT: ICD-10-CM

## 2021-12-20 DIAGNOSIS — Z94.0 KIDNEY REPLACED BY TRANSPLANT: ICD-10-CM

## 2021-12-20 DIAGNOSIS — Z79.899 ENCOUNTER FOR LONG-TERM CURRENT USE OF MEDICATION: ICD-10-CM

## 2021-12-20 PROCEDURE — 80197 ASSAY OF TACROLIMUS: CPT

## 2021-12-21 LAB
TACROLIMUS BLD-MCNC: 5.4 UG/L (ref 5–15)
TME LAST DOSE: NORMAL H
TME LAST DOSE: NORMAL H

## 2021-12-23 ENCOUNTER — LAB (OUTPATIENT)
Dept: LAB | Facility: CLINIC | Age: 51
End: 2021-12-23
Payer: COMMERCIAL

## 2021-12-23 DIAGNOSIS — Z94.0 KIDNEY REPLACED BY TRANSPLANT: ICD-10-CM

## 2021-12-23 DIAGNOSIS — Z48.298 AFTERCARE FOLLOWING ORGAN TRANSPLANT: ICD-10-CM

## 2021-12-23 DIAGNOSIS — Z79.899 ENCOUNTER FOR LONG-TERM CURRENT USE OF MEDICATION: ICD-10-CM

## 2021-12-23 PROCEDURE — 80197 ASSAY OF TACROLIMUS: CPT

## 2021-12-28 ENCOUNTER — TELEPHONE (OUTPATIENT)
Dept: TRANSPLANT | Facility: CLINIC | Age: 51
End: 2021-12-28

## 2021-12-28 ENCOUNTER — LAB (OUTPATIENT)
Dept: LAB | Facility: CLINIC | Age: 51
End: 2021-12-28
Payer: COMMERCIAL

## 2021-12-28 DIAGNOSIS — Z48.298 AFTERCARE FOLLOWING ORGAN TRANSPLANT: ICD-10-CM

## 2021-12-28 DIAGNOSIS — Z94.0 KIDNEY REPLACED BY TRANSPLANT: ICD-10-CM

## 2021-12-28 DIAGNOSIS — Z79.899 ENCOUNTER FOR LONG-TERM CURRENT USE OF MEDICATION: ICD-10-CM

## 2021-12-28 PROCEDURE — 80197 ASSAY OF TACROLIMUS: CPT

## 2021-12-28 NOTE — TELEPHONE ENCOUNTER
Patient Call: General  Route to LPN    Reason for call: Patient has a letter that says af os 12/23/21 he will move to labs 1 time weekly and he is wondering if he should still do so    Call back needed? Yes    Return Call Needed  Same as documented in contacts section

## 2021-12-29 LAB
TACROLIMUS BLD-MCNC: 4.3 UG/L (ref 5–15)
TACROLIMUS BLD-MCNC: 5.3 UG/L (ref 5–15)
TME LAST DOSE: ABNORMAL H
TME LAST DOSE: ABNORMAL H
TME LAST DOSE: NORMAL H
TME LAST DOSE: NORMAL H

## 2022-01-03 ENCOUNTER — LAB (OUTPATIENT)
Dept: LAB | Facility: CLINIC | Age: 52
End: 2022-01-03
Payer: MEDICARE

## 2022-01-03 DIAGNOSIS — Z48.298 AFTERCARE FOLLOWING ORGAN TRANSPLANT: ICD-10-CM

## 2022-01-03 DIAGNOSIS — Z94.0 KIDNEY REPLACED BY TRANSPLANT: ICD-10-CM

## 2022-01-03 DIAGNOSIS — Z79.899 ENCOUNTER FOR LONG-TERM CURRENT USE OF MEDICATION: ICD-10-CM

## 2022-01-03 PROCEDURE — 80197 ASSAY OF TACROLIMUS: CPT

## 2022-01-05 LAB
TACROLIMUS BLD-MCNC: 5.3 UG/L (ref 5–15)
TME LAST DOSE: NORMAL H
TME LAST DOSE: NORMAL H

## 2022-01-10 ENCOUNTER — LAB (OUTPATIENT)
Dept: LAB | Facility: CLINIC | Age: 52
End: 2022-01-10
Payer: MEDICARE

## 2022-01-10 DIAGNOSIS — Z48.298 AFTERCARE FOLLOWING ORGAN TRANSPLANT: ICD-10-CM

## 2022-01-10 DIAGNOSIS — Z79.899 ENCOUNTER FOR LONG-TERM CURRENT USE OF MEDICATION: ICD-10-CM

## 2022-01-10 DIAGNOSIS — Z94.0 KIDNEY REPLACED BY TRANSPLANT: ICD-10-CM

## 2022-01-10 PROCEDURE — 80197 ASSAY OF TACROLIMUS: CPT

## 2022-01-12 LAB
TACROLIMUS BLD-MCNC: 4.7 UG/L (ref 5–15)
TME LAST DOSE: ABNORMAL H
TME LAST DOSE: ABNORMAL H

## 2022-01-14 DIAGNOSIS — Z76.82 KIDNEY TRANSPLANT CANDIDATE: ICD-10-CM

## 2022-01-14 DIAGNOSIS — Z94.0 KIDNEY REPLACED BY TRANSPLANT: ICD-10-CM

## 2022-01-14 DIAGNOSIS — Z94.0 KIDNEY TRANSPLANTED: ICD-10-CM

## 2022-01-14 DIAGNOSIS — N25.81 SECONDARY RENAL HYPERPARATHYROIDISM (H): ICD-10-CM

## 2022-01-14 DIAGNOSIS — D84.9 IMMUNOSUPPRESSED STATUS (H): ICD-10-CM

## 2022-01-14 RX ORDER — TACROLIMUS 1 MG/1
2 CAPSULE ORAL 2 TIMES DAILY
Qty: 120 CAPSULE | Refills: 11 | Status: SHIPPED | OUTPATIENT
Start: 2022-01-14 | End: 2022-07-21

## 2022-01-14 RX ORDER — TACROLIMUS 0.5 MG/1
0.5 CAPSULE ORAL 2 TIMES DAILY
Qty: 60 CAPSULE | Refills: 11 | Status: SHIPPED | OUTPATIENT
Start: 2022-01-14 | End: 2022-07-21

## 2022-01-14 RX ORDER — MYCOPHENOLATE MOFETIL 250 MG/1
750 CAPSULE ORAL 2 TIMES DAILY
Qty: 180 CAPSULE | Refills: 11 | Status: SHIPPED | OUTPATIENT
Start: 2022-01-14 | End: 2022-10-13

## 2022-01-14 NOTE — TELEPHONE ENCOUNTER
[facility] Call Center    Phone Message    May a detailed message be left on voicemail: yes     Reason for Call: Medication Refill Request    Has the patient contacted the pharmacy for the refill? Yes   Name of medication being requested: mycophenolate (GENERIC EQUIVALENT) 250 MG capsule   tacrolimus (GENERIC EQUIVALENT) 0.5 MG capsule     Provider who prescribed the medication: Dr. Espinoza  Pharmacy: [id number] - [place], [place] - [address]  Date medication is needed: ASAP     Pharmacy called advising pt needs refills.  Please refill medications.  Because this involves Medicare, there are forms needed.  Please complete forms as well, forms previously faxed about 2-3 days ago; pharmacy will fax again today (1/14/22) to our fax: [phone]      Action Taken: Message routed to:  [facility] ([facility] neph    Travel Screening: Not Applicable

## 2022-01-17 ENCOUNTER — LAB (OUTPATIENT)
Dept: LAB | Facility: CLINIC | Age: 52
End: 2022-01-17
Payer: MEDICARE

## 2022-01-17 DIAGNOSIS — Z94.0 KIDNEY REPLACED BY TRANSPLANT: ICD-10-CM

## 2022-01-17 DIAGNOSIS — Z48.298 AFTERCARE FOLLOWING ORGAN TRANSPLANT: ICD-10-CM

## 2022-01-17 DIAGNOSIS — Z79.899 ENCOUNTER FOR LONG-TERM CURRENT USE OF MEDICATION: ICD-10-CM

## 2022-01-17 PROCEDURE — 80197 ASSAY OF TACROLIMUS: CPT

## 2022-01-19 LAB
TACROLIMUS BLD-MCNC: 5.5 UG/L (ref 5–15)
TME LAST DOSE: NORMAL H
TME LAST DOSE: NORMAL H

## 2022-01-23 ENCOUNTER — HEALTH MAINTENANCE LETTER (OUTPATIENT)
Age: 52
End: 2022-01-23

## 2022-01-24 ENCOUNTER — LAB (OUTPATIENT)
Dept: LAB | Facility: CLINIC | Age: 52
End: 2022-01-24
Payer: MEDICARE

## 2022-01-24 DIAGNOSIS — Z48.298 AFTERCARE FOLLOWING ORGAN TRANSPLANT: ICD-10-CM

## 2022-01-24 DIAGNOSIS — Z94.0 KIDNEY REPLACED BY TRANSPLANT: ICD-10-CM

## 2022-01-24 DIAGNOSIS — Z79.899 ENCOUNTER FOR LONG-TERM CURRENT USE OF MEDICATION: ICD-10-CM

## 2022-01-24 PROCEDURE — 80197 ASSAY OF TACROLIMUS: CPT

## 2022-01-25 LAB
TACROLIMUS BLD-MCNC: 4.4 UG/L (ref 5–15)
TME LAST DOSE: ABNORMAL H
TME LAST DOSE: ABNORMAL H

## 2022-01-31 ENCOUNTER — LAB (OUTPATIENT)
Dept: LAB | Facility: CLINIC | Age: 52
End: 2022-01-31
Payer: MEDICARE

## 2022-01-31 DIAGNOSIS — Z94.0 KIDNEY REPLACED BY TRANSPLANT: ICD-10-CM

## 2022-01-31 DIAGNOSIS — Z48.298 AFTERCARE FOLLOWING ORGAN TRANSPLANT: ICD-10-CM

## 2022-01-31 DIAGNOSIS — Z79.899 ENCOUNTER FOR LONG-TERM CURRENT USE OF MEDICATION: ICD-10-CM

## 2022-02-01 LAB
TACROLIMUS BLD-MCNC: 6.4 UG/L (ref 5–15)
TME LAST DOSE: NORMAL H
TME LAST DOSE: NORMAL H

## 2022-02-01 PROCEDURE — 80197 ASSAY OF TACROLIMUS: CPT

## 2022-02-07 ENCOUNTER — LAB (OUTPATIENT)
Dept: LAB | Facility: CLINIC | Age: 52
End: 2022-02-07
Payer: MEDICARE

## 2022-02-07 DIAGNOSIS — Z79.899 ENCOUNTER FOR LONG-TERM CURRENT USE OF MEDICATION: ICD-10-CM

## 2022-02-07 DIAGNOSIS — Z94.0 KIDNEY TRANSPLANT RECIPIENT: ICD-10-CM

## 2022-02-07 DIAGNOSIS — Z94.0 KIDNEY REPLACED BY TRANSPLANT: ICD-10-CM

## 2022-02-07 DIAGNOSIS — Z48.298 AFTERCARE FOLLOWING ORGAN TRANSPLANT: ICD-10-CM

## 2022-02-07 PROCEDURE — 80197 ASSAY OF TACROLIMUS: CPT

## 2022-02-08 LAB
TACROLIMUS BLD-MCNC: 6.2 UG/L (ref 5–15)
TME LAST DOSE: NORMAL H
TME LAST DOSE: NORMAL H

## 2022-02-08 RX ORDER — FUROSEMIDE 20 MG
20 TABLET ORAL DAILY
Qty: 30 TABLET | Refills: 2 | OUTPATIENT
Start: 2022-02-08

## 2022-02-09 ENCOUNTER — TELEPHONE (OUTPATIENT)
Dept: TRANSPLANT | Facility: CLINIC | Age: 52
End: 2022-02-09
Payer: COMMERCIAL

## 2022-02-09 NOTE — TELEPHONE ENCOUNTER
OK for labs every other week. Review w/ MD at next clinic appointment on 2/23. Left voicemail for patient.

## 2022-02-21 ENCOUNTER — LAB (OUTPATIENT)
Dept: LAB | Facility: CLINIC | Age: 52
End: 2022-02-21
Payer: MEDICARE

## 2022-02-21 DIAGNOSIS — Z94.0 KIDNEY REPLACED BY TRANSPLANT: ICD-10-CM

## 2022-02-21 DIAGNOSIS — Z79.899 ENCOUNTER FOR LONG-TERM CURRENT USE OF MEDICATION: ICD-10-CM

## 2022-02-21 DIAGNOSIS — Z48.298 AFTERCARE FOLLOWING ORGAN TRANSPLANT: ICD-10-CM

## 2022-02-21 PROCEDURE — 86833 HLA CLASS II HIGH DEFIN QUAL: CPT

## 2022-02-21 PROCEDURE — 80197 ASSAY OF TACROLIMUS: CPT

## 2022-02-21 PROCEDURE — 86832 HLA CLASS I HIGH DEFIN QUAL: CPT

## 2022-02-25 LAB
TACROLIMUS BLD-MCNC: 3.7 UG/L (ref 5–15)
TME LAST DOSE: ABNORMAL H
TME LAST DOSE: ABNORMAL H

## 2022-02-28 LAB
DONOR IDENTIFICATION: NORMAL
DSA COMMENTS: NORMAL
DSA PRESENT: NO
DSA TEST METHOD: NORMAL
ORGAN: NORMAL
SA 1 CELL: NORMAL
SA 1 TEST METHOD: NORMAL
SA 2 CELL: NORMAL
SA 2 TEST METHOD: NORMAL
SA1 HI RISK ABY: NORMAL
SA1 MOD RISK ABY: NORMAL
SA2 HI RISK ABY: NORMAL
SA2 MOD RISK ABY: NORMAL
UNACCEPTABLE ANTIGENS: NORMAL
UNOS CPRA: 67
ZZZSA 1  COMMENTS: NORMAL
ZZZSA 2 COMMENTS: NORMAL

## 2022-03-07 ENCOUNTER — LAB (OUTPATIENT)
Dept: LAB | Facility: CLINIC | Age: 52
End: 2022-03-07
Payer: MEDICARE

## 2022-03-07 DIAGNOSIS — Z79.899 ENCOUNTER FOR LONG-TERM CURRENT USE OF MEDICATION: ICD-10-CM

## 2022-03-07 DIAGNOSIS — Z48.298 AFTERCARE FOLLOWING ORGAN TRANSPLANT: ICD-10-CM

## 2022-03-07 DIAGNOSIS — Z94.0 KIDNEY REPLACED BY TRANSPLANT: ICD-10-CM

## 2022-03-07 PROCEDURE — 80197 ASSAY OF TACROLIMUS: CPT | Performed by: INTERNAL MEDICINE

## 2022-03-10 LAB
TACROLIMUS BLD-MCNC: 7.8 UG/L (ref 5–15)
TME LAST DOSE: NORMAL H
TME LAST DOSE: NORMAL H

## 2022-03-11 ENCOUNTER — TELEPHONE (OUTPATIENT)
Dept: TRANSPLANT | Facility: CLINIC | Age: 52
End: 2022-03-11
Payer: COMMERCIAL

## 2022-03-11 DIAGNOSIS — Z94.0 KIDNEY TRANSPLANTED: ICD-10-CM

## 2022-03-11 RX ORDER — MAGNESIUM OXIDE 400 MG/1
TABLET ORAL
Qty: 30 TABLET | Refills: 0 | Status: SHIPPED | OUTPATIENT
Start: 2022-03-11 | End: 2022-04-08

## 2022-03-17 ENCOUNTER — TELEPHONE (OUTPATIENT)
Dept: TRANSPLANT | Facility: CLINIC | Age: 52
End: 2022-03-17
Payer: COMMERCIAL

## 2022-03-17 NOTE — TELEPHONE ENCOUNTER
Confirmed patient is taking 500mg mycophenolate BID. Repeat WBC on 3/10 was up to 3.5.   Repeat labs on Monday.     Patient v/u, he has appt w/ Dr Espinoza tomorrow.

## 2022-03-18 ENCOUNTER — VIRTUAL VISIT (OUTPATIENT)
Dept: NEPHROLOGY | Facility: CLINIC | Age: 52
End: 2022-03-18
Payer: COMMERCIAL

## 2022-03-18 DIAGNOSIS — E55.9 VITAMIN D DEFICIENCY: ICD-10-CM

## 2022-03-18 DIAGNOSIS — I15.1 HTN, KIDNEY TRANSPLANT RELATED: ICD-10-CM

## 2022-03-18 DIAGNOSIS — E87.20 METABOLIC ACIDOSIS: ICD-10-CM

## 2022-03-18 DIAGNOSIS — E83.42 HYPOMAGNESEMIA: ICD-10-CM

## 2022-03-18 DIAGNOSIS — D63.1 ANEMIA IN STAGE 4 CHRONIC KIDNEY DISEASE (H): ICD-10-CM

## 2022-03-18 DIAGNOSIS — Z94.0 HTN, KIDNEY TRANSPLANT RELATED: ICD-10-CM

## 2022-03-18 DIAGNOSIS — D84.9 IMMUNOSUPPRESSION (H): ICD-10-CM

## 2022-03-18 DIAGNOSIS — N18.4 CKD (CHRONIC KIDNEY DISEASE) STAGE 4, GFR 15-29 ML/MIN (H): ICD-10-CM

## 2022-03-18 DIAGNOSIS — Z94.0 KIDNEY REPLACED BY TRANSPLANT: Primary | ICD-10-CM

## 2022-03-18 DIAGNOSIS — Z48.298 AFTERCARE FOLLOWING ORGAN TRANSPLANT: ICD-10-CM

## 2022-03-18 DIAGNOSIS — Z29.89 NEED FOR PNEUMOCYSTIS PROPHYLAXIS: ICD-10-CM

## 2022-03-18 DIAGNOSIS — N18.4 ANEMIA IN STAGE 4 CHRONIC KIDNEY DISEASE (H): ICD-10-CM

## 2022-03-18 PROBLEM — I82.3 RENAL VEIN THROMBOSIS (H): Status: RESOLVED | Noted: 2021-10-27 | Resolved: 2022-03-18

## 2022-03-18 PROBLEM — E87.5 HYPERKALEMIA: Status: RESOLVED | Noted: 2021-11-07 | Resolved: 2022-03-18

## 2022-03-18 PROBLEM — T81.49XA SURGICAL SITE INFECTION: Status: RESOLVED | Noted: 2021-11-07 | Resolved: 2022-03-18

## 2022-03-18 PROCEDURE — 99214 OFFICE O/P EST MOD 30 MIN: CPT | Mod: 95 | Performed by: INTERNAL MEDICINE

## 2022-03-18 ASSESSMENT — PAIN SCALES - GENERAL: PAINLEVEL: MILD PAIN (3)

## 2022-03-18 NOTE — PROGRESS NOTES
Raj is a 51 year old who is being evaluated via a billable video visit.      How would you like to obtain your AVS? MyChart  If the video visit is dropped, the invitation should be resent by: Text to cell phone: [phone]  Will anyone else be joining your video visit? No    Video Start Time: 0801  Video-Visit Details    Type of service:  Video Visit    Video End Time:0815    Originating Location (pt. Location): Home    Distant Location (provider location):  [facility]     Platform used for Video Visit: [facility]      TRANSPLANT NEPHROLOGY CHRONIC POST TRANSPLANT VISIT    Assessment & Plan   # DDKT: Stable creatinine in the high 3s to low 4s range.  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  His previous DDKT is still working and patient remains off dialysis.  Patient has kidney transplant waiting time back to 2013, that should be able to be reinstated following this recent early graft failure.  Patient has some uremic symptoms, mostly with fatigue, although recent viral infection could be contributing to this.  Would look to make patient active on the kidney transplant waiting list in the next 2-3 months if he continues to get over his recent infection.  Patient is followed for CKD management by his primary Nephrologist.   - Baseline Creatinine:  ~ 3.5-4.5   - Proteinuria: Moderate (1-3 grams)   - Date DSA Last Checked: Feb/2022      Latest DSA: No cPRA: 67%   - BK Viremia: No   - Kidney Tx Biopsy: Oct 28, 2015; Result:  No diagnostic evidence of acute rejection.  Mild, resolving ATN.  Findings otherwise consistent with dysplastic kidney.    # Immunosuppression: Tacrolimus immediate release (goal 4-6) and Mycophenolate mofetil (dose 500 mg every 12 hours)   - Continue with intensive monitoring of immunosuppression for efficacy and toxicity.   - Changes: No     # Infection Prophylaxis:   Last CD4 Level: Not  checked  - PJP: Dapsone  - CMV: Valganciclovir (Valcyte); Patient is CMV IgG Ab discordant (D+/R-) and will continue on Valcyte x 6 months, then check CMV PCR monthly until 12 months post transplant.    # Hypertension: Borderline control;  Goal BP: < 130/80   - Changes: Not at this time; Will defer antihypertensive management to primary Nephrologist.    # Anemia in Chronic Renal Disease: Hgb: Stable      NADIYA: No   - Iron studies: Replete    # Leukopenia: Decreased WBC, likely due to recent viral infection, on to of immunosuppression.  Now with improved slightly improved WBC as patient recovers from infection.  Will follow.    # Mineral Bone Disorder:   - Secondary renal hyperparathyroidism; PTH level: Normal (18-80 pg/ml)        On treatment: None  - Vitamin D; level: Normal        On supplement: Yes  - Calcium; level: Normal        On supplement: Yes    # Electrolytes:   - Potassium; level: Normal        On supplement: No  - Magnesium; level: Normal        On supplement: Yes  - Bicarbonate; level: Normal        On supplement: Yes    # H/o Kidney Transplant Renal Vein Thrombosis, s/p Allograft Nephrectomy: Patient is now off anticoagulation.    # Fatigue: Significant fatigue, likely multifactorial related to kidney failure, as well as more recently a viral infection.  However, with recent induction immunosuppression on top of maintenance immunosuppression, cannot rule out viral infection.   - Will check CMV and EBV PCR.    # Viral Syndrome: Patient with persistent hoarse voice, but other symptoms have resolved.  Overall getting better.  Was SARS-CoV-2 negative.    # Skin Cancer Risk:    - Discussed sun protection and recommend regular follow up with Dermatology.    # Medical Compliance: Yes    # COVID-19 Virus Review: Discussed COVID-19 virus and the potential medical risks.  Reviewed preventative health recommendations, including wearing a mask where appropriate.  Recommended COVID vaccination should be up to date  "with either an initial vaccination or booster shot when appropriate.  Asked the patient to inform the transplant center if they are exposed or diagnosed with this virus.    # COVID Vaccination Up To Date: No, due for next dose; Has only received one dose of J&J vaccine    # Transplant History:  Etiology of Kidney Failure: Polycystic kidney disease (PKD)  Tx: DDKT  Transplant: 10/13/2015 (Kidney), 10/23/2021 (Kidney)  Significant changes in immunosuppression: Slightly decreased mycophenolate mofetil due to leukopenia.  Significant transplant-related complications: Renal vein thrombosis with 2nd kidney transplant requiring allograft nephrectomy.    Transplant Office Phone Number: [phone]    Assessment and plan was discussed with the patient and he voiced his understanding and agreement.    Return visit: Return in about 6 months (around 9/18/2022).    Francis Espinoza MD    Chief Complaint   Mr. Pierce is a 51 year old here for kidney transplant and immunosuppression management.    History of Present Illness    Mr. Pierce reports feeling okay overall with some medical complaints.  Since last clinic visit, patient reports no hospitalizations, but some new medical complaints.  He reports developing cold symptoms over the last month.  Symptoms included sore throat, productive cough, hoarseness and low grade fever.  No runny nose or sinus congestion.  Symptoms have mostly resolved, but still a hoarse voice and now a dry cough, both of which are slowly improving.    His energy level is \"terrible,\" even prior to recent infection as he tires very easy.  He is active, but only gets minimal exercise.  Denies any chest pain or shortness of breath with exertion.  No leg swelling.    Appetite is okay, \"average,\" but weight has been stable.  No nausea, vomiting or diarrhea.  No further fever, sweats or chills.  No night sweats.    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)     S/p nephrectomy     Moderate malnutrition (H)     Chronic kidney disease-mineral and bone disorder     Metabolic acidosis     Leukocytosis     Need for pneumocystis prophylaxis       Allergies   No Known Allergies    Medications   Current Outpatient Medications   Medication Sig     acetaminophen (TYLENOL) 325 MG tablet Take 2 tablets (650 mg) by mouth every 4 hours as needed for other (For optimal non-opioid multimodal pain management to improve pain control.)     atorvastatin (LIPITOR) 10 MG tablet Take 10 mg by mouth daily     calcium carbonate-vitamin D (OS-LILIANA WITH D) 500-200 MG-UNIT tablet Take 1 tablet by mouth 2 times daily     citalopram (CELEXA) 20 MG tablet Take 20 mg by mouth daily.     dapsone (ACZONE) 25 MG tablet Take 2 tablets (50 mg) by mouth daily     furosemide (LASIX) 20 MG tablet Take 1 tablet (20 mg) by mouth daily     magnesium oxide (MAG-OX) 400 MG tablet TAKE 1 TABLET (400 MG) BY MOUTH DAILY WITH LUNCH     mycophenolate (GENERIC EQUIVALENT) 250 MG capsule Take 3 capsules (750 mg) by mouth 2 times daily     sodium bicarbonate 650 MG tablet Take 2 tablets (1,300 mg) by mouth 3 times daily     tacrolimus (GENERIC EQUIVALENT) 0.5 MG capsule Take 1 capsule (0.5 mg) by mouth 2 times daily Total dose = 2.5 mg BID     tacrolimus (GENERIC EQUIVALENT) 1 MG capsule Take 2 capsules (2 mg) by mouth 2 times daily Total dose = 2.5 mg BID     valGANciclovir (VALCYTE) 450 MG tablet Take 1  tablet (450 mg) by mouth twice a week     No current facility-administered medications for this visit.     Medications Discontinued During This Encounter   Medication Reason     warfarin ANTICOAGULANT (COUMADIN) 5 MG tablet      warfarin ANTICOAGULANT (COUMADIN) 1 MG tablet      senna-docusate (SENOKOT-S/PERICOLACE) 8.6-50 MG tablet      oxyCODONE (ROXICODONE) 5 MG tablet      polyethylene glycol (MIRALAX) 17 GM/Dose powder      ondansetron (ZOFRAN-ODT) 4 MG ODT tab      Lidocaine (LIDOCARE) 4 % Patch        Physical Exam   Vital Signs: Deferred for this telemedicine visit.    GENERAL APPEARANCE: alert and no distress; Patient does have a significant hoarse voice  HENT: no obvious abnormalities on appearance   RESP: breathing appears unremarkable with normal rate, no audible wheezing or cough and no apparent shortness of breath with conversation  MS: extremities normal - no gross deformities noted  SKIN: no apparent rash and normal skin tone  NEURO: speech is clear with no obvious neurological deficits  PSYCH: mentation appears normal and affect normal    Data     Renal Latest Ref Rng & Units 2/21/2022 2/7/2022 1/31/2022   Na 133 - 144 mmol/L - - -   Na (external) 136 - 145 meq/L 138 139 141   K 3.4 - 5.3 mmol/L - - -   K (external) 3.5 - 5.1 meq/L 4.5 5.0 4.8   Cl 98 - 109 meq/L 102 105 107   CO2 20 - 32 mmol/L - - -   CO2 (external) 20 - 29 meq/L 22 22 23   BUN 7 - 30 mg/dL - - -   BUN (external) 6 - 22 mg/dL 57(H) 62(H) 62(H)   Cr 0.66 - 1.25 mg/dL - - -   Cr (external) 0.70 - 1.30 mg/dL 3.70(H) 3.83(H) 3.53(H)   Glucose 70 - 99 mg/dL - - -   Glucose (external) 70 - 99 mg/dL 101(H) 93 94   Ca  8.5 - 10.1 mg/dL - - -   Ca (external) 8.5 - 10.5 mg/dL 9.6 9.7 9.3   Mg 1.6 - 2.3 mg/dL - - -   Mg (external) 1.6 - 2.6 mg/dL 2.0 - -     Bone Health Latest Ref Rng & Units 2/21/2022 1/17/2022 12/13/2021   Phos 2.5 - 4.5 mg/dL - - -   Phos (external) 2.3 - 4.7 mg/dL 3.9 4.0 3.8   PTHi 18 - 80 pg/mL - - -   PTHi (external)  14 - 95 pg/mL 89 - -   Vit D Def 20 - 75 ug/L - - -   Vit D Def (external) 30 - 80 ng/mL - - -     Heme Latest Ref Rng & Units 2/21/2022 2/7/2022 1/31/2022   WBC 4.0 - 11.0 10e3/uL - - -   WBC (external) 4.0 - 11.0 K/uL 2.5(L) 4.2 4.1   Hgb 13.3 - 17.7 g/dL - - -   Hgb (external) 13.5 - 17.5 g/dL 12.0(L) 11.9(L) 11.3(L)   Plt 150 - 450 10e3/uL - - -   Plt (external) 140 - 400 K/uL 198 230 240   ABSOLUTE NEUTROPHIL 1.6 - 8.3 10e3/uL - - -   ABSOLUTE NEUTROPHILS (EXTERNAL) 1.6 - 8.1 10(3)/uL - - -   ABSOLUTE LYMPHOCYTES 0.8 - 5.3 10e3/uL - - -   ABSOLUTE LYMPHOCYTES (EXTERNAL) 0.9 - 3.5 10(3)/uL - - -   ABSOLUTE MONOCYTES 0.0 - 1.3 10e3/uL - - -   ABSOLUTE MONOCYTES (EXTERNAL) 0.0 - 1.1 10(3)/uL - - -   ABSOLUTE EOSINOPHILS 0.0 - 0.7 10e3/uL - - -   ABSOLUTE EOSINOPHILS (EXTERNAL) 0.0 - 0.8 10(3)/uL - - -   ABSOLUTE BASOPHILS 0.0 - 0.2 10e9/L - - -   ABSOLUTE BASOPHILS (EXTERNAL) 0.0 - 0.2 10(3)/uL - - -   ABS IMMATURE GRANULOCYTES 0 - 0.4 10e9/L - - -     Liver Latest Ref Rng & Units 11/1/2021 10/23/2021 10/11/2021   AP 40 - 150 U/L - 56 -   AP (external) 38 - 126 U/L - - -   TBili 0.2 - 1.3 mg/dL - 0.6 -   TBili (external) 0.2 - 1.3 mg/dL - - -   DBili (external) 0.0 - 0.4 mg/dL - - -   ALT 0 - 70 U/L - 24 -   ALT (external) 21 - 72 U/L - - -   AST 0 - 45 U/L - 23 -   AST (external) 17 - 59 U/L - - -   Tot Protein 6.8 - 8.8 g/dL - 6.5(L) -   Tot Protein (external) 6.3 - 8.2 g/dL - - -   Albumin 3.4 - 5.0 g/dL 2.0(L) 3.6 -   Albumin (external) 3.5 - 5.0 g/dL - - 4.2     Pancreas Latest Ref Rng & Units 10/23/2021 10/14/2015 10/13/2015   A1C 0.0 - 5.6 % 5.6 4.8 4.8     Iron studies Latest Ref Rng & Units 10/27/2021 3/3/2021 10/17/2015   Iron 35 - 180 ug/dL 19(L) - 13(L)   Iron sat 15 - 46 % 10(L) - 5(L)   Ferritin 26 - 388 ng/mL 597(H) - -   Ferritin (external) 18.0 - 464.0 ng/mL - 383.3 -     [facility] Txp Virology Latest Ref Rng & Units 2/21/2022 1/17/2022 12/16/2021   CVM DNA Quant - - - -   CMV QUANT IU/ML Not Detected  IU/mL - - -   LOG IU/ML OF CMVQNT <2.1 [Log:IU]/mL - - -   BK Spec - - - -   BK Res BKNEG copies/mL - - -   BK Log <2.7 Log copies/mL - - -   BK Quant Log Ext log IU/mL Undetected Undetected Undetected   BK Quant Result Ext Undetected IU/mL Undetected Undetected Undetected   BK Quant Spec Ext - Plasma - Blood   EBV VCA IGG ANTIBODY U/mL - - -   EBV CAPSID ANTIBODY IGG No detectable antibody. - - -   EBV DNA COPIES/ML EBVNEG [Copies]/mL - - -   EBV DNA LOG OF COPIES <2.7 [Log:copies]/mL - - -   Hep B Core NR - - -   Hep B Core Ext Nonreactive - - -   Hep B Surf - - - -   Hep B Surf Ext  - - - -   HIV 1&2 NEG - - -        Recent Labs   Lab Test 02/07/22  0736 02/21/22  0747 03/07/22  0803   DOSTAC 2/6/2022 2/20/2022 3/6/2022   TACROL 6.2 3.7* 7.8     Recent Labs   Lab Test 10/19/15  0707 10/26/15  0707 11/09/15  0758   DOSMPA 2,000 Not Provided 11/8/15 AT 2030   MPACID 1.27 4.01* 0.45*   MPAG >200.0* 190.0* 20.5*

## 2022-03-18 NOTE — LETTER
3/18/2022      RE: Raj Pierce  [address]  [place] [address]       Raj is a 51 year old who is being evaluated via a billable video visit.      How would you like to obtain your AVS? MyChart  If the video visit is dropped, the invitation should be resent by: Text to cell phone: [phone]  Will anyone else be joining your video visit? No    Video Start Time: 0801  Video-Visit Details    Type of service:  Video Visit    Video End Time:0815    Originating Location (pt. Location): Home    Distant Location (provider location):  [facility]     Platform used for Video Visit: [employer]      TRANSPLANT NEPHROLOGY CHRONIC POST TRANSPLANT VISIT    Assessment & Plan   # DDKT: Stable creatinine in the high 3s to low 4s range.  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  His previous DDKT is still working and patient remains off dialysis.  Patient has kidney transplant waiting time back to 2013, that should be able to be reinstated following this recent early graft failure.  Patient has some uremic symptoms, mostly with fatigue, although recent viral infection could be contributing to this.  Would look to make patient active on the kidney transplant waiting list in the next 2-3 months if he continues to get over his recent infection.  Patient is followed for CKD management by his primary Nephrologist.   - Baseline Creatinine:  ~ 3.5-4.5   - Proteinuria: Moderate (1-3 grams)   - Date DSA Last Checked: Feb/2022      Latest DSA: No cPRA: 67%   - BK Viremia: No   - Kidney Tx Biopsy: Oct 28, 2015; Result:  No diagnostic evidence of acute rejection.  Mild, resolving ATN.  Findings otherwise consistent with dysplastic kidney.    # Immunosuppression: Tacrolimus immediate release (goal 4-6) and Mycophenolate mofetil (dose 500 mg every 12 hours)   - Continue with intensive monitoring of immunosuppression for  efficacy and toxicity.   - Changes: No     # Infection Prophylaxis:   Last CD4 Level: Not checked  - PJP: Dapsone  - CMV: Valganciclovir (Valcyte); Patient is CMV IgG Ab discordant (D+/R-) and will continue on Valcyte x 6 months, then check CMV PCR monthly until 12 months post transplant.    # Hypertension: Borderline control;  Goal BP: < 130/80   - Changes: Not at this time; Will defer antihypertensive management to primary Nephrologist.    # Anemia in Chronic Renal Disease: Hgb: Stable      NADIYA: No   - Iron studies: Replete    # Leukopenia: Decreased WBC, likely due to recent viral infection, on to of immunosuppression.  Now with improved slightly improved WBC as patient recovers from infection.  Will follow.    # Mineral Bone Disorder:   - Secondary renal hyperparathyroidism; PTH level: Normal (18-80 pg/ml)        On treatment: None  - Vitamin D; level: Normal        On supplement: Yes  - Calcium; level: Normal        On supplement: Yes    # Electrolytes:   - Potassium; level: Normal        On supplement: No  - Magnesium; level: Normal        On supplement: Yes  - Bicarbonate; level: Normal        On supplement: Yes    # H/o Kidney Transplant Renal Vein Thrombosis, s/p Allograft Nephrectomy: Patient is now off anticoagulation.    # Fatigue: Significant fatigue, likely multifactorial related to kidney failure, as well as more recently a viral infection.  However, with recent induction immunosuppression on top of maintenance immunosuppression, cannot rule out viral infection.   - Will check CMV and EBV PCR.    # Viral Syndrome: Patient with persistent hoarse voice, but other symptoms have resolved.  Overall getting better.  Was SARS-CoV-2 negative.    # Skin Cancer Risk:    - Discussed sun protection and recommend regular follow up with Dermatology.    # Medical Compliance: Yes    # COVID-19 Virus Review: Discussed COVID-19 virus and the potential medical risks.  Reviewed preventative health recommendations,  "including wearing a mask where appropriate.  Recommended COVID vaccination should be up to date with either an initial vaccination or booster shot when appropriate.  Asked the patient to inform the transplant center if they are exposed or diagnosed with this virus.    # COVID Vaccination Up To Date: No, due for next dose; Has only received one dose of J&J vaccine    # Transplant History:  Etiology of Kidney Failure: Polycystic kidney disease (PKD)  Tx: DDKT  Transplant: 10/13/2015 (Kidney), 10/23/2021 (Kidney)  Significant changes in immunosuppression: Slightly decreased mycophenolate mofetil due to leukopenia.  Significant transplant-related complications: Renal vein thrombosis with 2nd kidney transplant requiring allograft nephrectomy.    Transplant Office Phone Number: [phone]    Assessment and plan was discussed with the patient and he voiced his understanding and agreement.    Return visit: Return in about 6 months (around 9/18/2022).    Francis Espinoza MD    Chief Complaint   Mr. Pierce is a 51 year old here for kidney transplant and immunosuppression management.    History of Present Illness    Mr. Pierce reports feeling okay overall with some medical complaints.  Since last clinic visit, patient reports no hospitalizations, but some new medical complaints.  He reports developing cold symptoms over the last month.  Symptoms included sore throat, productive cough, hoarseness and low grade fever.  No runny nose or sinus congestion.  Symptoms have mostly resolved, but still a hoarse voice and now a dry cough, both of which are slowly improving.    His energy level is \"terrible,\" even prior to recent infection as he tires very easy.  He is active, but only gets minimal exercise.  Denies any chest pain or shortness of breath with exertion.  No leg swelling.    Appetite is okay, \"average,\" but weight has been stable.  No nausea, vomiting or diarrhea.  No further fever, sweats or chills.  No night " sweats.    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)     S/p nephrectomy     Moderate malnutrition (H)     Chronic kidney disease-mineral and bone disorder     Metabolic acidosis     Leukocytosis     Need for pneumocystis prophylaxis       Allergies   No Known Allergies    Medications   Current Outpatient Medications   Medication Sig     acetaminophen (TYLENOL) 325 MG tablet Take 2 tablets (650 mg) by mouth every 4 hours as needed for other (For optimal non-opioid multimodal pain management to improve pain control.)     atorvastatin (LIPITOR) 10 MG tablet Take 10 mg by mouth daily     calcium carbonate-vitamin D (OS-LILIANA WITH D) 500-200 MG-UNIT tablet Take 1 tablet by mouth 2 times daily     citalopram (CELEXA) 20 MG tablet Take 20 mg by mouth daily.     dapsone (ACZONE) 25 MG tablet Take 2 tablets (50 mg) by mouth daily     furosemide (LASIX) 20 MG tablet Take 1 tablet (20 mg) by mouth daily     magnesium oxide (MAG-OX) 400 MG tablet TAKE 1 TABLET (400 MG) BY MOUTH DAILY WITH LUNCH     mycophenolate (GENERIC EQUIVALENT) 250 MG capsule Take 3 capsules (750 mg) by mouth 2 times daily     sodium bicarbonate 650 MG tablet Take 2 tablets (1,300 mg) by mouth 3 times daily     tacrolimus (GENERIC EQUIVALENT) 0.5 MG capsule Take 1 capsule (0.5 mg) by mouth 2 times daily Total dose = 2.5 mg BID     tacrolimus (GENERIC EQUIVALENT) 1 MG capsule Take 2 capsules (2 mg) by mouth 2  times daily Total dose = 2.5 mg BID     valGANciclovir (VALCYTE) 450 MG tablet Take 1 tablet (450 mg) by mouth twice a week     No current facility-administered medications for this visit.     Medications Discontinued During This Encounter   Medication Reason     warfarin ANTICOAGULANT (COUMADIN) 5 MG tablet      warfarin ANTICOAGULANT (COUMADIN) 1 MG tablet      senna-docusate (SENOKOT-S/PERICOLACE) 8.6-50 MG tablet      oxyCODONE (ROXICODONE) 5 MG tablet      polyethylene glycol (MIRALAX) 17 GM/Dose powder      ondansetron (ZOFRAN-ODT) 4 MG ODT tab      Lidocaine (LIDOCARE) 4 % Patch        Physical Exam   Vital Signs: Deferred for this telemedicine visit.    GENERAL APPEARANCE: alert and no distress; Patient does have a significant hoarse voice  HENT: no obvious abnormalities on appearance   RESP: breathing appears unremarkable with normal rate, no audible wheezing or cough and no apparent shortness of breath with conversation  MS: extremities normal - no gross deformities noted  SKIN: no apparent rash and normal skin tone  NEURO: speech is clear with no obvious neurological deficits  PSYCH: mentation appears normal and affect normal    Data     Renal Latest Ref Rng & Units 2/21/2022 2/7/2022 1/31/2022   Na 133 - 144 mmol/L - - -   Na (external) 136 - 145 meq/L 138 139 141   K 3.4 - 5.3 mmol/L - - -   K (external) 3.5 - 5.1 meq/L 4.5 5.0 4.8   Cl 98 - 109 meq/L 102 105 107   CO2 20 - 32 mmol/L - - -   CO2 (external) 20 - 29 meq/L 22 22 23   BUN 7 - 30 mg/dL - - -   BUN (external) 6 - 22 mg/dL 57(H) 62(H) 62(H)   Cr 0.66 - 1.25 mg/dL - - -   Cr (external) 0.70 - 1.30 mg/dL 3.70(H) 3.83(H) 3.53(H)   Glucose 70 - 99 mg/dL - - -   Glucose (external) 70 - 99 mg/dL 101(H) 93 94   Ca  8.5 - 10.1 mg/dL - - -   Ca (external) 8.5 - 10.5 mg/dL 9.6 9.7 9.3   Mg 1.6 - 2.3 mg/dL - - -   Mg (external) 1.6 - 2.6 mg/dL 2.0 - -     Bone Health Latest Ref Rng & Units 2/21/2022 1/17/2022 12/13/2021   Phos 2.5 - 4.5 mg/dL - - -    Phos (external) 2.3 - 4.7 mg/dL 3.9 4.0 3.8   PTHi 18 - 80 pg/mL - - -   PTHi (external) 14 - 95 pg/mL 89 - -   Vit D Def 20 - 75 ug/L - - -   Vit D Def (external) 30 - 80 ng/mL - - -     Heme Latest Ref Rng & Units 2/21/2022 2/7/2022 1/31/2022   WBC 4.0 - 11.0 10e3/uL - - -   WBC (external) 4.0 - 11.0 K/uL 2.5(L) 4.2 4.1   Hgb 13.3 - 17.7 g/dL - - -   Hgb (external) 13.5 - 17.5 g/dL 12.0(L) 11.9(L) 11.3(L)   Plt 150 - 450 10e3/uL - - -   Plt (external) 140 - 400 K/uL 198 230 240   ABSOLUTE NEUTROPHIL 1.6 - 8.3 10e3/uL - - -   ABSOLUTE NEUTROPHILS (EXTERNAL) 1.6 - 8.1 10(3)/uL - - -   ABSOLUTE LYMPHOCYTES 0.8 - 5.3 10e3/uL - - -   ABSOLUTE LYMPHOCYTES (EXTERNAL) 0.9 - 3.5 10(3)/uL - - -   ABSOLUTE MONOCYTES 0.0 - 1.3 10e3/uL - - -   ABSOLUTE MONOCYTES (EXTERNAL) 0.0 - 1.1 10(3)/uL - - -   ABSOLUTE EOSINOPHILS 0.0 - 0.7 10e3/uL - - -   ABSOLUTE EOSINOPHILS (EXTERNAL) 0.0 - 0.8 10(3)/uL - - -   ABSOLUTE BASOPHILS 0.0 - 0.2 10e9/L - - -   ABSOLUTE BASOPHILS (EXTERNAL) 0.0 - 0.2 10(3)/uL - - -   ABS IMMATURE GRANULOCYTES 0 - 0.4 10e9/L - - -     Liver Latest Ref Rng & Units 11/1/2021 10/23/2021 10/11/2021   AP 40 - 150 U/L - 56 -   AP (external) 38 - 126 U/L - - -   TBili 0.2 - 1.3 mg/dL - 0.6 -   TBili (external) 0.2 - 1.3 mg/dL - - -   DBili (external) 0.0 - 0.4 mg/dL - - -   ALT 0 - 70 U/L - 24 -   ALT (external) 21 - 72 U/L - - -   AST 0 - 45 U/L - 23 -   AST (external) 17 - 59 U/L - - -   Tot Protein 6.8 - 8.8 g/dL - 6.5(L) -   Tot Protein (external) 6.3 - 8.2 g/dL - - -   Albumin 3.4 - 5.0 g/dL 2.0(L) 3.6 -   Albumin (external) 3.5 - 5.0 g/dL - - 4.2     Pancreas Latest Ref Rng & Units 10/23/2021 10/14/2015 10/13/2015   A1C 0.0 - 5.6 % 5.6 4.8 4.8     Iron studies Latest Ref Rng & Units 10/27/2021 3/3/2021 10/17/2015   Iron 35 - 180 ug/dL 19(L) - 13(L)   Iron sat 15 - 46 % 10(L) - 5(L)   Ferritin 26 - 388 ng/mL 597(H) - -   Ferritin (external) 18.0 - 464.0 ng/mL - 383.3 -     [facility] Txp Virology Latest Ref Rng &  Units 2/21/2022 1/17/2022 12/16/2021   CVM DNA Quant - - - -   CMV QUANT IU/ML Not Detected IU/mL - - -   LOG IU/ML OF CMVQNT <2.1 [Log:IU]/mL - - -   BK Spec - - - -   BK Res BKNEG copies/mL - - -   BK Log <2.7 Log copies/mL - - -   BK Quant Log Ext log IU/mL Undetected Undetected Undetected   BK Quant Result Ext Undetected IU/mL Undetected Undetected Undetected   BK Quant Spec Ext - Plasma - Blood   EBV VCA IGG ANTIBODY U/mL - - -   EBV CAPSID ANTIBODY IGG No detectable antibody. - - -   EBV DNA COPIES/ML EBVNEG [Copies]/mL - - -   EBV DNA LOG OF COPIES <2.7 [Log:copies]/mL - - -   Hep B Core NR - - -   Hep B Core Ext Nonreactive - - -   Hep B Surf - - - -   Hep B Surf Ext  - - - -   HIV 1&2 NEG - - -        Recent Labs   Lab Test 02/07/22  0736 02/21/22  0747 03/07/22  0803   DOSTAC 2/6/2022 2/20/2022 3/6/2022   TACROL 6.2 3.7* 7.8     Recent Labs   Lab Test 10/19/15  0707 10/26/15  0707 11/09/15  0758   DOSMPA 2,000 Not Provided 11/8/15 AT 2030   MPACID 1.27 4.01* 0.45*   MPAG >200.0* 190.0* 20.5*       Francis Espinoza MD

## 2022-03-18 NOTE — LETTER
3/18/2022       RE: Raj Pierce  [address]  [place] [address]     Dear Colleague,    Thank you for referring your patient, Raj Pierce, to the [facility] at [facility]. Please see a copy of my visit note below.    Raj is a 51 year old who is being evaluated via a billable video visit.      How would you like to obtain your AVS? MyChart  If the video visit is dropped, the invitation should be resent by: Text to cell phone: [phone]  Will anyone else be joining your video visit? No    Video Start Time: 0801  Video-Visit Details    Type of service:  Video Visit    Video End Time:0815    Originating Location (pt. Location): Home    Distant Location (provider location):  [facility]     Platform used for Video Visit: [facility]      TRANSPLANT NEPHROLOGY CHRONIC POST TRANSPLANT VISIT    Assessment & Plan   # DDKT: Stable creatinine in the high 3s to low 4s range.  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  His previous DDKT is still working and patient remains off dialysis.  Patient has kidney transplant waiting time back to 2013, that should be able to be reinstated following this recent early graft failure.  Patient has some uremic symptoms, mostly with fatigue, although recent viral infection could be contributing to this.  Would look to make patient active on the kidney transplant waiting list in the next 2-3 months if he continues to get over his recent infection.  Patient is followed for CKD management by his primary Nephrologist.   - Baseline Creatinine:  ~ 3.5-4.5   - Proteinuria: Moderate (1-3 grams)   - Date DSA Last Checked: Feb/2022      Latest DSA: No cPRA: 67%   - BK Viremia: No   - Kidney Tx Biopsy: Oct 28, 2015; Result:  No diagnostic evidence of acute rejection.  Mild, resolving ATN.  Findings  otherwise consistent with dysplastic kidney.    # Immunosuppression: Tacrolimus immediate release (goal 4-6) and Mycophenolate mofetil (dose 500 mg every 12 hours)   - Continue with intensive monitoring of immunosuppression for efficacy and toxicity.   - Changes: No     # Infection Prophylaxis:   Last CD4 Level: Not checked  - PJP: Dapsone  - CMV: Valganciclovir (Valcyte); Patient is CMV IgG Ab discordant (D+/R-) and will continue on Valcyte x 6 months, then check CMV PCR monthly until 12 months post transplant.    # Hypertension: Borderline control;  Goal BP: < 130/80   - Changes: Not at this time; Will defer antihypertensive management to primary Nephrologist.    # Anemia in Chronic Renal Disease: Hgb: Stable      NADIYA: No   - Iron studies: Replete    # Leukopenia: Decreased WBC, likely due to recent viral infection, on to of immunosuppression.  Now with improved slightly improved WBC as patient recovers from infection.  Will follow.    # Mineral Bone Disorder:   - Secondary renal hyperparathyroidism; PTH level: Normal (18-80 pg/ml)        On treatment: None  - Vitamin D; level: Normal        On supplement: Yes  - Calcium; level: Normal        On supplement: Yes    # Electrolytes:   - Potassium; level: Normal        On supplement: No  - Magnesium; level: Normal        On supplement: Yes  - Bicarbonate; level: Normal        On supplement: Yes    # H/o Kidney Transplant Renal Vein Thrombosis, s/p Allograft Nephrectomy: Patient is now off anticoagulation.    # Fatigue: Significant fatigue, likely multifactorial related to kidney failure, as well as more recently a viral infection.  However, with recent induction immunosuppression on top of maintenance immunosuppression, cannot rule out viral infection.   - Will check CMV and EBV PCR.    # Viral Syndrome: Patient with persistent hoarse voice, but other symptoms have resolved.  Overall getting better.  Was SARS-CoV-2 negative.    # Skin Cancer Risk:    - Discussed sun  "protection and recommend regular follow up with Dermatology.    # Medical Compliance: Yes    # COVID-19 Virus Review: Discussed COVID-19 virus and the potential medical risks.  Reviewed preventative health recommendations, including wearing a mask where appropriate.  Recommended COVID vaccination should be up to date with either an initial vaccination or booster shot when appropriate.  Asked the patient to inform the transplant center if they are exposed or diagnosed with this virus.    # COVID Vaccination Up To Date: No, due for next dose; Has only received one dose of J&J vaccine    # Transplant History:  Etiology of Kidney Failure: Polycystic kidney disease (PKD)  Tx: DDKT  Transplant: 10/13/2015 (Kidney), 10/23/2021 (Kidney)  Significant changes in immunosuppression: Slightly decreased mycophenolate mofetil due to leukopenia.  Significant transplant-related complications: Renal vein thrombosis with 2nd kidney transplant requiring allograft nephrectomy.    Transplant Office Phone Number: [phone]    Assessment and plan was discussed with the patient and he voiced his understanding and agreement.    Return visit: Return in about 6 months (around 9/18/2022).    Francis Espinoza MD    Chief Complaint   Mr. Pierce is a 51 year old here for kidney transplant and immunosuppression management.    History of Present Illness    Mr. Pierce reports feeling okay overall with some medical complaints.  Since last clinic visit, patient reports no hospitalizations, but some new medical complaints.  He reports developing cold symptoms over the last month.  Symptoms included sore throat, productive cough, hoarseness and low grade fever.  No runny nose or sinus congestion.  Symptoms have mostly resolved, but still a hoarse voice and now a dry cough, both of which are slowly improving.    His energy level is \"terrible,\" even prior to recent infection as he tires very easy.  He is active, but only gets minimal exercise.  " "Denies any chest pain or shortness of breath with exertion.  No leg swelling.    Appetite is okay, \"average,\" but weight has been stable.  No nausea, vomiting or diarrhea.  No further fever, sweats or chills.  No night sweats.    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)     S/p nephrectomy     Moderate malnutrition (H)     Chronic kidney disease-mineral and bone disorder     Metabolic acidosis     Leukocytosis     Need for pneumocystis prophylaxis       Allergies   No Known Allergies    Medications   Current Outpatient Medications   Medication Sig     acetaminophen (TYLENOL) 325 MG tablet Take 2 tablets (650 mg) by mouth every 4 hours as needed for other (For optimal non-opioid multimodal pain management to improve pain control.)     atorvastatin (LIPITOR) 10 MG tablet Take 10 mg by mouth daily     calcium carbonate-vitamin D (OS-LILIANA WITH D) 500-200 MG-UNIT tablet Take 1 tablet by mouth 2 times daily     citalopram (CELEXA) 20 MG tablet Take 20 mg by mouth daily.     dapsone (ACZONE) 25 MG tablet Take 2 tablets (50 mg) by mouth daily     furosemide (LASIX) 20 MG tablet Take 1 tablet (20 mg) by mouth daily     magnesium oxide (MAG-OX) 400 MG tablet TAKE 1 TABLET (400 MG) BY MOUTH DAILY WITH LUNCH     mycophenolate (GENERIC EQUIVALENT) 250 MG capsule Take 3 capsules (750 mg) by mouth 2 times daily     sodium bicarbonate 650 MG tablet Take 2 tablets (1,300 mg) by mouth 3 " times daily     tacrolimus (GENERIC EQUIVALENT) 0.5 MG capsule Take 1 capsule (0.5 mg) by mouth 2 times daily Total dose = 2.5 mg BID     tacrolimus (GENERIC EQUIVALENT) 1 MG capsule Take 2 capsules (2 mg) by mouth 2 times daily Total dose = 2.5 mg BID     valGANciclovir (VALCYTE) 450 MG tablet Take 1 tablet (450 mg) by mouth twice a week     No current facility-administered medications for this visit.     Medications Discontinued During This Encounter   Medication Reason     warfarin ANTICOAGULANT (COUMADIN) 5 MG tablet      warfarin ANTICOAGULANT (COUMADIN) 1 MG tablet      senna-docusate (SENOKOT-S/PERICOLACE) 8.6-50 MG tablet      oxyCODONE (ROXICODONE) 5 MG tablet      polyethylene glycol (MIRALAX) 17 GM/Dose powder      ondansetron (ZOFRAN-ODT) 4 MG ODT tab      Lidocaine (LIDOCARE) 4 % Patch        Physical Exam   Vital Signs: Deferred for this telemedicine visit.    GENERAL APPEARANCE: alert and no distress; Patient does have a significant hoarse voice  HENT: no obvious abnormalities on appearance   RESP: breathing appears unremarkable with normal rate, no audible wheezing or cough and no apparent shortness of breath with conversation  MS: extremities normal - no gross deformities noted  SKIN: no apparent rash and normal skin tone  NEURO: speech is clear with no obvious neurological deficits  PSYCH: mentation appears normal and affect normal    Data     Renal Latest Ref Rng & Units 2/21/2022 2/7/2022 1/31/2022   Na 133 - 144 mmol/L - - -   Na (external) 136 - 145 meq/L 138 139 141   K 3.4 - 5.3 mmol/L - - -   K (external) 3.5 - 5.1 meq/L 4.5 5.0 4.8   Cl 98 - 109 meq/L 102 105 107   CO2 20 - 32 mmol/L - - -   CO2 (external) 20 - 29 meq/L 22 22 23   BUN 7 - 30 mg/dL - - -   BUN (external) 6 - 22 mg/dL 57(H) 62(H) 62(H)   Cr 0.66 - 1.25 mg/dL - - -   Cr (external) 0.70 - 1.30 mg/dL 3.70(H) 3.83(H) 3.53(H)   Glucose 70 - 99 mg/dL - - -   Glucose (external) 70 - 99 mg/dL 101(H) 93 94   Ca  8.5 - 10.1 mg/dL - - -    Ca (external) 8.5 - 10.5 mg/dL 9.6 9.7 9.3   Mg 1.6 - 2.3 mg/dL - - -   Mg (external) 1.6 - 2.6 mg/dL 2.0 - -     Bone Health Latest Ref Rng & Units 2/21/2022 1/17/2022 12/13/2021   Phos 2.5 - 4.5 mg/dL - - -   Phos (external) 2.3 - 4.7 mg/dL 3.9 4.0 3.8   PTHi 18 - 80 pg/mL - - -   PTHi (external) 14 - 95 pg/mL 89 - -   Vit D Def 20 - 75 ug/L - - -   Vit D Def (external) 30 - 80 ng/mL - - -     Heme Latest Ref Rng & Units 2/21/2022 2/7/2022 1/31/2022   WBC 4.0 - 11.0 10e3/uL - - -   WBC (external) 4.0 - 11.0 K/uL 2.5(L) 4.2 4.1   Hgb 13.3 - 17.7 g/dL - - -   Hgb (external) 13.5 - 17.5 g/dL 12.0(L) 11.9(L) 11.3(L)   Plt 150 - 450 10e3/uL - - -   Plt (external) 140 - 400 K/uL 198 230 240   ABSOLUTE NEUTROPHIL 1.6 - 8.3 10e3/uL - - -   ABSOLUTE NEUTROPHILS (EXTERNAL) 1.6 - 8.1 10(3)/uL - - -   ABSOLUTE LYMPHOCYTES 0.8 - 5.3 10e3/uL - - -   ABSOLUTE LYMPHOCYTES (EXTERNAL) 0.9 - 3.5 10(3)/uL - - -   ABSOLUTE MONOCYTES 0.0 - 1.3 10e3/uL - - -   ABSOLUTE MONOCYTES (EXTERNAL) 0.0 - 1.1 10(3)/uL - - -   ABSOLUTE EOSINOPHILS 0.0 - 0.7 10e3/uL - - -   ABSOLUTE EOSINOPHILS (EXTERNAL) 0.0 - 0.8 10(3)/uL - - -   ABSOLUTE BASOPHILS 0.0 - 0.2 10e9/L - - -   ABSOLUTE BASOPHILS (EXTERNAL) 0.0 - 0.2 10(3)/uL - - -   ABS IMMATURE GRANULOCYTES 0 - 0.4 10e9/L - - -     Liver Latest Ref Rng & Units 11/1/2021 10/23/2021 10/11/2021   AP 40 - 150 U/L - 56 -   AP (external) 38 - 126 U/L - - -   TBili 0.2 - 1.3 mg/dL - 0.6 -   TBili (external) 0.2 - 1.3 mg/dL - - -   DBili (external) 0.0 - 0.4 mg/dL - - -   ALT 0 - 70 U/L - 24 -   ALT (external) 21 - 72 U/L - - -   AST 0 - 45 U/L - 23 -   AST (external) 17 - 59 U/L - - -   Tot Protein 6.8 - 8.8 g/dL - 6.5(L) -   Tot Protein (external) 6.3 - 8.2 g/dL - - -   Albumin 3.4 - 5.0 g/dL 2.0(L) 3.6 -   Albumin (external) 3.5 - 5.0 g/dL - - 4.2     Pancreas Latest Ref Rng & Units 10/23/2021 10/14/2015 10/13/2015   A1C 0.0 - 5.6 % 5.6 4.8 4.8     Iron studies Latest Ref Rng & Units 10/27/2021  3/3/2021 10/17/2015   Iron 35 - 180 ug/dL 19(L) - 13(L)   Iron sat 15 - 46 % 10(L) - 5(L)   Ferritin 26 - 388 ng/mL 597(H) - -   Ferritin (external) 18.0 - 464.0 ng/mL - 383.3 -     UMP Txp Virology Latest Ref Rng & Units 2/21/2022 1/17/2022 12/16/2021   CVM DNA Quant - - - -   CMV QUANT IU/ML Not Detected IU/mL - - -   LOG IU/ML OF CMVQNT <2.1 [Log:IU]/mL - - -   BK Spec - - - -   BK Res BKNEG copies/mL - - -   BK Log <2.7 Log copies/mL - - -   BK Quant Log Ext log IU/mL Undetected Undetected Undetected   BK Quant Result Ext Undetected IU/mL Undetected Undetected Undetected   BK Quant Spec Ext - Plasma - Blood   EBV VCA IGG ANTIBODY U/mL - - -   EBV CAPSID ANTIBODY IGG No detectable antibody. - - -   EBV DNA COPIES/ML EBVNEG [Copies]/mL - - -   EBV DNA LOG OF COPIES <2.7 [Log:copies]/mL - - -   Hep B Core NR - - -   Hep B Core Ext Nonreactive - - -   Hep B Surf - - - -   Hep B Surf Ext  - - - -   HIV 1&2 NEG - - -        Recent Labs   Lab Test 02/07/22  0736 02/21/22  0747 03/07/22  0803   DOSTAC 2/6/2022 2/20/2022 3/6/2022   TACROL 6.2 3.7* 7.8     Recent Labs   Lab Test 10/19/15  0707 10/26/15  0707 11/09/15  0758   DOSMPA 2,000 Not Provided 11/8/15 AT 2030   MPACID 1.27 4.01* 0.45*   MPAG >200.0* 190.0* 20.5*       Again, thank you for allowing me to participate in the care of your patient.      Sincerely,    Francis Espinoza MD

## 2022-03-21 ENCOUNTER — LAB (OUTPATIENT)
Dept: LAB | Facility: CLINIC | Age: 52
End: 2022-03-21
Payer: COMMERCIAL

## 2022-03-21 DIAGNOSIS — Z48.298 AFTERCARE FOLLOWING ORGAN TRANSPLANT: ICD-10-CM

## 2022-03-21 DIAGNOSIS — Z79.899 ENCOUNTER FOR LONG-TERM CURRENT USE OF MEDICATION: ICD-10-CM

## 2022-03-21 DIAGNOSIS — Z94.0 KIDNEY REPLACED BY TRANSPLANT: ICD-10-CM

## 2022-03-21 PROCEDURE — 80197 ASSAY OF TACROLIMUS: CPT

## 2022-03-22 LAB
TACROLIMUS BLD-MCNC: 4.8 UG/L (ref 5–15)
TME LAST DOSE: ABNORMAL H
TME LAST DOSE: ABNORMAL H

## 2022-03-23 ENCOUNTER — REFERRAL (OUTPATIENT)
Dept: TRANSPLANT | Facility: CLINIC | Age: 52
End: 2022-03-23
Payer: COMMERCIAL

## 2022-03-23 DIAGNOSIS — Z76.82 ORGAN TRANSPLANT CANDIDATE: ICD-10-CM

## 2022-03-23 DIAGNOSIS — G47.33 OBSTRUCTIVE SLEEP APNEA: ICD-10-CM

## 2022-03-23 DIAGNOSIS — Q61.3 POLYCYSTIC KIDNEY DISEASE: ICD-10-CM

## 2022-03-23 DIAGNOSIS — N18.6 ESRD (END STAGE RENAL DISEASE) (H): Primary | ICD-10-CM

## 2022-03-23 DIAGNOSIS — I10 ESSENTIAL HYPERTENSION: ICD-10-CM

## 2022-03-23 DIAGNOSIS — N18.9 CHRONIC RENAL FAILURE: ICD-10-CM

## 2022-03-23 DIAGNOSIS — T86.12 KIDNEY TRANSPLANT FAILURE: ICD-10-CM

## 2022-03-23 DIAGNOSIS — N18.4 CHRONIC KIDNEY DISEASE, STAGE IV (SEVERE) (H): ICD-10-CM

## 2022-03-23 DIAGNOSIS — Z01.818 PRE-TRANSPLANT EVALUATION FOR KIDNEY TRANSPLANT: ICD-10-CM

## 2022-03-23 DIAGNOSIS — E78.5 HYPERLIPIDEMIA: ICD-10-CM

## 2022-03-23 NOTE — LETTER
March 29, 2022    Raj Pierce  [address]  [place] [address]    Dear Raj,    Thank you for your interest in the Transplant Center at [facility]. We look forward to being a part of your care team and assisting you through the transplant process.    As we discussed, your transplant coordinator is Pham Lynch (Kidney).  You may call your coordinator at any time with questions or concerns call [phone].    Please complete the following.    1. Fill out and return the enclosed forms    Authorization for Electronic Communication    Authorization to Discuss Protected Health Information    Authorization for Release of Protected Health Information    Authorization for Care Everywhere Release of Information    2. Sign up for:    [employer], access to your electronic medical record (see enclosed pamphlet)    [email or web address], a transplant education website    You can use these tools to learn more about your transplant, communicate with your care team, and track your medical details      Sincerely,  Solid Organ Transplant  [facility]    cc: Referring Physician and PCP

## 2022-03-25 NOTE — TELEPHONE ENCOUNTER
PCP: Do Guerrero   Referring Provider: Dr. Francis Espinoza   Referring Diagnosis: Failed Kidney Post Transplant.     GFR/Date: 17 (2/21/22)    Is patient under the age of 65? Yes  Is patient diabetic? No  Is patient on insulin? No  Was patient offered a pancreas transplant referral? No    Is patient in a group home/assisted living? No  Does patient have a guardian? No    Referral intake process completed.  Patient is aware that after financial approval is received, medical records will be requested.   Patient confirmed for a callback from transplant coordinator on 4/11/22 . (within 2 weeks)  Tentative evaluation date 5/12/22 (within 4 weeks) if appointment is virtual, does patient have capabilities of setting this up? Patient prefers in clinic appt.     Confirmed coordinator will discuss evaluation process in more detail at the time of their call.   Patient is aware of the need to arrange age appropriate cancer screening, vaccinations, and dental care.  Reminded patient to complete questionnaire, complete medical records release, and review packet prior to evaluation visit .  Assessed patient for special needs (ie-wheelchair, assistance, guardian, and ):  None  Patient instructed to call [phone] with questions.     Patient gave verbal consent during intake call to obtain medical records and documents outside of [facility]:  Yes    ROWENA Zavaleta, LPN       Solid Organ Transplant

## 2022-04-04 ENCOUNTER — LAB (OUTPATIENT)
Dept: LAB | Facility: CLINIC | Age: 52
End: 2022-04-04
Payer: COMMERCIAL

## 2022-04-04 DIAGNOSIS — Z79.899 ENCOUNTER FOR LONG-TERM CURRENT USE OF MEDICATION: ICD-10-CM

## 2022-04-04 DIAGNOSIS — Z94.0 KIDNEY REPLACED BY TRANSPLANT: ICD-10-CM

## 2022-04-04 DIAGNOSIS — Z48.298 AFTERCARE FOLLOWING ORGAN TRANSPLANT: ICD-10-CM

## 2022-04-04 PROCEDURE — 80197 ASSAY OF TACROLIMUS: CPT

## 2022-04-06 LAB
TACROLIMUS BLD-MCNC: 7 UG/L (ref 5–15)
TME LAST DOSE: NORMAL H
TME LAST DOSE: NORMAL H

## 2022-04-08 ENCOUNTER — TELEPHONE (OUTPATIENT)
Dept: TRANSPLANT | Facility: CLINIC | Age: 52
End: 2022-04-08
Payer: COMMERCIAL

## 2022-04-08 DIAGNOSIS — Z94.0 KIDNEY TRANSPLANTED: ICD-10-CM

## 2022-04-08 RX ORDER — MAGNESIUM OXIDE 400 MG/1
TABLET ORAL
Qty: 30 TABLET | Refills: 0 | Status: SHIPPED | OUTPATIENT
Start: 2022-04-08 | End: 2022-09-20

## 2022-04-08 NOTE — TELEPHONE ENCOUNTER
ISSUE:  Rise in creatinine    PLAN:  Assess patient for symptoms of uremia  N/V/D?    OUTCOME:  Patient states he is feeling well, denies uremic s/s  Denies N/V/D    Will repeat labs in 1 week. See other note about tacro adjustment.

## 2022-04-11 NOTE — TELEPHONE ENCOUNTER
Contacted patient and introduced myself as their Transplant Coordinator, also introduced the role of the Transplant Coordinator in the transplant process.  Explained the purpose of this call including reviewing next steps and answering questions.    Confirmed Referring Provider, Dialysis Center, and Primary Care Physician. Notified patient of the importance of continued communication with referring providers and primary care physicians.    Reviewed components of transplant evaluation process including necessary appointments, tests, and procedures.    Answered questions for patient regarding evaluation, provided my name and contact information and requested they call with any additional questions.    Determined that patient would like additional information regarding transplant by:     Drop Down choices: Mail, Email, MyChart, Phone Call   Encourage MyChart   Notified patients that they will hear from a Transplant  to schedule evaluation.       Reviewed pt's chart for pre-kidney transplant evaluation planning. Pt lives in [place], [place]. Pt has CKD d/t PKD and is s/p DDKT 2015 and 10/2021 which failed d/t renal vein thrombosis, he underwent allograft explant, re-transplant, and ultimately removed. Currently, he is not on dialysis, he reports he was on dialysis prior to his first transplant. Other hx includes HTN, HLD, anemia, GOUT, SILVIA (report he is not currently using his CPAP), and depression.  BMI 27.  Last colonoscopy was in 2013, pt will follow up w/ PCP to see when he is due again.  Dental: not UTD.  Pt is not a smoker, denies issues w/ alcohol, and recreational drug abuse. Pt is indepedent w/ ADLs.  Pt lives w/ wife.  Received one dose of J&J COVID vaccine.     I also introduced [email or web address] and asked pt to create an account and view pre-kidney transplant videos for review with me following evaluation. Confirmed STD 5/12/22. Informed pt they will hear from scheduling to arrange the evaluation.  Smartset orders entered, chart routed to scheduling pool.

## 2022-04-18 ENCOUNTER — LAB (OUTPATIENT)
Dept: LAB | Facility: CLINIC | Age: 52
End: 2022-04-18

## 2022-04-18 DIAGNOSIS — Z94.0 KIDNEY REPLACED BY TRANSPLANT: ICD-10-CM

## 2022-04-18 DIAGNOSIS — Z48.298 AFTERCARE FOLLOWING ORGAN TRANSPLANT: ICD-10-CM

## 2022-04-18 DIAGNOSIS — Z79.899 ENCOUNTER FOR LONG-TERM CURRENT USE OF MEDICATION: ICD-10-CM

## 2022-04-18 PROCEDURE — 80197 ASSAY OF TACROLIMUS: CPT | Performed by: INTERNAL MEDICINE

## 2022-04-19 ENCOUNTER — TELEPHONE (OUTPATIENT)
Dept: TRANSPLANT | Facility: CLINIC | Age: 52
End: 2022-04-19
Payer: COMMERCIAL

## 2022-04-19 LAB
TACROLIMUS BLD-MCNC: 5 UG/L (ref 5–15)
TME LAST DOSE: NORMAL H
TME LAST DOSE: NORMAL H

## 2022-04-19 NOTE — LETTER
The Transplant Center  Room 2-200  [facility],  [address]  [address]  Tel [phone]  Toll Free [phone]                OUTPATIENT LABORATORY TEST ORDER    Patient Name: Raj Pierce  Transplant Date: 10/13/2015 (Kidney), 10/23/2021 (Kidney)  YOB: 1970  Issue Date & Time:April 19, 20223:55 PM    [facility] MR:  [id number]  Exp. Date (1 year after date issued)      Diagnoses: Kidney Transplant (ICD-10 Z94.0)   Long term use of medications (ICD-10 Z79.899)     Lab results to be available on the same day drawn.   Patient should release information to the [facility], Transplant Center.  Please fax to the Transplant Center at [phone].    Every 3 Months   ?Hemogram and Platelet   ?Basic Metabolic Panel    ?/Tacrolimus/Prograf drug level            Every 6 Months                            ?Urine for protein/creatinine      If you have any questions, please call The Transplant Center at [phone] or [phone].    Please fax labs to [phone]  .

## 2022-04-19 NOTE — TELEPHONE ENCOUNTER
Patient Call: Transplant Lab/Orders  Route to LPN  Post Transplant Days: 2380  When patient is less than 60 days post-transplant, route high priority    Reason for Call: Annual lab reorder Yoshi STOVALL   Callback needed? No     Sent pt out drug mailers per pt  request

## 2022-05-05 ENCOUNTER — TELEPHONE (OUTPATIENT)
Dept: TRANSPLANT | Facility: CLINIC | Age: 52
End: 2022-05-05
Payer: COMMERCIAL

## 2022-05-05 NOTE — TELEPHONE ENCOUNTER
Spoke with patient. Confirmed upcoming PKE appointments at [facility] on 5/12/22 starting at 0730. Instructed patient may eat breakfast, take regularly scheduled medications and be accompanied by 1 adult visitor. Patient instructed to reschedule appointments if any Covid-19 symptoms and/or exposure within 14 days.  Patient states he has contact information for any further questions/concerns/need to reschedule.

## 2022-05-10 NOTE — PROGRESS NOTES
[facility] Solid Organ Transplant  Outpatient MNT: Kidney Transplant Evaluation    Current BMI: 25.1 (HT 70 in,  lbs/79 kg)  BMI is within recommendation of <35 for kidney transplant    Frailty Assessment -- Frail (3/5 points)- unintentional weight loss, reduced , low activity ** pt does not appear frail; he reports active job but unable to plug data into activity level       Time Spent: 15 minutes  Visit Type: Initial   Referring Physician: Audra   Pt accompanied by: his daughter     History of previous txp: kidney 2015, 2021   Dialysis: no    Nutrition Assessment  - Appetite: good   - Food allergies/intolerances: no  - Meal prep & grocery shopping: pt and his dtr   - Previous RD education: yes  - Issues chewing or swallowing: no   - N/V/D/C: no   - Food access concerns: no     Vitamins, Supplements, Pertinent Meds: vit D  Herbal Medicines/Supplements: none    Edema: none    Weight hx: down 40 lbs since last txp, stable none x few months    Wt Readings from Last 10 Encounters:   11/16/21 81.7 kg (180 lb 3.2 oz)   11/07/21 90.6 kg (199 lb 11.2 oz)   10/27/21 91.4 kg (201 lb 8 oz)   10/26/21 92.8 kg (204 lb 8 oz)   12/14/20 93.3 kg (205 lb 9.6 oz)   12/18/19 73.6 kg (162 lb 3.2 oz)   10/29/19 92.4 kg (203 lb 11.3 oz)   10/29/19 92.4 kg (203 lb 12.8 oz)   10/28/19 94.3 kg (208 lb)   12/21/18 95.3 kg (210 lb)     Diet Recall  Breakfast Granola bar    Lunch PBJ, leftovers    Dinner Stroganoff; meat loaf with potatoes and asparagus    Snacks Some chips    Beverages Mountain Dew (3/day), water   Alcohol 1-2 drinks/night (beer or vodka)   Dining out 1x/week at the most      Physical Activity  Active job, yet no formal exercise   No limitations to walking      Labs  4/4 K 5.1 Phos 4.8     Nutrition Diagnosis  No nutrition diagnosis identified at this time     Nutrition Intervention  Nutrition education provided:  Discussed sodium intake (low sodium foods and drinks, seasoning food without salt and tips  for low sodium diet).  Reviewed wnl K/Phos levels, with no indication for dietary modifications at this time.     Reviewed post txp diet guidelines in brief (will review in further detail post txp):  (1) Review of proper food safety measures d/t immunosuppressant therapy post-op and increased risk for food-borne illness    (2) Avoid the following post txp d/t risk for rejection, unknown effects on the organs, and/or potential interactions with immunosuppressants:  - Herbal, Chinese, holistic, chiropractic, natural, alternative medicines and supplements  - Detoxes and cleanses  - Weight loss pills  - Protein powders or other products with extracts or herbs (ie green tea extract)    (3) Med regimen and possible side effects    Patient Understanding: Pt verbalized understanding of education provided.  Expected Engagement: Good  Follow-Up Plans: PRN     Nutrition Goals  No nutrition goals identified at this time     Julienne Gaston, RD, LD, CCTD

## 2022-05-12 ENCOUNTER — OFFICE VISIT (OUTPATIENT)
Dept: TRANSPLANT | Facility: CLINIC | Age: 52
End: 2022-05-12
Attending: SURGERY
Payer: COMMERCIAL

## 2022-05-12 ENCOUNTER — ALLIED HEALTH/NURSE VISIT (OUTPATIENT)
Dept: TRANSPLANT | Facility: CLINIC | Age: 52
End: 2022-05-12
Attending: SURGERY
Payer: COMMERCIAL

## 2022-05-12 ENCOUNTER — LAB (OUTPATIENT)
Dept: LAB | Facility: CLINIC | Age: 52
End: 2022-05-12
Payer: COMMERCIAL

## 2022-05-12 ENCOUNTER — DOCUMENTATION ONLY (OUTPATIENT)
Dept: TRANSPLANT | Facility: CLINIC | Age: 52
End: 2022-05-12

## 2022-05-12 ENCOUNTER — APPOINTMENT (OUTPATIENT)
Dept: TRANSPLANT | Facility: CLINIC | Age: 52
End: 2022-05-12
Attending: SURGERY
Payer: COMMERCIAL

## 2022-05-12 ENCOUNTER — ANCILLARY PROCEDURE (OUTPATIENT)
Dept: GENERAL RADIOLOGY | Facility: CLINIC | Age: 52
End: 2022-05-12
Attending: PHYSICIAN ASSISTANT
Payer: COMMERCIAL

## 2022-05-12 VITALS
HEIGHT: 70 IN | SYSTOLIC BLOOD PRESSURE: 143 MMHG | HEART RATE: 77 BPM | OXYGEN SATURATION: 95 % | DIASTOLIC BLOOD PRESSURE: 100 MMHG | WEIGHT: 174 LBS | BODY MASS INDEX: 24.91 KG/M2

## 2022-05-12 DIAGNOSIS — N18.4 CHRONIC KIDNEY DISEASE, STAGE IV (SEVERE) (H): ICD-10-CM

## 2022-05-12 DIAGNOSIS — N18.9 CHRONIC RENAL FAILURE: ICD-10-CM

## 2022-05-12 DIAGNOSIS — Z76.82 ORGAN TRANSPLANT CANDIDATE: ICD-10-CM

## 2022-05-12 DIAGNOSIS — N18.6 ESRD (END STAGE RENAL DISEASE) (H): ICD-10-CM

## 2022-05-12 DIAGNOSIS — G47.33 OBSTRUCTIVE SLEEP APNEA: ICD-10-CM

## 2022-05-12 DIAGNOSIS — Z01.818 PRE-TRANSPLANT EVALUATION FOR KIDNEY TRANSPLANT: ICD-10-CM

## 2022-05-12 DIAGNOSIS — E78.5 HYPERLIPIDEMIA: ICD-10-CM

## 2022-05-12 DIAGNOSIS — Q61.3 POLYCYSTIC KIDNEY DISEASE: ICD-10-CM

## 2022-05-12 DIAGNOSIS — I10 ESSENTIAL HYPERTENSION: ICD-10-CM

## 2022-05-12 DIAGNOSIS — T86.12 KIDNEY TRANSPLANT FAILURE: ICD-10-CM

## 2022-05-12 DIAGNOSIS — D63.1 ANEMIA IN STAGE 4 CHRONIC KIDNEY DISEASE (H): Primary | ICD-10-CM

## 2022-05-12 DIAGNOSIS — N18.4 ANEMIA IN STAGE 4 CHRONIC KIDNEY DISEASE (H): Primary | ICD-10-CM

## 2022-05-12 DIAGNOSIS — E78.5 HYPERLIPIDEMIA, UNSPECIFIED HYPERLIPIDEMIA TYPE: ICD-10-CM

## 2022-05-12 DIAGNOSIS — N18.5 CHRONIC RENAL FAILURE, STAGE 5 (H): ICD-10-CM

## 2022-05-12 LAB
ALBUMIN UR-MCNC: 100 MG/DL
APPEARANCE UR: CLEAR
APTT PPP: 25 SECONDS (ref 22–38)
BILIRUB UR QL STRIP: NEGATIVE
CMV IGG SERPL IA-ACNC: <0.2 U/ML
CMV IGG SERPL IA-ACNC: NORMAL
COLOR UR AUTO: ABNORMAL
EBV VCA IGG SER IA-ACNC: 679 U/ML
EBV VCA IGG SER IA-ACNC: POSITIVE
GLUCOSE UR STRIP-MCNC: NEGATIVE MG/DL
HBV CORE AB SERPL QL IA: NONREACTIVE
HBV SURFACE AB SERPL IA-ACNC: 65.44 M[IU]/ML
HBV SURFACE AG SERPL QL IA: NONREACTIVE
HCV AB SERPL QL IA: NONREACTIVE
HGB UR QL STRIP: ABNORMAL
HIV 1+2 AB+HIV1 P24 AG SERPL QL IA: NONREACTIVE
INR PPP: 0.94 (ref 0.85–1.15)
KETONES UR STRIP-MCNC: NEGATIVE MG/DL
LEUKOCYTE ESTERASE UR QL STRIP: NEGATIVE
MUCOUS THREADS #/AREA URNS LPF: PRESENT /LPF
NITRATE UR QL: NEGATIVE
PH UR STRIP: 6 [PH] (ref 5–7)
RBC URINE: 0 /HPF
SP GR UR STRIP: 1.01 (ref 1–1.03)
T PALLIDUM AB SER QL: NONREACTIVE
UROBILINOGEN UR STRIP-MCNC: NORMAL MG/DL
VZV IGG SER QL IA: 297.2 INDEX
VZV IGG SER QL IA: POSITIVE
WBC URINE: 0 /HPF

## 2022-05-12 PROCEDURE — 86704 HEP B CORE ANTIBODY TOTAL: CPT

## 2022-05-12 PROCEDURE — 87340 HEPATITIS B SURFACE AG IA: CPT

## 2022-05-12 PROCEDURE — 85610 PROTHROMBIN TIME: CPT

## 2022-05-12 PROCEDURE — 86787 VARICELLA-ZOSTER ANTIBODY: CPT

## 2022-05-12 PROCEDURE — 71046 X-RAY EXAM CHEST 2 VIEWS: CPT | Mod: GC | Performed by: RADIOLOGY

## 2022-05-12 PROCEDURE — 99207 PR NO CHARGE COORDINATED CARE PS: CPT

## 2022-05-12 PROCEDURE — 86665 EPSTEIN-BARR CAPSID VCA: CPT

## 2022-05-12 PROCEDURE — 81001 URINALYSIS AUTO W/SCOPE: CPT

## 2022-05-12 PROCEDURE — 86780 TREPONEMA PALLIDUM: CPT

## 2022-05-12 PROCEDURE — 86706 HEP B SURFACE ANTIBODY: CPT

## 2022-05-12 PROCEDURE — 999N000207 HC UMP OPEN ENCOUNTER >50 DAYS

## 2022-05-12 PROCEDURE — 85730 THROMBOPLASTIN TIME PARTIAL: CPT | Mod: 91

## 2022-05-12 PROCEDURE — 99214 OFFICE O/P EST MOD 30 MIN: CPT | Performed by: SURGERY

## 2022-05-12 PROCEDURE — 85730 THROMBOPLASTIN TIME PARTIAL: CPT

## 2022-05-12 PROCEDURE — 86481 TB AG RESPONSE T-CELL SUSP: CPT

## 2022-05-12 PROCEDURE — 99215 OFFICE O/P EST HI 40 MIN: CPT

## 2022-05-12 PROCEDURE — 86803 HEPATITIS C AB TEST: CPT

## 2022-05-12 PROCEDURE — 36415 COLL VENOUS BLD VENIPUNCTURE: CPT

## 2022-05-12 PROCEDURE — 86147 CARDIOLIPIN ANTIBODY EA IG: CPT

## 2022-05-12 PROCEDURE — 86644 CMV ANTIBODY: CPT

## 2022-05-12 PROCEDURE — 85670 THROMBIN TIME PLASMA: CPT

## 2022-05-12 NOTE — LETTER
5/12/2022         RE: Raj Pierce  [address]  [place] [address]        Dear Colleague,    Thank you for referring your patient, Raj Pierce, to the [facility]. Please see a copy of my visit note below.    TRANSPLANT NEPHROLOGY RECIPIENT EVALUATION NOTE    Assessment and Plan:  # Kidney Transplant Evaluation: Patient is a good candidate overall. Benefits of a living donor transplant were discussed.    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 He has been referred to bleeding and clotting. His previous DDKT is still working (eGFR 12-18 ml/min), and he remains off dialysis. Will see if his waiting time back to 2013 can be reinstated.     # Cardiac Risk: he has no known history of cardiac disease or events and is asymptomatic with exertion. He last had risk assessment December 2020 with a negative stress test. Repeat ECHO to be scheduled. Due for updated risk assessment.     # Health Maintenance: Colonoscopy: Up to date. Due for PSA.    Discussed the risks and benefits of a transplant, including the risk of surgery and immunosuppression medications.  Patient's overall evaluation will be discussed in the Transplant Program's regular meeting with a final recommendation on the patients suitability for transplant to be made at that time.    Pending completion of the full evaluation, patient presently appears to be enough of an acceptable kidney transplant recipient candidate to have any potential kidney donors start the evaluation process.    Evaluation:  Raj Pierce was seen in consultation at the request of Dr. George Zhu for evaluation as a potential kidney transplant recipient.    Reason for Visit:  Raj Pierce is a 51 year old male with ESKD from polycystic kidney disease (PKD), who presents for kidney transplant evaluation.    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          Kidney Disease Hx:        His previous DDKT continues to function and he remains off dialysis. Overall doing OK, but does have fatigue.       Primary Nephrologist: Dr. Espinoza       H/o Kidney Stones: No       H/o Recurrent/Frequent UTI: No         Diabetic Hx: None           Cardiac/Vascular Disease Risk Factors:        - No known history         Viral Serology Status       CMV IgG Antibody: Negative       EBV IgG Antibody: Positive         Volume Status/Weight:        Volume status: Euvolemic       Weight:  Acceptable BMI       BMI: Body mass index is 25.19 kg/m .         Functional Capacity/Frailty:        He continues to work full time as a . He is not doing anything in particular for exercise, but is able to walk several blocks and go up and down stairs without chest pain, SOB, or claudication symptoms.    Fatigue/Decreased Energy: [] No [] Yes    Chest Pain or SOB with Exertion: [] No [] Yes    Significant Weight Change: [] No [] Yes    Nausea, Vomiting or Diarrhea: [] No [] Yes    Fever, Sweats or Chills:  [] No [] Yes    Leg Swelling [] No [] Yes      # COVID Vaccination Up To Date: Not asked    Potential Living Kidney Donors: Not sure    Review of Systems:  A comprehensive review of systems was obtained and negative, except as noted in the HPI or PMH.    Past Medical History:   Medical record was reviewed and PMH was discussed with patient and noted below.  Past Medical History:   Diagnosis Date     Acidosis      Chronic kidney disease, stage IV (severe) (H)      Disorders of phosphorus metabolism      History of blood transfusion      HTN (hypertension)     15 years     Hyperparathyroidism      Kidney replaced by transplant 10/23/2021    DCD re-transplant. Induction with thymoglobulin 6mg/kg and  steroids.     Kidney replaced by transplant 10/13/2015     Polycystic kidney, autosomal dominant      Pure hypercholesterolemia      Sleep apnea     cpap     Vitamin D deficiency        Past Social History:   Past Surgical History:   Procedure Laterality Date     BENCH KIDNEY N/A 10/13/2015    Procedure: BENCH KIDNEY;  Surgeon: Dariel Chavez MD;  Location: [facility]     BRONCHOSCOPY, DILATE BRONCHUS, STENT BRONCHUS, COMBINED  10/27/2021    Procedure: Bronchoscopy, dilate bronchus, stent bronchus, combined;  Surgeon: Madina Warren MD;  [facility]: [facility]     CYSTOSCOPY, REMOVE STENT(S), COMBINED Right 2015    Procedure: COMBINED CYSTOSCOPY, REMOVE STENT(S);  Surgeon: Dariel Chavez MD;  [facility]     HERNIORRHAPHY INGUINAL CHILD      right     LAPAROTOMY EXPLORATORY N/A 10/27/2021    Procedure: Exploratory left lower quadrant of kidney transplant, back table flush, reimplantation of kidney transplant, with ureteral stent removal and placement of a new ureteral stent, intraoperative ultrasound, intraoperative of transit time flow measurment (VeriQ);  Surgeon: Madina Warren MD;  [facility]     NEPHRECTOMY Left 2021    Procedure: NEPHRECTOMY, LEFT, Ureteral stent explant;  Surgeon: Eulalio Emanuel MD;  [facility]     THROMBECTOMY ABDOMEN N/A 10/27/2021    Procedure: Thrombectomy of iliac and renal veins;  Surgeon: Madina Warren MD;  [facility]     TONSILLECTOMY       TRANSPLANT KIDNEY RECIPIENT  DONOR N/A 10/13/2015    Procedure: TRANSPLANT KIDNEY RECIPIENT  DONOR;  Surgeon: Dariel Chavze MD;  [facility]     TRANSPLANT KIDNEY RECIPIENT  DONOR N/A 10/23/2021    Procedure: TRANSPLANT, KIDNEY, RECIPIENT,  DONOR, URETERAL STENT PLACEMENT;  Surgeon: Madina Warren MD;  Location: [facility]     Personal history of bleeding or anesthesia problems: No    Family History:  Family History   Problem Relation Age of  Onset     Gastrointestinal Disease Mother         ESRD PCKD     Hypertension Mother      C.A.D. Father      Hypertension Father      Gastrointestinal Disease Sister         PCKD       Personal History:   Social History     Socioeconomic History     Marital status:      Spouse name: Dianne     Number of children: 2     Years of education: 13     Highest education level: Not on file   Occupational History     Occupation:      Employer: [employer]   Tobacco Use     Smoking status: Never Smoker     Smokeless tobacco: Never Used   Substance and Sexual Activity     Alcohol use: Yes     Alcohol/week: 10.0 standard drinks     Types: 10 Standard drinks or equivalent per week     Comment: 1 drink daily      Drug use: No     Sexual activity: Yes     Partners: Female   Other Topics Concern     Parent/sibling w/ CABG, MI or angioplasty before 65F 55M? Not Asked      Service Not Asked     Blood Transfusions No     Caffeine Concern Not Asked     Occupational Exposure Not Asked     Hobby Hazards Not Asked     Sleep Concern Not Asked     Stress Concern Not Asked     Weight Concern Not Asked     Special Diet Not Asked     Back Care Not Asked     Exercise Not Asked     Bike Helmet Not Asked     Seat Belt Yes     Self-Exams Not Asked   Social History Narrative     Not on file     Social Determinants of Health     Financial Resource Strain: Not on file   Food Insecurity: Not on file   Transportation Needs: Not on file   Physical Activity: Not on file   Stress: Not on file   Social Connections: Not on file   Intimate Partner Violence: Not on file   Housing Stability: Not on file       Allergies:  No Known Allergies    Medications:  Current Outpatient Medications   Medication Sig     acetaminophen (TYLENOL) 325 MG tablet Take 2 tablets (650 mg) by mouth every 4 hours as needed for other (For optimal non-opioid multimodal pain management to improve pain control.)     atorvastatin (LIPITOR) 10 MG tablet  "Take 10 mg by mouth daily     calcium carbonate-vitamin D (OS-LILIANA WITH D) 500-200 MG-UNIT tablet Take 1 tablet by mouth 2 times daily     citalopram (CELEXA) 20 MG tablet Take 20 mg by mouth daily.     dapsone (ACZONE) 25 MG tablet Take 2 tablets (50 mg) by mouth daily     furosemide (LASIX) 20 MG tablet Take 1 tablet (20 mg) by mouth daily     magnesium oxide (MAG-OX) 400 MG tablet TAKE 1 TABLET (400 MG) BY MOUTH DAILY WITH LUNCH     mycophenolate (GENERIC EQUIVALENT) 250 MG capsule Take 3 capsules (750 mg) by mouth 2 times daily     sodium bicarbonate 650 MG tablet Take 2 tablets (1,300 mg) by mouth 3 times daily     tacrolimus (GENERIC EQUIVALENT) 0.5 MG capsule Take 1 capsule (0.5 mg) by mouth 2 times daily Total dose = 2.5 mg BID     tacrolimus (GENERIC EQUIVALENT) 1 MG capsule Take 2 capsules (2 mg) by mouth 2 times daily Total dose = 2.5 mg BID     valGANciclovir (VALCYTE) 450 MG tablet Take 1 tablet (450 mg) by mouth twice a week     No current facility-administered medications for this visit.       Vitals:  BP (!) 143/100   Pulse 77   Ht 1.77 m (5' 9.69\")   Wt 78.9 kg (174 lb)   SpO2 95%   BMI 25.19 kg/m      Exam:  GENERAL APPEARANCE: alert and no distress  HENT: mouth without ulcers or lesions  LYMPHATICS: no cervical or supraclavicular nodes  RESP: lungs clear to auscultation - no rales, rhonchi or wheezes  CV: regular rhythm, normal rate, no rub, no murmur  EDEMA: no LE edema bilaterally  ABDOMEN: soft, nondistended, nontender, bowel sounds normal  MS: extremities normal - no gross deformities noted, no evidence of inflammation in joints, no muscle tenderness  SKIN: no rash    Results:   No results found for this or any previous visit (from the past 336 hour(s)).          Again, thank you for allowing me to participate in the care of your patient.        Sincerely,        DORIS    "

## 2022-05-12 NOTE — LETTER
5/12/2022     RE: Raj Pierce  [address]  [place] [address]    Dear Colleague,    Thank you for referring your patient, Raj Pierce, to the [facility]. Please see a copy of my visit note below.    No notes on file    Again, thank you for allowing me to participate in the care of your patient.        Sincerely,        DORIS

## 2022-05-12 NOTE — PROGRESS NOTES
Kidney Transplant Referral - 3/23/2022   Patient attended appointments accompanied by wife  Patient completed AM appointments with all [facility] providers.  Time and location of PM appointments reviewed with patient.  Patient instructed next contact from Transplant Coordinator will be following Selection Committee  Patient stated understanding  KDPI form signed and faxed to HIM  Patient has not watched [facility] Pre Kidney Transplant videos 1&2.  Demonstrated accessing [facility] and watching Pre Kidney Transplant videos 1&2.      Summary    Team s concerns/comments:   1) Cardiac risk assessment  2) PAD assessment  3) Previous Renal TX 10/21-renal vein thombosis   4) Health maintenance    Candidacy category: Yellow    Action/Plan:   1) EKG today. Echo ordered-to be scheduled. Cardiology consult  2) 10/21 A/P CT-Update imaging per surgery  3) Bleeding and clotting referral. Check status for reinstated waiting time.  4) Colonoscopy UTD. Due for PSA    Expected Selection Meeting Discussion: 5/18/22

## 2022-05-12 NOTE — PROGRESS NOTES
TRANSPLANT NEPHROLOGY RECIPIENT EVALUATION NOTE    Assessment and Plan:  # Kidney Transplant Evaluation: Patient is a good candidate overall. Benefits of a living donor transplant were discussed.    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 He has been referred to bleeding and clotting. His previous DDKT is still working (eGFR 12-18 ml/min), and he remains off dialysis. Will see if his waiting time back to 2013 can be reinstated.     # Cardiac Risk: he has no known history of cardiac disease or events and is asymptomatic with exertion. He last had risk assessment December 2020 with a negative stress test. Repeat ECHO to be scheduled. Due for updated risk assessment.     # Health Maintenance: Colonoscopy: Up to date. Due for PSA.    Discussed the risks and benefits of a transplant, including the risk of surgery and immunosuppression medications.  Patient's overall evaluation will be discussed in the Transplant Program's regular meeting with a final recommendation on the patients suitability for transplant to be made at that time.    Pending completion of the full evaluation, patient presently appears to be enough of an acceptable kidney transplant recipient candidate to have any potential kidney donors start the evaluation process.    Evaluation:  Raj Pierce was seen in consultation at the request of Dr. George Zhu for evaluation as a potential kidney transplant recipient.    Reason for Visit:  Raj Pierce is a 51 year old male with ESKD from polycystic kidney disease (PKD), who presents for kidney transplant evaluation.    History of Present Illness:  Mr. 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          Kidney Disease Hx:        His previous DDKT continues to  function and he remains off dialysis. Overall doing OK, but does have fatigue.       Primary Nephrologist: Dr. Espinoza       H/o Kidney Stones: No       H/o Recurrent/Frequent UTI: No         Diabetic Hx: None           Cardiac/Vascular Disease Risk Factors:        - No known history         Viral Serology Status       CMV IgG Antibody: Negative       EBV IgG Antibody: Positive         Volume Status/Weight:        Volume status: Euvolemic       Weight:  Acceptable BMI       BMI: Body mass index is 25.19 kg/m .         Functional Capacity/Frailty:        He continues to work full time as a . He is not doing anything in particular for exercise, but is able to walk several blocks and go up and down stairs without chest pain, SOB, or claudication symptoms.    Fatigue/Decreased Energy: [] No [] Yes    Chest Pain or SOB with Exertion: [] No [] Yes    Significant Weight Change: [] No [] Yes    Nausea, Vomiting or Diarrhea: [] No [] Yes    Fever, Sweats or Chills:  [] No [] Yes    Leg Swelling [] No [] Yes      # COVID Vaccination Up To Date: Not asked    Potential Living Kidney Donors: Not sure    Review of Systems:  A comprehensive review of systems was obtained and negative, except as noted in the HPI or PMH.    Past Medical History:   Medical record was reviewed and PMH was discussed with patient and noted below.  Past Medical History:   Diagnosis Date     Acidosis      Chronic kidney disease, stage IV (severe) (H)      Disorders of phosphorus metabolism      History of blood transfusion      HTN (hypertension)     15 years     Hyperparathyroidism      Kidney replaced by transplant 10/23/2021    DCD re-transplant. Induction with thymoglobulin 6mg/kg and steroids.     Kidney replaced by transplant 10/13/2015     Polycystic kidney, autosomal dominant      Pure hypercholesterolemia      Sleep apnea     cpap     Vitamin D deficiency        Past Social History:   Past Surgical History:   Procedure Laterality Date      BENCH KIDNEY N/A 10/13/2015    Procedure: BENCH KIDNEY;  Surgeon: Dariel Chavez MD;  [facility]     BRONCHOSCOPY, DILATE BRONCHUS, STENT BRONCHUS, COMBINED  10/27/2021    Procedure: Bronchoscopy, dilate bronchus, stent bronchus, combined;  Surgeon: Madina Warren MD;  [facility]     CYSTOSCOPY, REMOVE STENT(S), COMBINED Right 2015    Procedure: COMBINED CYSTOSCOPY, REMOVE STENT(S);  Surgeon: Dariel Chavez MD;  Location: [facility]     HERNIORRHAPHY INGUINAL CHILD      right     LAPAROTOMY EXPLORATORY N/A 10/27/2021    Procedure: Exploratory left lower quadrant of kidney transplant, back table flush, reimplantation of kidney transplant, with ureteral stent removal and placement of a new ureteral stent, intraoperative ultrasound, intraoperative of transit time flow measurment (VeriQ);  Surgeon: Madina Warren MD;  [facility]     NEPHRECTOMY Left 2021    Procedure: NEPHRECTOMY, LEFT, Ureteral stent explant;  Surgeon: Eulalio Emanuel MD;  [facility]     THROMBECTOMY ABDOMEN N/A 10/27/2021    Procedure: Thrombectomy of iliac and renal veins;  Surgeon: Madina Warren MD;  [facility]: [facility]     TONSILLECTOMY       TRANSPLANT KIDNEY RECIPIENT  DONOR N/A 10/13/2015    Procedure: TRANSPLANT KIDNEY RECIPIENT  DONOR;  Surgeon: Dariel Chavez MD;  [facility]     TRANSPLANT KIDNEY RECIPIENT  DONOR N/A 10/23/2021    Procedure: TRANSPLANT, KIDNEY, RECIPIENT,  DONOR, URETERAL STENT PLACEMENT;  Surgeon: Madina Warren MD;  Location: [facility]     Personal history of bleeding or anesthesia problems: No    Family History:  Family History   Problem Relation Age of Onset     Gastrointestinal Disease Mother         ESRD PCKD     Hypertension Mother      C.A.D. Father      Hypertension Father      Gastrointestinal Disease Sister         PCKD       Personal History:   Social History     Socioeconomic History     Marital status:       Spouse name: Dianne     Number of children: 2     Years of education: 13     Highest education level: Not on file   Occupational History     Occupation: [employer]     Employer: [employer]   Tobacco Use     Smoking status: Never Smoker     Smokeless tobacco: Never Used   Substance and Sexual Activity     Alcohol use: Yes     Alcohol/week: 10.0 standard drinks     Types: 10 Standard drinks or equivalent per week     Comment: 1 drink daily      Drug use: No     Sexual activity: Yes     Partners: Female   Other Topics Concern     Parent/sibling w/ CABG, MI or angioplasty before 65F 55M? Not Asked      Service Not Asked     Blood Transfusions No     Caffeine Concern Not Asked     Occupational Exposure Not Asked     Hobby Hazards Not Asked     Sleep Concern Not Asked     Stress Concern Not Asked     Weight Concern Not Asked     Special Diet Not Asked     Back Care Not Asked     Exercise Not Asked     Bike Helmet Not Asked     Seat Belt Yes     Self-Exams Not Asked   Social History Narrative     Not on file     Social Determinants of Health     Financial Resource Strain: Not on file   Food Insecurity: Not on file   Transportation Needs: Not on file   Physical Activity: Not on file   Stress: Not on file   Social Connections: Not on file   Intimate Partner Violence: Not on file   Housing Stability: Not on file       Allergies:  No Known Allergies    Medications:  Current Outpatient Medications   Medication Sig     acetaminophen (TYLENOL) 325 MG tablet Take 2 tablets (650 mg) by mouth every 4 hours as needed for other (For optimal non-opioid multimodal pain management to improve pain control.)     atorvastatin (LIPITOR) 10 MG tablet Take 10 mg by mouth daily     calcium carbonate-vitamin D (OS-LILIANA WITH D) 500-200 MG-UNIT tablet Take 1 tablet by mouth 2 times daily     citalopram (CELEXA) 20 MG tablet Take 20 mg by mouth daily.     dapsone (ACZONE) 25 MG tablet Take 2 tablets (50 mg) by mouth  "daily     furosemide (LASIX) 20 MG tablet Take 1 tablet (20 mg) by mouth daily     magnesium oxide (MAG-OX) 400 MG tablet TAKE 1 TABLET (400 MG) BY MOUTH DAILY WITH LUNCH     mycophenolate (GENERIC EQUIVALENT) 250 MG capsule Take 3 capsules (750 mg) by mouth 2 times daily     sodium bicarbonate 650 MG tablet Take 2 tablets (1,300 mg) by mouth 3 times daily     tacrolimus (GENERIC EQUIVALENT) 0.5 MG capsule Take 1 capsule (0.5 mg) by mouth 2 times daily Total dose = 2.5 mg BID     tacrolimus (GENERIC EQUIVALENT) 1 MG capsule Take 2 capsules (2 mg) by mouth 2 times daily Total dose = 2.5 mg BID     valGANciclovir (VALCYTE) 450 MG tablet Take 1 tablet (450 mg) by mouth twice a week     No current facility-administered medications for this visit.       Vitals:  BP (!) 143/100   Pulse 77   Ht 1.77 m (5' 9.69\")   Wt 78.9 kg (174 lb)   SpO2 95%   BMI 25.19 kg/m      Exam:  GENERAL APPEARANCE: alert and no distress  HENT: mouth without ulcers or lesions  LYMPHATICS: no cervical or supraclavicular nodes  RESP: lungs clear to auscultation - no rales, rhonchi or wheezes  CV: regular rhythm, normal rate, no rub, no murmur  EDEMA: no LE edema bilaterally  ABDOMEN: soft, nondistended, nontender, bowel sounds normal  MS: extremities normal - no gross deformities noted, no evidence of inflammation in joints, no muscle tenderness  SKIN: no rash    Results:   No results found for this or any previous visit (from the past 336 hour(s)).      "

## 2022-05-13 LAB
CARDIOLIPIN IGG SER IA-ACNC: <2 GPL-U/ML
CARDIOLIPIN IGG SER IA-ACNC: NEGATIVE
CARDIOLIPIN IGM SER IA-ACNC: <2 MPL-U/ML
CARDIOLIPIN IGM SER IA-ACNC: NEGATIVE
DRVVT SCREEN RATIO: 0.75
GAMMA INTERFERON BACKGROUND BLD IA-ACNC: 0.02 IU/ML
LA PPP-IMP: NEGATIVE
LUPUS INTERPRETATION: NORMAL
M TB IFN-G BLD-IMP: NEGATIVE
M TB IFN-G CD4+ BCKGRND COR BLD-ACNC: 0.81 IU/ML
MITOGEN IGNF BCKGRD COR BLD-ACNC: -0.01 IU/ML
MITOGEN IGNF BCKGRD COR BLD-ACNC: 0 IU/ML
PTT RATIO: 0.97
QUANTIFERON MITOGEN: 0.83 IU/ML
QUANTIFERON NIL TUBE: 0.02 IU/ML
QUANTIFERON TB1 TUBE: 0.02 IU/ML
QUANTIFERON TB2 TUBE: 0.01
THROMBIN TIME: 15.8 SECONDS (ref 13–19)

## 2022-05-13 PROCEDURE — 85390 FIBRINOLYSINS SCREEN I&R: CPT | Mod: 26 | Performed by: PATHOLOGY

## 2022-05-13 NOTE — PROGRESS NOTES
Psychosocial Assessment For KidneyTransplantation  Patient Name/ Age: Raj Pierce 51 year old   Medical Record #: [id number]  Duration of Interview:     30 min  Process:   Face-to-Face Interview                (counseling < 50%)   Present at Appointment: Raj and spouse         : NOVA Mejia Date:  May 13, 2022        Type of transplant: Kidney     Donor type:      Cadaver   Prior Transplants:    Yes  Status of Transplant:    3rd transplant- all transplants have occurred at [facility]        Current Living Situation    Location:   [address] [place] [id number]  With Whom: lives with their spouse       Family/ Social Support:    Raj lives with his spouse Dianne in [place], [place]. They have two adult children- Viky (24) and Yuriy (26). Their children live in [place] [place]. Raj feels that his sister and father are of good support.  available, helpful   Committed Relationship:  Spouse Dianne of 30 years    Stable/Supportive   Other Supports:   Friends, community support  available, helpful       Activities/ Functional Ability    Current Level: independent with ADL's     Transportation drives self       Vocational/Employment/Financial     Employment   full time   Job Description  [employer]      Income   Salary/wages   Insurance      At this time, patient can afford medication costs:  Yes  Private Insurance and Medicare       Medical Status    Current Mode of Treatment for ESRD None   Complications None       Behavioral    Tobacco Use No Chemical Dependency No         Psychiatric Impairment Yes   Raj is on a medication for depression. He attributes this to a long journey of health concerns.     Reading Ability: Good  Education Level: Technical College Recent Legal History No      Coping Style/Strategies: going on a drive, being outdoors        Ability to Adhere to Complex Medical Regime: Yes     Adherence History:        Education  _X_ Medicare  _X_ Rehabilitation  _X_  Donor issues  _X_ Community resources  _X_ Post discharge housing  _X_ Financial resources  _X_ Medical insurance options  _X_ Psych adjustment  _X_ Family adjustment  _X_ Health Care Directive Yes, Will Provide and Provided Education   Psychosocial Risks of Transplant Reviewed and Discussed:  _X_ Increased stress related to emotional,            family, social, employment or financial           situation  _X_ Effect on work and/or disability benefits  _X_ Effect on future health and life           insurance  _X_ Transplant outcome expectations may           not be met  _X_ Mental Health Risks: anxiety,           depression, PTSD, guilt, grief and           chronic fatigue     Notable Items:   None noted.       Final Evaluation/Assessment   Patient seemed to process information well. Appeared well informed, motivated and able to follow post transplant requirements. Behavior was appropriate during interview. Has adequate income and insurance coverage. Adequate social support. No major contraindications noted for transplant.  At this time patient appears to understand the risks and benefits of transplant.      Recommendation  Acceptable    Selection Criteria Met:  Plan for support Yes   Chemical Dependence No  Smoking No  Mental Health Yes   Adequate Finances Yes    Signature: NOVA MejiaSW   Title: Clinical

## 2022-05-14 LAB
ATRIAL RATE - MUSE: 68 BPM
DIASTOLIC BLOOD PRESSURE - MUSE: NORMAL MMHG
INTERPRETATION ECG - MUSE: NORMAL
P AXIS - MUSE: 60 DEGREES
PR INTERVAL - MUSE: 152 MS
QRS DURATION - MUSE: 96 MS
QT - MUSE: 410 MS
QTC - MUSE: 435 MS
R AXIS - MUSE: 49 DEGREES
SYSTOLIC BLOOD PRESSURE - MUSE: NORMAL MMHG
T AXIS - MUSE: 44 DEGREES
VENTRICULAR RATE- MUSE: 68 BPM

## 2022-05-18 ENCOUNTER — COMMITTEE REVIEW (OUTPATIENT)
Dept: TRANSPLANT | Facility: CLINIC | Age: 52
End: 2022-05-18
Payer: COMMERCIAL

## 2022-05-18 NOTE — COMMITTEE REVIEW
"Abdominal Committee Review Note     Evaluation Date: 5/12/2022  Committee Review Date: 5/18/2022    Organ being evaluated for: Kidney    Transplant Phase: Evaluation  Transplant Status: Active    Transplant Coordinator: Deborah Amaya  Transplant Surgeon:       Referring Physician: Francis Pollock    Primary Diagnosis: Polycystic Kidneys  Secondary Diagnosis:     Committee Review Members:  Dietitian, Juan M Gaston RD   Nephrology Georges Quach MD, Ronni Medina, APRN CNP, Alondra Roy MD   Pharmacist Jordyn Contreras, [facility]    - Clinical Ladimejia Malin, [facility], Sheri Braga, [facility]   Transplant Nikki Gilmore PA-C, Irish Mcneil, RN, Pham Lynch, RN, Sheri Valdez, RN, Rodger Mcmullen, RN, Fernanda Araiza, ISHA, Gustavo Barton MD, Johanna Bone, ISHA, Kaykay Bolaños, RN   Transplant Surgery Gustavo Barton MD       Transplant Eligibility: Irreversible chronic kidney disease treated w/dialysis or expected need for dialysis    Committee Review Decision: Approved    Relative Contraindications: None    Absolute Contraindications: None    Committee Chair Gustavo Barton MD verbally attested to the committee's decision.    Committee Discussion Details: Reviewed pt's medical status and evaluation results to date.  Pt is determined to be an approved candidate for kidney transplant. The Committee discussed that the pt scored as \"frail\", however, he is not deemed frail- the scoring system of the test was not reflective of his actual functional status - he does not need to repeat the test.  Dr. Barton recommends the following items be completed as part of the pt's evaluation: bleeding/clotting clinic consult, cardiac risk assessment, and health maintenance UTD.  Will list the pt inactive status at this time.  Pt will be re-discussed upon successful completion of all evaluation items.  Pt will be called and summary letter generated. "

## 2022-05-19 ENCOUNTER — DOCUMENTATION ONLY (OUTPATIENT)
Dept: TRANSPLANT | Facility: CLINIC | Age: 52
End: 2022-05-19

## 2022-05-19 ENCOUNTER — TELEPHONE (OUTPATIENT)
Dept: TRANSPLANT | Facility: CLINIC | Age: 52
End: 2022-05-19
Payer: COMMERCIAL

## 2022-05-19 DIAGNOSIS — Z01.818 PRE-TRANSPLANT EVALUATION FOR KIDNEY TRANSPLANT: ICD-10-CM

## 2022-05-19 DIAGNOSIS — N18.4 CHRONIC KIDNEY DISEASE, STAGE IV (SEVERE) (H): ICD-10-CM

## 2022-05-19 DIAGNOSIS — Z76.82 ORGAN TRANSPLANT CANDIDATE: ICD-10-CM

## 2022-05-19 DIAGNOSIS — Q61.3 POLYCYSTIC KIDNEY DISEASE: ICD-10-CM

## 2022-05-19 NOTE — LETTER
05/19/22        Raj Pierce  [address]  [place] [address]        Dear Raj,    It was a pleasure to see you recently for consideration of kidney transplantation. Your pre-transplant evaluation results were reviewed at our Multidisciplinary Selection Committee on 5/18/22.The Committee has approved you as a candidate for kidney transplant.  You will be placed on the kidney wait list- INACTIVE status, meaning you will accrue wait time, but will not be eligible for organ offers until your evaluation is completed and approved by the Selection Committee. The Committee is requesting the following items are completed as part of your evaluation:    1. Echocardiogram - our schedulers will call to arrange this test    2. Cardiac risk assessment - our schedulers will call to arrange this consult    3. Bleeding and Clotting Clinic consult - our schedulers will call to arrange this consult     4. Covid-19 booster - please let me know when/where you receive this to ensure it is documented in your record     Once the above items are completed, your case will be reviewed by the Committee to be considered for active listing status on the kidney transplant wait list.  For any questions, please contact the Transplant Office at [phone] or you may reach me directly at [phone]      Sincerely,  Pham Lynch RN, Pre-Kidney/Pancreas Transplant Coordinator   Solid Organ Transplant  [facility], [facility]    CC: Dr. Jaden Don, Do Guerrero -DANNIE, Dr. Gloria Nair

## 2022-05-19 NOTE — LETTER
May 19, 2022    Raj Pierce  [address]  [place] [address]      Dear Paul Stan,    This letter is sent to confirm that you are a candidate in the kidney transplant program at the [facility].  You were placed on the kidney inactive waitlist on 22.  This means you will accumulate waiting time but not receive  donor calls.       Items we will need from you:      We have received approval from you insurance company for the transplant procedure.  It is critical that you notify us if there is any change in your insurance.  It is also important that you familiarize yourself with the details of your specific insurance policy.  Our patient  is available to assist you if you should have any questions regarding your coverage.      Once you are listed active status, an ALA or PRA blood sample will need to be sent here every 3 months to match you with  donors or any potential living donors.  At that time, special mailing boxes (called mailers) will be sent to you directly from the Outreach Department.  You should take the physician order form and the  to your home laboratory when it is time to for this testing to be done.  Additional mailers can be obtained by calling the Transplant Office and asking to speak to a kidney .      During this waiting period, we may request additional periodic laboratory tests with your primary physician.  It will be your responsibility to remind your physician to forward your results to the Transplant Office.      We need to be kept informed of any changes in your medical condition such as:    o changes in your medications,   o significant changes in your health  o significant infections (such as pneumonia or abscesses)  o blood transfusions  o any condition which requires hospitalization  o any surgery  o initiation of dialysis      Remember to complete any  routine cancer screening tests required before your transplant.  This includes colonoscopy; prostrate screening for men, and mammogram and gynecologic testing for women, as well as dental work.  Your primary care clinic can assist you with arranging for these exams.  Remind your caregivers to forward copies of the records and final reports.    We want you to know that our program has physician and surgeon coverage 24 hours a day, 365 days a year. If this coverage changes or there are substantial program changes, you will be notified in writing by letter.     Attached is a letter from the [employer] Organ [employer]aring (UNOS). It describes the services and information offered to patients by UNOS and the Organ Procurement and Transplantation Network.    We appreciate having had the opportunity to participate in your care.  If you have questions, please feel free to call the Transplant Office at [phone] or [phone].      Sincerely,   Pham Lynch RN, Pre-Kidney/Pancreas Transplant Coordinator     Kidney Transplant Program    Enclosures: [facility] Letter  CC:   Care Team                                The Organ Procurement and Transplantation Network  Toll-free patient services line:     Your resource for organ transplant information    If you have a question regarding your own medical care, you always should call your transplant hospital first. However, for general organ transplant-related information, you can call the Organ Procurement and Transplantation Network (OPTN) toll-free patient services line at [phone]- [phone]. Anyone, including potential transplant candidates, candidates, recipients, family members, friends, living donors, and donor family members, can call this number to:          Talk about organ donation, living donation, the transplant process, the donation process, and transplant policies.    Get a free patient information kit with helpful booklets, waiting list and transplant information,  and a list of all transplant hospitals.    Ask questions about the OPTN website (https://optn.transplant.hrsa.gov/), the [employer] Organ [employer]aring s (UNOS) website (https://unos.org/), or the UNOS website for living donors and transplant recipients. (https://www.transplantliving.org/).    Learn how the OPTN can help you.    Talk about any concerns that you may have with a transplant hospital.    The [facility] transplant system, the OPTN, is managed under federal contract by the [employer] Organ [employer]aring (UNOS), which is a non-profit charitable organization. The OPTN helps create and define organ sharing policies that make the best use of donated organs. This process continuously evaluating new advances and discoveries so policies can be adapted to best serve patients waiting for transplants. To do so, the OPTN works closely with transplant professionals, transplant patients, transplant candidates, donor families, living donors, and the public. All transplant programs and organ procurement organizations throughout the country are OPTN members and are obligated to follow the policies the OPTN creates for allocating organs.    The OPTN also is responsible for:      Providing educational material for patients, the public, and professionals.    Raising awareness of the need for donated organs and tissue.    Coordinating organ procurement, matching, and placement.    Collecting information about every organ transplant and donation that occurs in the [place].    Remember, you should contact your transplant hospital directly if you have questions or concerns about your own medical care including medical records, work-up progress, and test results.    We are not your transplant hospital, and our staff will not be able to answer questions about your case, so please keep your transplant hospital s phone number handy.    However, while you research your transplant needs and learn as much as you can about  transplantation and donation, we welcome your call to our toll-free patient services line at [phone].          Updated 4/1/2019

## 2022-05-20 ENCOUNTER — TELEPHONE (OUTPATIENT)
Dept: TRANSPLANT | Facility: CLINIC | Age: 52
End: 2022-05-20
Payer: COMMERCIAL

## 2022-05-20 ENCOUNTER — DOCUMENTATION ONLY (OUTPATIENT)
Dept: TRANSPLANT | Facility: CLINIC | Age: 52
End: 2022-05-20
Payer: COMMERCIAL

## 2022-05-20 DIAGNOSIS — Z01.810 PRE-OPERATIVE CARDIOVASCULAR EXAMINATION: ICD-10-CM

## 2022-05-20 DIAGNOSIS — Z76.82 ORGAN TRANSPLANT CANDIDATE: ICD-10-CM

## 2022-05-20 DIAGNOSIS — Z12.5 PROSTATE CANCER SCREENING: ICD-10-CM

## 2022-05-20 DIAGNOSIS — N18.9 CHRONIC KIDNEY DISEASE (CKD): ICD-10-CM

## 2022-05-20 DIAGNOSIS — I10 HYPERTENSION: ICD-10-CM

## 2022-05-20 NOTE — PROGRESS NOTES
Received verification from OS that wait time has been reinstated to 07/24/2013. Forms sent to scanning.

## 2022-05-20 NOTE — TELEPHONE ENCOUNTER
Called pt to introduce self as new transplant coordinator. Reviewed items needing to complete for active status consideration. Needs Cards, echo, bleeding and clotting, PSA, and iliac US. Left VM with direct line for return call.

## 2022-06-16 ENCOUNTER — TELEPHONE (OUTPATIENT)
Dept: TRANSPLANT | Facility: CLINIC | Age: 52
End: 2022-06-16

## 2022-06-16 ENCOUNTER — OFFICE VISIT (OUTPATIENT)
Dept: HEMATOLOGY | Facility: CLINIC | Age: 52
End: 2022-06-16
Attending: PHYSICIAN ASSISTANT
Payer: COMMERCIAL

## 2022-06-16 VITALS
HEART RATE: 73 BPM | DIASTOLIC BLOOD PRESSURE: 95 MMHG | WEIGHT: 178.7 LBS | SYSTOLIC BLOOD PRESSURE: 161 MMHG | OXYGEN SATURATION: 96 % | TEMPERATURE: 98 F | HEIGHT: 69 IN | BODY MASS INDEX: 26.47 KG/M2 | RESPIRATION RATE: 16 BRPM

## 2022-06-16 DIAGNOSIS — Z86.718 HISTORY OF DEEP VENOUS THROMBOSIS: Primary | ICD-10-CM

## 2022-06-16 DIAGNOSIS — T85.868D GRAFT FAILURE DUE TO THROMBOSIS, SUBSEQUENT ENCOUNTER: ICD-10-CM

## 2022-06-16 DIAGNOSIS — Z76.82 ORGAN TRANSPLANT CANDIDATE: ICD-10-CM

## 2022-06-16 LAB
AT III ACT/NOR PPP CHRO: 91 % (ref 85–135)
PROT C ACT/NOR PPP CHRO: 88 % (ref 70–170)
PROT S FREE AG ACT/NOR PPP IA: 105 % (ref 70–148)

## 2022-06-16 PROCEDURE — 86147 CARDIOLIPIN ANTIBODY EA IG: CPT | Performed by: INTERNAL MEDICINE

## 2022-06-16 PROCEDURE — 36415 COLL VENOUS BLD VENIPUNCTURE: CPT | Performed by: INTERNAL MEDICINE

## 2022-06-16 PROCEDURE — 86146 BETA-2 GLYCOPROTEIN ANTIBODY: CPT | Performed by: INTERNAL MEDICINE

## 2022-06-16 PROCEDURE — 85303 CLOT INHIBIT PROT C ACTIVITY: CPT | Performed by: INTERNAL MEDICINE

## 2022-06-16 PROCEDURE — G0463 HOSPITAL OUTPT CLINIC VISIT: HCPCS

## 2022-06-16 PROCEDURE — 85300 ANTITHROMBIN III ACTIVITY: CPT | Performed by: INTERNAL MEDICINE

## 2022-06-16 PROCEDURE — 85730 THROMBOPLASTIN TIME PARTIAL: CPT | Performed by: INTERNAL MEDICINE

## 2022-06-16 PROCEDURE — 85306 CLOT INHIBIT PROT S FREE: CPT | Performed by: INTERNAL MEDICINE

## 2022-06-16 PROCEDURE — 99204 OFFICE O/P NEW MOD 45 MIN: CPT | Performed by: INTERNAL MEDICINE

## 2022-06-16 ASSESSMENT — PAIN SCALES - GENERAL: PAINLEVEL: NO PAIN (0)

## 2022-06-16 NOTE — TELEPHONE ENCOUNTER
Patient Call: General  Route to LPN    Reason for call: Patient wanting to schedule appointments    Call back needed? Yes    Return Call Needed  Same as documented in contacts section  When to return call?: Greater than one day: Route standard priority

## 2022-06-16 NOTE — PROGRESS NOTES
[facility]  [facility]otting [facility]  [facility], [address], [place] [address]  Main: [phone], Fax: [phone]          Outpatient Clinic Visit  Date:  06/16/2022      Raj Pierce is a 51-year-old man referred by the kidney transplant team for evaluation as part of transplant work-up given a past history of renal graft thrombosis.  He is accompanied by his wife.    He has a history of polycystic kidney disease and first underwent kidney transplantation in October 2015.  That transplant has been complicated by poor graft function (small graft) and so he underwent repeat preemptive kidney transplantation in October 2021.  His initial post transplant course was uncomplicated and he was discharged on postop day 3.  He was admitted the following day for emergent exploratory laparotomy due to renal vein thrombosis.  The graft was removed, thrombectomy was performed, and the graft was reimplanted.  Unfortunately, the graft never regained function and ultimately had to be removed a few days later.  He was anticoagulated initially with heparin and transitioned to warfarin which he took for several weeks only.    He has no previous personal history of venous thrombosis and there is no known family history of venous thrombosis with the caveat that his family is small.  His original transplant is still functioning and thus far he has avoided dialysis.  He is being considered for another attempt at renal transplantation and we are asked to evaluate him with regard to whether or not there is some underlying coagulopathy that may have contributed to his previous graft thrombosis.  Review of the notes from the surgery team indicate, however, that the graft thrombosis was felt likely to be due to mechanical issues.    Physical exam: Appears to be in reasonably good overall health.  Detailed exam was not performed today.    Labs: He was tested for factor V Leiden and the  prothrombin gene mutation in 2013 as part of routine pretransplant evaluation, and both were absent.  He also did not have a lupus anticoagulant at that time.      ASSESSMENT / PLAN:  History of renal graft thrombosis, October 2021.  Given the absence of any previous personal or known family history of thrombosis, the likelihood of an underlying genetic thrombophilia is low.  In addition, based upon review of the surgery team's notes, it seems most likely that the previous renal graft thrombosis was due to mechanical issues.  Nevertheless, given the small size of his family, a genetic thrombophilia cannot be completely excluded and therefore its not unreasonable to proceed with that testing.  In addition, we can screen him for antiphospholipid antibody syndrome although we also have low suspicion for this.    Unless there are unexpected findings on his laboratory testing, no further evaluation is necessary from our perspective, I see no contraindication to proceeding with repeat transplant, and he should need nothing more than routine perioperative VTE prophylaxis.      Total time 45 minutes, including review of medical records and labs, clinic visit, and documentation.      Natanael Terry MD  Professor of Medicine  Division of Hematology, Oncology, and Transplantation  Director, [facility]

## 2022-06-17 LAB
B2 GLYCOPROT1 IGG SERPL IA-ACNC: <0.8 U/ML
B2 GLYCOPROT1 IGM SERPL IA-ACNC: <2.4 U/ML
CARDIOLIPIN IGG SER IA-ACNC: <2 GPL-U/ML
CARDIOLIPIN IGG SER IA-ACNC: NEGATIVE
CARDIOLIPIN IGM SER IA-ACNC: 2.9 MPL-U/ML
CARDIOLIPIN IGM SER IA-ACNC: NEGATIVE
DRVVT SCREEN RATIO: 0.82
INR PPP: 0.98 (ref 0.85–1.15)
LA PPP-IMP: NEGATIVE
LUPUS INTERPRETATION: NORMAL
PTT RATIO: 1.03
THROMBIN TIME: 16.7 SECONDS (ref 13–19)

## 2022-06-17 PROCEDURE — 85390 FIBRINOLYSINS SCREEN I&R: CPT | Mod: 26 | Performed by: PATHOLOGY

## 2022-07-18 ENCOUNTER — LAB (OUTPATIENT)
Dept: LAB | Facility: CLINIC | Age: 52
End: 2022-07-18
Payer: COMMERCIAL

## 2022-07-18 DIAGNOSIS — Z48.298 AFTERCARE FOLLOWING ORGAN TRANSPLANT: ICD-10-CM

## 2022-07-18 DIAGNOSIS — Z79.899 ENCOUNTER FOR LONG-TERM CURRENT USE OF MEDICATION: ICD-10-CM

## 2022-07-18 DIAGNOSIS — Z94.0 KIDNEY REPLACED BY TRANSPLANT: ICD-10-CM

## 2022-07-18 PROCEDURE — 80197 ASSAY OF TACROLIMUS: CPT

## 2022-07-20 LAB
TACROLIMUS BLD-MCNC: 7.4 UG/L (ref 5–15)
TME LAST DOSE: NORMAL H
TME LAST DOSE: NORMAL H

## 2022-07-21 DIAGNOSIS — Z94.0 KIDNEY TRANSPLANTED: Primary | ICD-10-CM

## 2022-07-21 DIAGNOSIS — D84.9 IMMUNOSUPPRESSED STATUS (H): ICD-10-CM

## 2022-07-21 DIAGNOSIS — Z76.82 KIDNEY TRANSPLANT CANDIDATE: ICD-10-CM

## 2022-07-21 RX ORDER — TACROLIMUS 1 MG/1
2 CAPSULE ORAL 2 TIMES DAILY
Qty: 120 CAPSULE | Refills: 11 | Status: ON HOLD | OUTPATIENT
Start: 2022-07-21 | End: 2022-12-08

## 2022-07-21 RX ORDER — TACROLIMUS 0.5 MG/1
CAPSULE ORAL
Qty: 60 CAPSULE | Refills: 11
Start: 2022-07-21 | End: 2022-08-18

## 2022-07-21 NOTE — LETTER
PHYSICIAN ORDERS      DATE & TIME ISSUED: 2022 2:41 PM  PATIENT NAME: Raj Pierce   : 1970     [facility] MR# [if applicable]: [id number]     DIAGNOSIS:  Kidney Transplant  ICD-10 CODE: Z94.0     Please repeat the following labs in 2 weeks:  Tacrolimus drug level  CBC  BMP    Any questions please call: [phone]  Please fax lab results to [phone].    .

## 2022-07-21 NOTE — TELEPHONE ENCOUNTER
ISSUE:   Tacrolimus IR level 7.4 on 7/18, goal 4-6, dose 2.5 mg BID.    PLAN:   Please call patient and confirm this was an accurate 12-hour trough. Verify Tacrolimus IR dose 2.5 mg BID. Confirm no new medications or illness. Confirm no missed doses. If accurate trough and accurate dose, decrease Tacrolimus IR dose to 2 mg BID and repeat labs in 2 weeks.    Deborah Sotelo RN BSN  Transplant Care Coordinator    OUTCOME:   Spoke with patient, they confirm accurate trough level and current dose 2.5 mg BID. Patient confirmed dose change to 2 mg BID and to repeat labs in 2 weeks. Orders sent to preferred pharmacy for dose change and lab for repeat labs. Patient voiced understanding of plan.

## 2022-08-03 NOTE — TELEPHONE ENCOUNTER
RECORDS RECEIVED FROM:   DATE RECEIVED:   NOTES STATUS DETAILS   OFFICE NOTE from referring provider    Internal SOT   OFFICE NOTE from other cardiologist    Internal 12-21-20 L Kourtney   DISCHARGE SUMMARY from hospital    N/A    DISCHARGE REPORT from the ER   N/A    OPERATIVE REPORT    N/A    MEDICATION LIST   Internal    LABS     BMP   Internal 7-18-22   CBC   Internal 7-18-22   CMP   Internal 2-23-22   Lipids   Internal 10-23-21   TSH   N/A    DIAGNOSTIC PROCEDURES     EKG   Internal 5-12-22   Monitor Reports   N/A    IMAGING (DISC & REPORT)      Echo   Internal Scheduled prior to appt 8-18-22   Stress Tests   Internal 12-14-20   Cath   N/A    MRI/MRA   N/A    CT/CTA   Internal 10-27-21 CT Chest

## 2022-08-18 ENCOUNTER — ANCILLARY PROCEDURE (OUTPATIENT)
Dept: CARDIOLOGY | Facility: CLINIC | Age: 52
End: 2022-08-18
Attending: PHYSICIAN ASSISTANT
Payer: COMMERCIAL

## 2022-08-18 ENCOUNTER — PRE VISIT (OUTPATIENT)
Dept: CARDIOLOGY | Facility: CLINIC | Age: 52
End: 2022-08-18

## 2022-08-18 ENCOUNTER — ANCILLARY PROCEDURE (OUTPATIENT)
Dept: ULTRASOUND IMAGING | Facility: CLINIC | Age: 52
End: 2022-08-18
Attending: PHYSICIAN ASSISTANT
Payer: COMMERCIAL

## 2022-08-18 ENCOUNTER — LAB (OUTPATIENT)
Dept: LAB | Facility: CLINIC | Age: 52
End: 2022-08-18
Payer: COMMERCIAL

## 2022-08-18 VITALS
HEIGHT: 69 IN | HEART RATE: 75 BPM | BODY MASS INDEX: 26.31 KG/M2 | DIASTOLIC BLOOD PRESSURE: 97 MMHG | WEIGHT: 177.6 LBS | OXYGEN SATURATION: 96 % | SYSTOLIC BLOOD PRESSURE: 158 MMHG

## 2022-08-18 DIAGNOSIS — Z76.82 ORGAN TRANSPLANT CANDIDATE: ICD-10-CM

## 2022-08-18 DIAGNOSIS — N18.4 STAGE 4 CHRONIC KIDNEY DISEASE (H): ICD-10-CM

## 2022-08-18 DIAGNOSIS — N18.6 ESRD (END STAGE RENAL DISEASE) (H): ICD-10-CM

## 2022-08-18 DIAGNOSIS — I15.1 HYPERTENSION SECONDARY TO OTHER RENAL DISORDERS: ICD-10-CM

## 2022-08-18 DIAGNOSIS — I10 HYPERTENSION: ICD-10-CM

## 2022-08-18 DIAGNOSIS — I10 ESSENTIAL HYPERTENSION: ICD-10-CM

## 2022-08-18 DIAGNOSIS — Z12.5 PROSTATE CANCER SCREENING: ICD-10-CM

## 2022-08-18 DIAGNOSIS — N18.4 CHRONIC KIDNEY DISEASE, STAGE IV (SEVERE) (H): ICD-10-CM

## 2022-08-18 DIAGNOSIS — N18.9 CHRONIC RENAL FAILURE: ICD-10-CM

## 2022-08-18 DIAGNOSIS — N18.9 CHRONIC KIDNEY DISEASE (CKD): ICD-10-CM

## 2022-08-18 DIAGNOSIS — Z01.810 PRE-OPERATIVE CARDIOVASCULAR EXAMINATION: ICD-10-CM

## 2022-08-18 DIAGNOSIS — G47.33 OBSTRUCTIVE SLEEP APNEA: ICD-10-CM

## 2022-08-18 DIAGNOSIS — Q61.3 POLYCYSTIC KIDNEY DISEASE: ICD-10-CM

## 2022-08-18 DIAGNOSIS — T86.12 KIDNEY TRANSPLANT FAILURE: ICD-10-CM

## 2022-08-18 DIAGNOSIS — E78.5 HYPERLIPIDEMIA: ICD-10-CM

## 2022-08-18 DIAGNOSIS — Z01.818 PRE-TRANSPLANT EVALUATION FOR KIDNEY TRANSPLANT: ICD-10-CM

## 2022-08-18 LAB
LVEF ECHO: NORMAL
PSA SERPL-MCNC: 1.05 UG/L (ref 0–4)

## 2022-08-18 PROCEDURE — 99203 OFFICE O/P NEW LOW 30 MIN: CPT | Mod: 25 | Performed by: INTERNAL MEDICINE

## 2022-08-18 PROCEDURE — 36415 COLL VENOUS BLD VENIPUNCTURE: CPT | Performed by: PATHOLOGY

## 2022-08-18 PROCEDURE — 93978 VASCULAR STUDY: CPT | Mod: GC | Performed by: RADIOLOGY

## 2022-08-18 PROCEDURE — 93306 TTE W/DOPPLER COMPLETE: CPT | Performed by: INTERNAL MEDICINE

## 2022-08-18 PROCEDURE — G0103 PSA SCREENING: HCPCS | Performed by: PATHOLOGY

## 2022-08-18 PROCEDURE — G0463 HOSPITAL OUTPT CLINIC VISIT: HCPCS

## 2022-08-18 RX ORDER — AMLODIPINE BESYLATE 5 MG/1
5 TABLET ORAL DAILY
COMMUNITY
Start: 2022-07-29 | End: 2023-06-27

## 2022-08-18 ASSESSMENT — PAIN SCALES - GENERAL: PAINLEVEL: NO PAIN (0)

## 2022-08-18 NOTE — LETTER
8/18/2022      RE: Raj Pierce  [address]  [place] [address]       Dear Colleague,    Thank you for the opportunity to participate in the care of your patient, Raj Pierce, at the [facility] [place] at [facility]. Please see a copy of my visit note below.    CARDIOLOGY NEW OFFICE VISIT    HPI: Raj Pierce is a 51 year old male being seen today for pre-renal transplant evaluation.   The patient's risk factor profile is: (+) HTN, (-) DM, (+) hypercholesterolemia, (-) tobacco use, (-) fam Hx premature CAD.    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 He has had a number of normal stress tests and echocardiograms, with last one being two years ago.    The patient denies a history of chest discomfort, dyspnea, PND, orthopnea, pedal edema, palpitations, lightheadedness, and syncope.      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     Kidney replaced by transplant 10/13/2015     Polycystic kidney, autosomal dominant      Pure hypercholesterolemia      Sleep apnea     cpap     Vitamin D deficiency        CURRENT MEDICATIONS:  Current Outpatient Medications   Medication Sig Dispense Refill     amLODIPine (NORVASC) 5 MG tablet Take 5 mg by mouth daily       atorvastatin (LIPITOR) 10 MG tablet Take 10 mg by mouth daily       calcium carbonate-vitamin D (OS-LILIANA WITH D) 500-200 MG-UNIT tablet Take 1 tablet by mouth 2 times daily 60 tablet 2     citalopram (CELEXA) 20 MG  tablet Take 20 mg by mouth daily.       dapsone (ACZONE) 25 MG tablet Take 2 tablets (50 mg) by mouth daily 60 tablet 11     furosemide (LASIX) 20 MG tablet Take 1 tablet (20 mg) by mouth daily 30 tablet 2     magnesium oxide (MAG-OX) 400 MG tablet TAKE 1 TABLET (400 MG) BY MOUTH DAILY WITH LUNCH 30 tablet 0     mycophenolate (GENERIC EQUIVALENT) 250 MG capsule Take 3 capsules (750 mg) by mouth 2 times daily 180 capsule 11     sodium bicarbonate 650 MG tablet Take 2 tablets (1,300 mg) by mouth 3 times daily 120 tablet 2     tacrolimus (GENERIC EQUIVALENT) 1 MG capsule Take 2 capsules (2 mg) by mouth 2 times daily 120 capsule 11     valGANciclovir (VALCYTE) 450 MG tablet Take 1 tablet (450 mg) by mouth twice a week 60 tablet 5       PAST SURGICAL HISTORY:  Past Surgical History:   Procedure Laterality Date     BENCH KIDNEY N/A 10/13/2015    Procedure: BENCH KIDNEY;  Surgeon: Dariel Chavez MD;  [facility]     BRONCHOSCOPY, DILATE BRONCHUS, STENT BRONCHUS, COMBINED  10/27/2021    Procedure: Bronchoscopy, dilate bronchus, stent bronchus, combined;  Surgeon: Madina Warren MD;  [facility]     CYSTOSCOPY, REMOVE STENT(S), COMBINED Right 11/9/2015    Procedure: COMBINED CYSTOSCOPY, REMOVE STENT(S);  Surgeon: Dariel Chavez MD;  [facility]     HERNIORRHAPHY INGUINAL CHILD      right     LAPAROTOMY EXPLORATORY N/A 10/27/2021    Procedure: Exploratory left lower quadrant of kidney transplant, back table flush, reimplantation of kidney transplant, with ureteral stent removal and placement of a new ureteral stent, intraoperative ultrasound, intraoperative of transit time flow measurment (VeriQ);  Surgeon: Madina Warren MD;  [facility]     NEPHRECTOMY Left 11/4/2021    Procedure: NEPHRECTOMY, LEFT, Ureteral stent explant;  Surgeon: Eulalio Emanuel MD;  [facility]     THROMBECTOMY ABDOMEN N/A 10/27/2021    Procedure: Thrombectomy of iliac and renal veins;  Surgeon:  Madina Warren MD;  [facility]     TONSILLECTOMY       TRANSPLANT KIDNEY RECIPIENT  DONOR N/A 10/13/2015    Procedure: TRANSPLANT KIDNEY RECIPIENT  DONOR;  Surgeon: Dariel Chavez MD;  [facility]     TRANSPLANT KIDNEY RECIPIENT  DONOR N/A 10/23/2021    Procedure: TRANSPLANT, KIDNEY, RECIPIENT,  DONOR, URETERAL STENT PLACEMENT;  Surgeon: Madina Warren MD;  [facility]       ALLERGIES  Alcohol    FAMILY HX:  Family History   Problem Relation Age of Onset     Gastrointestinal Disease Mother         ESRD PCKD     Hypertension Mother      C.A.D. Father      Hypertension Father      Gastrointestinal Disease Sister         PCKD       SOCIAL HX:  Social History     Socioeconomic History     Marital status:      Spouse name: Dianne     Number of children: 2     Years of education: 13     Highest education level: None   Occupational History     Occupation: [employer]     Employer: [employer]   Tobacco Use     Smoking status: Never Smoker     Smokeless tobacco: Never Used   Substance and Sexual Activity     Alcohol use: Yes     Alcohol/week: 10.0 standard drinks     Types: 10 Standard drinks or equivalent per week     Comment: 1 drink daily      Drug use: No     Sexual activity: Yes     Partners: Female   Other Topics Concern     Blood Transfusions No     Seat Belt Yes       ROS:  Constitutional: No fever, chills, or sweats. No weight gain/loss.   ENT: No visual disturbance, ear ache, epistaxis, sore throat.   Allergies/Immunologic: Negative.   Respiratory: No cough, hemoptysis.   Cardiovascular: As per HPI.   GI: No nausea, vomiting, hematemesis, melena, or hematochezia.   : No urinary frequency, dysuria, or hematuria.   Integument: Negative.   Psychiatric: Negative.   Neuro: Negative.   Endocrinology: Negative.   Musculoskeletal: No myalgia.    VITAL SIGNS:  BP (!) 158/97 (BP Location: Right arm, Patient Position: Chair, Cuff Size:  "Adult Regular)   Pulse 75   Ht 1.752 m (5' 8.98\")   Wt 80.6 kg (177 lb 9.6 oz)   SpO2 96%   BMI 26.25 kg/m    Body mass index is 26.25 kg/m .  Wt Readings from Last 2 Encounters:   08/18/22 80.6 kg (177 lb 9.6 oz)   06/16/22 81.1 kg (178 lb 11.2 oz)       PHYSICAL EXAM  Raj Pierce is a 51 year old male in no acute distress.  HEENT: Unremarkable.  Neck: JVP normal.  Carotids +4/4 bilaterally without bruits.  Lungs: CTA.  Cor: RRR. Normal S1 and S2.  No murmur, rub, or gallop.  PMI in Lf 5th ICS.  Abd: Soft, nontender, nondistended.  NABS.  No pulsatile mass.  Extremities: No C/C/E.  Pulses +4/4 symmetric in upper and lower extremities.  Neuro: Grossly intact.    LABS    Lab Results   Component Value Date    WBC 11.8 11/07/2021    WBC 6.1 12/14/2020     Lab Results   Component Value Date    RBC 2.52 11/07/2021    RBC 4.96 12/14/2020     Lab Results   Component Value Date    HGB 7.3 11/07/2021    HGB 13.8 12/14/2020     Lab Results   Component Value Date    HCT 23.7 11/07/2021    HCT 43.9 12/14/2020     No components found for: MCT  Lab Results   Component Value Date    MCV 94 11/07/2021    MCV 89 12/14/2020     Lab Results   Component Value Date    MCH 29.0 11/07/2021    MCH 27.8 12/14/2020     Lab Results   Component Value Date    MCHC 30.8 11/07/2021    MCHC 31.4 12/14/2020     Lab Results   Component Value Date    RDW 14.8 11/07/2021    RDW 12.4 12/14/2020     Lab Results   Component Value Date     11/07/2021     12/14/2020      Recent Labs   Lab Test 07/18/22  0751 04/04/22  0753 11/08/21  0736 11/07/21  1208 11/07/21  0629 11/07/21  0200 11/06/21  0601   NA  --   --   --   --   --  135 135   POTASSIUM  --   --   --  5.5* 5.3 5.5* 4.9  4.9   CHLORIDE 105 105   < >  --   --  109 106   CO2  --   --   --   --   --  20 20   ANIONGAP  --   --   --   --   --  6 9   GLC  --   --   --   --   --  103* 129*   BUN  --   --   --   --   --  83* 90*   CR  --   --   --   --   --  5.31* 5.75*   LILIANA  --   " --   --   --   --  7.9* 7.9*    < > = values in this interval not displayed.     Recent Labs   Lab Test 10/23/21  0358 20  0833 04/15/16  0813 10/13/15  0937   CHOL 162  --   --  178   HDL 64  --   --  66   LDL 77  --   --  86   TRIG 105 104   < > 129   CHOLHDLRATIO  --   --   --  2.7   NHDL 98  --   --   --     < > = values in this interval not displayed.        EK22  Normal sinus rhythm    ECHO: 22  Interpretation Summary  Left ventricular size, wall motion and function are normal. The ejection  fraction is 60-65%.  Global right ventricular function is normal.  The inferior vena cava is normal.  Mildly dilated aorta: Sinuses of Valsalva 4.0 cm; ascending aorta 4.2 cm.     This study was compared with the study from 18. Sinus of valsalva  diameter has increased. No change in proximal ascending aorta.    STRESS TEST:       The nuclear stress test is negative for inducible myocardial ischemia or infarction.     LVEDv 105ml. LVESv 21ml. LV EF80%.    CARDIAC CATH:  --    ASSESSMENT AND PLAN:    1. Hypertension, not at goal  -- renal to manage, continue CCB, Lasix    2. Hyperlipidemia, well controlled  -- continue statin    3. Mildly dilated ascending aorta  -- repeat echo in 1 year    His RCRI is 2 for risk of surgery and CKD, which are non modifiable, and he is optimally medically managed, his 30 day risk of rodrigo-operative cardiovascular events is 10%. We will opt for a nuclear stress test as before given his CKD and desire to avoid contrast to avoid initiating dialysis.    RTC annually with echocardiogram given ascending aortic dilation    Waqas Hickey MD

## 2022-08-18 NOTE — PATIENT INSTRUCTIONS
Patient Instructions:  It was a pleasure to see you in the cardiology clinic today.      If you have any questions, call  Madalyn Irving RN, at [phone].   [facility].  To schedule an appointment or to leave a message for your Care Team Press #1  If you are a physician calling for another physician Press #2  For Billing Press #3  For Medical Records Press #4  We are encouraging the use of [employer] to communicate with your HealthCare Provider    Note the new medications: none  Stop the following medications: none    The results from today include: nuclear med stress test  ECHO in one year to evaluate aorta  Please follow up pending results      If you have an urgent need after hours (8:00 am to 4:30 pm) please call [phone] and ask for the cardiology fellow on call.

## 2022-08-18 NOTE — NURSING NOTE
Chief Complaint   Patient presents with     New Patient     New Cardiology- referral from [facility] for pre op cardiovascular exam     Vitals were taken and medications reconciled.    KUN Mckeon  1:36 PM

## 2022-08-19 NOTE — PROGRESS NOTES
CARDIOLOGY NEW OFFICE VISIT    HPI: Raj Pierce is a 51 year old male being seen today for pre-renal transplant evaluation.   The patient's risk factor profile is: (+) HTN, (-) DM, (+) hypercholesterolemia, (-) tobacco use, (-) fam Hx premature CAD.    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 He has had a number of normal stress tests and echocardiograms, with last one being two years ago.    The patient denies a history of chest discomfort, dyspnea, PND, orthopnea, pedal edema, palpitations, lightheadedness, and syncope.      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     Kidney replaced by transplant 10/13/2015     Polycystic kidney, autosomal dominant      Pure hypercholesterolemia      Sleep apnea     cpap     Vitamin D deficiency        CURRENT MEDICATIONS:  Current Outpatient Medications   Medication Sig Dispense Refill     amLODIPine (NORVASC) 5 MG tablet Take 5 mg by mouth daily       atorvastatin (LIPITOR) 10 MG tablet Take 10 mg by mouth daily       calcium carbonate-vitamin D (OS-LILIANA WITH D) 500-200 MG-UNIT tablet Take 1 tablet by mouth 2 times daily 60 tablet 2     citalopram (CELEXA) 20 MG tablet Take 20 mg by mouth daily.       dapsone (ACZONE) 25 MG tablet Take 2 tablets (50 mg) by mouth daily 60 tablet 11     furosemide (LASIX) 20 MG tablet Take 1 tablet (20 mg) by mouth daily 30 tablet 2     magnesium oxide (MAG-OX) 400 MG tablet TAKE 1 TABLET (400 MG) BY MOUTH DAILY WITH LUNCH 30 tablet 0     mycophenolate (GENERIC EQUIVALENT)  250 MG capsule Take 3 capsules (750 mg) by mouth 2 times daily 180 capsule 11     sodium bicarbonate 650 MG tablet Take 2 tablets (1,300 mg) by mouth 3 times daily 120 tablet 2     tacrolimus (GENERIC EQUIVALENT) 1 MG capsule Take 2 capsules (2 mg) by mouth 2 times daily 120 capsule 11     valGANciclovir (VALCYTE) 450 MG tablet Take 1 tablet (450 mg) by mouth twice a week 60 tablet 5       PAST SURGICAL HISTORY:  Past Surgical History:   Procedure Laterality Date     BENCH KIDNEY N/A 10/13/2015    Procedure: BENCH KIDNEY;  Surgeon: Dariel Chavez MD;  [facility]     BRONCHOSCOPY, DILATE BRONCHUS, STENT BRONCHUS, COMBINED  10/27/2021    Procedure: Bronchoscopy, dilate bronchus, stent bronchus, combined;  Surgeon: Madina Warren MD;  [facility]     CYSTOSCOPY, REMOVE STENT(S), COMBINED Right 2015    Procedure: COMBINED CYSTOSCOPY, REMOVE STENT(S);  Surgeon: Dariel Chavez MD;  [facility]     HERNIORRHAPHY INGUINAL CHILD      right     LAPAROTOMY EXPLORATORY N/A 10/27/2021    Procedure: Exploratory left lower quadrant of kidney transplant, back table flush, reimplantation of kidney transplant, with ureteral stent removal and placement of a new ureteral stent, intraoperative ultrasound, intraoperative of transit time flow measurment (VeriQ);  Surgeon: Madina Warren MD;  [facility]     NEPHRECTOMY Left 2021    Procedure: NEPHRECTOMY, LEFT, Ureteral stent explant;  Surgeon: Eulalio Emanuel MD;  [facility]     THROMBECTOMY ABDOMEN N/A 10/27/2021    Procedure: Thrombectomy of iliac and renal veins;  Surgeon: Madina Warren MD;  [facility]     TONSILLECTOMY       TRANSPLANT KIDNEY RECIPIENT  DONOR N/A 10/13/2015    Procedure: TRANSPLANT KIDNEY RECIPIENT  DONOR;  Surgeon: Dariel Chavez MD;  [facility]     TRANSPLANT KIDNEY RECIPIENT  DONOR N/A 10/23/2021    Procedure: TRANSPLANT, KIDNEY, RECIPIENT,   "DONOR, URETERAL STENT PLACEMENT;  Surgeon: Madina Warren MD;  [facility]       ALLERGIES  Alcohol    FAMILY HX:  Family History   Problem Relation Age of Onset     Gastrointestinal Disease Mother         ESRD PCKD     Hypertension Mother      C.A.D. Father      Hypertension Father      Gastrointestinal Disease Sister         PCKD       SOCIAL HX:  Social History     Socioeconomic History     Marital status:      Spouse name: Dianne     Number of children: 2     Years of education: 13     Highest education level: None   Occupational History     Occupation: [employer]     Employer: [employer]   Tobacco Use     Smoking status: Never Smoker     Smokeless tobacco: Never Used   Substance and Sexual Activity     Alcohol use: Yes     Alcohol/week: 10.0 standard drinks     Types: 10 Standard drinks or equivalent per week     Comment: 1 drink daily      Drug use: No     Sexual activity: Yes     Partners: Female   Other Topics Concern     Blood Transfusions No     Seat Belt Yes       ROS:  Constitutional: No fever, chills, or sweats. No weight gain/loss.   ENT: No visual disturbance, ear ache, epistaxis, sore throat.   Allergies/Immunologic: Negative.   Respiratory: No cough, hemoptysis.   Cardiovascular: As per HPI.   GI: No nausea, vomiting, hematemesis, melena, or hematochezia.   : No urinary frequency, dysuria, or hematuria.   Integument: Negative.   Psychiatric: Negative.   Neuro: Negative.   Endocrinology: Negative.   Musculoskeletal: No myalgia.    VITAL SIGNS:  BP (!) 158/97 (BP Location: Right arm, Patient Position: Chair, Cuff Size: Adult Regular)   Pulse 75   Ht 1.752 m (5' 8.98\")   Wt 80.6 kg (177 lb 9.6 oz)   SpO2 96%   BMI 26.25 kg/m    Body mass index is 26.25 kg/m .  Wt Readings from Last 2 Encounters:   08/18/22 80.6 kg (177 lb 9.6 oz)   06/16/22 81.1 kg (178 lb 11.2 oz)       PHYSICAL EXAM  Raj Pierce is a 51 year old male in no acute distress.  HEENT: " Unremarkable.  Neck: JVP normal.  Carotids +4/4 bilaterally without bruits.  Lungs: CTA.  Cor: RRR. Normal S1 and S2.  No murmur, rub, or gallop.  PMI in Lf 5th ICS.  Abd: Soft, nontender, nondistended.  NABS.  No pulsatile mass.  Extremities: No C/C/E.  Pulses +4/4 symmetric in upper and lower extremities.  Neuro: Grossly intact.    LABS    Lab Results   Component Value Date    WBC 11.8 11/07/2021    WBC 6.1 12/14/2020     Lab Results   Component Value Date    RBC 2.52 11/07/2021    RBC 4.96 12/14/2020     Lab Results   Component Value Date    HGB 7.3 11/07/2021    HGB 13.8 12/14/2020     Lab Results   Component Value Date    HCT 23.7 11/07/2021    HCT 43.9 12/14/2020     No components found for: MCT  Lab Results   Component Value Date    MCV 94 11/07/2021    MCV 89 12/14/2020     Lab Results   Component Value Date    MCH 29.0 11/07/2021    MCH 27.8 12/14/2020     Lab Results   Component Value Date    MCHC 30.8 11/07/2021    MCHC 31.4 12/14/2020     Lab Results   Component Value Date    RDW 14.8 11/07/2021    RDW 12.4 12/14/2020     Lab Results   Component Value Date     11/07/2021     12/14/2020      Recent Labs   Lab Test 07/18/22  0751 04/04/22  0753 11/08/21  0736 11/07/21  1208 11/07/21  0629 11/07/21  0200 11/06/21  0601   NA  --   --   --   --   --  135 135   POTASSIUM  --   --   --  5.5* 5.3 5.5* 4.9  4.9   CHLORIDE 105 105   < >  --   --  109 106   CO2  --   --   --   --   --  20 20   ANIONGAP  --   --   --   --   --  6 9   GLC  --   --   --   --   --  103* 129*   BUN  --   --   --   --   --  83* 90*   CR  --   --   --   --   --  5.31* 5.75*   LILIANA  --   --   --   --   --  7.9* 7.9*    < > = values in this interval not displayed.     Recent Labs   Lab Test 10/23/21  0358 05/14/20  0833 04/15/16  0813 10/13/15  0937   CHOL 162  --   --  178   HDL 64  --   --  66   LDL 77  --   --  86   TRIG 105 104   < > 129   CHOLHDLRATIO  --   --   --  2.7   NHDL 98  --   --   --     < > = values in this  interval not displayed.        EK22  Normal sinus rhythm    ECHO: 22  Interpretation Summary  Left ventricular size, wall motion and function are normal. The ejection  fraction is 60-65%.  Global right ventricular function is normal.  The inferior vena cava is normal.  Mildly dilated aorta: Sinuses of Valsalva 4.0 cm; ascending aorta 4.2 cm.     This study was compared with the study from 18. Sinus of valsalva  diameter has increased. No change in proximal ascending aorta.    STRESS TEST:       The nuclear stress test is negative for inducible myocardial ischemia or infarction.     LVEDv 105ml. LVESv 21ml. LV EF80%.    CARDIAC CATH:  --    ASSESSMENT AND PLAN:    1. Hypertension, not at goal  -- renal to manage, continue CCB, Lasix    2. Hyperlipidemia, well controlled  -- continue statin    3. Mildly dilated ascending aorta  -- repeat echo in 1 year    His RCRI is 2 for risk of surgery and CKD, which are non modifiable, and he is optimally medically managed, his 30 day risk of rodrigo-operative cardiovascular events is 10%. We will opt for a nuclear stress test as before given his CKD and desire to avoid contrast to avoid initiating dialysis.    RTC annually with echocardiogram given ascending aortic dilation    Waqas Hickey MD

## 2022-08-30 ENCOUNTER — TEAM CONFERENCE (OUTPATIENT)
Dept: TRANSPLANT | Facility: CLINIC | Age: 52
End: 2022-08-30

## 2022-08-30 NOTE — TELEPHONE ENCOUNTER
Image Review Meeting    ATTENDEES: Dr. Warren, coordinators     IMAGES REVIEWED: 8/18/22 iliac US, 10/27/22 CT chest/abdomen/pelvis    DECISION: vessels suitable for transplant, kidneys appear large. May need to remove right kidney at time of transplant. Needs review with all surgeons at complex imaging meeting.

## 2022-09-01 ENCOUNTER — HOSPITAL ENCOUNTER (OUTPATIENT)
Dept: CARDIOLOGY | Facility: CLINIC | Age: 52
Discharge: HOME | End: 2022-09-01
Attending: INTERNAL MEDICINE
Payer: COMMERCIAL

## 2022-09-01 ENCOUNTER — HOSPITAL ENCOUNTER (OUTPATIENT)
Dept: NUCLEAR MEDICINE | Facility: CLINIC | Age: 52
Setting detail: NUCLEAR MEDICINE
Discharge: HOME | End: 2022-09-01
Attending: INTERNAL MEDICINE
Payer: COMMERCIAL

## 2022-09-01 DIAGNOSIS — Z76.82 ORGAN TRANSPLANT CANDIDATE: ICD-10-CM

## 2022-09-01 DIAGNOSIS — I15.1 HYPERTENSION SECONDARY TO OTHER RENAL DISORDERS: ICD-10-CM

## 2022-09-01 DIAGNOSIS — Z01.810 PRE-OPERATIVE CARDIOVASCULAR EXAMINATION: ICD-10-CM

## 2022-09-01 DIAGNOSIS — Z12.5 PROSTATE CANCER SCREENING: ICD-10-CM

## 2022-09-01 DIAGNOSIS — N18.4 STAGE 4 CHRONIC KIDNEY DISEASE (H): ICD-10-CM

## 2022-09-01 LAB
STRESS ECHO BASELINE DIASTOLIC HE: 82
STRESS ECHO BASELINE SYSTOLIC BP: 111
STRESS ECHO LAST STRESS SYSTOLIC BP: 106
STRESS ECHO TARGET HR: 167

## 2022-09-01 PROCEDURE — G1010 CDSM STANSON: HCPCS

## 2022-09-01 PROCEDURE — 78452 HT MUSCLE IMAGE SPECT MULT: CPT | Mod: 26

## 2022-09-01 PROCEDURE — A9502 TC99M TETROFOSMIN: HCPCS | Performed by: INTERNAL MEDICINE

## 2022-09-01 PROCEDURE — 250N000011 HC RX IP 250 OP 636: Performed by: STUDENT IN AN ORGANIZED HEALTH CARE EDUCATION/TRAINING PROGRAM

## 2022-09-01 PROCEDURE — 93017 CV STRESS TEST TRACING ONLY: CPT

## 2022-09-01 PROCEDURE — 343N000001 HC RX 343: Performed by: INTERNAL MEDICINE

## 2022-09-01 PROCEDURE — 93016 CV STRESS TEST SUPVJ ONLY: CPT | Performed by: STUDENT IN AN ORGANIZED HEALTH CARE EDUCATION/TRAINING PROGRAM

## 2022-09-01 PROCEDURE — 93018 CV STRESS TEST I&R ONLY: CPT | Performed by: STUDENT IN AN ORGANIZED HEALTH CARE EDUCATION/TRAINING PROGRAM

## 2022-09-01 RX ORDER — REGADENOSON 0.08 MG/ML
0.4 INJECTION, SOLUTION INTRAVENOUS ONCE
Status: COMPLETED | OUTPATIENT
Start: 2022-09-01 | End: 2022-09-01

## 2022-09-01 RX ORDER — ACYCLOVIR 200 MG/1
0-1 CAPSULE ORAL
Status: DISCONTINUED | OUTPATIENT
Start: 2022-09-01 | End: 2022-09-02 | Stop reason: HOSPADM

## 2022-09-01 RX ORDER — CAFFEINE CITRATE 20 MG/ML
60 SOLUTION INTRAVENOUS
Status: DISCONTINUED | OUTPATIENT
Start: 2022-09-01 | End: 2022-09-02 | Stop reason: HOSPADM

## 2022-09-01 RX ORDER — ALBUTEROL SULFATE 90 UG/1
2 AEROSOL, METERED RESPIRATORY (INHALATION) EVERY 5 MIN PRN
Status: DISCONTINUED | OUTPATIENT
Start: 2022-09-01 | End: 2022-09-02 | Stop reason: HOSPADM

## 2022-09-01 RX ORDER — AMINOPHYLLINE 25 MG/ML
50-100 INJECTION, SOLUTION INTRAVENOUS
Status: DISCONTINUED | OUTPATIENT
Start: 2022-09-01 | End: 2022-09-02 | Stop reason: HOSPADM

## 2022-09-01 RX ADMIN — TETROFOSMIN 39.4 MCI.: 1.38 INJECTION, POWDER, LYOPHILIZED, FOR SOLUTION INTRAVENOUS at 08:51

## 2022-09-01 RX ADMIN — TETROFOSMIN 10.7 MCI.: 1.38 INJECTION, POWDER, LYOPHILIZED, FOR SOLUTION INTRAVENOUS at 07:50

## 2022-09-01 RX ADMIN — REGADENOSON 0.4 MG: 0.08 INJECTION, SOLUTION INTRAVENOUS at 08:49

## 2022-09-01 NOTE — PROGRESS NOTES
Pt here for Lexiscan nuclear stress test. Medication and side effects reviewed with patient. Lung sounds clear to auscultation bilaterally. Denied caffeine use. Patient tolerated Lexiscan dose with some shortness of breath, resolved after a few minutes. VSS. Monitored post injection and then taken to nuclear medicine for follow up imaging.    Madalyn Guevara RN

## 2022-09-04 ENCOUNTER — HEALTH MAINTENANCE LETTER (OUTPATIENT)
Age: 52
End: 2022-09-04

## 2022-09-06 DIAGNOSIS — N18.9 CHRONIC KIDNEY DISEASE (CKD): ICD-10-CM

## 2022-09-06 DIAGNOSIS — Z94.0 KIDNEY REPLACED BY TRANSPLANT: ICD-10-CM

## 2022-09-06 NOTE — PROGRESS NOTES
Called pt to update orders placed for CT a/p. Scheduling should be reaching out to arrange, also looking for update on colonoscopy. Left  with direct line for return call.

## 2022-09-15 ENCOUNTER — TELEPHONE (OUTPATIENT)
Dept: TRANSPLANT | Facility: CLINIC | Age: 52
End: 2022-09-15

## 2022-09-20 DIAGNOSIS — Z94.0 KIDNEY TRANSPLANTED: Primary | ICD-10-CM

## 2022-09-21 RX ORDER — MAGNESIUM OXIDE 400 MG/1
400 TABLET ORAL DAILY
Qty: 30 TABLET | Refills: 0 | Status: SHIPPED | OUTPATIENT
Start: 2022-09-21 | End: 2022-10-19

## 2022-09-22 ENCOUNTER — LAB (OUTPATIENT)
Dept: LAB | Facility: CLINIC | Age: 52
End: 2022-09-22
Payer: COMMERCIAL

## 2022-09-22 ENCOUNTER — TELEPHONE (OUTPATIENT)
Dept: TRANSPLANT | Facility: CLINIC | Age: 52
End: 2022-09-22

## 2022-09-22 ENCOUNTER — DOCUMENTATION ONLY (OUTPATIENT)
Dept: TRANSPLANT | Facility: CLINIC | Age: 52
End: 2022-09-22

## 2022-09-22 PROCEDURE — 80197 ASSAY OF TACROLIMUS: CPT | Performed by: INTERNAL MEDICINE

## 2022-09-22 NOTE — TELEPHONE ENCOUNTER
Creatinine = 4.94  (9/22/22)  Baseline 3.5 - 4.5 (as of 3/18/22)      PLAN:  Check for recent illness.  Any fever?  Any missed medication?  Changes in medication, (german diuretics)?  Any pain over the transplanted kidney?  Any nausea / vomiting / diarrhea?  Dehydrated?   Is BP low?     If not on fluid restriction, instruct to improve hydration and recheck BMP in 2 weeks.      OUTCOME:  Called Raj Pierce who reports he probably need to drink more water.  Recommended improving hydration and repeating BMP in 2 weeks.  Reports he just saw Dr. Don and that he was not concerned.

## 2022-09-22 NOTE — TELEPHONE ENCOUNTER
Returned pt call, requesting CT a/p be sent to Dr. Don' s office. [facility] will cancel appt on 10/5 and have order faxed to primary neph. Pt will let writer know once this is completed. Pt verbalized understanding of information and has no further questions. Encouraged to reach out if questions arise.

## 2022-09-22 NOTE — TELEPHONE ENCOUNTER
Raj would like to get CT Abd/Pel done in the [place] area (Dr. Don)   is scheduled here  [facility] for CT on 10/05/2022. Please call Raj

## 2022-09-23 ENCOUNTER — TELEPHONE (OUTPATIENT)
Dept: TRANSPLANT | Facility: CLINIC | Age: 52
End: 2022-09-23

## 2022-09-23 DIAGNOSIS — Z94.0 KIDNEY REPLACED BY TRANSPLANT: ICD-10-CM

## 2022-09-23 DIAGNOSIS — Z79.899 ENCOUNTER FOR LONG-TERM CURRENT USE OF MEDICATION: ICD-10-CM

## 2022-09-23 DIAGNOSIS — Z48.298 AFTERCARE FOLLOWING ORGAN TRANSPLANT: ICD-10-CM

## 2022-09-23 LAB
TACROLIMUS BLD-MCNC: 7.1 UG/L (ref 5–15)
TME LAST DOSE: NORMAL H
TME LAST DOSE: NORMAL H

## 2022-09-23 NOTE — TELEPHONE ENCOUNTER
Tacrolimus = 7.1  (9/22/22)  Goal 4-6  Current tac dose 2 mg BID    PLAN:   Call and confirm this was a good 12-hour trough.   Verify dose 2 mg BID.   Confirm no new medications or illness (german. Diarrhea).  MISSED DOSES?   If good trough, stay on the same dose.   Recheck level in 2 weeks and make sure it is a good trough to avoid additional lab draws.

## 2022-09-23 NOTE — LETTER
PHYSICIAN ORDERS      DATE & TIME ISSUED: 2022 6:44 PM  PATIENT NAME: Raj Pierce   : 1970     [facility] MR# [if applicable]: [id number]     DIAGNOSIS:  Kidney transplant   ICD-10 CODE: Z94.0      Please repeat the following level in two weeks  Tacrolimus level (ensure 12 hours between last dose and blood draw)    Any questions please call: [phone] option 5    Please fax results to [phone].    .

## 2022-09-26 NOTE — TELEPHONE ENCOUNTER
Call placed to patient. Patient confirms current dose and accurate trough level. Denies any recent illness, diarrhea or medication changes. Patient v\u to continue current dose and repeat level in two weeks. Order sent

## 2022-10-11 ENCOUNTER — TELEPHONE (OUTPATIENT)
Dept: TRANSPLANT | Facility: CLINIC | Age: 52
End: 2022-10-11

## 2022-10-11 ENCOUNTER — LAB (OUTPATIENT)
Dept: LAB | Facility: CLINIC | Age: 52
End: 2022-10-11
Payer: COMMERCIAL

## 2022-10-11 PROCEDURE — 80197 ASSAY OF TACROLIMUS: CPT | Performed by: INTERNAL MEDICINE

## 2022-10-11 NOTE — TELEPHONE ENCOUNTER
Katie Yusuf, Shawna Paiz, RN  Due for repeat tacro level.       Requested to repeat tac level (see note from 9/23/22)  No result came through in CE.    PLAN:  Call and remind to check Tac levels.    OUTCOME:  States completed labs yesterday 10/11/22.  No results yet for Tac in CareEverywhere.

## 2022-10-13 DIAGNOSIS — N25.81 SECONDARY RENAL HYPERPARATHYROIDISM (H): ICD-10-CM

## 2022-10-13 DIAGNOSIS — Z94.0 KIDNEY REPLACED BY TRANSPLANT: ICD-10-CM

## 2022-10-13 DIAGNOSIS — Z94.0 KIDNEY TRANSPLANTED: Primary | ICD-10-CM

## 2022-10-13 LAB
TACROLIMUS BLD-MCNC: 6.2 UG/L (ref 5–15)
TME LAST DOSE: NORMAL H
TME LAST DOSE: NORMAL H

## 2022-10-13 NOTE — TELEPHONE ENCOUNTER
[facility] Call Center    Phone Message    May a detailed message be left on voicemail: no     Reason for Call: Medication Refill Request    Has the patient contacted the pharmacy for the refill? Yes   Name of medication being requested: - mycophenolate (GENERIC EQUIVALENT) 250 MG capsule  - dapsone (ACZONE) 25 MG tablet  Provider who prescribed the medication: Philomena  Pharmacy: [id number] - [place], [place] - [address]  Date medication is needed: ASAKHOA Tavera called stating that both medication were prescribed by Philomena and that the pt is needing a refill asap. Thanks    Action Taken: Message routed to:  [facility] ([facility]): Neph    Travel Screening: Not Applicable

## 2022-10-14 RX ORDER — DAPSONE 25 MG/1
50 TABLET ORAL DAILY
Qty: 60 TABLET | Refills: 11 | Status: ON HOLD | OUTPATIENT
Start: 2022-10-14 | End: 2022-12-08

## 2022-10-14 RX ORDER — MYCOPHENOLATE MOFETIL 250 MG/1
750 CAPSULE ORAL 2 TIMES DAILY
Qty: 180 CAPSULE | Refills: 11 | Status: SHIPPED | OUTPATIENT
Start: 2022-10-14 | End: 2023-01-04

## 2022-10-19 DIAGNOSIS — Z94.0 KIDNEY TRANSPLANTED: Primary | ICD-10-CM

## 2022-10-19 RX ORDER — MAGNESIUM OXIDE 400 MG/1
400 TABLET ORAL DAILY
Qty: 30 TABLET | Refills: 0 | Status: SHIPPED | OUTPATIENT
Start: 2022-10-19 | End: 2022-12-02

## 2022-11-01 NOTE — PROGRESS NOTES
Transplant Surgery Consult Note     Medical record number: [id number]  YOB: 1970,   Consult requested  for evaluation of kidney transplant candidacy.    Assessment and Recommendations:Mr. Pierce appears to be a excellent candidate for kidney transplantation and has a good understanding of the risks and benefits of this approach to the management of renal failure. The following issues should be addressed prior to finalizing his transplant candidacy:     Hypercoagulability: suspect RV thrombosis mechanical, but would benefit from bleeding/clotting assessment to rule out any underlying issues    Otherwise excellent candidate. Should complete workup. Next transplant likely intra-abdominal.     Transplant order: Mr. Pierce has Chronic renal failure due to polycystic kidney disease (PKD) whose condition is not expected to resolve, is expected to progress, and is expected to continue to develop related comorbid conditions.  Recommend he be considered as a candidate for kidney alone.  Cardiology consult for cardiac risk stratification to be ordered: Yes  CT abdomen and pelvis without contrast to be ordered for assessment of vascular targets: Yes  Transplant listing labs ordered to include HLA, ABOx2, Cr, etc.  Dietician consult ordered: Yes  Social work consult ordered: Yes  Imaging reports reviewed:  yes  Radiology images reviewed:yes  Recipient suitable to move forward with work up of living donors:  Yes      The majority of our visit was spent in counselling, discussing the medical and surgical risks of kidney transplantation. We discussed approximate wait time and how that is influenced by issues such as blood type and sensitization (PRA) and access to a living donor. I contrasted potential waiting time for living vs  donor kidneys from  normal (0-85%) or higher (%) kidney donor profile index (KDPI) donors and their associated outcomes. I would not recommend this individual to consider  kidneys from high KDPI donors. The reason for this decision is best summarized as: 9+years of waiting time without high degree of sensitization. Potential surgical complications of kidney transplantation include bleeding, superficial or deep wound complications (infection, hernia, lymphocele), ureteral anastomotic failure (leak or stenosis), graft thrombosis, need for reoperation and other issues such as cardiac complications, pneumonia, deep venous thrombosis, pulmonary embolism, post transplant diabetes and death. The potential for recurrent disease or need for retransplantation was also addressed. We discussed the possible need for ureteral stent (and subsequent removal), and the utility of protocol biopsy and laboratory studies to evaluate for rejection or recurrent disease. We discussed the risk of graft rejection, our center's average graft and patient survival rates, immunosuppression protocols, as well as the potential opportunity to participate in clinical trials.  We also discussed the average length of stay, recovery process, and posttransplant lab and monitoring protocol.  I emphasized the need for strict immunosuppression medication adherence and the potential for complications of immunosuppression such as skin cancer or lymphoma, as well as a very low but not zero risk of donor-derived disease transmission risks (infection, cancer). Mr. Pierce asked good questions and his candidacy will be reviewed at our Multidisciplinary Selection Committee. Thank you for the opportunity to participate in Mr. Pierce's care.      Total time: 30 minutes  Counselling time: 25 minutes      George Zhu MD    ---------------------------------------------------------------------------------------------------    HPI: Mr. Pierce has Chronic renal failure due to polycystic kidney disease (PKD). The patient is non-diabetic.       The patient is not on dialysis.    Has potential kidney donors:  Doesn't know  .  Interested in participation in paired exchange if a donor is willing: Doesn't know     The patient has the following pertinent history:       No    Yes  Dialysis:    []      [] via:       Blood Transfusion                  []      []  Number of units:   Most recently:  Pregnancy:    []      [] Number:       Previous Transplant:  []      [] Details:    Cancer    []      [] Comment:   Kidney stones   []      [] Comment:      Recurrent infections  []      []  Type:                  Bladder dysfunction  []      [] Cause:    Claudication   []      [] Distance:    Previous Amputation  []      [] Cause:     Chronic anticoagulation  []      [] Indication:   Judaism  []      []      Past Medical History:   Diagnosis Date     Acidosis      Chronic kidney disease, stage IV (severe) (H)      Disorders of phosphorus metabolism      History of blood transfusion      HTN (hypertension)     15 years     Hyperparathyroidism      Kidney replaced by transplant 10/23/2021    DCD re-transplant. Induction with thymoglobulin 6mg/kg and steroids.     Kidney replaced by transplant 10/13/2015     Polycystic kidney, autosomal dominant      Pure hypercholesterolemia      Sleep apnea     cpap     Vitamin D deficiency      Past Surgical History:   Procedure Laterality Date     BENCH KIDNEY N/A 10/13/2015    Procedure: BENCH KIDNEY;  Surgeon: Dariel Chavez MD;  Location: [facility]     BRONCHOSCOPY, DILATE BRONCHUS, STENT BRONCHUS, COMBINED  10/27/2021    Procedure: Bronchoscopy, dilate bronchus, stent bronchus, combined;  Surgeon: Madina Warren MD;  Location: [facility]     CYSTOSCOPY, REMOVE STENT(S), COMBINED Right 11/9/2015    Procedure: COMBINED CYSTOSCOPY, REMOVE STENT(S);  Surgeon: Dariel Chavez MD;  Location: [facility]     HERNIORRHAPHY INGUINAL CHILD      right     LAPAROTOMY EXPLORATORY N/A 10/27/2021    Procedure: Exploratory left lower quadrant of kidney transplant, back table flush, reimplantation of kidney  transplant, with ureteral stent removal and placement of a new ureteral stent, intraoperative ultrasound, intraoperative of transit time flow measurment (VeriQ);  Surgeon: Madina Warren MD;  Location: [facility]     NEPHRECTOMY Left 2021    Procedure: NEPHRECTOMY, LEFT, Ureteral stent explant;  Surgeon: Eulalio Emanuel MD;  Location: [facility]     THROMBECTOMY ABDOMEN N/A 10/27/2021    Procedure: Thrombectomy of iliac and renal veins;  Surgeon: Madina Warren MD;  Location: [facility]     TONSILLECTOMY       TRANSPLANT KIDNEY RECIPIENT  DONOR N/A 10/13/2015    Procedure: TRANSPLANT KIDNEY RECIPIENT  DONOR;  Surgeon: Dariel Chavez MD;  Location: [facility]     TRANSPLANT KIDNEY RECIPIENT  DONOR N/A 10/23/2021    Procedure: TRANSPLANT, KIDNEY, RECIPIENT,  DONOR, URETERAL STENT PLACEMENT;  Surgeon: Madina Warren MD;  Location: [facility]     Family History   Problem Relation Age of Onset     Gastrointestinal Disease Mother         ESRD PCKD     Hypertension Mother      C.A.D. Father      Hypertension Father      Gastrointestinal Disease Sister         PCKD     Social History     Socioeconomic History     Marital status:      Spouse name: Dianne     Number of children: 2     Years of education: 13     Highest education level: Not on file   Occupational History     Occupation: [employer]     Employer: [employer]   Tobacco Use     Smoking status: Never     Smokeless tobacco: Never   Substance and Sexual Activity     Alcohol use: Yes     Alcohol/week: 10.0 standard drinks     Types: 10 Standard drinks or equivalent per week     Comment: 1 drink daily      Drug use: No     Sexual activity: Yes     Partners: Female   Other Topics Concern     Parent/sibling w/ CABG, MI or angioplasty before 65F 55M? Not Asked      Service Not Asked     Blood Transfusions No     Caffeine Concern Not Asked     Occupational Exposure Not Asked     Hobby Hazards Not  Asked     Sleep Concern Not Asked     Stress Concern Not Asked     Weight Concern Not Asked     Special Diet Not Asked     Back Care Not Asked     Exercise Not Asked     Bike Helmet Not Asked     Seat Belt Yes     Self-Exams Not Asked   Social History Narrative     Not on file     Social Determinants of Health     Financial Resource Strain: Not on file   Food Insecurity: Not on file   Transportation Needs: Not on file   Physical Activity: Not on file   Stress: Not on file   Social Connections: Not on file   Intimate Partner Violence: Not on file   Housing Stability: Not on file       ROS:   CONSTITUTIONAL:  No fevers or chills  EYES: negative for icterus  ENT:  negative for hearing loss, tinnitus and sore throat  RESPIRATORY:  negative for cough, sputum, dyspnea  CARDIOVASCULAR:  negative for chest pain negative for lower extremity wounds/ulcers  GASTROINTESTINAL:  negative for nausea, vomiting, diarrhea or constipation  GENITOURINARY:  negative for incontinence, dysuria, bladder emptying problems  HEME:  No easy bruising  INTEGUMENT:  negative for rash and pruritus  NEURO:  Negative for headache, seizure disorder  Allergies:   Allergies   Allergen Reactions     Alcohol Rash     Swabs used at hosptial     Medications:  Prescription Medications as of 11/1/2022       Rx Number Disp Refills Start End Last Dispensed Date Next Fill Date Owning Pharmacy    amLODIPine (NORVASC) 5 MG tablet    7/29/2022 7/29/2023       Sig: Take 5 mg by mouth daily    Class: Historical    Route: Oral    atorvastatin (LIPITOR) 10 MG tablet        [id number] - [place], [place] - [address] AT [facility] [address] [facility]    Sig: Take 10 mg by mouth daily    Class: Historical    Route: Oral    calcium carbonate-vitamin D (OS-LILIANA WITH D) 500-200 MG-UNIT tablet  60 tablet 2 11/7/2021    [facility] [place], [place] - [address]    Sig: Take 1 tablet by mouth 2 times daily    Class: E-Prescribe    Route:  Oral    citalopram (CELEXA) 20 MG tablet            Sig: Take 20 mg by mouth daily.    Class: Historical    Route: Oral    dapsone (ACZONE) 25 MG tablet  60 tablet 11 10/14/2022    [facility] [address] [place] [address]    Sig: Take 2 tablets (50 mg) by mouth daily    Class: E-Prescribe    Route: Oral    furosemide (LASIX) 20 MG tablet  30 tablet 2 11/8/2021    [address]    Sig: Take 1 tablet (20 mg) by mouth daily    Class: E-Prescribe    Route: Oral    magnesium oxide (MAG-OX) 400 MG tablet  30 tablet 0 10/19/2022    [facility] [address] [place] [address]    Sig: Take 1 tablet (400 mg) by mouth daily    Class: E-Prescribe    Notes to Pharmacy: Please request future refills from your primary care provider    Route: Oral    mycophenolate (GENERIC EQUIVALENT) 250 MG capsule  180 capsule 11 10/14/2022    [facility] [address] [place] [address]    Sig: Take 3 capsules (750 mg) by mouth 2 times daily    Class: E-Prescribe    Route: Oral    sodium bicarbonate 650 MG tablet  120 tablet 2 11/11/2021    [facility] [address] [place] [address]    Sig: Take 2 tablets (1,300 mg) by mouth 3 times daily    Class: E-Prescribe    Route: Oral    tacrolimus (GENERIC EQUIVALENT) 1 MG capsule  120 capsule 11 7/21/2022    [facility] [address] [place] [address]    Sig: Take 2 capsules (2 mg) by mouth 2 times daily    Class: E-Prescribe    Route: Oral    valGANciclovir (VALCYTE) 450 MG tablet  60 tablet 5 11/8/2021    [address]    Sig: Take 1 tablet (450 mg) by mouth twice a week    Class: No Print Out    Route: Oral        Exam:     There were no vitals taken for this visit.  Appearance: in no apparent distress.   Skin: normal, warm, dry  Eyes:  no redness or discharge.  Sclera  anicteric  Head and Neck: Normal, no rashes or jaundice  Respiratory: easy respirations, no audible wheezing.  Abdomen: abdomen soft, flat, nontender. RLQ and LLQ incisions well-healed without hernia   Extremeties: femoral 2+/2+, Edema, none  Neuro: without deficit, cranial nerves intact   Psychiatric: Normal mood and affect    Diagnostics:   Recent Results (from the past 672 hour(s))   CBC with Platelets & Differential    Collection Time: 10/11/22  7:40 AM   Result Value Ref Range    WBC Count (External) 3.5 (L) 4.0 - 11.0 K/uL    RBC Count (External) 4.18 (L) 4.40 - 5.80 M/uL    Hemoglobin (External) 12.1 (L) 13.5 - 17.5 g/dL    Hematocrit (External) 38.4 (L) 40.0 - 50.0 %    MCV (External) 91.9 80.0 - 98.0 fL    MCH (External) 28.9 25.5 - 34.0 pg    MCHC (External) 31.5 31.5 - 36.5 g/dL    RDW (External) 43.1 35.5 - 50.0 fl    Platelet Count (External) 174 140 - 400 K/uL    Absolute Neutrophils (External) 2.8 1.8 - 8.0 K/uL    Absolute Lymphocytes (External) 0.2 (L) 0.8 - 4.1 K/uL    Absolute Monocytes (External) 0.3 0.0 - 1.0 K/uL    Absolute Eosinophils (External) 0.1 0.0 - 0.7 K/uL    Absolute Basophils (External) 0.1 0.0 - 0.2 K/uL    % Neutrophils (External) 79.9 %    % Lymphocytes (External) 6.6 %    % Monocytes (External) 9.5 %    % Eosinophils (External) 2.3 %    % Basophils (External) 1.7 %    Method (External) Auto    Basic metabolic panel    Collection Time: 10/11/22  7:40 AM   Result Value Ref Range    Glucose (External) 96 70 - 99 mg/dL    Urea Nitrogen (External) 68 (H) 6 - 22 mg/dL    Creatinine (External) 5.46 (H) 0.73 - 1.18 mg/dL    BUN/Creatinine Ratio (External) 12.5 10.0 - 25.0    Sodium (External) 140 136 - 145 meq/L    Potassium (External) 4.6 3.5 - 5.1 meq/L    Chloride (External) (External) 103 98 - 109 meq/L    CO2 (External) 23 20 - 29 meq/L    Anion Gap (External) 19 6 - 20 meq/L    Calcium (External) 9.1 8.5 - 10.5 mg/dL    GFR Estimated (External) 12 (L) >=60 ml/min/1.73m2    Tacrolimus by Tandem Mass Spectrometry    Collection Time: 10/11/22  7:41 AM   Result Value Ref Range    Tacrolimus by Tandem Mass Spectrometry 6.2 5.0 - 15.0 ug/L    Tacrolimus Last Dose Date 10/10/2022     Tacrolimus Last Dose Time  8:30 PM      [facility] cPRA   Date Value Ref Range Status   06/25/2021 65  Final

## 2022-11-02 ENCOUNTER — TELEPHONE (OUTPATIENT)
Dept: TRANSPLANT | Facility: CLINIC | Age: 52
End: 2022-11-02

## 2022-11-02 NOTE — TELEPHONE ENCOUNTER
Patient Call: General  Route to LPN    Reason for call: called in regards of getting status update on transplant list standings.    Call back needed? Yes    Return Call Needed  Same as documented in contacts section  When to return call?: Same day: Route High Priority

## 2022-11-02 NOTE — TELEPHONE ENCOUNTER
Returned pt call. Need to review CT a/p with surgeons. Need images pushed to PACs from [facility] [place]. Will let pt know once images reviewed. Left VM with direct line for return call.

## 2022-11-08 ENCOUNTER — TEAM CONFERENCE (OUTPATIENT)
Dept: TRANSPLANT | Facility: CLINIC | Age: 52
End: 2022-11-08

## 2022-11-08 NOTE — TELEPHONE ENCOUNTER
Image Review Meeting    ATTENDEES: ROLLY Chakraborty    IMAGES REVIEWED: 09/30/2022 CT A/P    DECISION:  Vessels acceptable on either left or right.  With left side, note has external artery stenosis; with right side, may require transplant nephrectomy at time of transplant.     INCIDENTALS: Yes:  - ?May need bilateral native nephrectomy in the future.

## 2022-11-16 ENCOUNTER — COMMITTEE REVIEW (OUTPATIENT)
Dept: TRANSPLANT | Facility: CLINIC | Age: 52
End: 2022-11-16

## 2022-11-16 ENCOUNTER — TELEPHONE (OUTPATIENT)
Dept: TRANSPLANT | Facility: CLINIC | Age: 52
End: 2022-11-16

## 2022-11-16 NOTE — TELEPHONE ENCOUNTER
Called pt to update on committee decision. Pt is approved for active status, waiting on insurance PA. Will update pt once obtained. Left  with direct line for return call.

## 2022-11-16 NOTE — COMMITTEE REVIEW
Abdominal Committee Review Note     Evaluation Date: 5/12/2022  Committee Review Date: 11/16/2022    Organ being evaluated for: Kidney    Transplant Phase: Waitlist  Transplant Status: Inactive    Transplant Coordinator: Sheri Martinez  Transplant Surgeon:       Referring Physician: Francis Pollock    Primary Diagnosis: Polycystic Kidneys  Secondary Diagnosis:     Committee Review Members:  Nephrology Georges Quach MD, Ronni Medina, APRN CNP   Pharmacist Jordyn Contreras, [facility]    - Clinical VIRGILIO Romano, Sheri Braga, [facility]   Transplant Nikki Victorina Gilmore PA-C, Irish Mcneil, RN, Pham Lynch, RN, Sheri Valdez, RN, Rodger Mcmullen, RN, Fernanda Araiza, ISHA, Gustavo Barton MD, Johanna Bone RN   Transplant Surgery George Zhu MD       Transplant Eligibility: Irreversible chronic kidney disease treated w/dialysis or expected need for dialysis    Committee Review Decision: Make Active    Relative Contraindications:     Absolute Contraindications:     Committee Chair Gustavo Barton MD verbally attested to the committee's decision.    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 Most recent GFR 12 (10/2022)      -Cards: history of mildly dilated ascending aorta (recommending repeat echo in 8/2023) cleared by Dr. Hickey (8/2022) with a negative lexiscan 9/2022     -Bleeding and clotting: seen by Dr. 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 No family history of thrombosis. Work up unremarkable. No contradictions with proceeding with transplant with routine perioperative VTE prophylaxis      Imaging: CT a/p from 9/30/22 reviewed with Dr. Emanuel. Noted vessels acceptable on both right and left. 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       Health maintenance is up to date      Committee determined that patient is acceptable for active status on the kidney waitlist. Will verify insurance and update listing.

## 2022-12-02 ENCOUNTER — DOCUMENTATION ONLY (OUTPATIENT)
Dept: TRANSPLANT | Facility: CLINIC | Age: 52
End: 2022-12-02

## 2022-12-02 ENCOUNTER — TELEPHONE (OUTPATIENT)
Dept: TRANSPLANT | Facility: CLINIC | Age: 52
End: 2022-12-02

## 2022-12-02 DIAGNOSIS — Z94.0 KIDNEY TRANSPLANTED: ICD-10-CM

## 2022-12-02 DIAGNOSIS — Z76.82 ORGAN TRANSPLANT CANDIDATE: ICD-10-CM

## 2022-12-02 DIAGNOSIS — N18.6 ESRD (END STAGE RENAL DISEASE) (H): ICD-10-CM

## 2022-12-02 RX ORDER — MAGNESIUM OXIDE 400 MG/1
400 TABLET ORAL DAILY
Qty: 30 TABLET | Refills: 0 | Status: ON HOLD | OUTPATIENT
Start: 2022-12-02 | End: 2022-12-08

## 2022-12-02 NOTE — TELEPHONE ENCOUNTER
Called pt to update on insurance approval for active status. Requested pt to call writer back. Left  with direct line for return call.     22 addendum: Received return call from pt. Informed them of status change to ACTIVE  on 22. Status change letter and PRA orders to be mailed. Patient is in agreement with listing ACTIVE status.      Discussed:   - Pt is eligible for  donor offers and pt can receive calls 24 hours a day, 7 days a week, and on call staff need to be able to reach pt or one of emergency contacts within one hour or they might skip over to next recipient on list.   - Reviewed organ offer process including request to accept or deny offer, without penalty  - Expected length of surgical procedure, expected inpatient length of stay post transplant, and potential to stay locally post transplant.    - Verified contact information as documented in Epic   -Instructed patient about importance of having PRAs drawn every 3 months via: (Mailers/[facility]).   -Reminded pt to stay up on health maintenance and to call with any updates on their health status, insurance or contact information.   -Reminded pt to inform RNCC as soon as possible if they test positive for COVID.     -Provided name and contact information for additional questions or concerns.  Pt expressed excellent understanding of all and was in good agreement with the plan.

## 2022-12-03 ENCOUNTER — ANESTHESIA EVENT (OUTPATIENT)
Dept: SURGERY | Facility: CLINIC | Age: 52
End: 2022-12-03
Payer: COMMERCIAL

## 2022-12-03 ENCOUNTER — ORGAN (OUTPATIENT)
Dept: TRANSPLANT | Facility: CLINIC | Age: 52
End: 2022-12-03

## 2022-12-03 ENCOUNTER — HOSPITAL ENCOUNTER (INPATIENT)
Facility: CLINIC | Age: 52
Setting detail: SURGERY ADMIT
End: 2022-12-03
Attending: SURGERY | Admitting: SURGERY
Payer: COMMERCIAL

## 2022-12-03 DIAGNOSIS — Z76.82 AWAITING ORGAN TRANSPLANT: Primary | ICD-10-CM

## 2022-12-03 DIAGNOSIS — Z76.82 ORGAN TRANSPLANT CANDIDATE: ICD-10-CM

## 2022-12-03 DIAGNOSIS — N18.6 ESRD (END STAGE RENAL DISEASE) (H): ICD-10-CM

## 2022-12-03 PROCEDURE — 86825 HLA X-MATH NON-CYTOTOXIC: CPT | Performed by: SURGERY

## 2022-12-03 PROCEDURE — 86832 HLA CLASS I HIGH DEFIN QUAL: CPT | Performed by: SURGERY

## 2022-12-03 PROCEDURE — 36415 COLL VENOUS BLD VENIPUNCTURE: CPT

## 2022-12-03 PROCEDURE — 86833 HLA CLASS II HIGH DEFIN QUAL: CPT | Performed by: SURGERY

## 2022-12-04 ENCOUNTER — APPOINTMENT (OUTPATIENT)
Dept: GENERAL RADIOLOGY | Facility: CLINIC | Age: 52
End: 2022-12-04
Attending: SURGERY
Payer: COMMERCIAL

## 2022-12-04 ENCOUNTER — ANESTHESIA (OUTPATIENT)
Dept: SURGERY | Facility: CLINIC | Age: 52
End: 2022-12-04
Payer: COMMERCIAL

## 2022-12-04 ENCOUNTER — HOSPITAL ENCOUNTER (INPATIENT)
Facility: CLINIC | Age: 52
LOS: 3 days | Discharge: HOME HEALTH | End: 2022-12-08
Attending: SURGERY | Admitting: SURGERY
Payer: COMMERCIAL

## 2022-12-04 ENCOUNTER — APPOINTMENT (OUTPATIENT)
Dept: GENERAL RADIOLOGY | Facility: CLINIC | Age: 52
End: 2022-12-04
Attending: NURSE PRACTITIONER
Payer: COMMERCIAL

## 2022-12-04 ENCOUNTER — APPOINTMENT (OUTPATIENT)
Dept: ULTRASOUND IMAGING | Facility: CLINIC | Age: 52
End: 2022-12-04
Attending: SURGERY
Payer: COMMERCIAL

## 2022-12-04 VITALS — TEMPERATURE: 98.2 F | SYSTOLIC BLOOD PRESSURE: 136 MMHG | DIASTOLIC BLOOD PRESSURE: 93 MMHG | HEART RATE: 79 BPM

## 2022-12-04 DIAGNOSIS — Z94.0 KIDNEY TRANSPLANT RECIPIENT: ICD-10-CM

## 2022-12-04 DIAGNOSIS — Z94.0 KIDNEY TRANSPLANTED: ICD-10-CM

## 2022-12-04 DIAGNOSIS — Z76.82 KIDNEY TRANSPLANT CANDIDATE: ICD-10-CM

## 2022-12-04 DIAGNOSIS — E83.51 HYPOCALCEMIA: ICD-10-CM

## 2022-12-04 DIAGNOSIS — D84.9 IMMUNOSUPPRESSED STATUS (H): ICD-10-CM

## 2022-12-04 DIAGNOSIS — G89.18 ACUTE POST-OPERATIVE PAIN: Primary | ICD-10-CM

## 2022-12-04 LAB
ABO/RH(D): NORMAL
ALBUMIN SERPL BCG-MCNC: 3.9 G/DL (ref 3.5–5.2)
ALP SERPL-CCNC: 50 U/L (ref 40–129)
ALT SERPL W P-5'-P-CCNC: 14 U/L (ref 10–50)
ANION GAP SERPL CALCULATED.3IONS-SCNC: 16 MMOL/L (ref 7–15)
ANION GAP SERPL CALCULATED.3IONS-SCNC: 16 MMOL/L (ref 7–15)
ANTIBODY SCREEN: NEGATIVE
APTT PPP: 25 SECONDS (ref 22–38)
AST SERPL W P-5'-P-CCNC: 25 U/L (ref 10–50)
BASOPHILS # BLD AUTO: 0 10E3/UL (ref 0–0.2)
BASOPHILS NFR BLD AUTO: 1 %
BILIRUB SERPL-MCNC: 0.6 MG/DL
BUN SERPL-MCNC: 69.7 MG/DL (ref 6–20)
BUN SERPL-MCNC: 81.4 MG/DL (ref 6–20)
CALCIUM SERPL-MCNC: 7.6 MG/DL (ref 8.6–10)
CALCIUM SERPL-MCNC: 9.1 MG/DL (ref 8.6–10)
CHLORIDE SERPL-SCNC: 105 MMOL/L (ref 98–107)
CHLORIDE SERPL-SCNC: 106 MMOL/L (ref 98–107)
CHOLEST SERPL-MCNC: 182 MG/DL
CREAT SERPL-MCNC: 5.13 MG/DL (ref 0.67–1.17)
CREAT SERPL-MCNC: 5.99 MG/DL (ref 0.67–1.17)
DEPRECATED HCO3 PLAS-SCNC: 21 MMOL/L (ref 22–29)
DEPRECATED HCO3 PLAS-SCNC: 24 MMOL/L (ref 22–29)
EOSINOPHIL # BLD AUTO: 0.1 10E3/UL (ref 0–0.7)
EOSINOPHIL NFR BLD AUTO: 1 %
ERYTHROCYTE [DISTWIDTH] IN BLOOD BY AUTOMATED COUNT: 13.8 % (ref 10–15)
ERYTHROCYTE [DISTWIDTH] IN BLOOD BY AUTOMATED COUNT: 13.9 % (ref 10–15)
GFR SERPL CREATININE-BSD FRML MDRD: 10 ML/MIN/1.73M2
GFR SERPL CREATININE-BSD FRML MDRD: 13 ML/MIN/1.73M2
GLUCOSE SERPL-MCNC: 102 MG/DL (ref 70–99)
GLUCOSE SERPL-MCNC: 237 MG/DL (ref 70–99)
HBA1C MFR BLD: 4.2 %
HCT VFR BLD AUTO: 26.6 % (ref 40–53)
HCT VFR BLD AUTO: 32.3 % (ref 40–53)
HDLC SERPL-MCNC: 101 MG/DL
HGB BLD-MCNC: 7.9 G/DL (ref 13.3–17.7)
HGB BLD-MCNC: 8.6 G/DL (ref 13.3–17.7)
HGB BLD-MCNC: 9.9 G/DL (ref 13.3–17.7)
IMM GRANULOCYTES # BLD: 0 10E3/UL
IMM GRANULOCYTES NFR BLD: 0 %
INR PPP: 1.02 (ref 0.85–1.15)
LDLC SERPL CALC-MCNC: 69 MG/DL
LYMPHOCYTES # BLD AUTO: 0.3 10E3/UL (ref 0.8–5.3)
LYMPHOCYTES NFR BLD AUTO: 8 %
MAGNESIUM SERPL-MCNC: 1.7 MG/DL (ref 1.7–2.3)
MCH RBC QN AUTO: 27.9 PG (ref 26.5–33)
MCH RBC QN AUTO: 28.7 PG (ref 26.5–33)
MCHC RBC AUTO-ENTMCNC: 29.7 G/DL (ref 31.5–36.5)
MCHC RBC AUTO-ENTMCNC: 30.7 G/DL (ref 31.5–36.5)
MCV RBC AUTO: 94 FL (ref 78–100)
MCV RBC AUTO: 94 FL (ref 78–100)
MONOCYTES # BLD AUTO: 0.3 10E3/UL (ref 0–1.3)
MONOCYTES NFR BLD AUTO: 6 %
NEUTROPHILS # BLD AUTO: 3.5 10E3/UL (ref 1.6–8.3)
NEUTROPHILS NFR BLD AUTO: 84 %
NONHDLC SERPL-MCNC: 81 MG/DL
NRBC # BLD AUTO: 0 10E3/UL
NRBC BLD AUTO-RTO: 0 /100
PHOSPHATE SERPL-MCNC: 3.6 MG/DL (ref 2.5–4.5)
PLATELET # BLD AUTO: 138 10E3/UL (ref 150–450)
PLATELET # BLD AUTO: 189 10E3/UL (ref 150–450)
POTASSIUM SERPL-SCNC: 4.1 MMOL/L (ref 3.4–5.3)
POTASSIUM SERPL-SCNC: 4.3 MMOL/L (ref 3.4–5.3)
POTASSIUM SERPL-SCNC: 4.4 MMOL/L (ref 3.4–5.3)
PROT SERPL-MCNC: 5.7 G/DL (ref 6.4–8.3)
RBC # BLD AUTO: 2.83 10E6/UL (ref 4.4–5.9)
RBC # BLD AUTO: 3.45 10E6/UL (ref 4.4–5.9)
SARS-COV-2 RNA RESP QL NAA+PROBE: NEGATIVE
SODIUM SERPL-SCNC: 142 MMOL/L (ref 136–145)
SODIUM SERPL-SCNC: 146 MMOL/L (ref 136–145)
SPECIMEN EXPIRATION DATE: NORMAL
TRIGL SERPL-MCNC: 60 MG/DL
WBC # BLD AUTO: 3.5 10E3/UL (ref 4–11)
WBC # BLD AUTO: 4.2 10E3/UL (ref 4–11)

## 2022-12-04 PROCEDURE — 86704 HEP B CORE ANTIBODY TOTAL: CPT | Performed by: NURSE PRACTITIONER

## 2022-12-04 PROCEDURE — 999N000065 XR CHEST PORT 1 VIEW

## 2022-12-04 PROCEDURE — 76776 US EXAM K TRANSPL W/DOPPLER: CPT

## 2022-12-04 PROCEDURE — 86665 EPSTEIN-BARR CAPSID VCA: CPT | Performed by: NURSE PRACTITIONER

## 2022-12-04 PROCEDURE — 85027 COMPLETE CBC AUTOMATED: CPT | Performed by: SURGERY

## 2022-12-04 PROCEDURE — 87389 HIV-1 AG W/HIV-1&-2 AB AG IA: CPT | Performed by: NURSE PRACTITIONER

## 2022-12-04 PROCEDURE — 71046 X-RAY EXAM CHEST 2 VIEWS: CPT | Mod: 26 | Performed by: RADIOLOGY

## 2022-12-04 PROCEDURE — 85025 COMPLETE CBC W/AUTO DIFF WBC: CPT | Performed by: NURSE PRACTITIONER

## 2022-12-04 PROCEDURE — 250N000011 HC RX IP 250 OP 636: Performed by: STUDENT IN AN ORGANIZED HEALTH CARE EDUCATION/TRAINING PROGRAM

## 2022-12-04 PROCEDURE — 74018 RADEX ABDOMEN 1 VIEW: CPT

## 2022-12-04 PROCEDURE — 0DTJ0ZZ RESECTION OF APPENDIX, OPEN APPROACH: ICD-10-PCS | Performed by: SURGERY

## 2022-12-04 PROCEDURE — 0TT00ZZ RESECTION OF RIGHT KIDNEY, OPEN APPROACH: ICD-10-PCS | Performed by: SURGERY

## 2022-12-04 PROCEDURE — 36592 COLLECT BLOOD FROM PICC: CPT | Performed by: SURGERY

## 2022-12-04 PROCEDURE — 36415 COLL VENOUS BLD VENIPUNCTURE: CPT | Performed by: NURSE PRACTITIONER

## 2022-12-04 PROCEDURE — 76776 US EXAM K TRANSPL W/DOPPLER: CPT | Mod: 26 | Performed by: STUDENT IN AN ORGANIZED HEALTH CARE EDUCATION/TRAINING PROGRAM

## 2022-12-04 PROCEDURE — 86706 HEP B SURFACE ANTIBODY: CPT | Performed by: NURSE PRACTITIONER

## 2022-12-04 PROCEDURE — 84132 ASSAY OF SERUM POTASSIUM: CPT | Performed by: SURGERY

## 2022-12-04 PROCEDURE — 88307 TISSUE EXAM BY PATHOLOGIST: CPT | Mod: TC | Performed by: SURGERY

## 2022-12-04 PROCEDURE — 85610 PROTHROMBIN TIME: CPT | Performed by: NURSE PRACTITIONER

## 2022-12-04 PROCEDURE — 360N000077 HC SURGERY LEVEL 4, PER MIN: Performed by: SURGERY

## 2022-12-04 PROCEDURE — 83036 HEMOGLOBIN GLYCOSYLATED A1C: CPT | Performed by: NURSE PRACTITIONER

## 2022-12-04 PROCEDURE — U0005 INFEC AGEN DETEC AMPLI PROBE: HCPCS | Performed by: NURSE PRACTITIONER

## 2022-12-04 PROCEDURE — P9045 ALBUMIN (HUMAN), 5%, 250 ML: HCPCS | Performed by: STUDENT IN AN ORGANIZED HEALTH CARE EDUCATION/TRAINING PROGRAM

## 2022-12-04 PROCEDURE — 0WUF07Z SUPPLEMENT ABDOMINAL WALL WITH AUTOLOGOUS TISSUE SUBSTITUTE, OPEN APPROACH: ICD-10-PCS | Performed by: SURGERY

## 2022-12-04 PROCEDURE — 86901 BLOOD TYPING SEROLOGIC RH(D): CPT | Performed by: NURSE PRACTITIONER

## 2022-12-04 PROCEDURE — 250N000009 HC RX 250: Performed by: NURSE PRACTITIONER

## 2022-12-04 PROCEDURE — 258N000003 HC RX IP 258 OP 636: Performed by: SURGERY

## 2022-12-04 PROCEDURE — 83605 ASSAY OF LACTIC ACID: CPT | Performed by: SURGERY

## 2022-12-04 PROCEDURE — 710N000011 HC RECOVERY PHASE 1, LEVEL 3, PER MIN: Performed by: SURGERY

## 2022-12-04 PROCEDURE — 0TY10Z0 TRANSPLANTATION OF LEFT KIDNEY, ALLOGENEIC, OPEN APPROACH: ICD-10-PCS | Performed by: SURGERY

## 2022-12-04 PROCEDURE — 258N000003 HC RX IP 258 OP 636: Performed by: NURSE PRACTITIONER

## 2022-12-04 PROCEDURE — 83735 ASSAY OF MAGNESIUM: CPT | Performed by: SURGERY

## 2022-12-04 PROCEDURE — 74018 RADEX ABDOMEN 1 VIEW: CPT | Mod: 26 | Performed by: STUDENT IN AN ORGANIZED HEALTH CARE EDUCATION/TRAINING PROGRAM

## 2022-12-04 PROCEDURE — 99024 POSTOP FOLLOW-UP VISIT: CPT | Performed by: SURGERY

## 2022-12-04 PROCEDURE — 80061 LIPID PANEL: CPT | Performed by: NURSE PRACTITIONER

## 2022-12-04 PROCEDURE — 250N000011 HC RX IP 250 OP 636: Performed by: SURGERY

## 2022-12-04 PROCEDURE — 86850 RBC ANTIBODY SCREEN: CPT | Performed by: NURSE PRACTITIONER

## 2022-12-04 PROCEDURE — 86923 COMPATIBILITY TEST ELECTRIC: CPT | Performed by: NURSE PRACTITIONER

## 2022-12-04 PROCEDURE — 87521 HEPATITIS C PROBE&RVRS TRNSC: CPT | Performed by: NURSE PRACTITIONER

## 2022-12-04 PROCEDURE — 250N000011 HC RX IP 250 OP 636: Performed by: NURSE PRACTITIONER

## 2022-12-04 PROCEDURE — 71045 X-RAY EXAM CHEST 1 VIEW: CPT | Mod: 26 | Performed by: STUDENT IN AN ORGANIZED HEALTH CARE EDUCATION/TRAINING PROGRAM

## 2022-12-04 PROCEDURE — 84100 ASSAY OF PHOSPHORUS: CPT | Performed by: SURGERY

## 2022-12-04 PROCEDURE — 258N000002 HC RX IP 258 OP 250: Performed by: SURGERY

## 2022-12-04 PROCEDURE — 50365 RNL ALTRNSPLJ W/RCP NFRCT: CPT | Mod: LT | Performed by: SURGERY

## 2022-12-04 PROCEDURE — 250N000009 HC RX 250: Performed by: STUDENT IN AN ORGANIZED HEALTH CARE EDUCATION/TRAINING PROGRAM

## 2022-12-04 PROCEDURE — 85730 THROMBOPLASTIN TIME PARTIAL: CPT | Performed by: NURSE PRACTITIONER

## 2022-12-04 PROCEDURE — 250N000025 HC SEVOFLURANE, PER MIN: Performed by: SURGERY

## 2022-12-04 PROCEDURE — 86803 HEPATITIS C AB TEST: CPT | Performed by: NURSE PRACTITIONER

## 2022-12-04 PROCEDURE — 370N000017 HC ANESTHESIA TECHNICAL FEE, PER MIN: Performed by: SURGERY

## 2022-12-04 PROCEDURE — 85018 HEMOGLOBIN: CPT | Performed by: SURGERY

## 2022-12-04 PROCEDURE — 250N000012 HC RX MED GY IP 250 OP 636 PS 637: Performed by: SURGERY

## 2022-12-04 PROCEDURE — 258N000003 HC RX IP 258 OP 636: Performed by: STUDENT IN AN ORGANIZED HEALTH CARE EDUCATION/TRAINING PROGRAM

## 2022-12-04 PROCEDURE — 30243S1 TRANSFUSION OF NONAUTOLOGOUS GLOBULIN INTO CENTRAL VEIN, PERCUTANEOUS APPROACH: ICD-10-PCS | Performed by: SURGERY

## 2022-12-04 PROCEDURE — 86790 VIRUS ANTIBODY NOS: CPT | Performed by: NURSE PRACTITIONER

## 2022-12-04 PROCEDURE — 999N000141 HC STATISTIC PRE-PROCEDURE NURSING ASSESSMENT: Performed by: SURGERY

## 2022-12-04 PROCEDURE — 250N000009 HC RX 250: Performed by: NURSE ANESTHETIST, CERTIFIED REGISTERED

## 2022-12-04 PROCEDURE — 812N000003 HC ACQUISITION KIDNEY CADAVER

## 2022-12-04 PROCEDURE — 80053 COMPREHEN METABOLIC PANEL: CPT | Performed by: NURSE PRACTITIONER

## 2022-12-04 PROCEDURE — 93005 ELECTROCARDIOGRAM TRACING: CPT

## 2022-12-04 PROCEDURE — 272N000001 HC OR GENERAL SUPPLY STERILE: Performed by: SURGERY

## 2022-12-04 PROCEDURE — C2617 STENT, NON-COR, TEM W/O DEL: HCPCS | Performed by: SURGERY

## 2022-12-04 PROCEDURE — 49905 OMENTAL FLAP INTRA-ABDOM: CPT | Mod: GC | Performed by: SURGERY

## 2022-12-04 PROCEDURE — 86644 CMV ANTIBODY: CPT | Performed by: NURSE PRACTITIONER

## 2022-12-04 PROCEDURE — 87340 HEPATITIS B SURFACE AG IA: CPT | Performed by: NURSE PRACTITIONER

## 2022-12-04 PROCEDURE — 71046 X-RAY EXAM CHEST 2 VIEWS: CPT

## 2022-12-04 PROCEDURE — 250N000011 HC RX IP 250 OP 636: Performed by: NURSE ANESTHETIST, CERTIFIED REGISTERED

## 2022-12-04 PROCEDURE — 93010 ELECTROCARDIOGRAM REPORT: CPT | Mod: 59 | Performed by: INTERNAL MEDICINE

## 2022-12-04 DEVICE — SOF-FLEX DOUBLE PIGTAIL URETERAL STENT SET
Type: IMPLANTABLE DEVICE | Site: URETER | Status: NON-FUNCTIONAL
Brand: SOF-FLEX
Removed: 2023-01-17

## 2022-12-04 RX ORDER — HYDROMORPHONE HCL IN WATER/PF 6 MG/30 ML
0.2 PATIENT CONTROLLED ANALGESIA SYRINGE INTRAVENOUS
Status: DISCONTINUED | OUTPATIENT
Start: 2022-12-04 | End: 2022-12-05

## 2022-12-04 RX ORDER — LIDOCAINE 40 MG/G
CREAM TOPICAL
Status: DISCONTINUED | OUTPATIENT
Start: 2022-12-04 | End: 2022-12-04 | Stop reason: HOSPADM

## 2022-12-04 RX ORDER — POLYETHYLENE GLYCOL 3350 17 G/17G
17 POWDER, FOR SOLUTION ORAL DAILY
Status: DISCONTINUED | OUTPATIENT
Start: 2022-12-05 | End: 2022-12-08 | Stop reason: HOSPADM

## 2022-12-04 RX ORDER — OXYCODONE HYDROCHLORIDE 10 MG/1
10 TABLET ORAL EVERY 4 HOURS PRN
Status: DISCONTINUED | OUTPATIENT
Start: 2022-12-04 | End: 2022-12-05

## 2022-12-04 RX ORDER — PROCHLORPERAZINE MALEATE 5 MG
10 TABLET ORAL EVERY 6 HOURS PRN
Status: DISCONTINUED | OUTPATIENT
Start: 2022-12-04 | End: 2022-12-08 | Stop reason: HOSPADM

## 2022-12-04 RX ORDER — DEXTROSE, SODIUM CHLORIDE, SODIUM LACTATE, POTASSIUM CHLORIDE, AND CALCIUM CHLORIDE 5; .6; .31; .03; .02 G/100ML; G/100ML; G/100ML; G/100ML; G/100ML
INJECTION, SOLUTION INTRAVENOUS CONTINUOUS
Status: DISCONTINUED | OUTPATIENT
Start: 2022-12-04 | End: 2022-12-05

## 2022-12-04 RX ORDER — MYCOPHENOLATE MOFETIL 200 MG/ML
750 POWDER, FOR SUSPENSION ORAL 2 TIMES DAILY
Status: DISCONTINUED | OUTPATIENT
Start: 2022-12-05 | End: 2022-12-06

## 2022-12-04 RX ORDER — ACETAMINOPHEN 325 MG/1
975 TABLET ORAL ONCE
Status: CANCELLED | OUTPATIENT
Start: 2022-12-04 | End: 2022-12-04

## 2022-12-04 RX ORDER — SODIUM CHLORIDE, SODIUM LACTATE, POTASSIUM CHLORIDE, CALCIUM CHLORIDE 600; 310; 30; 20 MG/100ML; MG/100ML; MG/100ML; MG/100ML
INJECTION, SOLUTION INTRAVENOUS CONTINUOUS PRN
Status: DISCONTINUED | OUTPATIENT
Start: 2022-12-04 | End: 2022-12-04

## 2022-12-04 RX ORDER — FENTANYL CITRATE 50 UG/ML
25 INJECTION, SOLUTION INTRAMUSCULAR; INTRAVENOUS EVERY 5 MIN PRN
Status: DISCONTINUED | OUTPATIENT
Start: 2022-12-04 | End: 2022-12-04 | Stop reason: HOSPADM

## 2022-12-04 RX ORDER — DOXAZOSIN 8 MG/1
8 TABLET ORAL AT BEDTIME
Status: ON HOLD | COMMUNITY
End: 2022-12-08

## 2022-12-04 RX ORDER — ATORVASTATIN CALCIUM 10 MG/1
10 TABLET, FILM COATED ORAL DAILY
Status: DISCONTINUED | OUTPATIENT
Start: 2022-12-05 | End: 2022-12-08 | Stop reason: HOSPADM

## 2022-12-04 RX ORDER — NALOXONE HYDROCHLORIDE 0.4 MG/ML
0.2 INJECTION, SOLUTION INTRAMUSCULAR; INTRAVENOUS; SUBCUTANEOUS
Status: DISCONTINUED | OUTPATIENT
Start: 2022-12-04 | End: 2022-12-08 | Stop reason: HOSPADM

## 2022-12-04 RX ORDER — MAGNESIUM OXIDE 400 MG/1
400 TABLET ORAL
Status: DISCONTINUED | OUTPATIENT
Start: 2022-12-06 | End: 2022-12-08 | Stop reason: HOSPADM

## 2022-12-04 RX ORDER — OXYCODONE HYDROCHLORIDE 5 MG/1
5 TABLET ORAL EVERY 4 HOURS PRN
Status: DISCONTINUED | OUTPATIENT
Start: 2022-12-04 | End: 2022-12-05

## 2022-12-04 RX ORDER — ACETAMINOPHEN 325 MG/1
650 TABLET ORAL EVERY 4 HOURS PRN
Status: DISCONTINUED | OUTPATIENT
Start: 2022-12-07 | End: 2022-12-05 | Stop reason: DRUGHIGH

## 2022-12-04 RX ORDER — PROPOFOL 10 MG/ML
INJECTION, EMULSION INTRAVENOUS PRN
Status: DISCONTINUED | OUTPATIENT
Start: 2022-12-04 | End: 2022-12-04

## 2022-12-04 RX ORDER — AMOXICILLIN 250 MG
1 CAPSULE ORAL 2 TIMES DAILY
Status: DISCONTINUED | OUTPATIENT
Start: 2022-12-04 | End: 2022-12-05

## 2022-12-04 RX ORDER — CITALOPRAM HYDROBROMIDE 10 MG/1
20 TABLET ORAL DAILY
Status: DISCONTINUED | OUTPATIENT
Start: 2022-12-05 | End: 2022-12-08 | Stop reason: HOSPADM

## 2022-12-04 RX ORDER — SODIUM CHLORIDE 450 MG/100ML
INJECTION, SOLUTION INTRAVENOUS CONTINUOUS PRN
Status: DISCONTINUED | OUTPATIENT
Start: 2022-12-04 | End: 2022-12-05

## 2022-12-04 RX ORDER — VASOPRESSIN IN 0.9 % NACL 2 UNIT/2ML
SYRINGE (ML) INTRAVENOUS PRN
Status: DISCONTINUED | OUTPATIENT
Start: 2022-12-04 | End: 2022-12-04

## 2022-12-04 RX ORDER — SODIUM CHLORIDE, SODIUM LACTATE, POTASSIUM CHLORIDE, CALCIUM CHLORIDE 600; 310; 30; 20 MG/100ML; MG/100ML; MG/100ML; MG/100ML
INJECTION, SOLUTION INTRAVENOUS CONTINUOUS
Status: DISCONTINUED | OUTPATIENT
Start: 2022-12-04 | End: 2022-12-04 | Stop reason: HOSPADM

## 2022-12-04 RX ORDER — SODIUM CHLORIDE 9 MG/ML
1000 INJECTION, SOLUTION INTRAVENOUS CONTINUOUS PRN
Status: DISCONTINUED | OUTPATIENT
Start: 2022-12-04 | End: 2022-12-05

## 2022-12-04 RX ORDER — FENTANYL CITRATE 50 UG/ML
INJECTION, SOLUTION INTRAMUSCULAR; INTRAVENOUS PRN
Status: DISCONTINUED | OUTPATIENT
Start: 2022-12-04 | End: 2022-12-04

## 2022-12-04 RX ORDER — CEFUROXIME SODIUM 1.5 G/16ML
1.5 INJECTION, POWDER, FOR SOLUTION INTRAVENOUS SEE ADMIN INSTRUCTIONS
Status: DISCONTINUED | OUTPATIENT
Start: 2022-12-04 | End: 2022-12-04 | Stop reason: HOSPADM

## 2022-12-04 RX ORDER — METHOCARBAMOL 750 MG/1
750 TABLET, FILM COATED ORAL EVERY 6 HOURS PRN
Status: DISCONTINUED | OUTPATIENT
Start: 2022-12-04 | End: 2022-12-08 | Stop reason: HOSPADM

## 2022-12-04 RX ORDER — ONDANSETRON 2 MG/ML
4 INJECTION INTRAMUSCULAR; INTRAVENOUS EVERY 6 HOURS PRN
Status: DISCONTINUED | OUTPATIENT
Start: 2022-12-04 | End: 2022-12-08 | Stop reason: HOSPADM

## 2022-12-04 RX ORDER — VALGANCICLOVIR 450 MG/1
450 TABLET, FILM COATED ORAL
Status: DISCONTINUED | OUTPATIENT
Start: 2022-12-05 | End: 2022-12-08

## 2022-12-04 RX ORDER — ONDANSETRON 2 MG/ML
INJECTION INTRAMUSCULAR; INTRAVENOUS
Status: DISCONTINUED
Start: 2022-12-04 | End: 2022-12-04 | Stop reason: CLARIF

## 2022-12-04 RX ORDER — DAPSONE 25 MG/1
50 TABLET ORAL DAILY
Status: DISCONTINUED | OUTPATIENT
Start: 2022-12-05 | End: 2022-12-06

## 2022-12-04 RX ORDER — ATORVASTATIN CALCIUM 10 MG/1
10 TABLET, FILM COATED ORAL DAILY
Status: DISCONTINUED | OUTPATIENT
Start: 2022-12-05 | End: 2022-12-04

## 2022-12-04 RX ORDER — CALCIUM CHLORIDE 100 MG/ML
INJECTION INTRAVENOUS; INTRAVENTRICULAR PRN
Status: DISCONTINUED | OUTPATIENT
Start: 2022-12-04 | End: 2022-12-04

## 2022-12-04 RX ORDER — FENTANYL CITRATE 50 UG/ML
50 INJECTION, SOLUTION INTRAMUSCULAR; INTRAVENOUS EVERY 5 MIN PRN
Status: DISCONTINUED | OUTPATIENT
Start: 2022-12-04 | End: 2022-12-04 | Stop reason: HOSPADM

## 2022-12-04 RX ORDER — NALOXONE HYDROCHLORIDE 0.4 MG/ML
0.4 INJECTION, SOLUTION INTRAMUSCULAR; INTRAVENOUS; SUBCUTANEOUS
Status: DISCONTINUED | OUTPATIENT
Start: 2022-12-04 | End: 2022-12-08 | Stop reason: HOSPADM

## 2022-12-04 RX ORDER — BISACODYL 10 MG
10 SUPPOSITORY, RECTAL RECTAL DAILY PRN
Status: DISCONTINUED | OUTPATIENT
Start: 2022-12-04 | End: 2022-12-08 | Stop reason: HOSPADM

## 2022-12-04 RX ORDER — SODIUM CHLORIDE, SODIUM GLUCONATE, SODIUM ACETATE, POTASSIUM CHLORIDE AND MAGNESIUM CHLORIDE 526; 502; 368; 37; 30 MG/100ML; MG/100ML; MG/100ML; MG/100ML; MG/100ML
INJECTION, SOLUTION INTRAVENOUS CONTINUOUS PRN
Status: DISCONTINUED | OUTPATIENT
Start: 2022-12-04 | End: 2022-12-04

## 2022-12-04 RX ORDER — HYDROMORPHONE HYDROCHLORIDE 1 MG/ML
0.4 INJECTION, SOLUTION INTRAMUSCULAR; INTRAVENOUS; SUBCUTANEOUS EVERY 5 MIN PRN
Status: DISCONTINUED | OUTPATIENT
Start: 2022-12-04 | End: 2022-12-04 | Stop reason: HOSPADM

## 2022-12-04 RX ORDER — PAPAVERINE HYDROCHLORIDE 30 MG/ML
INJECTION INTRAMUSCULAR; INTRAVENOUS PRN
Status: DISCONTINUED | OUTPATIENT
Start: 2022-12-04 | End: 2022-12-04 | Stop reason: HOSPADM

## 2022-12-04 RX ORDER — ONDANSETRON 2 MG/ML
4 INJECTION INTRAMUSCULAR; INTRAVENOUS EVERY 30 MIN PRN
Status: DISCONTINUED | OUTPATIENT
Start: 2022-12-04 | End: 2022-12-04 | Stop reason: HOSPADM

## 2022-12-04 RX ORDER — CEFUROXIME SODIUM 1.5 G/16ML
1.5 INJECTION, POWDER, FOR SOLUTION INTRAVENOUS ONCE
Status: COMPLETED | OUTPATIENT
Start: 2022-12-04 | End: 2022-12-04

## 2022-12-04 RX ORDER — HYDROMORPHONE HYDROCHLORIDE 1 MG/ML
0.2 INJECTION, SOLUTION INTRAMUSCULAR; INTRAVENOUS; SUBCUTANEOUS EVERY 5 MIN PRN
Status: DISCONTINUED | OUTPATIENT
Start: 2022-12-04 | End: 2022-12-04 | Stop reason: HOSPADM

## 2022-12-04 RX ORDER — DIPHENHYDRAMINE HYDROCHLORIDE 50 MG/ML
INJECTION INTRAMUSCULAR; INTRAVENOUS PRN
Status: DISCONTINUED | OUTPATIENT
Start: 2022-12-04 | End: 2022-12-04

## 2022-12-04 RX ORDER — ONDANSETRON 2 MG/ML
INJECTION INTRAMUSCULAR; INTRAVENOUS PRN
Status: DISCONTINUED | OUTPATIENT
Start: 2022-12-04 | End: 2022-12-04

## 2022-12-04 RX ORDER — ONDANSETRON 4 MG/1
4 TABLET, ORALLY DISINTEGRATING ORAL EVERY 30 MIN PRN
Status: DISCONTINUED | OUTPATIENT
Start: 2022-12-04 | End: 2022-12-04 | Stop reason: HOSPADM

## 2022-12-04 RX ORDER — LIDOCAINE HYDROCHLORIDE 20 MG/ML
INJECTION, SOLUTION INFILTRATION; PERINEURAL PRN
Status: DISCONTINUED | OUTPATIENT
Start: 2022-12-04 | End: 2022-12-04

## 2022-12-04 RX ORDER — HYDROMORPHONE HCL IN WATER/PF 6 MG/30 ML
0.4 PATIENT CONTROLLED ANALGESIA SYRINGE INTRAVENOUS
Status: DISCONTINUED | OUTPATIENT
Start: 2022-12-04 | End: 2022-12-05

## 2022-12-04 RX ORDER — MANNITOL 20 G/100ML
INJECTION, SOLUTION INTRAVENOUS PRN
Status: DISCONTINUED | OUTPATIENT
Start: 2022-12-04 | End: 2022-12-04

## 2022-12-04 RX ORDER — FUROSEMIDE 10 MG/ML
INJECTION INTRAMUSCULAR; INTRAVENOUS PRN
Status: DISCONTINUED | OUTPATIENT
Start: 2022-12-04 | End: 2022-12-04

## 2022-12-04 RX ORDER — MYCOPHENOLATE MOFETIL 250 MG/1
750 CAPSULE ORAL 2 TIMES DAILY
Status: DISCONTINUED | OUTPATIENT
Start: 2022-12-04 | End: 2022-12-04 | Stop reason: CLARIF

## 2022-12-04 RX ORDER — ACETAMINOPHEN 325 MG/1
975 TABLET ORAL EVERY 8 HOURS
Status: DISCONTINUED | OUTPATIENT
Start: 2022-12-04 | End: 2022-12-05 | Stop reason: DRUGHIGH

## 2022-12-04 RX ORDER — TACROLIMUS 1 MG/1
2 CAPSULE ORAL 2 TIMES DAILY
Status: DISCONTINUED | OUTPATIENT
Start: 2022-12-04 | End: 2022-12-04 | Stop reason: CLARIF

## 2022-12-04 RX ORDER — BUMETANIDE 0.25 MG/ML
2 INJECTION INTRAMUSCULAR; INTRAVENOUS ONCE
Status: COMPLETED | OUTPATIENT
Start: 2022-12-04 | End: 2022-12-04

## 2022-12-04 RX ORDER — METOPROLOL TARTRATE 1 MG/ML
1-2 INJECTION, SOLUTION INTRAVENOUS EVERY 5 MIN PRN
Status: DISCONTINUED | OUTPATIENT
Start: 2022-12-04 | End: 2022-12-04 | Stop reason: HOSPADM

## 2022-12-04 RX ORDER — HEPARIN SODIUM 1000 [USP'U]/ML
INJECTION, SOLUTION INTRAVENOUS; SUBCUTANEOUS PRN
Status: DISCONTINUED | OUTPATIENT
Start: 2022-12-04 | End: 2022-12-04

## 2022-12-04 RX ORDER — ONDANSETRON 4 MG/1
4 TABLET, ORALLY DISINTEGRATING ORAL EVERY 6 HOURS PRN
Status: DISCONTINUED | OUTPATIENT
Start: 2022-12-04 | End: 2022-12-08 | Stop reason: HOSPADM

## 2022-12-04 RX ADMIN — NOREPINEPHRINE BITARTRATE 6.4 MCG: 1 INJECTION, SOLUTION, CONCENTRATE INTRAVENOUS at 14:21

## 2022-12-04 RX ADMIN — Medication 1 UNITS: at 14:50

## 2022-12-04 RX ADMIN — Medication 20 MG: at 13:44

## 2022-12-04 RX ADMIN — SODIUM CHLORIDE, POTASSIUM CHLORIDE, SODIUM LACTATE AND CALCIUM CHLORIDE 1000 ML: 600; 310; 30; 20 INJECTION, SOLUTION INTRAVENOUS at 19:04

## 2022-12-04 RX ADMIN — SODIUM CHLORIDE, SODIUM GLUCONATE, SODIUM ACETATE, POTASSIUM CHLORIDE AND MAGNESIUM CHLORIDE: 526; 502; 368; 37; 30 INJECTION, SOLUTION INTRAVENOUS at 13:01

## 2022-12-04 RX ADMIN — DIPHENHYDRAMINE HYDROCHLORIDE 50 MG: 50 INJECTION, SOLUTION INTRAMUSCULAR; INTRAVENOUS at 15:24

## 2022-12-04 RX ADMIN — FENTANYL CITRATE 50 MCG: 50 INJECTION, SOLUTION INTRAMUSCULAR; INTRAVENOUS at 18:26

## 2022-12-04 RX ADMIN — MANNITOL 25 G: 20 INJECTION, SOLUTION INTRAVENOUS at 16:43

## 2022-12-04 RX ADMIN — HYDROMORPHONE HYDROCHLORIDE 0.5 MG: 1 INJECTION, SOLUTION INTRAMUSCULAR; INTRAVENOUS; SUBCUTANEOUS at 18:36

## 2022-12-04 RX ADMIN — FENTANYL CITRATE 50 MCG: 50 INJECTION, SOLUTION INTRAMUSCULAR; INTRAVENOUS at 16:39

## 2022-12-04 RX ADMIN — MIDAZOLAM 2 MG: 1 INJECTION INTRAMUSCULAR; INTRAVENOUS at 13:02

## 2022-12-04 RX ADMIN — Medication 0.5 UNITS: at 14:40

## 2022-12-04 RX ADMIN — ALBUMIN (HUMAN): 12.5 SOLUTION INTRAVENOUS at 16:32

## 2022-12-04 RX ADMIN — SODIUM CHLORIDE: 4.5 INJECTION, SOLUTION INTRAVENOUS at 20:00

## 2022-12-04 RX ADMIN — BUMETANIDE 2 MG: 0.25 INJECTION, SOLUTION INTRAMUSCULAR; INTRAVENOUS at 18:19

## 2022-12-04 RX ADMIN — NOREPINEPHRINE BITARTRATE 12.8 MCG: 1 INJECTION, SOLUTION, CONCENTRATE INTRAVENOUS at 14:41

## 2022-12-04 RX ADMIN — MIDAZOLAM 1 MG: 1 INJECTION INTRAMUSCULAR; INTRAVENOUS at 15:30

## 2022-12-04 RX ADMIN — CALCIUM CHLORIDE 500 MG: 100 INJECTION INTRAVENOUS; INTRAVENTRICULAR at 14:37

## 2022-12-04 RX ADMIN — PROPOFOL 110 MG: 10 INJECTION, EMULSION INTRAVENOUS at 13:07

## 2022-12-04 RX ADMIN — HYDROMORPHONE HYDROCHLORIDE 0.2 MG: 0.2 INJECTION, SOLUTION INTRAMUSCULAR; INTRAVENOUS; SUBCUTANEOUS at 21:57

## 2022-12-04 RX ADMIN — NOREPINEPHRINE BITARTRATE 6.4 MCG: 1 INJECTION, SOLUTION, CONCENTRATE INTRAVENOUS at 16:23

## 2022-12-04 RX ADMIN — Medication 50 MG: at 13:07

## 2022-12-04 RX ADMIN — PROCHLORPERAZINE EDISYLATE 10 MG: 5 INJECTION INTRAMUSCULAR; INTRAVENOUS at 23:19

## 2022-12-04 RX ADMIN — PHENYLEPHRINE HYDROCHLORIDE 100 MCG: 10 INJECTION INTRAVENOUS at 14:34

## 2022-12-04 RX ADMIN — ONDANSETRON 4 MG: 2 INJECTION INTRAMUSCULAR; INTRAVENOUS at 18:19

## 2022-12-04 RX ADMIN — NOREPINEPHRINE BITARTRATE 6.4 MCG: 1 INJECTION, SOLUTION, CONCENTRATE INTRAVENOUS at 15:48

## 2022-12-04 RX ADMIN — HYDROMORPHONE HYDROCHLORIDE 0.2 MG: 1 INJECTION, SOLUTION INTRAMUSCULAR; INTRAVENOUS; SUBCUTANEOUS at 20:44

## 2022-12-04 RX ADMIN — SODIUM CHLORIDE 500 MG: 9 INJECTION, SOLUTION INTRAVENOUS at 14:08

## 2022-12-04 RX ADMIN — LIDOCAINE HYDROCHLORIDE 100 MG: 20 INJECTION, SOLUTION INFILTRATION; PERINEURAL at 13:07

## 2022-12-04 RX ADMIN — SODIUM CHLORIDE, SODIUM LACTATE, POTASSIUM CHLORIDE, CALCIUM CHLORIDE AND DEXTROSE MONOHYDRATE: 5; 600; 310; 30; 20 INJECTION, SOLUTION INTRAVENOUS at 19:37

## 2022-12-04 RX ADMIN — DOPAMINE HYDROCHLORIDE 3 MCG/KG/MIN: 160 INJECTION, SOLUTION INTRAVENOUS at 16:14

## 2022-12-04 RX ADMIN — Medication 20 MG: at 16:16

## 2022-12-04 RX ADMIN — CEFUROXIME 1.5 G: 1.5 INJECTION, POWDER, FOR SOLUTION INTRAVENOUS at 17:14

## 2022-12-04 RX ADMIN — FENTANYL CITRATE 25 MCG: 50 INJECTION INTRAMUSCULAR; INTRAVENOUS at 20:13

## 2022-12-04 RX ADMIN — ONDANSETRON 4 MG: 2 INJECTION INTRAMUSCULAR; INTRAVENOUS at 21:44

## 2022-12-04 RX ADMIN — Medication 20 MG: at 14:24

## 2022-12-04 RX ADMIN — NOREPINEPHRINE BITARTRATE 12.8 MCG: 1 INJECTION, SOLUTION, CONCENTRATE INTRAVENOUS at 14:45

## 2022-12-04 RX ADMIN — FENTANYL CITRATE 50 MCG: 50 INJECTION, SOLUTION INTRAMUSCULAR; INTRAVENOUS at 17:55

## 2022-12-04 RX ADMIN — NOREPINEPHRINE BITARTRATE 6.4 MCG: 1 INJECTION, SOLUTION, CONCENTRATE INTRAVENOUS at 18:47

## 2022-12-04 RX ADMIN — ALBUMIN (HUMAN): 12.5 SOLUTION INTRAVENOUS at 16:31

## 2022-12-04 RX ADMIN — FUROSEMIDE 100 MG: 10 INJECTION, SOLUTION INTRAVENOUS at 16:40

## 2022-12-04 RX ADMIN — SUGAMMADEX 200 MG: 100 INJECTION, SOLUTION INTRAVENOUS at 18:26

## 2022-12-04 RX ADMIN — MYCOPHENOLATE MOFETIL 1000 MG: 500 INJECTION, POWDER, LYOPHILIZED, FOR SOLUTION INTRAVENOUS at 14:15

## 2022-12-04 RX ADMIN — Medication 0.5 UNITS: at 14:35

## 2022-12-04 RX ADMIN — ANTI-THYMOCYTE GLOBULIN (RABBIT) 175 MG: 5 INJECTION, POWDER, LYOPHILIZED, FOR SOLUTION INTRAVENOUS at 14:15

## 2022-12-04 RX ADMIN — CALCIUM CHLORIDE 500 MG: 100 INJECTION INTRAVENOUS; INTRAVENTRICULAR at 17:03

## 2022-12-04 RX ADMIN — FENTANYL CITRATE 100 MCG: 50 INJECTION, SOLUTION INTRAMUSCULAR; INTRAVENOUS at 13:07

## 2022-12-04 RX ADMIN — PHENYLEPHRINE HYDROCHLORIDE 200 MCG: 10 INJECTION INTRAVENOUS at 14:31

## 2022-12-04 RX ADMIN — SODIUM CHLORIDE, POTASSIUM CHLORIDE, SODIUM LACTATE AND CALCIUM CHLORIDE: 600; 310; 30; 20 INJECTION, SOLUTION INTRAVENOUS at 13:00

## 2022-12-04 RX ADMIN — Medication 10 MG: at 17:48

## 2022-12-04 RX ADMIN — HEPARIN SODIUM 2000 UNITS: 1000 INJECTION INTRAVENOUS; SUBCUTANEOUS at 16:04

## 2022-12-04 RX ADMIN — SODIUM CHLORIDE: 9 INJECTION, SOLUTION INTRAVENOUS at 19:59

## 2022-12-04 RX ADMIN — CEFUROXIME 1.5 G: 1.5 INJECTION, POWDER, FOR SOLUTION INTRAVENOUS at 13:14

## 2022-12-04 ASSESSMENT — ACTIVITIES OF DAILY LIVING (ADL)
ADLS_ACUITY_SCORE: 35

## 2022-12-04 NOTE — H&P
[facility]    History and Physical  Transplant Surgery     Date of Admission:  2022    Assessment & Plan   Raj Pierce is a 52 year old male who presents with ESRD 2/2 polycystic kidney disease s/p DDKT x2 (10/13/2015, 10/23/2021) with most recent transplant c/b thrombosis now s/p explant, not on HD.  Patient was notified as an acceptable  donor organ became available and presented for further pre-operative work-up.  Patient was informed of the risks and benefits regarding  donor organ transplantation, and has elected to proceed with possible  donor kidney transplant.      Plan:   -Pre-op labs  -CXR  -EKG  -NPO status  -STAT COVID-19 PCR  -Consent obtained    Yi Hawkins MD    Attestation: I saw and examined the patient with Yi Hawkins MD, and the transplant team. I independently reviewed all pertinent laboratory and imaging information and made management decisions including immunosuppression management. I agree with the findings and plan as documented in the note.  George Zhu MD    Code Status   Full Code    Primary Care Physician   *Do Guerrero    Chief Complaint   History of ESRD 2/2 Polycystic kidney disease    History is obtained from the patient    History of Present Illness   Raj Pierce is a 52 year old male who presents with ESRD 2/2 polycystic kidney disease. He is not on dialysis, currently making urine. Denies any known recent sick contacts. Functioning at normal baseline.     Past Medical History    I have reviewed this patient's medical history and updated it with pertinent information if needed.   Past Medical History:   Diagnosis Date     Acidosis      Chronic kidney disease, stage IV (severe) (H)      Disorders of phosphorus metabolism      History of blood transfusion      HTN (hypertension)     15 years     Hyperparathyroidism      Kidney replaced by transplant 10/23/2021    DCD re-transplant.  Induction with thymoglobulin 6mg/kg and steroids.     Kidney replaced by transplant 10/13/2015     Polycystic kidney, autosomal dominant      Pure hypercholesterolemia      Sleep apnea     cpap     Vitamin D deficiency        Past Surgical History   I have reviewed this patient's surgical history and updated it with pertinent information if needed.  Past Surgical History:   Procedure Laterality Date     BENCH KIDNEY N/A 10/13/2015    Procedure: BENCH KIDNEY;  Surgeon: Dariel Chavez MD;  Location: [facility] OR     BRONCHOSCOPY, DILATE BRONCHUS, STENT BRONCHUS, COMBINED  10/27/2021    Procedure: Bronchoscopy, dilate bronchus, stent bronchus, combined;  Surgeon: Madina Warren MD;  Location: [facility] OR     CYSTOSCOPY, REMOVE STENT(S), COMBINED Right 2015    Procedure: COMBINED CYSTOSCOPY, REMOVE STENT(S);  Surgeon: Dariel Chavez MD;  Location: [facility] OR     HERNIORRHAPHY INGUINAL CHILD      right     LAPAROTOMY EXPLORATORY N/A 10/27/2021    Procedure: Exploratory left lower quadrant of kidney transplant, back table flush, reimplantation of kidney transplant, with ureteral stent removal and placement of a new ureteral stent, intraoperative ultrasound, intraoperative of transit time flow measurment (VeriQ);  Surgeon: Madina Warren MD;  Location: [facility] OR     NEPHRECTOMY Left 2021    Procedure: NEPHRECTOMY, LEFT, Ureteral stent explant;  Surgeon: Eulalio Emanuel MD;  Location: [facility]     THROMBECTOMY ABDOMEN N/A 10/27/2021    Procedure: Thrombectomy of iliac and renal veins;  Surgeon: Madina Warren MD;  Location: [facility] OR     TONSILLECTOMY       TRANSPLANT KIDNEY RECIPIENT  DONOR N/A 10/13/2015    Procedure: TRANSPLANT KIDNEY RECIPIENT  DONOR;  Surgeon: Dariel Chavez MD;  Location: [facility]     TRANSPLANT KIDNEY RECIPIENT  DONOR N/A 10/23/2021    Procedure: TRANSPLANT, KIDNEY, RECIPIENT,  DONOR, URETERAL STENT PLACEMENT;  Surgeon: Madina Warren  MD;  Location: [facility] OR       Prior to Admission Medications   Prior to Admission Medications   Prescriptions Last Dose Informant Patient Reported? Taking?   amLODIPine (NORVASC) 5 MG tablet   Yes No   Sig: Take 5 mg by mouth daily   atorvastatin (LIPITOR) 10 MG tablet  Spouse/Significant Other Yes No   Sig: Take 10 mg by mouth daily   calcium carbonate-vitamin D (OS-LILIANA WITH D) 500-200 MG-UNIT tablet   No No   Sig: Take 1 tablet by mouth 2 times daily   citalopram (CELEXA) 20 MG tablet  Spouse/Significant Other Yes No   Sig: Take 20 mg by mouth daily.   dapsone (ACZONE) 25 MG tablet   No No   Sig: Take 2 tablets (50 mg) by mouth daily   furosemide (LASIX) 20 MG tablet   No No   Sig: Take 1 tablet (20 mg) by mouth daily   magnesium oxide (MAG-OX) 400 MG tablet   No No   Sig: Take 1 tablet (400 mg) by mouth daily   mycophenolate (GENERIC EQUIVALENT) 250 MG capsule   No No   Sig: Take 3 capsules (750 mg) by mouth 2 times daily   sodium bicarbonate 650 MG tablet   No No   Sig: Take 2 tablets (1,300 mg) by mouth 3 times daily   tacrolimus (GENERIC EQUIVALENT) 1 MG capsule   No No   Sig: Take 2 capsules (2 mg) by mouth 2 times daily   valGANciclovir (VALCYTE) 450 MG tablet   No No   Sig: Take 1 tablet (450 mg) by mouth twice a week      Facility-Administered Medications: None     Allergies   Allergies   Allergen Reactions     Alcohol Rash     Swabs used at hosptial       Social History   I have reviewed this patient's social history and updated it with pertinent information if needed.     Family History   I have reviewed this patient's family history and updated it with pertinent information if needed.   Family History   Problem Relation Age of Onset     Gastrointestinal Disease Mother         ESRD PCKD     Hypertension Mother      C.A.D. Father      Hypertension Father      Gastrointestinal Disease Sister         PCKD       Review of Systems   The 10 point Review of Systems is negative other than noted in the  HPI.    Physical Exam                      Vital Signs with Ranges  Temp:  [98.2  F (36.8  C)] 98.2  F (36.8  C)  Pulse:  [79] 79  BP: (136)/(93) 136/93  177 lbs 12.8 oz    Constitutional: awake, alert, sitting up in chair  HEENT: EOMI, MMM  Respiratory: nonlabored breathing on room air, CTABL  Cardiovascular: RRR, no murmur  GI: abdomen soft, nondistended, nontender  Skin: No rash  Musculoskeletal: moving all extremities  Neurologic: AOx3, no focal deficits  Neuropsychiatric: appropriate mood and affect    Data   Results for orders placed or performed during the hospital encounter of 12/04/22 (from the past 24 hour(s))   EKG 12-lead, tracing only   Result Value Ref Range    Systolic Blood Pressure  mmHg    Diastolic Blood Pressure  mmHg    Ventricular Rate 66 BPM    Atrial Rate 66 BPM    MD Interval 158 ms    QRS Duration 102 ms     ms    QTc 442 ms    P Axis 66 degrees    R AXIS 48 degrees    T Axis 35 degrees    Interpretation ECG       Sinus rhythm  Normal ECG  When compared with ECG of 12-MAY-2022 11:33,  No significant change was found     CBC with platelets differential    Narrative    The following orders were created for panel order CBC with platelets differential.  Procedure                               Abnormality         Status                     ---------                               -----------         ------                     CBC with platelets and d...[[id number]]                      In process                   Please view results for these tests on the individual orders.   ABO/Rh type and screen    Narrative    The following orders were created for panel order ABO/Rh type and screen.  Procedure                               Abnormality         Status                     ---------                               -----------         ------                     Adult Type and Screen[[id number]]                            In process                   Please view results for these tests on the  individual orders.

## 2022-12-04 NOTE — ANESTHESIA PROCEDURE NOTES
Airway       Patient location during procedure: OR       Procedure Start/Stop Times: 12/4/2022 1:09 PM  Staff -        Anesthesiologist:  Leroy Kevin MD       Resident/Fellow: Jimmy Harmon MD       Performed By: resident  Consent for Airway        Urgency: elective  Indications and Patient Condition       Indications for airway management: rodrigo-procedural       Induction type:intravenous       Mask difficulty assessment: 1 - vent by mask    Final Airway Details       Final airway type: endotracheal airway       Successful airway: ETT - single  Endotracheal Airway Details        ETT size (mm): 7.5       Cuffed: yes       Successful intubation technique: direct laryngoscopy       DL Blade Type: MAC 4       Grade View of Cords: 1       Adjucts: stylet       Position: Right       Measured from: gums/teeth       Secured at (cm): 22       Bite block used: None    Post intubation assessment        Placement verified by: capnometry, equal breath sounds and chest rise        Number of attempts at approach: 1       Number of other approaches attempted: 0       Secured with: pink tape       Ease of procedure: easy       Dentition: Intact and Unchanged    Medication(s) Administered   Medication Administration Time: 12/4/2022 1:09 PM

## 2022-12-04 NOTE — ANESTHESIA PREPROCEDURE EVALUATION
Anesthesia Pre-Procedure Evaluation    Patient: Raj Pierce   MRN: [id number] : 1970        Procedure : Procedure(s):  TRANSPLANT, KIDNEY, RECIPIENT,  DONOR          Past Medical History:   Diagnosis Date     Acidosis      Chronic kidney disease, stage IV (severe) (H)      Disorders of phosphorus metabolism      History of blood transfusion      HTN (hypertension)     15 years     Hyperparathyroidism      Kidney replaced by transplant 10/23/2021    DCD re-transplant. Induction with thymoglobulin 6mg/kg and steroids.     Kidney replaced by transplant 10/13/2015     Polycystic kidney, autosomal dominant      Pure hypercholesterolemia      Sleep apnea     cpap     Vitamin D deficiency       Past Surgical History:   Procedure Laterality Date     BENCH KIDNEY N/A 10/13/2015    Procedure: BENCH KIDNEY;  Surgeon: Dariel Chavez MD;  Location: [facility]     BRONCHOSCOPY, DILATE BRONCHUS, STENT BRONCHUS, COMBINED  10/27/2021    Procedure: Bronchoscopy, dilate bronchus, stent bronchus, combined;  Surgeon: Madina Warren MD;  Location: [facility]     CYSTOSCOPY, REMOVE STENT(S), COMBINED Right 2015    Procedure: COMBINED CYSTOSCOPY, REMOVE STENT(S);  Surgeon: Dariel Chavez MD;  Location: [facility]     HERNIORRHAPHY INGUINAL CHILD      right     LAPAROTOMY EXPLORATORY N/A 10/27/2021    Procedure: Exploratory left lower quadrant of kidney transplant, back table flush, reimplantation of kidney transplant, with ureteral stent removal and placement of a new ureteral stent, intraoperative ultrasound, intraoperative of transit time flow measurment (VeriQ);  Surgeon: Madina Warren MD;  Location: [facility]     NEPHRECTOMY Left 2021    Procedure: NEPHRECTOMY, LEFT, Ureteral stent explant;  Surgeon: Eulalio Emanuel MD;  Location: [facility]     THROMBECTOMY ABDOMEN N/A 10/27/2021    Procedure: Thrombectomy of iliac and renal veins;  Surgeon: Madina Warren MD;  Location: [facility]      TONSILLECTOMY       TRANSPLANT KIDNEY RECIPIENT  DONOR N/A 10/13/2015    Procedure: TRANSPLANT KIDNEY RECIPIENT  DONOR;  Surgeon: Dariel Chavez MD;  Location: [facility]     TRANSPLANT KIDNEY RECIPIENT  DONOR N/A 10/23/2021    Procedure: TRANSPLANT, KIDNEY, RECIPIENT,  DONOR, URETERAL STENT PLACEMENT;  Surgeon: Madina Warren MD;  Location: [facility]      Allergies   Allergen Reactions     Alcohol Rash     Swabs used at hosptial      Social History     Tobacco Use     Smoking status: Never     Smokeless tobacco: Never   Substance Use Topics     Alcohol use: Yes     Alcohol/week: 10.0 standard drinks     Types: 10 Standard drinks or equivalent per week     Comment: 1 drink daily       Wt Readings from Last 1 Encounters:   22 80.6 kg (177 lb 9.6 oz)        Anesthesia Evaluation   Pt has had prior anesthetic. Type: General.    No history of anesthetic complications       ROS/MED HX  ENT/Pulmonary:     (+) sleep apnea, uses CPAP,     Neurologic:       Cardiovascular:     (+) Dyslipidemia hypertension-----Previous cardiac testing   Echo: Date:  Results:  Interpretation Summary  Left ventricular size, wall motion and function are normal. The ejection  fraction is 60-65%.  Global right ventricular function is normal.  The inferior vena cava is normal.  Mildly dilated aorta: Sinuses of Valsalva 4.0 cm; ascending aorta 4.2 cm.     This study was compared with the study from 18. Sinus of valsalva  diameter has increased. No change in proximal ascending aorta.  Stress Test: Date:  Results:    The nuclear stress test is negative for inducible myocardial ischemia or infarction. Left ventricular function is normal.     A prior study was conducted on 2020.     No change from previous Nuclear Medicine myocardial perfusion examination.  ECG Reviewed:  Date:  Results:  Sinus rhythm  Cath:  Date: Results:      METS/Exercise Tolerance:     Hematologic:     (+) History  of blood clots (renal vein thrombosis), pt is not anticoagulated, anemia,     Musculoskeletal: Comment: Gout      GI/Hepatic:  - neg GI/hepatic ROS     Renal/Genitourinary: Comment: PKD-->status post pre-emptive  donor (DCD) kidney re-transplant on 10/23. Discharged 10/26 POD 3. Readmitted 10/27 for emergent exploratory laparotomy due to renal vein thrombosis and failed transplant d/t RVT.     (+) renal disease (ESRD 2/2 PCKD s/p kidney transplant () c/b poor function (small graft)., was relisted; KTP 10/27/2021 c/b iliac vein and renal vein thrombosis s/p thrombectomy, but now with nonviable kidney), type: ESRD, Nephrolithiasis ,     Endo: Comment: hyperparathyroidism ,         Psychiatric/Substance Use:     (+) psychiatric history depression     Infectious Disease:       Malignancy:       Other:            Physical Exam    Airway  airway exam normal      Mallampati: II   TM distance: > 3 FB   Neck ROM: full   Mouth opening: > 3 cm    Respiratory Devices and Support         Dental  no notable dental history         Cardiovascular   cardiovascular exam normal          Pulmonary   pulmonary exam normal                OUTSIDE LABS:  CBC:   Lab Results   Component Value Date    WBC 11.8 (H) 2021    WBC 12.8 (H) 2021    HGB 7.3 (L) 2021    HGB 7.3 (L) 2021    HCT 23.7 (L) 2021    HCT 23.5 (L) 2021     (H) 2021     (H) 2021     BMP:   Lab Results   Component Value Date     2021     2021    POTASSIUM 5.5 (H) 2021    POTASSIUM 5.3 2021    CHLORIDE 103 10/11/2022    CHLORIDE 101 2022    CHLORIDE 101 2022    CO2 20 2021    CO2 20 2021    BUN 83 (H) 2021    BUN 90 (H) 2021    CR 5.31 (H) 2021    CR 5.75 (H) 2021     (H) 2021     (H) 2021     COAGS:   Lab Results   Component Value Date    PTT 25 2022    INR 0.98 2022    FIBR 546 (H)  10/27/2021     POC:   Lab Results   Component Value Date    BGM 90 12/18/2019     HEPATIC:   Lab Results   Component Value Date    ALBUMIN 2.0 (L) 11/01/2021    PROTTOTAL 6.5 (L) 10/23/2021    ALT 24 10/23/2021    AST 23 10/23/2021    ALKPHOS 56 10/23/2021    BILITOTAL 0.6 10/23/2021     OTHER:   Lab Results   Component Value Date    LACT 1.2 10/23/2021    A1C 5.6 10/23/2021    LILIANA 7.9 (L) 11/07/2021    PHOS 4.8 (H) 11/07/2021    MAG 2.4 (H) 11/07/2021       Anesthesia Plan    ASA Status:  3      Anesthesia Type: General.     - Airway: ETT   Induction: Intravenous.   Maintenance: Balanced.   Techniques and Equipment:     - Lines/Monitors: 2nd IV, Central Line, CVP, BIS     Consents    Anesthesia Plan(s) and associated risks, benefits, and realistic alternatives discussed. Questions answered and patient/representative(s) expressed understanding.    - Discussed:     - Discussed with:  Patient      - Extended Intubation/Ventilatory Support Discussed: No.      - Patient is DNR/DNI Status: No    Use of blood products discussed: No .     Postoperative Care    Pain management: IV analgesics.   PONV prophylaxis: Ondansetron (or other 5HT-3), Dexamethasone or Solumedrol     Comments:                Jimmy Harmon MD

## 2022-12-04 NOTE — PHARMACY-ADMISSION MEDICATION HISTORY
Admission Medication History Completed by Pharmacy    See [facility] Admission Navigator for allergy information, preferred outpatient pharmacy, prior to admission medications and immunization status.     Medication History Sources:     Patient, SureScripts    Changes made to PTA medication list (reason):    Added: Doxazosin    Deleted: None    Changed: Sodium Bicarbonate from TID to BID    Additional Information:    None    Prior to Admission medications    Medication Sig Last Dose Taking? Auth Provider Long Term End Date   amLODIPine (NORVASC) 5 MG tablet Take 5 mg by mouth daily  Yes Reported, Patient No 7/29/23   atorvastatin (LIPITOR) 10 MG tablet Take 10 mg by mouth daily  Yes Reported, Patient Yes    calcium carbonate-vitamin D (OS-LILIANA WITH D) 500-200 MG-UNIT tablet Take 1 tablet by mouth 2 times daily  Yes Elif Monique NP     citalopram (CELEXA) 20 MG tablet Take 20 mg by mouth daily.  Yes Reported, Patient Yes    dapsone (ACZONE) 25 MG tablet Take 2 tablets (50 mg) by mouth daily  Yes Georges Quach MD     doxazosin (CARDURA) 8 MG tablet Take 8 mg by mouth At Bedtime  Yes Unknown, Entered By History No    furosemide (LASIX) 20 MG tablet Take 1 tablet (20 mg) by mouth daily  Yes Elif Monique NP Yes    magnesium oxide (MAG-OX) 400 MG tablet Take 1 tablet (400 mg) by mouth daily  Yes Francis Espinoza MD No    mycophenolate (GENERIC EQUIVALENT) 250 MG capsule Take 3 capsules (750 mg) by mouth 2 times daily  Yes Georges Quach MD Yes    sodium bicarbonate 650 MG tablet Take 2 tablets (1,300 mg) by mouth 3 times daily  Patient taking differently: Take 1,300 mg by mouth 2 times daily  Yes Georges Quach MD No    tacrolimus (GENERIC EQUIVALENT) 1 MG capsule Take 2 capsules (2 mg) by mouth 2 times daily  Yes Francis Espinoza MD No    valGANciclovir (VALCYTE) 450 MG tablet Take 1 tablet (450 mg) by mouth twice a week  Yes Elif Monique NP Yes        Date completed:  12/04/22    Medication history completed by: Lupis Short RP

## 2022-12-04 NOTE — ANESTHESIA PROCEDURE NOTES
Central Line/PA Catheter Placement    Pre-Procedure   Staff -        Anesthesiologist:  Lreoy Kevin MD       Resident/Fellow: Jimmy Harmon MD       Performed By: resident       Location: OR       Pre-Anesthestic Checklist: patient identified, IV checked, risks and benefits discussed, monitors and equipment checked, pre-op evaluation and at physician/surgeon's request  Timeout:       Correct Patient: Yes        Correct Procedure: Yes        Correct Site: Yes        Correct Position: Yes        Correct Laterality: Yes   Line Placement:   This line was placed Post Induction starting at 12/4/2022 1:30 PM and ending at 12/4/2022 1:50 PM    Procedure   Procedure: central line       Diagnosis: CVP monitoring and drug administration       Laterality: right       Insertion Site: internal jugular.       Patient Position: Trendelenburg  Sterile Prep        All elements of maximal sterile barrier technique followed       Patient Prep/Sterile Barriers: draped, hand hygiene, gloves , hat , mask , draped, gown, sterile gel and probe cover       Skin prep: Chloraprep  Insertion/Injection        Technique: ultrasound guided and Seldinger Technique        1. Ultrasound was used to evaluate the access site.       2. Vein evaluated via ultrasound for patency/adequacy.       3. Using real-time ultrasound the needle/catheter was observed entering the artery/vein.       Type: CVC       Catheter Length: 20       Number of Lumens: triple lumen  Narrative         Secured by: suture and anchor securement device       Tegaderm and Biopatch dressing used.       Complications: None apparent,        blood aspirated from all lumens,        Verification method: Ultrasound and Placement to be verified post-op

## 2022-12-05 ENCOUNTER — DOCUMENTATION ONLY (OUTPATIENT)
Dept: TRANSPLANT | Facility: CLINIC | Age: 52
End: 2022-12-05

## 2022-12-05 LAB
ANION GAP SERPL CALCULATED.3IONS-SCNC: 16 MMOL/L (ref 7–15)
BASOPHILS # BLD AUTO: 0 10E3/UL (ref 0–0.2)
BASOPHILS NFR BLD AUTO: 0 %
BUN SERPL-MCNC: 64.8 MG/DL (ref 6–20)
CALCIUM SERPL-MCNC: 6.9 MG/DL (ref 8.6–10)
CHLORIDE SERPL-SCNC: 105 MMOL/L (ref 98–107)
CMV DNA SPEC NAA+PROBE-ACNC: NOT DETECTED IU/ML
CMV IGG SERPL IA-ACNC: <0.2 U/ML
CMV IGG SERPL IA-ACNC: NORMAL
CREAT SERPL-MCNC: 4.98 MG/DL (ref 0.67–1.17)
DEPRECATED HCO3 PLAS-SCNC: 21 MMOL/L (ref 22–29)
DONOR IDENTIFICATION: NORMAL
DSA COMMENTS: NORMAL
DSA PRESENT: NO
DSA TEST METHOD: NORMAL
EBV VCA IGG SER IA-ACNC: 548 U/ML
EBV VCA IGG SER IA-ACNC: POSITIVE
EBV VCA IGM SER IA-ACNC: <10 U/ML
EBV VCA IGM SER IA-ACNC: NORMAL
EOSINOPHIL # BLD AUTO: 0 10E3/UL (ref 0–0.7)
EOSINOPHIL NFR BLD AUTO: 0 %
ERYTHROCYTE [DISTWIDTH] IN BLOOD BY AUTOMATED COUNT: 14 % (ref 10–15)
GFR SERPL CREATININE-BSD FRML MDRD: 13 ML/MIN/1.73M2
GLUCOSE BLDC GLUCOMTR-MCNC: 130 MG/DL (ref 70–99)
GLUCOSE BLDC GLUCOMTR-MCNC: 140 MG/DL (ref 70–99)
GLUCOSE BLDC GLUCOMTR-MCNC: 153 MG/DL (ref 70–99)
GLUCOSE BLDC GLUCOMTR-MCNC: 154 MG/DL (ref 70–99)
GLUCOSE SERPL-MCNC: 136 MG/DL (ref 70–99)
HBV CORE AB SERPL QL IA: NONREACTIVE
HBV SURFACE AB SERPL IA-ACNC: 36.98 M[IU]/ML
HBV SURFACE AB SERPL IA-ACNC: REACTIVE M[IU]/ML
HBV SURFACE AG SERPL QL IA: NONREACTIVE
HCT VFR BLD AUTO: 27.9 % (ref 40–53)
HCV AB SERPL QL IA: NONREACTIVE
HGB BLD-MCNC: 7.7 G/DL (ref 13.3–17.7)
HGB BLD-MCNC: 8 G/DL (ref 13.3–17.7)
HGB BLD-MCNC: 8.1 G/DL (ref 13.3–17.7)
HGB BLD-MCNC: 8.2 G/DL (ref 13.3–17.7)
HIV 1+2 AB+HIV1 P24 AG SERPL QL IA: NONREACTIVE
IMM GRANULOCYTES # BLD: 0 10E3/UL
IMM GRANULOCYTES NFR BLD: 0 %
LACTATE SERPL-SCNC: 1.6 MMOL/L (ref 0.7–2)
LACTATE SERPL-SCNC: 2.4 MMOL/L (ref 0.7–2)
LACTATE SERPL-SCNC: 2.7 MMOL/L (ref 0.7–2)
LYMPHOCYTES # BLD AUTO: 0 10E3/UL (ref 0.8–5.3)
LYMPHOCYTES NFR BLD AUTO: 0 %
MAGNESIUM SERPL-MCNC: 1.3 MG/DL (ref 1.7–2.3)
MCH RBC QN AUTO: 28.7 PG (ref 26.5–33)
MCHC RBC AUTO-ENTMCNC: 29.4 G/DL (ref 31.5–36.5)
MCV RBC AUTO: 98 FL (ref 78–100)
MONOCYTES # BLD AUTO: 0.1 10E3/UL (ref 0–1.3)
MONOCYTES NFR BLD AUTO: 2 %
NEUTROPHILS # BLD AUTO: 6.8 10E3/UL (ref 1.6–8.3)
NEUTROPHILS NFR BLD AUTO: 98 %
NRBC # BLD AUTO: 0 10E3/UL
NRBC BLD AUTO-RTO: 0 /100
ORGAN: NORMAL
PHOSPHATE SERPL-MCNC: 3.7 MG/DL (ref 2.5–4.5)
PLATELET # BLD AUTO: 128 10E3/UL (ref 150–450)
POTASSIUM SERPL-SCNC: 4.1 MMOL/L (ref 3.4–5.3)
POTASSIUM SERPL-SCNC: 4.3 MMOL/L (ref 3.4–5.3)
POTASSIUM SERPL-SCNC: 4.4 MMOL/L (ref 3.4–5.3)
POTASSIUM SERPL-SCNC: 4.5 MMOL/L (ref 3.4–5.3)
RBC # BLD AUTO: 2.86 10E6/UL (ref 4.4–5.9)
SA 1 CELL: NORMAL
SA 1 TEST METHOD: NORMAL
SA 2 CELL: NORMAL
SA 2 TEST METHOD: NORMAL
SA1 HI RISK ABY: NORMAL
SA1 MOD RISK ABY: NORMAL
SA2 HI RISK ABY: NORMAL
SA2 MOD RISK ABY: NORMAL
SODIUM SERPL-SCNC: 142 MMOL/L (ref 136–145)
UNACCEPTABLE ANTIGENS: NORMAL
UNOS CPRA: 67
WBC # BLD AUTO: 7 10E3/UL (ref 4–11)
ZZZSA 1  COMMENTS: NORMAL
ZZZSA 2 COMMENTS: NORMAL

## 2022-12-05 PROCEDURE — 85018 HEMOGLOBIN: CPT | Performed by: SURGERY

## 2022-12-05 PROCEDURE — 36592 COLLECT BLOOD FROM PICC: CPT | Performed by: SURGERY

## 2022-12-05 PROCEDURE — 83735 ASSAY OF MAGNESIUM: CPT | Performed by: SURGERY

## 2022-12-05 PROCEDURE — 84132 ASSAY OF SERUM POTASSIUM: CPT | Performed by: SURGERY

## 2022-12-05 PROCEDURE — 84100 ASSAY OF PHOSPHORUS: CPT | Performed by: SURGERY

## 2022-12-05 PROCEDURE — 36415 COLL VENOUS BLD VENIPUNCTURE: CPT | Performed by: SURGERY

## 2022-12-05 PROCEDURE — 250N000011 HC RX IP 250 OP 636: Performed by: SURGERY

## 2022-12-05 PROCEDURE — 36592 COLLECT BLOOD FROM PICC: CPT | Performed by: NURSE PRACTITIONER

## 2022-12-05 PROCEDURE — 258N000003 HC RX IP 258 OP 636: Performed by: NURSE PRACTITIONER

## 2022-12-05 PROCEDURE — 83605 ASSAY OF LACTIC ACID: CPT | Performed by: NURSE PRACTITIONER

## 2022-12-05 PROCEDURE — P9045 ALBUMIN (HUMAN), 5%, 250 ML: HCPCS | Performed by: STUDENT IN AN ORGANIZED HEALTH CARE EDUCATION/TRAINING PROGRAM

## 2022-12-05 PROCEDURE — 258N000003 HC RX IP 258 OP 636: Performed by: SURGERY

## 2022-12-05 PROCEDURE — 258N000002 HC RX IP 258 OP 250: Performed by: SURGERY

## 2022-12-05 PROCEDURE — 250N000013 HC RX MED GY IP 250 OP 250 PS 637: Performed by: NURSE PRACTITIONER

## 2022-12-05 PROCEDURE — 99232 SBSQ HOSP IP/OBS MODERATE 35: CPT | Mod: 24 | Performed by: SURGERY

## 2022-12-05 PROCEDURE — 99223 1ST HOSP IP/OBS HIGH 75: CPT | Mod: 24 | Performed by: INTERNAL MEDICINE

## 2022-12-05 PROCEDURE — 250N000011 HC RX IP 250 OP 636: Performed by: NURSE PRACTITIONER

## 2022-12-05 PROCEDURE — 120N000011 HC R&B TRANSPLANT UMMC

## 2022-12-05 PROCEDURE — 250N000011 HC RX IP 250 OP 636: Performed by: STUDENT IN AN ORGANIZED HEALTH CARE EDUCATION/TRAINING PROGRAM

## 2022-12-05 PROCEDURE — 250N000012 HC RX MED GY IP 250 OP 636 PS 637: Performed by: SURGERY

## 2022-12-05 PROCEDURE — 250N000013 HC RX MED GY IP 250 OP 250 PS 637: Performed by: SURGERY

## 2022-12-05 PROCEDURE — 250N000011 HC RX IP 250 OP 636

## 2022-12-05 PROCEDURE — 250N000012 HC RX MED GY IP 250 OP 636 PS 637: Performed by: NURSE PRACTITIONER

## 2022-12-05 RX ORDER — SULFAMETHOXAZOLE AND TRIMETHOPRIM 200; 40 MG/5ML; MG/5ML
10 SUSPENSION ORAL
Status: DISCONTINUED | OUTPATIENT
Start: 2022-12-07 | End: 2022-12-06

## 2022-12-05 RX ORDER — NICOTINE POLACRILEX 4 MG
15-30 LOZENGE BUCCAL
Status: DISCONTINUED | OUTPATIENT
Start: 2022-12-05 | End: 2022-12-06

## 2022-12-05 RX ORDER — MYCOPHENOLATE MOFETIL 200 MG/ML
750 POWDER, FOR SUSPENSION ORAL 2 TIMES DAILY
Status: DISCONTINUED | OUTPATIENT
Start: 2022-12-05 | End: 2022-12-05

## 2022-12-05 RX ORDER — PREDNISONE 5 MG/1
5 TABLET ORAL DAILY
Status: DISCONTINUED | OUTPATIENT
Start: 2023-01-01 | End: 2022-12-08 | Stop reason: HOSPADM

## 2022-12-05 RX ORDER — ACETAMINOPHEN 325 MG/10.15ML
650 LIQUID ORAL EVERY 4 HOURS PRN
Status: DISCONTINUED | OUTPATIENT
Start: 2022-12-07 | End: 2022-12-08 | Stop reason: HOSPADM

## 2022-12-05 RX ORDER — PREDNISONE 10 MG/1
10 TABLET ORAL DAILY
Status: DISCONTINUED | OUTPATIENT
Start: 2022-12-25 | End: 2022-12-08 | Stop reason: HOSPADM

## 2022-12-05 RX ORDER — CALCIUM GLUCONATE 20 MG/ML
1 INJECTION, SOLUTION INTRAVENOUS ONCE
Status: COMPLETED | OUTPATIENT
Start: 2022-12-05 | End: 2022-12-05

## 2022-12-05 RX ORDER — OXYCODONE HCL 5 MG/5 ML
5 SOLUTION, ORAL ORAL EVERY 4 HOURS PRN
Status: DISCONTINUED | OUTPATIENT
Start: 2022-12-05 | End: 2022-12-06

## 2022-12-05 RX ORDER — SODIUM CHLORIDE, SODIUM LACTATE, POTASSIUM CHLORIDE, CALCIUM CHLORIDE 600; 310; 30; 20 MG/100ML; MG/100ML; MG/100ML; MG/100ML
INJECTION, SOLUTION INTRAVENOUS CONTINUOUS
Status: DISCONTINUED | OUTPATIENT
Start: 2022-12-05 | End: 2022-12-07

## 2022-12-05 RX ORDER — DEXTROSE MONOHYDRATE 25 G/50ML
25-50 INJECTION, SOLUTION INTRAVENOUS
Status: DISCONTINUED | OUTPATIENT
Start: 2022-12-05 | End: 2022-12-06

## 2022-12-05 RX ORDER — PREDNISONE 20 MG/1
20 TABLET ORAL DAILY
Status: DISCONTINUED | OUTPATIENT
Start: 2022-12-11 | End: 2022-12-08 | Stop reason: HOSPADM

## 2022-12-05 RX ORDER — METHYLPREDNISOLONE SODIUM SUCCINATE 125 MG/2ML
100 INJECTION, POWDER, LYOPHILIZED, FOR SOLUTION INTRAMUSCULAR; INTRAVENOUS ONCE
Status: COMPLETED | OUTPATIENT
Start: 2022-12-06 | End: 2022-12-06

## 2022-12-05 RX ORDER — OXYCODONE HCL 5 MG/5 ML
10 SOLUTION, ORAL ORAL EVERY 4 HOURS PRN
Status: DISCONTINUED | OUTPATIENT
Start: 2022-12-05 | End: 2022-12-06

## 2022-12-05 RX ORDER — VALGANCICLOVIR HYDROCHLORIDE 50 MG/ML
450 POWDER, FOR SOLUTION ORAL
Status: DISCONTINUED | OUTPATIENT
Start: 2022-12-05 | End: 2022-12-06

## 2022-12-05 RX ORDER — MAGNESIUM SULFATE HEPTAHYDRATE 40 MG/ML
2 INJECTION, SOLUTION INTRAVENOUS ONCE
Status: COMPLETED | OUTPATIENT
Start: 2022-12-05 | End: 2022-12-05

## 2022-12-05 RX ORDER — PREDNISONE 20 MG/1
60 TABLET ORAL DAILY
Status: COMPLETED | OUTPATIENT
Start: 2022-12-07 | End: 2022-12-08

## 2022-12-05 RX ORDER — ACETAMINOPHEN 325 MG/10.15ML
975 LIQUID ORAL EVERY 8 HOURS
Status: DISCONTINUED | OUTPATIENT
Start: 2022-12-05 | End: 2022-12-06

## 2022-12-05 RX ORDER — PREDNISONE 20 MG/1
40 TABLET ORAL DAILY
Status: DISCONTINUED | OUTPATIENT
Start: 2022-12-09 | End: 2022-12-08 | Stop reason: HOSPADM

## 2022-12-05 RX ORDER — LIDOCAINE 4 G/G
2 PATCH TOPICAL
Status: DISCONTINUED | OUTPATIENT
Start: 2022-12-05 | End: 2022-12-08 | Stop reason: HOSPADM

## 2022-12-05 RX ORDER — CITALOPRAM HYDROBROMIDE 20 MG/10ML
20 SOLUTION, ORAL ORAL DAILY
Status: DISCONTINUED | OUTPATIENT
Start: 2022-12-05 | End: 2022-12-06

## 2022-12-05 RX ADMIN — VALGANCICLOVIR 450 MG: 50 FOR SOLUTION ORAL at 11:22

## 2022-12-05 RX ADMIN — SODIUM CHLORIDE 1000 ML: 9 INJECTION, SOLUTION INTRAVENOUS at 01:01

## 2022-12-05 RX ADMIN — OXYCODONE HYDROCHLORIDE 5 MG: 5 SOLUTION ORAL at 13:15

## 2022-12-05 RX ADMIN — MYCOPHENOLATE MOFETIL 750 MG: 200 POWDER, FOR SUSPENSION ORAL at 17:55

## 2022-12-05 RX ADMIN — ACETAMINOPHEN 975 MG: 325 SOLUTION ORAL at 00:50

## 2022-12-05 RX ADMIN — SODIUM CHLORIDE, SODIUM LACTATE, POTASSIUM CHLORIDE, CALCIUM CHLORIDE AND DEXTROSE MONOHYDRATE: 5; 600; 310; 30; 20 INJECTION, SOLUTION INTRAVENOUS at 04:03

## 2022-12-05 RX ADMIN — SENNOSIDES 5 ML: 8.8 LIQUID ORAL at 19:38

## 2022-12-05 RX ADMIN — MYCOPHENOLATE MOFETIL 750 MG: 200 POWDER, FOR SUSPENSION ORAL at 08:21

## 2022-12-05 RX ADMIN — SODIUM CHLORIDE, POTASSIUM CHLORIDE, SODIUM LACTATE AND CALCIUM CHLORIDE: 600; 310; 30; 20 INJECTION, SOLUTION INTRAVENOUS at 07:53

## 2022-12-05 RX ADMIN — TACROLIMUS 2 MG: 5 CAPSULE ORAL at 17:55

## 2022-12-05 RX ADMIN — SODIUM CHLORIDE 1000 ML: 4.5 INJECTION, SOLUTION INTRAVENOUS at 00:01

## 2022-12-05 RX ADMIN — SODIUM CHLORIDE, POTASSIUM CHLORIDE, SODIUM LACTATE AND CALCIUM CHLORIDE: 600; 310; 30; 20 INJECTION, SOLUTION INTRAVENOUS at 17:21

## 2022-12-05 RX ADMIN — SENNOSIDES 5 ML: 8.8 LIQUID ORAL at 11:22

## 2022-12-05 RX ADMIN — HYDROMORPHONE HYDROCHLORIDE 0.2 MG: 0.2 INJECTION, SOLUTION INTRAMUSCULAR; INTRAVENOUS; SUBCUTANEOUS at 02:15

## 2022-12-05 RX ADMIN — ACETAMINOPHEN 975 MG: 325 SOLUTION ORAL at 08:20

## 2022-12-05 RX ADMIN — TACROLIMUS 2 MG: 5 CAPSULE ORAL at 08:21

## 2022-12-05 RX ADMIN — MAGNESIUM SULFATE IN WATER 2 G: 40 INJECTION, SOLUTION INTRAVENOUS at 09:16

## 2022-12-05 RX ADMIN — METHYLPREDNISOLONE SODIUM SUCCINATE 250 MG: 125 INJECTION, POWDER, LYOPHILIZED, FOR SOLUTION INTRAMUSCULAR; INTRAVENOUS at 11:29

## 2022-12-05 RX ADMIN — SODIUM CHLORIDE 20 MG: 9 INJECTION, SOLUTION INTRAVENOUS at 14:29

## 2022-12-05 RX ADMIN — HYDROMORPHONE HYDROCHLORIDE 0.2 MG: 0.2 INJECTION, SOLUTION INTRAMUSCULAR; INTRAVENOUS; SUBCUTANEOUS at 04:20

## 2022-12-05 RX ADMIN — OXYCODONE HYDROCHLORIDE 5 MG: 5 SOLUTION ORAL at 21:36

## 2022-12-05 RX ADMIN — LIDOCAINE PATCH 4% 2 PATCH: 40 PATCH TOPICAL at 11:08

## 2022-12-05 RX ADMIN — FAMOTIDINE 20 MG: 10 INJECTION, SOLUTION INTRAVENOUS at 00:50

## 2022-12-05 RX ADMIN — ONDANSETRON 4 MG: 2 INJECTION INTRAMUSCULAR; INTRAVENOUS at 07:00

## 2022-12-05 RX ADMIN — CITALOPRAM 20 MG: 10 SOLUTION ORAL at 11:22

## 2022-12-05 RX ADMIN — ACETAMINOPHEN 975 MG: 325 SOLUTION ORAL at 16:34

## 2022-12-05 RX ADMIN — ALBUMIN (HUMAN) 25 G: 12.5 INJECTION, SOLUTION INTRAVENOUS at 02:39

## 2022-12-05 RX ADMIN — INSULIN ASPART 1 UNITS: 100 INJECTION, SOLUTION INTRAVENOUS; SUBCUTANEOUS at 17:49

## 2022-12-05 RX ADMIN — DOCUSATE SODIUM LIQUID 50 MG: 100 LIQUID ORAL at 19:38

## 2022-12-05 RX ADMIN — CALCIUM GLUCONATE 1 G: 20 INJECTION, SOLUTION INTRAVENOUS at 13:15

## 2022-12-05 RX ADMIN — DOCUSATE SODIUM LIQUID 50 MG: 100 LIQUID ORAL at 11:22

## 2022-12-05 ASSESSMENT — ACTIVITIES OF DAILY LIVING (ADL)
ADLS_ACUITY_SCORE: 41
ADLS_ACUITY_SCORE: 35
ADLS_ACUITY_SCORE: 41

## 2022-12-05 NOTE — PROGRESS NOTES
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 S/p  donor kidney re-transplant with ureteral stent, right native nephrectomy, and appendectomy on 22.    Graft function: POD #1  Kidney: Cr 5.1->5, good UOP.  US: patent vessels.     Immunosuppression management: PRA 67%  Thymo: 175 rodrigo-op  Basiliximab:  &   Steroids:  Taper per protocol.  MMF: 750mg BID  Tacro: Goal level 8-10  Complexity of management: High, induction    Hematology:   Anemia of chronic disease: Hgb 8.2, monitor.    Cardiorespiratory:   Hypoxia: IS, C&DB, wean O2.    GI/Nutrition:   Diet: NPO w/ NG to suction due to nephrectomy.  Bowel regimen: Senna, PEG    Endocrine:   Steroid induced hyperglycemia: Start sliding scale insulin.    Fluid/Electrolytes: MIVF: Straight rate  Hypomagnesemia: Replace today.  Hypocalcemia: Asymptomatic. Replace today.    : Wdae to remain due to new surgical anastomosis x3 days.    Infectious disease: Afebrile    Prophylaxis: DVT, fall, GI, viral (Valcyte), pneumocystis (Bactrim)    Disposition: 7A    Medical Decision Making: Medium  Subsequent visit 26438 (moderate level decision making)    JATINDER/Fellow/Resident Provider: Radha Amezcua NP [id number]    Faculty: Geroge Zhu MD     Attestation: I saw and examined the patient with Radha Amezcua NP, and the transplant team. I independently reviewed all pertinent laboratory and imaging information and made management decisions including immunosuppression management. I agree with the findings and plan as documented in the note.  George Zhu MD  _________________________________________________________________  Transplant History:   2022 (Kidney), 10/13/2015 (Kidney), 10/23/2021 (Kidney), Postoperative day: 2610 (Kidney), 1 (Kidney)     Interval  History: History is obtained from the patient  Overnight events: Intermittent nausea overnight. Pain controlled.    ROS:   A 10-point review of systems was negative except as noted above.    Meds:    acetaminophen  975 mg Oral Q8H     atorvastatin  10 mg Oral Daily     basiliximab (SIMULECT) infusion  20 mg Intravenous Once     [START ON 12/9/2022] basiliximab (SIMULECT) infusion  20 mg Intravenous Once     calcium gluconate  1 g Intravenous Once     citalopram  20 mg Oral or NG Tube Daily     [Held by provider] citalopram  20 mg Oral Daily     [Held by provider] dapsone  50 mg Oral Daily     sennosides  5 mL Oral or NG Tube BID    And     docusate  50 mg Oral or NG Tube BID     famotidine  20 mg Intravenous Q48H     insulin aspart  1-7 Units Subcutaneous TID AC     insulin aspart  1-5 Units Subcutaneous At Bedtime     lidocaine  2 patch Transdermal Q24H     lidocaine   Transdermal Q8H DONITA     [START ON 12/6/2022] magnesium oxide  400 mg Oral Daily with lunch     [START ON 12/6/2022] methylPREDNISolone  100 mg Intravenous Once     mycophenolate  750 mg Oral BID     polyethylene glycol  17 g Oral Daily     [START ON 12/7/2022] predniSONE  60 mg Oral Daily    Followed by     [START ON 12/9/2022] predniSONE  40 mg Oral Daily    Followed by     [START ON 12/11/2022] predniSONE  20 mg Oral Daily    Followed by     [START ON 12/18/2022] predniSONE  15 mg Oral Daily    Followed by     [START ON 12/25/2022] predniSONE  10 mg Oral Daily    Followed by     [START ON 1/1/2023] predniSONE  5 mg Oral Daily     sodium chloride (PF)  10 mL Intracatheter Q8H     [START ON 12/7/2022] sulfamethoxazole-trimethoprim  10 mL Oral or NG Tube Q Mon Wed Fri AM     tacrolimus  2 mg Oral BID     valGANciclovir  450 mg Oral or NG Tube Once per day on Mon Thu     [Held by provider] valGANciclovir  450 mg Oral Once per day on Mon Thu       Physical Exam:     Admit Weight: 80.6 kg (177 lb 12.8 oz)    Current vitals:   /70 (BP Location:  "Right arm)   Pulse 95   Temp 98.3  F (36.8  C) (Oral)   Resp 17   Ht 1.778 m (5' 10\")   Wt 80.6 kg (177 lb 12.8 oz)   SpO2 96%   BMI 25.51 kg/m      Vital sign ranges:    Temp:  [97.6  F (36.4  C)-102.7  F (39.3  C)] 98.3  F (36.8  C)  Pulse:  [] 95  Resp:  [10-21] 17  BP: (101-140)/(68-89) 111/70  SpO2:  [91 %-98 %] 96 %    General Appearance: in no apparent distress.   Skin: Warm, perfused  Heart: ->NSR  Lungs: Unlabored on RA  Abdomen: The abdomen is Soft, incision CDI. NG with minimal output.  : spears is present.  Urine is yellow.  Extremities: edema: none, strength 5/5  Neurologic: A&Ox4    Data:   CMP  Recent Labs   Lab 12/05/22  1158 12/05/22  0635 12/05/22  0330 12/04/22  2322 12/04/22  1906 12/04/22  0731   NA  --  142  --   --  142 146*   POTASSIUM  --  4.4  4.4 4.3   < > 4.1 4.4   CHLORIDE  --  105  --   --  105 106   CO2  --  21*  --   --  21* 24   * 136*  --   --  237* 102*   BUN  --  64.8*  --   --  69.7* 81.4*   CR  --  4.98*  --   --  5.13* 5.99*   GFRESTIMATED  --  13*  --   --  13* 10*   LILIANA  --  6.9*  --   --  7.6* 9.1   MAG  --  1.3*  --   --  1.7  --    PHOS  --  3.7  --   --  3.6  --    ALBUMIN  --   --   --   --   --  3.9   BILITOTAL  --   --   --   --   --  0.6   ALKPHOS  --   --   --   --   --  50   AST  --   --   --   --   --  25   ALT  --   --   --   --   --  14    < > = values in this interval not displayed.     CBC  Recent Labs   Lab 12/05/22  1004 12/05/22  0635 12/04/22  2322 12/04/22  1906 12/04/22  0731   HGB 8.1* 8.2*  8.2*   < > 7.9* 9.9*   WBC  --  7.0  --  3.5* 4.2   PLT  --  128*  --  138* 189   A1C  --   --   --   --  4.2    < > = values in this interval not displayed.       "

## 2022-12-05 NOTE — DISCHARGE INSTRUCTIONS
Diet recommendations post-transplant: High protein diet (105 grams/day) x 8 weeks.  Heart healthy dietary habits long term (low saturated/trans fat, low sodium). Practice food safety precautions. See nutrition handout and food safety booklet for more information.

## 2022-12-05 NOTE — PLAN OF CARE
Goal Outcome Evaluation:      Plan of Care Reviewed With: patient    Overall Patient Progress: no changeOverall Patient Progress: no change    Outcome Evaluation: Currently NPO.  Once diet advanced, ordered supplements for patient to order PRN.  Monitor PO intake/tolerance.  Encourage protein sources when appropriate.

## 2022-12-05 NOTE — TELEPHONE ENCOUNTER
Organ Offer Encounter Information    Organ Offer Information  Organ offer date & time: 12/3/2022  6:05 AM  Coordinator/Fellow/Attending name: Lisa Esteves RN   Organ(s):  Organ UNOS ID Match Run ID Comment Organ Laterality   Kidney [id number] MNOP       Recent infections?: No    New medications?: Yes (Comment: anti-HTN) Recent pregnancy?: No   Angicoagulation medications?: No Recent vaccinations?: No   Recent blood transfusions?: No Recent hospitalizations?: No   Has your insurance changed in the last 6-12 months?: Neg    Discussed organ offer with: Patient  Discussed risk category with Patient/Other: N/A  Understood donor criteria, verbalized understanding  Patient/Other asked to speak to a surgeon?: No  Discussed program-specific outcomes: Did not have questions regarding SRTR  Right to decline organ offer without penalty, Patient/Other: Aware of option to decline without penalty  Organ offer decision status Patient/Other: Accepted Offer  Organ disposition: Transplanted  Additional Comments: December 3, 2022 6:06 AM  Called patient to discuss KI offer, no answer, LM requesting call back.  Saskia Esteves RN   Transplant Coordinator    12/3/2022 6:22 AM  Kidney: Local, DBD, Back Up to MV  MD: Audra/Twila  OPO Contact: Sarah/Kristine LOPEZ Results: Unable to do per [place]  XM Plan (FXM must be done with serum no older than 10 days from transplant): Will come to the lab for blood draw this AM  Plan (Admission, NPO, Donor OR): Called patient to discuss back up kidney offer.  Health assessment as above.  Discussed donor details including back up status as well as donor is COVID+.  Patient verbalized understanding and acceptance.  Discussed plan for XM, will come to acute care lab at 0800 for XM blood draw.  Orders placed, updated Milka in acute care lab & Lela in immunology.  Donor OR time TBD, no need for NPO yet at this point.  - - -   COVID Screening  In the past month, have you:  Or anyone close  to you had a positive COVID test or suspected to have COVID:  No  Had any COVID symptoms (Fever, Cough, Short of Breath, Loss of Taste/Smell, Rash): No  Saskia Esteves RN   Transplant Coordinator    12/3/2022     11:40 AM  Called to connect with patient and provide contact information. Patient had blood drawn around 0930. Discussed plan to call back as soon as donor OR is set as well as when FXM results back.   Johanna Weber RN    6:01 PM  FXM negative. Patient notified of need for admission tomorrow AM at 0600. Admission instructions provided. Patient will be NPO after midnight tonight.   Johanna Weber RN    Admissions: 1756, [place], bed requested on 7a, donor/patient information provided. ETA 0600 tomorrow AM, 12/4  Unit: 1750, [place] on 7a notified of need for admission tomorrow AM, 12/4 at 0600. Patient/donor information provided.    Immunology: FXM complete, results sent to DR. Zhu/Twila  Inpatient Lab (COVID Testing [phone], Option 2): RN to confirm STAT run  Book OR: 1825, El[place]; OR booked for 1000 on 12/4  Vessel Storage Confirmation (PA/)- OK to store  Blood Bank: [id number]. Donor/patient information provided along with tentative ETA 1000.   Research: ON HOLD  TransNet/ABO Verification: printed label for LEFT kidney, will need to update once final acceptance     Donor OR Time: 12/4, 0800  Procuring MD: Dr. Cheng  Contact in the OR:   Organs Being Procured: HR/LI/KI  Flush Solution: UW  Biopsy: no  Pump: if both kidneys accepted, will plan to pump second kidney until OR time  Special Requests (Special blood tubes, nodes, waivers): none  MD for Visualization: Dr. Mattson  Transportation Details: MD to drive himself  Johanna Weber RN    12/04/22  Update Provider Entering Orders (XM Plan & COVID Testing): 12/4, 0610; paged gen josie Dey chief resident. Confirmed receipt.   Confirm laterality (OR, BB, labels): 1244, LEFT kidney officially accepted, request immediate delivery. LS import  updated.   Add Organ: 1246, organ added  Johanna Weber RN                  Attestation I have discussed all of the above with the Patient/Legal Guardian/Caregiver regarding this organ offer.: Yes  Coordinator/Fellow/Attending name: Lisa Esteves RN

## 2022-12-05 NOTE — PHARMACY-TRANSPLANT NOTE
Adult Kidney Transplant Post Operative Note    52 year old male s/p  donor kidney transplant on 2022 for chronic renal failure due to polycystic kidney disease s/p kidney transplant 10/13/2015 and 10/23/2021 (received thymoglobulin 6mg/kg total dose).      Planned immunosuppression regimen per kidney intermediate transplant protocol:  INDUCTION:   : thymoglobulin 175mg IV and methylprednisolone 500mg IV  : basiliximab 20mg IV, methylprednisolone 250mg IV  : methylprednisolone 100mg IV  : Prednisone 60mg by mouth daily for two days THEN 40mg by mouth daily for two days THEN 20mg by mouth daily for 7 days THEN 15mg by mouth daily for 7 days THEN 10mg by mouth daily for 7 days THEN 5mg by mouth daily for 7 days THEN STOP    MAINTENANCE:  mycophenolate and tacrolimus with goal trough levels of 8-10 mcg/L for first 6 months post-transplant.    Opportunistic pathogen prophylaxis includes: trimethoprim/sulfamethoxazole and valganciclovir.    Patient is not enrolled in medication study.    Pharmacy will monitor for medication interactions and immunosuppression levels in conjunction with the team. Medication therapy needs for discharge planning will continue to be addressed throughout the current admission via multidisciplinary rounds and order review.  Pharmacy will make recommendations as appropriate.    Gracie Cobian, Pharm.D., [facility], B[facility]P  Pager [phone]

## 2022-12-05 NOTE — PROGRESS NOTES
Admitted/transferred from: PACU    Time of arrival on unit:     2 RN partial skin assessment (unable to visualize pt's posterier--back, sacrum/buttocks/thighs d/t nausea/pain) completed by Cori SOTO and Chelo MCKAY    Skin assessment findin) RIJ--patent with CVP monitoring and IV fluids infusing  2) Right nare NG tube--patent with bilious output; to LIS  3) RPIV SL; Left arm AVF, +bruit, +thrill  4) Midline incision covered with operative dressing; drainage marked--no change  5) RJP patent with bloody output  6) Wade--patent with light/pink UOP  7) Dry skin on bilateral feet    Interventions/actions:   --Assess skin/LDAs/wounds q shift/PRN  --Pressure injury prevention education reinforced; pt verbalized understanding.     Will continue to monitor.

## 2022-12-05 NOTE — ANESTHESIA POSTPROCEDURE EVALUATION
Patient: Raj Pierce    Procedure: Procedure(s):  TRANSPLANT, KIDNEY,  DONOR, Recipient right nephrectomy, Appendectomy       Anesthesia Type:  General    Note:  Disposition: Inpatient; Admission   Postop Pain Control: Uneventful            Sign Out: Well controlled pain   PONV: No   Neuro/Psych: Uneventful            Sign Out: Acceptable/Baseline neuro status   Airway/Respiratory: Uneventful            Sign Out: Acceptable/Baseline resp. status   CV/Hemodynamics: Uneventful            Sign Out: Acceptable CV status; No obvious hypovolemia; No obvious fluid overload   Other NRE: NONE   DID A NON-ROUTINE EVENT OCCUR? No           Last vitals:  Vitals Value Taken Time   /77 22   Temp 37.2  C (98.9  F) 22   Pulse 89 22   Resp 14 22   SpO2 93 % 22   Vitals shown include unvalidated device data.    Electronically Signed By: Harman Lewis MD  2022  9:24 PM

## 2022-12-05 NOTE — PROVIDER NOTIFICATION
CODE Sepsis called for lactic of 2.7; MD Watson paged and notified; per MD, give scheduled tylenol and continue to monitor; repeat lactic in 4 hours.

## 2022-12-05 NOTE — OP NOTE
Transplant Surgery  Operative Note     Procedure date:  12/04/22    Preoperative diagnosis:  Chronic renal failure due to Polycystic kidney disease s/p kidney transplant X2. Has limited function in first transplant, second thrombosed and was explanted    Postoperative diagnosis:  Same    Procedure:  1. Left kidney  Re- transplant,  Donation after Brain Death, Right  iliac fossa (midline), without vascular reconstruction. A J-J ureteral stent was placed.  2. Kidney allograft preparation on Back Table  3. Open appendectomy   4. Open RIGHT NATIVE nephrectomy (to make space). PLEASE NOTE: right TRANSPLANT kidney remains in place    Surgeon:  ALYSHA BILLS    Fellow/Assistant:  Georges Mattson MD, fellow. Dr Mattson was the primary assistant for the procedure and participated in all aspects of the case including exposure, nephrectomy, appendectomy, anastomosis  Deidre Hawkins MD- resident: Dr. Hawkins was a secondary assistant for the case and participated in the vascular exposure and the appendectomy       Anesthesia:  General    Specimen:  None    Drains:  Jared-Peacock drain in right nephrectomy bed    Urine output:  100 mls    Estimated blood loss:  200    Fluids administered:       Indication: The patient has Chronic renal failure due to PKD s/p kidney transplant X2 with limited function in one graft and thrombosis/explant of the other and received an organ offer for a Donation after Brain Death kidney allograft. After discussing the risks and benefits of proceeding, the patient agreed to proceed with surgery and provided informed consent.  Findings: Integrity of recipient artery: Normal. Right native nephrectomy performed to make space for kidney transplant. Graft kidney placed in right retrocolic space but lower than native kidney. Artery end to side to proximal right common iliac artery, vein end to side to IVC (shortened). Appendectomy performed due to placement. Ureter managed with Liche  Deepak anterior multistitch anastomosis. URETERAL STENT PLACED. NATHEN placed in right retrocolic space. Omentum mobilized and tucked behind kidney to prevent it from falling into native nephrectomy bed and reduce risk of vascular stretch. Perinephric fat pexied to peritoneum overlying prior right lower quadrant kidney transplant.   Intraoperative Events: Other- hypotensive shortly following incision without clear etiology. Treated with increased fluid and dopamine with some response. Kidney with resultant initial spasm but improved slowly    Final ABO/Crossmatch verification: After the donor organ arrived to the operating room and prior to anastomosis, I participated in the transplant pre-verification upon organ receipt timeout by visually verifying the donor ID, organ and laterality, donor blood type, recipient unique identifier, recipient blood type, and that the donor and recipient are blood type compatible. The crossmatch was done prospectively; the T cell flow crossmatch result was negative and B cell flow crossmatch result was negative prior to anastomosis.  The patient received Thymoglobulin, Cellcept and Solumedrol on induction.    Donor Organ Information:   Donor UNOS ID:  [id number]    Donor arterial clamp on:  12/4/2022 11:49 AM    Total ischemic time:  292 min    Cold ischemic time:  264 min    Warm ischemic time:  28 min    Preservation fluid:  UW      Back Table Details:   Procedure:  Bench preparation of the kidney allograft for transplantation without vascular reconstruction    Surgeon:  ALYSHA BILLS    Faculty Co-Surgeon:  ALYSHA BILLS    Fellow/Assistant: As above    Donor arrival to recipient room:  12/4/2022  1:27 PM    Graft injury:  No    Graft biopsy:  n/a    Organ received on:  Ice    Pump resistance:      Pump flow:      Arterial anatomy:  Single    Donor arterial quality:  Normal    Venous anatomy:  Single    Ureteral anatomy:  Single    Any  reconstruction:  No    Artery:  single, normal    Vein:  single, normal     Complications: None.    Findings: Normal. As above      None.    Back Table Preparation:  The donor kidney was received and inspected. It had been flushed with UW. The graft was prepared on the back table by removing perinephric fat and ligating venous tributaries and lymphatics. The ureter was also cleaned of excess tissue. If required, reconstruction was performed as detailed above. The kidney was stored in iced cold preservation solution until ready for transplantation. Faculty was present for the critical portions of the procedure.    Operative Procedure:   Arterial anastomosis start:  12/4/2022  4:13 PM    Arterial unclamp:  12/4/2022  4:41 PM    Extra vessels used:   n/a      The patient was brought to the operating room, placed in a supine position, and a time out was performed. Sequential compression devices were placed on both lower extremities and general endotracheal anesthesia was induced.  The patient was given IV antibiotics and a Wdae catheter. A central line was placed by Anesthesia service. The abdomen was then shaved, prepped, and draped in the usual sterile fashion.  An incision was made in the midline and carried down through the subcutaneous tissue and the abdominal wall fascia.     Unexpectedly, he became profoundly hypotensive. He was not tachycardic (HR 60-70). This was addressed expediently by our anesthesia colleagues, who increased fluid resuscitation and treated him with calcium and pressor boluses. The calcium and pressor had very limited effect but he did slowly respond to fluid. Ultrasound was performed of the chest and did not demonstrate overt pneumothorax. MARSHA probe demonstrated good cardiac squeeze but less filling of the heart, also suggesting volume depletion. Given these events, we felt it best to be as expedient as possible.     We evaluated the left iliac fossa but, due to the scarring from his prior  transplant, we did not feel we would be able to expediently expose the vessels to place the kidney. We turned attention back to the right, where it would be necessary to perform native nephrectomy to facilitate placement. We mobilized the right colon and hepatic flexure to expose the right kidney. We dissected it free of the retroperitoneal attachments using cautery and ligasure to mobilize the kidney. The ureter was identified and doubly ligated and divided. As we mobilized the hilum I identified two renal arteries and a single renal vein. The arteries were tied and suture ligated and divided, and then this process was repeated for the vein. The kidney was then passed from the field. We did not see the adrenal gland on the kidney specimen. A few of the cysts were ruptured in handling. The nephrectomy bed was copiously irrigated and hemostasis achieved. This was then packed with laps and snow.     We then completed our vascular dissection. This process had exposed the IVC, and this exposure was completed to the level of the right common iliac artery crossing over. The right iliac was fully exposed and mobilized of avascular attachments to facilitate mobility. The kidney was brought up to the field. Due to vessel lengths, we felt it would be wisest to place the kidney above the nephrectomy bed rather than fully down in it, with the artery end to side to the right common iliac artery and the vein end to side to the IVC (shortened).      The patient was heparinized. We applied atraumatic vascular clamps and the donor kidney was brought to the operative field. We made a venotomy and the renal vein was anastomosed to the recipient IVC in an end-to-side fashion. An arteriotomy was made and the donor aortic cuff was anastomosed to the recipient right common iliac artery  in an end to side fashion. The patient was simultaneously loaded with IV mannitol, Lasix and volume. The renal artery was protected and the clamps were  removed. After several cardiac cycles, we opened the renal artery and the kidney had Fair reperfusion and was firm and purple, with dense vasospasm. This improved gradually with time, warming, and distal clamping, until it was soft and pink.    We mobilized the omentum from the top of the colon to create a vascularized flap, and placed this in the nephrectomy bed to prevent the kidney from falling in. We pexied the perinephric fat around the lower pole to the peritoneum overlying the prior transplant. With these adjustments, the vessels had a good straight lie without significant tension.     We released the retractors and positioned the bowel appropriately. We removed the appendix to reduce any confounding appendicitis pain with graft issues. The mesoappendix was taken with ligasure. The appendix was suture ligated and divided, with the stump imbricated within the cecum.     The transplant ureter was managed by creating a Liche (anterior multistitch) anastomosis with absorbable suture. A stent was placed across the anastomosis. The kidney made Yes urine prior to implantation.    Hemostasis was obtained, the anastomoses inspected, and the kidney placed in the iliac fossa. After placement, the vessel lay was inspected and found to be acceptable. The kidney position was Intra-peritoneal. The field was irrigated with antibiotic solution. A drain was placed in the right nephrectomy bed. The retractor was removed and the abdominal wall fascia reapproximated. Subcutaneous tissues were irrigated and hemostasis obtained.  The skin was reapproximated with staples and a dry dressing was applied.   All needle, sponge and instrument counts were correct x 2. The patient was awakened, extubated, and transferred to PACU for post-op monitoring. Faculty was present for key portions of the procedure.

## 2022-12-05 NOTE — PROVIDER NOTIFICATION
Notified temo Watson that latest CVP was 4-5 (pt has normally been 7-10); no change in BP (130/80s) and HR 100s. Last UOP from [facility] was 550ml (last hourly UOPs have been 850ml, then 725ml, then 650ml and now 550ml). Awaiting orders. Will continue to monitor.    ADDENDUM: @0215; per MD, one time dose of albumin to be ordered and administered. Will continue to monitor.     ADDENDUM: @0300 paged temo Watson to update that last UOP was 200ml, last CVP 4, pt axillary temperatures continue to be higher than oral (102 vs 99, see flowsheet); albumin also still infusing per orders. Paged MD to clarify if lasix gtt needs to be restarted (orders to hold if UOP >300ml). Per MD, okay to continue holding off on lasix gtt for now, pt making adequate UOP. Will continue to monitor.    ADDENDUM: @0500 notified MD that pt temps continue to range from 99 (PO) to 102 (axillary); also that lactic recheck was 2.4 (from 2.7) and potassium stable at 4.3; hgb still pending. Also updated that last UOP from [facility] 225ml and last CVP 8. No new orders. Will continue to monitor.     03/13/2019  Patient Information  Latrice Everett                                                                                          [address] [facility] [address]   1936  'PCP/Referring Physician'  Juanjo Razo MD  [phone]  No ref. provider found    Chief Complaint   Patient presents with   • Carotid Artery Disease     1 year follow-up with carotid duplex for carotid stenosis       History of Present Illness:   The patient returns today for follow-up of her carotid stenosis  Since last visit she has overall been doing reasonably well.  She did develop atrial fibrillation.  She is on Xarelto.  She denies any TIA or CVA symptoms.  She denies any dizziness or vertigo.    Patient Active Problem List   Diagnosis   • CAD (coronary artery disease)   • Dyslipidemia   • Carotid artery stenosis   • Carotid stenosis s/p right CEA 8/9/16   • Hypothyroidism on replacement     Past Medical History:   Diagnosis Date   • Arthritis    • Atrial fibrillation (CMS/HCC)    • Carotid artery stenosis    • Dizzy    • Dyslipidemia    • GERD (gastroesophageal reflux disease)    • [place] (hard of hearing)    • Hypertension    • Hypothyroidism    • Irregular heart beat    • Skin cancer     removed from face and hands    • Wears dentures     upper   • Wears glasses      Past Surgical History:   Procedure Laterality Date   • CAROTID ENDARTERECTOMY Right 8/9/2016    Procedure: RIGHT CAROTID ENDARTERECTOMY;  Surgeon: Darwin Chapin MD;  [facility];  Service:    • CAROTID ENDARTERECTOMY Left 04/21/2006    History of Neurological Surgery   • CAROTID ENDARTERECTOMY Right 08/09/2016   • COLONOSCOPY      x4   • SKIN LESION EXCISION  07/06/2016    pre-cancerous lesion removed from face       Current Outpatient Medications:   •  alendronate (FOSAMAX) 35 MG tablet, Take 70 mg by mouth every 7 days. On saturdays , Disp: , Rfl:   •  alendronate (FOSAMAX) 70 MG tablet, Take 1 tablet by mouth Daily., Disp: ,  Rfl:   •  amLODIPine (NORVASC) 5 MG tablet, Take 5 mg by mouth Daily., Disp: , Rfl:   •  atenolol (TENORMIN) 25 MG tablet, Take 1 tablet by mouth 2 (Two) Times a Day., Disp: , Rfl:   •  gemfibrozil (LOPID) 600 MG tablet, Take 600 mg by mouth 2 (two) times a day., Disp: , Rfl:   •  levothyroxine (SYNTHROID, LEVOTHROID) 125 MCG tablet, Take 125 mcg by mouth Daily., Disp: , Rfl:   •  losartan (COZAAR) 100 MG tablet, Take 1 tablet by mouth Daily., Disp: , Rfl:   •  nitroglycerin (NITROSTAT) 0.4 MG SL tablet, Place 0.4 mg under the tongue every 5 (five) minutes as needed for chest pain. Take no more than 3 doses in 15 minutes., Disp: , Rfl:   •  rivaroxaban (XARELTO) 20 MG tablet, Take 20 mg by mouth Daily., Disp: , Rfl:   •  aspirin  MG tablet, Take 162 mg by mouth daily., Disp: , Rfl:   •  clopidogrel (PLAVIX) 75 MG tablet, Take 1 tablet by mouth daily., Disp: 30 tablet, Rfl: 2  •  lovastatin (MEVACOR) 40 MG tablet, Take 40 mg by mouth every night., Disp: , Rfl:   No Known Allergies  Social History     Socioeconomic History   • Marital status:      Spouse name: Not on file   • Number of children: 4   • Years of education: Not on file   • Highest education level: Not on file   Social Needs   • Financial resource strain: Not on file   • Food insecurity - worry: Not on file   • Food insecurity - inability: Not on file   • Transportation needs - medical: Not on file   • Transportation needs - non-medical: Not on file   Occupational History   • Occupation: [employer]     Employer: RETIRED   Tobacco Use   • Smoking status: Never Smoker   • Smokeless tobacco: Never Used   Substance and Sexual Activity   • Alcohol use: No   • Drug use: No   • Sexual activity: Defer   Other Topics Concern   • Not on file   Social History Narrative    Lives in [place], [place] alone     Family History   Problem Relation Age of Onset   • Peripheral vascular disease Mother    • Heart failure Father      Review of Systems  "  Constitution: Negative for chills, fever, malaise/fatigue, night sweats and weight loss.   HENT: Negative for hearing loss, odynophagia and sore throat.    Cardiovascular: Positive for dyspnea on exertion, leg swelling and palpitations. Negative for chest pain and orthopnea.   Respiratory: Positive for shortness of breath. Negative for cough and hemoptysis.    Endocrine: Negative for cold intolerance, heat intolerance, polydipsia, polyphagia and polyuria.   Hematologic/Lymphatic: Does not bruise/bleed easily.   Skin: Negative for itching and rash.   Musculoskeletal: Positive for back pain. Negative for joint pain, joint swelling and myalgias.   Gastrointestinal: Positive for abdominal pain and dysphagia. Negative for constipation, diarrhea, hematemesis, hematochezia, melena, nausea and vomiting.   Genitourinary: Negative for dysuria, frequency and hematuria.   Neurological: Positive for loss of balance and tremors. Negative for focal weakness, headaches, numbness and seizures.   Psychiatric/Behavioral: Negative for suicidal ideas.   All other systems reviewed and are negative.    Vitals:    03/13/19 0823   BP: 157/91   BP Location: Right arm   Patient Position: Sitting   Pulse: 61   SpO2: 98%   Weight: 66.7 kg (147 lb)   Height: 167.6 cm (66\")      Physical Exam   Neck: Normal range of motion. Neck supple. No JVD present. No tracheal deviation present. No thyromegaly present.   Cardiovascular: Normal rate and regular rhythm. Exam reveals no gallop and no friction rub.   No murmur heard.  Pulmonary/Chest: No stridor. No respiratory distress. She has no wheezes. She has no rales. She exhibits no tenderness.   Abdominal: Soft. Bowel sounds are normal. There is no tenderness.   Lymphadenopathy:     She has no cervical adenopathy.     Assessment/Plan:   The patient appears to be doing reasonably well, but she did develop atrial fibrillation and is on Xarelto.  She is seeing cardiology for this.  I have obtained and " reviewed her carotid ultrasound.  It looks satisfactory.  Her incision is well-healed.  We will see her back tentatively in 1 year with a carotid duplex.    Patient Active Problem List   Diagnosis   • CAD (coronary artery disease)   • Dyslipidemia   • Carotid artery stenosis   • Carotid stenosis s/p right CEA 8/9/16   • Hypothyroidism on replacement       CC: MD Hannah Zapata, , editing for Darwin Chapin M.D.    I, Darwin Chapin MD, have read and agree with the editing done by Hannah Erickson, .

## 2022-12-05 NOTE — PLAN OF CARE
"  /82 (BP Location: Right arm)   Pulse 98   Temp 99.5  F (37.5  C) (Oral)   Resp 17   Ht 1.778 m (5' 10\")   Wt 80.6 kg (177 lb 12.8 oz)   SpO2 94%   BMI 25.51 kg/m      [id number]  Raj Pierce admitted to 7A s/p DDKTx with stent placement with Dr. Zhu. Sleepy thru shift, arouseable to voice. A/Ox4, calm, quiet, cooperative thru shift. Pt's wife, Dianne at bedside briefly to greet pt upon arrival from PACU; supportive. Febrile, .7 (team aware, see previous notes, tylenol suspension given with no effect); pt did have one bout of shaking/rigors with high fever (lasted about 10-15 min)--see notes; temo HERNANDEZ aware. BPs 120-140s/50-80s, HR 90s-110s (NSR/STach on tele); CVP readings 4-10 thru shift; one time dose of albumin given for CVP of 4. O2 Sats mid 90s on 4L NC--unable to wean pt off oxygen overnight but pt does have sleep apnea and does use CPAP at home and did not use tonight. Triggered sepsis--lactic level 2.7 and RRT called; recheck lactic 2.4; repeat lactic to be drawn this morning. Pt c/o incisional pain--treated with IV dilaudid with good relief. Midline incision covered with operative dressing--drainage marked, more drainage noted this morning.  Pt refusing all PO meds overnight d/t severe intermittent nausea--treated with IV zofran x2 and IV compazine x1. NG to LIS thru shift and clamped with suspension meds; patent with 100ml of bilious output. RJP patent with 350ml of bloody output. PIV SL. RIJ patent--blue lumen patent with D5LR@100 and replacement fluids; brown lumen transduced for CVP readings; white lumen SL. Pt not OOB yet but likely assist of 1-2.  [facility] patent with light pink UOP (over 2L of UOP this shift, see flowsheet); [facility] cares done; continue replacement protocols. Med card started; lab book updated and at bedside.Continue post op cares & pain and nausea management. Pt dozing thru shift and sleeping now; call light and belongings within reach. Will continue to " monitor.

## 2022-12-05 NOTE — CODE/RAPID RESPONSE
22 0000   Call Information   Date of Call 22   Time of Call 0031   Name of person requesting the team Cori SOTO   Title of person requesting team RN   RRT Arrival time 0036   Time RRT ended 0050   Reason for call   Type of RRT Adult   Primary reason for call Sepsis suspected   Sepsis Suspected Elevated Lactate level;Heart Rate > 100;WBC <4 or >12   Was patient transferred from the ED, ICU, or PACU within last 24 hours prior to RRT call? Yes   SBAR   Situation Lactic Acid 2.7   Background Per provider note: h/o ESRD 2/2 polycystic kidney disease s/p DDKT x2 (10/13/2015, 10/23/2021) with most recent transplant c/b thrombosis now s/p explant now POD#0 s/p  donor kidney transplant, recipient R nephrectomy, and appendectomy.   Notable History/Conditions End-Stage disease;Recent surgery;Transplant   Assessment A&O x4, endorses ppost op pain and nausea. Febrile and slightly tachy in the low 100's   Interventions Labs   Patient Outcome   Patient Outcome Stabilized on unit   RRT Team   Date Attending Physician notified 22   Physician(s) GUY Lorenzana CNP   Lead RN Martir SOTO   Post RRT Intervention Assessment   Post RRT Assessment Stable/Improved   Date Follow Up Done 22   Time Follow Up Done 0430   Comments Repeat lactic acid 2.4. Albumin was given per post kidney transplan protocol.

## 2022-12-05 NOTE — OR NURSING
Dr. Mattson text paged post-op hemoglobin 7.9, NATHEN output 120mL and urine output 650mL in first hour, dopamine gtt off x 30 minutes.

## 2022-12-05 NOTE — CONSULTS
Care Management Initial Consult    General Information  Assessment completed with: Patient, Spouse or significant other,  (Raj and wife)  Type of CM/SW Visit: Initial Assessment (Post Kidney Transplant)    Primary Care Provider verified and updated as needed: Yes   Readmission within the last 30 days: no previous admission in last 30 days      Reason for Consult: other (see comments) (Post Kidney Transplant)  Advance Care Planning: Advance Care Planning Reviewed: no concerns identified        Communication Assessment  Patient's communication style: spoken language (English or Bilingual)        Cognitive  Cognitive/Neuro/Behavioral: WDL  Level of Consciousness: lethargic, alert  Arousal Level: opens eyes spontaneously, arouses to voice  Orientation: oriented x 4  Mood/Behavior: calm, cooperative  Best Language: 0 - No aphasia  Speech: whispers    Living Environment:   People in home: spouse   (Raj and Dianne)  Current living Arrangements: house      Able to return to prior arrangements: yes    Family/Social Support:  Care provided by: self  Provides care for: no one  Marital Status:   Wife, Children, Other (specify) (Friends)   (Dianne)       Description of Support System: Supportive, Involved    Support Assessment: Adequate family and caregiver support, Adequate social supports    Current Resources:   Patient receiving home care services: No     Community Resources: None  Equipment currently used at home: none  Supplies currently used at home: None    Employment/Financial:  Employment Status: employed full-time        Financial Concerns: No concerns identified   Referral to Financial Worker: No    Lifestyle & Psychosocial Needs:  Social Determinants of Health     Tobacco Use: Low Risk      Smoking Tobacco Use: Never     Smokeless Tobacco Use: Never     Passive Exposure: Not on file   Alcohol Use: Not on file   Financial Resource Strain: Not on file   Food Insecurity: Not on file   Transportation Needs: Not on  file   Physical Activity: Not on file   Stress: Not on file   Social Connections: Not on file   Intimate Partner Violence: Not on file   Depression: Not at risk     PHQ-2 Score: 0   Housing Stability: Not on file     Functional Status:  Prior to admission patient needed assistance: Independent with ADL's.    Mental Health Status: Per previous sw assessment, Vera is on a medication for depression. He attributes this to a long journey of health concerns.         Chemical Dependency Status:  No concerns.     Values/Beliefs:  Spiritual, Cultural Beliefs, Roman Catholic Practices, Values that affect care: no          Values/Beliefs Comment:  (Michael)    Patient, Vera, underwent a kidney transplant. Met with Vera and his wife Dianne to update psychosocial assessment and provide brief education about SW role while inpatient, as well as expectations/requirements and follow up needs post-transplant. SW also provided education about need for compliance with transplant medications, and explained ESRD Medicare benefits and medication coverage under Medicare part B.  Medicare 2728 forms completed and signed by patient.    Initial social work evaluation: Sw met with Vera and his wife Dianne at the bedside. Vera presented as fatigued but reports his surgery went well. Dianne reports she will be Vera's primary caregiver post hositalization. They plan to stay locally at the [facility] until the medical team clears him and they may return to their home in [place], [place]. Vera and his wife had few questions as Vera has undergone a kidney transplant in the past.     Patient's post transplant caregivers: Wife Dianne.  If not local, plans for short term stay post transplant:  Plan to stay at the [facility].     Financial concerns/resources provided: No financial concerns noted.      Insurance and Medication:     SOT *LIAISON (VERA) IS A (KIDNEY) TRANSPLANT PATIENT  (DATE OF TRANSPLANT: (DATE: 12/04/2022) )    [facility] HEALTH  BENEFIT: ([employer])- SPOUSE    ID# [id number] GRP# [id number]  (EFFECTIVE (DATE: 1/1/2022) )    PROCESSING INFO: (PayLease/CBTec) ID# [id number] GRP# [id number] BIN#[id number]    SECONDARY HEALTH BENEFIT: MEDICARE    ID# [id number] (PART A EFFECTIVE (DATE: 10/01/2021) , PART B EFFECTIVE (DATE: 10/01/2021) )    PROCESSING INFO: ID# [id number] GRP# OTHER BIN# [id number]    PT WILL PAY $0 AT TIME OF SERVICE   PATIENT DOES NOT HAVE PART D COVERAGE BUT DOES HAVE A COMMERCIAL PLAN THROUGH [employer]    PHARMACY BENEFIT: (PayLease/CBT[employer])- SPOUSE    PROCESSING INFO: ID# [id number] GRP# [id number] PCN# [id number] BIN# [id number] (EFFECTIVE (DATE: 1/1/2022) ).    DEDUCTIBLE (0) & MAX OUT-OF-POCKET (6550)    COPAY STRUCTURE:    $ (4) FOR GENERIC    % (35) FOR BRAND    % (35) FOR NON-FORMULARY MEDICATIONS    IF PATIENT HAS ANY QUESTIONS ON THE COPAY STRUCTURE PLEASE HAVE THEM REACH OUT TO THEIR PLAN  TEST CLAIM SPECIALTY #28    MYCOPHENOLATE 250mg (#240/30DS)= $0 AT TIME OF SERVICE    PROGRAF 1mg (#180/30DS) = FILL TOO SOON UNTIL ON OR AFTER 12/14/2022    TACROLIMUS 1mg (#60/30DS) = FILL TOO SOON UNTIL ON OR AFTER 12/24/2022    CYCLOSPORINE 100mg (#60/30DS) = $0 AT TIME OF SERVICE    VALGANCICLOVIR 450mg (#60/30DS)=$20    VALACYCLOVIR 1gm (#90=30DS) =PLAN LIMITATIONS EXCEEDED    TEST CLAIM DISCHARGE #27 (18 YEARS AND OLDER):    MYCOPHENOLATE 250mg (#240/30DS)= $0 AT TIME OF SERVICE    PROGRAF 1mg (#180/30DS) = FILL TOO SOON UNTIL ON OR AFTER 12/14/2022    TACROLIMUS 1mg (#60/30DS) = FILL TOO SOON UNTIL ON OR AFTER 12/24/2022    CYCLOSPORINE 100mg (#60/30DS) = $0 AT TIME OF SERVICE    VALGANCICLOVIR 450mg (#60/30DS)=$20    VALACYCLOVIR 1gm (#90=30DS) =PLAN LIMITATIONS EXCEEDED    Mental and chemical health services needed: None at this time. Raj is well supported by his wife and family and utilizes medication for his mental health management.     Education provided by DANAE: Social Work role inpatient setting, availability of  support groups, parking information and local lodging options. SW provided discounted medication resources: costplus, needymeds, goodrx.    Assessment and recommendations and plan: Primary transplant SW's will continue to follow for psychosocial support, resources and advocate on behalf of the patient.      LAUREL Romano  SOT/BMT/CF Float

## 2022-12-05 NOTE — PROGRESS NOTES
Handoff information     Type of transplant: DDKT  Date of transplant: 12/4/22  Direct/non-direct/PEP- N/A  Transplant history: DDKT 10/13/2015, still functioning not on HD. 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

## 2022-12-05 NOTE — ANESTHESIA CARE TRANSFER NOTE
Patient: Raj Pierce    Procedure: Procedure(s):  TRANSPLANT, KIDNEY,  DONOR, Recipient right nephrectomy, Appendectomy       Diagnosis: End stage renal disease (H) [N18.6]  Diagnosis Additional Information: No value filed.    Anesthesia Type:   General     Note:    Oropharynx: spontaneously breathing  Level of Consciousness: awake and drowsy  Oxygen Supplementation: face mask    Independent Airway: airway patency satisfactory and stable  Dentition: dentition unchanged  Vital Signs Stable: post-procedure vital signs reviewed and stable  Report to RN Given: handoff report given            Vitals:  Vitals Value Taken Time   /68 22 1900   Temp     Pulse 89 22 1901   Resp 10 22 1901   SpO2 99 % 22 190   Vitals shown include unvalidated device data.    Electronically Signed By: GUY Mckeon CRNA  2022  7:01 PM

## 2022-12-05 NOTE — CONSULTS
[facility]  Transplant Nephrology Consult  Date of Admission:  12/4/2022  Today's Date: 12/05/2022  Requesting physician: George Zhu*    Recommendations:  -ionized calcium tmrw  -1g IV calcium gluconate now  -Stop dapsone, start bactrim MWF      Assessment & Plan   # DDKT: Trend down   - Baseline Creatinine: ~ TBD   - Proteinuria: Not checked post transplant   - Date DSA Last Checked: Dec/2022      Latest DSA: pending   - BK Viremia: Not checked post transplant   - Kidney Tx Biopsy: No    -Double J stent placed at time of kidney transplant. Remove 4-6 weeks post transplant.   -Of note, had CKD V of functional RLQ kidney transplant from 2015 at time of transplant    # PKD:    -S/p R native nephrectomy at time of transplant   -NG per primary team   -If no previous MRA of brain would pursue MRA brain as outpatient    # Immunosuppression: Tacrolimus immediate release (goal 8-10), Mycophenolate mofetil (dose 750 mg every 12 hours) and Prednisone (dose taper)    -Induction: intermediate intensity   - Changes: Not at this time    # Infection Prophylaxis:   - PJP: Dapsone, change to bactrim. Bactrim stopped after 10/2021 transplant due to hyperK, not allergy  - CMV: Valganciclovir (Valcyte). CMV R-. Duration dependent on donor serotype    # Hypertension: Controlled;  Goal BP: < 150/90   - Volume status: Mildly hypervolemic     - Changes: Not at this time    # Elevated Blood Glucose: Glucose generally running ~ 150s   - Management as per primary team.    # Anemia in Chronic Renal Disease: Hgb: Stable, low      NADIYA: No   - Iron studies: Unknown at this time, but checked with dialysis    # Mineral Bone Disorder:   - Secondary renal hyperparathyroidism; PTH level: Unknown at this time, but checked with dialysis        On treatment: None  - Vitamin D; level: Unknown at this time, but checked with dialysis        On supplement: No  - Calcium; level: Low        On  supplement: No, give 1g calcium gluconate  - Phosphorus; level: Normal        On supplement: No    # Electrolytes:   - Potassium; level: Normal        On supplement: No  - Magnesium; level: Low        On supplement: No, give IV mag and if still low tmrw start Mag ox  - Bicarbonate; level: Low normal        On supplement: No    # Hypocalcemia:     -Recommend 1g IV calcium gluconate now   -ionized calcium tmrw AM    # Transplant History:  Etiology of Kidney Failure: Polycystic kidney disease (PKD)  Tx: DDKT  Transplant: 2022 (Kidney), 10/13/2015 (Kidney), 10/23/2021 (Kidney)  Crossmatch at time of Tx: negative  DSA at time of Tx: pending  Significant changes in immunosuppression: None  Significant transplant-related complications: None    Recommendations were communicated to the primary team verbally.    Georges Quach MD  Pager: [phone]    REASON FOR CONSULT   Kidney transplant, immunosuppression, hypocalcemia    History of Present Illness   Raj Pierce is a 52-year-old male with a history of polycystic kidney disease, history of CKD 5 of a  donor kidney transplant from  in the right lower quadrant, clotted  donor kidney transplant from 10/23/2021 that was explanted, now status post  donor kidney transplant with right native nephrectomy, CPRA 67%, intermediate intensity induction, with a stent on 2022 for whom transplant nephrology was consulted for assistance with management.  The patient had a fever overnight with Thymoglobulin with a maximum temperature of 102.7.  He is also CMV recipient negative with a pending donor serology.  The patient's urine output has decreased from 200 cc/h down to 125 cc/h.  The patient overall feels well.  He states that his pain is a 4 out of 10.  He has an NG in place to suction.  He states that he is thirsty.  He denies nausea, vomiting, did have a fever as stated but denied any chills, shortness of breath, chest pain, lower extremity edema.   He does not have a functional dialysis access given that during his 10/2021 transplant his left upper extremity fistula clotted.      Review of Systems    The 10 point Review of Systems is negative other than noted in the HPI or here.     Past Medical History    I have reviewed this patient's medical history and updated it with pertinent information if needed.   Past Medical History:   Diagnosis Date     Acidosis      Chronic kidney disease, stage IV (severe) (H)      Disorders of phosphorus metabolism      History of blood transfusion      HTN (hypertension)     15 years     Hyperparathyroidism      Kidney replaced by transplant 10/23/2021    DCD re-transplant. Induction with thymoglobulin 6mg/kg and steroids.     Kidney replaced by transplant 10/13/2015     Polycystic kidney, autosomal dominant      Pure hypercholesterolemia      Sleep apnea     cpap     Vitamin D deficiency        Past Surgical History   I have reviewed this patient's surgical history and updated it with pertinent information if needed.  Past Surgical History:   Procedure Laterality Date     BENCH KIDNEY N/A 10/13/2015    Procedure: BENCH KIDNEY;  Surgeon: Dariel Chavez MD;  Location: [facility] OR     BRONCHOSCOPY, DILATE BRONCHUS, STENT BRONCHUS, COMBINED  10/27/2021    Procedure: Bronchoscopy, dilate bronchus, stent bronchus, combined;  Surgeon: Madina Warren MD;  Location: [facility] OR     CYSTOSCOPY, REMOVE STENT(S), COMBINED Right 11/9/2015    Procedure: COMBINED CYSTOSCOPY, REMOVE STENT(S);  Surgeon: Dariel Chavez MD;  Location: [facility] OR     HERNIORRHAPHY INGUINAL CHILD      right     LAPAROTOMY EXPLORATORY N/A 10/27/2021    Procedure: Exploratory left lower quadrant of kidney transplant, back table flush, reimplantation of kidney transplant, with ureteral stent removal and placement of a new ureteral stent, intraoperative ultrasound, intraoperative of transit time flow measurment (VeriQ);  Surgeon: Madina Warren MD;   Location: [facility] OR     NEPHRECTOMY Left 2021    Procedure: NEPHRECTOMY, LEFT, Ureteral stent explant;  Surgeon: Eulalio Emanuel MD;  Location: [facility] OR     THROMBECTOMY ABDOMEN N/A 10/27/2021    Procedure: Thrombectomy of iliac and renal veins;  Surgeon: Madina Warren MD;  Location: [facility] OR     TONSILLECTOMY       TRANSPLANT KIDNEY RECIPIENT  DONOR N/A 10/13/2015    Procedure: TRANSPLANT KIDNEY RECIPIENT  DONOR;  Surgeon: Dariel Chavez MD;  Location: [facility] OR     TRANSPLANT KIDNEY RECIPIENT  DONOR N/A 10/23/2021    Procedure: TRANSPLANT, KIDNEY, RECIPIENT,  DONOR, URETERAL STENT PLACEMENT;  Surgeon: Madina Warren MD;  Location: [facility] OR     TRANSPLANT KIDNEY RECIPIENT  DONOR N/A 2022    Procedure: TRANSPLANT, KIDNEY,  DONOR- with ureteral stent placement, Recipient right nephrectomy, Appendectomy;  Surgeon: George Zhu MD;  Location: [facility] OR       Family History   I have reviewed this patient's family history and updated it with pertinent information if needed.   Family History   Problem Relation Age of Onset     Gastrointestinal Disease Mother         ESRD PCKD     Hypertension Mother      C.A.D. Father      Hypertension Father      Gastrointestinal Disease Sister         PCKD       Social History   I have reviewed this patient's social history and updated it with pertinent information if needed. Raj Pierce  reports that he has never smoked. He has never used smokeless tobacco. He reports current alcohol use of about 10.0 standard drinks per week. He reports that he does not use drugs.    Allergies   Allergies   Allergen Reactions     Alcohol Rash     Swabs used at hosptial     Prior to Admission Medications     acetaminophen  975 mg Oral Q8H     atorvastatin  10 mg Oral Daily     [START ON 2022] basiliximab (SIMULECT) infusion  20 mg Intravenous Once     citalopram  20 mg Oral or NG Tube Daily     [Held by  "provider] citalopram  20 mg Oral Daily     [Held by provider] dapsone  50 mg Oral Daily     sennosides  5 mL Oral or NG Tube BID    And     docusate  50 mg Oral or NG Tube BID     famotidine  20 mg Intravenous Q48H     insulin aspart  1-7 Units Subcutaneous TID AC     insulin aspart  1-5 Units Subcutaneous At Bedtime     lidocaine  2 patch Transdermal Q24H     lidocaine   Transdermal Q8H DONITA     [START ON 2022] magnesium oxide  400 mg Oral Daily with lunch     [START ON 2022] methylPREDNISolone  100 mg Intravenous Once     mycophenolate  750 mg Oral BID     polyethylene glycol  17 g Oral Daily     [START ON 2022] predniSONE  60 mg Oral Daily    Followed by     [START ON 2022] predniSONE  40 mg Oral Daily    Followed by     [START ON 2022] predniSONE  20 mg Oral Daily    Followed by     [START ON 2022] predniSONE  15 mg Oral Daily    Followed by     [START ON 2022] predniSONE  10 mg Oral Daily    Followed by     [START ON 2023] predniSONE  5 mg Oral Daily     sodium chloride (PF)  10 mL Intracatheter Q8H     [START ON 2022] sulfamethoxazole-trimethoprim  10 mL Oral or NG Tube Q  AM     tacrolimus  2 mg Oral BID     valGANciclovir  450 mg Oral or NG Tube Once per day on      [Held by provider] valGANciclovir  450 mg Oral Once per day on        lactated ringers 100 mL/hr at 22 0753       Physical Exam   Temp  Av.8  F (37.7  C)  Min: 97.6  F (36.4  C)  Max: 102.7  F (39.3  C)      Pulse  Av.7  Min: 79  Max: 108 Resp  Avg: 15.1  Min: 10  Max: 21  SpO2  Av.3 %  Min: 89 %  Max: 98 %    CVP (mmHg): 8 mmHgBP 109/74 (BP Location: Right arm)   Pulse 89   Temp 98.1  F (36.7  C) (Oral)   Resp 17   Ht 1.778 m (5' 10\")   Wt 84.1 kg (185 lb 6.5 oz)   SpO2 95%   BMI 26.60 kg/m     Date 22 0700 - 22 0659   Shift 6384-9438 4295-2760 4363-3537 24 Hour Total   INTAKE   I.V. 1116.5   1116.5   NG/GT 75   75   Shift Total(mL/kg) " 1191.5(14.77)   1191.5(14.17)   OUTPUT   Urine 620   620   Emesis/NG output 75   75   Drains 140   140   Shift Total(mL/kg) 835(10.35)   835(9.93)   Weight (kg) 80.65 84.1 84.1 84.1      Admit Weight: 80.6 kg (177 lb 12.8 oz)     GENERAL APPEARANCE: alert and no distress  HENT: mouth without ulcers or lesions  LYMPHATICS: no cervical or supraclavicular nodes  RESP: lungs clear to auscultation - no rales, rhonchi or wheezes  CV: regular rhythm, normal rate, no rub, no murmur  EDEMA: trace LE edema bilaterally  ABDOMEN: soft, nondistended, nontender, bowel sounds normal  MS: extremities normal - no gross deformities noted, no evidence of inflammation in joints, no muscle tenderness  SKIN: no rash  NEURO: normal strength and tone, sensory exam grossly normal, mentation intact and speech normal  PSYCH: mentation appears normal and affect normal/bright  TX KIDNEY: normal  DIALYSIS ACCESS: none    Data   CMP  Recent Labs   Lab 12/05/22  1632 12/05/22  1158 12/05/22  1004 12/05/22  0635 12/05/22  0330 12/04/22  2322 12/04/22  1906 12/04/22  0731   NA  --   --   --  142  --   --  142 146*   POTASSIUM  --   --  4.1 4.4  4.4 4.3 4.3 4.1 4.4   CHLORIDE  --   --   --  105  --   --  105 106   CO2  --   --   --  21*  --   --  21* 24   ANIONGAP  --   --   --  16*  --   --  16* 16*   * 130*  --  136*  --   --  237* 102*   BUN  --   --   --  64.8*  --   --  69.7* 81.4*   CR  --   --   --  4.98*  --   --  5.13* 5.99*   GFRESTIMATED  --   --   --  13*  --   --  13* 10*   LILIANA  --   --   --  6.9*  --   --  7.6* 9.1   MAG  --   --   --  1.3*  --   --  1.7  --    PHOS  --   --   --  3.7  --   --  3.6  --    PROTTOTAL  --   --   --   --   --   --   --  5.7*   ALBUMIN  --   --   --   --   --   --   --  3.9   BILITOTAL  --   --   --   --   --   --   --  0.6   ALKPHOS  --   --   --   --   --   --   --  50   AST  --   --   --   --   --   --   --  25   ALT  --   --   --   --   --   --   --  14     CBC  [facility]   Lab 12/05/22  5786  12/05/22  1004 12/05/22  0635 12/05/22  0330 12/04/22  2322 12/04/22  1906 12/04/22  0731   HGB 8.2* 8.1* 8.2*  8.2* 8.0*   < > 7.9* 9.9*   WBC  --   --  7.0  --   --  3.5* 4.2   RBC  --   --  2.86*  --   --  2.83* 3.45*   HCT  --   --  27.9*  --   --  26.6* 32.3*   MCV  --   --  98  --   --  94 94   MCH  --   --  28.7  --   --  27.9 28.7   MCHC  --   --  29.4*  --   --  29.7* 30.7*   RDW  --   --  14.0  --   --  13.9 13.8   PLT  --   --  128*  --   --  138* 189    < > = values in this interval not displayed.     INR  Recent Labs   Lab 12/04/22  0731   INR 1.02   PTT 25     ABGNo lab results found in last 7 days.   Urine Studies  Recent Labs   Lab Test 05/12/22  1220 11/02/21  1121 10/23/21  0526 11/09/15  1300   COLOR Straw Light Yellow Straw Yellow   APPEARANCE Clear Clear Clear Clear   URINEGLC Negative Negative Negative Negative   URINEBILI Negative Negative Negative Negative   URINEKETONE Negative Negative Negative Negative   SG 1.010 1.011 1.009 1.008   UBLD Small* Moderate* Negative Negative   URINEPH 6.0 6.0 7.0 5.5   PROTEIN 100* 70* 50* 10*   NITRITE Negative Negative Negative Negative   LEUKEST Negative Negative Negative Negative   RBCU 0 1 0 <1   WBCU 0 1 1 1     Recent Labs   Lab Test 10/23/21  0526 06/05/17  1253 01/25/16  1602 10/28/15  0702 10/13/15  1035   UTPG 1.37* 1.30* 0.36* 0.45* 0.55*     PTH  Recent Labs   Lab Test 10/27/21  0621 08/01/16  1638 01/25/16  1601 10/15/15  0520   PTHI 149* 90* 121* 434*     Iron Studies  Recent Labs   Lab Test 10/27/21  0621 10/17/15  0455   IRON 19* 13*   * 241   IRONSAT 10* 5*   TERRANCE 597*  --        IMAGING:  All imaging studies reviewed by me.

## 2022-12-05 NOTE — PLAN OF CARE
[id number]:  T Max 101.6, HR , OVSS, weaned to RA, now needing 2L while sleeping. IS being done with encouragement. NPO with NG to LIS, tolerated clamping x3 for meds w/o nausea. Not passing flatus yet. CVP and tele DC'd. MIVF SR@ 100cc/hr. NATHEN with moderate output. Wade with good UOP. Up in recliner chair this afternoon. Mg and calcium replaced per orders. Steroids and Simulect given. Pain controlled with scheduled tylenol and oxy x1. Continue to encourage IS and activity as tolerated.

## 2022-12-05 NOTE — CODE/RAPID RESPONSE
Rapid Response Team Note    Assessment   A rapid response was called on Raj Pierce due to lactic acidosis. Patient evaluated by primary team at bedside.     Plan   -  Management per primary team  - Recheck lactic acid in 4 hours    -  Disposition: The patient will remain on the current unit. We will continue to monitor this patient closely.  -  Reassessment and plan follow-up will be performed by the primary team      GUY Rosado CNP  [facility] [place] RRT [facility] Job Code Contact #[phone]  [facility] Paging/Directory    Hospital Course   Brief Summary of events leading to rapid response:     RRT for lactic acidosis based on predictive model    Temp: 98.4  F (36.9  C) (12/05 0000)  Pulse: 102 (12/05 0000)  Oximeter Heart Rate: 89 bpm (12/04 2100)  Resp: 16 (12/05 0000)  BP: 130/86 (12/05 0000)        Lab Results   Component Value Date     WBC 3.5 12/04/2022     WBC 6.1 12/14/2020          Predictive Model Details               5 (High)   Factor Value     Calculated 12/5/2022 00:25 19% SIRS temperature criterion met     Early Detection of Sepsis Model 16% SIRS pulse criterion met       11% SIRS WBC criterion met       9% Number of incisions 2       8% Age 52       7% Diagnosis of hypertension present       5% Number of active cephalosporin orders 1       5% Number of active electrolyte maintenance orders 2       4% Diagnosis of chronic kidney disease present       4% MCHC low (29.7 g/dL)       2% Number of active analgesic narcotic orders 4       2% RDW normal (13.9 %)       2% Hematocrit low (26.6 %)       1% Number of active analgesic antipyretic orders 2       1% Sex male       1% Number of drains 2       1% Number of active saline orders 7       1% Number of CVC multiple lumen 1       1% Number of peripheral IVs 1       1% Relationship status        0% Platelet count low (138 10e3/uL)       0% Hemoglobin low (8.6 g/dL)       0% Number of active glucocorticoid orders 1          Admission  Diagnosis:   Kidney transplant candidate [Z76.82]    Physical Exam   Temp: 97.6  F (36.4  C) Temp  Min: 97.6  F (36.4  C)  Max: 100.6  F (38.1  C)  Resp: 15 Resp  Min: 10  Max: 17  SpO2: 93 % SpO2  Min: 92 %  Max: 98 %  Pulse: 106 Pulse  Min: 79  Max: 106    No data recorded  BP: 134/84 Systolic (24hrs), Av , Min:101 , Max:140   Diastolic (24hrs), Av, Min:68, Max:93     I/Os: I/O last 3 completed shifts:  In: 7440.1 [I.V.:5580.1; IV Piggyback:860]  Out: 3285 [Urine:2645; Drains:240; Other:200; Blood:200]       Significant Results and Procedures   Lactic Acid:   Recent Labs   Lab Test 22  2356 10/23/21  1647 10/23/21  1500   LACT 2.7* 1.2 0.6     CBC:   Recent Labs   Lab Test 22  2322 22  1906 22  0731 21  0200   WBC  --  3.5* 4.2 11.8*   HGB 8.6* 7.9* 9.9* 7.3*   HCT  --  26.6* 32.3* 23.7*   PLT  --  138* 189 507*        Sepsis Evaluation   The patient is not known to have an infection.  NO EVIDENCE OF SEPSIS at this time.  Vital sign, physical exam, and lab findings are due to post-op and thymoglobulin infusion.

## 2022-12-05 NOTE — PROGRESS NOTES
"CLINICAL NUTRITION SERVICES - ASSESSMENT NOTE     Nutrition Prescription    Malnutrition Status:    Patient does not meet two of the established criteria necessary for diagnosing malnutrition    Recommendations already ordered by Registered Dietitian (RD):  Oral supplements PRN as requested by patient    Future/Additional Recommendations:  Minimize diet restrictions as able d/t high calorie/protein needs post-transplant.  Oral supplements as needed to help meet nutritional needs.     High protein food choices with meals to help meet high needs post-transplant over the next 6-8 weeks.     Heart-healthy diet (low saturated fat, low sodium, high fiber) and food safety precautions long term due to immunosuppression regimen post-transplant         REASON FOR ASSESSMENT  Raj Pierce is a 52 year old male seen by the dietitian for MD Order- Assess & Educate post-op SOT    NUTRITION HISTORY  Patient with hx of transplant 10/2021.  Patient struggled with poor appetite and nausea following this transplant, utilized Ensure Clear supplements.      He notes that recently he has been eating well with a good appetite.  He doesn't routinely follow any diet restrictions.      CURRENT NUTRITION ORDERS  Diet: NPO  Intake/Tolerance: He had severe intermittent nausea overnight.  He notes this is improved this morning.     LABS  Cre improving  K+/Phos stable    MEDICATIONS  Colace BID  Senokot BID  Prednisone taper  Med sliding scale insulin  Cellcept BID  Tacrolimus BID  Miralax  MagOx 400 mg daily     ANTHROPOMETRICS  Height: 177.8 cm (5' 10\")  Most Recent Weight: 80.6 kg (177 lb 12.8 oz)    IBW: 75.5 kg  BMI: Overweight BMI 25-29.9  Weight History: Weight loss of ~20-25# within the first 1-2 months after transplant in 10/2021.  Weight loss was not regained.  Weight fairly stable over the last 6 months.    Wt Readings from Last 15 Encounters:   12/04/22 80.6 kg (177 lb 12.8 oz)   08/18/22 80.6 kg (177 lb 9.6 oz)   06/16/22 81.1 kg " (178 lb 11.2 oz)   05/12/22 78.9 kg (174 lb)   11/16/21 81.7 kg (180 lb 3.2 oz)   11/07/21 90.6 kg (199 lb 11.2 oz)   10/27/21 91.4 kg (201 lb 8 oz)   10/26/21 92.8 kg (204 lb 8 oz)   12/14/20 93.3 kg (205 lb 9.6 oz)   12/18/19 73.6 kg (162 lb 3.2 oz)   10/29/19 92.4 kg (203 lb 11.3 oz)   10/29/19 92.4 kg (203 lb 12.8 oz)   10/28/19 94.3 kg (208 lb)   12/21/18 95.3 kg (210 lb)   11/01/18 93.4 kg (205 lb 12.8 oz)   Dosing Weight: 81 kg (actual wt)    ASSESSED NUTRITION NEEDS:  Estimated Energy Needs: 3626-4661 kcals (30-35 Kcal/Kg)  Justification: increased needs post-op SOT  Estimated Protein Needs: 105-162 grams protein (1.3-2 gm/Kg)  Justification: increased needs post op SOT  Estimated Fluid Needs: per MD pending fluid status and adequate UOP    PHYSICAL FINDINGS  See malnutrition section below.    MALNUTRITION  % Intake: No decreased intake noted  % Weight Loss: Weight loss does not meet criteria  Subcutaneous Fat Loss: None observed  Muscle Loss: None observed  Fluid Accumulation/Edema: None noted  Malnutrition Diagnosis: Patient does not meet two of the established criteria necessary for diagnosing malnutrition but is at risk for malnutrition    NUTRITION DIAGNOSIS:  Food and nutrition-related knowledge deficit r/t length of time since previous post-transplant education AEB patient verbal report, review of chart record, and MD consult for nutrition education.     INTERVENTIONS  Implementation  Nutrition Education:  Provided instruction review on post-transplant diet with discussion regarding protein sources and high protein needs in acute post-tx phase.  Reviewed recommendations to follow low fat/low sodium diet long term and discussed heart healthy diet tips.  Discussed monitoring of K+/Phos lab values with possible need for adjustment of these in the diet as necessary. Reviewed need for food safety precautions to prevent food borne illness.  Pt declined additional handouts, noting he has the ones from last  year at home.     Medical food supplement therapy - discussed option of oral supplements again as needed, patient will order prn    Goals  1. Patient will verbalize understanding of 3 important aspects of post-transplant diet guidelines.   2. PO intake >50% meals TID.    Monitoring/Evaluation  Progress toward goals will be monitored and evaluated per protocol.      Elif Howell, MS, RD, LD, CCTD, CNSC  7A/Obs unit pager [phone]  Weekend pager [phone]

## 2022-12-05 NOTE — OR NURSING
Called into OR to confirm patient okay to transfer to 7A.  Dr. Zhu updated on US complete, hemoglobin 7.9, urine output 750, off dopamine gtt x 1 hour.  MD okay for patient to transfer out of PACU.

## 2022-12-05 NOTE — PROVIDER NOTIFICATION
Notified oncall MD Watson that pt had axillary temp of 100.6 and pt shaking; PO temp 98.4 but had ice about 10min prior. Pt also refusing to take PO meds d/t nausea; just prior to taking pt temp, administered IV Compazine  VSS but HR now in 100s (was in the 90s). MD paged to assess. Will continue to monitor.

## 2022-12-05 NOTE — PROGRESS NOTES
Patient removed from OS waitlist after  donor KIDNEY transplant. OS ID [id number].    Donor Has Risk Criteria for Transmission of HIV/HCV/HBV: NO  Recipient Notified of Risk Criteria: N/a

## 2022-12-05 NOTE — PROGRESS NOTES
"Post Op Check    2022    Raj Pierce is a 52 year old male with h/o ESRD 2/2 polycystic kidney disease s/p DDKT x2 (10/13/2015, 10/23/2021) with most recent transplant c/b thrombosis now s/p explant now POD#0 s/p  donor kidney transplant, recipient R nephrectomy, and appendectomy.    Pt reports feeling fatigued and nauseous. Denies SOB, chest pain, or dizziness. Has not gotten out of bed.     /85 (BP Location: Right arm)   Pulse 91   Temp 99  F (37.2  C) (Oral)   Resp 14   Ht 1.778 m (5' 10\")   Wt 80.6 kg (177 lb 12.8 oz)   SpO2 95%   BMI 25.51 kg/m      Gen: A&O x3, NAD  Chest: breathing non-labored  Abdomen: soft, appropriately tender, non-distended, and RLQ NATHEN drain w/ serosang output  Midline Incision: Primapore dressing with moderate serosang drainage   : spears in place with light pink urine  Extremities: warm and well perfused    A/P: Continue plan of care per primary team. Please call with any questions.    Please page if questions,     Antwon Watson,   General Surgery, PGY-1  [phone]            "

## 2022-12-05 NOTE — PROGRESS NOTES
Paged regarding rapid called on pt for lactate of 2.7. Pt noted to have tachycardia in the low 100s, and denied being in any acute distress apart from feeling tired.    Temp: (!) 102.7  F (39.3  C) (oncall MD aware) Temp src: Axillary BP: 134/84 Pulse: 106   Resp: 15 SpO2: 93 % O2 Device: Nasal cannula Oxygen Delivery: 3 LPM     Pt also noted to be febrile to 102.7 towards the end of the rapid. Asked bedside nurse to give pt Tylenol, and suspect fever 2/2 to thymo as opposed to infectious etiology. Will recheck lactate in four hours

## 2022-12-06 ENCOUNTER — APPOINTMENT (OUTPATIENT)
Dept: ULTRASOUND IMAGING | Facility: CLINIC | Age: 52
End: 2022-12-06
Attending: NURSE PRACTITIONER
Payer: COMMERCIAL

## 2022-12-06 ENCOUNTER — TELEPHONE (OUTPATIENT)
Dept: TRANSPLANT | Facility: CLINIC | Age: 52
End: 2022-12-06

## 2022-12-06 LAB
ANION GAP SERPL CALCULATED.3IONS-SCNC: 13 MMOL/L (ref 7–15)
BASOPHILS # BLD AUTO: 0 10E3/UL (ref 0–0.2)
BASOPHILS NFR BLD AUTO: 0 %
BUN SERPL-MCNC: 72 MG/DL (ref 6–20)
CA-I BLD-MCNC: 4 MG/DL (ref 4.4–5.2)
CALCIUM SERPL-MCNC: 7.5 MG/DL (ref 8.6–10)
CELL TYPE ALLO: NORMAL
CELL TYPE AUTO: NORMAL
CHANNELSHIFTALLOB1: -34
CHANNELSHIFTALLOB2: -15
CHANNELSHIFTALLOT1: -21
CHANNELSHIFTALLOT2: -19
CHANNELSHIFTAUTOB1: -47
CHANNELSHIFTAUTOB2: -39
CHANNELSHIFTAUTOT1: -33
CHANNELSHIFTAUTOT2: -23
CHLORIDE SERPL-SCNC: 111 MMOL/L (ref 98–107)
CREAT SERPL-MCNC: 4.98 MG/DL (ref 0.67–1.17)
CROSSMATCHDATEALLO: NORMAL
CROSSMATCHDATEAUTO: NORMAL
DEPRECATED HCO3 PLAS-SCNC: 23 MMOL/L (ref 22–29)
DONOR ALLO: NORMAL
DONOR AUTO: NORMAL
DONORCELLDATE ALLO: NORMAL
DONORCELLDATE AUTO: NORMAL
EOSINOPHIL # BLD AUTO: 0 10E3/UL (ref 0–0.7)
EOSINOPHIL NFR BLD AUTO: 0 %
ERYTHROCYTE [DISTWIDTH] IN BLOOD BY AUTOMATED COUNT: 14.3 % (ref 10–15)
GFR SERPL CREATININE-BSD FRML MDRD: 13 ML/MIN/1.73M2
GLUCOSE SERPL-MCNC: 126 MG/DL (ref 70–99)
HBV DNA SERPL QL NAA+PROBE: NORMAL
HCT VFR BLD AUTO: 25 % (ref 40–53)
HCV RNA SERPL QL NAA+PROBE: NORMAL
HGB BLD-MCNC: 7.3 G/DL (ref 13.3–17.7)
HIV1+2 RNA SERPL QL NAA+PROBE: NORMAL
IMM GRANULOCYTES # BLD: 0.1 10E3/UL
IMM GRANULOCYTES NFR BLD: 1 %
LYMPHOCYTES # BLD AUTO: 0.1 10E3/UL (ref 0.8–5.3)
LYMPHOCYTES NFR BLD AUTO: 1 %
MAGNESIUM SERPL-MCNC: 2.2 MG/DL (ref 1.7–2.3)
MCH RBC QN AUTO: 28.2 PG (ref 26.5–33)
MCHC RBC AUTO-ENTMCNC: 29.2 G/DL (ref 31.5–36.5)
MCV RBC AUTO: 97 FL (ref 78–100)
MONOCYTES # BLD AUTO: 0.3 10E3/UL (ref 0–1.3)
MONOCYTES NFR BLD AUTO: 3 %
NEUTROPHILS # BLD AUTO: 9.7 10E3/UL (ref 1.6–8.3)
NEUTROPHILS NFR BLD AUTO: 95 %
NRBC # BLD AUTO: 0 10E3/UL
NRBC BLD AUTO-RTO: 0 /100
PHOSPHATE SERPL-MCNC: 6.6 MG/DL (ref 2.5–4.5)
PLATELET # BLD AUTO: 112 10E3/UL (ref 150–450)
POS CUT OFF ALLO B: >93
POS CUT OFF ALLO T: >79
POS CUT OFF AUTO B: >93
POS CUT OFF AUTO T: >79
POTASSIUM SERPL-SCNC: 4.8 MMOL/L (ref 3.4–5.3)
RBC # BLD AUTO: 2.59 10E6/UL (ref 4.4–5.9)
RESULT ALLO B1: NORMAL
RESULT ALLO B2: NORMAL
RESULT ALLO T1: NORMAL
RESULT ALLO T2: NORMAL
RESULT AUTO B1: NORMAL
RESULT AUTO B2: NORMAL
RESULT AUTO T1: NORMAL
RESULT AUTO T2: NORMAL
SERUM DATE ALLO B1: NORMAL
SERUM DATE ALLO B2: NORMAL
SERUM DATE ALLO T1: NORMAL
SERUM DATE ALLO T2: NORMAL
SERUM DATE AUTO B1: NORMAL
SERUM DATE AUTO B2: NORMAL
SERUM DATE AUTO T1: NORMAL
SERUM DATE AUTO T2: NORMAL
SODIUM SERPL-SCNC: 147 MMOL/L (ref 136–145)
TESTMETHODALLO: NORMAL
TESTMETHODAUTO: NORMAL
TREATMENT ALLO B1: NORMAL
TREATMENT ALLO B2: NORMAL
TREATMENT ALLO T1: NORMAL
TREATMENT ALLO T2: NORMAL
TREATMENT AUTO B1: NORMAL
TREATMENT AUTO B2: NORMAL
TREATMENT AUTO T1: NORMAL
TREATMENT AUTO T2: NORMAL
WBC # BLD AUTO: 10.2 10E3/UL (ref 4–11)
ZZZCOMMENT ALLOB2: NORMAL

## 2022-12-06 PROCEDURE — 250N000013 HC RX MED GY IP 250 OP 250 PS 637: Performed by: SURGERY

## 2022-12-06 PROCEDURE — 76776 US EXAM K TRANSPL W/DOPPLER: CPT

## 2022-12-06 PROCEDURE — 99233 SBSQ HOSP IP/OBS HIGH 50: CPT | Mod: 24 | Performed by: INTERNAL MEDICINE

## 2022-12-06 PROCEDURE — 80048 BASIC METABOLIC PNL TOTAL CA: CPT | Performed by: SURGERY

## 2022-12-06 PROCEDURE — 258N000003 HC RX IP 258 OP 636: Performed by: PHYSICIAN ASSISTANT

## 2022-12-06 PROCEDURE — 258N000003 HC RX IP 258 OP 636: Performed by: NURSE PRACTITIONER

## 2022-12-06 PROCEDURE — 76776 US EXAM K TRANSPL W/DOPPLER: CPT | Mod: 26 | Performed by: RADIOLOGY

## 2022-12-06 PROCEDURE — 99232 SBSQ HOSP IP/OBS MODERATE 35: CPT | Mod: 24 | Performed by: SURGERY

## 2022-12-06 PROCEDURE — 83735 ASSAY OF MAGNESIUM: CPT | Performed by: SURGERY

## 2022-12-06 PROCEDURE — 250N000012 HC RX MED GY IP 250 OP 636 PS 637: Performed by: NURSE PRACTITIONER

## 2022-12-06 PROCEDURE — 250N000013 HC RX MED GY IP 250 OP 250 PS 637: Performed by: NURSE PRACTITIONER

## 2022-12-06 PROCEDURE — 82330 ASSAY OF CALCIUM: CPT

## 2022-12-06 PROCEDURE — 250N000011 HC RX IP 250 OP 636: Performed by: NURSE PRACTITIONER

## 2022-12-06 PROCEDURE — 250N000011 HC RX IP 250 OP 636

## 2022-12-06 PROCEDURE — 84100 ASSAY OF PHOSPHORUS: CPT | Performed by: SURGERY

## 2022-12-06 PROCEDURE — 36592 COLLECT BLOOD FROM PICC: CPT

## 2022-12-06 PROCEDURE — 85025 COMPLETE CBC W/AUTO DIFF WBC: CPT | Performed by: SURGERY

## 2022-12-06 PROCEDURE — 120N000011 HC R&B TRANSPLANT UMMC

## 2022-12-06 RX ORDER — OXYCODONE HYDROCHLORIDE 5 MG/1
5 TABLET ORAL EVERY 4 HOURS PRN
Status: DISCONTINUED | OUTPATIENT
Start: 2022-12-06 | End: 2022-12-08 | Stop reason: HOSPADM

## 2022-12-06 RX ORDER — FAMOTIDINE 10 MG
10 TABLET ORAL DAILY
Status: DISCONTINUED | OUTPATIENT
Start: 2022-12-06 | End: 2022-12-08 | Stop reason: HOSPADM

## 2022-12-06 RX ORDER — LABETALOL HYDROCHLORIDE 5 MG/ML
10 INJECTION, SOLUTION INTRAVENOUS ONCE
Status: COMPLETED | OUTPATIENT
Start: 2022-12-06 | End: 2022-12-06

## 2022-12-06 RX ORDER — CALCIUM ACETATE 667 MG/1
667 CAPSULE ORAL
Status: DISCONTINUED | OUTPATIENT
Start: 2022-12-06 | End: 2022-12-07

## 2022-12-06 RX ORDER — TACROLIMUS 1 MG/1
2 CAPSULE ORAL
Status: DISCONTINUED | OUTPATIENT
Start: 2022-12-06 | End: 2022-12-07

## 2022-12-06 RX ORDER — SULFAMETHOXAZOLE AND TRIMETHOPRIM 400; 80 MG/1; MG/1
1 TABLET ORAL
Status: DISCONTINUED | OUTPATIENT
Start: 2022-12-07 | End: 2022-12-08

## 2022-12-06 RX ORDER — SENNOSIDES 8.6 MG
1 TABLET ORAL 2 TIMES DAILY
Status: DISCONTINUED | OUTPATIENT
Start: 2022-12-06 | End: 2022-12-08 | Stop reason: HOSPADM

## 2022-12-06 RX ORDER — ACETAMINOPHEN 325 MG/1
975 TABLET ORAL EVERY 8 HOURS
Status: DISCONTINUED | OUTPATIENT
Start: 2022-12-06 | End: 2022-12-08 | Stop reason: HOSPADM

## 2022-12-06 RX ORDER — MYCOPHENOLATE MOFETIL 250 MG/1
750 CAPSULE ORAL
Status: DISCONTINUED | OUTPATIENT
Start: 2022-12-06 | End: 2022-12-08 | Stop reason: HOSPADM

## 2022-12-06 RX ADMIN — METHYLPREDNISOLONE SODIUM SUCCINATE 100 MG: 125 INJECTION, POWDER, FOR SOLUTION INTRAMUSCULAR; INTRAVENOUS at 08:20

## 2022-12-06 RX ADMIN — SODIUM CHLORIDE, POTASSIUM CHLORIDE, SODIUM LACTATE AND CALCIUM CHLORIDE: 600; 310; 30; 20 INJECTION, SOLUTION INTRAVENOUS at 22:45

## 2022-12-06 RX ADMIN — CITALOPRAM HYDROBROMIDE 20 MG: 10 TABLET ORAL at 08:19

## 2022-12-06 RX ADMIN — OXYCODONE HYDROCHLORIDE 5 MG: 5 SOLUTION ORAL at 05:47

## 2022-12-06 RX ADMIN — CALCIUM ACETATE 667 MG: 667 CAPSULE ORAL at 18:08

## 2022-12-06 RX ADMIN — ACETAMINOPHEN 975 MG: 325 TABLET, FILM COATED ORAL at 23:55

## 2022-12-06 RX ADMIN — SODIUM CHLORIDE, POTASSIUM CHLORIDE, SODIUM LACTATE AND CALCIUM CHLORIDE: 600; 310; 30; 20 INJECTION, SOLUTION INTRAVENOUS at 03:24

## 2022-12-06 RX ADMIN — SODIUM CHLORIDE, POTASSIUM CHLORIDE, SODIUM LACTATE AND CALCIUM CHLORIDE: 600; 310; 30; 20 INJECTION, SOLUTION INTRAVENOUS at 13:10

## 2022-12-06 RX ADMIN — ACETAMINOPHEN 975 MG: 325 SOLUTION ORAL at 00:47

## 2022-12-06 RX ADMIN — ACETAMINOPHEN 975 MG: 325 TABLET, FILM COATED ORAL at 16:10

## 2022-12-06 RX ADMIN — SENNOSIDES 1 TABLET: 8.6 TABLET, FILM COATED ORAL at 20:26

## 2022-12-06 RX ADMIN — ATORVASTATIN CALCIUM 10 MG: 10 TABLET, FILM COATED ORAL at 08:10

## 2022-12-06 RX ADMIN — MYCOPHENOLATE MOFETIL 750 MG: 250 CAPSULE ORAL at 08:10

## 2022-12-06 RX ADMIN — LABETALOL HYDROCHLORIDE 10 MG: 5 INJECTION, SOLUTION INTRAVENOUS at 20:27

## 2022-12-06 RX ADMIN — SENNOSIDES 1 TABLET: 8.6 TABLET, FILM COATED ORAL at 08:11

## 2022-12-06 RX ADMIN — TACROLIMUS 2 MG: 1 CAPSULE ORAL at 08:10

## 2022-12-06 RX ADMIN — TACROLIMUS 2 MG: 1 CAPSULE ORAL at 18:08

## 2022-12-06 RX ADMIN — MYCOPHENOLATE MOFETIL 750 MG: 250 CAPSULE ORAL at 18:08

## 2022-12-06 RX ADMIN — SODIUM CHLORIDE 1000 ML: 9 INJECTION, SOLUTION INTRAVENOUS at 18:15

## 2022-12-06 RX ADMIN — LIDOCAINE PATCH 4% 2 PATCH: 40 PATCH TOPICAL at 12:02

## 2022-12-06 RX ADMIN — OXYCODONE HYDROCHLORIDE 5 MG: 5 TABLET ORAL at 11:09

## 2022-12-06 RX ADMIN — ACETAMINOPHEN 975 MG: 325 TABLET, FILM COATED ORAL at 08:10

## 2022-12-06 RX ADMIN — FAMOTIDINE 10 MG: 10 TABLET, FILM COATED ORAL at 09:29

## 2022-12-06 RX ADMIN — POLYETHYLENE GLYCOL 3350 17 G: 17 POWDER, FOR SOLUTION ORAL at 08:11

## 2022-12-06 RX ADMIN — MAGNESIUM OXIDE TAB 400 MG (241.3 MG ELEMENTAL MG) 400 MG: 400 (241.3 MG) TAB at 12:03

## 2022-12-06 ASSESSMENT — ACTIVITIES OF DAILY LIVING (ADL)
ADLS_ACUITY_SCORE: 42
ADLS_ACUITY_SCORE: 41
ADLS_ACUITY_SCORE: 42
ADLS_ACUITY_SCORE: 41
ADLS_ACUITY_SCORE: 42
ADLS_ACUITY_SCORE: 41
ADLS_ACUITY_SCORE: 42

## 2022-12-06 NOTE — PLAN OF CARE
"Vitals: BP (!) 142/97 (BP Location: Right arm)   Pulse 69   Temp 98.4  F (36.9  C) (Oral)   Resp 18   Ht 1.778 m (5' 10\")   Wt 84.1 kg (185 lb 6.5 oz)   SpO2 98%   BMI 26.60 kg/m      Endocrine: n/a  Labs: Creatinine 4.98 unchanged, HGB 7.3.  Pain: Mild abdominal incisional pain.  PRN's: Oxycodone 5 mg.  Diet: NPO this morning, advanced to clear liquids this afternoon.   LDA: RTL internal jugular LR at 100cc/hr, PIV, right arm graft with weak bruit. NATHEN.  GI: Passing gas, no BM.  : Wade in place, good urine output (made urine pre tx).  Skin: Abdominal incision with surgical dressing on.NATHEN leaking at the site, scant out of drain, new orders to strip Q8 hours.   Neuro: Alert and oriented.  Mobility: Minimal stand by assist.  Education: Medication and lab book updated, previous transplant.  Plan: Renal ultrasound completed.'                        "

## 2022-12-06 NOTE — PROGRESS NOTES
"Shift: 8732-3523  BP (!) 142/97 (BP Location: Right arm)   Pulse 69   Temp 98.4  F (36.9  C) (Oral)   Resp 18   Ht 1.778 m (5' 10\")   Wt 84.1 kg (185 lb 6.5 oz)   SpO2 98%   BMI 26.60 kg/m       VSS on 2L oxygen overnight on, pt has sleep apnea and declined CPAP .  BG- ACHS 140  Pain/Nausea/PRN'S- Oxy given once for pain. Scheduled tylenol given. Denies nausea.   Diet- NPO with ice chips  LDA- NATHEN 65 ml output. Right PIV, Right internal jugular. Left AV fistula.  Gtt/IVF- LR @ 100 ml/hr  GI/-spears output  550 ml, no BM passing gas.  Skin- Abdominal incision   activty- 1 Assist, not OOB overnight.  Cont. With plan of care.      "

## 2022-12-06 NOTE — TELEPHONE ENCOUNTER
-- DO NOT REPLY / DO NOT REPLY ALL --  -- Message is from the [facility]--    COVID-19 Universal Screening: Positive    General Patient Message      Reason for Call: Patient tested positive for COVID-19. Patient took test because   tested positive. Patient would like to know next steps on what to do since she is positive of the virus. Patient currently has no symptoms.  been home since 4/9/2020; patient took the test 4/6/2020.  Please call and advise.     Caller Information       Type Contact Phone    04/12/2020 07:50 PM Phone (Incoming) Kenya Buchanan (Self) [phone] (M)          Alternative phone number: n/a    Turnaround time given to caller:   \"This message will be sent to [state Provider's name]. The clinical team will fulfill your request as soon as they review your message when the office opens tomorrow.\"     A pharmacist spent 15 minutes providing medication teaching with Raj Pierce for discharge with a focus on new medications/dose changes.  The discharge medication list was reviewed with the patient/family and the following points were discussed, as applicable: Name, description, purpose, dose/strength and duration of medications.  The patient will be responsible for managing medications. Additionally, the following transplant related education was covered: Purpose of medication card, Timing of medications and day of lab draw considerations  and Discharge process for receiving meds   Patient will  transplant supplies including 7 day pill organizer, thermometer, and BP monitor at the discharge pharmacy along with medications.  Patient will use local pharmacy or other mail order for outpatient medications. [facility] in [place]  Clinical Pharmacy Consult:                                                      Transplant Specific:   Date of Transplant: 12/04/2022  Type of Transplant: kidney  First Transplant: no  History of rejection: no    Immunosuppression Regimen   TAC 2 mg qAM & 2mg qPM and MMF  750 mg qAM & 750 mg qPM; Pred taper  Patient specific goal: Tac 8 to 10  Most recent level: not drawn yet, date 12/06/2022  Immunosuppressant Levels:   Pt adherent to lab draws: yes  Scr:   Lab Results   Component Value Date    CR 4.98 12/06/2022    CR 3.13 12/14/2020     Side effects: no side effects    Prophylactic Medications  Antibacterial:  Bactrim 400/80 mg three times weekly  Scheduled Discontinue Date: Lifelong    Antifungal: Not needed thus far  Scheduled Discontinue Date: N/A    Antiviral: CrCl 10 to 24 mL/minute: Valcyte 450 mg twice weekly   Scheduled Discontinue Date: 6 months     Acid Reducer: Pepcid (famotidine) 10 mg daily  Scheduled Reviewed Date: review in clinic - stefano bolton upon discharge     Thrombosis Prevention:   Scheduled Discontinue Date:     Blood Pressure Management  Frequency of home  Blood Pressure checks: once daily  Most recent home BP: 144/79  Patient Blood pressure goal: <150/90  Patient blood pressure at goal:  yes  Hospitalizations/ER visits since last assessment: 0      Med rec/DUR performed: yes  Med Rec Discrepancies: no    Reminders:    1. Bring to first clinic appt: med box, med card, bp monitor, all medications being taken, and lab book.  2.   MTM pharmacist visit on first clinic appt and if ok, again in 3 to 4 months during follow up appt.  3.   Avoid Grapefruit and Grapefruit juice.   4.   Avoid herbal supplements. If wish to take other medications or supplements, call your coordinator.   5.   Keep lab appts.   6.   Can use apps on phone like [employer] to help manage medication lists and reminders.   7.   Make sure you are protecting your skin by wearing long sleeves and applying sunscreen to exposed skin, for any significant time in the sun.     Transplant Coordinator is Elif Brito, Pharm.D.  [facility]  [phone]

## 2022-12-06 NOTE — PROGRESS NOTES
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 S/p  donor kidney re-transplant with ureteral stent, right native nephrectomy, and appendectomy on 22.    Graft function: POD #2  Kidney: Cr 5, stable, good UOP.  US: patent vessels.  US: patent vessels.    Immunosuppression management: PRA 67%  Thymo: 175 rodrigo-op, complicated by fever and rigors.  Basiliximab:  &   Steroids:  Taper per protocol.  MMF: 750mg BID  Tacro: Goal level 8-10  Complexity of management: High, induction    Hematology:   Anemia of chronic disease: Hgb 7.7->7.3, monitor. No sign of bleeding.  Thrombocytopenia: Plt 112. Secondary to medications.    Cardiorespiratory:   Hypoxia, hx SILVIA: IS, C&DB, wean O2. Declined to wear CPAP last night.    GI/Nutrition:   Diet: NG removed last evening. ADAT.  Bowel regimen: Senna, PEG    Endocrine:   Steroid induced hyperglycemia: A1c 4.2%. -153. Stop sliding scale insulin.    Fluid/Electrolytes: MIVF: Straight rate until taking in adequate PO.  Hypomagnesemia: Continue MagOx.  Hypocalcemia: iCa 4. Replaced on .   Hyperphosphatemia: Add calcium acetate.    : Wade to remain due to new surgical anastomosis x3 days.    Infectious disease: Afebrile    Prophylaxis: DVT, fall, GI, viral (Valcyte), pneumocystis (Bactrim)    Disposition: 7A    Medical Decision Making: Medium  Subsequent visit 06841 (moderate level decision making)    JATINDER/Fellow/Resident Provider: Radha Amezcua NP [id number]    Faculty: George Zhu MD     Attestation: I saw and examined the patient with Radha Amezcua NP, and the transplant team. I independently reviewed all pertinent laboratory and imaging information and made management decisions including immunosuppression management. I agree with the findings and plan  "as documented in the note.  George Zhu MD  _________________________________________________________________  Transplant History:   12/4/2022 (Kidney), 10/13/2015 (Kidney), 10/23/2021 (Kidney), Postoperative day: 2611 (Kidney), 2 (Kidney)     Interval History: History is obtained from the patient  Overnight events: NG out, + flatus. Pain controlled. + cough.    ROS:   A 10-point review of systems was negative except as noted above.    Meds:    acetaminophen  975 mg Oral Q8H     atorvastatin  10 mg Oral Daily     [START ON 12/9/2022] basiliximab (SIMULECT) infusion  20 mg Intravenous Once     calcium acetate (phos binder)  667 mg Oral TID w/meals     citalopram  20 mg Oral Daily     famotidine  10 mg Oral Daily     lidocaine  2 patch Transdermal Q24H     lidocaine   Transdermal Q8H DONITA     magnesium oxide  400 mg Oral Daily with lunch     mycophenolate  750 mg Oral BID IS     polyethylene glycol  17 g Oral Daily     [START ON 12/7/2022] predniSONE  60 mg Oral Daily    Followed by     [START ON 12/9/2022] predniSONE  40 mg Oral Daily    Followed by     [START ON 12/11/2022] predniSONE  20 mg Oral Daily    Followed by     [START ON 12/18/2022] predniSONE  15 mg Oral Daily    Followed by     [START ON 12/25/2022] predniSONE  10 mg Oral Daily    Followed by     [START ON 1/1/2023] predniSONE  5 mg Oral Daily     sennosides  1 tablet Oral BID     sodium chloride (PF)  10 mL Intracatheter Q8H     [START ON 12/7/2022] sulfamethoxazole-trimethoprim  1 tablet Oral Once per day on Mon Wed Fri     tacrolimus  2 mg Oral BID IS     valGANciclovir  450 mg Oral Once per day on Mon Thu       Physical Exam:     Admit Weight: 80.6 kg (177 lb 12.8 oz)    Current vitals:   BP (!) 142/97 (BP Location: Right arm)   Pulse 69   Temp 98.4  F (36.9  C) (Oral)   Resp 18   Ht 1.778 m (5' 10\")   Wt 84.1 kg (185 lb 6.5 oz)   SpO2 98%   BMI 26.60 kg/m      Vital sign ranges:    Temp:  [98  F (36.7  C)-101.6  F (38.7  C)] 98.4  F " (36.9  C)  Pulse:  [] 69  Resp:  [14-21] 18  BP: (109-142)/(70-97) 142/97  SpO2:  [89 %-98 %] 98 %    General Appearance: in no apparent distress.   Skin: Warm, perfused  Heart: perfused  Lungs: Unlabored on RA  Abdomen: The abdomen is Soft, incision CDI. NATHEN serosang, leaking.  : spears is present.  Urine is yellow.  Extremities: edema: none, strength 5/5  Neurologic: A&Ox4    Data:   CMP  Recent Labs   Lab 12/06/22  0809 12/05/22  2146 12/05/22  1745 12/05/22  1729 12/05/22  1632 12/05/22  1508 12/05/22  1004 12/05/22  0635 12/04/22  2322 12/04/22  1906 12/04/22  0731   NA  --   --   --   --   --   --   --  142  --  142 146*   POTASSIUM  --   --   --  4.4  --  4.5   < > 4.4  4.4   < > 4.1 4.4   CHLORIDE  --   --   --   --   --   --   --  105  --  105 106   CO2  --   --   --   --   --   --   --  21*  --  21* 24   GLC  --  140* 153*  --    < >  --    < > 136*  --  237* 102*   BUN  --   --   --   --   --   --   --  64.8*  --  69.7* 81.4*   CR  --   --   --   --   --   --   --  4.98*  --  5.13* 5.99*   GFRESTIMATED  --   --   --   --   --   --   --  13*  --  13* 10*   LILIANA  --   --   --   --   --   --   --  6.9*  --  7.6* 9.1   ICAW 4.0*  --   --   --   --   --   --   --   --   --   --    MAG  --   --   --   --   --   --   --  1.3*  --  1.7  --    PHOS  --   --   --   --   --   --   --  3.7  --  3.6  --    ALBUMIN  --   --   --   --   --   --   --   --   --   --  3.9   BILITOTAL  --   --   --   --   --   --   --   --   --   --  0.6   ALKPHOS  --   --   --   --   --   --   --   --   --   --  50   AST  --   --   --   --   --   --   --   --   --   --  25   ALT  --   --   --   --   --   --   --   --   --   --  14    < > = values in this interval not displayed.     CBC  Recent Labs   Lab 12/06/22  0809 12/05/22  1729 12/05/22  1004 12/05/22  0635 12/04/22  1906 12/04/22  0731   HGB 7.3* 7.7*   < > 8.2*  8.2*   < > 9.9*   WBC 10.2  --   --  7.0   < > 4.2   *  --   --  128*   < > 189   A1C  --   --   --   --    --  4.2    < > = values in this interval not displayed.

## 2022-12-06 NOTE — PROGRESS NOTES
"/78 (BP Location: Right arm)   Pulse 78   Temp 98.4  F (36.9  C) (Oral)   Resp 18   Ht 1.778 m (5' 10\")   Wt 84.1 kg (185 lb 6.5 oz)   SpO2 93%   BMI 26.60 kg/m       Patient alert and oriented. Vital signs stable. On room air. NPO with ice chips. Lactated Ringers running at 100 mL/hr. Right peripheral IV saline locked. NJ tube removed this evening by provider. Potassium 4.4, hemoglobin 7.7 (down from 8.2). Total urine output 500 mL this shift. Blood sugars under 200. PRN Oxycodone 5 mg given at 2136 for incisional pain 6/10. Denies nausea.   "

## 2022-12-06 NOTE — PROGRESS NOTES
[facility]  Transplant Nephrology Follow Up  Date of Admission:  12/4/2022  Today's Date: 12/06/2022  Requesting physician: George Zhu*    Recommendations:  -Start calcium acetate 1 tab with meals for hypocalcemia and hyperphos    Assessment & Plan   # DDKT: Increased and stable. Unclear amount of function from newly transplanted kidney   - Baseline Creatinine: ~ TBD   - Proteinuria: Not checked post transplant   - Date DSA Last Checked: Dec/2022      Latest DSA: No DSA at time of transplant   - BK Viremia: Not checked post transplant   - Kidney Tx Biopsy: No    -Double J stent placed at time of kidney transplant. Remove 4-6 weeks post transplant.   -Of note, had CKD V of functional RLQ kidney transplant from 2015 at time of transplant   -3d spears per transplant surgery    # PKD:    -S/p R native nephrectomy at time of transplant   -If no previous MRA of brain would pursue MRA brain as outpatient    # Immunosuppression: Tacrolimus immediate release (goal 8-10), Mycophenolate mofetil (dose 750 mg every 12 hours) and Prednisone (dose taper)    -Induction: intermediate intensity   - Changes: Not at this time    # Infection Prophylaxis:   - PJP: Sulfa/TMP (Bactrim) .Bactrim was stopped after 10/2021 transplant due to hyperK, not allergy  - CMV: Valganciclovir (Valcyte). Patient is CMV IgG Ab discordant (D+/R-) and will continue on Valcyte x 6 months, then check CMV PCR monthly until 12 months post transplant.    # Hypertension: Controlled;  Goal BP: < 150/90   - Volume status: Mildly hypervolemic     - Changes: Not at this time    # Elevated Blood Glucose: Glucose generally running ~ 150s   - Management as per primary team.    # Anemia in Chronic Renal Disease: Hgb: Trend down      NADIYA: No   - Iron studies: Unknown at this time, but checked with dialysis    # Mineral Bone Disorder:   - Secondary renal hyperparathyroidism; PTH level: Unknown at this time, but  checked with dialysis        On treatment: None  - Vitamin D; level: Unknown at this time, but checked with dialysis        On supplement: No  - Calcium; level: Low        On supplement: No, start calcium acetate 1 tab with meals  - Phosphorus; level: High        On binder: No, start calcium acetate 1 tab with meals    # Electrolytes:   - Potassium; level: High normal        On supplement: No  - Magnesium; level: Normal        On supplement: Yes, mag ox 400mg daily    - Bicarbonate; level: Normal        On supplement: No    # Hypocalcemia:     -Start calcium acetate 1 tab with meals    # Transplant History:  Etiology of Kidney Failure: Polycystic kidney disease (PKD)  Tx: DDKT  Transplant: 12/4/2022 (Kidney), 10/13/2015 (Kidney), 10/23/2021 (Kidney)  Crossmatch at time of Tx: negative  DSA at time of Tx: No  Significant changes in immunosuppression: None  Significant transplant-related complications: None    Recommendations were communicated to the primary team verbally.    Georges Quach MD  Pager: [phone]    Interval events:  Mr. Nuñezs creatinine is 4.98 (12/06 0809); Stable, increased  Increasing urine output.  Other significant labs/tests/vitals: NG removed yesterday  No events overnight.  No chest pain or shortness of breath.  No leg swelling.  Denies nausea and vomiting.  Bowel movements are not yet present but increasing flatus.  No fever, sweats or chills.    Review of Systems    The 4 point Review of Systems is negative other than noted above or here.     Past Medical History    I have reviewed this patient's medical history and updated it with pertinent information if needed.   Past Medical History:   Diagnosis Date     Acidosis      Chronic kidney disease, stage IV (severe) (H)      Disorders of phosphorus metabolism      History of blood transfusion      HTN (hypertension)     15 years     Hyperparathyroidism      Kidney replaced by transplant 10/23/2021    DCD re-transplant. Induction with  thymoglobulin 6mg/kg and steroids.     Kidney replaced by transplant 10/13/2015     Polycystic kidney, autosomal dominant      Pure hypercholesterolemia      Sleep apnea     cpap     Vitamin D deficiency        Past Surgical History   I have reviewed this patient's surgical history and updated it with pertinent information if needed.  Past Surgical History:   Procedure Laterality Date     BENCH KIDNEY N/A 10/13/2015    Procedure: BENCH KIDNEY;  Surgeon: Dariel Chavez MD;  Location: [facility] OR     BRONCHOSCOPY, DILATE BRONCHUS, STENT BRONCHUS, COMBINED  10/27/2021    Procedure: Bronchoscopy, dilate bronchus, stent bronchus, combined;  Surgeon: Madina Warren MD;  Location: [facility] OR     CYSTOSCOPY, REMOVE STENT(S), COMBINED Right 2015    Procedure: COMBINED CYSTOSCOPY, REMOVE STENT(S);  Surgeon: Dariel Chavez MD;  Location: [facility]     HERNIORRHAPHY INGUINAL CHILD      right     LAPAROTOMY EXPLORATORY N/A 10/27/2021    Procedure: Exploratory left lower quadrant of kidney transplant, back table flush, reimplantation of kidney transplant, with ureteral stent removal and placement of a new ureteral stent, intraoperative ultrasound, intraoperative of transit time flow measurment (VeriQ);  Surgeon: Madina Warren MD;  Location: [facility]     NEPHRECTOMY Left 2021    Procedure: NEPHRECTOMY, LEFT, Ureteral stent explant;  Surgeon: Eulalio Emanuel MD;  Location: [facility]     THROMBECTOMY ABDOMEN N/A 10/27/2021    Procedure: Thrombectomy of iliac and renal veins;  Surgeon: Madina Warren MD;  Location: [facility]     TONSILLECTOMY       TRANSPLANT KIDNEY RECIPIENT  DONOR N/A 10/13/2015    Procedure: TRANSPLANT KIDNEY RECIPIENT  DONOR;  Surgeon: Dariel Chavez MD;  Location: [facility]     TRANSPLANT KIDNEY RECIPIENT  DONOR N/A 10/23/2021    Procedure: TRANSPLANT, KIDNEY, RECIPIENT,  DONOR, URETERAL STENT PLACEMENT;  Surgeon: Madina Warren MD;  Location:  [facility]     TRANSPLANT KIDNEY RECIPIENT  DONOR N/A 2022    Procedure: TRANSPLANT, KIDNEY,  DONOR- with ureteral stent placement, Recipient right nephrectomy, Appendectomy;  Surgeon: George Zhu MD;  Location: [facility] OR       Family History   I have reviewed this patient's family history and updated it with pertinent information if needed.   Family History   Problem Relation Age of Onset     Gastrointestinal Disease Mother         ESRD PCKD     Hypertension Mother      C.A.D. Father      Hypertension Father      Gastrointestinal Disease Sister         PCKD       Social History   I have reviewed this patient's social history and updated it with pertinent information if needed. Raj Pierce  reports that he has never smoked. He has never used smokeless tobacco. He reports current alcohol use of about 10.0 standard drinks per week. He reports that he does not use drugs.    Allergies   Allergies   Allergen Reactions     Alcohol Rash     [facility]     Prior to Admission Medications     acetaminophen  975 mg Oral Q8H     atorvastatin  10 mg Oral Daily     [START ON 2022] basiliximab (SIMULECT) infusion  20 mg Intravenous Once     citalopram  20 mg Oral Daily     famotidine  10 mg Oral Daily     lidocaine  2 patch Transdermal Q24H     lidocaine   Transdermal Q8H DONITA     magnesium oxide  400 mg Oral Daily with lunch     mycophenolate  750 mg Oral BID IS     polyethylene glycol  17 g Oral Daily     [START ON 2022] predniSONE  60 mg Oral Daily    Followed by     [START ON 2022] predniSONE  40 mg Oral Daily    Followed by     [START ON 2022] predniSONE  20 mg Oral Daily    Followed by     [START ON 2022] predniSONE  15 mg Oral Daily    Followed by     [START ON 2022] predniSONE  10 mg Oral Daily    Followed by     [START ON 2023] predniSONE  5 mg Oral Daily     sennosides  1 tablet Oral BID     sodium chloride (PF)  10 mL Intracatheter  "Q8H     [START ON 2022] sulfamethoxazole-trimethoprim  1 tablet Oral Once per day on      tacrolimus  2 mg Oral BID IS     valGANciclovir  450 mg Oral Once per day on        lactated ringers 100 mL/hr at 22 0324       Physical Exam   Temp  Av.8  F (37.7  C)  Min: 97.6  F (36.4  C)  Max: 102.7  F (39.3  C)      Pulse  Av.7  Min: 79  Max: 108 Resp  Avg: 15.1  Min: 10  Max: 21  SpO2  Av.3 %  Min: 89 %  Max: 98 %    CVP (mmHg): 8 mmHgBP (!) 144/79 (BP Location: Right arm)   Pulse 79   Temp 99.3  F (37.4  C) (Oral)   Resp 18   Ht 1.778 m (5' 10\")   Wt 84.1 kg (185 lb 6.5 oz)   SpO2 96%   BMI 26.60 kg/m     Date 22 07 - 22 0659   Shift 3283-4517 5996-7678 2454-7228 24 Hour Total   INTAKE   I.V. 1116.5   1116.5   NG/GT 75   75   Shift Total(mL/kg) 1191.5(14.77)   1191.5(14.17)   OUTPUT   Urine 620   620   Emesis/NG output 75   75   Drains 140   140   Shift Total(mL/kg) 835(10.35)   835(9.93)   Weight (kg) 80.65 84.1 84.1 84.1      Admit Weight: 80.6 kg (177 lb 12.8 oz)     GENERAL APPEARANCE: alert and no distress  HENT: mouth without ulcers or lesions  LYMPHATICS: no cervical or supraclavicular nodes  RESP: lungs clear to auscultation - no rales, rhonchi or wheezes  CV: regular rhythm, normal rate, no rub, no murmur  EDEMA: trace LE edema bilaterally  ABDOMEN: soft, nondistended, nontender, bowel sounds normal  MS: extremities normal - no gross deformities noted, no evidence of inflammation in joints, no muscle tenderness  SKIN: no rash  NEURO: normal strength and tone, sensory exam grossly normal, mentation intact and speech normal  PSYCH: mentation appears normal and affect normal/bright  TX KIDNEY: normal  DIALYSIS ACCESS: none    Data   CMP  Recent Labs   Lab 22  0809 22  2146 22  1745 22  1729 22  1632 22  1508 22  1158 22  1004 22  0635 22  2322 22  1906 22  0731   *  --   -- "   --   --   --   --   --  142  --  142 146*   POTASSIUM 4.8  --   --  4.4  --  4.5  --  4.1 4.4  4.4   < > 4.1 4.4   CHLORIDE 111*  --   --   --   --   --   --   --  105  --  105 106   CO2 23  --   --   --   --   --   --   --  21*  --  21* 24   ANIONGAP 13  --   --   --   --   --   --   --  16*  --  16* 16*   * 140* 153*  --  154*  --    < >  --  136*  --  237* 102*   BUN 72.0*  --   --   --   --   --   --   --  64.8*  --  69.7* 81.4*   CR 4.98*  --   --   --   --   --   --   --  4.98*  --  5.13* 5.99*   GFRESTIMATED 13*  --   --   --   --   --   --   --  13*  --  13* 10*   LILIANA 7.5*  --   --   --   --   --   --   --  6.9*  --  7.6* 9.1   MAG 2.2  --   --   --   --   --   --   --  1.3*  --  1.7  --    PHOS 6.6*  --   --   --   --   --   --   --  3.7  --  3.6  --    PROTTOTAL  --   --   --   --   --   --   --   --   --   --   --  5.7*   ALBUMIN  --   --   --   --   --   --   --   --   --   --   --  3.9   BILITOTAL  --   --   --   --   --   --   --   --   --   --   --  0.6   ALKPHOS  --   --   --   --   --   --   --   --   --   --   --  50   AST  --   --   --   --   --   --   --   --   --   --   --  25   ALT  --   --   --   --   --   --   --   --   --   --   --  14    < > = values in this interval not displayed.     CBC  Recent Labs   Lab 12/06/22  0809 12/05/22  1729 12/05/22  1508 12/05/22  1004 12/05/22  0635 12/04/22  2322 12/04/22  1906 12/04/22  0731   HGB 7.3* 7.7* 8.2* 8.1* 8.2*  8.2*   < > 7.9* 9.9*   WBC 10.2  --   --   --  7.0  --  3.5* 4.2   RBC 2.59*  --   --   --  2.86*  --  2.83* 3.45*   HCT 25.0*  --   --   --  27.9*  --  26.6* 32.3*   MCV 97  --   --   --  98  --  94 94   MCH 28.2  --   --   --  28.7  --  27.9 28.7   MCHC 29.2*  --   --   --  29.4*  --  29.7* 30.7*   RDW 14.3  --   --   --  14.0  --  13.9 13.8   *  --   --   --  128*  --  138* 189    < > = values in this interval not displayed.     INR  Recent Labs   Lab 12/04/22  0731   INR 1.02   PTT 25     ABGNo lab results found in  last 7 days.   Urine Studies  Recent Labs   Lab Test 05/12/22  1220 11/02/21  1121 10/23/21  0526 11/09/15  1300   COLOR Straw Light Yellow Straw Yellow   APPEARANCE Clear Clear Clear Clear   URINEGLC Negative Negative Negative Negative   URINEBILI Negative Negative Negative Negative   URINEKETONE Negative Negative Negative Negative   SG 1.010 1.011 1.009 1.008   UBLD Small* Moderate* Negative Negative   URINEPH 6.0 6.0 7.0 5.5   PROTEIN 100* 70* 50* 10*   NITRITE Negative Negative Negative Negative   LEUKEST Negative Negative Negative Negative   RBCU 0 1 0 <1   WBCU 0 1 1 1     Recent Labs   Lab Test 10/23/21  0526 06/05/17  1253 01/25/16  1602 10/28/15  0702 10/13/15  1035   UTPG 1.37* 1.30* 0.36* 0.45* 0.55*     PTH  Recent Labs   Lab Test 10/27/21  0621 08/01/16  1638 01/25/16  1601 10/15/15  0520   PTHI 149* 90* 121* 434*     Iron Studies  Recent Labs   Lab Test 10/27/21  0621 10/17/15  0455   IRON 19* 13*   * 241   IRONSAT 10* 5*   TERRANCE 597*  --        IMAGING:  All imaging studies reviewed by me.

## 2022-12-07 PROBLEM — G89.18 ACUTE POST-OPERATIVE PAIN: Status: ACTIVE | Noted: 2022-12-07

## 2022-12-07 PROBLEM — E83.51 HYPOCALCEMIA: Status: ACTIVE | Noted: 2022-12-07

## 2022-12-07 PROBLEM — D69.6 THROMBOCYTOPENIA (H): Status: ACTIVE | Noted: 2022-12-07

## 2022-12-07 LAB
ANION GAP SERPL CALCULATED.3IONS-SCNC: 12 MMOL/L (ref 7–15)
ATRIAL RATE - MUSE: 66 BPM
BASOPHILS # BLD AUTO: 0 10E3/UL (ref 0–0.2)
BASOPHILS NFR BLD AUTO: 0 %
BLD PROD TYP BPU: NORMAL
BLOOD COMPONENT TYPE: NORMAL
BUN SERPL-MCNC: 64.9 MG/DL (ref 6–20)
CALCIUM SERPL-MCNC: 7.3 MG/DL (ref 8.6–10)
CHLORIDE SERPL-SCNC: 107 MMOL/L (ref 98–107)
CODING SYSTEM: NORMAL
CREAT SERPL-MCNC: 3.9 MG/DL (ref 0.67–1.17)
CROSSMATCH: NORMAL
DEPRECATED HCO3 PLAS-SCNC: 21 MMOL/L (ref 22–29)
DIASTOLIC BLOOD PRESSURE - MUSE: NORMAL MMHG
EOSINOPHIL # BLD AUTO: 0 10E3/UL (ref 0–0.7)
EOSINOPHIL NFR BLD AUTO: 0 %
ERYTHROCYTE [DISTWIDTH] IN BLOOD BY AUTOMATED COUNT: 14 % (ref 10–15)
GFR SERPL CREATININE-BSD FRML MDRD: 17 ML/MIN/1.73M2
GLUCOSE SERPL-MCNC: 111 MG/DL (ref 70–99)
HCT VFR BLD AUTO: 23.7 % (ref 40–53)
HGB BLD-MCNC: 7 G/DL (ref 13.3–17.7)
IMM GRANULOCYTES # BLD: 0.1 10E3/UL
IMM GRANULOCYTES NFR BLD: 1 %
INTERPRETATION ECG - MUSE: NORMAL
ISSUE DATE AND TIME: NORMAL
LYMPHOCYTES # BLD AUTO: 0.2 10E3/UL (ref 0.8–5.3)
LYMPHOCYTES NFR BLD AUTO: 2 %
MAGNESIUM SERPL-MCNC: 2.1 MG/DL (ref 1.7–2.3)
MCH RBC QN AUTO: 28.5 PG (ref 26.5–33)
MCHC RBC AUTO-ENTMCNC: 29.5 G/DL (ref 31.5–36.5)
MCV RBC AUTO: 96 FL (ref 78–100)
MONOCYTES # BLD AUTO: 0.3 10E3/UL (ref 0–1.3)
MONOCYTES NFR BLD AUTO: 4 %
NEUTROPHILS # BLD AUTO: 8.1 10E3/UL (ref 1.6–8.3)
NEUTROPHILS NFR BLD AUTO: 93 %
NRBC # BLD AUTO: 0 10E3/UL
NRBC BLD AUTO-RTO: 0 /100
P AXIS - MUSE: 66 DEGREES
PATH REPORT.COMMENTS IMP SPEC: NORMAL
PATH REPORT.FINAL DX SPEC: NORMAL
PATH REPORT.GROSS SPEC: NORMAL
PATH REPORT.MICROSCOPIC SPEC OTHER STN: NORMAL
PATH REPORT.RELEVANT HX SPEC: NORMAL
PHOSPHATE SERPL-MCNC: 5 MG/DL (ref 2.5–4.5)
PHOTO IMAGE: NORMAL
PLATELET # BLD AUTO: 114 10E3/UL (ref 150–450)
POTASSIUM SERPL-SCNC: 4.7 MMOL/L (ref 3.4–5.3)
PR INTERVAL - MUSE: 158 MS
QRS DURATION - MUSE: 102 MS
QT - MUSE: 422 MS
QTC - MUSE: 442 MS
R AXIS - MUSE: 48 DEGREES
RBC # BLD AUTO: 2.46 10E6/UL (ref 4.4–5.9)
SODIUM SERPL-SCNC: 140 MMOL/L (ref 136–145)
SYSTOLIC BLOOD PRESSURE - MUSE: NORMAL MMHG
T AXIS - MUSE: 35 DEGREES
TACROLIMUS BLD-MCNC: 2.9 UG/L (ref 5–15)
TME LAST DOSE: ABNORMAL H
TME LAST DOSE: ABNORMAL H
UNIT ABO/RH: NORMAL
UNIT NUMBER: NORMAL
UNIT STATUS: NORMAL
UNIT TYPE ISBT: 1700
VENTRICULAR RATE- MUSE: 66 BPM
WBC # BLD AUTO: 8.7 10E3/UL (ref 4–11)

## 2022-12-07 PROCEDURE — 80048 BASIC METABOLIC PNL TOTAL CA: CPT | Performed by: SURGERY

## 2022-12-07 PROCEDURE — 250N000011 HC RX IP 250 OP 636: Performed by: NURSE PRACTITIONER

## 2022-12-07 PROCEDURE — 84100 ASSAY OF PHOSPHORUS: CPT | Performed by: SURGERY

## 2022-12-07 PROCEDURE — 36592 COLLECT BLOOD FROM PICC: CPT | Performed by: SURGERY

## 2022-12-07 PROCEDURE — 85025 COMPLETE CBC W/AUTO DIFF WBC: CPT | Performed by: SURGERY

## 2022-12-07 PROCEDURE — P9016 RBC LEUKOCYTES REDUCED: HCPCS | Performed by: NURSE PRACTITIONER

## 2022-12-07 PROCEDURE — 80197 ASSAY OF TACROLIMUS: CPT | Performed by: SURGERY

## 2022-12-07 PROCEDURE — 250N000013 HC RX MED GY IP 250 OP 250 PS 637: Performed by: NURSE PRACTITIONER

## 2022-12-07 PROCEDURE — 88307 TISSUE EXAM BY PATHOLOGIST: CPT | Mod: 26 | Performed by: PATHOLOGY

## 2022-12-07 PROCEDURE — 250N000012 HC RX MED GY IP 250 OP 636 PS 637: Performed by: NURSE PRACTITIONER

## 2022-12-07 PROCEDURE — 250N000013 HC RX MED GY IP 250 OP 250 PS 637: Performed by: SURGERY

## 2022-12-07 PROCEDURE — 88302 TISSUE EXAM BY PATHOLOGIST: CPT | Mod: 26 | Performed by: PATHOLOGY

## 2022-12-07 PROCEDURE — 83735 ASSAY OF MAGNESIUM: CPT | Performed by: SURGERY

## 2022-12-07 PROCEDURE — 250N000013 HC RX MED GY IP 250 OP 250 PS 637: Performed by: INTERNAL MEDICINE

## 2022-12-07 PROCEDURE — 120N000011 HC R&B TRANSPLANT UMMC

## 2022-12-07 PROCEDURE — 99233 SBSQ HOSP IP/OBS HIGH 50: CPT | Mod: 24 | Performed by: INTERNAL MEDICINE

## 2022-12-07 PROCEDURE — 999N000157 HC STATISTIC RCP TIME EA 10 MIN

## 2022-12-07 RX ORDER — HEPARIN SODIUM (PORCINE) LOCK FLUSH IV SOLN 100 UNIT/ML 100 UNIT/ML
5 SOLUTION INTRAVENOUS
Status: CANCELLED | OUTPATIENT
Start: 2022-12-09

## 2022-12-07 RX ORDER — TACROLIMUS 1 MG/1
3 CAPSULE ORAL
Status: DISCONTINUED | OUTPATIENT
Start: 2022-12-07 | End: 2022-12-08 | Stop reason: HOSPADM

## 2022-12-07 RX ORDER — FUROSEMIDE 10 MG/ML
40 INJECTION INTRAMUSCULAR; INTRAVENOUS
Status: DISCONTINUED | OUTPATIENT
Start: 2022-12-07 | End: 2022-12-07

## 2022-12-07 RX ORDER — ALBUTEROL SULFATE 0.83 MG/ML
2.5 SOLUTION RESPIRATORY (INHALATION)
Status: CANCELLED | OUTPATIENT
Start: 2022-12-09

## 2022-12-07 RX ORDER — CALCIUM CARBONATE 500 MG/1
1000 TABLET, CHEWABLE ORAL 2 TIMES DAILY
Status: DISCONTINUED | OUTPATIENT
Start: 2022-12-07 | End: 2022-12-08 | Stop reason: HOSPADM

## 2022-12-07 RX ORDER — DIPHENHYDRAMINE HYDROCHLORIDE 50 MG/ML
50 INJECTION INTRAMUSCULAR; INTRAVENOUS
Status: CANCELLED
Start: 2022-12-09

## 2022-12-07 RX ORDER — AMLODIPINE BESYLATE 5 MG/1
5 TABLET ORAL DAILY
Status: DISCONTINUED | OUTPATIENT
Start: 2022-12-07 | End: 2022-12-08 | Stop reason: HOSPADM

## 2022-12-07 RX ORDER — HEPARIN SODIUM,PORCINE 10 UNIT/ML
5 VIAL (ML) INTRAVENOUS
Status: CANCELLED | OUTPATIENT
Start: 2022-12-09

## 2022-12-07 RX ORDER — EPINEPHRINE 1 MG/ML
0.3 INJECTION, SOLUTION, CONCENTRATE INTRAVENOUS EVERY 5 MIN PRN
Status: CANCELLED | OUTPATIENT
Start: 2022-12-09

## 2022-12-07 RX ORDER — FUROSEMIDE 10 MG/ML
80 INJECTION INTRAMUSCULAR; INTRAVENOUS
Status: DISCONTINUED | OUTPATIENT
Start: 2022-12-07 | End: 2022-12-07

## 2022-12-07 RX ORDER — METHYLPREDNISOLONE SODIUM SUCCINATE 125 MG/2ML
125 INJECTION, POWDER, LYOPHILIZED, FOR SOLUTION INTRAMUSCULAR; INTRAVENOUS
Status: CANCELLED
Start: 2022-12-09

## 2022-12-07 RX ORDER — ALBUTEROL SULFATE 90 UG/1
1-2 AEROSOL, METERED RESPIRATORY (INHALATION)
Status: CANCELLED
Start: 2022-12-09

## 2022-12-07 RX ADMIN — ATORVASTATIN CALCIUM 10 MG: 10 TABLET, FILM COATED ORAL at 08:23

## 2022-12-07 RX ADMIN — OXYCODONE HYDROCHLORIDE 5 MG: 5 TABLET ORAL at 13:34

## 2022-12-07 RX ADMIN — ACETAMINOPHEN 975 MG: 325 TABLET, FILM COATED ORAL at 23:53

## 2022-12-07 RX ADMIN — MYCOPHENOLATE MOFETIL 750 MG: 250 CAPSULE ORAL at 18:08

## 2022-12-07 RX ADMIN — CALCIUM CARBONATE (ANTACID) CHEW TAB 500 MG 1000 MG: 500 CHEW TAB at 20:27

## 2022-12-07 RX ADMIN — MAGNESIUM OXIDE TAB 400 MG (241.3 MG ELEMENTAL MG) 400 MG: 400 (241.3 MG) TAB at 12:36

## 2022-12-07 RX ADMIN — TACROLIMUS 2 MG: 1 CAPSULE ORAL at 08:23

## 2022-12-07 RX ADMIN — TACROLIMUS 3 MG: 1 CAPSULE ORAL at 18:08

## 2022-12-07 RX ADMIN — PREDNISONE 60 MG: 20 TABLET ORAL at 08:41

## 2022-12-07 RX ADMIN — AMLODIPINE BESYLATE 5 MG: 5 TABLET ORAL at 08:23

## 2022-12-07 RX ADMIN — CALCIUM ACETATE 667 MG: 667 CAPSULE ORAL at 08:22

## 2022-12-07 RX ADMIN — MYCOPHENOLATE MOFETIL 750 MG: 250 CAPSULE ORAL at 08:22

## 2022-12-07 RX ADMIN — SULFAMETHOXAZOLE AND TRIMETHOPRIM 1 TABLET: 400; 80 TABLET ORAL at 08:41

## 2022-12-07 RX ADMIN — CITALOPRAM HYDROBROMIDE 20 MG: 10 TABLET ORAL at 08:22

## 2022-12-07 RX ADMIN — ACETAMINOPHEN 975 MG: 325 TABLET, FILM COATED ORAL at 17:01

## 2022-12-07 RX ADMIN — FUROSEMIDE 80 MG: 10 INJECTION, SOLUTION INTRAVENOUS at 10:38

## 2022-12-07 RX ADMIN — MAGNESIUM HYDROXIDE 30 ML: 400 SUSPENSION ORAL at 12:36

## 2022-12-07 RX ADMIN — OXYCODONE HYDROCHLORIDE 5 MG: 5 TABLET ORAL at 04:45

## 2022-12-07 RX ADMIN — FAMOTIDINE 10 MG: 10 TABLET, FILM COATED ORAL at 08:40

## 2022-12-07 RX ADMIN — ACETAMINOPHEN 975 MG: 325 TABLET, FILM COATED ORAL at 08:23

## 2022-12-07 RX ADMIN — SENNOSIDES 1 TABLET: 8.6 TABLET, FILM COATED ORAL at 08:23

## 2022-12-07 RX ADMIN — POLYETHYLENE GLYCOL 3350 17 G: 17 POWDER, FOR SOLUTION ORAL at 08:23

## 2022-12-07 RX ADMIN — CALCIUM CARBONATE (ANTACID) CHEW TAB 500 MG 1000 MG: 500 CHEW TAB at 10:39

## 2022-12-07 ASSESSMENT — ACTIVITIES OF DAILY LIVING (ADL)
ADLS_ACUITY_SCORE: 41
ADLS_ACUITY_SCORE: 39
ADLS_ACUITY_SCORE: 41
ADLS_ACUITY_SCORE: 39
ADLS_ACUITY_SCORE: 41
ADLS_ACUITY_SCORE: 41
ADLS_ACUITY_SCORE: 39
ADLS_ACUITY_SCORE: 41
ADLS_ACUITY_SCORE: 41

## 2022-12-07 NOTE — PLAN OF CARE
"Cares 6170-3823  BP (!) 155/110 (BP Location: Right arm)   Pulse 63   Temp 98  F (36.7  C) (Oral)   Resp 17   Ht 1.778 m (5' 10\")   Wt 88 kg (193 lb 14.4 oz)   SpO2 98%   BMI 27.82 kg/m       VS- B/P 155/110 provider notified orders pending.  on 1L oxygen overnight. pt has sleep apnea.  BG- none  Pain/Nausea/PRN'S- Oxy given once for incisional pain. Scheduled tylenol given. Denies nausea.    Diet- Full Liquid diet, advance as tolerated  LDA- NATHEN w/ serosanguinous drainage 65 ml output.   Right PIV SL. Right internal jugular infusing LR. Left AV fistula.   Gtt/IVF- LR @ 100 ml/hr   GI/-spears output 450 ml , no BM, pt states he is passing flautus   Skin- Abdominal incision w/ staples BRIJESH, no drainage   NATHEN site dressing CDI.  Activity- stand by assist   Plan- spears removal today. Advance diet as tolerated.        "

## 2022-12-07 NOTE — PLAN OF CARE
"Vitals: BP (!) 140/96   Pulse 89   Temp 97.9  F (36.6  C) (Oral)   Resp 16   Ht 1.778 m (5' 10\")   Wt 88 kg (193 lb 14.4 oz)   SpO2 98%   BMI 27.82 kg/m    Improving BP's, started on Norvasc.  Endocrine: n/a  Labs: Improving creatinine, HGB 7.0. 1U PRBC's infused.  Pain: Mild incisional pain.  PRN's: Oxycodone 5 mg, Milk of Magnesia X1.  Diet: Diet advanced to regular, tolerating it well.  LDA: R TL internal jugular, IVF's DC'd. R NATHEN, leaking at the site, dressing changed, 20cc output.  GI: Passing gas, no BM since surgery, Miralax and Senna this morning.  : Wade DC'd, negative bladder scans, good urine output after Lasix 80 mg IV.  Skin: Midline abdominal incision with staples.  Neuro: Alert and oriented, family here visiting.  Mobility: Up ad rowan.  Education: Medication card and lab book updated. Previous transplant, does not want Specialty Pharmacy due to minimal new medications.  Plan: Discharge to local hotel tomorrow when medically stable.                    "

## 2022-12-07 NOTE — PROGRESS NOTES
Care Management Follow Up    Length of Stay (days): 2  Expected Discharge Date: 12/08/2022  Concerns to be Addressed: discharge planning    Patient plan of care discussed at interdisciplinary rounds: Yes    Anticipated Discharge Disposition: From [place] staying at [facility] at discharge  Anticipated Discharge Services:  ATC and OP labs  Anticipated Discharge DME:        Additional Information:  Per JATINDER Reyes patient may be ready for discharge tomorrow. Spoke with patient by phone to discuss discharge.     Patient is from [place] and plans to stay locally with his wife at the [facility]. Pt will visit ATC clinic day after discharge for 2 days in a row. These appointments will be set up prior to patient discharge.     Pt will need labs drawn M/Th weekly. Offered patient options of home care RN to come to [facility] or for him to go to OP clinic for labs. Pt [place] to [place] [facility] [place].       [facility] will follow for further needs    Jane Hernandez RN, [place]  Float Care Coordinator  Covering 7A [facility]   Pager: [phone]

## 2022-12-07 NOTE — PROGRESS NOTES
CLINICAL NUTRITION SERVICES - DISCHARGE/Brief NOTE    Patient s discharge needs assessed and discharge planning has been conducted with the multidisciplinary transplant care team including physicians, pharmacy, social work and transplant coordinator.    Diet: regular - Raj reports appetite is pretty good.   Labs (12/7): phos 5 mg/dl (H), BUN 64.9 mg/dL (H), Cr 3.9 mg/dL (H). K+ 4.7 mmol/L    Interventions  1. Review post transplant nutrition guidelines. Emphasized high protein x 8 weeks and food safety. Patient verbalized understanding.     Follow up/Monitoring:  Once discharged, place outpatient nutrition consult via the transplant team if nutrition concerns arise.    Dolores Couch, MS/RD/GENTRY  7A/Obs RD pager: [phone]  Weekend/Holiday RD pager: [phone]

## 2022-12-07 NOTE — PLAN OF CARE
"BP (!) 145/97 (BP Location: Right arm)   Pulse 73   Temp 97.5  F (36.4  C)   Resp 16   Ht 1.778 m (5' 10\")   Wt 84.1 kg (185 lb 6.5 oz)   SpO2 94%   BMI 26.60 kg/m      Cares 0779-0961    VS: VSS on room air.  Neuro: A&O x 4. Calm, cooperative with cares. Makes own needs known.  BG: No BG orders. Last BG value 126 with morning lab draw.  Labs: Na 147, Cr 4.98, Phos 6.6, Hgb 7.3  Respiratory: WDL, on room air. Lung sounds diminished in lower lobes.  Cardiac: Hypertensive (140s-150s/100s); provider paged. IV labetalol x 1 ordered and given. Slight improvement on re-check (145/97). Hemodialysis AV fistula on left arm, weak bruit noted.  Pain/Nausea: Pt endorses incisional pain; scheduled Tylenol given with adequate relief.  Diet: Clear liquid diet, tolerated well.  IV Access: CVC triple lumen IJ - blue infusing LR, brown and white lumens saline locked. IJ dressing changed this shift. LPIV saline locked.  Infusion(s): LR infusing 100 mL/hr  GI/: Wade in place with good output (~995 mL out this shift). No BM this shift, pt endorses passing flatus. Bowel sounds normoactive.  Drains: R NATHEN ~140 serosanguinous output. To bulb suction.  Skin: Incision midline, stapled, BRIJESH. Small amount of serous drainage. NATHEN site leaking - site dressing changed/reinforced this AM, remains in place.  Mobility: SBA with IV pole.  Education: Med card and lab book updated.  Plan: Advance diet as tolerated. Monitor BP and notify team if values remain elevated. Encourage hydration and promote bowel motility. Continue with plan of care.           "

## 2022-12-07 NOTE — DISCHARGE SUMMARY
[facility]    Discharge Summary  Transplant Surgery    Date of Admission:  2022  Date of Discharge:  2022  Discharging Provider: Radha Amezcua NP  / Fahad Ro MD    Discharge Diagnoses   Principal Problem:    Kidney replaced by transplant  Active Problems:    HTN, kidney transplant related    Immunosuppression (H)    Anemia in chronic renal disease    Hypomagnesemia    Thrombocytopenia (H)    Acute post-operative pain    Hypocalcemia    Procedure/Surgery Information   Procedure: Procedure(s):  TRANSPLANT, KIDNEY,  DONOR- with ureteral stent placement, Recipient right nephrectomy, Appendectomy   Surgeon(s): Surgeon(s) and Role:     * Georeg Zhu MD - Primary     * Georges Mattson MD - Fellow - Assisting     * George Metzger MD - Fellow - Assisting     * Yi Hawkins MD       Non-operative procedures None performed     History of Present Illness   Raj Pierce is a 52 year old male with a past medical history significant for 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 He is now s/p  donor kidney re-transplant with ureteral stent, right native nephrectomy, and appendectomy on 22.    Hospital Course   Kidney transplant: Intra-abdominal kidney. Cr trended down as expected, Cr 3.1 on day of discharge. Good UOP. Post-op US showed patent vessels.     Immunosuppression:  Induction: Intermediate risk protocol (cPRA 67%) with Thymoglobulin 175 mg intra-op (complicated by fever and rigors for 24 hours post-op), basiliximab  and , and steroid pulse with four week taper per protocol.  Maintenance:  -Mycophenolate 750 mg BID  -Tacrolimus goal level 8-10 (12 hr trough).  Infection prophylaxis: viral (Valcyte x 6 months), PJP (Bactrim indefinitely)    Transplant coordinator: Elif Cervantes [phone]  Donor type:   DBD  DSA at time of transplant:  NO  Ureteral stent: YES  CMV:  Donor + / Recipient -  EBV:  Donor + / Recipient +  Thymoglobulin:  175 mg (2.2 mg/kg)    Acute post op pain: Controlled with current regimen of PRN Tylenol and occasional oxycodone (5mg, #5).     Hematology:   Anemia of chronic disease: Last transfused 12/7. Hgb 8.6 on discharge, stable.   Thrombocytopenia: Secondary to medications.  on discharge, monitor.     Cardiorespiratory:   HTN: Controlled with amlodipine. Cardura (for BP only) not restarted post-transplant.     Fluid/Electrolytes:   Hypomagnesemia: Continue Mag-Ox supplement.  Hypocalcemia: Continue oral calcium carbonate.      Discharge Disposition   Discharged to local [facility]   Condition at discharge: Stable    Pending Results   These results will be followed up by transplant team  Unresulted Labs Ordered in the Past 30 Days of this Admission       Date and Time Order Name Status Description    12/4/2022  6:37 AM BK Virus IgG Antibody In process           Primary Care Physician   Do Guerrero    Physical Exam   Temp: 98.1  F (36.7  C) Temp src: Oral BP: 135/87 Pulse: 67   Resp: 16 SpO2: 98 % O2 Device: None (Room air)    Vitals:    12/05/22 1541 12/07/22 0548 12/08/22 0856   Weight: 84.1 kg (185 lb 6.5 oz) 88 kg (193 lb 14.4 oz) 85 kg (187 lb 8 oz)     Vital Signs with Ranges  Temp:  [97.7  F (36.5  C)-98.3  F (36.8  C)] 98.1  F (36.7  C)  Pulse:  [60-84] 67  Resp:  [16-18] 16  BP: (132-154)/() 135/87  SpO2:  [93 %-98 %] 98 %  I/O last 3 completed shifts:  In: 900 [P.O.:600]  Out: 5495 [Urine:5335; Drains:160]    General Appearance: in no apparent distress.   Skin: Warm, perfused  Heart: perfused  Lungs: Unlabored on RA  Abdomen: The abdomen is Soft, incision CDI. NATHEN drain removed.  : No spears  Extremities: edema: none, strength 5/5  Neurologic: A&Ox4    Consultations This Hospital Stay   NEPHROLOGY KIDNEY/PANCREAS TRANSPLANT ADULT IP CONSULT  SOT MEDICATION HISTORY IP PHARMACY  CONSULT  SOCIAL WORK IP CONSULT  PHARMACY IP CONSULT  NUTRITION SERVICES ADULT IP CONSULT  NEPHROLOGY KIDNEY/PANCREAS TRANSPLANT ADULT IP CONSULT  CARE MANAGEMENT / SOCIAL WORK IP CONSULT  NURSING TO CONSULT FOR VASCULAR ACCESS CARE IP CONSULT    Time Spent on this Encounter   I have spent greater than 30 minutes on this discharge.    Discharge Orders       Reason for your hospital stay    You were admitted for a kidney transplant. You are discharging home in stable condition with close follow up.     Activity    Your activity upon discharge: Walk at least four times a day, lift no greater than 10 pounds for 6-8 weeks from the time of surgery.  No driving while taking narcotics or 3 weeks after surgery.     [facility] Follow-up and recommended labs and tests    [facility] FOLLOW UP:     1. [facility]: Over the next 2 days you will be seen in the [facility] (ph. [phone], option 3). Your labs will be drawn at the beginning of your appointment. DO NOT take your medications prior to having labs drawn. Please bring all your medications with you from home to take after labs are drawn.     2. Follow up with Dr. Zhu in Transplant Clinic in 1-2 weeks.     3. Follow up with Transplant Nephrologist as scheduled.     4.  Ureteral stent removal in 4-6 weeks, to be scheduled by Transplant Coordinator. If a  does not contact you for this, please contact your transplant coordinator.     5. Follow up with your primary care provider in ~8 weeks. Patient to schedule.     6. Basiliximab infusion in [facility] on 12/9/22.    Remember to always bring an updated medication list to all appointments.        Call your Transplant Coordinator ([phone]) with questions about Transplant Center appointment scheduling.     LABS:     CBC, BMP, magnesium, phosphorus, tacrolimus level to be drawn daily while in [facility], then every Monday and Thursday by home health care nurse if arranged,  or at an outpatient lab.     When to contact your care team    WHEN TO CONTACT YOUR  COORDINATOR:     Transplant Coordinator [phone]     Notify your coordinator if you have pain over your kidney, increased redness or drainage from your incision, fever greater than 100F, decreased urine output or new or increased amount of blood in urine.     Notify your coordinator immediately if you are ever unable to take your immunosuppressive medications for any reason.     If you have URGENT concerns after office hours, please call the hospital switchboard at [phone] and ask to have the organ transplant nurse on-call paged. If you have a life-threatening emergency, go to the nearest emergency room.     Wound care and dressings    Instructions to care for your wound at home: If you have staples in place, they will be removed in 3 weeks after operation. Wash incision daily with soap and water. Do not soak or scrub.     Discharge Instructions    People with weakened immune systems are at higher risk of getting severely sick from COVID-19. They may also remain infectious for a longer period of time than others with COVID-19.    Please be very careful to avoid COVID-19 infection. Everyone in your household should:  -Use a mask around any people that do not live with you.  -Practice social distancing of at least 6 feet around any people that do not live with you.  -Avoid any unnecessary gatherings with people that do not live with you.  -Wash your hands frequently when in public.     If you or people that you live with develop any of the following COVID symptoms, notify your coordinator:  -Fever  -Cough  -Shortness of breath  -New headache  -Body aches  -Chills  -Loss of taste or smell  -Sore throat  -New cold symptoms  -New diarrhea     Diet    Diet recommendations post-transplant: Heart healthy dietary habits long term (low saturated/trans fat, low sodium). High protein diet x 8 weeks. Practice food safety precautions.      Discharge Medications   Current Discharge Medication List        START taking these medications    Details   acetaminophen (TYLENOL) 325 MG tablet Take 1-2 tablets (325-650 mg) by mouth every 4 hours as needed for pain  Qty: 100 tablet, Refills: 0    Associated Diagnoses: Acute post-operative pain      calcium carbonate (TUMS) 500 MG chewable tablet Take 2 tablets (1,000 mg) by mouth 2 times daily  Qty: 60 tablet, Refills: 1    Associated Diagnoses: Hypocalcemia      famotidine (PEPCID) 10 MG tablet Take 1 tablet (10 mg) by mouth daily  Qty: 30 tablet, Refills: 0    Associated Diagnoses: Kidney transplanted      oxyCODONE (ROXICODONE) 5 MG tablet Take 1 tablet (5 mg) by mouth every 6 hours as needed for moderate to severe pain  Qty: 5 tablet, Refills: 0    Associated Diagnoses: Acute post-operative pain      polyethylene glycol (MIRALAX) 17 GM/Dose powder Take 17 g by mouth daily as needed for constipation  Qty: 510 g, Refills: 0    Associated Diagnoses: Kidney transplanted      predniSONE (DELTASONE) 10 MG tablet Take 4 tablets (40 mg) by mouth daily for 2 days, THEN 2 tablets (20 mg) daily for 7 days, THEN 1.5 tablets (15 mg) daily for 7 days, THEN 1 tablet (10 mg) daily for 7 days, THEN 0.5 tablets (5 mg) daily for 7 days.  Qty: 43 tablet, Refills: 0    Associated Diagnoses: Kidney transplanted      sennosides (SENOKOT) 8.6 MG tablet Take 1-2 tablets by mouth 2 times daily  Qty: 60 tablet, Refills: 0    Associated Diagnoses: Kidney transplanted      sulfamethoxazole-trimethoprim (BACTRIM) 400-80 MG tablet Take 1 tablet by mouth daily  Qty: 30 tablet, Refills: 11    Associated Diagnoses: Kidney transplanted      tacrolimus (GENERIC EQUIVALENT) 0.5 MG capsule Take 1 cap by mouth twice daily as directed by Transplant Center for dose changes.  Qty: 60 capsule, Refills: 0    Associated Diagnoses: Kidney transplanted           CONTINUE these medications which have CHANGED    Details   magnesium oxide (MAG-OX) 400  MG tablet Take 1 tablet (400 mg) by mouth daily (with lunch)  Qty: 30 tablet, Refills: 11    Associated Diagnoses: Kidney transplanted      tacrolimus (GENERIC EQUIVALENT) 1 MG capsule Take 3 capsules (3 mg) by mouth 2 times daily  Qty: 180 capsule, Refills: 11    Associated Diagnoses: Kidney transplanted; Immunosuppressed status (H); Kidney transplant candidate      valGANciclovir (VALCYTE) 450 MG tablet Take 1 tab (450mg) every other day. Increase to 900mg (2 tabs) daily when instructed by Transplant Center.  Qty: 60 tablet, Refills: 5    Associated Diagnoses: Kidney transplant recipient           CONTINUE these medications which have NOT CHANGED    Details   amLODIPine (NORVASC) 5 MG tablet Take 5 mg by mouth daily      atorvastatin (LIPITOR) 10 MG tablet Take 10 mg by mouth daily      citalopram (CELEXA) 20 MG tablet Take 20 mg by mouth daily.      mycophenolate (GENERIC EQUIVALENT) 250 MG capsule Take 3 capsules (750 mg) by mouth 2 times daily  Qty: 180 capsule, Refills: 11    Associated Diagnoses: Kidney transplanted; Kidney replaced by transplant; Secondary renal hyperparathyroidism (H)           STOP taking these medications       calcium carbonate-vitamin D (OS-LILIANA WITH D) 500-200 MG-UNIT tablet Comments:   Reason for Stopping:         dapsone (ACZONE) 25 MG tablet Comments:   Reason for Stopping:         doxazosin (CARDURA) 8 MG tablet Comments:   Reason for Stopping:         furosemide (LASIX) 20 MG tablet Comments:   Reason for Stopping:         sodium bicarbonate 650 MG tablet Comments:   Reason for Stopping:               No discharge procedures on file.      Data   Most Recent 3 CBC's:  Recent Labs   Lab Test 12/08/22  0732 12/07/22  0649 12/06/22  0809   WBC 7.6 8.7 10.2   HGB 8.6* 7.0* 7.3*   MCV 93 96 97   * 114* 112*     Most Recent 3 BMP's:  Recent Labs   Lab Test 12/08/22  0732 12/07/22  0649 12/06/22  0809    140 147*   POTASSIUM 4.6 4.7 4.8   CHLORIDE 104 107 111*   CO2 25 21* 23    BUN 59.0* 64.9* 72.0*   CR 3.14* 3.90* 4.98*   ANIONGAP 9 12 13   LILIANA 8.6 7.3* 7.5*   * 111* 126*     Attestation:    The patient has been seen with team and evaluated by me.   Vital signs, labs, medications and orders were reviewed.   When obtained, diagnostic images were reviewed by me and interpreted as above.    The care plan was discussed with the multidisciplinary team and I agree with the findings and plan in this note, with any differences recorded in blue.    Immunosuppressive medication management was reviewed and adjusted as reflected in the note and orders.     .

## 2022-12-07 NOTE — PROGRESS NOTES
[facility]  Transplant Nephrology Follow Up  Date of Admission:  12/4/2022  Today's Date: 12/07/2022  Requesting physician: George Zhu*    Recommendations:  -Stop calcium acetate with improving renal function and decreasing phos  -Start tums 1g BID  -Give lasix 80mg IV x1. Will consider PO diuretics tmrw depending on UOP and weight trend  -Start amlodipine 5mg daily  -Recommend 1U PRBC transfusion today    Assessment & Plan   # DDKT: Trend down    - Baseline Creatinine: ~ TBD   - Proteinuria: Not checked post transplant   - Date DSA Last Checked: Dec/2022      Latest DSA: No DSA at time of transplant   - BK Viremia: Not checked post transplant   - Kidney Tx Biopsy: No    -Double J stent placed at time of kidney transplant. Remove 4-6 weeks post transplant.   -Of note, had CKD V of functional RLQ kidney transplant from 2015 at time of transplant    # PKD:    -S/p R native nephrectomy at time of transplant   -If no previous MRA of brain (cannot find in records) would pursue MRA brain as outpatient    # Immunosuppression: Tacrolimus immediate release (goal 8-10), Mycophenolate mofetil (dose 750 mg every 12 hours) and Prednisone (dose taper)    -Induction: intermediate intensity   - Changes: Not at this time    # Infection Prophylaxis:   - PJP: Sulfa/TMP (Bactrim) .Bactrim was stopped after 10/2021 transplant due to hyperK, not allergy  - CMV: Valganciclovir (Valcyte). Patient is CMV IgG Ab discordant (D+/R-) and will continue on Valcyte x 6 months, then check CMV PCR monthly until 12 months post transplant.    # Hypertension: Borderline control;  Goal BP: < 150/90   - Volume status: Moderately hypervolemic     - Changes: Yes - give lasix 80mg IV x1 and consider PO diuretics tmrw. Start amlodipine 5mg daily    # Elevated Blood Glucose: Glucose generally running ~ 150s   - Management as per primary team.    # Anemia in Chronic Renal Disease: Hgb: Trend down       NADIYA: No   - Iron studies: Unknown at this time, but checked with dialysis    -Recommend 1U PRBC today    # Mineral Bone Disorder:   - Secondary renal hyperparathyroidism; PTH level: Unknown at this time, but checked with dialysis        On treatment: None. May need to consider calcitriol with low calcium  - Vitamin D; level: Unknown at this time, but checked with dialysis        On supplement: No  - Calcium; level: Low        On supplement: No, start tums 1g BID  - Phosphorus; level: High but trend down       On binder: Yes, stop calcium acetate    # Electrolytes:   - Potassium; level: High normal        On supplement: No  - Magnesium; level: Normal        On supplement: Yes, mag ox 400mg daily    - Bicarbonate; level: Normal        On supplement: No    # Hypocalcemia:     -stop phos binder, start tums 1g bid. If calcium still decreasing tmrw will start calcitriol    # Transplant History:  Etiology of Kidney Failure: Polycystic kidney disease (PKD)  Tx: DDKT  Transplant: 12/4/2022 (Kidney), 10/13/2015 (Kidney), 10/23/2021 (Kidney)  Crossmatch at time of Tx: negative  DSA at time of Tx: No  Significant changes in immunosuppression: None  Significant transplant-related complications: None    Recommendations were communicated to the primary team verbally.    Georges Quach MD  Pager: [phone]    Interval events:  Mr. Pierce's creatinine is 3.90 (12/07 0649); Trend down.  Increasing urine output.  Other significant labs/tests/vitals: low calcium, phos decreasing  No events overnight.  No chest pain or shortness of breath.  Increasing leg swelling.  No nausea and vomiting.  Bowel movements are not yet present but increasing flatus.  No fever, sweats or chills.      Review of Systems    The 4 point Review of Systems is negative other than noted above or here.     Past Medical History    I have reviewed this patient's medical history and updated it with pertinent information if needed.   Past Medical History:   Diagnosis  Date     Acidosis      Chronic kidney disease, stage IV (severe) (H)      Disorders of phosphorus metabolism      History of blood transfusion      HTN (hypertension)     15 years     Hyperparathyroidism      Kidney replaced by transplant 10/23/2021    DCD re-transplant. Induction with thymoglobulin 6mg/kg and steroids.     Kidney replaced by transplant 10/13/2015     Polycystic kidney, autosomal dominant      Pure hypercholesterolemia      Sleep apnea     cpap     Vitamin D deficiency        Past Surgical History   I have reviewed this patient's surgical history and updated it with pertinent information if needed.  Past Surgical History:   Procedure Laterality Date     BENCH KIDNEY N/A 10/13/2015    Procedure: BENCH KIDNEY;  Surgeon: Dariel Chavez MD;  Location: [facility] OR     BRONCHOSCOPY, DILATE BRONCHUS, STENT BRONCHUS, COMBINED  10/27/2021    Procedure: Bronchoscopy, dilate bronchus, stent bronchus, combined;  Surgeon: Madina Warren MD;  Location: [facility]     CYSTOSCOPY, REMOVE STENT(S), COMBINED Right 2015    Procedure: COMBINED CYSTOSCOPY, REMOVE STENT(S);  Surgeon: Dariel Chavez MD;  Location: [facility]     HERNIORRHAPHY INGUINAL CHILD      right     LAPAROTOMY EXPLORATORY N/A 10/27/2021    Procedure: Exploratory left lower quadrant of kidney transplant, back table flush, reimplantation of kidney transplant, with ureteral stent removal and placement of a new ureteral stent, intraoperative ultrasound, intraoperative of transit time flow measurment (VeriQ);  Surgeon: Madina Warren MD;  Location: [facility]     NEPHRECTOMY Left 2021    Procedure: NEPHRECTOMY, LEFT, Ureteral stent explant;  Surgeon: Eulalio Emanuel MD;  Location: [facility]     THROMBECTOMY ABDOMEN N/A 10/27/2021    Procedure: Thrombectomy of iliac and renal veins;  Surgeon: Madina Warren MD;  Location: [facility]     TONSILLECTOMY       TRANSPLANT KIDNEY RECIPIENT  DONOR N/A 10/13/2015    Procedure:  TRANSPLANT KIDNEY RECIPIENT  DONOR;  Surgeon: Dariel Chavez MD;  Location: [facility]     TRANSPLANT KIDNEY RECIPIENT  DONOR N/A 10/23/2021    Procedure: TRANSPLANT, KIDNEY, RECIPIENT,  DONOR, URETERAL STENT PLACEMENT;  Surgeon: Madina Warren MD;  Location: [facility]     TRANSPLANT KIDNEY RECIPIENT  DONOR N/A 2022    Procedure: TRANSPLANT, KIDNEY,  DONOR- with ureteral stent placement, Recipient right nephrectomy, Appendectomy;  Surgeon: George Zhu MD;  Location: [facility] OR       Family History   I have reviewed this patient's family history and updated it with pertinent information if needed.   Family History   Problem Relation Age of Onset     Gastrointestinal Disease Mother         ESRD PCKD     Hypertension Mother      C.A.D. Father      Hypertension Father      Gastrointestinal Disease Sister         PCKD       Social History   I have reviewed this patient's social history and updated it with pertinent information if needed. Raj Pierce  reports that he has never smoked. He has never used smokeless tobacco. He reports current alcohol use of about 10.0 standard drinks per week. He reports that he does not use drugs.    Allergies   Allergies   Allergen Reactions     Isopropyl Alcohol Rash     Swabs used at hospital     Anti-Thymocyte Globulin (Rabbit) [Antithymocyte Globulin (Equine)] Other (See Comments)     Fever and rigors with rodrigo-op dose. Received methylprednisolone 500mg prior to thymo.     Prior to Admission Medications     acetaminophen  975 mg Oral Q8H     amLODIPine  5 mg Oral Daily     atorvastatin  10 mg Oral Daily     [START ON 2022] basiliximab (SIMULECT) infusion  20 mg Intravenous Once     calcium carbonate  1,000 mg Oral BID     citalopram  20 mg Oral Daily     famotidine  10 mg Oral Daily     lidocaine  2 patch Transdermal Q24H     lidocaine   Transdermal Q8H [facility]     magnesium oxide  400 mg Oral Daily with lunch  "    mycophenolate  750 mg Oral BID IS     polyethylene glycol  17 g Oral Daily     predniSONE  60 mg Oral Daily    Followed by     [START ON 2022] predniSONE  40 mg Oral Daily    Followed by     [START ON 2022] predniSONE  20 mg Oral Daily    Followed by     [START ON 2022] predniSONE  15 mg Oral Daily    Followed by     [START ON 2022] predniSONE  10 mg Oral Daily    Followed by     [START ON 2023] predniSONE  5 mg Oral Daily     sennosides  1 tablet Oral BID     sodium chloride (PF)  10 mL Intracatheter Q8H     sulfamethoxazole-trimethoprim  1 tablet Oral Once per day on      tacrolimus  2 mg Oral BID IS     valGANciclovir  450 mg Oral Once per day on          Physical Exam   Temp  Av.8  F (37.7  C)  Min: 97.6  F (36.4  C)  Max: 102.7  F (39.3  C)      Pulse  Av.7  Min: 79  Max: 108 Resp  Avg: 15.1  Min: 10  Max: 21  SpO2  Av.3 %  Min: 89 %  Max: 98 %    CVP (mmHg): 8 mmHgBP (!) 132/91   Pulse 84   Temp 97.7  F (36.5  C) (Oral)   Resp 18   Ht 1.778 m (5' 10\")   Wt 88 kg (193 lb 14.4 oz)   SpO2 93%   BMI 27.82 kg/m     Date 22 0700 - 22 0659   Shift 4647-5904 7361-4613 2924-7919 24 Hour Total   INTAKE   I.V. 1116.5   1116.5   NG/GT 75   75   Shift Total(mL/kg) 1191.5(14.77)   1191.5(14.17)   OUTPUT   Urine 620   620   Emesis/NG output 75   75   Drains 140   140   Shift Total(mL/kg) 835(10.35)   835(9.93)   Weight (kg) 80.65 84.1 84.1 84.1      Admit Weight: 80.6 kg (177 lb 12.8 oz)     GENERAL APPEARANCE: alert and no distress  HENT: mouth without ulcers or lesions  LYMPHATICS: no cervical or supraclavicular nodes  RESP: lungs clear to auscultation - no rales, rhonchi or wheezes  CV: regular rhythm, normal rate, no rub, no murmur  EDEMA: trace LE edema bilaterally  ABDOMEN: soft, nondistended, nontender, bowel sounds normal  MS: extremities normal - no gross deformities noted, no evidence of inflammation in joints, no muscle " tenderness  SKIN: no rash  NEURO: normal strength and tone, sensory exam grossly normal, mentation intact and speech normal  PSYCH: mentation appears normal and affect normal/bright  TX KIDNEY: normal  DIALYSIS ACCESS: none    Data   CMP  Recent Labs   Lab 12/07/22  0649 12/06/22  0809 12/05/22  2146 12/05/22  1745 12/05/22  1729 12/05/22  1632 12/05/22  1508 12/05/22  1004 12/05/22  0635 12/04/22  2322 12/04/22  1906 12/04/22  0731    147*  --   --   --   --   --   --  142  --  142 146*   POTASSIUM 4.7 4.8  --   --  4.4  --  4.5   < > 4.4  4.4   < > 4.1 4.4   CHLORIDE 107 111*  --   --   --   --   --   --  105  --  105 106   CO2 21* 23  --   --   --   --   --   --  21*  --  21* 24   ANIONGAP 12 13  --   --   --   --   --   --  16*  --  16* 16*   * 126* 140* 153*  --    < >  --    < > 136*  --  237* 102*   BUN 64.9* 72.0*  --   --   --   --   --   --  64.8*  --  69.7* 81.4*   CR 3.90* 4.98*  --   --   --   --   --   --  4.98*  --  5.13* 5.99*   GFRESTIMATED 17* 13*  --   --   --   --   --   --  13*  --  13* 10*   LILIANA 7.3* 7.5*  --   --   --   --   --   --  6.9*  --  7.6* 9.1   MAG 2.1 2.2  --   --   --   --   --   --  1.3*  --  1.7  --    PHOS 5.0* 6.6*  --   --   --   --   --   --  3.7  --  3.6  --    PROTTOTAL  --   --   --   --   --   --   --   --   --   --   --  5.7*   ALBUMIN  --   --   --   --   --   --   --   --   --   --   --  3.9   BILITOTAL  --   --   --   --   --   --   --   --   --   --   --  0.6   ALKPHOS  --   --   --   --   --   --   --   --   --   --   --  50   AST  --   --   --   --   --   --   --   --   --   --   --  25   ALT  --   --   --   --   --   --   --   --   --   --   --  14    < > = values in this interval not displayed.     CBC  Recent Labs   Lab 12/07/22  0649 12/06/22  0809 12/05/22  1729 12/05/22  1508 12/05/22  1004 12/05/22  0635 12/04/22  2322 12/04/22  1906   HGB 7.0* 7.3* 7.7* 8.2*   < > 8.2*  8.2*   < > 7.9*   WBC 8.7 10.2  --   --   --  7.0  --  3.5*   RBC 2.46*  2.59*  --   --   --  2.86*  --  2.83*   HCT 23.7* 25.0*  --   --   --  27.9*  --  26.6*   MCV 96 97  --   --   --  98  --  94   MCH 28.5 28.2  --   --   --  28.7  --  27.9   MCHC 29.5* 29.2*  --   --   --  29.4*  --  29.7*   RDW 14.0 14.3  --   --   --  14.0  --  13.9   * 112*  --   --   --  128*  --  138*    < > = values in this interval not displayed.     INR  Recent Labs   Lab 12/04/22  0731   INR 1.02   PTT 25     ABGNo lab results found in last 7 days.   Urine Studies  Recent Labs   Lab Test 05/12/22  1220 11/02/21  1121 10/23/21  0526 11/09/15  1300   COLOR Straw Light Yellow Straw Yellow   APPEARANCE Clear Clear Clear Clear   URINEGLC Negative Negative Negative Negative   URINEBILI Negative Negative Negative Negative   URINEKETONE Negative Negative Negative Negative   SG 1.010 1.011 1.009 1.008   UBLD Small* Moderate* Negative Negative   URINEPH 6.0 6.0 7.0 5.5   PROTEIN 100* 70* 50* 10*   NITRITE Negative Negative Negative Negative   LEUKEST Negative Negative Negative Negative   RBCU 0 1 0 <1   WBCU 0 1 1 1     Recent Labs   Lab Test 10/23/21  0526 06/05/17  1253 01/25/16  1602 10/28/15  0702 10/13/15  1035   UTPG 1.37* 1.30* 0.36* 0.45* 0.55*     PTH  Recent Labs   Lab Test 10/27/21  0621 08/01/16  1638 01/25/16  1601 10/15/15  0520   PTHI 149* 90* 121* 434*     Iron Studies  Recent Labs   Lab Test 10/27/21  0621 10/17/15  0455   IRON 19* 13*   * 241   IRONSAT 10* 5*   TERRANCE 597*  --        IMAGING:  All imaging studies reviewed by me.

## 2022-12-07 NOTE — PROGRESS NOTES
Transplant Surgery  Inpatient Daily Progress Note  2022    Assessment & Plan: 53yo with history of CKD secondary to PCKD (not on dialysis), kidney transplants 10/2015 and 10/23/21 (compliacated by RV thrombosis and explant), HTN, and SILVIA. S/p  donor kidney re-transplant with ureteral stent, right native nephrectomy, and appendectomy on 22.    Graft function: POD #3  Kidney: Cr 5->3.9, stable, good UOP.  US: patent vessels.  US: patent vessels.    Immunosuppression management: PRA 67%  Thymo: 175 rodrigo-op, complicated by fever and rigors.  Basiliximab:  &   Steroids:  Taper per protocol.  MMF: 750mg BID  Tacro: Goal level 8-10  Complexity of management: High, induction    Hematology:   Anemia of chronic disease: Hgb 7, transfuse 1u RBC today. No sign of bleeding.   Thrombocytopenia: Plt 114. Secondary to medications.    Cardiorespiratory:   Hypoxia: Resolved.  HTN: On amlodipine. Diurese.    GI/Nutrition:   Diet: ADAT to regular diet.  Bowel regimen: Senna, PEG    Endocrine:   Steroid induced hyperglycemia: A1c 4.2%. Resolved.    Fluid/Electrolytes: MIVF: stop  Hypomagnesemia: Continue MagOx.  Hypocalcemia: Start PO calcium carb.  Hyperphosphatemia: Continue calcium acetate.  Hypervolemia: Lasix today.    : Remove spears and check PVR    Infectious disease: Afebrile    Prophylaxis: DVT, fall, GI, viral (Valcyte), pneumocystis (Bactrim)    Disposition: 7A. Discharge tomorrow.    Medical Decision Making: Medium  Subsequent visit 04443 (moderate level decision making)    JATINDER/Fellow/Resident Provider: Radha Amezcua NP [id number]    Faculty: Fahad Ro MD   _________________________________________________________________  Transplant History:   2022 (Kidney), 10/13/2015 (Kidney), 10/23/2021 (Kidney), Postoperative day: 2612 (Kidney), 3 (Kidney)     Interval History: History is obtained from the patient  Overnight events: + flatus, tolerating full liquids, pain controlled.    ROS:   A  "10-point review of systems was negative except as noted above.    Meds:   acetaminophen  975 mg Oral Q8H    amLODIPine  5 mg Oral Daily    atorvastatin  10 mg Oral Daily    [START ON 12/9/2022] basiliximab (SIMULECT) infusion  20 mg Intravenous Once    calcium acetate (phos binder)  667 mg Oral TID w/meals    calcium carbonate  1,000 mg Oral BID    citalopram  20 mg Oral Daily    famotidine  10 mg Oral Daily    furosemide  80 mg Intravenous BID    lidocaine  2 patch Transdermal Q24H    lidocaine   Transdermal Q8H DONITA    magnesium oxide  400 mg Oral Daily with lunch    mycophenolate  750 mg Oral BID IS    polyethylene glycol  17 g Oral Daily    predniSONE  60 mg Oral Daily    Followed by    [START ON 12/9/2022] predniSONE  40 mg Oral Daily    Followed by    [START ON 12/11/2022] predniSONE  20 mg Oral Daily    Followed by    [START ON 12/18/2022] predniSONE  15 mg Oral Daily    Followed by    [START ON 12/25/2022] predniSONE  10 mg Oral Daily    Followed by    [START ON 1/1/2023] predniSONE  5 mg Oral Daily    sennosides  1 tablet Oral BID    sodium chloride (PF)  10 mL Intracatheter Q8H    sulfamethoxazole-trimethoprim  1 tablet Oral Once per day on Mon Wed Fri    tacrolimus  2 mg Oral BID IS    valGANciclovir  450 mg Oral Once per day on Mon Thu       Physical Exam:     Admit Weight: 80.6 kg (177 lb 12.8 oz)    Current vitals:   BP (!) 140/96   Pulse 89   Temp 97.9  F (36.6  C) (Oral)   Resp 16   Ht 1.778 m (5' 10\")   Wt 88 kg (193 lb 14.4 oz)   SpO2 98%   BMI 27.82 kg/m      Vital sign ranges:    Temp:  [97.5  F (36.4  C)-98.5  F (36.9  C)] 97.9  F (36.6  C)  Pulse:  [63-89] 89  Resp:  [16-18] 16  BP: (135-155)/() 140/96  SpO2:  [93 %-98 %] 98 %    General Appearance: in no apparent distress.   Skin: Warm, perfused  Heart: perfused  Lungs: Unlabored on RA  Abdomen: The abdomen is Soft, incision CDI. NATHEN serosang, leaking.  : Remove spears, yellow urine  Extremities: edema: none, strength " 5/5  Neurologic: A&Ox4    Data:   CMP  Recent Labs   Lab 12/07/22  0649 12/06/22  0809 12/04/22  1906 12/04/22  0731    147*   < > 146*   POTASSIUM 4.7 4.8   < > 4.4   CHLORIDE 107 111*   < > 106   CO2 21* 23   < > 24   * 126*   < > 102*   BUN 64.9* 72.0*   < > 81.4*   CR 3.90* 4.98*   < > 5.99*   GFRESTIMATED 17* 13*   < > 10*   LILIANA 7.3* 7.5*   < > 9.1   ICAW  --  4.0*  --   --    MAG 2.1 2.2   < >  --    PHOS 5.0* 6.6*   < >  --    ALBUMIN  --   --   --  3.9   BILITOTAL  --   --   --  0.6   ALKPHOS  --   --   --  50   AST  --   --   --  25   ALT  --   --   --  14    < > = values in this interval not displayed.     CBC  Recent Labs   Lab 12/07/22  0649 12/06/22  0809 12/04/22  1906 12/04/22  0731   HGB 7.0* 7.3*   < > 9.9*   WBC 8.7 10.2   < > 4.2   * 112*   < > 189   A1C  --   --   --  4.2    < > = values in this interval not displayed.     Attestation:    The patient has been seen with team and evaluated by me.   Vital signs, labs, medications and orders were reviewed.   When obtained, diagnostic images were reviewed by me and interpreted as above.    The care plan was discussed with the multidisciplinary team and I agree with the findings and plan in this note, with any differences recorded in blue.    Immunosuppressive medication management was reviewed and adjusted as reflected in the note and orders.     .

## 2022-12-07 NOTE — PROVIDER NOTIFICATION
"Paged on-call at [phone]:    \"ANDRES Pierce ([id number])  FYI: pt hypertensive 155/101, other vitals WDL. No PRNs ordered.  Please advise.  Thanks.  Reji Raya RN ([facility])  [phone] (unit 7A)\"    1x IV labetalol dose ordered.  "

## 2022-12-08 VITALS
DIASTOLIC BLOOD PRESSURE: 87 MMHG | OXYGEN SATURATION: 98 % | HEIGHT: 70 IN | TEMPERATURE: 98.1 F | HEART RATE: 67 BPM | BODY MASS INDEX: 26.84 KG/M2 | RESPIRATION RATE: 16 BRPM | SYSTOLIC BLOOD PRESSURE: 135 MMHG | WEIGHT: 187.5 LBS

## 2022-12-08 DIAGNOSIS — Z48.298 AFTERCARE FOLLOWING ORGAN TRANSPLANT: ICD-10-CM

## 2022-12-08 DIAGNOSIS — Z79.899 ENCOUNTER FOR LONG-TERM CURRENT USE OF MEDICATION: ICD-10-CM

## 2022-12-08 DIAGNOSIS — Z94.0 KIDNEY REPLACED BY TRANSPLANT: Primary | ICD-10-CM

## 2022-12-08 DIAGNOSIS — Z20.828 CONTACT WITH AND (SUSPECTED) EXPOSURE TO OTHER VIRAL COMMUNICABLE DISEASES: ICD-10-CM

## 2022-12-08 DIAGNOSIS — Z48.298 AFTERCARE FOLLOWING ORGAN TRANSPLANT: Primary | ICD-10-CM

## 2022-12-08 DIAGNOSIS — R63.8 OTHER SYMPTOMS AND SIGNS CONCERNING FOOD AND FLUID INTAKE: ICD-10-CM

## 2022-12-08 LAB
ANION GAP SERPL CALCULATED.3IONS-SCNC: 9 MMOL/L (ref 7–15)
BASOPHILS # BLD AUTO: 0 10E3/UL (ref 0–0.2)
BASOPHILS NFR BLD AUTO: 0 %
BUN SERPL-MCNC: 59 MG/DL (ref 6–20)
CALCIUM SERPL-MCNC: 8.6 MG/DL (ref 8.6–10)
CHLORIDE SERPL-SCNC: 104 MMOL/L (ref 98–107)
CREAT SERPL-MCNC: 3.14 MG/DL (ref 0.67–1.17)
DEPRECATED HCO3 PLAS-SCNC: 25 MMOL/L (ref 22–29)
EOSINOPHIL # BLD AUTO: 0 10E3/UL (ref 0–0.7)
EOSINOPHIL NFR BLD AUTO: 0 %
ERYTHROCYTE [DISTWIDTH] IN BLOOD BY AUTOMATED COUNT: 14 % (ref 10–15)
GFR SERPL CREATININE-BSD FRML MDRD: 23 ML/MIN/1.73M2
GLUCOSE SERPL-MCNC: 101 MG/DL (ref 70–99)
HCT VFR BLD AUTO: 27.8 % (ref 40–53)
HGB BLD-MCNC: 8.6 G/DL (ref 13.3–17.7)
HOLD SPECIMEN: NORMAL
IMM GRANULOCYTES # BLD: 0.1 10E3/UL
IMM GRANULOCYTES NFR BLD: 1 %
LYMPHOCYTES # BLD AUTO: 0.2 10E3/UL (ref 0.8–5.3)
LYMPHOCYTES NFR BLD AUTO: 3 %
MAGNESIUM SERPL-MCNC: 2.2 MG/DL (ref 1.7–2.3)
MCH RBC QN AUTO: 28.8 PG (ref 26.5–33)
MCHC RBC AUTO-ENTMCNC: 30.9 G/DL (ref 31.5–36.5)
MCV RBC AUTO: 93 FL (ref 78–100)
MONOCYTES # BLD AUTO: 0.4 10E3/UL (ref 0–1.3)
MONOCYTES NFR BLD AUTO: 5 %
NEUTROPHILS # BLD AUTO: 7 10E3/UL (ref 1.6–8.3)
NEUTROPHILS NFR BLD AUTO: 91 %
NRBC # BLD AUTO: 0 10E3/UL
NRBC BLD AUTO-RTO: 0 /100
PHOSPHATE SERPL-MCNC: 4 MG/DL (ref 2.5–4.5)
PLATELET # BLD AUTO: 139 10E3/UL (ref 150–450)
POTASSIUM SERPL-SCNC: 4.6 MMOL/L (ref 3.4–5.3)
RBC # BLD AUTO: 2.99 10E6/UL (ref 4.4–5.9)
SODIUM SERPL-SCNC: 138 MMOL/L (ref 136–145)
WBC # BLD AUTO: 7.6 10E3/UL (ref 4–11)

## 2022-12-08 PROCEDURE — 80048 BASIC METABOLIC PNL TOTAL CA: CPT | Performed by: SURGERY

## 2022-12-08 PROCEDURE — 250N000013 HC RX MED GY IP 250 OP 250 PS 637: Performed by: SURGERY

## 2022-12-08 PROCEDURE — 36415 COLL VENOUS BLD VENIPUNCTURE: CPT | Performed by: NURSE PRACTITIONER

## 2022-12-08 PROCEDURE — 36592 COLLECT BLOOD FROM PICC: CPT | Performed by: SURGERY

## 2022-12-08 PROCEDURE — 84100 ASSAY OF PHOSPHORUS: CPT | Performed by: SURGERY

## 2022-12-08 PROCEDURE — 99233 SBSQ HOSP IP/OBS HIGH 50: CPT | Mod: 24 | Performed by: INTERNAL MEDICINE

## 2022-12-08 PROCEDURE — 250N000012 HC RX MED GY IP 250 OP 636 PS 637: Performed by: NURSE PRACTITIONER

## 2022-12-08 PROCEDURE — 250N000013 HC RX MED GY IP 250 OP 250 PS 637: Performed by: INTERNAL MEDICINE

## 2022-12-08 PROCEDURE — 87521 HEPATITIS C PROBE&RVRS TRNSC: CPT | Performed by: NURSE PRACTITIONER

## 2022-12-08 PROCEDURE — 250N000013 HC RX MED GY IP 250 OP 250 PS 637: Performed by: NURSE PRACTITIONER

## 2022-12-08 PROCEDURE — 85025 COMPLETE CBC W/AUTO DIFF WBC: CPT | Performed by: SURGERY

## 2022-12-08 PROCEDURE — 83735 ASSAY OF MAGNESIUM: CPT | Performed by: SURGERY

## 2022-12-08 RX ORDER — POLYETHYLENE GLYCOL 3350 17 G/17G
17 POWDER, FOR SOLUTION ORAL DAILY PRN
Qty: 510 G | Refills: 0 | Status: SHIPPED | OUTPATIENT
Start: 2022-12-08 | End: 2023-01-18

## 2022-12-08 RX ORDER — PREDNISONE 10 MG/1
TABLET ORAL
Qty: 43 TABLET | Refills: 0 | Status: SHIPPED | OUTPATIENT
Start: 2022-12-09 | End: 2023-01-08

## 2022-12-08 RX ORDER — VALGANCICLOVIR 450 MG/1
TABLET, FILM COATED ORAL
Qty: 60 TABLET | Refills: 5 | Status: SHIPPED | OUTPATIENT
Start: 2022-12-08 | End: 2022-12-16

## 2022-12-08 RX ORDER — MAGNESIUM OXIDE 400 MG/1
400 TABLET ORAL
Qty: 30 TABLET | Refills: 11 | Status: SHIPPED | OUTPATIENT
Start: 2022-12-08 | End: 2023-01-04

## 2022-12-08 RX ORDER — SENNOSIDES 8.6 MG
1-2 TABLET ORAL 2 TIMES DAILY
Qty: 60 TABLET | Refills: 0 | Status: SHIPPED | OUTPATIENT
Start: 2022-12-08 | End: 2023-01-18

## 2022-12-08 RX ORDER — OXYCODONE HYDROCHLORIDE 5 MG/1
5 TABLET ORAL EVERY 6 HOURS PRN
Qty: 5 TABLET | Refills: 0 | Status: SHIPPED | OUTPATIENT
Start: 2022-12-08 | End: 2023-01-18

## 2022-12-08 RX ORDER — TACROLIMUS 0.5 MG/1
CAPSULE ORAL
Qty: 60 CAPSULE | Refills: 0 | Status: SHIPPED | OUTPATIENT
Start: 2022-12-08 | End: 2023-02-06

## 2022-12-08 RX ORDER — ACETAMINOPHEN 325 MG/1
325-650 TABLET ORAL EVERY 4 HOURS PRN
Qty: 100 TABLET | Refills: 0 | Status: SHIPPED | OUTPATIENT
Start: 2022-12-08

## 2022-12-08 RX ORDER — TACROLIMUS 1 MG/1
3 CAPSULE ORAL 2 TIMES DAILY
Qty: 180 CAPSULE | Refills: 11 | Status: SHIPPED | OUTPATIENT
Start: 2022-12-08 | End: 2022-12-09

## 2022-12-08 RX ORDER — SULFAMETHOXAZOLE AND TRIMETHOPRIM 400; 80 MG/1; MG/1
1 TABLET ORAL DAILY
Qty: 30 TABLET | Refills: 11 | Status: SHIPPED | OUTPATIENT
Start: 2022-12-08 | End: 2023-01-06

## 2022-12-08 RX ORDER — SULFAMETHOXAZOLE AND TRIMETHOPRIM 400; 80 MG/1; MG/1
1 TABLET ORAL
Qty: 13 TABLET | Refills: 11 | Status: SHIPPED | OUTPATIENT
Start: 2022-12-09 | End: 2022-12-08

## 2022-12-08 RX ORDER — SULFAMETHOXAZOLE AND TRIMETHOPRIM 400; 80 MG/1; MG/1
1 TABLET ORAL DAILY
Status: DISCONTINUED | OUTPATIENT
Start: 2022-12-09 | End: 2022-12-08 | Stop reason: HOSPADM

## 2022-12-08 RX ORDER — CALCIUM CARBONATE 500 MG/1
2 TABLET, CHEWABLE ORAL 2 TIMES DAILY
Qty: 60 TABLET | Refills: 1 | Status: SHIPPED | OUTPATIENT
Start: 2022-12-08 | End: 2023-01-18

## 2022-12-08 RX ORDER — FAMOTIDINE 10 MG
10 TABLET ORAL DAILY
Qty: 30 TABLET | Refills: 0 | Status: SHIPPED | OUTPATIENT
Start: 2022-12-09 | End: 2023-01-18

## 2022-12-08 RX ORDER — VALGANCICLOVIR 450 MG/1
450 TABLET, FILM COATED ORAL EVERY OTHER DAY
Status: DISCONTINUED | OUTPATIENT
Start: 2022-12-10 | End: 2022-12-08 | Stop reason: HOSPADM

## 2022-12-08 RX ADMIN — TACROLIMUS 3 MG: 1 CAPSULE ORAL at 08:44

## 2022-12-08 RX ADMIN — OXYCODONE HYDROCHLORIDE 5 MG: 5 TABLET ORAL at 10:30

## 2022-12-08 RX ADMIN — AMLODIPINE BESYLATE 5 MG: 5 TABLET ORAL at 08:44

## 2022-12-08 RX ADMIN — MAGNESIUM OXIDE TAB 400 MG (241.3 MG ELEMENTAL MG) 400 MG: 400 (241.3 MG) TAB at 12:56

## 2022-12-08 RX ADMIN — CALCIUM CARBONATE (ANTACID) CHEW TAB 500 MG 1000 MG: 500 CHEW TAB at 10:32

## 2022-12-08 RX ADMIN — ACETAMINOPHEN 975 MG: 325 TABLET, FILM COATED ORAL at 08:44

## 2022-12-08 RX ADMIN — VALGANCICLOVIR HYDROCHLORIDE 450 MG: 450 TABLET ORAL at 08:53

## 2022-12-08 RX ADMIN — ATORVASTATIN CALCIUM 10 MG: 10 TABLET, FILM COATED ORAL at 08:44

## 2022-12-08 RX ADMIN — MYCOPHENOLATE MOFETIL 750 MG: 250 CAPSULE ORAL at 08:44

## 2022-12-08 RX ADMIN — CITALOPRAM HYDROBROMIDE 20 MG: 10 TABLET ORAL at 08:44

## 2022-12-08 RX ADMIN — PREDNISONE 60 MG: 20 TABLET ORAL at 08:59

## 2022-12-08 RX ADMIN — FAMOTIDINE 10 MG: 10 TABLET, FILM COATED ORAL at 08:44

## 2022-12-08 ASSESSMENT — ACTIVITIES OF DAILY LIVING (ADL)
ADLS_ACUITY_SCORE: 39

## 2022-12-08 NOTE — PLAN OF CARE
Patient's Name: Raj Pierce    Procedure Date: 12/08/22  Procedure Time 11:30      Procedure Performed: Central line removal     The Central Venous Catheter (CVC) was removed per provider order.     The central venous catheter was located: Right Internal Jugular vein, with a total of 3   lumens.     The patient was placed in Supine position with Insertion site lower than heart.     Valsalva response achieved by: Patient instructed to take a deep breath and bear down while the CVC was removed with a constant steady motion.     Firm pressure was applied at the access site for 10 minutes until bleeding stopped.     Post removal site assessment; Clean and dry without bleeding. Occlusive dressing applied: Yes    CVC tip was inspected and intact: No    Tip was sent to lab for culture per provider order: No    Patient tolerated the procedure: well    2nd RN present during removal (if applicable) n/a        LUCA JADE RN   12/8/2022   11:19 AM

## 2022-12-08 NOTE — PLAN OF CARE
"Vitals: /87 (BP Location: Right arm)   Pulse 67   Temp 98.1  F (36.7  C) (Oral)   Resp 16   Ht 1.778 m (5' 10\")   Wt 85 kg (187 lb 8 oz)   SpO2 98%   BMI 26.90 kg/m      Endocrine: n/a  Labs: Stable labs.  Pain: Minimal abdominal pain.  PRN's: Oxycodone 5 mg X1.  Diet: Regular diet, good appetite.  LDA: Cental line DC'd.  GI: BM 12/8.  : Voids, good urine output, not saving today.  Skin: Abdominal incision with staples.  Neuro: Alert and oriented.  Mobility: Up ad rowan.  Education: Medication card and lab book updated.  Plan: Discharge this afternoon to local hotel. Prescriptions filled per [facility], report called to [facility] for 0745 am appointment 12/9.                          "

## 2022-12-08 NOTE — PLAN OF CARE
Neuro: A&Ox4  Cardiac: VSS.   Respiratory: Sating >95% on RA. LS clear  GI/: Adequate urine output. BM X1 - loose/diarrhea  Diet/appetite: Tolerating regular diet. Eating well.  Activity: independent in room and bathroom  Pain: At acceptable level on current regimen.   Skin: No new deficits noted. NATHEN drain leaking at insertion site, dressing changed x 1 this shift  LDA's: right triple lumen internal jugular, right abd NATHEN    Plan: Continue with POC. Plan to discharge home tomorrow. Notify primary team with changes.

## 2022-12-08 NOTE — PROGRESS NOTES
[facility]  Transplant Nephrology Follow Up  Date of Admission:  12/4/2022  Today's Date: 12/08/2022  Requesting physician: George Zhu*    Recommendations:  -No new recommendations. Ok to discharge    Assessment & Plan   # DDKT: Trend down    - Baseline Creatinine: ~ TBD   - Proteinuria: Not checked post transplant   - Date DSA Last Checked: Dec/2022      Latest DSA: No DSA at time of transplant   - BK Viremia: Not checked post transplant   - Kidney Tx Biopsy: No    -Double J stent placed at time of kidney transplant. Remove 4-6 weeks post transplant.   -Of note, had CKD V of functional RLQ kidney transplant from 2015 at time of transplant    # PKD:    -S/p R native nephrectomy at time of transplant   -If no previous MRA of brain (cannot find in records) would pursue MRA brain as outpatient    # Immunosuppression: Tacrolimus immediate release (goal 8-10), Mycophenolate mofetil (dose 750 mg every 12 hours) and Prednisone (dose taper)    -Induction: intermediate intensity   - Changes: Not at this time    # Infection Prophylaxis:   - PJP: Sulfa/TMP (Bactrim) .Bactrim was stopped after 10/2021 transplant due to hyperK, not allergy  - CMV: Valganciclovir (Valcyte). Patient is CMV IgG Ab discordant (D+/R-) and will continue on Valcyte x 6 months, then check CMV PCR monthly until 12 months post transplant.    # Hypertension: Controlled;  Goal BP: < 150/90   - Volume status: Moderately hypervolemic     - Changes: Not at this time. Continue amlodipine 5mg daily     # Elevated Blood Glucose: Glucose generally running ~ 110-150s   - Management as per primary team.    # Anemia in Chronic Renal Disease: Hgb: Trend up      NADIYA: No   - Iron studies: Unknown at this time, but checked with dialysis    -S/p 1U PRBC on 12/7    # Mineral Bone Disorder:   - Secondary renal hyperparathyroidism; PTH level: Unknown at this time, but checked with dialysis        On treatment: None.    - Vitamin D; level: Unknown at this time, but checked with dialysis        On supplement: No  - Calcium; level: Normal, but trend up        On supplement: Yes tums 1g BID  - Phosphorus; level: Normal but trend down       On supplement: No    # Electrolytes:   - Potassium; level: Normal        On supplement: No  - Magnesium; level: Normal        On supplement: Yes, mag ox 400mg daily    - Bicarbonate; level: Normal        On supplement: No    # Hypocalcemia:     -improved on tums 1g BID. Continue. Obtain PTH as outpatient when kidney function improving and consider calcitriol if phos also low    # Transplant History:  Etiology of Kidney Failure: Polycystic kidney disease (PKD)  Tx: DDKT  Transplant: 12/4/2022 (Kidney), 10/13/2015 (Kidney), 10/23/2021 (Kidney)  Crossmatch at time of Tx: negative  DSA at time of Tx: No  Significant changes in immunosuppression: None  Significant transplant-related complications: None    Recommendations were communicated to the primary team verbally.    Georges Quach MD  Pager: [phone]    Interval events:  Mr. Pierce's creatinine is 3.14 (12/08 0732); Trend down.  4.8L urine output.  Other significant labs/tests/vitals: calcium improving, BP improving  No events overnight.  No chest pain or shortness of breath.  Resolved leg swelling.  No nausea and vomiting.  Bowel movements are now present.  No fever, sweats or chills.        Review of Systems    The 4 point Review of Systems is negative other than noted above or here.     Past Medical History    I have reviewed this patient's medical history and updated it with pertinent information if needed.   Past Medical History:   Diagnosis Date     Acidosis      Chronic kidney disease, stage IV (severe) (H)      Disorders of phosphorus metabolism      History of blood transfusion      HTN (hypertension)     15 years     Hyperparathyroidism      Kidney replaced by transplant 10/23/2021    DCD re-transplant. Induction with thymoglobulin  6mg/kg and steroids.     Kidney replaced by transplant 10/13/2015     Polycystic kidney, autosomal dominant      Pure hypercholesterolemia      Sleep apnea     cpap     Vitamin D deficiency        Past Surgical History   I have reviewed this patient's surgical history and updated it with pertinent information if needed.  Past Surgical History:   Procedure Laterality Date     BENCH KIDNEY N/A 10/13/2015    Procedure: BENCH KIDNEY;  Surgeon: Dariel Chavez MD;  Location: [facility]     BRONCHOSCOPY, DILATE BRONCHUS, STENT BRONCHUS, COMBINED  10/27/2021    Procedure: Bronchoscopy, dilate bronchus, stent bronchus, combined;  Surgeon: Madina Warren MD;  [facility]     CYSTOSCOPY, REMOVE STENT(S), COMBINED Right 2015    Procedure: COMBINED CYSTOSCOPY, REMOVE STENT(S);  Surgeon: Dariel Chavez MD;  [facility]     HERNIORRHAPHY INGUINAL CHILD      right     LAPAROTOMY EXPLORATORY N/A 10/27/2021    Procedure: Exploratory left lower quadrant of kidney transplant, back table flush, reimplantation of kidney transplant, with ureteral stent removal and placement of a new ureteral stent, intraoperative ultrasound, intraoperative of transit time flow measurment (VeriQ);  Surgeon: Madina Warren MD;  [facility]     NEPHRECTOMY Left 2021    Procedure: NEPHRECTOMY, LEFT, Ureteral stent explant;  Surgeon: Eulalio Emanule MD;  [facility]     THROMBECTOMY ABDOMEN N/A 10/27/2021    Procedure: Thrombectomy of iliac and renal veins;  Surgeon: Madina Warren MD;  [facility]     TONSILLECTOMY       TRANSPLANT KIDNEY RECIPIENT  DONOR N/A 10/13/2015    Procedure: TRANSPLANT KIDNEY RECIPIENT  DONOR;  Surgeon: Dariel Chavez MD;  [facility]     TRANSPLANT KIDNEY RECIPIENT  DONOR N/A 10/23/2021    Procedure: TRANSPLANT, KIDNEY, RECIPIENT,  DONOR, URETERAL STENT PLACEMENT;  Surgeon: Madina Warren MD;  [facility]      "TRANSPLANT KIDNEY RECIPIENT  DONOR N/A 2022    Procedure: TRANSPLANT, KIDNEY,  DONOR- with ureteral stent placement, Recipient right nephrectomy, Appendectomy;  Surgeon: George Zhu MD;  [facility]       Family History   I have reviewed this patient's family history and updated it with pertinent information if needed.   Family History   Problem Relation Age of Onset     Gastrointestinal Disease Mother         ESRD PCKD     Hypertension Mother      C.A.D. Father      Hypertension Father      Gastrointestinal Disease Sister         PCKD       Social History   I have reviewed this patient's social history and updated it with pertinent information if needed. Raj Pierce  reports that he has never smoked. He has never used smokeless tobacco. He reports current alcohol use of about 10.0 standard drinks per week. He reports that he does not use drugs.    Allergies   Allergies   Allergen Reactions     Isopropyl Alcohol Rash     Swabs used at hospital     Anti-Thymocyte Globulin (Rabbit) [Antithymocyte Globulin (Equine)] Other (See Comments)     Fever and rigors with rodrigo-op dose. Received methylprednisolone 500mg prior to thymo.     Prior to Admission Medications         Physical Exam   Temp  Av.8  F (37.7  C)  Min: 97.6  F (36.4  C)  Max: 102.7  F (39.3  C)      Pulse  Av.7  Min: 79  Max: 108 Resp  Avg: 15.1  Min: 10  Max: 21  SpO2  Av.3 %  Min: 89 %  Max: 98 %    CVP (mmHg): 8 mmHgBP 135/87 (BP Location: Right arm)   Pulse 67   Temp 98.1  F (36.7  C) (Oral)   Resp 16   Ht 1.778 m (5' 10\")   Wt 85 kg (187 lb 8 oz)   SpO2 98%   BMI 26.90 kg/m     Date 22 0700 - 22 0659   Shift 4189-8344 4693-2576 3251-8347 24 Hour Total   INTAKE   I.V. 1116.5   1116.5   NG/GT 75   75   Shift Total(mL/kg) 1191.5(14.77)   1191.5(14.17)   OUTPUT   Urine 620   620   Emesis/NG output 75   75   Drains 140   140   Shift Total(mL/kg) 835(10.35)   835(9.93) "   Weight (kg) 80.65 84.1 84.1 84.1      Admit Weight: 80.6 kg (177 lb 12.8 oz)     GENERAL APPEARANCE: alert and no distress  HENT: mouth without ulcers or lesions  LYMPHATICS: no cervical or supraclavicular nodes  RESP: lungs clear to auscultation - no rales, rhonchi or wheezes  CV: regular rhythm, normal rate, no rub, no murmur  EDEMA: trace LE edema bilaterally  ABDOMEN: soft, nondistended, nontender, bowel sounds normal  MS: extremities normal - no gross deformities noted, no evidence of inflammation in joints, no muscle tenderness  SKIN: no rash  NEURO: normal strength and tone, sensory exam grossly normal, mentation intact and speech normal  PSYCH: mentation appears normal and affect normal/bright  TX KIDNEY: normal  DIALYSIS ACCESS: none    Data   CMP  Recent Labs   Lab 12/08/22  0732 12/07/22  0649 12/06/22  0809 12/05/22  2146 12/05/22  1745 12/05/22  1729 12/05/22  1004 12/05/22  0635 12/04/22  1906 12/04/22  0731    140 147*  --   --   --   --  142   < > 146*   POTASSIUM 4.6 4.7 4.8  --   --  4.4   < > 4.4  4.4   < > 4.4   CHLORIDE 104 107 111*  --   --   --   --  105   < > 106   CO2 25 21* 23  --   --   --   --  21*   < > 24   ANIONGAP 9 12 13  --   --   --   --  16*   < > 16*   * 111* 126* 140*   < >  --    < > 136*   < > 102*   BUN 59.0* 64.9* 72.0*  --   --   --   --  64.8*   < > 81.4*   CR 3.14* 3.90* 4.98*  --   --   --   --  4.98*   < > 5.99*   GFRESTIMATED 23* 17* 13*  --   --   --   --  13*   < > 10*   LILIANA 8.6 7.3* 7.5*  --   --   --   --  6.9*   < > 9.1   MAG 2.2 2.1 2.2  --   --   --   --  1.3*   < >  --    PHOS 4.0 5.0* 6.6*  --   --   --   --  3.7   < >  --    PROTTOTAL  --   --   --   --   --   --   --   --   --  5.7*   ALBUMIN  --   --   --   --   --   --   --   --   --  3.9   BILITOTAL  --   --   --   --   --   --   --   --   --  0.6   ALKPHOS  --   --   --   --   --   --   --   --   --  50   AST  --   --   --   --   --   --   --   --   --  25   ALT  --   --   --   --   --    --   --   --   --  14    < > = values in this interval not displayed.     CBC  Recent Labs   Lab 12/08/22  0732 12/07/22  0649 12/06/22  0809 12/05/22  1729 12/05/22  1004 12/05/22  0635   HGB 8.6* 7.0* 7.3* 7.7*   < > 8.2*  8.2*   WBC 7.6 8.7 10.2  --   --  7.0   RBC 2.99* 2.46* 2.59*  --   --  2.86*   HCT 27.8* 23.7* 25.0*  --   --  27.9*   MCV 93 96 97  --   --  98   MCH 28.8 28.5 28.2  --   --  28.7   MCHC 30.9* 29.5* 29.2*  --   --  29.4*   RDW 14.0 14.0 14.3  --   --  14.0   * 114* 112*  --   --  128*    < > = values in this interval not displayed.     INR  Recent Labs   Lab 12/04/22  0731   INR 1.02   PTT 25     ABGNo lab results found in last 7 days.   Urine Studies  Recent Labs   Lab Test 05/12/22  1220 11/02/21  1121 10/23/21  0526 11/09/15  1300   COLOR Straw Light Yellow Straw Yellow   APPEARANCE Clear Clear Clear Clear   URINEGLC Negative Negative Negative Negative   URINEBILI Negative Negative Negative Negative   URINEKETONE Negative Negative Negative Negative   SG 1.010 1.011 1.009 1.008   UBLD Small* Moderate* Negative Negative   URINEPH 6.0 6.0 7.0 5.5   PROTEIN 100* 70* 50* 10*   NITRITE Negative Negative Negative Negative   LEUKEST Negative Negative Negative Negative   RBCU 0 1 0 <1   WBCU 0 1 1 1     Recent Labs   Lab Test 10/23/21  0526 06/05/17  1253 01/25/16  1602 10/28/15  0702 10/13/15  1035   UTPG 1.37* 1.30* 0.36* 0.45* 0.55*     PTH  Recent Labs   Lab Test 10/27/21  0621 08/01/16  1638 01/25/16  1601 10/15/15  0520   PTHI 149* 90* 121* 434*     Iron Studies  Recent Labs   Lab Test 10/27/21  0621 10/17/15  0455   IRON 19* 13*   * 241   IRONSAT 10* 5*   TERRANCE 597*  --        IMAGING:  All imaging studies reviewed by me.

## 2022-12-08 NOTE — PLAN OF CARE
"BP (!) 142/100 (BP Location: Right arm)   Pulse 65   Temp 97.7  F (36.5  C) (Oral)   Resp 16   Ht 1.778 m (5' 10\")   Wt 88 kg (193 lb 14.4 oz)   SpO2 95%   BMI 27.82 kg/m      Shift: 4812-6243  VS: stable on RA , afebrile  Cardiac: WDL  Neuro: Aox4, calm and cooperative w/ cares   Labs: creatinine 3.9, trending down   Respiratory: non labored breathing, denies SOB   Pain/Nausea/PRN: denies nausea, some mild incisional pain gave scheuled tylenol  Diet: regular   LDA: R NATHEN-bulb suction 75cc serosang ouput, R triple lumen internal jugular SL, L AVF   GI/: 1 bm tonight, voiding adequate amts   Skin: midline stapled incision BRIJESH no drainage   Mobility: UAL  Plan: Pt is planned to discharge home today     Will give report to oncoming nurse. Pt left in stable condition, care relinquished at this time.                           "

## 2022-12-08 NOTE — PROGRESS NOTES
Care Management Discharge Note    Discharge Date: 12/08/2022       Discharge Disposition: Home    Discharge Services:  Home care/OP labs    Discharge DME:  none  Discharge Transportation: family or friend will provide    Private pay costs discussed: Not applicable    PAS Confirmation Code:    Patient/family educated on Medicare website which has current facility and service quality ratings:      Education Provided on the Discharge Plan:  yes  Persons Notified of Discharge Plans: patient  Patient/Family in Agreement with the Plan:  yes    Handoff Referral Completed: Yes    Additional Information:  RNCC send ATC appointment request via email this morning.     Patient has appointments on 12/9. Patient is staying at [facility] in [place] with spouse.     Patient will then continue with outpatient labs.     ANTHONY Rutherford, ROWENAN., RN  Nurse Care Coordinator  Pager: [phone] - RN[facility]  Social Work and Care Management Department   [address] [place] [id number]  [email or web address]  Employed by [facility]     SEARCHABLE in [employer] - search CARE COORDINATOR     [place] [address] [place] (0800-1630) Saturday & Sunday; (0800-1630) [facility] Recognized Holidays     Units: 4A, 4C, 4E, 5A & 5B   Pager: [phone]    Units: 6A & 6B    Pager: [phone]    Units: 6C & 6D   Pager: [phone]    Units: 7A, 7B, 7C, 7D & 5C    Pager: [phone]    Units: [facility] ED, 5 Ortho, 5 Med/Surg, 6 Med/Surg, 8A, 10 ICU, & [facility]    Pager: [phone]      ]

## 2022-12-09 ENCOUNTER — APPOINTMENT (OUTPATIENT)
Dept: LAB | Facility: CLINIC | Age: 52
End: 2022-12-09
Attending: SURGERY
Payer: COMMERCIAL

## 2022-12-09 ENCOUNTER — INFUSION THERAPY VISIT (OUTPATIENT)
Dept: INFUSION THERAPY | Facility: CLINIC | Age: 52
End: 2022-12-09
Attending: NURSE PRACTITIONER
Payer: COMMERCIAL

## 2022-12-09 ENCOUNTER — TELEPHONE (OUTPATIENT)
Dept: TRANSPLANT | Facility: CLINIC | Age: 52
End: 2022-12-09

## 2022-12-09 VITALS
RESPIRATION RATE: 16 BRPM | SYSTOLIC BLOOD PRESSURE: 168 MMHG | OXYGEN SATURATION: 97 % | HEART RATE: 67 BPM | BODY MASS INDEX: 26.56 KG/M2 | TEMPERATURE: 97.9 F | DIASTOLIC BLOOD PRESSURE: 110 MMHG | WEIGHT: 185.1 LBS

## 2022-12-09 VITALS — RESPIRATION RATE: 18 BRPM | SYSTOLIC BLOOD PRESSURE: 165 MMHG | HEART RATE: 64 BPM | DIASTOLIC BLOOD PRESSURE: 99 MMHG

## 2022-12-09 DIAGNOSIS — Z94.0 KIDNEY REPLACED BY TRANSPLANT: ICD-10-CM

## 2022-12-09 DIAGNOSIS — Z94.0 KIDNEY TRANSPLANTED: ICD-10-CM

## 2022-12-09 DIAGNOSIS — Z48.298 AFTERCARE FOLLOWING ORGAN TRANSPLANT: ICD-10-CM

## 2022-12-09 DIAGNOSIS — N18.6 END STAGE RENAL DISEASE (H): ICD-10-CM

## 2022-12-09 DIAGNOSIS — Z94.0 KIDNEY TRANSPLANT RECIPIENT: Primary | ICD-10-CM

## 2022-12-09 DIAGNOSIS — Z94.0 KIDNEY TRANSPLANT RECIPIENT: ICD-10-CM

## 2022-12-09 DIAGNOSIS — D84.9 IMMUNOSUPPRESSED STATUS (H): ICD-10-CM

## 2022-12-09 DIAGNOSIS — Z76.82 KIDNEY TRANSPLANT CANDIDATE: ICD-10-CM

## 2022-12-09 DIAGNOSIS — Z79.899 ENCOUNTER FOR LONG-TERM CURRENT USE OF MEDICATION: ICD-10-CM

## 2022-12-09 LAB
ANION GAP SERPL CALCULATED.3IONS-SCNC: 10 MMOL/L (ref 7–15)
BASOPHILS # BLD AUTO: 0 10E3/UL (ref 0–0.2)
BASOPHILS NFR BLD AUTO: 0 %
BUN SERPL-MCNC: 53.6 MG/DL (ref 6–20)
CALCIUM SERPL-MCNC: 9.3 MG/DL (ref 8.6–10)
CHLORIDE SERPL-SCNC: 103 MMOL/L (ref 98–107)
CMV DNA SPEC NAA+PROBE-ACNC: NOT DETECTED IU/ML
CREAT SERPL-MCNC: 2.57 MG/DL (ref 0.67–1.17)
DEPRECATED CALCIDIOL+CALCIFEROL SERPL-MC: 26 UG/L (ref 20–75)
DEPRECATED HCO3 PLAS-SCNC: 26 MMOL/L (ref 22–29)
EOSINOPHIL # BLD AUTO: 0 10E3/UL (ref 0–0.7)
EOSINOPHIL NFR BLD AUTO: 0 %
ERYTHROCYTE [DISTWIDTH] IN BLOOD BY AUTOMATED COUNT: 13.2 % (ref 10–15)
FERRITIN SERPL-MCNC: 983 NG/ML (ref 31–409)
GFR SERPL CREATININE-BSD FRML MDRD: 29 ML/MIN/1.73M2
GLUCOSE SERPL-MCNC: 95 MG/DL (ref 70–99)
HCT VFR BLD AUTO: 30.2 % (ref 40–53)
HGB BLD-MCNC: 9.6 G/DL (ref 13.3–17.7)
IMM GRANULOCYTES # BLD: 0.1 10E3/UL
IMM GRANULOCYTES NFR BLD: 1 %
IRON BINDING CAPACITY (ROCHE): 249 UG/DL (ref 240–430)
IRON SATN MFR SERPL: 33 % (ref 15–46)
IRON SERPL-MCNC: 83 UG/DL (ref 61–157)
LYMPHOCYTES # BLD AUTO: 0.2 10E3/UL (ref 0.8–5.3)
LYMPHOCYTES NFR BLD AUTO: 3 %
MAGNESIUM SERPL-MCNC: 2.2 MG/DL (ref 1.7–2.3)
MCH RBC QN AUTO: 28.7 PG (ref 26.5–33)
MCHC RBC AUTO-ENTMCNC: 31.8 G/DL (ref 31.5–36.5)
MCV RBC AUTO: 90 FL (ref 78–100)
MONOCYTES # BLD AUTO: 0.6 10E3/UL (ref 0–1.3)
MONOCYTES NFR BLD AUTO: 9 %
NEUTROPHILS # BLD AUTO: 5.9 10E3/UL (ref 1.6–8.3)
NEUTROPHILS NFR BLD AUTO: 87 %
NRBC # BLD AUTO: 0 10E3/UL
NRBC BLD AUTO-RTO: 0 /100
PHOSPHATE SERPL-MCNC: 4.1 MG/DL (ref 2.5–4.5)
PLAT MORPH BLD: NORMAL
PLATELET # BLD AUTO: 199 10E3/UL (ref 150–450)
POTASSIUM SERPL-SCNC: 4.8 MMOL/L (ref 3.4–5.3)
PTH-INTACT SERPL-MCNC: 31 PG/ML (ref 15–65)
RBC # BLD AUTO: 3.34 10E6/UL (ref 4.4–5.9)
RBC MORPH BLD: NORMAL
SODIUM SERPL-SCNC: 139 MMOL/L (ref 136–145)
TACROLIMUS BLD-MCNC: 4 UG/L (ref 5–15)
TME LAST DOSE: ABNORMAL H
TME LAST DOSE: ABNORMAL H
WBC # BLD AUTO: 6.7 10E3/UL (ref 4–11)

## 2022-12-09 PROCEDURE — 258N000003 HC RX IP 258 OP 636: Performed by: NURSE PRACTITIONER

## 2022-12-09 PROCEDURE — 83550 IRON BINDING TEST: CPT | Performed by: NURSE PRACTITIONER

## 2022-12-09 PROCEDURE — 96365 THER/PROPH/DIAG IV INF INIT: CPT

## 2022-12-09 PROCEDURE — 80197 ASSAY OF TACROLIMUS: CPT | Performed by: NURSE PRACTITIONER

## 2022-12-09 PROCEDURE — 99214 OFFICE O/P EST MOD 30 MIN: CPT | Performed by: NURSE PRACTITIONER

## 2022-12-09 PROCEDURE — 80048 BASIC METABOLIC PNL TOTAL CA: CPT | Performed by: NURSE PRACTITIONER

## 2022-12-09 PROCEDURE — 83735 ASSAY OF MAGNESIUM: CPT | Performed by: NURSE PRACTITIONER

## 2022-12-09 PROCEDURE — 84100 ASSAY OF PHOSPHORUS: CPT | Performed by: NURSE PRACTITIONER

## 2022-12-09 PROCEDURE — 83970 ASSAY OF PARATHORMONE: CPT | Performed by: NURSE PRACTITIONER

## 2022-12-09 PROCEDURE — 36415 COLL VENOUS BLD VENIPUNCTURE: CPT | Performed by: NURSE PRACTITIONER

## 2022-12-09 PROCEDURE — 82306 VITAMIN D 25 HYDROXY: CPT | Performed by: NURSE PRACTITIONER

## 2022-12-09 PROCEDURE — 85025 COMPLETE CBC W/AUTO DIFF WBC: CPT | Performed by: NURSE PRACTITIONER

## 2022-12-09 PROCEDURE — 82728 ASSAY OF FERRITIN: CPT | Performed by: NURSE PRACTITIONER

## 2022-12-09 PROCEDURE — 250N000011 HC RX IP 250 OP 636: Performed by: NURSE PRACTITIONER

## 2022-12-09 PROCEDURE — 99211 OFF/OP EST MAY X REQ PHY/QHP: CPT | Performed by: NURSE PRACTITIONER

## 2022-12-09 RX ORDER — METHYLPREDNISOLONE SODIUM SUCCINATE 125 MG/2ML
125 INJECTION, POWDER, LYOPHILIZED, FOR SOLUTION INTRAMUSCULAR; INTRAVENOUS
Status: CANCELLED
Start: 2022-12-09

## 2022-12-09 RX ORDER — ALBUTEROL SULFATE 0.83 MG/ML
2.5 SOLUTION RESPIRATORY (INHALATION)
Status: CANCELLED | OUTPATIENT
Start: 2022-12-09

## 2022-12-09 RX ORDER — EPINEPHRINE 1 MG/ML
0.3 INJECTION, SOLUTION INTRAMUSCULAR; SUBCUTANEOUS EVERY 5 MIN PRN
Status: CANCELLED | OUTPATIENT
Start: 2022-12-09

## 2022-12-09 RX ORDER — ALBUTEROL SULFATE 90 UG/1
1-2 AEROSOL, METERED RESPIRATORY (INHALATION)
Status: CANCELLED
Start: 2022-12-09

## 2022-12-09 RX ORDER — DIPHENHYDRAMINE HYDROCHLORIDE 50 MG/ML
50 INJECTION INTRAMUSCULAR; INTRAVENOUS
Status: CANCELLED
Start: 2022-12-09

## 2022-12-09 RX ORDER — TACROLIMUS 1 MG/1
5 CAPSULE ORAL 2 TIMES DAILY
Qty: 300 CAPSULE | Refills: 11 | Status: SHIPPED | OUTPATIENT
Start: 2022-12-09 | End: 2022-12-10

## 2022-12-09 RX ORDER — HEPARIN SODIUM,PORCINE 10 UNIT/ML
5 VIAL (ML) INTRAVENOUS
Status: CANCELLED | OUTPATIENT
Start: 2022-12-09

## 2022-12-09 RX ORDER — HEPARIN SODIUM (PORCINE) LOCK FLUSH IV SOLN 100 UNIT/ML 100 UNIT/ML
5 SOLUTION INTRAVENOUS
Status: CANCELLED | OUTPATIENT
Start: 2022-12-09

## 2022-12-09 RX ADMIN — SODIUM CHLORIDE 20 MG: 9 INJECTION, SOLUTION INTRAVENOUS at 09:57

## 2022-12-09 RX ADMIN — SODIUM CHLORIDE 1000 ML: 9 INJECTION, SOLUTION INTRAVENOUS at 10:31

## 2022-12-09 ASSESSMENT — PAIN SCALES - GENERAL: PAINLEVEL: NO PAIN (0)

## 2022-12-09 NOTE — TELEPHONE ENCOUNTER
Raj is a recent kidney transplant patient who discharged on 12/09/2022.     The patient will be responsible for managing medications.     Patient will  transplant supplies including 7 day pill organizer, thermometer, and BP monitor at the discharge pharmacy along with medications.  Patient uses his local [facility] pharmacy in [place] for outpatient medications.    **CAN PATIENT FILL AT [place] [facility] FOR MEDICATIONS LISTED? PT HAS A PLAN THROUGH [employer], AND PER OUR CONTRACT WITH THEM, WE ARE ONLY ALLOWED TO SHIP TO PT IF THEY ARE PHYSICALLY UNABLE TO COME IN TO OUR PHARMACY TO . IF PT IS COMFORTABLE WITH US DOCUMENTING THIS IN THEIR RECORD, WE CAN SHIP OUT THEIR MEDS. IF NOT, THEY WILL NEED TO UTILIZE [facility]([phone]) OR  AT ANY [place] [facility] SITE.     PRIMARY HEALTH BENEFIT: (Register My InfoÂ®)- SPOUSE  ID# [id number] GRP# [id number]  (EFFECTIVE (DATE: 1/1/2022) )  PROCESSING INFO: (PAID/[employer]) ID# [id number] GRP# [id number] BIN#[id number]  SECONDARY HEALTH BENEFIT: MEDICARE   ID# [id number] (PART A EFFECTIVE (DATE: 10/01/2021) , PART B EFFECTIVE (DATE: 10/01/2021) )   PROCESSING INFO: ID# [id number] GRP# OTHER BIN# [id number]  PT WILL PAY $0 AT TIME OF SERVICE        PATIENT DOES NOT HAVE PART D COVERAGE BUT DOES HAVE A COMMERCIAL PLAN THROUGH [employer]      PHARMACY BENEFIT: (PAID/[employer])- SPOUSE  PROCESSING INFO: ID# [id number] GRP# [id number] PCN# [id number] BIN# [id number] (EFFECTIVE (DATE: 1/1/2022) ).   DEDUCTIBLE (0) & MAX OUT-OF-POCKET (6550)  COPAY STRUCTURE:  $ (4) FOR GENERIC  % (35) FOR BRAND  % (35) FOR NON-FORMULARY MEDICATIONS   IF PATIENT HAS ANY QUESTIONS ON THE COPAY STRUCTURE PLEASE HAVE THEM REACH OUT TO THEIR PLAN      TEST CLAIM SPECIALTY #28     MYCOPHENOLATE 250mg (#240/30DS)= $0 AT TIME OF SERVICE  PROGRAF 1mg (#180/30DS) = $0 AT TIME OF SERVICE  TACROLIMUS 1mg (#60/30DS) = $0 AT TIME OF SERVICE  CYCLOSPORINE 100mg (#60/30DS) =  $0 AT TIME OF SERVICE   VALGANCICLOVIR 450mg (#60/30DS)=$20  VALACYCLOVIR 1gm (#90=30DS) =PLAN LIMITATIONS EXCEEDED        Rod Brito, Pharm.D.  [facility]  [phone]

## 2022-12-09 NOTE — LETTER
12/9/2022         RE: Raj Pierce  [address]  [place] [address]        Dear Colleague,    Thank you for referring your patient, Raj Pierce, to the [facility]. Please see a copy of my visit note below.    TRANSPLANT NEPHROLOGY EARLY POST TRANSPLANT VISIT    Assessment & Plan   # DDKT: Trend down   - Baseline Creatinine: ~ TBD   - Proteinuria: Not checked recently   - Date DSA Last Checked: Dec/2022      Latest DSA: No DSA at time of transplant   - BK Viremia: No   - Kidney Tx Biopsy: No   - Transplant Ureteral Stent: Yes    # Immunosuppression: Tacrolimus immediate release (goal 8-10), Mycophenolate mofetil (dose 750 mg every 12 hours) and Prednisone (dose taper)   - Induction with Recent Transplant:  Intermediate Intensity   - Continue with intensive monitoring of immunosuppression for efficacy and toxicity.   - Changes: No    # Infection Prophylaxis:   - PJP: Sulfa/TMP (Bactrim)  - CMV: Valganciclovir (Valcyte)    # Hypertension: Borderline control;  Goal BP: < 150/90   - Volume status: Euvolemic     - Changes: Continue amlodipine. Discussed giving 1 liter of NS IV fluids today but patient feels he is retaining fluid and is drinking/voiding adequately. Will hold on IV fluids.     # Elevated Blood Glucose: Glucose generally running ~    - Management as per primary team.    # Anemia in Chronic Renal Disease: Hgb: Stable      NADIYA: No   - Iron studies: Low iron saturation, but high ferritin    # Mineral Bone Disorder:   - Secondary renal hyperparathyroidism; PTH level: Normal (15-65 pg/ml)        On treatment: None    # Electrolytes:   - Potassium; level: Normal        On supplement: No  - Magnesium; level: Normal        On supplement: Yes  - Bicarbonate; level: Normal        On supplement: No  - Sodium; level: Normal    # Transplant History:  Etiology of Kidney Failure: Polycystic kidney disease (PKD)  Tx: DDKT  Transplant: 12/4/2022  (Kidney), 10/13/2015 (Kidney), 10/23/2021 (Kidney)  Donor Type: Donation after Brain Death Donor Class:   Crossmatch at time of Tx: negative  DSA at time of Tx: No  Significant changes in immunosuppression: None  CMV IgG Ab High Risk Discordance (D+/R-): Yes  EBV IgG Ab High Risk Discordance (D+/R-): No  Significant transplant-related complications: None    Transplant Office Phone Number: [phone]    Assessment and plan was discussed with the patient and he voiced his understanding and agreement.    Return visit:[facility] tomorrow for CIELOA  GUY Waterman CNP    Chief Complaint   Mr. Pierce is a 52 year old here for follow-up after hospitalization.     History of Present Illness       Raj Pierce is a 52 year old male with a past medical history significant for 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 He is now s/p  donor kidney re-transplant with ureteral stent, right native nephrectomy, and appendectomy on 22    Doing well. No complaints. LBM yesterday. No F/C, N/V/D, CP or SOB.   Drinking and voiding without issue.     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 Received methylprednisolone 500mg prior to thymo.       Medications   Current Outpatient Medications   Medication Sig     acetaminophen (TYLENOL) 325 MG tablet Take 1-2 tablets (325-650 mg) by mouth every 4 hours as needed for pain     amLODIPine (NORVASC) 5 MG tablet Take 5 mg by mouth daily     atorvastatin (LIPITOR) 10 MG tablet Take 10 mg by mouth daily     calcium carbonate (TUMS) 500 MG chewable tablet Take 2 tablets (1,000 mg) by mouth 2 times daily     citalopram (CELEXA) 20 MG tablet Take 20 mg by mouth daily.     famotidine (PEPCID) 10 MG tablet Take 1 tablet (10 mg) by mouth daily     magnesium oxide (MAG-OX) 400 MG tablet Take 1 tablet (400 mg) by mouth daily (with lunch)     mycophenolate (GENERIC EQUIVALENT) 250 MG capsule Take 3 capsules (750 mg) by mouth 2 times daily     oxyCODONE (ROXICODONE) 5 MG tablet Take 1 tablet (5 mg) by mouth every 6 hours as needed for moderate to severe pain     polyethylene glycol (MIRALAX) 17 GM/Dose powder Take 17 g by mouth daily as needed for constipation (Patient not taking: Reported on 12/9/2022)     predniSONE (DELTASONE) 10 MG tablet Take 4 tablets (40 mg) by mouth daily for 2 days, THEN 2 tablets (20 mg) daily for 7 days, THEN 1.5 tablets (15 mg) daily for 7 days, THEN 1 tablet (10 mg) daily for 7 days, THEN 0.5 tablets (5 mg) daily for 7 days.     sennosides (SENOKOT) 8.6 MG tablet Take 1-2 tablets by mouth 2 times daily     sulfamethoxazole-trimethoprim (BACTRIM) 400-80 MG tablet Take 1 tablet by mouth daily     tacrolimus (GENERIC EQUIVALENT) 0.5 MG capsule Take 1 cap by mouth twice daily as directed by Transplant Center for dose changes. (Patient not taking: Reported on 12/9/2022)     tacrolimus (GENERIC EQUIVALENT) 1 MG capsule Take 3  capsules (3 mg) by mouth 2 times daily     valGANciclovir (VALCYTE) 450 MG tablet Take 1 tab (450mg) every other day. Increase to 900mg (2 tabs) daily when instructed by Transplant Center.     No current facility-administered medications for this visit.     There are no discontinued medications.    Physical Exam   Vital Signs: BP (!) 168/110 (BP Location: Right arm, Patient Position: Sitting, Cuff Size: Adult Regular)   Pulse 67   Temp 97.9  F (36.6  C) (Oral)   Resp 16   Wt 84 kg (185 lb 1.6 oz)   SpO2 97%   BMI 26.56 kg/m      GENERAL APPEARANCE: alert and no distress  HENT: mouth without ulcers or lesions  LYMPHATICS: no cervical or supraclavicular nodes  RESP: lungs clear to auscultation - no rales, rhonchi or wheezes  CV: regular rhythm, normal rate, no rub, no murmur  EDEMA: no LE edema bilaterally  ABDOMEN: soft, nondistended, nontender, bowel sounds normal  MS: extremities normal - no gross deformities noted, no evidence of inflammation in joints, no muscle tenderness  SKIN: no rash  SKIN: midline abd incision intact with staples C/D/I.     Data     Renal Latest Ref Rng & Units 12/9/2022 12/8/2022 12/7/2022   Na 136 - 145 mmol/L 139 138 140   Na (external) 136 - 145 meq/L - - -   K 3.4 - 5.3 mmol/L 4.8 4.6 4.7   K (external) 3.5 - 5.1 meq/L - - -   Cl 98 - 107 mmol/L 103 104 107   CO2 22 - 29 mmol/L 26 25 21(L)   CO2 (external) 20 - 29 meq/L - - -   BUN 6.0 - 20.0 mg/dL 53.6(H) 59.0(H) 64.9(H)   BUN (external) 6 - 22 mg/dL - - -   Cr 0.67 - 1.17 mg/dL 2.57(H) 3.14(H) 3.90(H)   Cr (external) 0.73 - 1.18 mg/dL - - -   Glucose 70 - 99 mg/dL 95 101(H) 111(H)   Glucose (external) 70 - 99 mg/dL - - -   Ca  8.6 - 10.0 mg/dL 9.3 8.6 7.3(L)   Ca (external) 8.5 - 10.5 mg/dL - - -   Mg 1.7 - 2.3 mg/dL 2.2 2.2 2.1   Mg (external) 1.6 - 2.6 mg/dL - - -     Bone Health Latest Ref Rng & Units 12/9/2022 12/8/2022 12/7/2022   Phos 2.5 - 4.5 mg/dL 4.1 4.0 5.0(H)   Phos (external) 2.9 - 5.2 mg/dL - - -   PTHi 15 - 65  pg/mL 31 - -   PTHi (external) 11.0 - 101.0 pg/mL - - -   Vit D Def 20 - 75 ug/L - - -   Vit D Def (external) 30.0 - 100.0 ng/mL - - -     Heme Latest Ref Rng & Units 12/9/2022 12/8/2022 12/7/2022   WBC 4.0 - 11.0 10e3/uL 6.7 7.6 8.7   WBC (external) 4.0 - 11.0 K/uL - - -   Hgb 13.3 - 17.7 g/dL 9.6(L) 8.6(L) 7.0(L)   Hgb (external) 13.5 - 17.5 g/dL - - -   Plt 150 - 450 10e3/uL 199 139(L) 114(L)   Plt (external) 140 - 400 K/uL - - -   ABSOLUTE NEUTROPHIL 1.6 - 8.3 10e3/uL - - -   ABSOLUTE NEUTROPHILS (EXTERNAL) 1.8 - 8.0 K/uL - - -   ABSOLUTE LYMPHOCYTES 0.8 - 5.3 10e3/uL - - -   ABSOLUTE LYMPHOCYTES (EXTERNAL) 0.8 - 4.1 K/uL - - -   ABSOLUTE MONOCYTES 0.0 - 1.3 10e3/uL - - -   ABSOLUTE MONOCYTES (EXTERNAL) 0.0 - 1.0 K/uL - - -   ABSOLUTE EOSINOPHILS 0.0 - 0.7 10e3/uL - - -   ABSOLUTE EOSINOPHILS (EXTERNAL) 0.0 - 0.7 K/uL - - -   ABSOLUTE BASOPHILS 0.0 - 0.2 10e9/L - - -   ABSOLUTE BASOPHILS (EXTERNAL) 0.0 - 0.2 K/uL - - -   ABS IMMATURE GRANULOCYTES 0 - 0.4 10e9/L - - -     Liver Latest Ref Rng & Units 12/4/2022 11/1/2021 10/23/2021   AP 40 - 129 U/L 50 - 56   AP (external) 38 - 126 U/L - - -   TBili <=1.2 mg/dL 0.6 - 0.6   TBili (external) 0.2 - 1.3 mg/dL - - -   DBili (external) 0.0 - 0.4 mg/dL - - -   ALT 10 - 50 U/L 14 - 24   ALT (external) 21 - 72 U/L - - -   AST 10 - 50 U/L 25 - 23   AST (external) 17 - 59 U/L - - -   Tot Protein 6.4 - 8.3 g/dL 5.7(L) - 6.5(L)   Tot Protein (external) 6.3 - 8.2 g/dL - - -   Albumin 3.5 - 5.2 g/dL 3.9 2.0(L) 3.6   Albumin (external) 3.5 - 5.0 g/dL - - -     Pancreas Latest Ref Rng & Units 12/4/2022 10/23/2021 10/14/2015   A1C <5.7 % 4.2 5.6 4.8     Iron studies Latest Ref Rng & Units 12/9/2022 10/27/2021 3/3/2021   Iron 61 - 157 ug/dL 83 19(L) -   Iron sat 15 - 46 % 33 10(L) -   Ferritin 31 - 409 ng/mL 983(H) 597(H) -   Ferritin (external) 18.0 - 464.0 ng/mL - - 383.3     [facility] Txp Virology Latest Ref Rng & Units 12/4/2022 5/12/2022 4/4/2022   CVM DNA Quant - - - -   CMV QUANT  IU/ML Not Detected IU/mL Not Detected - -   LOG IU/ML OF CMVQNT <2.1 [Log:IU]/mL - - -   BK Spec - - - -   BK Res BKNEG copies/mL - - -   BK Log <2.7 Log copies/mL - - -   BK Quant Log Ext log IU/mL - - Undetected   BK Quant Result Ext Undetected IU/mL - - Undetected   BK Quant Spec Ext - - - Blood   EBV VCA IGG ANTIBODY U/mL - - -   EBV CAPSID ANTIBODY IGG No detectable antibody. Positive(A) Positive(A) -   EBV DNA QUANT (EXTERNAL) Undetected IU/mL - - -   EBV DNA COPIES/ML EBVNEG [Copies]/mL - - -   EBV DNA LOG OF COPIES <2.7 [Log:copies]/mL - - -   EBV DNA LOG OF COPIES (EXTERNAL) log IU/mL - - -   Hep B Core NR - - -   Hep B Core Ext Nonreactive - - -   Hep B Surf - - - -   Hep B Surf Ext  - - - -   HIV 1&2 NEG - - -        Recent Labs   Lab Test 07/18/22  0750 09/22/22  0710 10/11/22  0741 12/07/22  0649   DOSTAC 7/17/2022 9/21/2022 10/10/2022  --    TACROL 7.4 7.1 6.2 2.9*     Recent Labs   Lab Test 10/19/15  0707 10/26/15  0707 11/09/15  0758   DOSMPA 2,000 Not Provided 11/8/15 AT 2030   MPACID 1.27 4.01* 0.45*   MPAG >200.0* 190.0* 20.5*

## 2022-12-09 NOTE — NURSING NOTE
Chief Complaint   Patient presents with     Blood Draw     Labs drawn with PIV start by vascular access. Vitals taken.     Labs drawn with PIV start by vascular access. Pt tolerated well. Vitals taken. Pt checked into next appointment.    Maribel Sherman RN

## 2022-12-09 NOTE — PROGRESS NOTES
TRANSPLANT NEPHROLOGY EARLY POST TRANSPLANT VISIT    Assessment & Plan   # DDKT: Trend down   - Baseline Creatinine: ~ TBD   - Proteinuria: Not checked recently   - Date DSA Last Checked: Dec/2022      Latest DSA: No DSA at time of transplant   - BK Viremia: No   - Kidney Tx Biopsy: No   - Transplant Ureteral Stent: Yes    # Immunosuppression: Tacrolimus immediate release (goal 8-10), Mycophenolate mofetil (dose 750 mg every 12 hours) and Prednisone (dose taper)   - Induction with Recent Transplant:  Intermediate Intensity   - Continue with intensive monitoring of immunosuppression for efficacy and toxicity.   - Changes: No    # Infection Prophylaxis:   - PJP: Sulfa/TMP (Bactrim)  - CMV: Valganciclovir (Valcyte)    # Hypertension: Borderline control;  Goal BP: < 150/90   - Volume status: Euvolemic     - Changes: Continue amlodipine. Discussed giving 1 liter of NS IV fluids today but patient feels he is retaining fluid and is drinking/voiding adequately. Will hold on IV fluids.     # Elevated Blood Glucose: Glucose generally running ~    - Management as per primary team.    # Anemia in Chronic Renal Disease: Hgb: Stable      NADIYA: No   - Iron studies: Low iron saturation, but high ferritin    # Mineral Bone Disorder:   - Secondary renal hyperparathyroidism; PTH level: Normal (15-65 pg/ml)        On treatment: None    # Electrolytes:   - Potassium; level: Normal        On supplement: No  - Magnesium; level: Normal        On supplement: Yes  - Bicarbonate; level: Normal        On supplement: No  - Sodium; level: Normal        # Transplant History:  Etiology of Kidney Failure: Polycystic kidney disease (PKD)  Tx: DDKT  Transplant: 12/4/2022 (Kidney), 10/13/2015 (Kidney), 10/23/2021 (Kidney)  Donor Type: Donation after Brain Death Donor Class:   Crossmatch at time of Tx: negative  DSA at time of Tx: No  Significant changes in immunosuppression: None  CMV IgG Ab High Risk Discordance (D+/R-): Yes  EBV IgG Ab High  Risk Discordance (D+/R-): No  Significant transplant-related complications: None    Transplant Office Phone Number: [phone]    Assessment and plan was discussed with the patient and he voiced his understanding and agreement.    Return visit:[facility] tomorrow for GUY Coe CNP    Chief Complaint   Mr. Pierce is a 52 year old here for follow-up after hospitalization.     History of Present Illness       Raj Pierce is a 52 year old male with a past medical history significant for 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 He is now s/p  donor kidney re-transplant with ureteral stent, right native nephrectomy, and appendectomy on 22    Doing well. No complaints. LBM yesterday. No F/C, N/V/D, CP or SOB.   Drinking and voiding without issue.     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 Received methylprednisolone 500mg prior to thymo.        Medications   Current Outpatient Medications   Medication Sig     acetaminophen (TYLENOL) 325 MG tablet Take 1-2 tablets (325-650 mg) by mouth every 4 hours as needed for pain     amLODIPine (NORVASC) 5 MG tablet Take 5 mg by mouth daily     atorvastatin (LIPITOR) 10 MG tablet Take 10 mg by mouth daily     calcium carbonate (TUMS) 500 MG chewable tablet Take 2 tablets (1,000 mg) by mouth 2 times daily     citalopram (CELEXA) 20 MG tablet Take 20 mg by mouth daily.     famotidine (PEPCID) 10 MG tablet Take 1 tablet (10 mg) by mouth daily     magnesium oxide (MAG-OX) 400 MG tablet Take 1 tablet (400 mg) by mouth daily (with lunch)     mycophenolate (GENERIC EQUIVALENT) 250 MG capsule Take 3 capsules (750 mg) by mouth 2 times daily     oxyCODONE (ROXICODONE) 5 MG tablet Take 1 tablet (5 mg) by mouth every 6 hours as needed for moderate to severe pain     polyethylene glycol (MIRALAX) 17 GM/Dose powder Take 17 g by mouth daily as needed for constipation (Patient not taking: Reported on 12/9/2022)     predniSONE (DELTASONE) 10 MG tablet Take 4 tablets (40 mg) by mouth daily for 2 days, THEN 2 tablets (20 mg) daily for 7 days, THEN 1.5 tablets (15 mg) daily for 7 days, THEN 1 tablet (10 mg) daily for 7 days, THEN 0.5 tablets (5 mg) daily for 7 days.     sennosides (SENOKOT) 8.6 MG tablet Take 1-2 tablets by mouth 2 times daily     sulfamethoxazole-trimethoprim (BACTRIM) 400-80 MG tablet Take 1 tablet by mouth daily     tacrolimus (GENERIC EQUIVALENT) 0.5 MG capsule Take 1 cap by mouth twice daily as directed by Transplant Center for dose changes. (Patient not taking: Reported on 12/9/2022)     tacrolimus (GENERIC EQUIVALENT) 1 MG capsule Take 3 capsules (3 mg) by mouth 2 times daily     valGANciclovir (VALCYTE) 450 MG tablet Take 1 tab (450mg) every other day. Increase to 900mg (2 tabs) daily when instructed by Transplant Center.     No current facility-administered medications for this visit.     There are no  discontinued medications.    Physical Exam   Vital Signs: BP (!) 168/110 (BP Location: Right arm, Patient Position: Sitting, Cuff Size: Adult Regular)   Pulse 67   Temp 97.9  F (36.6  C) (Oral)   Resp 16   Wt 84 kg (185 lb 1.6 oz)   SpO2 97%   BMI 26.56 kg/m      GENERAL APPEARANCE: alert and no distress  HENT: mouth without ulcers or lesions  LYMPHATICS: no cervical or supraclavicular nodes  RESP: lungs clear to auscultation - no rales, rhonchi or wheezes  CV: regular rhythm, normal rate, no rub, no murmur  EDEMA: no LE edema bilaterally  ABDOMEN: soft, nondistended, nontender, bowel sounds normal  MS: extremities normal - no gross deformities noted, no evidence of inflammation in joints, no muscle tenderness  SKIN: no rash  SKIN: midline abd incision intact with staples C/D/I.     Data     Renal Latest Ref Rng & Units 12/9/2022 12/8/2022 12/7/2022   Na 136 - 145 mmol/L 139 138 140   Na (external) 136 - 145 meq/L - - -   K 3.4 - 5.3 mmol/L 4.8 4.6 4.7   K (external) 3.5 - 5.1 meq/L - - -   Cl 98 - 107 mmol/L 103 104 107   CO2 22 - 29 mmol/L 26 25 21(L)   CO2 (external) 20 - 29 meq/L - - -   BUN 6.0 - 20.0 mg/dL 53.6(H) 59.0(H) 64.9(H)   BUN (external) 6 - 22 mg/dL - - -   Cr 0.67 - 1.17 mg/dL 2.57(H) 3.14(H) 3.90(H)   Cr (external) 0.73 - 1.18 mg/dL - - -   Glucose 70 - 99 mg/dL 95 101(H) 111(H)   Glucose (external) 70 - 99 mg/dL - - -   Ca  8.6 - 10.0 mg/dL 9.3 8.6 7.3(L)   Ca (external) 8.5 - 10.5 mg/dL - - -   Mg 1.7 - 2.3 mg/dL 2.2 2.2 2.1   Mg (external) 1.6 - 2.6 mg/dL - - -     Bone Health Latest Ref Rng & Units 12/9/2022 12/8/2022 12/7/2022   Phos 2.5 - 4.5 mg/dL 4.1 4.0 5.0(H)   Phos (external) 2.9 - 5.2 mg/dL - - -   PTHi 15 - 65 pg/mL 31 - -   PTHi (external) 11.0 - 101.0 pg/mL - - -   Vit D Def 20 - 75 ug/L - - -   Vit D Def (external) 30.0 - 100.0 ng/mL - - -     Heme Latest Ref Rng & Units 12/9/2022 12/8/2022 12/7/2022   WBC 4.0 - 11.0 10e3/uL 6.7 7.6 8.7   WBC (external) 4.0 - 11.0 K/uL - - -    Hgb 13.3 - 17.7 g/dL 9.6(L) 8.6(L) 7.0(L)   Hgb (external) 13.5 - 17.5 g/dL - - -   Plt 150 - 450 10e3/uL 199 139(L) 114(L)   Plt (external) 140 - 400 K/uL - - -   ABSOLUTE NEUTROPHIL 1.6 - 8.3 10e3/uL - - -   ABSOLUTE NEUTROPHILS (EXTERNAL) 1.8 - 8.0 K/uL - - -   ABSOLUTE LYMPHOCYTES 0.8 - 5.3 10e3/uL - - -   ABSOLUTE LYMPHOCYTES (EXTERNAL) 0.8 - 4.1 K/uL - - -   ABSOLUTE MONOCYTES 0.0 - 1.3 10e3/uL - - -   ABSOLUTE MONOCYTES (EXTERNAL) 0.0 - 1.0 K/uL - - -   ABSOLUTE EOSINOPHILS 0.0 - 0.7 10e3/uL - - -   ABSOLUTE EOSINOPHILS (EXTERNAL) 0.0 - 0.7 K/uL - - -   ABSOLUTE BASOPHILS 0.0 - 0.2 10e9/L - - -   ABSOLUTE BASOPHILS (EXTERNAL) 0.0 - 0.2 K/uL - - -   ABS IMMATURE GRANULOCYTES 0 - 0.4 10e9/L - - -     Liver Latest Ref Rng & Units 12/4/2022 11/1/2021 10/23/2021   AP 40 - 129 U/L 50 - 56   AP (external) 38 - 126 U/L - - -   TBili <=1.2 mg/dL 0.6 - 0.6   TBili (external) 0.2 - 1.3 mg/dL - - -   DBili (external) 0.0 - 0.4 mg/dL - - -   ALT 10 - 50 U/L 14 - 24   ALT (external) 21 - 72 U/L - - -   AST 10 - 50 U/L 25 - 23   AST (external) 17 - 59 U/L - - -   Tot Protein 6.4 - 8.3 g/dL 5.7(L) - 6.5(L)   Tot Protein (external) 6.3 - 8.2 g/dL - - -   Albumin 3.5 - 5.2 g/dL 3.9 2.0(L) 3.6   Albumin (external) 3.5 - 5.0 g/dL - - -     Pancreas Latest Ref Rng & Units 12/4/2022 10/23/2021 10/14/2015   A1C <5.7 % 4.2 5.6 4.8     Iron studies Latest Ref Rng & Units 12/9/2022 10/27/2021 3/3/2021   Iron 61 - 157 ug/dL 83 19(L) -   Iron sat 15 - 46 % 33 10(L) -   Ferritin 31 - 409 ng/mL 983(H) 597(H) -   Ferritin (external) 18.0 - 464.0 ng/mL - - 383.3     [facility] Txp Virology Latest Ref Rng & Units 12/4/2022 5/12/2022 4/4/2022   CVM DNA Quant - - - -   CMV QUANT IU/ML Not Detected IU/mL Not Detected - -   LOG IU/ML OF CMVQNT <2.1 [Log:IU]/mL - - -   BK Spec - - - -   BK Res BKNEG copies/mL - - -   BK Log <2.7 Log copies/mL - - -   BK Quant Log Ext log IU/mL - - Undetected   BK Quant Result Ext Undetected IU/mL - - Undetected   BK  Quant Spec Ext - - - Blood   EBV VCA IGG ANTIBODY U/mL - - -   EBV CAPSID ANTIBODY IGG No detectable antibody. Positive(A) Positive(A) -   EBV DNA QUANT (EXTERNAL) Undetected IU/mL - - -   EBV DNA COPIES/ML EBVNEG [Copies]/mL - - -   EBV DNA LOG OF COPIES <2.7 [Log:copies]/mL - - -   EBV DNA LOG OF COPIES (EXTERNAL) log IU/mL - - -   Hep B Core NR - - -   Hep B Core Ext Nonreactive - - -   Hep B Surf - - - -   Hep B Surf Ext  - - - -   HIV 1&2 NEG - - -        Recent Labs   Lab Test 07/18/22  0750 09/22/22  0710 10/11/22  0741 12/07/22  0649   DOSTAC 7/17/2022 9/21/2022 10/10/2022  --    TACROL 7.4 7.1 6.2 2.9*     Recent Labs   Lab Test 10/19/15  0707 10/26/15  0707 11/09/15  0758   DOSMPA 2,000 Not Provided 11/8/15 AT 2030   MPACID 1.27 4.01* 0.45*   MPAG >200.0* 190.0* 20.5*

## 2022-12-09 NOTE — LETTER
"    12/9/2022         RE: Raj Pierce  [address]  [place] [address]        Dear Colleague,    Thank you for referring your patient, Raj Pierce, to the [facility]. Please see a copy of my visit note below.    Chief Complaint   Patient presents with     Eval/Assessment     LBA day 1     Raj Pierce came to [facility] today for a lab and assess following a kidney transplant on 12/4/2022.      Discharge date: 12/8/22  Transplant coordinator: Mel COUCH  Phone number patient can be reached at: [phone] cell- okay to leave VM      Physical Assessment:  See physical assessment located under \"Document Flowsheets\".  Incision site: midline with staples, CDI  Lines: n/a  Wade: n/a  Urine clarity: clear yellow, frequency. Denies burning or irritation  Hydration: at least 2 L water daily  Nutrition: good appetite, denies N/V  Last BM: last night  Pain: 4/10 at incision. Taking Tylenol and Oxy PRN with good relief.   BP: sitting 159/98  65, standing 155/100  70.  Wt 83 kg (80 kg prior to txp)    My transplant place videos watched: yes, this is patient's 3rd txp.     PIV and labs drawn by [facility]- copy of results given to patient.     Plan of care for today:   -Labs and assessment reviewed with Yulia To NP.   -Simulect dose 2/2 completed.   -Patient will bring BP machine tomorrow to check for accuracy.   -[facility] checklist completed.    Medication changes:  none    Medications administered:     Administrations This Visit     0.9% sodium chloride BOLUS     Admin Date  12/09/2022 Action  New Bag Dose  1,000 mL Rate  1,000 mL/hr Route  Intravenous Administered By  Vita Jones, RN          basiliximab (SIMULECT) 20 mg in sodium chloride 0.9 % 60 mL infusion     Admin Date  12/09/2022 Action  New Bag Dose  20 mg Rate  120 mL/hr Route  Intravenous Administered By  Vita Jones, RN              Pt declined remaining fluids, okay per surgery team. " "    Patient education:  The following teaching topics were addressed: Importance of drinking 2L of non-caffeinated fluids daily, Incisional care, Signs/symptoms of infection, Good handwashing, Medications (purposes, doses and times of administration), Phone numbers to call with concenrs (Transplant coordinator, [facility] and [facility]) and Plan of care   Patient and spouse verbalized understanding and all questions answered.    Drug level:  Patient took 3 mg of Tacrolimus last evening at 8 PM.  Care coordinator to follow up with the result.    Face to face time: 40 mintues    Discharge Plan  Pt will follow up with [facility] tomorrow for day 2 LBA  Discharge instructions reviewed with patient: YES  Patient/Representative verbalized understanding, all questions answered: YES    Discharged from unit at 1100 with whom: spouse to hotel.        Vital signs:      BP: (!) 165/99 Pulse: 64   Resp: 18            Estimated body mass index is 26.56 kg/m  as calculated from the following:    Height as of 12/4/22: 1.778 m (5' 10\").    Weight as of an earlier encounter on 12/9/22: 84 kg (185 lb 1.6 oz).                Again, thank you for allowing me to participate in the care of your patient.        Sincerely,        No name on file    "

## 2022-12-09 NOTE — PATIENT INSTRUCTIONS
Dear Raj Pierce    Thank you for choosing [facility] ([facility]) for your transplant cares.  The following information is a summary of our appointment as well as important reminders.      Please make sure your phone is available today because your coordinator will call to update you with your anti-rejection drug levels and possibly make changes to your anti-rejection dosages.    We look forward in seeing you on your next appointment here at [facility] ([facility]).  Please don t hesitate to call us at [phone] to reschedule any of your appointments or to speak with one of the [facility] registered nurses.  It was a pleasure taking care of you today.    Sincerely,    [facility]  Special[facility] Infusion [facility]  [address] [place] [address]  [address]  Phone:  [phone]

## 2022-12-09 NOTE — PROGRESS NOTES
"Chief Complaint   Patient presents with     Eval/Assessment     LBA day 1     Raj Pierce came to [facility] today for a lab and assess following a kidney transplant on 12/4/2022.      Discharge date: 12/8/22  Transplant coordinator: Mel COUCH  Phone number patient can be reached at: [phone] cell- okay to leave       Physical Assessment:  See physical assessment located under \"Document Flowsheets\".  Incision site: midline with staples, CDI  Lines: n/a  Wade: n/a  Urine clarity: clear yellow, frequency. Denies burning or irritation  Hydration: at least 2 L water daily  Nutrition: good appetite, denies N/V  Last BM: last night  Pain: 4/10 at incision. Taking Tylenol and Oxy PRN with good relief.   BP: sitting 159/98  65, standing 155/100  70.  Wt 83 kg (80 kg prior to txp)    My transplant place videos watched: yes, this is patient's 3rd txp.     PIV and labs drawn by [email or web address]- copy of results given to patient.     Plan of care for today:   -Labs and assessment reviewed with Yulia To NP.   -Simulect dose 2/2 completed.   -Patient will bring BP machine tomorrow to check for accuracy.   -[facility] checklist completed.    Medication changes:  none    Medications administered:     Administrations This Visit     0.9% sodium chloride BOLUS     Admin Date  12/09/2022 Action  New Bag Dose  1,000 mL Rate  1,000 mL/hr Route  Intravenous Administered By  Vita Jones, RN          basiliximab (SIMULECT) 20 mg in sodium chloride 0.9 % 60 mL infusion     Admin Date  12/09/2022 Action  New Bag Dose  20 mg Rate  120 mL/hr Route  Intravenous Administered By  Vita Jones, RN              Pt declined remaining fluids, okay per surgery team.     Patient education:  The following teaching topics were addressed: Importance of drinking 2L of non-caffeinated fluids daily, Incisional care, Signs/symptoms of infection, Good handwashing, Medications (purposes, doses and times of administration), Phone numbers to call with " "aylanrs (Transplant coordinator, [facility] [address] and [facility]) and Plan of care   Patient and spouse verbalized understanding and all questions answered.    Drug level:  Patient took 3 mg of Tacrolimus last evening at 8 PM.  Care coordinator to follow up with the result.    Face to face time: 40 mintues    Discharge Plan  [facility]  Discharge instructions reviewed with patient: YES  Patient/Representative verbalized understanding, all questions answered: YES    Discharged from unit at 1100 with whom: spouse to hotel.        Vital signs:      BP: (!) 165/99 Pulse: 64   Resp: 18            Estimated body mass index is 26.56 kg/m  as calculated from the following:    Height as of 12/4/22: 1.778 m (5' 10\").    Weight as of an earlier encounter on 12/9/22: 84 kg (185 lb 1.6 oz).            "

## 2022-12-09 NOTE — TELEPHONE ENCOUNTER
ISSUE:   Tacrolimus IR level 4.0 on 12/9, goal 8-10, dose 3 mg BID.    PLAN:   Please call patient and confirm this was an accurate 12-hour trough. Verify Tacrolimus IR dose 3 mg BID. Confirm no new medications or illness. Confirm no missed doses. If accurate trough and accurate dose, increase Tacrolimus IR dose to 5 mg BID and repeat labs in 1 days    OUTCOME:   Spoke with patient, they confirm accurate trough level and current dose 3 mg BID. Patient confirmed dose change to 5 mg BID and to repeat labs in 1 days. Orders sent to preferred pharmacy for dose change and lab for repeat labs. Patient voiced understanding of plan.     Rja prefers to do Monday/Thrusday labs at [facility] in [place]. Will use [facility] in [place] for medications.

## 2022-12-09 NOTE — LETTER
Date:December 9, 2022      Provider requested that no letter be sent. Do not send.       [facility]

## 2022-12-09 NOTE — PHARMACY-TRANSPLANT NOTE
Solid Organ Transplant Recipient Prior to Discharge Note    52 year old male s/p third kidney transplant on 12/4/2022.    Immunosuppression plan at time of discharge:    Mycophenolate 750 mg by mouth twice daily about 12 hours apart    Prednisone 40mg by mouth daily for two days (12/9-12/10) THEN 20mg by mouth daily for 7 days (12/11-12/17) THEN 15mg by mouth daily for 7 days (12/18-12/24) THEN 10mg by mouth daily for 7 days (12/25-12/31) THEN 5mg by mouth daily for 7 days (1/1/2023-1/7/2023) THEN STOP    Tacrolimus 3 mg by mouth twice daily about 12 hours apart, titrated to goal trough of 8-10 mcg/L  Tac level 12/7 was 2.9 mcg/L (12.5hr trough) after 4 doses of 2mg; dose increased to 3mg bid                    Opportunistic prophylaxis:    Sulfamethoxazole-trimethoprim 400-80mg by mouth daily for PJP prophylaxis    Valganciclovir 450 mg by mouth every other day for CMV prophylaxis titrate to 900mg daily as able based on kindey function; continue for 6 months  (CMV IgG: Donor POSITIVE  / Recipient NEGATIVE; EBV IgG: Donor POSITIVE / Recipient POSITIVE )    Pharmacy has monitored for medication interactions and immunosuppression levels in conjunction with the multidisciplinary team. In anticipation for discharge, medication therapy needs have been addressed daily throughout the current admission via multidisciplinary rounds and/or discussions, order verification, daily clinical pharmacy review, and communication with prescribers.  Gracie Cobian, Pharm.D., [facility], B[facility]  Pager [phone]

## 2022-12-10 ENCOUNTER — INFUSION THERAPY VISIT (OUTPATIENT)
Dept: INFUSION THERAPY | Facility: CLINIC | Age: 52
End: 2022-12-10
Attending: NURSE PRACTITIONER
Payer: COMMERCIAL

## 2022-12-10 ENCOUNTER — TELEPHONE (OUTPATIENT)
Dept: TRANSPLANT | Facility: CLINIC | Age: 52
End: 2022-12-10

## 2022-12-10 VITALS
HEART RATE: 69 BPM | TEMPERATURE: 98.3 F | WEIGHT: 182.2 LBS | BODY MASS INDEX: 26.14 KG/M2 | SYSTOLIC BLOOD PRESSURE: 156 MMHG | OXYGEN SATURATION: 98 % | DIASTOLIC BLOOD PRESSURE: 91 MMHG | RESPIRATION RATE: 18 BRPM

## 2022-12-10 DIAGNOSIS — D84.9 IMMUNOSUPPRESSED STATUS (H): ICD-10-CM

## 2022-12-10 DIAGNOSIS — G89.18 ACUTE POST-OPERATIVE PAIN: ICD-10-CM

## 2022-12-10 DIAGNOSIS — Z94.0 KIDNEY TRANSPLANT RECIPIENT: Primary | ICD-10-CM

## 2022-12-10 DIAGNOSIS — Z94.0 KIDNEY TRANSPLANTED: ICD-10-CM

## 2022-12-10 DIAGNOSIS — Z76.82 KIDNEY TRANSPLANT CANDIDATE: ICD-10-CM

## 2022-12-10 LAB
ANION GAP SERPL CALCULATED.3IONS-SCNC: 11 MMOL/L (ref 7–15)
BASOPHILS # BLD AUTO: 0 10E3/UL (ref 0–0.2)
BASOPHILS NFR BLD AUTO: 0 %
BUN SERPL-MCNC: 50.6 MG/DL (ref 6–20)
CALCIUM SERPL-MCNC: 9.3 MG/DL (ref 8.6–10)
CHLORIDE SERPL-SCNC: 105 MMOL/L (ref 98–107)
CREAT SERPL-MCNC: 2.34 MG/DL (ref 0.67–1.17)
DEPRECATED HCO3 PLAS-SCNC: 22 MMOL/L (ref 22–29)
EOSINOPHIL # BLD AUTO: 0 10E3/UL (ref 0–0.7)
EOSINOPHIL NFR BLD AUTO: 0 %
ERYTHROCYTE [DISTWIDTH] IN BLOOD BY AUTOMATED COUNT: 13.1 % (ref 10–15)
GFR SERPL CREATININE-BSD FRML MDRD: 32 ML/MIN/1.73M2
GLUCOSE SERPL-MCNC: 99 MG/DL (ref 70–99)
HCT VFR BLD AUTO: 29.8 % (ref 40–53)
HGB BLD-MCNC: 9.5 G/DL (ref 13.3–17.7)
IMM GRANULOCYTES # BLD: 0.2 10E3/UL
IMM GRANULOCYTES NFR BLD: 2 %
LYMPHOCYTES # BLD AUTO: 0.2 10E3/UL (ref 0.8–5.3)
LYMPHOCYTES NFR BLD AUTO: 2 %
MAGNESIUM SERPL-MCNC: 1.7 MG/DL (ref 1.7–2.3)
MCH RBC QN AUTO: 29 PG (ref 26.5–33)
MCHC RBC AUTO-ENTMCNC: 31.9 G/DL (ref 31.5–36.5)
MCV RBC AUTO: 91 FL (ref 78–100)
MONOCYTES # BLD AUTO: 0.9 10E3/UL (ref 0–1.3)
MONOCYTES NFR BLD AUTO: 9 %
NEUTROPHILS # BLD AUTO: 8.2 10E3/UL (ref 1.6–8.3)
NEUTROPHILS NFR BLD AUTO: 87 %
NRBC # BLD AUTO: 0 10E3/UL
NRBC BLD AUTO-RTO: 0 /100
PHOSPHATE SERPL-MCNC: 4.2 MG/DL (ref 2.5–4.5)
PLATELET # BLD AUTO: 214 10E3/UL (ref 150–450)
POTASSIUM SERPL-SCNC: 4.3 MMOL/L (ref 3.4–5.3)
RBC # BLD AUTO: 3.28 10E6/UL (ref 4.4–5.9)
SODIUM SERPL-SCNC: 138 MMOL/L (ref 136–145)
TACROLIMUS BLD-MCNC: 6.2 UG/L (ref 5–15)
TME LAST DOSE: NORMAL H
TME LAST DOSE: NORMAL H
WBC # BLD AUTO: 9.5 10E3/UL (ref 4–11)

## 2022-12-10 PROCEDURE — 36415 COLL VENOUS BLD VENIPUNCTURE: CPT

## 2022-12-10 PROCEDURE — 85025 COMPLETE CBC W/AUTO DIFF WBC: CPT

## 2022-12-10 PROCEDURE — 80048 BASIC METABOLIC PNL TOTAL CA: CPT

## 2022-12-10 PROCEDURE — 84100 ASSAY OF PHOSPHORUS: CPT

## 2022-12-10 PROCEDURE — 80197 ASSAY OF TACROLIMUS: CPT

## 2022-12-10 PROCEDURE — 83735 ASSAY OF MAGNESIUM: CPT

## 2022-12-10 RX ORDER — LIDOCAINE 4 G/G
1 PATCH TOPICAL EVERY 24 HOURS
Qty: 15 PATCH | Refills: 2 | Status: SHIPPED | OUTPATIENT
Start: 2022-12-10 | End: 2023-01-18

## 2022-12-10 RX ORDER — TACROLIMUS 1 MG/1
6 CAPSULE ORAL 2 TIMES DAILY
Qty: 300 CAPSULE | Refills: 11 | Status: SHIPPED | OUTPATIENT
Start: 2022-12-10 | End: 2022-12-19

## 2022-12-10 RX ORDER — OXYCODONE HYDROCHLORIDE 5 MG/1
5 TABLET ORAL EVERY 6 HOURS PRN
Qty: 20 TABLET | Refills: 0 | Status: SHIPPED | OUTPATIENT
Start: 2022-12-10 | End: 2022-12-15

## 2022-12-10 NOTE — PROGRESS NOTES
"Rja Pierce came to [facility] today for a lab and assess following a kidney transplant on 12/04/22.      Discharge date: 12/08/22   Transplant coordinator: Elif WALTER   Phone number patient can be reached at: [phone]       Physical Assessment:  See physical assessment located under \"Document Flowsheets\".  Incision site: midline abd incison staples, CDI. Drain removal site with small serous drainage, no signs or symptoms of infection noted. Covered with gauze and primapore. Right neck line removal site, no signs or symptoms of infection, left uncovered. Patient understands signs and symptoms of infection and when to report them to coordinator and/or provider.    Lines: n/a  Wade: n/a  Urine clarity: Clear, yellow. No burning or pain during urination.   Hydration: Drinking at least 2L throughout the day   Nutrition: Good appetite, no nausea or vomiting.   Last BM: 12/10/22, a little loose.   Pain: 5/10 abdominal incision, has not taken anything for pain this morning.   My transplant place videos watched: Yes    Labs drawn by [facility] staff: Yes    Plan of care for today:   -Vitals obtained and nursing assessment completed  -Labs drawn at [facility], printed and given to patient  -Patient did not bring blood pressure monitor in today. Spouse stated they have been getting about the same reading as here in [facility].   -Per Dr. Mattson, orders were:    -Discharge from [facility] today.    -Lidocaine patch and oxycodone refilled sent to pharmacy on first floor     Medication changes: none    Medications administered:  Patient self administered AM meds     Patient education:    The following teaching topics were addressed: Importance of drinking 2L of non-caffeinated fluids daily, Incisional care, Signs/symptoms of infection, Good handwashing and Plan of care   Patient verbalized understanding and all questions answered.     Drug level:  Patient took 5 mg of Tacrolimus last evening at 2000.  Care coordinator to follow up with the " result.    Face to face time: 40 minutes  Discharge Plan    Pt will follow up with with provider   Discharge instructions reviewed with patient: YES  Patient/Representative verbalized understanding, all questions answered: YES    Discharged from unit at 0930 with whom: spouse to hotel.    Owen Ivey RN

## 2022-12-10 NOTE — PATIENT INSTRUCTIONS
Dear Raj Pierce    Thank you for choosing [facility] ([facility]) for your transplant cares.  The following information is a summary of our appointment as well as important reminders.      Return Monday, 12/12/22, at 0715 for lab appointment    We look forward in seeing you on your next appointment here at [facility] ([facility]).  Please don t hesitate to call us at [phone] to reschedule any of your appointments or to speak with one of the [facility] registered nurses.  It was a pleasure taking care of you today.    Sincerely,    [facility]  Special[facility]fusion [facility]  [address] [place] [address]  [address]  Phone:  [phone] Dear Raj Pierce

## 2022-12-10 NOTE — TELEPHONE ENCOUNTER
ISSUE:   Tacrolimus IR level 6.2 on 12/10, goal 8-10, dose 5 mg BID.     PLAN:   Please call patient and confirm this was an accurate 12-hour trough. Verify Tacrolimus IR dose 5 mg BID. Confirm no new medications or illness. Confirm no missed doses. If accurate trough and accurate dose, increase Tacrolimus IR dose to 6 mg BID and repeat labs in 2 days     OUTCOME:   Spoke with patient, they confirm accurate trough level and current dose 5 mg BID. Patient confirmed dose change to 6 mg BID and to repeat labs in 2 days. Orders sent to preferred pharmacy for dose change and lab for repeat labs. Patient voiced understanding of plan.

## 2022-12-10 NOTE — LETTER
Date:December 12, 2022      Provider requested that no letter be sent. Do not send.       [facility]

## 2022-12-10 NOTE — LETTER
"    12/10/2022         RE: Raj Pierce  [address]  [place] [address]        Dear Colleague,    Thank you for referring your patient, Raj Pierce, to the [facility]. Please see a copy of my visit note below.    Raj Pierce came to [facility] today for a lab and assess following a kidney transplant on 12/04/22.      Discharge date: 12/08/22   Transplant coordinator: Elif WALTER   Phone number patient can be reached at: [phone]       Physical Assessment:  See physical assessment located under \"Document Flowsheets\".  Incision site: midline abd incison staples, CDI. Drain removal site with small serous drainage, no signs or symptoms of infection noted. Covered with gauze and primapore. Right neck line removal site, no signs or symptoms of infection, left uncovered. Patient understands signs and symptoms of infection and when to report them to coordinator and/or provider.    Lines: n/a  Wade: n/a  Urine clarity: Clear, yellow. No burning or pain during urination.   Hydration: Drinking at least 2L throughout the day   Nutrition: Good appetite, no nausea or vomiting.   Last BM: 12/10/22, a little loose.   Pain: 5/10 abdominal incision, has not taken anything for pain this morning.   My transplant place videos watched: Yes    Labs drawn by [facility] staff: Yes    Plan of care for today:   -Vitals obtained and nursing assessment completed  -Labs drawn at [facility], printed and given to patient  -Patient did not bring blood pressure monitor in today. Spouse stated they have been getting about the same reading as here in [facility].   -Per Dr. Mattson, orders were:    -Discharge from [facility] today.    -Lidocaine patch and oxycodone refilled sent to pharmacy on first floor     Medication changes: none    Medications administered:  Patient self administered AM meds     Patient education:    The following teaching topics were addressed: Importance of drinking 2L of " non-caffeinated fluids daily, Incisional care, Signs/symptoms of infection, Good handwashing and Plan of care   Patient verbalized understanding and all questions answered.     Drug level:  Patient took 5 mg of Tacrolimus last evening at 2000.  Care coordinator to follow up with the result.    Face to face time: 40 minutes  Discharge Plan    Pt will follow up with with provider   Discharge instructions reviewed with patient: YES  Patient/Representative verbalized understanding, all questions answered: YES    Discharged from unit at 0930 with whom: spouse to hotel.    Owen Ivey RN       Again, thank you for allowing me to participate in the care of your patient.        Sincerely,        No name on file

## 2022-12-11 LAB
HBV DNA SERPL QL NAA+PROBE: NORMAL
HCV RNA SERPL QL NAA+PROBE: NORMAL
HIV1+2 RNA SERPL QL NAA+PROBE: NORMAL

## 2022-12-12 ENCOUNTER — TELEPHONE (OUTPATIENT)
Dept: TRANSPLANT | Facility: CLINIC | Age: 52
End: 2022-12-12

## 2022-12-12 ENCOUNTER — LAB (OUTPATIENT)
Dept: LAB | Facility: CLINIC | Age: 52
End: 2022-12-12
Attending: NURSE PRACTITIONER
Payer: COMMERCIAL

## 2022-12-12 DIAGNOSIS — Z79.899 ENCOUNTER FOR LONG-TERM CURRENT USE OF MEDICATION: ICD-10-CM

## 2022-12-12 DIAGNOSIS — Z48.298 AFTERCARE FOLLOWING ORGAN TRANSPLANT: ICD-10-CM

## 2022-12-12 DIAGNOSIS — Z94.0 KIDNEY REPLACED BY TRANSPLANT: ICD-10-CM

## 2022-12-12 LAB
DEPRECATED CALCIDIOL+CALCIFEROL SERPL-MC: 28 UG/L (ref 20–75)
FERRITIN SERPL-MCNC: 699 NG/ML (ref 31–409)
IRON BINDING CAPACITY (ROCHE): 269 UG/DL (ref 240–430)
IRON SATN MFR SERPL: 17 % (ref 15–46)
IRON SERPL-MCNC: 45 UG/DL (ref 61–157)
PTH-INTACT SERPL-MCNC: 30 PG/ML (ref 15–65)
TACROLIMUS BLD-MCNC: 9.9 UG/L (ref 5–15)
TME LAST DOSE: NORMAL H
TME LAST DOSE: NORMAL H

## 2022-12-12 PROCEDURE — 36415 COLL VENOUS BLD VENIPUNCTURE: CPT

## 2022-12-12 PROCEDURE — 83550 IRON BINDING TEST: CPT

## 2022-12-12 PROCEDURE — 82728 ASSAY OF FERRITIN: CPT

## 2022-12-12 PROCEDURE — 80197 ASSAY OF TACROLIMUS: CPT

## 2022-12-12 PROCEDURE — 82306 VITAMIN D 25 HYDROXY: CPT

## 2022-12-12 PROCEDURE — 83970 ASSAY OF PARATHORMONE: CPT

## 2022-12-12 NOTE — TELEPHONE ENCOUNTER
Spoke with Raj, tacrolimus within goal. Will stay on same dose tacrolimus and repeat labs again on Thursday in [place].     Ok for staple removal locally with PCP around 3 week nia. No drainage or redness from incision.

## 2022-12-12 NOTE — NURSING NOTE
Chief Complaint   Patient presents with     Blood Draw     Labs drawn via  by RN in lab.      Labs collected from venipuncture by RN.    Chloe Rubi RN

## 2022-12-15 ENCOUNTER — VIRTUAL VISIT (OUTPATIENT)
Dept: PHARMACY | Facility: CLINIC | Age: 52
End: 2022-12-15

## 2022-12-15 DIAGNOSIS — F32.A DEPRESSION, UNSPECIFIED DEPRESSION TYPE: ICD-10-CM

## 2022-12-15 DIAGNOSIS — G89.18 ACUTE POST-OPERATIVE PAIN: ICD-10-CM

## 2022-12-15 DIAGNOSIS — Z48.298 AFTERCARE FOLLOWING ORGAN TRANSPLANT: ICD-10-CM

## 2022-12-15 DIAGNOSIS — E78.5 DYSLIPIDEMIA: ICD-10-CM

## 2022-12-15 DIAGNOSIS — Z94.0 HTN, KIDNEY TRANSPLANT RELATED: ICD-10-CM

## 2022-12-15 DIAGNOSIS — I15.1 HTN, KIDNEY TRANSPLANT RELATED: ICD-10-CM

## 2022-12-15 DIAGNOSIS — Z94.0 KIDNEY TRANSPLANT RECIPIENT: Primary | ICD-10-CM

## 2022-12-15 PROCEDURE — 99605 MTMS BY PHARM NP 15 MIN: CPT | Performed by: PHARMACIST

## 2022-12-15 NOTE — PROGRESS NOTES
Medication Therapy Management (MTM) Encounter    ASSESSMENT:                            Medication Adherence/Access: No issues identified    Renal Transplant:  CrCl is between 40-60ml/min, Valcyte dosed too low. Recommend he separate Mycophenolate Mofetil and Magnesium by 2 hours to avoid possible absorption interaction.    Hypertension: BP at home well controlled at <150/90 0-2 months post txp. Patient instructed to take amlodipine in the evening as it is more effective when taken at this time.     Hyperlipidemia: Stable.     Pain: Stable.     Depression:  Stable.     PLAN:                            1. Increase Valcyte 450mg (1 tablet) once daily.   2. Separate Magnesium at least 2 hours from Mycophenolate Mofetil.   3. Move Amlodipine 5mg to the evening.     Follow-up: 2 months    SUBJECTIVE/OBJECTIVE:                          Raj Pierce is a 52 year old male called for a transitions of care visit. He was discharged from [facility] on 12/8 for kidney transplant.      Reason for visit: initial post transplant med review.    Allergies/ADRs: Reviewed in chart  Past Medical History: Reviewed in chart  Tobacco: He reports that he has never smoked. He has never used smokeless tobacco.  Alcohol: not currently using    Medication Adherence/Access: Patient uses pill box(es).  Patient takes medications 2 time(s) per day. 8, 8  Per patient, misses medication 0 times per week.   The patient fills medications at [facility]: NO, fills medications at [facility].    Renal Transplant:  Current immunosuppressants include Tacrolimus 6mg twice daily and Mycophenolate Mofetil 750mg twice daily, Prednisone 20mg daily tapering by 5mg every Sunday.  Pt reports no side effects  Transplant date: 12/4/22, 2015, 2021  Estimated Creatinine Clearance: 42.2 mL/min (A) (based on SCr of 2.34 mg/dL (H)). Most recent creatinine was 1.94, draw on 12/15/22.   CMV prophylaxis: CrCl 25 to 39 mL/minute: Valcyte 450 mg every other day Donor (+), Recipient (-),  treat 6 months post tx   PJP prophylaxis: Bactrim S S daily  PPI use: Famotidine 10mg daily.   Supplements: Mag Oxide 400mg daily, Tums 1000mg twice daily.   Tx Coordinator: Danny White MD: Dr. Espinoza, Using Med Card: Yes  Immunizations: annual flu shot unknown; Pneumovax 23:  2013; Prevnar 13/15/20: 2017; TDaP:  2014; Shingrix: unknown, HBV: immunity per last titers, COVID: JnJ 2021  Magnesium   Date Value Ref Range Status   12/10/2022 1.7 1.7 - 2.3 mg/dL Final   08/01/2016 1.7 1.6 - 2.3 mg/dL Final     Hypertension: Current medications include Amlodipine 5mg every morning.  Patient does self-monitor blood pressure. 123/79 yesterday morning.  Patient reports no current medication side effects.  BP Readings from Last 3 Encounters:   12/10/22 (!) 156/91   12/09/22 (!) 168/110   12/09/22 (!) 165/99     Hyperlipidemia: Current therapy includes Atorvastatin 10mg daily.  Patient reports no significant myalgias or other side effects.  The ASCVD Risk score (Rochelle DK, et al., 2019) failed to calculate for the following reasons:    The valid HDL cholesterol range is 20 to 100 mg/dL  Recent Labs   Lab Test 12/04/22  0731 10/23/21  0358 04/15/16  0813 10/13/15  0937   CHOL 182 162  --  178    64  --  66   LDL 69 77  --  86   TRIG 60 105   < > 129   CHOLHDLRATIO  --   --   --  2.7    < > = values in this interval not displayed.     Pain: Pt is taking Acetaminophen 650mg about once daily, lidocaine patches 4% as needed. Pain is 3/10. Has not need oxycodone for for the last few days.     Depression:  Current medications include: Citalopram 20mg once daily. Patient reports that depression symptoms are unchanged.  No flowsheet data found.    Today's Vitals: There were no vitals taken for this visit.  ----------------  Post Discharge Medication Reconciliation Status: discharge medications reconciled and changed, per note/orders.    I spent 12 minutes with this patient today. All changes were made via collaborative  practice agreement with Dr. Roy. A copy of the visit note was provided to the patient's provider(s).    A summary of these recommendations was sent via [employer].    Silas Nino, PharmD  [facility] Pharmacist    Phone: [phone]     Telemedicine Visit Details  Type of service:  Telephone visit  Start Time: 2:19 PM  End Time: 2:31 PM  Originating Location (pt. Location): Home      Distant Location (provider location):  On-site  Provider has received verbal consent for a visit from the patient? Yes     Medication Therapy Recommendations  Aftercare following organ transplant    Current Medication: magnesium oxide (MAG-OX) 400 MG tablet   Rationale: Medication interaction - Adverse medication event - Safety   Recommendation: Change Administration Time   Status: Patient Agreed - Adherence/Education          Current Medication: valGANciclovir (VALCYTE) 450 MG tablet   Rationale: Dose too low - Dosage too low - Effectiveness   Recommendation: Increase Dose   Status: Accepted per CPA         HTN, kidney transplant related    Current Medication: amLODIPine (NORVASC) 5 MG tablet   Rationale: Incorrect administration - Dosage too low - Effectiveness   Recommendation: Change Administration Time   Status: Patient Agreed - Adherence/Education

## 2022-12-15 NOTE — PATIENT INSTRUCTIONS
"Recommendations from today's MTM visit:                                                       1. Increase Valcyte 450mg (1 tablet) once daily.   2. Separate Magnesium at least 2 hours from Mycophenolate Mofetil.   3. Move Amlodipine 5mg to the evening.     Follow-up: 2 months post transplant    It was great speaking with you today.  I value your experience and would be very thankful for your time in providing feedback in our clinic survey. In the next few days, you may receive an email or text message from [employer] with a link to a survey related to your  clinical pharmacist.\"     To schedule another MTM appointment, please call the clinic directly or you may call the MTM scheduling line at [phone] or toll-free at [phone].     My Clinical Pharmacist's contact information:                                                      Please feel free to contact me with any questions or concerns you have.      Silas Nino, PharmD  MTM Pharmacist    Phone: [phone]     "

## 2022-12-16 ENCOUNTER — TELEPHONE (OUTPATIENT)
Dept: TRANSPLANT | Facility: CLINIC | Age: 52
End: 2022-12-16

## 2022-12-16 DIAGNOSIS — Z94.0 KIDNEY TRANSPLANT RECIPIENT: ICD-10-CM

## 2022-12-16 RX ORDER — VALGANCICLOVIR 450 MG/1
450 TABLET, FILM COATED ORAL DAILY
Qty: 60 TABLET | Refills: 5
Start: 2022-12-16 | End: 2023-01-06

## 2022-12-16 NOTE — TELEPHONE ENCOUNTER
ISSUE:  WBC 14.2     Labs reviewed in care everywhere, creatinine 1.94   CrCl: 50.7 ml/min -- increase Valcyte to 450 mg daily.     Spoke with Raj, feeling well. BPs 140s/90s. Drinking 2 liters daily. Denies fevers or other signs of infection.no drainage or redness from incision. Old NATHEN site is leaking serous fluid, has to change small gauze pad once daily. No purulent drainage or redness around site. Offered follow up appointment to assess but patient declined, will keep an eye on it.

## 2022-12-19 ENCOUNTER — TELEPHONE (OUTPATIENT)
Dept: TRANSPLANT | Facility: CLINIC | Age: 52
End: 2022-12-19

## 2022-12-19 DIAGNOSIS — Z76.82 KIDNEY TRANSPLANT CANDIDATE: ICD-10-CM

## 2022-12-19 DIAGNOSIS — Z94.0 KIDNEY TRANSPLANTED: ICD-10-CM

## 2022-12-19 DIAGNOSIS — D84.9 IMMUNOSUPPRESSED STATUS (H): ICD-10-CM

## 2022-12-19 LAB — BK VIRUS IGG ANTIBODY: ABNORMAL

## 2022-12-19 RX ORDER — TACROLIMUS 1 MG/1
5 CAPSULE ORAL 2 TIMES DAILY
Qty: 300 CAPSULE | Refills: 11 | Status: SHIPPED | OUTPATIENT
Start: 2022-12-19 | End: 2022-12-26

## 2022-12-19 NOTE — TELEPHONE ENCOUNTER
ISSUE:   Tacrolimus IR level 12.8 on 12/15, goal 8-10, dose 6 mg BID.    PLAN:   Please call patient and confirm this was an accurate 12-hour trough. Verify Tacrolimus IR dose 6 mg BID. Confirm no new medications or illness. Confirm no missed doses. If accurate trough and accurate dose, decrease Tacrolimus IR dose to 5 mg BID and repeat labs in 2 days    OUTCOME:   Spoke with patient, they confirm accurate trough level and current dose 6 mg BID. Patient confirmed dose change to 5 mg BID and to repeat labs in 2 days. Orders sent to preferred pharmacy for dose change and lab for repeat labs. Patient voiced understanding of plan.     Feeling well, drinking 2+ liters daily. Old NATHEN site is no longer draining. Creatinine up a bit today to 2.0, WBC trending down. BPs and weight stable. Will increase hydration, reduce tacrolimus and repeat labs again on Thursday.

## 2022-12-21 ENCOUNTER — TELEPHONE (OUTPATIENT)
Dept: TRANSPLANT | Facility: CLINIC | Age: 52
End: 2022-12-21

## 2022-12-21 NOTE — TELEPHONE ENCOUNTER
Post Kidney and Pancreas Transplant Team Conference  Date: 12/21/2022  Transplant Coordinator: Mel Cervantes     Attendees:  [x]  Dr. Espinoza [x] Park Gamboa, RN [] Tiffany Mckay LPN     []  Dr. Marques [x] Lucie Galeano RN [] Millie Merida LPN   [x]  Dr. Roy [x] Deborah Sotelo RN    [x]  Dr. Quach [] Lupis Ortega RN [x] Silas Nino, PharmD   [] Dr. Becerra [] Rosie Helton RN    [] Dr. Barton [] German Sylvester RN    [] Dr. Muñoz [] Sadia Mcdermott RN [x] Silvia Yusuf RN   [] Dr. Hernandez [] Patience Alicea RN    [x]  Dr. Zhu [] Johanna Rosa RN    [] Dr. Emanuel [x] Elif Cervantes RN    [] Karena Roy, NP [] Julienne Suarez RN        Verbal Plan Read Back:   No changes, continue to monitor.    Routed to RN Coordinator   Katie Yusuf RN

## 2022-12-26 ENCOUNTER — TELEPHONE (OUTPATIENT)
Dept: TRANSPLANT | Facility: CLINIC | Age: 52
End: 2022-12-26

## 2022-12-26 DIAGNOSIS — Z76.82 KIDNEY TRANSPLANT CANDIDATE: ICD-10-CM

## 2022-12-26 DIAGNOSIS — Z94.0 KIDNEY TRANSPLANTED: ICD-10-CM

## 2022-12-26 DIAGNOSIS — D84.9 IMMUNOSUPPRESSED STATUS (H): ICD-10-CM

## 2022-12-26 RX ORDER — TACROLIMUS 1 MG/1
4 CAPSULE ORAL 2 TIMES DAILY
Qty: 240 CAPSULE | Refills: 3 | Status: SHIPPED | OUTPATIENT
Start: 2022-12-26 | End: 2023-01-31

## 2022-12-26 NOTE — TELEPHONE ENCOUNTER
Issue:  Tac 13.2- goal is 8-10. Last dose change 12/19.  Cr stable in CE.     Plan:  Raj Pierce confirms 12 hour level. Will lower tac from 5 mg bid to 4 mg bid. Repeat labs tomorrow. Med list updated.

## 2022-12-30 ENCOUNTER — LAB (OUTPATIENT)
Dept: LAB | Facility: CLINIC | Age: 52
End: 2022-12-30
Payer: COMMERCIAL

## 2022-12-30 DIAGNOSIS — Z79.899 ENCOUNTER FOR LONG-TERM CURRENT USE OF MEDICATION: ICD-10-CM

## 2022-12-30 DIAGNOSIS — Z94.0 KIDNEY REPLACED BY TRANSPLANT: ICD-10-CM

## 2022-12-30 DIAGNOSIS — Z48.298 AFTERCARE FOLLOWING ORGAN TRANSPLANT: ICD-10-CM

## 2022-12-30 PROCEDURE — 86833 HLA CLASS II HIGH DEFIN QUAL: CPT

## 2022-12-30 PROCEDURE — 86828 HLA CLASS I&II ANTIBODY QUAL: CPT | Mod: XU

## 2022-12-30 PROCEDURE — 36415 COLL VENOUS BLD VENIPUNCTURE: CPT

## 2022-12-30 PROCEDURE — 86832 HLA CLASS I HIGH DEFIN QUAL: CPT

## 2023-01-04 ENCOUNTER — TELEPHONE (OUTPATIENT)
Dept: TRANSPLANT | Facility: CLINIC | Age: 53
End: 2023-01-04

## 2023-01-04 DIAGNOSIS — Z48.298 AFTERCARE FOLLOWING ORGAN TRANSPLANT: Primary | ICD-10-CM

## 2023-01-04 DIAGNOSIS — Z94.0 KIDNEY TRANSPLANTED: ICD-10-CM

## 2023-01-04 DIAGNOSIS — N25.81 SECONDARY RENAL HYPERPARATHYROIDISM (H): ICD-10-CM

## 2023-01-04 DIAGNOSIS — Z94.0 KIDNEY REPLACED BY TRANSPLANT: ICD-10-CM

## 2023-01-04 RX ORDER — MAGNESIUM OXIDE 400 MG/1
400 TABLET ORAL
Qty: 30 TABLET | Refills: 11 | Status: SHIPPED | OUTPATIENT
Start: 2023-01-04 | End: 2023-02-17

## 2023-01-04 RX ORDER — MYCOPHENOLATE MOFETIL 250 MG/1
1000 CAPSULE ORAL 2 TIMES DAILY
Qty: 240 CAPSULE | Refills: 11 | Status: SHIPPED | OUTPATIENT
Start: 2023-01-04 | End: 2023-03-30 | Stop reason: ALTCHOICE

## 2023-01-04 NOTE — TELEPHONE ENCOUNTER
ISSUE:  MPA <0.5    Currently on  mg bid. Raj confirmed current dose and accurate trough.     Reviewed with Dr. Espinoza, increase MMF to 1000 mg bid. Orders faxed

## 2023-01-05 LAB
DONOR IDENTIFICATION: NORMAL
DSA COMMENTS: NORMAL
DSA PRESENT: NO
DSA TEST METHOD: NORMAL
INT SUB RESULT: NORMAL
INTERF SUBSTANCE: NORMAL
INTSUB TEST METHOD: NORMAL
ORGAN: NORMAL
SA 1 CELL: NORMAL
SA 1 TEST METHOD: NORMAL
SA 2 CELL: NORMAL
SA 2 TEST METHOD: NORMAL
SA1 HI RISK ABY: NORMAL
SA1 MOD RISK ABY: NORMAL
SA2 HI RISK ABY: NORMAL
SA2 MOD RISK ABY: NORMAL
UNACCEPTABLE ANTIGENS: NORMAL
UNOS CPRA: 67
ZZZINT SUB COMMENTS: NORMAL
ZZZSA 1  COMMENTS: NORMAL
ZZZSA 2 COMMENTS: NORMAL

## 2023-01-06 DIAGNOSIS — Z94.0 KIDNEY TRANSPLANTED: ICD-10-CM

## 2023-01-06 DIAGNOSIS — Z94.0 KIDNEY TRANSPLANT RECIPIENT: ICD-10-CM

## 2023-01-06 RX ORDER — VALGANCICLOVIR 450 MG/1
450 TABLET, FILM COATED ORAL DAILY
Qty: 30 TABLET | Refills: 5 | Status: SHIPPED | OUTPATIENT
Start: 2023-01-06 | End: 2023-04-21

## 2023-01-06 RX ORDER — SULFAMETHOXAZOLE AND TRIMETHOPRIM 400; 80 MG/1; MG/1
1 TABLET ORAL DAILY
Qty: 30 TABLET | Refills: 11 | Status: SHIPPED | OUTPATIENT
Start: 2023-01-06 | End: 2023-12-22

## 2023-01-06 NOTE — TELEPHONE ENCOUNTER
Estimated Creatinine Clearance: 42.2 mL/min (A) (based on SCr of 2.34 mg/dL (H)).    Ok to increase Valcyte to 450 mg daily.

## 2023-01-17 ENCOUNTER — OFFICE VISIT (OUTPATIENT)
Dept: TRANSPLANT | Facility: CLINIC | Age: 53
End: 2023-01-17
Attending: INTERNAL MEDICINE
Payer: COMMERCIAL

## 2023-01-17 DIAGNOSIS — Z48.298 AFTERCARE FOLLOWING ORGAN TRANSPLANT: Primary | ICD-10-CM

## 2023-01-17 PROCEDURE — 250N000009 HC RX 250: Performed by: NURSE PRACTITIONER

## 2023-01-17 PROCEDURE — 250N000013 HC RX MED GY IP 250 OP 250 PS 637: Performed by: NURSE PRACTITIONER

## 2023-01-17 RX ORDER — LIDOCAINE HYDROCHLORIDE 20 MG/ML
JELLY TOPICAL ONCE
Status: COMPLETED | OUTPATIENT
Start: 2023-01-17 | End: 2023-01-17

## 2023-01-17 RX ORDER — LEVOFLOXACIN 250 MG/1
500 TABLET, FILM COATED ORAL ONCE
Status: COMPLETED | OUTPATIENT
Start: 2023-01-17 | End: 2023-01-17

## 2023-01-17 RX ADMIN — LEVOFLOXACIN 500 MG: 250 TABLET, FILM COATED ORAL at 13:45

## 2023-01-17 RX ADMIN — LIDOCAINE HYDROCHLORIDE: 20 JELLY TOPICAL at 13:49

## 2023-01-17 NOTE — LETTER
1/17/2023         RE: Raj Pierce  [address]  [place] [address]        Dear Colleague,    Thank you for referring your patient, Raj Pierce, to the [facility]. Please see a copy of my visit note below.    See procedure note.       Again, thank you for allowing me to participate in the care of your patient.        Sincerely,        GUY Waterman CNP

## 2023-01-17 NOTE — PROCEDURES
Transplant Surgery  Operative Note    Preop dx: Status post  Donor kidney transplant.  Post op dx: same   Procedure: Flexible cystoscopy and ureteral stent removal   Provider: GUY Waterman  Assistant: GUY Ayala   Anesthesia: local  EBL: 0   Specimens: none.  Findings: none abnormal. Stent inspected and noted to be intact.   Indication: The patient is status post kidney transplant and the ureteral stent is no longer needed.    Procedure: The patient was positioned supine on the table.  The groin was sterilely prepped and draped in the usual fashion. Time out was done. Urojet was applied to the urethra. A flexible cystoscope was inserted and advanced thru the urethra into the bladder. The stent was visualized and grasped. The cystoscope, grasper and stent were removed en-mass. The stent was visualized to be intact.  The bladder mucosa was normal in appearance. Antibiotics were administered. The patient tolerated the procedure well and was asked to void before leaving the clinic.

## 2023-01-18 ENCOUNTER — VIRTUAL VISIT (OUTPATIENT)
Dept: NEPHROLOGY | Facility: CLINIC | Age: 53
End: 2023-01-18
Attending: INTERNAL MEDICINE
Payer: COMMERCIAL

## 2023-01-18 DIAGNOSIS — Z94.0 KIDNEY REPLACED BY TRANSPLANT: Primary | ICD-10-CM

## 2023-01-18 DIAGNOSIS — I15.1 HTN, KIDNEY TRANSPLANT RELATED: ICD-10-CM

## 2023-01-18 DIAGNOSIS — Z48.298 AFTERCARE FOLLOWING ORGAN TRANSPLANT: ICD-10-CM

## 2023-01-18 DIAGNOSIS — D84.9 IMMUNOSUPPRESSION (H): ICD-10-CM

## 2023-01-18 DIAGNOSIS — Z94.0 HTN, KIDNEY TRANSPLANT RELATED: ICD-10-CM

## 2023-01-18 PROBLEM — D69.6 THROMBOCYTOPENIA (H): Status: RESOLVED | Noted: 2022-12-07 | Resolved: 2023-01-18

## 2023-01-18 PROBLEM — E83.51 HYPOCALCEMIA: Status: RESOLVED | Noted: 2022-12-07 | Resolved: 2023-01-18

## 2023-01-18 PROBLEM — N18.9 CHRONIC KIDNEY DISEASE-MINERAL AND BONE DISORDER: Status: RESOLVED | Noted: 2021-11-07 | Resolved: 2023-01-18

## 2023-01-18 PROBLEM — Z29.89 NEED FOR PNEUMOCYSTIS PROPHYLAXIS: Status: RESOLVED | Noted: 2022-03-18 | Resolved: 2023-01-18

## 2023-01-18 PROBLEM — G89.18 ACUTE POST-OPERATIVE PAIN: Status: RESOLVED | Noted: 2022-12-07 | Resolved: 2023-01-18

## 2023-01-18 PROBLEM — M89.9 CHRONIC KIDNEY DISEASE-MINERAL AND BONE DISORDER: Status: RESOLVED | Noted: 2021-11-07 | Resolved: 2023-01-18

## 2023-01-18 PROBLEM — D72.829 LEUKOCYTOSIS: Status: RESOLVED | Noted: 2021-11-07 | Resolved: 2023-01-18

## 2023-01-18 PROBLEM — E83.9 CHRONIC KIDNEY DISEASE-MINERAL AND BONE DISORDER: Status: RESOLVED | Noted: 2021-11-07 | Resolved: 2023-01-18

## 2023-01-18 PROBLEM — E87.20 METABOLIC ACIDOSIS: Status: RESOLVED | Noted: 2021-11-07 | Resolved: 2023-01-18

## 2023-01-18 PROBLEM — Z90.5 S/P NEPHRECTOMY: Status: RESOLVED | Noted: 2021-11-07 | Resolved: 2023-01-18

## 2023-01-18 PROBLEM — T86.19 RENAL VEIN THROMBOSIS OF KIDNEY TRANSPLANT (H): Status: RESOLVED | Noted: 2021-10-27 | Resolved: 2023-01-18

## 2023-01-18 PROBLEM — E44.0 MODERATE MALNUTRITION (H): Status: RESOLVED | Noted: 2021-11-07 | Resolved: 2023-01-18

## 2023-01-18 PROBLEM — I82.3 RENAL VEIN THROMBOSIS OF KIDNEY TRANSPLANT (H): Status: RESOLVED | Noted: 2021-10-27 | Resolved: 2023-01-18

## 2023-01-18 PROBLEM — Z76.82 KIDNEY TRANSPLANT CANDIDATE: Status: RESOLVED | Noted: 2019-12-18 | Resolved: 2023-01-18

## 2023-01-18 PROCEDURE — G0463 HOSPITAL OUTPT CLINIC VISIT: HCPCS | Mod: PN,GT | Performed by: INTERNAL MEDICINE

## 2023-01-18 PROCEDURE — 99215 OFFICE O/P EST HI 40 MIN: CPT | Mod: 24 | Performed by: INTERNAL MEDICINE

## 2023-01-18 ASSESSMENT — PAIN SCALES - GENERAL: PAINLEVEL: NO PAIN (0)

## 2023-01-18 NOTE — PATIENT INSTRUCTIONS
Patient Recommendations:  -Stop Tums (calcium carbonate)  -Please check your blood pressure regularly. If it is higher than 140/90 please let us know    Transplant Patient Information  Your Post Transplant Coordinator is: Mel Cervantes  For non urgent items, we encourage you to contact your coordinator/care team online via [employer]  You and your care team can also contact your transplant coordinator Monday - Friday, 8am - 5pm at [phone] (Option 2 to reach the coordinator or Option 4 to schedule an appointment).  After hours for urgent matters, please call [facility] at [phone].

## 2023-01-18 NOTE — LETTER
1/18/2023       RE: Raj Pierce  [address]  [place] [address]     Dear Colleague,    Thank you for referring your patient, Raj Pierce, to the [facility] [place] [facility]. Please see a copy of my visit note below      TRANSPLANT NEPHROLOGY EARLY POST TRANSPLANT VISIT    Assessment & Plan   # DDKT: Stable   - Baseline Creatinine: ~ 1.5-1.7   - Proteinuria: Minimal (0.2-0.5 grams).    - Date DSA Last Checked: Dec/2022      Latest DSA: No   - BK Viremia: No   - Kidney Tx Biopsy: No   - Transplant Ureteral Stent: Removed 1/17/23    # Immunosuppression: Tacrolimus immediate release (goal 8-10) and Mycophenolate mofetil (dose 1000 mg every 12 hours)   - Induction with Recent Transplant:  Intermediate Intensity   - Continue with intensive monitoring of immunosuppression for efficacy and toxicity.   - Changes: Not at this time    # Infection Prophylaxis:   - PJP: Sulfa/TMP (Bactrim)  - CMV: Valganciclovir (Valcyte). 450mg daily. Patient is CMV IgG Ab discordant (D+/R-) and will continue on Valcyte x 6 months, then check CMV PCR monthly until 12 months post transplant.    # Hypertension: Controlled;  Goal BP: < 140/90   - Volume status: Euvolemic     - Changes: Not at this time. Continue amlodipine 5mg daily    # Anemia in Chronic Renal Disease: Hgb: Stable      NADIYA: No   - Iron studies: Replete    # Mineral Bone Disorder:   - Secondary renal hyperparathyroidism; PTH level: Normal (15-65 pg/ml)        On treatment: None  - Vitamin D; level: Normal        On supplement: No  - Calcium; level: Normal        On supplement: Yes, tums 1g bid. Calcium has improved. Stop calcium now   - Phosphorus; level: Normal        On supplement: No    # Electrolytes:   - Potassium; level: Normal        On supplement: No  - Magnesium; level: Normal        On supplement: Yes, mag ox 400mg daily   - Bicarbonate; level: Low normal        On  supplement: No    # PKD:                          -S/p R native nephrectomy at time of transplant              -If no previous MRA of brain (cannot find in records) would pursue MRA brain as outpatient    # Medical Compliance: Yes    # COVID-19 Virus Review: Discussed COVID-19 virus and the potential medical risks.  Reviewed preventative health recommendations, including wearing a mask where appropriate.  Recommended COVID vaccination should be up to date with either an initial vaccination or booster shot when appropriate.  Asked the patient to inform the transplant center if they are exposed or diagnosed with this virus.    # COVID Vaccination Up To Date: No, due for next dose    # Transplant History:  Etiology of Kidney Failure: Polycystic kidney disease (PKD)  Tx: DDKT  Transplant: 12/4/2022 (Kidney), 10/13/2015 (Kidney), 10/23/2021 (Kidney)  Donor Type: Donation after Brain Death Donor Class:   Crossmatch at time of Tx: negative  DSA at time of Tx: No  Significant changes in immunosuppression: None  CMV IgG Ab High Risk Discordance (D+/R-): Yes  EBV IgG Ab High Risk Discordance (D+/R-): No  Significant transplant-related complications: None    Transplant Office Phone Number: [phone]    Assessment and plan was discussed with the patient and he voiced his understanding and agreement.    Return visit: Return in 30 days (on 2/17/2023).    Georges Quach MD    Chief Complaint   Mr. Pierce is a 52 year old here for routine follow up, kidney transplant and immunosuppression management.     History of Present Illness   The patient overall feels well. He denies any recent hospitalizations. He denies nausea, vomiting, diarrhea, fever, chills, shortness of breath, chest pain, LE edema, unintentional weight loss, nights sweats, dysuria, hematuria.     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 Received methylprednisolone 500mg prior to thymo.       Medications   Current Outpatient Medications   Medication Sig     acetaminophen (TYLENOL) 325 MG tablet Take 1-2 tablets (325-650 mg) by mouth every 4 hours as needed for pain     amLODIPine (NORVASC) 5 MG tablet Take 5 mg by mouth daily     atorvastatin (LIPITOR) 10 MG tablet Take 10 mg by mouth daily     citalopram (CELEXA) 20 MG tablet Take 20 mg by mouth daily.     magnesium oxide (MAG-OX) 400 MG tablet Take 1 tablet (400 mg) by mouth daily (with lunch)     mycophenolate (GENERIC EQUIVALENT) 250 MG capsule Take 4 capsules (1,000 mg) by mouth 2 times daily     sulfamethoxazole-trimethoprim (BACTRIM) 400-80 MG tablet Take 1 tablet by mouth daily     tacrolimus (GENERIC EQUIVALENT) 1 MG capsule Take 4 capsules (4 mg) by mouth 2 times daily     valGANciclovir (VALCYTE) 450 MG tablet Take 1 tablet (450 mg) by mouth daily Stop taking on 6/4/2023     tacrolimus (GENERIC EQUIVALENT) 0.5 MG capsule Take 1 cap by mouth twice daily as directed by Transplant Center for dose changes. (Patient not taking: Reported on  12/9/2022)     No current facility-administered medications for this visit.     Medications Discontinued During This Encounter   Medication Reason     calcium carbonate (TUMS) 500 MG chewable tablet      famotidine (PEPCID) 10 MG tablet      Lidocaine (LIDOCARE) 4 % Patch      oxyCODONE (ROXICODONE) 5 MG tablet      polyethylene glycol (MIRALAX) 17 GM/Dose powder      sennosides (SENOKOT) 8.6 MG tablet        Physical Exam   Vital Signs: There were no vitals taken for this visit.    GENERAL APPEARANCE: alert and no distress  HENT: no obvious abnormalities on appearance  RESP: breathing appears unremarkable with normal rate, no audible wheezing or cough and no apparent shortness of breath with conversation  MS: extremities normal - no gross deformities noted  SKIN: no apparent rash and normal skin tone  NEURO: speech is clear with no obvious neurological deficits  PSYCH: mentation appears normal and affect normal  Data     Renal Latest Ref Rng & Units 1/12/2023 1/6/2023 1/3/2023   Na 136 - 145 mmol/L - - -   Na (external) 136 - 145 meq/L 141 142 140   K 3.4 - 5.3 mmol/L - - -   K (external) 3.5 - 5.1 meq/L 4.5 4.7 4.3   Cl 98 - 109 meq/L 110(H) 108 108   CO2 22 - 29 mmol/L - - -   CO2 (external) 20 - 29 meq/L 21 24 23   BUN 6.0 - 20.0 mg/dL - - -   BUN (external) 6 - 22 mg/dL 33(H) 29(H) 36(H)   Cr 0.67 - 1.17 mg/dL - - -   Cr (external) 0.73 - 1.18 mg/dL 1.65(H) 1.54(H) 1.67(H)   Glucose 70 - 99 mg/dL - - -   Glucose (external) 70 - 99 mg/dL 84 91 89   Ca  8.6 - 10.0 mg/dL - - -   Ca (external) 8.5 - 10.5 mg/dL 9.3 9.5 9.5   Mg 1.7 - 2.3 mg/dL - - -   Mg (external) 1.6 - 2.6 mg/dL 1.6 1.6 1.5(L)     Bone Health Latest Ref Rng & Units 1/12/2023 1/6/2023 1/3/2023   Phos 2.5 - 4.5 mg/dL - - -   Phos (external) 2.3 - 4.7 mg/dL 4.3 4.2 4.7   PTHi 15 - 65 pg/mL - - -   PTHi (external) 11.0 - 101.0 pg/mL - - -   Vit D Def 20 - 75 ug/L - - -   Vit D Def (external) 30.0 - 100.0 ng/mL - - -     Heme Latest Ref Rng & Units  1/12/2023 1/6/2023 1/3/2023   WBC 4.0 - 11.0 10e3/uL - - -   WBC (external) 4.0 - 11.0 K/uL 4.9 6.1 5.9   Hgb 13.3 - 17.7 g/dL - - -   Hgb (external) 13.5 - 17.5 g/dL 12.3(L) 12.6(L) 12.2(L)   Plt 150 - 450 10e3/uL - - -   Plt (external) 140 - 400 K/uL 218 197 191   ABSOLUTE NEUTROPHIL 1.6 - 8.3 10e3/uL - - -   ABSOLUTE NEUTROPHILS (EXTERNAL) 1.8 - 8.0 K/uL - - -   ABSOLUTE LYMPHOCYTES 0.8 - 5.3 10e3/uL - - -   ABSOLUTE LYMPHOCYTES (EXTERNAL) 0.8 - 4.1 K/uL - - -   ABSOLUTE MONOCYTES 0.0 - 1.3 10e3/uL - - -   ABSOLUTE MONOCYTES (EXTERNAL) 0.0 - 1.0 K/uL - - -   ABSOLUTE EOSINOPHILS 0.0 - 0.7 10e3/uL - - -   ABSOLUTE EOSINOPHILS (EXTERNAL) 0.0 - 0.7 K/uL - - -   ABSOLUTE BASOPHILS 0.0 - 0.2 10e9/L - - -   ABSOLUTE BASOPHILS (EXTERNAL) 0.0 - 0.2 K/uL - - -   ABS IMMATURE GRANULOCYTES 0 - 0.4 10e9/L - - -     Liver Latest Ref Rng & Units 12/4/2022 11/1/2021 10/23/2021   AP 40 - 129 U/L 50 - 56   AP (external) 38 - 126 U/L - - -   TBili <=1.2 mg/dL 0.6 - 0.6   TBili (external) 0.2 - 1.3 mg/dL - - -   DBili (external) 0.0 - 0.4 mg/dL - - -   ALT 10 - 50 U/L 14 - 24   ALT (external) 21 - 72 U/L - - -   AST 10 - 50 U/L 25 - 23   AST (external) 17 - 59 U/L - - -   Tot Protein 6.4 - 8.3 g/dL 5.7(L) - 6.5(L)   Tot Protein (external) 6.3 - 8.2 g/dL - - -   Albumin 3.5 - 5.2 g/dL 3.9 2.0(L) 3.6   Albumin (external) 3.5 - 5.0 g/dL - - -     Pancreas Latest Ref Rng & Units 12/4/2022 10/23/2021 10/14/2015   A1C <5.7 % 4.2 5.6 4.8     Iron studies Latest Ref Rng & Units 12/12/2022 12/9/2022 10/27/2021   Iron 61 - 157 ug/dL 45(L) 83 19(L)   Iron sat 15 - 46 % 17 33 10(L)   Ferritin 31 - 409 ng/mL 699(H) 983(H) 597(H)   Ferritin (external) 18.0 - 464.0 ng/mL - - -     [facility] Txp Virology Latest Ref Rng & Units 12/30/2022 12/9/2022 12/4/2022   CVM DNA Quant - - - -   CMV QUANT IU/ML Not Detected IU/mL - Not Detected Not Detected   LOG IU/ML OF CMVQNT <2.1 [Log:IU]/mL - - -   BK Spec - - - -   BK Res BKNEG copies/mL - - -   BK Log <2.7  Log copies/mL - - -   BK Quant Log Ext log IU/mL Undetected - -   BK Quant Result Ext Undetected IU/mL Undetected - -   BK Quant Spec Ext - Plasma - -   EBV VCA IGG ANTIBODY U/mL - - -   EBV CAPSID ANTIBODY IGG No detectable antibody. - - Positive(A)   EBV DNA QUANT (EXTERNAL) Undetected IU/mL - - -   EBV DNA COPIES/ML EBVNEG [Copies]/mL - - -   EBV DNA LOG OF COPIES <2.7 [Log:copies]/mL - - -   EBV DNA LOG OF COPIES (EXTERNAL) log IU/mL - - -   Hep B Core NR - - -   Hep B Core Ext Nonreactive - - -   Hep B Surf - - - -   Hep B Surf Ext  - - - -   HIV 1&2 NEG - - -        Recent Labs   Lab Test 10/11/22  0741 12/07/22  0649 12/09/22  0829 12/10/22  0719 12/12/22  0735   DOSTAC 10/10/2022  --  12/8/2022  --  12/11/2022   TACROL 6.2   < > 4.0* 6.2 9.9    < > = values in this interval not displayed.     Recent Labs   Lab Test 10/19/15  0707 10/26/15  0707 11/09/15  0758   DOSMPA 2,000 Not Provided 11/8/15 AT 2030   MPACID 1.27 4.01* 0.45*   MPAG >200.0* 190.0* 20.5*       Again, thank you for allowing me to participate in the care of your patient.      Sincerely,    Georges Quach MD

## 2023-01-18 NOTE — NURSING NOTE
Patient declined individual allergy and medication review by support staff because no changes since last reviewed on 12/15/22.    Luzmaria Garcia

## 2023-01-18 NOTE — PROGRESS NOTES
Raj is a 52 year old who is being evaluated via a billable video visit.      How would you like to obtain your AVS? MyChart  If the video visit is dropped, the invitation should be resent by: Text to cell phone: [phone]  Will anyone else be joining your video visit? No      Luzmaria EASTMAN Imerbrendon    Video-Visit Details    Type of service:  Video Visit   Video Start Time: 12:03 PM  Video End Time:12:11 PM    Originating Location (pt. Location): Home  Distant Location (provider location):  On-site  Platform used for Video Visit: [facility]        TRANSPLANT NEPHROLOGY EARLY POST TRANSPLANT VISIT    Assessment & Plan   # DDKT: Stable   - Baseline Creatinine: ~ 1.5-1.7   - Proteinuria: Minimal (0.2-0.5 grams).    - Date DSA Last Checked: Dec/2022      Latest DSA: No   - BK Viremia: No   - Kidney Tx Biopsy: No   - Transplant Ureteral Stent: Removed 1/17/23    # Immunosuppression: Tacrolimus immediate release (goal 8-10) and Mycophenolate mofetil (dose 1000 mg every 12 hours)   - Induction with Recent Transplant:  Intermediate Intensity   - Continue with intensive monitoring of immunosuppression for efficacy and toxicity.   - Changes: Not at this time    # Infection Prophylaxis:   - PJP: Sulfa/TMP (Bactrim)  - CMV: Valganciclovir (Valcyte). 450mg daily. Patient is CMV IgG Ab discordant (D+/R-) and will continue on Valcyte x 6 months, then check CMV PCR monthly until 12 months post transplant.    # Hypertension: Controlled;  Goal BP: < 140/90   - Volume status: Euvolemic     - Changes: Not at this time. Continue amlodipine 5mg daily    # Anemia in Chronic Renal Disease: Hgb: Stable      NADIYA: No   - Iron studies: Replete    # Mineral Bone Disorder:   - Secondary renal hyperparathyroidism; PTH level: Normal (15-65 pg/ml)        On treatment: None  - Vitamin D; level: Normal        On supplement: No  - Calcium; level: Normal        On supplement: Yes, tums 1g bid. Calcium has improved. Stop calcium now   - Phosphorus; level:  Normal        On supplement: No    # Electrolytes:   - Potassium; level: Normal        On supplement: No  - Magnesium; level: Normal        On supplement: Yes, mag ox 400mg daily   - Bicarbonate; level: Low normal        On supplement: No    # PKD:                          -S/p R native nephrectomy at time of transplant              -If no previous MRA of brain (cannot find in records) would pursue MRA brain as outpatient    # Medical Compliance: Yes    # COVID-19 Virus Review: Discussed COVID-19 virus and the potential medical risks.  Reviewed preventative health recommendations, including wearing a mask where appropriate.  Recommended COVID vaccination should be up to date with either an initial vaccination or booster shot when appropriate.  Asked the patient to inform the transplant center if they are exposed or diagnosed with this virus.    # COVID Vaccination Up To Date: No, due for next dose    # Transplant History:  Etiology of Kidney Failure: Polycystic kidney disease (PKD)  Tx: DDKT  Transplant: 12/4/2022 (Kidney), 10/13/2015 (Kidney), 10/23/2021 (Kidney)  Donor Type: Donation after Brain Death Donor Class:   Crossmatch at time of Tx: negative  DSA at time of Tx: No  Significant changes in immunosuppression: None  CMV IgG Ab High Risk Discordance (D+/R-): Yes  EBV IgG Ab High Risk Discordance (D+/R-): No  Significant transplant-related complications: None    Transplant Office Phone Number: [phone]    Assessment and plan was discussed with the patient and he voiced his understanding and agreement.    Return visit: Return in 30 days (on 2/17/2023).    Georges Quach MD    Chief Complaint   Mr. Pierce is a 52 year old here for routine follow up, kidney transplant and immunosuppression management.     History of Present Illness   The patient overall feels well. He denies any recent hospitalizations. He denies nausea, vomiting, diarrhea, fever, chills, shortness of breath, chest pain, LE edema,  unintentional weight loss, nights sweats, dysuria, hematuria.     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 Received methylprednisolone 500mg prior to thymo.       Medications   Current Outpatient Medications   Medication Sig     acetaminophen (TYLENOL) 325 MG tablet Take 1-2 tablets (325-650 mg) by mouth every 4 hours as needed for pain     amLODIPine (NORVASC) 5 MG tablet Take 5 mg by mouth daily     atorvastatin (LIPITOR) 10 MG tablet Take 10 mg by mouth daily     citalopram (CELEXA) 20 MG tablet Take 20 mg by mouth daily.     magnesium oxide (MAG-OX) 400 MG tablet Take 1 tablet (400 mg) by mouth daily (with lunch)     mycophenolate (GENERIC EQUIVALENT) 250 MG capsule Take 4 capsules (1,000 mg) by mouth 2 times daily     sulfamethoxazole-trimethoprim (BACTRIM) 400-80 MG tablet Take 1 tablet by mouth daily     tacrolimus (GENERIC EQUIVALENT) 1 MG capsule Take 4 capsules (4 mg) by mouth 2 times daily     valGANciclovir (VALCYTE) 450 MG tablet  Take 1 tablet (450 mg) by mouth daily Stop taking on 6/4/2023     tacrolimus (GENERIC EQUIVALENT) 0.5 MG capsule Take 1 cap by mouth twice daily as directed by Transplant Center for dose changes. (Patient not taking: Reported on 12/9/2022)     No current facility-administered medications for this visit.     Medications Discontinued During This Encounter   Medication Reason     calcium carbonate (TUMS) 500 MG chewable tablet      famotidine (PEPCID) 10 MG tablet      Lidocaine (LIDOCARE) 4 % Patch      oxyCODONE (ROXICODONE) 5 MG tablet      polyethylene glycol (MIRALAX) 17 GM/Dose powder      sennosides (SENOKOT) 8.6 MG tablet        Physical Exam   Vital Signs: There were no vitals taken for this visit.    GENERAL APPEARANCE: alert and no distress  HENT: no obvious abnormalities on appearance  RESP: breathing appears unremarkable with normal rate, no audible wheezing or cough and no apparent shortness of breath with conversation  MS: extremities normal - no gross deformities noted  SKIN: no apparent rash and normal skin tone  NEURO: speech is clear with no obvious neurological deficits  PSYCH: mentation appears normal and affect normal  Data     Renal Latest Ref Rng & Units 1/12/2023 1/6/2023 1/3/2023   Na 136 - 145 mmol/L - - -   Na (external) 136 - 145 meq/L 141 142 140   K 3.4 - 5.3 mmol/L - - -   K (external) 3.5 - 5.1 meq/L 4.5 4.7 4.3   Cl 98 - 109 meq/L 110(H) 108 108   CO2 22 - 29 mmol/L - - -   CO2 (external) 20 - 29 meq/L 21 24 23   BUN 6.0 - 20.0 mg/dL - - -   BUN (external) 6 - 22 mg/dL 33(H) 29(H) 36(H)   Cr 0.67 - 1.17 mg/dL - - -   Cr (external) 0.73 - 1.18 mg/dL 1.65(H) 1.54(H) 1.67(H)   Glucose 70 - 99 mg/dL - - -   Glucose (external) 70 - 99 mg/dL 84 91 89   Ca  8.6 - 10.0 mg/dL - - -   Ca (external) 8.5 - 10.5 mg/dL 9.3 9.5 9.5   Mg 1.7 - 2.3 mg/dL - - -   Mg (external) 1.6 - 2.6 mg/dL 1.6 1.6 1.5(L)     Bone Health Latest Ref Rng & Units 1/12/2023 1/6/2023 1/3/2023   Phos 2.5 - 4.5 mg/dL - - -    Phos (external) 2.3 - 4.7 mg/dL 4.3 4.2 4.7   PTHi 15 - 65 pg/mL - - -   PTHi (external) 11.0 - 101.0 pg/mL - - -   Vit D Def 20 - 75 ug/L - - -   Vit D Def (external) 30.0 - 100.0 ng/mL - - -     Heme Latest Ref Rng & Units 1/12/2023 1/6/2023 1/3/2023   WBC 4.0 - 11.0 10e3/uL - - -   WBC (external) 4.0 - 11.0 K/uL 4.9 6.1 5.9   Hgb 13.3 - 17.7 g/dL - - -   Hgb (external) 13.5 - 17.5 g/dL 12.3(L) 12.6(L) 12.2(L)   Plt 150 - 450 10e3/uL - - -   Plt (external) 140 - 400 K/uL 218 197 191   ABSOLUTE NEUTROPHIL 1.6 - 8.3 10e3/uL - - -   ABSOLUTE NEUTROPHILS (EXTERNAL) 1.8 - 8.0 K/uL - - -   ABSOLUTE LYMPHOCYTES 0.8 - 5.3 10e3/uL - - -   ABSOLUTE LYMPHOCYTES (EXTERNAL) 0.8 - 4.1 K/uL - - -   ABSOLUTE MONOCYTES 0.0 - 1.3 10e3/uL - - -   ABSOLUTE MONOCYTES (EXTERNAL) 0.0 - 1.0 K/uL - - -   ABSOLUTE EOSINOPHILS 0.0 - 0.7 10e3/uL - - -   ABSOLUTE EOSINOPHILS (EXTERNAL) 0.0 - 0.7 K/uL - - -   ABSOLUTE BASOPHILS 0.0 - 0.2 10e9/L - - -   ABSOLUTE BASOPHILS (EXTERNAL) 0.0 - 0.2 K/uL - - -   ABS IMMATURE GRANULOCYTES 0 - 0.4 10e9/L - - -     Liver Latest Ref Rng & Units 12/4/2022 11/1/2021 10/23/2021   AP 40 - 129 U/L 50 - 56   AP (external) 38 - 126 U/L - - -   TBili <=1.2 mg/dL 0.6 - 0.6   TBili (external) 0.2 - 1.3 mg/dL - - -   DBili (external) 0.0 - 0.4 mg/dL - - -   ALT 10 - 50 U/L 14 - 24   ALT (external) 21 - 72 U/L - - -   AST 10 - 50 U/L 25 - 23   AST (external) 17 - 59 U/L - - -   Tot Protein 6.4 - 8.3 g/dL 5.7(L) - 6.5(L)   Tot Protein (external) 6.3 - 8.2 g/dL - - -   Albumin 3.5 - 5.2 g/dL 3.9 2.0(L) 3.6   Albumin (external) 3.5 - 5.0 g/dL - - -     Pancreas Latest Ref Rng & Units 12/4/2022 10/23/2021 10/14/2015   A1C <5.7 % 4.2 5.6 4.8     Iron studies Latest Ref Rng & Units 12/12/2022 12/9/2022 10/27/2021   Iron 61 - 157 ug/dL 45(L) 83 19(L)   Iron sat 15 - 46 % 17 33 10(L)   Ferritin 31 - 409 ng/mL 699(H) 983(H) 597(H)   Ferritin (external) 18.0 - 464.0 ng/mL - - -     [facility] Txp Virology Latest Ref Rng & Units  12/30/2022 12/9/2022 12/4/2022   CVM DNA Quant - - - -   CMV QUANT IU/ML Not Detected IU/mL - Not Detected Not Detected   LOG IU/ML OF CMVQNT <2.1 [Log:IU]/mL - - -   BK Spec - - - -   BK Res BKNEG copies/mL - - -   BK Log <2.7 Log copies/mL - - -   BK Quant Log Ext log IU/mL Undetected - -   BK Quant Result Ext Undetected IU/mL Undetected - -   BK Quant Spec Ext - Plasma - -   EBV VCA IGG ANTIBODY U/mL - - -   EBV CAPSID ANTIBODY IGG No detectable antibody. - - Positive(A)   EBV DNA QUANT (EXTERNAL) Undetected IU/mL - - -   EBV DNA COPIES/ML EBVNEG [Copies]/mL - - -   EBV DNA LOG OF COPIES <2.7 [Log:copies]/mL - - -   EBV DNA LOG OF COPIES (EXTERNAL) log IU/mL - - -   Hep B Core NR - - -   Hep B Core Ext Nonreactive - - -   Hep B Surf - - - -   Hep B Surf Ext  - - - -   HIV 1&2 NEG - - -        Recent Labs   Lab Test 10/11/22  0741 12/07/22  0649 12/09/22  0829 12/10/22  0719 12/12/22  0735   DOSTAC 10/10/2022  --  12/8/2022  --  12/11/2022   TACROL 6.2   < > 4.0* 6.2 9.9    < > = values in this interval not displayed.     Recent Labs   Lab Test 10/19/15  0707 10/26/15  0707 11/09/15  0758   DOSMPA 2,000 Not Provided 11/8/15 AT 2030   MPACID 1.27 4.01* 0.45*   MPAG >200.0* 190.0* 20.5*

## 2023-01-21 ENCOUNTER — HEALTH MAINTENANCE LETTER (OUTPATIENT)
Age: 53
End: 2023-01-21

## 2023-01-23 ENCOUNTER — OFFICE VISIT (OUTPATIENT)
Dept: TRANSPLANT | Facility: CLINIC | Age: 53
End: 2023-01-23
Attending: INTERNAL MEDICINE
Payer: COMMERCIAL

## 2023-01-23 ENCOUNTER — LAB (OUTPATIENT)
Dept: LAB | Facility: CLINIC | Age: 53
End: 2023-01-23
Attending: INTERNAL MEDICINE
Payer: COMMERCIAL

## 2023-01-23 ENCOUNTER — ANCILLARY PROCEDURE (OUTPATIENT)
Dept: ULTRASOUND IMAGING | Facility: CLINIC | Age: 53
End: 2023-01-23
Attending: SURGERY
Payer: COMMERCIAL

## 2023-01-23 VITALS
HEART RATE: 71 BPM | SYSTOLIC BLOOD PRESSURE: 135 MMHG | DIASTOLIC BLOOD PRESSURE: 89 MMHG | TEMPERATURE: 98.3 F | BODY MASS INDEX: 27.06 KG/M2 | OXYGEN SATURATION: 98 % | WEIGHT: 188.6 LBS

## 2023-01-23 DIAGNOSIS — Z94.0 KIDNEY REPLACED BY TRANSPLANT: ICD-10-CM

## 2023-01-23 DIAGNOSIS — Z79.899 ENCOUNTER FOR LONG-TERM CURRENT USE OF MEDICATION: ICD-10-CM

## 2023-01-23 DIAGNOSIS — Z48.298 AFTERCARE FOLLOWING ORGAN TRANSPLANT: Primary | ICD-10-CM

## 2023-01-23 DIAGNOSIS — Z48.298 AFTERCARE FOLLOWING ORGAN TRANSPLANT: ICD-10-CM

## 2023-01-23 LAB
ANION GAP SERPL CALCULATED.3IONS-SCNC: 12 MMOL/L (ref 7–15)
BUN SERPL-MCNC: 35.9 MG/DL (ref 6–20)
CALCIUM SERPL-MCNC: 9.7 MG/DL (ref 8.6–10)
CHLORIDE SERPL-SCNC: 104 MMOL/L (ref 98–107)
CREAT SERPL-MCNC: 2.01 MG/DL (ref 0.67–1.17)
DEPRECATED HCO3 PLAS-SCNC: 25 MMOL/L (ref 22–29)
ERYTHROCYTE [DISTWIDTH] IN BLOOD BY AUTOMATED COUNT: 13.2 % (ref 10–15)
GFR SERPL CREATININE-BSD FRML MDRD: 39 ML/MIN/1.73M2
GLUCOSE SERPL-MCNC: 95 MG/DL (ref 70–99)
HCT VFR BLD AUTO: 42 % (ref 40–53)
HGB BLD-MCNC: 13.2 G/DL (ref 13.3–17.7)
MCH RBC QN AUTO: 28.3 PG (ref 26.5–33)
MCHC RBC AUTO-ENTMCNC: 31.4 G/DL (ref 31.5–36.5)
MCV RBC AUTO: 90 FL (ref 78–100)
PLATELET # BLD AUTO: 273 10E3/UL (ref 150–450)
POTASSIUM SERPL-SCNC: 5 MMOL/L (ref 3.4–5.3)
RBC # BLD AUTO: 4.67 10E6/UL (ref 4.4–5.9)
SODIUM SERPL-SCNC: 141 MMOL/L (ref 136–145)
TACROLIMUS BLD-MCNC: 17.1 UG/L (ref 5–15)
TME LAST DOSE: ABNORMAL H
TME LAST DOSE: ABNORMAL H
WBC # BLD AUTO: 5.9 10E3/UL (ref 4–11)

## 2023-01-23 PROCEDURE — 80048 BASIC METABOLIC PNL TOTAL CA: CPT | Performed by: PATHOLOGY

## 2023-01-23 PROCEDURE — 36415 COLL VENOUS BLD VENIPUNCTURE: CPT | Performed by: PATHOLOGY

## 2023-01-23 PROCEDURE — 85027 COMPLETE CBC AUTOMATED: CPT | Performed by: PATHOLOGY

## 2023-01-23 PROCEDURE — 99213 OFFICE O/P EST LOW 20 MIN: CPT | Mod: 24 | Performed by: SURGERY

## 2023-01-23 PROCEDURE — 99212 OFFICE O/P EST SF 10 MIN: CPT | Performed by: SURGERY

## 2023-01-23 PROCEDURE — 80180 DRUG SCRN QUAN MYCOPHENOLATE: CPT | Performed by: INTERNAL MEDICINE

## 2023-01-23 PROCEDURE — 80197 ASSAY OF TACROLIMUS: CPT | Performed by: INTERNAL MEDICINE

## 2023-01-23 PROCEDURE — 76776 US EXAM K TRANSPL W/DOPPLER: CPT | Performed by: RADIOLOGY

## 2023-01-23 ASSESSMENT — PAIN SCALES - GENERAL: PAINLEVEL: NO PAIN (0)

## 2023-01-23 NOTE — NURSING NOTE
"Chief Complaint   Patient presents with     RECHECK     S/P Kidney TX 12/4/2022     Vital signs:  Temp: 98.3  F (36.8  C) Temp src: Oral BP: 135/89 Pulse: 71     SpO2: 98 %       Weight: 85.5 kg (188 lb 9.6 oz)  Estimated body mass index is 27.06 kg/m  as calculated from the following:    Height as of 12/4/22: 1.778 m (5' 10\").    Weight as of this encounter: 85.5 kg (188 lb 9.6 oz).        Payton Fernandes, [facility]  1/23/2023 12:32 PM      "

## 2023-01-23 NOTE — LETTER
1/23/2023         RE: Raj Pierce  [address]  [place] [address]        Dear Colleague,    Thank you for referring your patient, Raj Pierce, to the [facility]. Please see a copy of my visit note below.    Transplant Surgery Progress Note    Transplants:  12/4/2022 (Kidney), 10/13/2015 (Kidney), 10/23/2021 (Kidney)     S: Recovering well after intra-abdominal kidney transplant. Incision healing well, pain controlled. Appetite and energy level returning. No complaints.     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   Transplant Date:  12/4/2022 (Kidney), 10/13/2015 (Kidney), 10/23/2021 (Kidney)   Ureteral Stent:  Yes   Crossmatch:  negative   DSA at Tx:  No  Baseline Cr: 1.3-1.5   DeNovo DSA: No    Acute Rejection Hx:  No    Present Maintenance Immunosuppression:  Tacrolimus and Mycophenolate mofetil    CMV IgG Ab Discordance:  No  EBV IgG Ab Discordance:  No    BK Viremia:  No  EBV Viremia:  No    Transplant Coordinator: Elif Cervantes     Transplant Office Phone Number: [phone]     Immunosuppressant Medications     Immunosuppressive Agents Disp Start End     mycophenolate (GENERIC EQUIVALENT) 250 MG capsule    240 capsule 1/4/2023     Sig - Route: Take 4 capsules (1,000 mg) by mouth 2 times daily - Oral    Class: E-Prescribe    Notes to Pharmacy: TXP DT 12/4/2022 (Kidney), 10/13/2015 (Kidney), 10/23/2021 (Kidney) TXP Dischg DT 12/8/2022 DX Kidney replaced by transplant Z94.0 TX Center [facility] ([place], [place])     tacrolimus (GENERIC EQUIVALENT) 0.5 MG capsule    60 capsule 3/8/2023     Sig: HOLD FOR FUTURE DOSE CHANGES.  Profile Rx: patient will contact pharmacy when needed    Class: E-Prescribe     tacrolimus (GENERIC EQUIVALENT) 1 MG capsule    60 capsule 3/8/2023     Sig - Route: Take 1 capsule (1 mg) by mouth 2 times daily - Oral    Class: E-Prescribe          Possible  Immunosuppression-related side effects:   []             headache  []             vivid dreams  []             irritability  []             cognitive difficuties  []             fine tremor  []             nausea  []             diarrhea  []             neuropathy      []             edema  []             renal calcineurin toxicity  []             hyperkalemia  []             post-transplant diabetes  []             decreased appetite  []             increased appetite  []             other:  []             none    Prescription Medications as of 3/23/2023       Rx Number Disp Refills Start End Last Dispensed Date Next Fill Date Owning Pharmacy    acetaminophen (TYLENOL) 325 MG tablet  100 tablet 0 12/8/2022    [facility] - [place], [place] - [address] [place] [address]    Sig: Take 1-2 tablets (325-650 mg) by mouth every 4 hours as needed for pain    Class: E-Prescribe    Route: Oral    amLODIPine (NORVASC) 5 MG tablet    7/29/2022 7/29/2023       Sig: Take 5 mg by mouth daily    Class: Historical    Route: Oral    atorvastatin (LIPITOR) 10 MG tablet        [id number] - [place], [place] - [address] AT [address]    Sig: Take 10 mg by mouth daily    Class: Historical    Route: Oral    citalopram (CELEXA) 20 MG tablet            Sig: Take 20 mg by mouth daily.    Class: Historical    Route: Oral    magnesium oxide (MAG-OX) 400 MG tablet  180 tablet 3 2/17/2023    [facility] #[facility] [place] - [address]    Sig: Take 1 tablet (400 mg) by mouth 2 times daily    Class: E-Prescribe    Route: Oral    mycophenolate (GENERIC EQUIVALENT) 250 MG capsule  240 capsule 11 1/4/2023    [facility] #[facility] [place] - [address]    Sig: Take 4 capsules (1,000 mg) by mouth 2 times daily    Class: E-Prescribe    Notes to Pharmacy: TXP DT 12/4/2022 (Kidney), 10/13/2015 (Kidney), 10/23/2021 (Kidney) TXP Dischg DT 12/8/2022 DX Kidney replaced by  transplant Z94.0 TX Center [facility] ([place], [place])    Route: Oral    sodium bicarbonate 650 MG tablet  60 tablet 3 3/13/2023    [facility] [address]    Sig: Take 1 tablet (650 mg) by mouth 2 times daily    Class: E-Prescribe    Route: Oral    sulfamethoxazole-trimethoprim (BACTRIM) 400-80 MG tablet  30 tablet 11 1/6/2023    [facility] #[place] [address]    Sig: Take 1 tablet by mouth daily    Class: E-Prescribe    Route: Oral    tacrolimus (GENERIC EQUIVALENT) 0.5 MG capsule  60 capsule 11 3/8/2023    [facility] [address]    Sig: HOLD FOR FUTURE DOSE CHANGES.  Profile Rx: patient will contact pharmacy when needed    Class: E-Prescribe    tacrolimus (GENERIC EQUIVALENT) 1 MG capsule  60 capsule 11 3/8/2023    [facility]  [place] [address]    Sig: Take 1 capsule (1 mg) by mouth 2 times daily    Class: E-Prescribe    Route: Oral    tamsulosin (FLOMAX) 0.4 MG capsule  30 capsule 3 1/31/2023    [facility] [address]    Sig: Take 1 capsule (0.4 mg) by mouth daily    Class: E-Prescribe    Route: Oral    valGANciclovir (VALCYTE) 450 MG tablet  30 tablet 5 1/6/2023    [facility]  [place] [address]    Sig: Take 1 tablet (450 mg) by mouth daily Stop taking on 6/4/2023    Class: E-Prescribe    Notes to Pharmacy: tx dr    Route: Oral          O:      General Appearance: in no apparent distress.   Skin: Normal, no rashes or jaundice  Heart: regular rate and rhythm, normal S1 and S2  Lungs: easy respirations, no audible wheezing.  Abdomen: abd soft, flat, nontender. Incision healing well, no evident hernia.   Extremities: Tremor absent.   Edema: absent.     Transplant Immunosuppression Labs Latest Ref Rng & Units 1/23/2023 12/10/2022 12/9/2022 12/8/2022  12/7/2022   Tacro Level 5.0 - 15.0 ug/L - - - - -   Tacro Level - - - - - -   Creat 0.67 - 1.17 mg/dL 2.01(H) 2.34(H) 2.57(H) 3.14(H) 3.90(H)   UREA NITROGEN 7 - 30 mg/dL - - - - -   UREA NITROGEN (R) 6.0 - 20.0 mg/dL 35.9(H) 50.6(H) 53.6(H) 59.0(H) 64.9(H)   WBC 4.0 - 11.0 10e3/uL 5.9 9.5 6.7 7.6 8.7   Neutrophil % - 87 87 91 93   ANEU 1.6 - 8.3 10e3/uL - - - - -       Chemistries:   Recent Labs   Lab Test 01/23/23  1215   BUN 35.9*   CR 2.01*   GFRESTIMATED 39*   GLC 95     Lab Results   Component Value Date    A1C 4.2 12/04/2022    A1C 4.8 10/14/2015     Recent Labs   Lab Test 12/04/22  0731   ALBUMIN 3.9   BILITOTAL 0.6   ALKPHOS 50   AST 25   ALT 14     Urine Studies:  Recent Labs   Lab Test 05/12/22  1220   COLOR Straw   APPEARANCE Clear   URINEGLC Negative   URINEBILI Negative   URINEKETONE Negative   SG 1.010   UBLD Small*   URINEPH 6.0   PROTEIN 100*   NITRITE Negative   LEUKEST Negative   RBCU 0   WBCU 0     Recent Labs   Lab Test 10/23/21  0526 06/05/17  1253   UTPG 1.37* 1.30*     Hematology:   Recent Labs   Lab Test 01/23/23  1215 12/10/22  0719 12/09/22  0829   HGB 13.2* 9.5* 9.6*    214 199   WBC 5.9 9.5 6.7     Coags:   Recent Labs   Lab Test 12/04/22  0731 06/16/22  1059   INR 1.02 0.98     HLA antibodies:   SA1 Hi Risk Sana   Date Value Ref Range Status   06/25/2021 None  Final     SA1 HI RISK SANA   Date Value Ref Range Status   12/30/2022 None  Final     SA1 Mod Risk Sana   Date Value Ref Range Status   06/25/2021   Final    B:18 35 37 45 46 49 50 51 52 53 54 55 56 58 59 62 63 71 72 75 77 78 82     SA1 MOD RISK SANA   Date Value Ref Range Status   12/30/2022   Final    B:18 35 37 45 46 49 51 53 54 55 62 63 71 75 76 77 78     SA2 Hi Risk Sana   Date Value Ref Range Status   06/25/2021 None  Final     SA2 HI RISK SANA   Date Value Ref Range Status   12/30/2022 None  Final     SA2 Mod Risk Sana   Date Value Ref Range Status   06/25/2021 None  Final     SA2 MOD RISK SANA   Date Value Ref Range Status    12/30/2022 None  Final       Assessment: Raj Pierce is doing well s/p DDKT:  Issues we addressed during his visit include:    Plan:    1. Graft function: good, stable.   2. Immunosuppression Management: No change continue tac and cellcept .  Complexity of management:Medium.  Contributing factors: leukopenia  Followup: as needed    Total Time: 20 min,   Counselling Time: 15 min.      George Zhu MD      Again, thank you for allowing me to participate in the care of your patient.        Sincerely,        George Zhu MD

## 2023-01-24 ENCOUNTER — TELEPHONE (OUTPATIENT)
Dept: TRANSPLANT | Facility: CLINIC | Age: 53
End: 2023-01-24
Payer: COMMERCIAL

## 2023-01-24 LAB
MYCOPHENOLATE SERPL LC/MS/MS-MCNC: 4.44 MG/L (ref 1–3.5)
MYCOPHENOLATE-G SERPL LC/MS/MS-MCNC: 169.9 MG/L (ref 30–95)
TME LAST DOSE: ABNORMAL H
TME LAST DOSE: ABNORMAL H

## 2023-01-24 NOTE — TELEPHONE ENCOUNTER
ISSUE:  Tac 17.1 (goal 8-10)   MPA elvated     Raj confrimed he took his mediations before lab draw. Will plan to repeat labs again on Wednesday.     US results showing hydronephrosis with higher PVR. Stent removal on 1/17. Spoke with Raj, feels well. Denies issue voiding. No pain over kidney. No complaints of a weak stream or difficulty voiding. Does not feel like his bladder is full. Sent to Dr. Zhu for review.

## 2023-01-25 ENCOUNTER — TEAM CONFERENCE (OUTPATIENT)
Dept: TRANSPLANT | Facility: CLINIC | Age: 53
End: 2023-01-25
Payer: COMMERCIAL

## 2023-01-25 ENCOUNTER — TELEPHONE (OUTPATIENT)
Dept: TRANSPLANT | Facility: CLINIC | Age: 53
End: 2023-01-25
Payer: COMMERCIAL

## 2023-01-25 DIAGNOSIS — T86.19 HYDRONEPHROSIS OF KIDNEY TRANSPLANT: ICD-10-CM

## 2023-01-25 DIAGNOSIS — N13.30 HYDRONEPHROSIS OF KIDNEY TRANSPLANT: ICD-10-CM

## 2023-01-25 RX ORDER — EPINEPHRINE 1 MG/ML
0.3 INJECTION, SOLUTION, CONCENTRATE INTRAVENOUS EVERY 5 MIN PRN
Status: CANCELLED | OUTPATIENT
Start: 2023-01-25

## 2023-01-25 RX ORDER — ALBUTEROL SULFATE 90 UG/1
1-2 AEROSOL, METERED RESPIRATORY (INHALATION)
Status: CANCELLED
Start: 2023-01-25

## 2023-01-25 RX ORDER — MEPERIDINE HYDROCHLORIDE 25 MG/ML
25 INJECTION INTRAMUSCULAR; INTRAVENOUS; SUBCUTANEOUS EVERY 30 MIN PRN
Status: CANCELLED | OUTPATIENT
Start: 2023-01-25

## 2023-01-25 RX ORDER — ALBUTEROL SULFATE 0.83 MG/ML
2.5 SOLUTION RESPIRATORY (INHALATION)
Status: CANCELLED | OUTPATIENT
Start: 2023-01-25

## 2023-01-25 RX ORDER — METHYLPREDNISOLONE SODIUM SUCCINATE 125 MG/2ML
125 INJECTION, POWDER, LYOPHILIZED, FOR SOLUTION INTRAMUSCULAR; INTRAVENOUS
Status: CANCELLED
Start: 2023-01-25

## 2023-01-25 RX ORDER — DIPHENHYDRAMINE HYDROCHLORIDE 50 MG/ML
50 INJECTION INTRAMUSCULAR; INTRAVENOUS
Status: CANCELLED
Start: 2023-01-25

## 2023-01-25 NOTE — TELEPHONE ENCOUNTER
Patient Call: General  Route to LPN    Reason for call: called in regards of returning missed call. call back number [phone].     Call back needed? Yes    Return Call Needed  Same as documented in contacts section  When to return call?: Same day: Route High Priority

## 2023-01-25 NOTE — LETTER
PHYSICIAN ORDERS      DATE & TIME ISSUED: 2023 7:56 AM  PATIENT NAME: Raj Pierce   : 1970     [facility] MR# [if applicable]: [id number]     DIAGNOSIS:  kidney transplant   ICD-10 CODE: Z94.0     With next lab appointment, please collect urinalysis with urine culture.     Any questions please call: [phone]  Fax results to:   [phone].    .

## 2023-01-25 NOTE — LETTER
PHYSICIAN ORDERS      DATE & TIME ISSUED: 2023 10:48 AM  PATIENT NAME: Raj Pierce   : 1970     [facility] MR# [if applicable]: [id number]     DIAGNOSIS:  kidney transplant, hydronephrosis   ICD-10 CODE: Z94.0, N13.2    **urgent, please complete US on  or **  Please call [phone] if this cannot be done during this time frame. Thank you!!     Clinical history: kidney transplant 2022 with ureteral stent removal on 2023. Now with elevated creatinine, renal transplant US on 2023 showing transplant  hydronephrosis. Wade catheter placed for bladder decompression.     Please perform a repeat renal transplant US w/o doppler to assess hydronephrosis.     Any questions please call: [phone]  Fax results to:   [phone].    .

## 2023-01-25 NOTE — TELEPHONE ENCOUNTER
Post Kidney and Pancreas Transplant Team Conference  Date: 1/25/2023  Transplant Coordinator: Mel Cervantes     Attendees:  [x]  Dr. Espinoza [x] Park Gamboa, RN [x] Tiffany Mckay LPN     []  Dr. Marques [] Lucie Galeano RN [] Millie Merida LPN   [x]  Dr. Roy [x] Deborah Sotelo, ISHA    [x]  Dr. Quach [] Lupis Ortega RN [x] Silas Nino, PharmD   [] Dr. Becerra [] Rosie Helton RN    [] Dr. Barton [] German Sylvester RN    [] Dr. Muñoz [] Sadia Mcdermott, ISHA [x] Silvia Yusuf RN   [] Dr. Hernandez [] Patience Alicea RN    []  Dr. Zhu [] Johanna Rosa RN    [] Dr. Emanuel [x] Elif Cervantes RN    [] Karena Roy, NP [] Julienne Suarez RN        Verbal Plan Read Back:   Recommend spears and repeat ultrasound 2-3 days after      Routed to RN Coordinator   Tiffany Mckay LPN

## 2023-01-25 NOTE — TELEPHONE ENCOUNTER
Repeat renal transplant US to be completed 2-3 days after Wade placement,    Reviewed US at acute committee review, plan to place Wade catheter for hydronephrosis. Spoke with Raj, agreeable to plan but would prefer to have cath placed by SOT in clinic. Reviewed with jimmy Ayala to schedule.     Plan to have cath placed on Friday, repeat US on Monday, orders faxed to [employer] (fax# [phone]).

## 2023-01-27 ENCOUNTER — OFFICE VISIT (OUTPATIENT)
Dept: TRANSPLANT | Facility: CLINIC | Age: 53
End: 2023-01-27
Attending: NURSE PRACTITIONER
Payer: COMMERCIAL

## 2023-01-27 VITALS
BODY MASS INDEX: 27.65 KG/M2 | OXYGEN SATURATION: 98 % | WEIGHT: 192.7 LBS | HEART RATE: 70 BPM | DIASTOLIC BLOOD PRESSURE: 84 MMHG | SYSTOLIC BLOOD PRESSURE: 131 MMHG

## 2023-01-27 DIAGNOSIS — T86.19 HYDRONEPHROSIS OF KIDNEY TRANSPLANT: ICD-10-CM

## 2023-01-27 DIAGNOSIS — N13.30 HYDRONEPHROSIS OF KIDNEY TRANSPLANT: ICD-10-CM

## 2023-01-27 PROCEDURE — 99214 OFFICE O/P EST MOD 30 MIN: CPT | Mod: 24 | Performed by: NURSE PRACTITIONER

## 2023-01-27 PROCEDURE — 99211 OFF/OP EST MAY X REQ PHY/QHP: CPT | Performed by: NURSE PRACTITIONER

## 2023-01-27 NOTE — PROGRESS NOTES
Transplant Surgery Progress Note    Transplants:  12/4/2022 (Kidney), 10/13/2015 (Kidney), 10/23/2021 (Kidney); Postoperative day:  54  S: Here today for spears placement.  Found to have hydronephrosis and will have repeat US on Monday.  No complaints today.   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   Transplant Date:  12/4/2022 (Kidney), 10/13/2015 (Kidney), 10/23/2021 (Kidney)   Ureteral Stent:  No   Crossmatch:  negative   DSA at Tx:  No    Acute Rejection Hx:  No    Present Maintenance Immunosuppression:  Tacrolimus and Mycophenolate mofetil    CMV IgG Ab Discordance:  Yes  EBV IgG Ab Discordance:  No    BK Viremia:  No  EBV Viremia:  No    Transplant Coordinator: Elif Cervantes     Transplant Office Phone Number: [phone]     Immunosuppressant Medications     Immunosuppressive Agents Disp Start End     mycophenolate (GENERIC EQUIVALENT) 250 MG capsule    240 capsule 1/4/2023     Sig - Route: Take 4 capsules (1,000 mg) by mouth 2 times daily - Oral    Class: E-Prescribe    Notes to Pharmacy: TXP DT 12/4/2022 (Kidney), 10/13/2015 (Kidney), 10/23/2021 (Kidney) TXP Dischg DT 12/8/2022 DX Kidney replaced by transplant Z94.0 TX Center [facility] ([place], [place])     tacrolimus (GENERIC EQUIVALENT) 0.5 MG capsule    60 capsule 12/8/2022     Sig: Take 1 cap by mouth twice daily as directed by Transplant Center for dose changes.    Patient not taking: Reported on 12/9/2022       Class: E-Prescribe    Renewals     Renewal requests to authorizing provider (Radha Amezcua, APRN CNP) <b>prohibited</b>           tacrolimus (GENERIC EQUIVALENT) 1 MG capsule    240 capsule 12/26/2022     Sig - Route: Take 4 capsules (4 mg) by mouth 2 times daily - Oral    Class: E-Prescribe          Possible Immunosuppression-related side effects:   []             headache  []             vivid dreams  []             irritability  []              cognitive difficuties  []             fine tremor  []             nausea  []             diarrhea  []             neuropathy      []             edema  []             renal calcineurin toxicity  []             hyperkalemia  []             post-transplant diabetes  []             decreased appetite  []             increased appetite  []             other:  []             none    Prescription Medications as of 1/27/2023       Rx Number Disp Refills Start End Last Dispensed Date Next Fill Date Owning Pharmacy    acetaminophen (TYLENOL) 325 MG tablet  100 tablet 0 12/8/2022    [facility] - [place], [place] - [address]    Sig: Take 1-2 tablets (325-650 mg) by mouth every 4 hours as needed for pain    Class: E-Prescribe    Route: Oral    amLODIPine (NORVASC) 5 MG tablet    7/29/2022 7/29/2023       Sig: Take 5 mg by mouth daily    Class: Historical    Route: Oral    atorvastatin (LIPITOR) 10 MG tablet        [facility] #[id number] - [place] - [address]    Sig: Take 10 mg by mouth daily    Class: Historical    Route: Oral    citalopram (CELEXA) 20 MG tablet            Sig: Take 20 mg by mouth daily.    Class: Historical    Route: Oral    magnesium oxide (MAG-OX) 400 MG tablet  30 tablet 11 1/4/2023    [facility] #[id number] - [place] - [address]    Sig: Take 1 tablet (400 mg) by mouth daily (with lunch)    Class: E-Prescribe    Route: Oral    mycophenolate (GENERIC EQUIVALENT) 250 MG capsule  240 capsule 11 1/4/2023    [facility] #[id number] - [place] - [address]    Sig: Take 4 capsules (1,000 mg) by mouth 2 times daily    Class: E-Prescribe    Notes to Pharmacy: TXP DT 12/4/2022 (Kidney), 10/13/2015 (Kidney), 10/23/2021 (Kidney) TXP Dischg DT 12/8/2022 DX Kidney replaced by transplant Z94.0 TX Center [facility] ([place], [place])    Route: Oral     sulfamethoxazole-trimethoprim (BACTRIM) 400-80 MG tablet  30 tablet 11 1/6/2023    [facility]  [place] - [address]    Sig: Take 1 tablet by mouth daily    Class: E-Prescribe    Route: Oral    tacrolimus (GENERIC EQUIVALENT) 0.5 MG capsule  60 capsule 0 12/8/2022    [facility] [place], [place] - [address]    Sig: Take 1 cap by mouth twice daily as directed by Transplant Center for dose changes.    Class: E-Prescribe    Renewals     Renewal requests to authorizing provider (Radha Amezcua, GUY CNP) <b>prohibited</b>          tacrolimus (GENERIC EQUIVALENT) 1 MG capsule  240 capsule 3 12/26/2022    [id number] [place] - [address]    Sig: Take 4 capsules (4 mg) by mouth 2 times daily    Class: E-Prescribe    Route: Oral    valGANciclovir (VALCYTE) 450 MG tablet  30 tablet 5 1/6/2023    [facility] #[id number] - [place], [place] - [address]    Sig: Take 1 tablet (450 mg) by mouth daily Stop taking on 6/4/2023    Class: E-Prescribe    Notes to Pharmacy: tx dr    Route: Oral          O:   Pulse:  [70] 70  BP: (131)/(84) 131/84  SpO2:  [98 %] 98 %  General Appearance: in no apparent distress.   Skin: Normal, no rashes or jaundice  Abdomen: rounded, The wound is dry and intact, without hernia. The abdomen is non-tender. The kidney graft is not tender.  There is no ascites.  Extremities: Tremor absent.   Edema: absent.     Transplant Immunosuppression Labs Latest Ref Rng & Units 1/23/2023 12/10/2022 12/9/2022 12/8/2022 12/7/2022   Tacro Level 5.0 - 15.0 ug/L - - - - -   Tacro Level - - - - - -   Creat 0.67 - 1.17 mg/dL 2.01(H) 2.34(H) 2.57(H) 3.14(H) 3.90(H)   BUN 6.0 - 20.0 mg/dL 35.9(H) 50.6(H) 53.6(H) 59.0(H) 64.9(H)   WBC 4.0 - 11.0 10e3/uL 5.9 9.5 6.7 7.6 8.7   Neutrophil % - 87 87 91 93   ANEU 1.6 - 8.3 10e3/uL - - - - -       Chemistries:   Recent Labs   Lab Test 01/23/23  1215   BUN  35.9*   CR 2.01*   GFRESTIMATED 39*   GLC 95     Lab Results   Component Value Date    A1C 4.2 12/04/2022    A1C 4.8 10/14/2015     Recent Labs   Lab Test 12/04/22  0731   ALBUMIN 3.9   BILITOTAL 0.6   ALKPHOS 50   AST 25   ALT 14     Urine Studies:  Recent Labs   Lab Test 05/12/22  1220   COLOR Straw   APPEARANCE Clear   URINEGLC Negative   URINEBILI Negative   URINEKETONE Negative   SG 1.010   UBLD Small*   URINEPH 6.0   PROTEIN 100*   NITRITE Negative   LEUKEST Negative   RBCU 0   WBCU 0     Recent Labs   Lab Test 10/23/21  0526 06/05/17  1253   UTPG 1.37* 1.30*     Hematology:   Recent Labs   Lab Test 01/23/23  1215 12/10/22  0719 12/09/22  0829   HGB 13.2* 9.5* 9.6*    214 199   WBC 5.9 9.5 6.7     Coags:   Recent Labs   Lab Test 12/04/22  0731 06/16/22  1059   INR 1.02 0.98     HLA antibodies:   SA1 Hi Risk Sana   Date Value Ref Range Status   06/25/2021 None  Final     SA1 HI RISK SANA   Date Value Ref Range Status   12/30/2022 None  Final     SA1 Mod Risk Sana   Date Value Ref Range Status   06/25/2021   Final    B:18 35 37 45 46 49 50 51 52 53 54 55 56 58 59 62 63 71 72 75 77 78 82     SA1 MOD RISK SANA   Date Value Ref Range Status   12/30/2022   Final    B:18 35 37 45 46 49 51 53 54 55 62 63 71 75 76 77 78     SA2 Hi Risk Sana   Date Value Ref Range Status   06/25/2021 None  Final     SA2 HI RISK SANA   Date Value Ref Range Status   12/30/2022 None  Final     SA2 Mod Risk Sana   Date Value Ref Range Status   06/25/2021 None  Final     SA2 MOD RISK SANA   Date Value Ref Range Status   12/30/2022 None  Final       Assessment: Raj Pierce is doing well s/p DDKT:  Issues we addressed during his visit include:    Plan:    1. Graft function: Cr rising, hydronephrosis on US. Wade placed today with no issues. Wade care reviewed with patient. Repeat US on Monday.   2. Immunosuppression Management: No change Continue current regimen.  .  Complexity of management:Low.  Contributing factors: hydronephrosis   3.  Wade placed - should be able to have removed after US on Monday. Will review with coordinator if can discontinue in [place].       Total Time: 20 min,   Counselling Time: 10 min.    GUY Waterman

## 2023-01-27 NOTE — LETTER
1/27/2023         RE: Raj Pierce  [address]  [place] [address]        Dear Colleague,    Thank you for referring your patient, Raj Pierce, to the [facility]. Please see a copy of my visit note below.    Transplant Surgery Progress Note    Transplants:  12/4/2022 (Kidney), 10/13/2015 (Kidney), 10/23/2021 (Kidney); Postoperative day:  54  S: Here today for spears placement.  Found to have hydronephrosis and will have repeat US on Monday.  No complaints today.   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   Transplant Date:  12/4/2022 (Kidney), 10/13/2015 (Kidney), 10/23/2021 (Kidney)   Ureteral Stent:  No   Crossmatch:  negative   DSA at Tx:  No    Acute Rejection Hx:  No    Present Maintenance Immunosuppression:  Tacrolimus and Mycophenolate mofetil    CMV IgG Ab Discordance:  Yes  EBV IgG Ab Discordance:  No    BK Viremia:  No  EBV Viremia:  No    Transplant Coordinator: Elif Cervantes     Transplant Office Phone Number: [phone]     Immunosuppressant Medications     Immunosuppressive Agents Disp Start End     mycophenolate (GENERIC EQUIVALENT) 250 MG capsule    240 capsule 1/4/2023     Sig - Route: Take 4 capsules (1,000 mg) by mouth 2 times daily - Oral    Class: E-Prescribe    Notes to Pharmacy: TXP DT 12/4/2022 (Kidney), 10/13/2015 (Kidney), 10/23/2021 (Kidney) TXP Dischg DT 12/8/2022 DX Kidney replaced by transplant Z94.0 TX Center [facility] ([place], [place])     tacrolimus (GENERIC EQUIVALENT) 0.5 MG capsule    60 capsule 12/8/2022     Sig: Take 1 cap by mouth twice daily as directed by Transplant Center for dose changes.    Patient not taking: Reported on 12/9/2022       Class: E-Prescribe    Renewals     Renewal requests to authorizing provider (Radha Amezcua, APRN CNP) <b>prohibited</b>           tacrolimus (GENERIC EQUIVALENT) 1 MG capsule    240 capsule  12/26/2022     Sig - Route: Take 4 capsules (4 mg) by mouth 2 times daily - Oral    Class: E-Prescribe          Possible Immunosuppression-related side effects:   []             headache  []             vivid dreams  []             irritability  []             cognitive difficuties  []             fine tremor  []             nausea  []             diarrhea  []             neuropathy      []             edema  []             renal calcineurin toxicity  []             hyperkalemia  []             post-transplant diabetes  []             decreased appetite  []             increased appetite  []             other:  []             none    Prescription Medications as of 1/27/2023       Rx Number Disp Refills Start End Last Dispensed Date Next Fill Date Owning Pharmacy    acetaminophen (TYLENOL) 325 MG tablet  100 tablet 0 12/8/2022    [facility] [place], [place] - [address] [place] [address]    Sig: Take 1-2 tablets (325-650 mg) by mouth every 4 hours as needed for pain    Class: E-Prescribe    Route: Oral    amLODIPine (NORVASC) 5 MG tablet    7/29/2022 7/29/2023       Sig: Take 5 mg by mouth daily    Class: Historical    Route: Oral    atorvastatin (LIPITOR) 10 MG tablet        [facility] #[id number] - [place], [place] - [address] [facility] [address]    Sig: Take 10 mg by mouth daily    Class: Historical    Route: Oral    citalopram (CELEXA) 20 MG tablet            Sig: Take 20 mg by mouth daily.    Class: Historical    Route: Oral    magnesium oxide (MAG-OX) 400 MG tablet  30 tablet 11 1/4/2023    [facility] #[id number]  [place] - [address]    Sig: Take 1 tablet (400 mg) by mouth daily (with lunch)    Class: E-Prescribe    Route: Oral    mycophenolate (GENERIC EQUIVALENT) 250 MG capsule  240 capsule 11 1/4/2023    [facility] #[id number] - [place] - [address]    Sig: Take 4 capsules (1,000 mg) by mouth 2 times daily    Class: E-Prescribe    Notes to  Pharmacy: TXP DT 12/4/2022 (Kidney), 10/13/2015 (Kidney), 10/23/2021 (Kidney) TXP Dischg DT 12/8/2022 DX Kidney replaced by transplant Z94.0 TX Center [facility] ([place], [place])    Route: Oral    sulfamethoxazole-trimethoprim (BACTRIM) 400-80 MG tablet  30 tablet 11 1/6/2023    [facility] #[id number] - [place] - [address]    Sig: Take 1 tablet by mouth daily    Class: E-Prescribe    Route: Oral    tacrolimus (GENERIC EQUIVALENT) 0.5 MG capsule  60 capsule 0 12/8/2022    [facility] - [place], [place] - [address]    Sig: Take 1 cap by mouth twice daily as directed by Transplant Center for dose changes.    Class: E-Prescribe    Renewals     Renewal requests to authorizing provider (Radha Amezcua, GUY CNP) <b>prohibited</b>          tacrolimus (GENERIC EQUIVALENT) 1 MG capsule  240 capsule 3 12/26/2022    [facility] #[id number] - [place] - [address] AT [address] [facility] [address] [facility] [facility]    Sig: Take 4 capsules (4 mg) by mouth 2 times daily    Class: E-Prescribe    Route: Oral    valGANciclovir (VALCYTE) 450 MG tablet  30 tablet 5 1/6/2023    [facility] - [place] - [address] [facility]    Sig: Take 1 tablet (450 mg) by mouth daily Stop taking on 6/4/2023    Class: E-Prescribe    Notes to Pharmacy: tx dr    Route: Oral          O:   Pulse:  [70] 70  BP: (131)/(84) 131/84  SpO2:  [98 %] 98 %  General Appearance: in no apparent distress.   Skin: Normal, no rashes or jaundice  Abdomen: rounded, The wound is dry and intact, without hernia. The abdomen is non-tender. The kidney graft is not tender.  There is no ascites.  Extremities: Tremor absent.   Edema: absent.     Transplant Immunosuppression Labs Latest Ref Rng & Units 1/23/2023 12/10/2022 12/9/2022 12/8/2022 12/7/2022   Tacro Level 5.0 - 15.0 ug/L - - - - -   Tacro Level - - - - - -   Creat 0.67 - 1.17 mg/dL 2.01(H) 2.34(H) 2.57(H) 3.14(H) 3.90(H)    BUN 6.0 - 20.0 mg/dL 35.9(H) 50.6(H) 53.6(H) 59.0(H) 64.9(H)   WBC 4.0 - 11.0 10e3/uL 5.9 9.5 6.7 7.6 8.7   Neutrophil % - 87 87 91 93   ANEU 1.6 - 8.3 10e3/uL - - - - -       Chemistries:   Recent Labs   Lab Test 01/23/23  1215   BUN 35.9*   CR 2.01*   GFRESTIMATED 39*   GLC 95     Lab Results   Component Value Date    A1C 4.2 12/04/2022    A1C 4.8 10/14/2015     Recent Labs   Lab Test 12/04/22  0731   ALBUMIN 3.9   BILITOTAL 0.6   ALKPHOS 50   AST 25   ALT 14     Urine Studies:  Recent Labs   Lab Test 05/12/22  1220   COLOR Straw   APPEARANCE Clear   URINEGLC Negative   URINEBILI Negative   URINEKETONE Negative   SG 1.010   UBLD Small*   URINEPH 6.0   PROTEIN 100*   NITRITE Negative   LEUKEST Negative   RBCU 0   WBCU 0     Recent Labs   Lab Test 10/23/21  0526 06/05/17  1253   UTPG 1.37* 1.30*     Hematology:   Recent Labs   Lab Test 01/23/23  1215 12/10/22  0719 12/09/22  0829   HGB 13.2* 9.5* 9.6*    214 199   WBC 5.9 9.5 6.7     Coags:   Recent Labs   Lab Test 12/04/22  0731 06/16/22  1059   INR 1.02 0.98     HLA antibodies:   SA1 Hi Risk Sana   Date Value Ref Range Status   06/25/2021 None  Final     SA1 HI RISK SANA   Date Value Ref Range Status   12/30/2022 None  Final     SA1 Mod Risk Sana   Date Value Ref Range Status   06/25/2021   Final    B:18 35 37 45 46 49 50 51 52 53 54 55 56 58 59 62 63 71 72 75 77 78 82     SA1 MOD RISK SANA   Date Value Ref Range Status   12/30/2022   Final    B:18 35 37 45 46 49 51 53 54 55 62 63 71 75 76 77 78     SA2 Hi Risk Sana   Date Value Ref Range Status   06/25/2021 None  Final     SA2 HI RISK SANA   Date Value Ref Range Status   12/30/2022 None  Final     SA2 Mod Risk Sana   Date Value Ref Range Status   06/25/2021 None  Final     SA2 MOD RISK SANA   Date Value Ref Range Status   12/30/2022 None  Final       Assessment: Raj Pierce is doing well s/p DDKT:  Issues we addressed during his visit include:    Plan:    1. Graft function: Cr rising, hydronephrosis on US.  Wade placed today with no issues. Wade care reviewed with patient. Repeat US on Monday.   2. Immunosuppression Management: No change Continue current regimen.  .  Complexity of management:Low.  Contributing factors: hydronephrosis   3. Wade placed - should be able to have removed after US on Monday. Will review with coordinator if can discontinue in [facility].       Total Time: 20 min,   Counselling Time: 10 min.    GUY Waterman        Again, thank you for allowing me to participate in the care of your patient.        Sincerely,        GUY Waterman CNP

## 2023-01-30 ENCOUNTER — TELEPHONE (OUTPATIENT)
Dept: TRANSPLANT | Facility: CLINIC | Age: 53
End: 2023-01-30
Payer: COMMERCIAL

## 2023-01-30 DIAGNOSIS — R33.9 URINARY RETENTION: Primary | ICD-10-CM

## 2023-01-30 DIAGNOSIS — N13.30 HYDRONEPHROSIS: ICD-10-CM

## 2023-01-30 NOTE — TELEPHONE ENCOUNTER
Spoke with Raj, need to have US completed with Wade catheter in place. Will make a plan to remove catheter based on US results. Raj verbalized understanding.

## 2023-01-30 NOTE — TELEPHONE ENCOUNTER
ISSUE:  Tac 12.2 (goal 8-10)     Previous levels within goal range, last dose change was ~2 months ago. Will plan to stay on same dose tacrolimus, repeat level from today pending.     Please for repeat US today, will review removing Wade based on US results.

## 2023-01-31 ENCOUNTER — TELEPHONE (OUTPATIENT)
Dept: TRANSPLANT | Facility: CLINIC | Age: 53
End: 2023-01-31
Payer: COMMERCIAL

## 2023-01-31 DIAGNOSIS — Z76.82 KIDNEY TRANSPLANT CANDIDATE: ICD-10-CM

## 2023-01-31 DIAGNOSIS — Z94.0 KIDNEY TRANSPLANTED: ICD-10-CM

## 2023-01-31 DIAGNOSIS — D84.9 IMMUNOSUPPRESSED STATUS (H): ICD-10-CM

## 2023-01-31 RX ORDER — TAMSULOSIN HYDROCHLORIDE 0.4 MG/1
0.4 CAPSULE ORAL DAILY
Qty: 30 CAPSULE | Refills: 3 | Status: SHIPPED | OUTPATIENT
Start: 2023-01-31 | End: 2023-05-31

## 2023-01-31 RX ORDER — TACROLIMUS 1 MG/1
3 CAPSULE ORAL 2 TIMES DAILY
Qty: 180 CAPSULE | Refills: 11 | Status: SHIPPED | OUTPATIENT
Start: 2023-01-31 | End: 2023-02-06

## 2023-01-31 NOTE — TELEPHONE ENCOUNTER
Renal transplant US results reviewed with Dr. Espinoza. Hydronephrosis has resolved but hydroureter is present.     Francis Espinoza MD Poucher, Jessica, RN  Cc: George Zhu MD  Yes, would start tamsulosin 0.4 mg daily.  Can remove the spears catheter in a couple of days after starting tamsulosin.  Also recommend double voiding for awhile.  Then would repeat post void residual or ultrasound in a week.     Raj verbalized understanding. Repeat US scheduled for next Friday.

## 2023-01-31 NOTE — TELEPHONE ENCOUNTER
ISSUE:   Tacrolimus IR level 18.1 on 1/30, goal 8-10, dose 4 mg BID.    PLAN:   Please call patient and confirm this was an accurate 12-hour trough. Verify Tacrolimus IR dose 4 mg BID. Confirm no new medications or illness. Confirm no missed doses. If accurate trough and accurate dose, decrease Tacrolimus IR dose to 3 mg BID and repeat labs in 1 weeks    OUTCOME:   Spoke with patient, they confirm accurate trough level and current dose 4 mg BID. Patient confirmed dose change to 3 mg BID and to repeat labs in 1 weeks. Orders sent to preferred pharmacy for dose change and lab for repeat labs. Patient voiced understanding of plan.

## 2023-02-06 DIAGNOSIS — Z76.82 KIDNEY TRANSPLANT CANDIDATE: ICD-10-CM

## 2023-02-06 DIAGNOSIS — D84.9 IMMUNOSUPPRESSED STATUS (H): ICD-10-CM

## 2023-02-06 DIAGNOSIS — Z94.0 KIDNEY TRANSPLANTED: Primary | ICD-10-CM

## 2023-02-06 RX ORDER — TACROLIMUS 0.5 MG/1
0.5 CAPSULE ORAL 2 TIMES DAILY
Qty: 60 CAPSULE | Refills: 11 | Status: SHIPPED | OUTPATIENT
Start: 2023-02-06 | End: 2023-02-09

## 2023-02-06 RX ORDER — TACROLIMUS 1 MG/1
2 CAPSULE ORAL 2 TIMES DAILY
Qty: 120 CAPSULE | Refills: 11 | Status: SHIPPED | OUTPATIENT
Start: 2023-02-06 | End: 2023-02-09

## 2023-02-06 NOTE — TELEPHONE ENCOUNTER
ISSUE:   Tacrolimus IR level 15 on 2/4/23, goal 8-10, dose 3 mg BID.    PLAN:   Please call patient and confirm this was an accurate 12-hour trough. Verify Tacrolimus IR dose 3 mg BID. Confirm no new medications or illness. Confirm no missed doses. If accurate trough and accurate dose, decrease Tacrolimus IR dose to 2.5 mg BID and repeat labs in 3 days

## 2023-02-06 NOTE — TELEPHONE ENCOUNTER
Patient confirms this was an accurate 12-hour trough. Verified Tacrolimus IR dose 3 mg BID. Confirmed no new medications or illness. Confirmed no missed doses. Patient confirms decrease Tacrolimus IR dose to 2.5 mg BID and repeat labs in 3 days

## 2023-02-06 NOTE — TELEPHONE ENCOUNTER
[employer] Interactive message sent to patient regarding:  Tacrolimus IR level 15 on 2/4/23, goal 8-10, dose 3 mg BID.     PLAN:   Please call patient and confirm this was an accurate 12-hour trough. Verify Tacrolimus IR dose 3 mg BID. Confirm no new medications or illness. Confirm no missed doses. If accurate trough and accurate dose, decrease Tacrolimus IR dose to 2.5 mg BID and repeat labs in 3 days

## 2023-02-08 DIAGNOSIS — D84.9 IMMUNOSUPPRESSED STATUS (H): ICD-10-CM

## 2023-02-08 DIAGNOSIS — Z94.0 KIDNEY TRANSPLANTED: Primary | ICD-10-CM

## 2023-02-08 DIAGNOSIS — Z76.82 KIDNEY TRANSPLANT CANDIDATE: ICD-10-CM

## 2023-02-08 NOTE — TELEPHONE ENCOUNTER
ISSUE:   Tacrolimus IR level 17.5 on 2/6, goal 8-10, dose 2.5 mg BID.  Dose was just reduced from 3 mg to 2.5 mg bid on 2.6, before lab was drawn     PLAN:   Please call patient and confirm this was an accurate 12-hour trough. Verify Tacrolimus IR dose 2.5 mg BID. Confirm no new medications or illness. Confirm no missed doses. If accurate trough and accurate dose, decrease dose further to Tacrolimus IR dose to 2 mg BID and repeat labs as scheduled.     Elif Cervantes RN    OUTCOME:   Spoke with patient, they confirm accurate trough level and current dose 2.5 mg BID. Patient confirmed dose change to 2 mg BID and to repeat labs in 1 weeks. Orders sent to preferred pharmacy for dose change and lab for repeat labs. Patient voiced understanding of plan.

## 2023-02-08 NOTE — TELEPHONE ENCOUNTER
Left message and sent [employer] message to patient regarding:  Tacrolimus IR level 17.5 on 2/6, goal 8-10, dose 2.5 mg BID.  Dose was just reduced from 3 mg to 2.5 mg bid on 2.6, before lab was drawn      PLAN:   Please call patient and confirm this was an accurate 12-hour trough. Verify Tacrolimus IR dose 2.5 mg BID. Confirm no new medications or illness. Confirm no missed doses. If accurate trough and accurate dose, decrease dose further to Tacrolimus IR dose to 2 mg BID and repeat labs as scheduled

## 2023-02-09 ENCOUNTER — TELEPHONE (OUTPATIENT)
Dept: TRANSPLANT | Facility: CLINIC | Age: 53
End: 2023-02-09
Payer: COMMERCIAL

## 2023-02-09 RX ORDER — TACROLIMUS 1 MG/1
2 CAPSULE ORAL 2 TIMES DAILY
Qty: 120 CAPSULE | Refills: 11 | Status: SHIPPED | OUTPATIENT
Start: 2023-02-09 | End: 2023-02-17

## 2023-02-09 RX ORDER — TACROLIMUS 0.5 MG/1
CAPSULE ORAL
Qty: 60 CAPSULE | Refills: 11 | Status: SHIPPED | OUTPATIENT
Start: 2023-02-09 | End: 2023-02-17

## 2023-02-09 NOTE — TELEPHONE ENCOUNTER
Patient Call: General  Route to LPN    Reason for call: Pt needs to clarify frequency of lab draw  He thinks he  Is still to go 2 x a wk till labs stable-  and lab only has weekly  They jamey him today and nee orders to run them     Call back needed? Yes    Return Call Needed  Same as documented in contacts section  When to return call?: Same day: Route High Priority

## 2023-02-09 NOTE — TELEPHONE ENCOUNTER
Ok for weekly labs per protocol. Left detailed VM.     Creatinine elevated today, 2.0 (baseline 1.5-1.7). Has follow up US tomorrow. Tacrolimus has been elevated, recheck from today pending. Reviewed with Dr. Espinoza.

## 2023-02-10 ENCOUNTER — TELEPHONE (OUTPATIENT)
Dept: TRANSPLANT | Facility: CLINIC | Age: 53
End: 2023-02-10

## 2023-02-10 ENCOUNTER — ANCILLARY PROCEDURE (OUTPATIENT)
Dept: ULTRASOUND IMAGING | Facility: CLINIC | Age: 53
End: 2023-02-10
Attending: INTERNAL MEDICINE
Payer: COMMERCIAL

## 2023-02-10 DIAGNOSIS — N13.30 HYDRONEPHROSIS: ICD-10-CM

## 2023-02-10 PROCEDURE — 76775 US EXAM ABDO BACK WALL LIM: CPT | Mod: GC | Performed by: RADIOLOGY

## 2023-02-10 NOTE — TELEPHONE ENCOUNTER
Ultrasound results from 2/10 reviewed with Dr. Espinoza. No follow up needed, continue tamsulosin and double voiding. Raj verbalized understanding.     Per Raj, tac level drawn on 2/9 will not be reflective or recent dose change, repeat accurate level on Monday.

## 2023-02-13 ENCOUNTER — TELEPHONE (OUTPATIENT)
Dept: TRANSPLANT | Facility: CLINIC | Age: 53
End: 2023-02-13
Payer: COMMERCIAL

## 2023-02-13 NOTE — LETTER
OUTPATIENT LABORATORY TEST ORDER    Patient: Raj Pierce    Transplant Date: 12/4/2022  Date of Birth: [id number]    Ordered By: Dr. Alondra ArmasAaron  MRN:[id number]     Issued Date/Time: 2/13/2023  8:31 AM         Expiration Date: 12/4/2023    Diagnosis: Kidney Transplant (ICD-10 Z94.0)    Aftercare following organ transplant (ICD-10 Z48.288)    Long term use of medications (ICD-10 Z79.899)      Lab results to be available on the same day drawn    Patient should release information to the [facility].     Please fax all results to [phone]    Critical Labs to be paged to      Months 2-4 post-transplant (2/13/2023 - 4/5/2023)  Labs 1x weekly (Monday or Tuesday)    CBC with platelets    Basic Metabolic Panel (Sodium, Potassium, Chloride, Creatinine, CO2, Urea Nitrogen, glucose, Calcium)    Tacrolimus/Prograf/ drug level  Labs monthly     Phosphorus, magnesium    BK (Polyoma Virus) PCR Quantitative/Plasma    Months 4-7 post-transplant (4/6/2023 - 7/6/2023)  Labs every other week    CBC with platelets    Basic Metabolic Panel (Sodium, Potassium, Chloride, Creatinine, CO2, Urea Nitrogen, glucose, Calcium)    Tacrolimus/Prograf/ drug level  Monthly    Phosphorus, magnesium    BK (Polyoma Virus) PCR Quantitative/Plasma    Months 7-12 post-transplant (7/7/2023 - 12/4/2023)  Monthly    CBC with platelets    Basic Metabolic Panel (Sodium, Potassium, Chloride, Creatinine, CO2, Urea Nitrogen, glucose, Calcium)    Tacrolimus/Prograf/ drug level    BK (Polyoma Virus) PCR Quantitative/Plasma    CMV PCR QT    At 2 months post-transplant (Due: NOW)     BK Virus PCR Quantitative/Plasma    Mycophenolic Acid (MPA) drug level    Urine protein/creatinine    At 4 months post-transplant  (Due: 4/4/2023)    DSA PRA    PTH    Urine protein/creatinine    At 6 months post-transplant (Due: 6/4/2023)    Hepatic panel    Hemoglobin A1c    Uric Acid    Lipid  panel    Urine protein/creatinine    At 7 months post-transplant  (Due: 7/4/2023)     PRA/DSA    At 9 months post-transplant (Due: 9/4/2023)     Urine protein/creatinine    At 12 months post-transplant (Fasting labs) Due: 12/4/2023     Hepatic panel    Hemoglobin A1c    Uric Acid    Lipid panel    Urine protein/creatinine    DSA PRA    PTH    Vitamin D      If you have any questions please call the Transplant Center at [phone] option 5. All lab results should be faxed to [phone]        Alondra Roy MD

## 2023-02-13 NOTE — TELEPHONE ENCOUNTER
[facility] called; Raj is telling them he needs his weekly draws have been changed to twice a week [facility] needs updated lab order f# [phone]

## 2023-02-13 NOTE — TELEPHONE ENCOUNTER
Patient is now on weekly labs.  Updated lab letter faxed to patients lab @ [phone] and sent copy of lab letter to patient via [facility].

## 2023-02-15 ENCOUNTER — TELEPHONE (OUTPATIENT)
Dept: TRANSPLANT | Facility: CLINIC | Age: 53
End: 2023-02-15
Payer: COMMERCIAL

## 2023-02-15 NOTE — TELEPHONE ENCOUNTER
Post Kidney and Pancreas Transplant Team Conference  Date: 2/15/2023  Transplant Coordinator: Elif Cervantes     Attendees:  [x]  Dr. Espinoza [x] Park Gamboa, RN [x] Tiffany Mckay LPN     []  Dr. Marques [x] Lucie Galeano RN [] Millie Merida LPN   [x]  Dr. Roy [x] Deborah Sotelo, ISHA    [x]  Dr. Quach [] Lupis Ortega RN [x] Silas Nino, PharmD   [] Dr. Becerra [] Rosie Helton, ISHA    [] Dr. Barton [] German Sylvester RN    [] Dr. Muñoz [] Sadia Mcdermott RN [] Silvia Yusuf RN   [] Dr. Hernandez [] Patience Alicea RN    []  Dr. Zhu [] Johanna Rosa RN    [] Dr. Emanuel [x] Elif Cervantes RN    [x] Karena Roy, DANNIE [] Julienne Suarez RN        Verbal Plan Read Back:   Continue current treatment plan  Due for DSA      Routed to RN Coordinator   Tiffany Mckay LPN

## 2023-02-17 ENCOUNTER — TELEPHONE (OUTPATIENT)
Dept: TRANSPLANT | Facility: CLINIC | Age: 53
End: 2023-02-17

## 2023-02-17 ENCOUNTER — OFFICE VISIT (OUTPATIENT)
Dept: NEPHROLOGY | Facility: CLINIC | Age: 53
End: 2023-02-17
Attending: INTERNAL MEDICINE
Payer: COMMERCIAL

## 2023-02-17 VITALS
BODY MASS INDEX: 27.16 KG/M2 | WEIGHT: 189.3 LBS | HEART RATE: 76 BPM | SYSTOLIC BLOOD PRESSURE: 110 MMHG | DIASTOLIC BLOOD PRESSURE: 78 MMHG

## 2023-02-17 DIAGNOSIS — Z94.0 KIDNEY TRANSPLANTED: ICD-10-CM

## 2023-02-17 DIAGNOSIS — Z94.0 KIDNEY REPLACED BY TRANSPLANT: ICD-10-CM

## 2023-02-17 DIAGNOSIS — D84.9 IMMUNOSUPPRESSED STATUS (H): ICD-10-CM

## 2023-02-17 DIAGNOSIS — Z94.0 HTN, KIDNEY TRANSPLANT RELATED: ICD-10-CM

## 2023-02-17 DIAGNOSIS — Z29.89 NEED FOR PNEUMOCYSTIS PROPHYLAXIS: ICD-10-CM

## 2023-02-17 DIAGNOSIS — D84.9 IMMUNOSUPPRESSION (H): ICD-10-CM

## 2023-02-17 DIAGNOSIS — N18.31 STAGE 3A CHRONIC KIDNEY DISEASE (H): ICD-10-CM

## 2023-02-17 DIAGNOSIS — Z48.298 AFTERCARE FOLLOWING ORGAN TRANSPLANT: ICD-10-CM

## 2023-02-17 DIAGNOSIS — E83.42 HYPOMAGNESEMIA: Primary | ICD-10-CM

## 2023-02-17 DIAGNOSIS — Z76.82 KIDNEY TRANSPLANT CANDIDATE: ICD-10-CM

## 2023-02-17 DIAGNOSIS — I15.1 HTN, KIDNEY TRANSPLANT RELATED: ICD-10-CM

## 2023-02-17 PROCEDURE — 99215 OFFICE O/P EST HI 40 MIN: CPT | Mod: 24 | Performed by: INTERNAL MEDICINE

## 2023-02-17 RX ORDER — TACROLIMUS 1 MG/1
1 CAPSULE ORAL 2 TIMES DAILY
Qty: 60 CAPSULE | Refills: 11 | Status: SHIPPED | OUTPATIENT
Start: 2023-02-17 | End: 2023-03-08

## 2023-02-17 RX ORDER — TACROLIMUS 0.5 MG/1
0.5 CAPSULE ORAL 2 TIMES DAILY
Qty: 60 CAPSULE | Refills: 11 | Status: SHIPPED | OUTPATIENT
Start: 2023-02-17 | End: 2023-03-08

## 2023-02-17 RX ORDER — MAGNESIUM OXIDE 400 MG/1
400 TABLET ORAL 2 TIMES DAILY
Qty: 180 TABLET | Refills: 3 | Status: SHIPPED | OUTPATIENT
Start: 2023-02-17 | End: 2023-04-21

## 2023-02-17 NOTE — LETTER
2/17/2023      RE: Raj Pierce  [address]  [place] [address]       TRANSPLANT NEPHROLOGY EARLY POST TRANSPLANT VISIT    Assessment & Plan   # DDKT: Stable   - Baseline Creatinine: ~ 1.5-1.8   - Proteinuria: Normal (<0.2 grams)   - Date DSA Last Checked: Dec/2022      Latest DSA: No cPRA: 67%   - BK Viremia: No   - Kidney Tx Biopsy: No   - Transplant Ureteral Stent: Removed    # Immunosuppression: Tacrolimus immediate release (goal 8-10) and Mycophenolate mofetil (dose 1000 mg every 12 hours)   - Induction with Recent Transplant:  Intermediate Intensity   - Continue with intensive monitoring of immunosuppression for efficacy and toxicity.   - Changes: Yes - With high tacrolimus level, will decrease tacrolimus dose to 1.5 mg every 12 hours.    # Infection Prophylaxis:   - PJP: Sulfa/TMP (Bactrim)  - CMV: Valganciclovir (Valcyte); Patient is CMV IgG Ab discordant (D+/R-) and will continue on Valcyte x 6 months, then check CMV PCR monthly until 12 months post transplant.    # Hypertension: Controlled;  Goal BP: < 140/90   - Volume status: Euvolemic   - Changes: No    # Anemia in Chronic Renal Disease: Hgb: Stable      NADIYA: No   - Iron studies: Low iron saturation, but high ferritin    # Mineral Bone Disorder:   - Secondary renal hyperparathyroidism; PTH level: Normal (15-65 pg/ml)        On treatment: None  - Vitamin D; level: Normal        On supplement: No  - Calcium; level: Normal        On supplement: No  - Phosphorus; level: High        On supplement: No    # Electrolytes:   - Potassium; level: Normal        On supplement: No  - Magnesium; level: Low        On supplement: Yes; Will increase magnesium oxide to 400 mg twice daily.  - Bicarbonate; level: Normal        On supplement: No    # PKD, s/p Right Native Nephrectomy: Patient had right native kidney removed at the time of this latest transplant.    # Urinary Retention: Reports being asymptomatic now on tamsulosin.    # Diarrhea: Mild  symptoms, but ongoing.  Likely medication related.   - Recommend increased dietary fiber, such as psyllium, Metamucil or Benefiber.  Would also recommend trying probiotics.   - If symptoms persist, would change mycophenolate mofetil to mycophenolic acid.    # Medical Compliance: Yes    # COVID-19 Virus Review: Discussed COVID-19 virus and the potential medical risks.  Reviewed preventative health recommendations, including wearing a mask where appropriate.  Recommended COVID vaccination should be up to date with either an initial vaccination or booster shot when appropriate.  Asked the patient to inform the transplant center if they are exposed or diagnosed with this virus.    # COVID Vaccination Up To Date: No, due for next dose at 3-4 months post transplant    # Transplant History:  Etiology of Kidney Failure: Polycystic kidney disease (PKD)  Tx: DDKT  Transplant: 12/4/2022 (Kidney), 10/13/2015 (Kidney), 10/23/2021 (Kidney)  Donor Type: Donation after Brain Death Donor Class:   Crossmatch at time of Tx: negative  DSA at time of Tx: No  Significant changes in immunosuppression: None  CMV IgG Ab High Risk Discordance (D+/R-): Yes  EBV IgG Ab High Risk Discordance (D+/R-): No  Significant transplant-related complications: None    Transplant Office Phone Number: [phone]    Assessment and plan was discussed with the patient and he voiced his understanding and agreement.    Return visit: Return in about 2 months (around 4/17/2023).    Francis Espinoza MD    Chief Complaint   Mr. Pierce is a 52 year old here for kidney transplant and immunosuppression management.     History of Present Illness    Mr. Pierce reports feeling good overall with some medical complaints.  Since last clinic visit, patient reports no hospitalizations or new medical complaints and has been doing well overall.  His energy level is a bit lower after starting on tamsulosin following his ureteral stent removal.  He is active, now back  "to work, but only getting minimal exercise so far.  Denies any chest pain or shortness of breath with exertion.  No leg swelling.    Appetite is \"so-so,\" but weight is stable.  No nausea or vomiting.  Some loose stools since this transplant.  No fever, sweats or chills.  No night sweats.  No problems emptying his bladder.    Home BP: 120/80s    Problem List   Patient Active Problem List   Diagnosis     Polycystic kidney, autosomal dominant     Dyslipidemia     Vitamin D deficiency     HTN, kidney transplant related     Secondary renal hyperparathyroidism (HCC)     Kidney replaced by transplant     Immunosuppression (H)     Acute gouty arthritis     Anemia in chronic renal disease     Hypomagnesemia     Aftercare following organ transplant     Kidney transplant recipient     Need for pneumocystis prophylaxis     Hydronephrosis of kidney transplant     Stage 3a chronic kidney disease (H)       Allergies   Allergies   Allergen Reactions     Isopropyl Alcohol Rash     Swabs used at hospital     Anti-Thymocyte Globulin (Rabbit) [Antithymocyte Globulin (Equine)] Other (See Comments)     Fever and rigors with rodrigo-op dose. Received methylprednisolone 500mg prior to thymo.       Medications   Current Outpatient Medications   Medication Sig     acetaminophen (TYLENOL) 325 MG tablet Take 1-2 tablets (325-650 mg) by mouth every 4 hours as needed for pain     amLODIPine (NORVASC) 5 MG tablet Take 5 mg by mouth daily     atorvastatin (LIPITOR) 10 MG tablet Take 10 mg by mouth daily     citalopram (CELEXA) 20 MG tablet Take 20 mg by mouth daily.     magnesium oxide (MAG-OX) 400 MG tablet Take 1 tablet (400 mg) by mouth 2 times daily     mycophenolate (GENERIC EQUIVALENT) 250 MG capsule Take 4 capsules (1,000 mg) by mouth 2 times daily     sulfamethoxazole-trimethoprim (BACTRIM) 400-80 MG tablet Take 1 tablet by mouth daily     tamsulosin (FLOMAX) 0.4 MG capsule Take 1 capsule (0.4 mg) by mouth daily     valGANciclovir (VALCYTE) " 450 MG tablet Take 1 tablet (450 mg) by mouth daily Stop taking on 6/4/2023     tacrolimus (GENERIC EQUIVALENT) 0.5 MG capsule Take 1 capsule (0.5 mg) by mouth 2 times daily Total dose = 1.5 mg twice daily     tacrolimus (GENERIC EQUIVALENT) 1 MG capsule Take 1 capsule (1 mg) by mouth 2 times daily Total dose = 1.5 mg twice daily     No current facility-administered medications for this visit.     Medications Discontinued During This Encounter   Medication Reason     magnesium oxide (MAG-OX) 400 MG tablet        Physical Exam   Vital Signs: /78 (BP Location: Right arm, Patient Position: Sitting, Cuff Size: Adult Regular)   Pulse 76   Wt 85.9 kg (189 lb 4.8 oz)   BMI 27.16 kg/m      GENERAL APPEARANCE: alert and no distress  HENT: mouth without ulcers or lesions  LYMPHATICS: no cervical or supraclavicular nodes  RESP: lungs clear to auscultation - no rales, rhonchi or wheezes  CV: regular rhythm, normal rate, no rub, no murmur  EDEMA: no LE edema bilaterally  ABDOMEN: soft, nondistended, nontender, bowel sounds normal  MS: extremities normal - no gross deformities noted, no evidence of inflammation in joints, no muscle tenderness  SKIN: no rash  TX KIDNEY: normal  DIALYSIS ACCESS: none    Data     Renal Latest Ref Rng & Units 2/27/2023 2/20/2023 2/13/2023   SODIUM 136 - 145 mmol/L - - -   SODIUM POCT 133 - 144 mmol/L - - -   Na (external) 136 - 145 meq/L 140 139 138   K 3.4 - 5.3 mmol/L - - -   K (external) 3.5 - 5.1 meq/L 4.5 4.4 4.7   Cl 98 - 109 meq/L 110(H) 112(H) 109   Cl (external) 98 - 109 meq/L 110(H) 112(H) 109   CO2 22 - 29 mmol/L - - -   CO2 (external) 20 - 29 meq/L 21 19(L) 21   UREA NITROGEN 7 - 30 mg/dL - - -   UREA NITROGEN (R) 6.0 - 20.0 mg/dL - - -   BUN (external) 6 - 22 mg/dL 15 21 26(H)   CREATININE 0.67 - 1.17 mg/dL - - -   Cr (external) 0.73 - 1.18 mg/dL 1.51(H) 1.49(H) 1.56(H)   Glucose 70 - 99 mg/dL - - -   Glucose (external) 70 - 99 mg/dL 91 89 93   CALCIUM, TOTAL 8.6 - 10.0 mg/dL -  - -   Ca (external) 8.5 - 10.5 mg/dL 8.8 8.4(L) 8.7   MAGNESIUM 1.7 - 2.3 mg/dL - - -   Mg (external) 1.6 - 2.6 mg/dL - 1.5(L) -     Bone Health Latest Ref Rng & Units 2/20/2023 2/9/2023 1/30/2023   PHOSPHORUS 2.5 - 4.5 mg/dL - - -   Phos (external) 2.3 - 4.7 mg/dL 2.8 4.9(H) 2.8   PARATHYROID HORMONE INTACT 15 - 65 pg/mL - - -   PTHi (external) 11.0 - 101.0 pg/mL - - -   Vit D Def 20 - 75 ug/L - - -   Vit D Def (external) 30.0 - 100.0 ng/mL - - -     Heme Latest Ref Rng & Units 2/27/2023 2/20/2023 2/13/2023   WBC 4.0 - 11.0 10e3/uL - - -   WBC (external) 4.0 - 11.0 K/uL 4.0 5.6 5.5   Hgb 13.3 - 17.7 g/dL - - -   Hgb (external) 13.5 - 17.5 g/dL 12.8(L) 12.7(L) 12.1(L)   Plt 150 - 450 10e3/uL - - -   Plt (external) 140 - 400 K/uL 230 241 248   ABSOLUTE NEUTROPHIL 1.6 - 8.3 10e3/uL - - -   ABSOLUTE NEUTROPHILS (EXTERNAL) 1.8 - 8.0 K/uL - - 4.5   ABSOLUTE LYMPHOCYTES 0.8 - 5.3 10e3/uL - - -   ABSOLUTE LYMPHOCYTES (EXTERNAL) 0.8 - 4.1 K/uL - - 0.4(L)   ABSOLUTE MONOCYTES 0.0 - 1.3 10e3/uL - - -   ABSOLUTE MONOCYTES (EXTERNAL) 0.0 - 1.0 K/uL - - 0.5   ABSOLUTE EOSINOPHILS 0.0 - 0.7 10e3/uL - - -   ABSOLUTE EOSINOPHILS (EXTERNAL) 0.0 - 0.7 K/uL - - 0.1   ABSOLUTE BASOPHILS 0.0 - 0.2 10e9/L - - -   ABSOLUTE BASOPHILS (EXTERNAL) 0.0 - 0.2 K/uL - - 0.0   ABS IMMATURE GRANULOCYTES 0 - 0.4 10e9/L - - -     Liver Latest Ref Rng & Units 12/4/2022 11/1/2021 10/23/2021   AP 40 - 129 U/L 50 - 56   AP (external) 38 - 126 U/L - - -   TBili <=1.2 mg/dL 0.6 - 0.6   TBili (external) 0.2 - 1.3 mg/dL - - -   DBili (external) 0.0 - 0.4 mg/dL - - -   ALT 10 - 50 U/L 14 - 24   ALT (external) 21 - 72 U/L - - -   AST 10 - 50 U/L 25 - 23   AST (external) 17 - 59 U/L - - -   Tot Protein 6.4 - 8.3 g/dL 5.7(L) - 6.5(L)   Tot Protein (external) 6.3 - 8.2 g/dL - - -   ALBUMIN 3.4 - 5.0 g/dL - 2.0(L) 3.6   ALBUMIN (R) 3.5 - 5.2 g/dL 3.9 - -   Albumin (external) 3.5 - 5.0 g/dL - - -     Pancreas Latest Ref Rng & Units 12/4/2022 10/23/2021 10/14/2015    A1C <5.7 % 4.2 5.6 4.8     Iron studies Latest Ref Rng & Units 12/12/2022 12/9/2022 10/27/2021   Iron 61 - 157 ug/dL 45(L) 83 19(L)   IRON SATURATION INDEX 15 - 46 % - - 10(L)   IRON SATURATION INDEX (R) 15 - 46 % 17 33 -   FERRITIN 31 - 409 ng/mL 699(H) 983(H) 597(H)   Ferritin (external) 18.0 - 464.0 ng/mL - - -     [facility] Txp Virology Latest Ref Rng & Units 2/20/2023 2/9/2023 2/6/2023   CVM DNA Quant - - - -   CMV QUANT IU/ML Not Detected IU/mL - - -   LOG IU/ML OF CMVQNT <2.1 [Log:IU]/mL - - -   BK Spec - - - -   BK Res BKNEG copies/mL - - -   BK Log <2.7 Log copies/mL - - -   BK Quant Log Ext log IU/mL Undetected Undetected -   BK Quant Result Ext Undetected IU/mL Undetected Undetected -   BK Quant Spec Ext - Plasma Plasma -   EBV VCA IGG ANTIBODY U/mL - - -   EBV CAPSID ANTIBODY IGG No detectable antibody. - - -   EBV DNA QUANT (EXTERNAL) Undetected IU/mL - - -   EBV DNA COPIES/ML EBVNEG [Copies]/mL - - -   EBV DNA LOG OF COPIES <2.7 [Log:copies]/mL - - -   EBV DNA LOG OF COPIES (EXTERNAL) log IU/mL - - -   Hep B Core NR - - -   Hep B Core Ext Nonreactive - - -   Hep B Surf - - - -   Hep B Surf Ext  - - - -   HIV 1&2 NEG - - -   HIV 1&2 Ext Nonreactive - - Nonreactive        Recent Labs   Lab Test 12/09/22  0829 12/10/22  0719 12/12/22  0735 01/23/23  1215   DOSTAC 12/8/2022  --  12/11/2022 1/22/2023   TACROL 4.0* 6.2 9.9 17.1*     Recent Labs   Lab Test 10/19/15  0707 10/26/15  0707 11/09/15  0758 01/23/23  1215   DOSMPA 2,000 Not Provided 11/8/15 AT 2030  --    MPACID 1.27 4.01* 0.45* 4.44*   MPAG >200.0* 190.0* 20.5* 169.9*       Francis Espinoza MD

## 2023-02-17 NOTE — TELEPHONE ENCOUNTER
ISSUE:   Tacrolimus IR level 13.7 on 2/13, goal 8-10, dose 2 mg BID.    PLAN:   Please call patient and confirm this was an accurate 12-hour trough. Verify Tacrolimus IR dose 2 mg BID. Confirm no new medications or illness. Confirm no missed doses. If accurate trough and accurate dose, decrease Tacrolimus IR dose to 1.5 mg BID and repeat labs in 1 weeks    OUTCOME:   Spoke with patient, they confirm accurate trough level and current dose 2 mg BID. Patient confirmed dose change to 1.5 mg BID and to repeat labs in 1 weeks. Orders sent to preferred pharmacy for dose change and lab for repeat labs. Patient voiced understanding of plan.

## 2023-02-17 NOTE — LETTER
2/17/2023       RE: Raj Pierce  [address]  [place] [address]     Dear Colleague,    Thank you for referring your patient, Raj Pierce, to the [facility] at [facility]. Please see a copy of my visit note below.    TRANSPLANT NEPHROLOGY EARLY POST TRANSPLANT VISIT    Assessment & Plan   # DDKT: Stable   - Baseline Creatinine: ~ 1.5-1.8   - Proteinuria: Normal (<0.2 grams)   - Date DSA Last Checked: Dec/2022      Latest DSA: No cPRA: 67%   - BK Viremia: No   - Kidney Tx Biopsy: No   - Transplant Ureteral Stent: Removed    # Immunosuppression: Tacrolimus immediate release (goal 8-10) and Mycophenolate mofetil (dose 1000 mg every 12 hours)   - Induction with Recent Transplant:  Intermediate Intensity   - Continue with intensive monitoring of immunosuppression for efficacy and toxicity.   - Changes: Yes - With high tacrolimus level, will decrease tacrolimus dose to 1.5 mg every 12 hours.    # Infection Prophylaxis:   - PJP: Sulfa/TMP (Bactrim)  - CMV: Valganciclovir (Valcyte); Patient is CMV IgG Ab discordant (D+/R-) and will continue on Valcyte x 6 months, then check CMV PCR monthly until 12 months post transplant.    # Hypertension: Controlled;  Goal BP: < 140/90   - Volume status: Euvolemic   - Changes: No    # Anemia in Chronic Renal Disease: Hgb: Stable      NADIYA: No   - Iron studies: Low iron saturation, but high ferritin    # Mineral Bone Disorder:   - Secondary renal hyperparathyroidism; PTH level: Normal (15-65 pg/ml)        On treatment: None  - Vitamin D; level: Normal        On supplement: No  - Calcium; level: Normal        On supplement: No  - Phosphorus; level: High        On supplement: No    # Electrolytes:   - Potassium; level: Normal        On supplement: No  - Magnesium; level: Low        On supplement: Yes; Will increase magnesium oxide to 400 mg twice daily.  - Bicarbonate; level: Normal        On  supplement: No    # PKD, s/p Right Native Nephrectomy: Patient had right native kidney removed at the time of this latest transplant.    # Urinary Retention: Reports being asymptomatic now on tamsulosin.    # Diarrhea: Mild symptoms, but ongoing.  Likely medication related.   - Recommend increased dietary fiber, such as psyllium, Metamucil or Benefiber.  Would also recommend trying probiotics.   - If symptoms persist, would change mycophenolate mofetil to mycophenolic acid.    # Medical Compliance: Yes    # COVID-19 Virus Review: Discussed COVID-19 virus and the potential medical risks.  Reviewed preventative health recommendations, including wearing a mask where appropriate.  Recommended COVID vaccination should be up to date with either an initial vaccination or booster shot when appropriate.  Asked the patient to inform the transplant center if they are exposed or diagnosed with this virus.    # COVID Vaccination Up To Date: No, due for next dose at 3-4 months post transplant    # Transplant History:  Etiology of Kidney Failure: Polycystic kidney disease (PKD)  Tx: DDKT  Transplant: 12/4/2022 (Kidney), 10/13/2015 (Kidney), 10/23/2021 (Kidney)  Donor Type: Donation after Brain Death Donor Class:   Crossmatch at time of Tx: negative  DSA at time of Tx: No  Significant changes in immunosuppression: None  CMV IgG Ab High Risk Discordance (D+/R-): Yes  EBV IgG Ab High Risk Discordance (D+/R-): No  Significant transplant-related complications: None    Transplant Office Phone Number: [phone]    Assessment and plan was discussed with the patient and he voiced his understanding and agreement.    Return visit: Return in about 2 months (around 4/17/2023).    Francis Espinoza MD    Chief Complaint   Mr. Pierce is a 52 year old here for kidney transplant and immunosuppression management.     History of Present Illness    Mr. Pierce reports feeling good overall with some medical complaints.  Since last clinic  "visit, patient reports no hospitalizations or new medical complaints and has been doing well overall.  His energy level is a bit lower after starting on tamsulosin following his ureteral stent removal.  He is active, now back to work, but only getting minimal exercise so far.  Denies any chest pain or shortness of breath with exertion.  No leg swelling.    Appetite is \"so-so,\" but weight is stable.  No nausea or vomiting.  Some loose stools since this transplant.  No fever, sweats or chills.  No night sweats.  No problems emptying his bladder.    Home BP: 120/80s    Problem List   Patient Active Problem List   Diagnosis     Polycystic kidney, autosomal dominant     Dyslipidemia     Vitamin D deficiency     HTN, kidney transplant related     Secondary renal hyperparathyroidism (HCC)     Kidney replaced by transplant     Immunosuppression (H)     Acute gouty arthritis     Anemia in chronic renal disease     Hypomagnesemia     Aftercare following organ transplant     Kidney transplant recipient     Need for pneumocystis prophylaxis     Hydronephrosis of kidney transplant     Stage 3a chronic kidney disease (H)       Allergies   Allergies   Allergen Reactions     Isopropyl Alcohol Rash     Swabs used at hospital     Anti-Thymocyte Globulin (Rabbit) [Antithymocyte Globulin (Equine)] Other (See Comments)     Fever and rigors with rodrigo-op dose. Received methylprednisolone 500mg prior to thymo.       Medications   Current Outpatient Medications   Medication Sig     acetaminophen (TYLENOL) 325 MG tablet Take 1-2 tablets (325-650 mg) by mouth every 4 hours as needed for pain     amLODIPine (NORVASC) 5 MG tablet Take 5 mg by mouth daily     atorvastatin (LIPITOR) 10 MG tablet Take 10 mg by mouth daily     citalopram (CELEXA) 20 MG tablet Take 20 mg by mouth daily.     magnesium oxide (MAG-OX) 400 MG tablet Take 1 tablet (400 mg) by mouth 2 times daily     mycophenolate (GENERIC EQUIVALENT) 250 MG capsule Take 4 capsules (1,000 " mg) by mouth 2 times daily     sulfamethoxazole-trimethoprim (BACTRIM) 400-80 MG tablet Take 1 tablet by mouth daily     tamsulosin (FLOMAX) 0.4 MG capsule Take 1 capsule (0.4 mg) by mouth daily     valGANciclovir (VALCYTE) 450 MG tablet Take 1 tablet (450 mg) by mouth daily Stop taking on 6/4/2023     tacrolimus (GENERIC EQUIVALENT) 0.5 MG capsule Take 1 capsule (0.5 mg) by mouth 2 times daily Total dose = 1.5 mg twice daily     tacrolimus (GENERIC EQUIVALENT) 1 MG capsule Take 1 capsule (1 mg) by mouth 2 times daily Total dose = 1.5 mg twice daily     No current facility-administered medications for this visit.     Medications Discontinued During This Encounter   Medication Reason     magnesium oxide (MAG-OX) 400 MG tablet        Physical Exam   Vital Signs: /78 (BP Location: Right arm, Patient Position: Sitting, Cuff Size: Adult Regular)   Pulse 76   Wt 85.9 kg (189 lb 4.8 oz)   BMI 27.16 kg/m      GENERAL APPEARANCE: alert and no distress  HENT: mouth without ulcers or lesions  LYMPHATICS: no cervical or supraclavicular nodes  RESP: lungs clear to auscultation - no rales, rhonchi or wheezes  CV: regular rhythm, normal rate, no rub, no murmur  EDEMA: no LE edema bilaterally  ABDOMEN: soft, nondistended, nontender, bowel sounds normal  MS: extremities normal - no gross deformities noted, no evidence of inflammation in joints, no muscle tenderness  SKIN: no rash  TX KIDNEY: normal  DIALYSIS ACCESS: none    Data     Renal Latest Ref Rng & Units 2/27/2023 2/20/2023 2/13/2023   SODIUM 136 - 145 mmol/L - - -   SODIUM POCT 133 - 144 mmol/L - - -   Na (external) 136 - 145 meq/L 140 139 138   K 3.4 - 5.3 mmol/L - - -   K (external) 3.5 - 5.1 meq/L 4.5 4.4 4.7   Cl 98 - 109 meq/L 110(H) 112(H) 109   Cl (external) 98 - 109 meq/L 110(H) 112(H) 109   CO2 22 - 29 mmol/L - - -   CO2 (external) 20 - 29 meq/L 21 19(L) 21   UREA NITROGEN 7 - 30 mg/dL - - -   UREA NITROGEN (R) 6.0 - 20.0 mg/dL - - -   BUN (external) 6 - 22  mg/dL 15 21 26(H)   CREATININE 0.67 - 1.17 mg/dL - - -   Cr (external) 0.73 - 1.18 mg/dL 1.51(H) 1.49(H) 1.56(H)   Glucose 70 - 99 mg/dL - - -   Glucose (external) 70 - 99 mg/dL 91 89 93   CALCIUM, TOTAL 8.6 - 10.0 mg/dL - - -   Ca (external) 8.5 - 10.5 mg/dL 8.8 8.4(L) 8.7   MAGNESIUM 1.7 - 2.3 mg/dL - - -   Mg (external) 1.6 - 2.6 mg/dL - 1.5(L) -     Bone Health Latest Ref Rng & Units 2/20/2023 2/9/2023 1/30/2023   PHOSPHORUS 2.5 - 4.5 mg/dL - - -   Phos (external) 2.3 - 4.7 mg/dL 2.8 4.9(H) 2.8   PARATHYROID HORMONE INTACT 15 - 65 pg/mL - - -   PTHi (external) 11.0 - 101.0 pg/mL - - -   Vit D Def 20 - 75 ug/L - - -   Vit D Def (external) 30.0 - 100.0 ng/mL - - -     Heme Latest Ref Rng & Units 2/27/2023 2/20/2023 2/13/2023   WBC 4.0 - 11.0 10e3/uL - - -   WBC (external) 4.0 - 11.0 K/uL 4.0 5.6 5.5   Hgb 13.3 - 17.7 g/dL - - -   Hgb (external) 13.5 - 17.5 g/dL 12.8(L) 12.7(L) 12.1(L)   Plt 150 - 450 10e3/uL - - -   Plt (external) 140 - 400 K/uL 230 241 248   ABSOLUTE NEUTROPHIL 1.6 - 8.3 10e3/uL - - -   ABSOLUTE NEUTROPHILS (EXTERNAL) 1.8 - 8.0 K/uL - - 4.5   ABSOLUTE LYMPHOCYTES 0.8 - 5.3 10e3/uL - - -   ABSOLUTE LYMPHOCYTES (EXTERNAL) 0.8 - 4.1 K/uL - - 0.4(L)   ABSOLUTE MONOCYTES 0.0 - 1.3 10e3/uL - - -   ABSOLUTE MONOCYTES (EXTERNAL) 0.0 - 1.0 K/uL - - 0.5   ABSOLUTE EOSINOPHILS 0.0 - 0.7 10e3/uL - - -   ABSOLUTE EOSINOPHILS (EXTERNAL) 0.0 - 0.7 K/uL - - 0.1   ABSOLUTE BASOPHILS 0.0 - 0.2 10e9/L - - -   ABSOLUTE BASOPHILS (EXTERNAL) 0.0 - 0.2 K/uL - - 0.0   ABS IMMATURE GRANULOCYTES 0 - 0.4 10e9/L - - -     Liver Latest Ref Rng & Units 12/4/2022 11/1/2021 10/23/2021   AP 40 - 129 U/L 50 - 56   AP (external) 38 - 126 U/L - - -   TBili <=1.2 mg/dL 0.6 - 0.6   TBili (external) 0.2 - 1.3 mg/dL - - -   DBili (external) 0.0 - 0.4 mg/dL - - -   ALT 10 - 50 U/L 14 - 24   ALT (external) 21 - 72 U/L - - -   AST 10 - 50 U/L 25 - 23   AST (external) 17 - 59 U/L - - -   Tot Protein 6.4 - 8.3 g/dL 5.7(L) - 6.5(L)   Tot  Protein (external) 6.3 - 8.2 g/dL - - -   ALBUMIN 3.4 - 5.0 g/dL - 2.0(L) 3.6   ALBUMIN (R) 3.5 - 5.2 g/dL 3.9 - -   Albumin (external) 3.5 - 5.0 g/dL - - -     Pancreas Latest Ref Rng & Units 12/4/2022 10/23/2021 10/14/2015   A1C <5.7 % 4.2 5.6 4.8     Iron studies Latest Ref Rng & Units 12/12/2022 12/9/2022 10/27/2021   Iron 61 - 157 ug/dL 45(L) 83 19(L)   IRON SATURATION INDEX 15 - 46 % - - 10(L)   IRON SATURATION INDEX (R) 15 - 46 % 17 33 -   FERRITIN 31 - 409 ng/mL 699(H) 983(H) 597(H)   Ferritin (external) 18.0 - 464.0 ng/mL - - -     [facility] Txp Virology Latest Ref Rng & Units 2/20/2023 2/9/2023 2/6/2023   CVM DNA Quant - - - -   CMV QUANT IU/ML Not Detected IU/mL - - -   LOG IU/ML OF CMVQNT <2.1 [Log:IU]/mL - - -   BK Spec - - - -   BK Res BKNEG copies/mL - - -   BK Log <2.7 Log copies/mL - - -   BK Quant Log Ext log IU/mL Undetected Undetected -   BK Quant Result Ext Undetected IU/mL Undetected Undetected -   BK Quant Spec Ext - Plasma Plasma -   EBV VCA IGG ANTIBODY U/mL - - -   EBV CAPSID ANTIBODY IGG No detectable antibody. - - -   EBV DNA QUANT (EXTERNAL) Undetected IU/mL - - -   EBV DNA COPIES/ML EBVNEG [Copies]/mL - - -   EBV DNA LOG OF COPIES <2.7 [Log:copies]/mL - - -   EBV DNA LOG OF COPIES (EXTERNAL) log IU/mL - - -   Hep B Core NR - - -   Hep B Core Ext Nonreactive - - -   Hep B Surf - - - -   Hep B Surf Ext  - - - -   HIV 1&2 NEG - - -   HIV 1&2 Ext Nonreactive - - Nonreactive        Recent Labs   Lab Test 12/09/22  0829 12/10/22  0719 12/12/22  0735 01/23/23  1215   DOSTAC 12/8/2022  --  12/11/2022 1/22/2023   TACROL 4.0* 6.2 9.9 17.1*     Recent Labs   Lab Test 10/19/15  0707 10/26/15  0707 11/09/15  0758 01/23/23  1215   DOSMPA 2,000 Not Provided 11/8/15 AT 2030  --    MPACID 1.27 4.01* 0.45* 4.44*   MPAG >200.0* 190.0* 20.5* 169.9*       Again, thank you for allowing me to participate in the care of your patient.      Sincerely,    Francis Espinoza MD

## 2023-03-05 PROBLEM — Z29.89 NEED FOR PNEUMOCYSTIS PROPHYLAXIS: Status: ACTIVE | Noted: 2022-03-18

## 2023-03-05 PROBLEM — N18.31 STAGE 3A CHRONIC KIDNEY DISEASE (H): Status: ACTIVE | Noted: 2023-03-05

## 2023-03-06 NOTE — PATIENT INSTRUCTIONS
Patient Recommendations:  - Increase magnesium oxide to 400 mg twice daily, at lunch and supper.  - Decrease tacrolimus to 1.5 mg every 12 hours.    Transplant Patient Information  Your Post Transplant Coordinator is: Mel Cervantes  For non urgent items, we encourage you to contact your coordinator/care team online via [employer]  You and your care team can also contact your transplant coordinator Monday - Friday, 8am - 5pm at [phone] (Option 2 to reach the coordinator or Option 4 to schedule an appointment).  After hours for urgent matters, please call [facility] at [phone].

## 2023-03-06 NOTE — PROGRESS NOTES
TRANSPLANT NEPHROLOGY EARLY POST TRANSPLANT VISIT    Assessment & Plan   # DDKT: Stable   - Baseline Creatinine: ~ 1.5-1.8   - Proteinuria: Normal (<0.2 grams)   - Date DSA Last Checked: Dec/2022      Latest DSA: No cPRA: 67%   - BK Viremia: No   - Kidney Tx Biopsy: No   - Transplant Ureteral Stent: Removed    # Immunosuppression: Tacrolimus immediate release (goal 8-10) and Mycophenolate mofetil (dose 1000 mg every 12 hours)   - Induction with Recent Transplant:  Intermediate Intensity   - Continue with intensive monitoring of immunosuppression for efficacy and toxicity.   - Changes: Yes - With high tacrolimus level, will decrease tacrolimus dose to 1.5 mg every 12 hours.    # Infection Prophylaxis:   - PJP: Sulfa/TMP (Bactrim)  - CMV: Valganciclovir (Valcyte); Patient is CMV IgG Ab discordant (D+/R-) and will continue on Valcyte x 6 months, then check CMV PCR monthly until 12 months post transplant.    # Hypertension: Controlled;  Goal BP: < 140/90   - Volume status: Euvolemic   - Changes: No    # Anemia in Chronic Renal Disease: Hgb: Stable      NADIYA: No   - Iron studies: Low iron saturation, but high ferritin    # Mineral Bone Disorder:   - Secondary renal hyperparathyroidism; PTH level: Normal (15-65 pg/ml)        On treatment: None  - Vitamin D; level: Normal        On supplement: No  - Calcium; level: Normal        On supplement: No  - Phosphorus; level: High        On supplement: No    # Electrolytes:   - Potassium; level: Normal        On supplement: No  - Magnesium; level: Low        On supplement: Yes; Will increase magnesium oxide to 400 mg twice daily.  - Bicarbonate; level: Normal        On supplement: No    # PKD, s/p Right Native Nephrectomy: Patient had right native kidney removed at the time of this latest transplant.    # Urinary Retention: Reports being asymptomatic now on tamsulosin.    # Diarrhea: Mild symptoms, but ongoing.  Likely medication related.   - Recommend increased dietary fiber, such  as psyllium, Metamucil or Benefiber.  Would also recommend trying probiotics.   - If symptoms persist, would change mycophenolate mofetil to mycophenolic acid.    # Medical Compliance: Yes    # COVID-19 Virus Review: Discussed COVID-19 virus and the potential medical risks.  Reviewed preventative health recommendations, including wearing a mask where appropriate.  Recommended COVID vaccination should be up to date with either an initial vaccination or booster shot when appropriate.  Asked the patient to inform the transplant center if they are exposed or diagnosed with this virus.    # COVID Vaccination Up To Date: No, due for next dose at 3-4 months post transplant    # Transplant History:  Etiology of Kidney Failure: Polycystic kidney disease (PKD)  Tx: DDKT  Transplant: 12/4/2022 (Kidney), 10/13/2015 (Kidney), 10/23/2021 (Kidney)  Donor Type: Donation after Brain Death Donor Class:   Crossmatch at time of Tx: negative  DSA at time of Tx: No  Significant changes in immunosuppression: None  CMV IgG Ab High Risk Discordance (D+/R-): Yes  EBV IgG Ab High Risk Discordance (D+/R-): No  Significant transplant-related complications: None    Transplant Office Phone Number: [phone]    Assessment and plan was discussed with the patient and he voiced his understanding and agreement.    Return visit: Return in about 2 months (around 4/17/2023).    Francis Espinoza MD    Chief Complaint   Mr. Pierce is a 52 year old here for kidney transplant and immunosuppression management.     History of Present Illness    Mr. Pierce reports feeling good overall with some medical complaints.  Since last clinic visit, patient reports no hospitalizations or new medical complaints and has been doing well overall.  His energy level is a bit lower after starting on tamsulosin following his ureteral stent removal.  He is active, now back to work, but only getting minimal exercise so far.  Denies any chest pain or shortness of breath  "with exertion.  No leg swelling.    Appetite is \"so-so,\" but weight is stable.  No nausea or vomiting.  Some loose stools since this transplant.  No fever, sweats or chills.  No night sweats.  No problems emptying his bladder.    Home BP: 120/80s    Problem List   Patient Active Problem List   Diagnosis     Polycystic kidney, autosomal dominant     Dyslipidemia     Vitamin D deficiency     HTN, kidney transplant related     Secondary renal hyperparathyroidism (HCC)     Kidney replaced by transplant     Immunosuppression (H)     Acute gouty arthritis     Anemia in chronic renal disease     Hypomagnesemia     Aftercare following organ transplant     Kidney transplant recipient     Need for pneumocystis prophylaxis     Hydronephrosis of kidney transplant     Stage 3a chronic kidney disease (H)       Allergies   Allergies   Allergen Reactions     Isopropyl Alcohol Rash     Swabs used at hospital     Anti-Thymocyte Globulin (Rabbit) [Antithymocyte Globulin (Equine)] Other (See Comments)     Fever and rigors with rodrigo-op dose. Received methylprednisolone 500mg prior to thymo.       Medications   Current Outpatient Medications   Medication Sig     acetaminophen (TYLENOL) 325 MG tablet Take 1-2 tablets (325-650 mg) by mouth every 4 hours as needed for pain     amLODIPine (NORVASC) 5 MG tablet Take 5 mg by mouth daily     atorvastatin (LIPITOR) 10 MG tablet Take 10 mg by mouth daily     citalopram (CELEXA) 20 MG tablet Take 20 mg by mouth daily.     magnesium oxide (MAG-OX) 400 MG tablet Take 1 tablet (400 mg) by mouth 2 times daily     mycophenolate (GENERIC EQUIVALENT) 250 MG capsule Take 4 capsules (1,000 mg) by mouth 2 times daily     sulfamethoxazole-trimethoprim (BACTRIM) 400-80 MG tablet Take 1 tablet by mouth daily     tamsulosin (FLOMAX) 0.4 MG capsule Take 1 capsule (0.4 mg) by mouth daily     valGANciclovir (VALCYTE) 450 MG tablet Take 1 tablet (450 mg) by mouth daily Stop taking on 6/4/2023     tacrolimus " (GENERIC EQUIVALENT) 0.5 MG capsule Take 1 capsule (0.5 mg) by mouth 2 times daily Total dose = 1.5 mg twice daily     tacrolimus (GENERIC EQUIVALENT) 1 MG capsule Take 1 capsule (1 mg) by mouth 2 times daily Total dose = 1.5 mg twice daily     No current facility-administered medications for this visit.     Medications Discontinued During This Encounter   Medication Reason     magnesium oxide (MAG-OX) 400 MG tablet        Physical Exam   Vital Signs: /78 (BP Location: Right arm, Patient Position: Sitting, Cuff Size: Adult Regular)   Pulse 76   Wt 85.9 kg (189 lb 4.8 oz)   BMI 27.16 kg/m      GENERAL APPEARANCE: alert and no distress  HENT: mouth without ulcers or lesions  LYMPHATICS: no cervical or supraclavicular nodes  RESP: lungs clear to auscultation - no rales, rhonchi or wheezes  CV: regular rhythm, normal rate, no rub, no murmur  EDEMA: no LE edema bilaterally  ABDOMEN: soft, nondistended, nontender, bowel sounds normal  MS: extremities normal - no gross deformities noted, no evidence of inflammation in joints, no muscle tenderness  SKIN: no rash  TX KIDNEY: normal  DIALYSIS ACCESS: none    Data     Renal Latest Ref Rng & Units 2/27/2023 2/20/2023 2/13/2023   SODIUM 136 - 145 mmol/L - - -   SODIUM POCT 133 - 144 mmol/L - - -   Na (external) 136 - 145 meq/L 140 139 138   K 3.4 - 5.3 mmol/L - - -   K (external) 3.5 - 5.1 meq/L 4.5 4.4 4.7   Cl 98 - 109 meq/L 110(H) 112(H) 109   Cl (external) 98 - 109 meq/L 110(H) 112(H) 109   CO2 22 - 29 mmol/L - - -   CO2 (external) 20 - 29 meq/L 21 19(L) 21   UREA NITROGEN 7 - 30 mg/dL - - -   UREA NITROGEN (R) 6.0 - 20.0 mg/dL - - -   BUN (external) 6 - 22 mg/dL 15 21 26(H)   CREATININE 0.67 - 1.17 mg/dL - - -   Cr (external) 0.73 - 1.18 mg/dL 1.51(H) 1.49(H) 1.56(H)   Glucose 70 - 99 mg/dL - - -   Glucose (external) 70 - 99 mg/dL 91 89 93   CALCIUM, TOTAL 8.6 - 10.0 mg/dL - - -   Ca (external) 8.5 - 10.5 mg/dL 8.8 8.4(L) 8.7   MAGNESIUM 1.7 - 2.3 mg/dL - - -   Mg  (external) 1.6 - 2.6 mg/dL - 1.5(L) -     Bone Health Latest Ref Rng & Units 2/20/2023 2/9/2023 1/30/2023   PHOSPHORUS 2.5 - 4.5 mg/dL - - -   Phos (external) 2.3 - 4.7 mg/dL 2.8 4.9(H) 2.8   PARATHYROID HORMONE INTACT 15 - 65 pg/mL - - -   PTHi (external) 11.0 - 101.0 pg/mL - - -   Vit D Def 20 - 75 ug/L - - -   Vit D Def (external) 30.0 - 100.0 ng/mL - - -     Heme Latest Ref Rng & Units 2/27/2023 2/20/2023 2/13/2023   WBC 4.0 - 11.0 10e3/uL - - -   WBC (external) 4.0 - 11.0 K/uL 4.0 5.6 5.5   Hgb 13.3 - 17.7 g/dL - - -   Hgb (external) 13.5 - 17.5 g/dL 12.8(L) 12.7(L) 12.1(L)   Plt 150 - 450 10e3/uL - - -   Plt (external) 140 - 400 K/uL 230 241 248   ABSOLUTE NEUTROPHIL 1.6 - 8.3 10e3/uL - - -   ABSOLUTE NEUTROPHILS (EXTERNAL) 1.8 - 8.0 K/uL - - 4.5   ABSOLUTE LYMPHOCYTES 0.8 - 5.3 10e3/uL - - -   ABSOLUTE LYMPHOCYTES (EXTERNAL) 0.8 - 4.1 K/uL - - 0.4(L)   ABSOLUTE MONOCYTES 0.0 - 1.3 10e3/uL - - -   ABSOLUTE MONOCYTES (EXTERNAL) 0.0 - 1.0 K/uL - - 0.5   ABSOLUTE EOSINOPHILS 0.0 - 0.7 10e3/uL - - -   ABSOLUTE EOSINOPHILS (EXTERNAL) 0.0 - 0.7 K/uL - - 0.1   ABSOLUTE BASOPHILS 0.0 - 0.2 10e9/L - - -   ABSOLUTE BASOPHILS (EXTERNAL) 0.0 - 0.2 K/uL - - 0.0   ABS IMMATURE GRANULOCYTES 0 - 0.4 10e9/L - - -     Liver Latest Ref Rng & Units 12/4/2022 11/1/2021 10/23/2021   AP 40 - 129 U/L 50 - 56   AP (external) 38 - 126 U/L - - -   TBili <=1.2 mg/dL 0.6 - 0.6   TBili (external) 0.2 - 1.3 mg/dL - - -   DBili (external) 0.0 - 0.4 mg/dL - - -   ALT 10 - 50 U/L 14 - 24   ALT (external) 21 - 72 U/L - - -   AST 10 - 50 U/L 25 - 23   AST (external) 17 - 59 U/L - - -   Tot Protein 6.4 - 8.3 g/dL 5.7(L) - 6.5(L)   Tot Protein (external) 6.3 - 8.2 g/dL - - -   ALBUMIN 3.4 - 5.0 g/dL - 2.0(L) 3.6   ALBUMIN (R) 3.5 - 5.2 g/dL 3.9 - -   Albumin (external) 3.5 - 5.0 g/dL - - -     Pancreas Latest Ref Rng & Units 12/4/2022 10/23/2021 10/14/2015   A1C <5.7 % 4.2 5.6 4.8     Iron studies Latest Ref Rng & Units 12/12/2022 12/9/2022  10/27/2021   Iron 61 - 157 ug/dL 45(L) 83 19(L)   IRON SATURATION INDEX 15 - 46 % - - 10(L)   IRON SATURATION INDEX (R) 15 - 46 % 17 33 -   FERRITIN 31 - 409 ng/mL 699(H) 983(H) 597(H)   Ferritin (external) 18.0 - 464.0 ng/mL - - -     [facility] Txp Virology Latest Ref Rng & Units 2/20/2023 2/9/2023 2/6/2023   CVM DNA Quant - - - -   CMV QUANT IU/ML Not Detected IU/mL - - -   LOG IU/ML OF CMVQNT <2.1 [Log:IU]/mL - - -   BK Spec - - - -   BK Res BKNEG copies/mL - - -   BK Log <2.7 Log copies/mL - - -   BK Quant Log Ext log IU/mL Undetected Undetected -   BK Quant Result Ext Undetected IU/mL Undetected Undetected -   BK Quant Spec Ext - Plasma Plasma -   EBV VCA IGG ANTIBODY U/mL - - -   EBV CAPSID ANTIBODY IGG No detectable antibody. - - -   EBV DNA QUANT (EXTERNAL) Undetected IU/mL - - -   EBV DNA COPIES/ML EBVNEG [Copies]/mL - - -   EBV DNA LOG OF COPIES <2.7 [Log:copies]/mL - - -   EBV DNA LOG OF COPIES (EXTERNAL) log IU/mL - - -   Hep B Core NR - - -   Hep B Core Ext Nonreactive - - -   Hep B Surf - - - -   Hep B Surf Ext  - - - -   HIV 1&2 NEG - - -   HIV 1&2 Ext Nonreactive - - Nonreactive        Recent Labs   Lab Test 12/09/22  0829 12/10/22  0719 12/12/22  0735 01/23/23  1215   DOSTAC 12/8/2022  --  12/11/2022 1/22/2023   TACROL 4.0* 6.2 9.9 17.1*     Recent Labs   Lab Test 10/19/15  0707 10/26/15  0707 11/09/15  0758 01/23/23  1215   DOSMPA 2,000 Not Provided 11/8/15 AT 2030  --    MPACID 1.27 4.01* 0.45* 4.44*   MPAG >200.0* 190.0* 20.5* 169.9*

## 2023-03-07 DIAGNOSIS — Z94.0 KIDNEY TRANSPLANTED: Primary | ICD-10-CM

## 2023-03-07 DIAGNOSIS — Z76.82 KIDNEY TRANSPLANT CANDIDATE: ICD-10-CM

## 2023-03-07 DIAGNOSIS — D84.9 IMMUNOSUPPRESSED STATUS (H): ICD-10-CM

## 2023-03-07 NOTE — TELEPHONE ENCOUNTER
ISSUE:   Tacrolimus IR level 11.2 on 3/6, goal 8-10, dose 1.5 mg BID.    PLAN:   Please call patient and confirm this was an accurate 12-hour trough. Verify Tacrolimus IR dose 1.5 mg BID. Confirm no new medications or illness. Confirm no missed doses. If accurate trough and accurate dose, decrease Tacrolimus IR dose to 1 mg BID and repeat labs in 1 weeks    Elif Cervantes RN    OUTCOME:   Spoke with patient, they confirm accurate trough level and current dose 1.5 mg BID. Patient confirmed dose change to 1 mg BID and to repeat labs in 1 weeks. Orders sent to preferred pharmacy for dose change and lab for repeat labs. Patient voiced understanding of plan.

## 2023-03-07 NOTE — TELEPHONE ENCOUNTER
Left message and sent SlideMail message to patient regarding:  Tacrolimus IR level 11.2 on 3/6, goal 8-10, dose 1.5 mg BID.     PLAN:   Please call patient and confirm this was an accurate 12-hour trough. Verify Tacrolimus IR dose 1.5 mg BID. Confirm no new medications or illness. Confirm no missed doses. If accurate trough and accurate dose, decrease Tacrolimus IR dose to 1 mg BID and repeat labs in 1 weeks

## 2023-03-07 NOTE — LETTER
PHYSICIAN ORDERS      DATE & TIME ISSUED: 2023 1:22 PM  PATIENT NAME: Raj Pierce   : 1970     [facility] MR# [if applicable]: [id number]     DIAGNOSIS:  Kidney Transplant  ICD-10 CODE: Z94.0     Please repeat the following labs in 1 week:  Tacrolimus drug level  CBC  BMP    Any questions please call: [phone]  Please fax lab results to [phone].    .

## 2023-03-08 RX ORDER — TACROLIMUS 1 MG/1
1 CAPSULE ORAL 2 TIMES DAILY
Qty: 60 CAPSULE | Refills: 11 | Status: SHIPPED | OUTPATIENT
Start: 2023-03-08 | End: 2023-04-05

## 2023-03-08 RX ORDER — TACROLIMUS 0.5 MG/1
CAPSULE ORAL
Qty: 60 CAPSULE | Refills: 11 | Status: SHIPPED | OUTPATIENT
Start: 2023-03-08 | End: 2023-04-05

## 2023-03-13 ENCOUNTER — TELEPHONE (OUTPATIENT)
Dept: TRANSPLANT | Facility: CLINIC | Age: 53
End: 2023-03-13
Payer: COMMERCIAL

## 2023-03-13 DIAGNOSIS — E87.8 LOW BICARBONATE: Primary | ICD-10-CM

## 2023-03-13 RX ORDER — SODIUM BICARBONATE 650 MG/1
650 TABLET ORAL 2 TIMES DAILY
Qty: 60 TABLET | Refills: 3 | Status: SHIPPED | OUTPATIENT
Start: 2023-03-13

## 2023-03-13 NOTE — TELEPHONE ENCOUNTER
ISSUE:  Bicarb 17    Reviewed with [employer] tart sodium bicarbonate 650 mg bid. Live Mobile message sent to Raj.

## 2023-03-23 NOTE — PROGRESS NOTES
Transplant Surgery Progress Note    Transplants:  12/4/2022 (Kidney), 10/13/2015 (Kidney), 10/23/2021 (Kidney)     S: Recovering well after intra-abdominal kidney transplant. Incision healing well, pain controlled. Appetite and energy level returning. No complaints.     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   Transplant Date:  12/4/2022 (Kidney), 10/13/2015 (Kidney), 10/23/2021 (Kidney)   Ureteral Stent:  Yes   Crossmatch:  negative   DSA at Tx:  No  Baseline Cr: 1.3-1.5   DeNovo DSA: No    Acute Rejection Hx:  No    Present Maintenance Immunosuppression:  Tacrolimus and Mycophenolate mofetil    CMV IgG Ab Discordance:  No  EBV IgG Ab Discordance:  No    BK Viremia:  No  EBV Viremia:  No    Transplant Coordinator: Elif Cervantes     Transplant Office Phone Number: [phone]     Immunosuppressant Medications     Immunosuppressive Agents Disp Start End     mycophenolate (GENERIC EQUIVALENT) 250 MG capsule    240 capsule 1/4/2023     Sig - Route: Take 4 capsules (1,000 mg) by mouth 2 times daily - Oral    Class: E-Prescribe    Notes to Pharmacy: TXP DT 12/4/2022 (Kidney), 10/13/2015 (Kidney), 10/23/2021 (Kidney) TXP Dischg DT 12/8/2022 DX Kidney replaced by transplant Z94.0 TX [facility] ([place], [place])     tacrolimus (GENERIC EQUIVALENT) 0.5 MG capsule    60 capsule 3/8/2023     Sig: HOLD FOR FUTURE DOSE CHANGES.  Profile Rx: patient will contact pharmacy when needed    Class: E-Prescribe     tacrolimus (GENERIC EQUIVALENT) 1 MG capsule    60 capsule 3/8/2023     Sig - Route: Take 1 capsule (1 mg) by mouth 2 times daily - Oral    Class: E-Prescribe          Possible Immunosuppression-related side effects:   []             headache  []             vivid dreams  []             irritability  []             cognitive difficuties  []             fine tremor  []             nausea  []             diarrhea  []              neuropathy      []             edema  []             renal calcineurin toxicity  []             hyperkalemia  []             post-transplant diabetes  []             decreased appetite  []             increased appetite  []             other:  []             none    Prescription Medications as of 3/23/2023       Rx Number Disp Refills Start End Last Dispensed Date Next Fill Date Owning Pharmacy    acetaminophen (TYLENOL) 325 MG tablet  100 tablet 0 12/8/2022    [facility] - [place], [place] - [address]    Sig: Take 1-2 tablets (325-650 mg) by mouth every 4 hours as needed for pain    Class: E-Prescribe    Route: Oral    amLODIPine (NORVASC) 5 MG tablet    7/29/2022 7/29/2023       Sig: Take 5 mg by mouth daily    Class: Historical    Route: Oral    atorvastatin (LIPITOR) 10 MG tablet        [id number] - [place] - [address] AT [address]    Sig: Take 10 mg by mouth daily    Class: Historical    Route: Oral    citalopram (CELEXA) 20 MG tablet            Sig: Take 20 mg by mouth daily.    Class: Historical    Route: Oral    magnesium oxide (MAG-OX) 400 MG tablet  180 tablet 3 2/17/2023    [facility] [place] - [address]    Sig: Take 1 tablet (400 mg) by mouth 2 times daily    Class: E-Prescribe    Route: Oral    mycophenolate (GENERIC EQUIVALENT) 250 MG capsule  240 capsule 11 1/4/2023    [facility] #[id number] - [place] - [address]    Sig: Take 4 capsules (1,000 mg) by mouth 2 times daily    Class: E-Prescribe    Notes to Pharmacy: TXP DT 12/4/2022 (Kidney), 10/13/2015 (Kidney), 10/23/2021 (Kidney) TXP Dischg DT 12/8/2022 DX Kidney replaced by transplant Z94.0 TX Center [facility] ([place], [place])    Route: Oral    sodium bicarbonate 650 MG tablet  60 tablet 3 3/13/2023    [facility] - [place] - [address]    Sig: Take 1 tablet  (650 mg) by mouth 2 times daily    Class: E-Prescribe    Route: Oral    sulfamethoxazole-trimethoprim (BACTRIM) 400-80 MG tablet  30 tablet 11 1/6/2023    [facility] [place] [address]    Sig: Take 1 tablet by mouth daily    Class: E-Prescribe    Route: Oral    tacrolimus (GENERIC EQUIVALENT) 0.5 MG capsule  60 capsule 11 3/8/2023    [facility] [place] [address]    Sig: HOLD FOR FUTURE DOSE CHANGES.  Profile Rx: patient will contact pharmacy when needed    Class: E-Prescribe    tacrolimus (GENERIC EQUIVALENT) 1 MG capsule  60 capsule 11 3/8/2023    [facility] [place] [address]    Sig: Take 1 capsule (1 mg) by mouth 2 times daily    Class: E-Prescribe    Route: Oral    tamsulosin (FLOMAX) 0.4 MG capsule  30 capsule 3 1/31/2023    [facility] #[facility] [place] [address]    Sig: Take 1 capsule (0.4 mg) by mouth daily    Class: E-Prescribe    Route: Oral    valGANciclovir (VALCYTE) 450 MG tablet  30 tablet 5 1/6/2023    [facility] #[facility] [place] [address]    Sig: Take 1 tablet (450 mg) by mouth daily Stop taking on 6/4/2023    Class: E-Prescribe    Notes to Pharmacy: tx dr    Route: Oral          O:      General Appearance: in no apparent distress.   Skin: Normal, no rashes or jaundice  Heart: regular rate and rhythm, normal S1 and S2  Lungs: easy respirations, no audible wheezing.  Abdomen: abd soft, flat, nontender. Incision healing well, no evident hernia.   Extremities: Tremor absent.   Edema: absent.     Transplant Immunosuppression Labs Latest Ref Rng & Units 1/23/2023 12/10/2022 12/9/2022 12/8/2022 12/7/2022   Tacro Level 5.0 - 15.0 ug/L - - - - -   Tacro Level - - - - - -   Creat 0.67 - 1.17 mg/dL 2.01(H) 2.34(H) 2.57(H) 3.14(H) 3.90(H)   UREA NITROGEN 7 - 30 mg/dL - - - - -   UREA NITROGEN (R) 6.0 - 20.0 mg/dL 35.9(H) 50.6(H) 53.6(H) 59.0(H) 64.9(H)    WBC 4.0 - 11.0 10e3/uL 5.9 9.5 6.7 7.6 8.7   Neutrophil % - 87 87 91 93   ANEU 1.6 - 8.3 10e3/uL - - - - -       Chemistries:   Recent Labs   Lab Test 01/23/23  1215   BUN 35.9*   CR 2.01*   GFRESTIMATED 39*   GLC 95     Lab Results   Component Value Date    A1C 4.2 12/04/2022    A1C 4.8 10/14/2015     Recent Labs   Lab Test 12/04/22  0731   ALBUMIN 3.9   BILITOTAL 0.6   ALKPHOS 50   AST 25   ALT 14     Urine Studies:  Recent Labs   Lab Test 05/12/22  1220   COLOR Straw   APPEARANCE Clear   URINEGLC Negative   URINEBILI Negative   URINEKETONE Negative   SG 1.010   UBLD Small*   URINEPH 6.0   PROTEIN 100*   NITRITE Negative   LEUKEST Negative   RBCU 0   WBCU 0     Recent Labs   Lab Test 10/23/21  0526 06/05/17  1253   UTPG 1.37* 1.30*     Hematology:   Recent Labs   Lab Test 01/23/23  1215 12/10/22  0719 12/09/22  0829   HGB 13.2* 9.5* 9.6*    214 199   WBC 5.9 9.5 6.7     Coags:   Recent Labs   Lab Test 12/04/22  0731 06/16/22  1059   INR 1.02 0.98     HLA antibodies:   SA1 Hi Risk Sana   Date Value Ref Range Status   06/25/2021 None  Final     SA1 HI RISK SANA   Date Value Ref Range Status   12/30/2022 None  Final     SA1 Mod Risk Sana   Date Value Ref Range Status   06/25/2021   Final    B:18 35 37 45 46 49 50 51 52 53 54 55 56 58 59 62 63 71 72 75 77 78 82     SA1 MOD RISK SANA   Date Value Ref Range Status   12/30/2022   Final    B:18 35 37 45 46 49 51 53 54 55 62 63 71 75 76 77 78     SA2 Hi Risk Sana   Date Value Ref Range Status   06/25/2021 None  Final     SA2 HI RISK SANA   Date Value Ref Range Status   12/30/2022 None  Final     SA2 Mod Risk Sana   Date Value Ref Range Status   06/25/2021 None  Final     SA2 MOD RISK SANA   Date Value Ref Range Status   12/30/2022 None  Final       Assessment: Raj Pierce is doing well s/p DDKT:  Issues we addressed during his visit include:    Plan:    1. Graft function: good, stable.   2. Immunosuppression Management: No change continue tac and cellcept .   Complexity of management:Medium.  Contributing factors: leukopenia  Followup: as needed    Total Time: 20 min,   Counselling Time: 15 min.      George Zhu MD

## 2023-03-27 ENCOUNTER — TELEPHONE (OUTPATIENT)
Dept: TRANSPLANT | Facility: CLINIC | Age: 53
End: 2023-03-27
Payer: COMMERCIAL

## 2023-03-27 NOTE — LETTER
PHYSICIAN ORDERS      DATE & TIME ISSUED: 2023 9:17 AM  PATIENT NAME: Raj Pierce   : 1970     [facility] MR# [if applicable]: [id number]     DIAGNOSIS:  Leukopenia  ICD-10 CODE: D72.819     For critically low white blood cell count 3/27/2023, please ADD ON:  Complete blood cell count differential    Any questions please call: [facility], Solid Organ Transplant                                             [phone]     Please fax results, same-day available, to:  [phone].    .

## 2023-03-27 NOTE — TELEPHONE ENCOUNTER
Provider Call: General  Route to LPN    Reason for call: Call from lab  They were able to do all the add ons except Mycophenolate  They will contact the pt to come back in     Call back needed? No

## 2023-03-27 NOTE — TELEPHONE ENCOUNTER
ISSUE:  WBC 1.4  ANC 1.1     Spoke with Raj, having diarrhea since MMF was increased to 1000 mg bid in January, last MPA level 1.6 in February. Started daily fiber, no improvement so stopped. Mag-ox increased last month as well.     Denies fevers, fatigue, abdominal pain or other infectious symptoms. CMV D+/R-, continues on Valcyte.     Will add on CMV,  MPA, and magnesium.

## 2023-03-27 NOTE — TELEPHONE ENCOUNTER
DATE:  3/27/2023     TIME OF RECEIPT FROM LAB:  9:15a    LAB TEST:  WBC    LAB VALUE:  1.37      RNCC added on differential, faxed to [facility].

## 2023-03-27 NOTE — LETTER
PHYSICIAN ORDERS      DATE & TIME ISSUED: 2023 11:01 AM  PATIENT NAME: Raj Pierce   : 1970     [facility] MR# [if applicable]: [id number]     DIAGNOSIS:  kidney transplant, leukopenia  ICD-10 CODE: Z94.0,  D72.819    Please add on the following to blood collected in 3/27/23:   -CMV DNA PCR Quant (blood)   -mycophenolic acid drug level   -magnesium     If unable to add on, please call [phone].   Fax results to:   [phone].    .

## 2023-03-29 ENCOUNTER — TELEPHONE (OUTPATIENT)
Dept: TRANSPLANT | Facility: CLINIC | Age: 53
End: 2023-03-29
Payer: COMMERCIAL

## 2023-03-29 NOTE — TELEPHONE ENCOUNTER
ISSUE:   Tacrolimus IR level 11 on 3/27, goal 8-10, dose 1 mg BID.    PLAN:   Please call patient and confirm this was an accurate 12-hour trough. Verify Tacrolimus IR dose 1 mg BID. Confirm no new medications or illness. Confirm no missed doses. If accurate trough and accurate dose, stay on the same dose Tacrolimus IR dose to 1 mg BID and repeat labs in 1 weeks    OUTCOME:   Spoke with patient, they confirm accurate trough level and current dose 1 mg BID. Patient confirmed dose change to 1 mg BID and to repeat labs in 1 weeks. Orders sent to preferred pharmacy for dose change and lab for repeat labs. Patient voiced understanding of plan.     CMV negative, MPA level pending. Raj says diarrhea continues.

## 2023-03-30 ENCOUNTER — TELEPHONE (OUTPATIENT)
Dept: TRANSPLANT | Facility: CLINIC | Age: 53
End: 2023-03-30
Payer: COMMERCIAL

## 2023-03-30 DIAGNOSIS — Z94.0 KIDNEY REPLACED BY TRANSPLANT: ICD-10-CM

## 2023-03-30 DIAGNOSIS — Z48.298 AFTERCARE FOLLOWING ORGAN TRANSPLANT: Primary | ICD-10-CM

## 2023-03-30 RX ORDER — MYCOPHENOLIC ACID 180 MG/1
540 TABLET, DELAYED RELEASE ORAL 2 TIMES DAILY
Qty: 180 TABLET | Refills: 11 | Status: SHIPPED | OUTPATIENT
Start: 2023-03-30 | End: 2024-04-01

## 2023-03-30 RX ORDER — LACTOBACILLUS RHAMNOSUS GG 10B CELL
1 CAPSULE ORAL 2 TIMES DAILY
COMMUNITY
End: 2023-04-21

## 2023-03-30 NOTE — TELEPHONE ENCOUNTER
Francis Espinoza MD Poucher, Jessica, RN  Okay to change to mycophenolic acid 540 mg bid.     Would do 1/2 tsp of fiber twice daily and probiotics.     Yes on the infectious work up.     Eric     Called moshe Anthony  and sent [employer] message.

## 2023-03-30 NOTE — LETTER
PHYSICIAN ORDERS      DATE & TIME ISSUED: 2023 2:51 PM  PATIENT NAME: Raj Pierce   : 1970     [facility] MR# [if applicable]: [id number]     DIAGNOSIS:  diarrhea  ICD-10 CODE: R19.7     With next scheduled lab appointment, please collect the following:  -c.diff (stool)   -enteric pathogen panel (stool)     Any questions please call: [phone]  Fax results to:   [phone].    .

## 2023-04-03 ENCOUNTER — TELEPHONE (OUTPATIENT)
Dept: TRANSPLANT | Facility: CLINIC | Age: 53
End: 2023-04-03
Payer: COMMERCIAL

## 2023-04-03 NOTE — LETTER
PHYSICIAN ORDERS      DATE & TIME ISSUED: April 3, 2023 4:48 PM  PATIENT NAME: Raj Pierce   : 1970     [facility] MR# [if applicable]: [id number]       DIAGNOSIS: Kidney replaced by transplant, leukopenia, neutropenia   ICD-10 CODE: Z94.0,  D70.9     Please inject ZARXIO, 480mcg, subcutaneous, daily x3 days   Ok to substitute for bio-similar Neupogen, Filgrastim or Granix, as formulary preferred.     **Please call the patient to schedule at [phone].     Any questions please call: [facility], Solid Organ Transplant [phone]      .

## 2023-04-03 NOTE — LETTER
PHYSICIAN ORDERS      DATE & TIME ISSUED: April 3, 2023 4:47 PM  PATIENT NAME: Raj Pierce   : 1970     [facility] MR# [if applicable]: [id number]     DIAGNOSIS:  ***  ICD-10 CODE: ***     ***    Any questions please call: ***    { TRANSPLANT DEPT FAXES:[phone]}.    {[facility] TRANSPLANT MD SIGNATURE:[id number]}

## 2023-04-03 NOTE — TELEPHONE ENCOUNTER
ISSUE:  +sapovirus     Reviewed with Dr. Quach, no treatment available. Symptoms will improve over time.     ISSUE:  WBC 0.8   Differential added on     CMV negative, MPA reduced last week. Currently on po Valcyte until 6/4/23 (D+/R-). Will plan to treat with GCSF daily x3 doses.     Called moshe Anthony requesting return phone call.     ADDENDUM: Spoke with Raj, agreeable to plan. Would like to receive GCSF injections at [facility]. If they cannot administer, agreeable to go to [facility].

## 2023-04-03 NOTE — TELEPHONE ENCOUNTER
Patient Call: General  Route to LPN    Reason for call: called in regards of recent lab results. Patient would like to discuss them. Call back number is [phone].     Call back needed? Yes    Return Call Needed  Same as documented in contacts section  When to return call?: Same day: Route High Priority

## 2023-04-03 NOTE — TELEPHONE ENCOUNTER
DATE:  4/3/2023     TIME OF RECEIPT FROM LAB:0825    LAB TEST:  wbc    LAB VALUE:  0.76    RESULTS GIVEN WITH READ-BACK TO:Elif Cervantes    TIME LAB VALUE REPORTED TO PROVIDER:0826  Lab phone #[phone]

## 2023-04-04 ENCOUNTER — TELEPHONE (OUTPATIENT)
Dept: TRANSPLANT | Facility: CLINIC | Age: 53
End: 2023-04-04
Payer: COMMERCIAL

## 2023-04-04 NOTE — TELEPHONE ENCOUNTER
Evelyn from [facility] calling in regards of an order faxed over for medication Zarxio, they don't give it there, but she checked with clinic next door [facility] and they do, she's wanting to know if it's ok to fax the order to them

## 2023-04-04 NOTE — TELEPHONE ENCOUNTER
Left VM with Jaleesa at [facility], ok to fax orders for Zarxio to [facility] instead.     Spoke with Raj, he is ok receiving injections at [facility]. Will call to schedule appts.

## 2023-04-05 ENCOUNTER — TELEPHONE (OUTPATIENT)
Dept: TRANSPLANT | Facility: CLINIC | Age: 53
End: 2023-04-05
Payer: COMMERCIAL

## 2023-04-05 DIAGNOSIS — Z94.0 KIDNEY TRANSPLANTED: ICD-10-CM

## 2023-04-05 DIAGNOSIS — D84.9 IMMUNOSUPPRESSED STATUS (H): ICD-10-CM

## 2023-04-05 DIAGNOSIS — Z76.82 KIDNEY TRANSPLANT CANDIDATE: ICD-10-CM

## 2023-04-05 RX ORDER — TACROLIMUS 0.5 MG/1
0.5 CAPSULE ORAL EVERY EVENING
Qty: 90 CAPSULE | Refills: 3 | Status: SHIPPED | OUTPATIENT
Start: 2023-04-05 | End: 2023-04-12

## 2023-04-05 RX ORDER — TACROLIMUS 1 MG/1
1 CAPSULE ORAL EVERY MORNING
Qty: 90 CAPSULE | Refills: 3 | Status: SHIPPED | OUTPATIENT
Start: 2023-04-05 | End: 2023-04-12

## 2023-04-05 NOTE — TELEPHONE ENCOUNTER
Pt wonders if he would be able to get the shot here or would he still need to wait for insurance to approve it

## 2023-04-05 NOTE — TELEPHONE ENCOUNTER
Spoke with Raj, waiting for GCSF insurance approval through [facility]. Raj will call back if not covered.     ISSUE:   Tacrolimus IR level 11.9 on 4/3, goal 8-10, dose 1 mg BID.    PLAN:   Please call patient and confirm this was an accurate 12-hour trough. Verify Tacrolimus IR dose 1 mg BID. Confirm no new medications or illness. Confirm no missed doses. If accurate trough and accurate dose, decrease Tacrolimus IR dose to 1/0.5 mg BID and repeat labs in 1 weeks    OUTCOME:   Spoke with patient, they confirm accurate trough level and current dose 1 mg BID. Patient confirmed dose change to 1/0.5 mg BID and to repeat labs in 1 weeks. Orders sent to preferred pharmacy for dose change and lab for repeat labs. Patient voiced understanding of plan.

## 2023-04-05 NOTE — LETTER
OUTPATIENT LABORATORY TEST ORDER    Patient: Raj Pierce    Transplant Date: 12/4/2022  Date of Birth: [id number]    Ordered By: Dr. Alondra ArmasAaron  MRN:[id number]     Issued Date/Time: 4/5/2023  8:31 AM         Expiration Date: 12/4/2023    Diagnosis: Kidney Transplant (ICD-10 Z94.0)    Aftercare following organ transplant (ICD-10 Z48.288)    Long term use of medications (ICD-10 Z79.899)    Lab results to be available on the same day drawn  Patient should release information to the [facility].   Please fax all results to [phone]  Critical Labs to be paged to      Months 2-5 post-transplant (2/13/2023 - 5/5/2023)  Labs 1x weekly (Monday or Tuesday)  CBC with platelets  Basic Metabolic Panel (Sodium, Potassium, Chloride, Creatinine, CO2, Urea Nitrogen, glucose, Calcium)  Tacrolimus/Prograf/ drug level  Labs monthly   Phosphorus, magnesium  BK (Polyoma Virus) PCR Quantitative/Plasma    Months 5-7 post-transplant (5/6/2023 - 7/6/2023)  Labs every other week  CBC with platelets  Basic Metabolic Panel (Sodium, Potassium, Chloride, Creatinine, CO2, Urea Nitrogen, glucose, Calcium)  Tacrolimus/Prograf/ drug level  Monthly  Phosphorus, magnesium  BK (Polyoma Virus) PCR Quantitative/Plasma    Months 7-12 post-transplant (7/7/2023 - 12/4/2023)  Monthly  CBC with platelets  Basic Metabolic Panel (Sodium, Potassium, Chloride, Creatinine, CO2, Urea Nitrogen, glucose, Calcium)  Tacrolimus/Prograf/ drug level  BK (Polyoma Virus) PCR Quantitative/Plasma  CMV PCR QT              At 4 months post-transplant  (Due: 4/4/2023)  DSA PRA  PTH  Urine protein/creatinine    At 6 months post-transplant (Due: 6/4/2023)  Hepatic panel  Hemoglobin A1c  Uric Acid  Lipid panel  Urine protein/creatinine    At 7 months post-transplant  (Due: 7/4/2023)   PRA/DSA    At 9 months post-transplant (Due: 9/4/2023)   Urine protein/creatinine    At 12 months  post-transplant (Fasting labs) Due: 12/4/2023   Hepatic panel  Hemoglobin A1c  Uric Acid  Lipid panel  Urine protein/creatinine  DSA PRA  PTH  Vitamin D      If you have any questions please call the Transplant Center at [phone] option 5. All lab results should be faxed to [phone]      .

## 2023-04-06 ENCOUNTER — TELEPHONE (OUTPATIENT)
Dept: TRANSPLANT | Facility: CLINIC | Age: 53
End: 2023-04-06
Payer: COMMERCIAL

## 2023-04-06 NOTE — TELEPHONE ENCOUNTER
Patient Call: General  Route to LPN    Reason for call: Pt calling in to inform coord that he is still waiting on approval from his insurance company for shots     Call back needed? Yes    Return Call Needed  Same as documented in contacts section  When to return call?: Greater than one day: Route standard priority   3

## 2023-04-07 ENCOUNTER — TELEPHONE (OUTPATIENT)
Dept: TRANSPLANT | Facility: CLINIC | Age: 53
End: 2023-04-07
Payer: COMMERCIAL

## 2023-04-07 NOTE — TELEPHONE ENCOUNTER
Appears patient is scheduled for first GCSF injection today. Left VM for Clarissa requesting a return phone call if he is not yet scheduled.

## 2023-04-07 NOTE — TELEPHONE ENCOUNTER
Anamaria from [facility] in [place] calling and needing Lab order Clarification. Needing to know what PRA/DSA means.

## 2023-04-10 ENCOUNTER — LAB (OUTPATIENT)
Dept: LAB | Facility: CLINIC | Age: 53
End: 2023-04-10
Payer: COMMERCIAL

## 2023-04-10 DIAGNOSIS — Z79.899 ENCOUNTER FOR LONG-TERM CURRENT USE OF MEDICATION: ICD-10-CM

## 2023-04-10 DIAGNOSIS — Z48.298 AFTERCARE FOLLOWING ORGAN TRANSPLANT: ICD-10-CM

## 2023-04-10 DIAGNOSIS — Z94.0 KIDNEY REPLACED BY TRANSPLANT: ICD-10-CM

## 2023-04-10 PROCEDURE — 86832 HLA CLASS I HIGH DEFIN QUAL: CPT

## 2023-04-10 PROCEDURE — 86833 HLA CLASS II HIGH DEFIN QUAL: CPT

## 2023-04-12 ENCOUNTER — TELEPHONE (OUTPATIENT)
Dept: TRANSPLANT | Facility: CLINIC | Age: 53
End: 2023-04-12
Payer: COMMERCIAL

## 2023-04-12 DIAGNOSIS — Z76.82 KIDNEY TRANSPLANT CANDIDATE: ICD-10-CM

## 2023-04-12 DIAGNOSIS — Z94.0 KIDNEY TRANSPLANTED: ICD-10-CM

## 2023-04-12 DIAGNOSIS — D84.9 IMMUNOSUPPRESSED STATUS (H): ICD-10-CM

## 2023-04-12 RX ORDER — TACROLIMUS 0.5 MG/1
CAPSULE ORAL
Qty: 90 CAPSULE | Refills: 3
Start: 2023-04-12 | End: 2023-04-26

## 2023-04-12 RX ORDER — TACROLIMUS 1 MG/1
1 CAPSULE ORAL 2 TIMES DAILY
Qty: 180 CAPSULE | Refills: 3 | Status: SHIPPED | OUTPATIENT
Start: 2023-04-12 | End: 2023-04-26

## 2023-04-12 NOTE — TELEPHONE ENCOUNTER
ISSUE:   Tacrolimus IR level 5.7 on 4/10, goal 8-10, dose 1/0.5 mg BID.    PLAN:   Please call patient and confirm this was an accurate 12-hour trough. Verify Tacrolimus IR dose 1/0.5 mg BID. Confirm no new medications or illness. Confirm no missed doses. If accurate trough and accurate dose, increase Tacrolimus IR dose to 1 mg BID and repeat labs in 1 weeks    OUTCOME:   Spoke with patient, they confirm accurate trough level and current dose 1/0.5 mg BID. Patient confirmed dose change to 1 mg BID and to repeat labs in 1 weeks. Orders sent to preferred pharmacy for dose change and lab for repeat labs. Patient voiced understanding of plan.

## 2023-04-21 ENCOUNTER — VIRTUAL VISIT (OUTPATIENT)
Dept: PHARMACY | Facility: CLINIC | Age: 53
End: 2023-04-21
Payer: COMMERCIAL

## 2023-04-21 ENCOUNTER — OFFICE VISIT (OUTPATIENT)
Dept: NEPHROLOGY | Facility: CLINIC | Age: 53
End: 2023-04-21
Payer: COMMERCIAL

## 2023-04-21 VITALS
WEIGHT: 179.6 LBS | DIASTOLIC BLOOD PRESSURE: 86 MMHG | SYSTOLIC BLOOD PRESSURE: 130 MMHG | HEART RATE: 68 BPM | BODY MASS INDEX: 25.77 KG/M2

## 2023-04-21 DIAGNOSIS — I15.1 HTN, KIDNEY TRANSPLANT RELATED: ICD-10-CM

## 2023-04-21 DIAGNOSIS — D84.9 IMMUNOSUPPRESSION (H): ICD-10-CM

## 2023-04-21 DIAGNOSIS — E83.42 HYPOMAGNESEMIA: ICD-10-CM

## 2023-04-21 DIAGNOSIS — Z94.0 KIDNEY TRANSPLANT RECIPIENT: Primary | ICD-10-CM

## 2023-04-21 DIAGNOSIS — Z94.0 HTN, KIDNEY TRANSPLANT RELATED: ICD-10-CM

## 2023-04-21 DIAGNOSIS — N18.2 CKD (CHRONIC KIDNEY DISEASE) STAGE 2, GFR 60-89 ML/MIN: ICD-10-CM

## 2023-04-21 DIAGNOSIS — E78.5 DYSLIPIDEMIA: ICD-10-CM

## 2023-04-21 DIAGNOSIS — E55.9 VITAMIN D DEFICIENCY: ICD-10-CM

## 2023-04-21 DIAGNOSIS — E87.20 METABOLIC ACIDOSIS: ICD-10-CM

## 2023-04-21 DIAGNOSIS — Z94.0 KIDNEY REPLACED BY TRANSPLANT: Primary | ICD-10-CM

## 2023-04-21 DIAGNOSIS — Z48.298 AFTERCARE FOLLOWING ORGAN TRANSPLANT: ICD-10-CM

## 2023-04-21 DIAGNOSIS — Z29.89 NEED FOR PNEUMOCYSTIS PROPHYLAXIS: ICD-10-CM

## 2023-04-21 PROBLEM — N13.30 HYDRONEPHROSIS OF KIDNEY TRANSPLANT: Status: RESOLVED | Noted: 2023-01-25 | Resolved: 2023-04-21

## 2023-04-21 PROBLEM — T86.19 HYDRONEPHROSIS OF KIDNEY TRANSPLANT: Status: RESOLVED | Noted: 2023-01-25 | Resolved: 2023-04-21

## 2023-04-21 PROCEDURE — 99207 PR NO CHARGE LOS: CPT | Performed by: PHARMACIST

## 2023-04-21 PROCEDURE — 99214 OFFICE O/P EST MOD 30 MIN: CPT | Performed by: INTERNAL MEDICINE

## 2023-04-21 RX ORDER — VALGANCICLOVIR 450 MG/1
900 TABLET, FILM COATED ORAL DAILY
Qty: 30 TABLET | Refills: 5 | Status: SHIPPED | OUTPATIENT
Start: 2023-04-21 | End: 2023-06-27

## 2023-04-21 RX ORDER — MAGNESIUM OXIDE 400 MG/1
TABLET ORAL
Qty: 270 TABLET | Refills: 3 | Status: SHIPPED | OUTPATIENT
Start: 2023-04-21

## 2023-04-21 RX ORDER — VITAMIN B COMPLEX
25 TABLET ORAL DAILY
COMMUNITY

## 2023-04-21 NOTE — PROGRESS NOTES
TRANSPLANT NEPHROLOGY EARLY POST TRANSPLANT VISIT    Assessment & Plan   # DDKT: Stable creatinine lately at ~ 1.3-1.6, which is slightly improved from early post transplant baseline closer to ~ 1.5-1.8.   - Baseline Creatinine: ~ 1.3-1.6   - Proteinuria: Normal (<0.2 grams)   - Date DSA Last Checked: Apr/2023      Latest DSA: No cPRA: 67%   - BK Viremia: No   - Kidney Tx Biopsy: No   - Transplant Ureteral Stent: Removed    # Immunosuppression: Tacrolimus immediate release (goal 8-10) and Mycophenolic acid (dose 540 mg every 12 hours)   - Induction with Recent Transplant:  Intermediate Intensity   - Continue with intensive monitoring of immunosuppression for efficacy and toxicity.   - Changes: Not at this time    # Infection Prophylaxis:   - PJP: Sulfa/TMP (Bactrim)  - CMV: Valganciclovir (Valcyte); Patient is CMV IgG Ab discordant (D+/R-) and will continue on Valcyte x 6 months, then check CMV PCR monthly until 12 months post transplant.    # Hypertension: Borderline control;  Goal BP: < 130/80   - Volume status: Euvolemic   - Changes: Not at this time, but would consider starting ARB at next visit if now at goal.    # Leukopenia: Normal WBC after filgrastim, but trending down.  Likely medication related.  Will continue to follow and assess need for further filgrastim doses.    # Mineral Bone Disorder:   - Secondary renal hyperparathyroidism; PTH level: Normal (15-65 pg/ml)        On treatment: None  - Vitamin D; level: Low        On supplement: No; Recommend starting cholecalciferol 25 mcg daily.  - Calcium; level: Normal        On supplement: No    # Electrolytes:   - Potassium; level: Normal        On supplement: No  - Magnesium; level: Stable low        On supplement: Yes  - Bicarbonate; level: Normal        On supplement: Yes    # PKD, s/p Right Native Nephrectomy: Patient had right native kidney removed at the time of this latest transplant.    # Urinary Retention: Reports being asymptomatic now on  tamsulosin.   - If patient continues to do well, could consider stopping it at 6 months post transplant.    # Diarrhea: Symptoms now resolved after changing mycophenolate mofetil to mycophenolic acid at a slightly decreased dose.    # Medical Compliance: Yes    # COVID-19 Virus Review: Discussed COVID-19 virus and the potential medical risks.  Reviewed preventative health recommendations, including wearing a mask where appropriate.  Recommended COVID vaccination should be up to date with either an initial vaccination or booster shot when appropriate.  Asked the patient to inform the transplant center if they are exposed or diagnosed with this virus.    # COVID Vaccination Up To Date: No, due for next dose at 3-4 months post transplant    # Transplant History:  Etiology of Kidney Failure: Polycystic kidney disease (PKD)  Tx: DDKT  Transplant: 12/4/2022 (Kidney), 10/13/2015 (Kidney), 10/23/2021 (Kidney)  Donor Type: Donation after Brain Death Donor Class:   Crossmatch at time of Tx: negative  DSA at time of Tx: No  Significant changes in immunosuppression: None  CMV IgG Ab High Risk Discordance (D+/R-): Yes  EBV IgG Ab High Risk Discordance (D+/R-): No  Significant transplant-related complications: None    Transplant Office Phone Number: [phone]    Assessment and plan was discussed with the patient and he voiced his understanding and agreement.    Return visit: Return for previously scheduled visit.    Francis Espinoza MD    Chief Complaint   Mr. Pierce is a 52 year old here for kidney transplant and immunosuppression management.     History of Present Illness    Mr. Pierce reports feeling good overall with minimal medical complaints.  Since last clinic visit, patient reports no hospitalizations or new medical complaints and has been doing well overall.  He did have some leukopenia and received filgrastim shots x 3 with improved WBC.    His energy level is good and remains normal now.  He is active and  does get some exercise.  Denies any chest pain or shortness of breath with exertion.  No leg swelling.    Appetite is good.  Weight is down ~ 10 lbs, but stable lately.  No nausea or vomiting.  He was having diarrhea, but since changing from mycophenolate mofetil to mycophenolic acid, that has resolved.  No fever, sweats or chills.  No night sweats.  No problems emptying his bladder.    Home BP: 120-130/80s    Problem List   Patient Active Problem List   Diagnosis     Polycystic kidney, autosomal dominant     Dyslipidemia     Vitamin D deficiency     HTN, kidney transplant related     Secondary renal hyperparathyroidism (HCC)     Kidney replaced by transplant     Immunosuppression (H)     Acute gouty arthritis     Hypomagnesemia     Aftercare following organ transplant     Kidney transplant recipient     Metabolic acidosis     Need for pneumocystis prophylaxis     CKD (chronic kidney disease) stage 2, GFR 60-89 ml/min       Allergies   Allergies   Allergen Reactions     Isopropyl Alcohol Rash     Swabs used at hospital     Anti-Thymocyte Globulin (Rabbit) [Antithymocyte Globulin (Equine)] Other (See Comments)     Fever and rigors with rodrigo-op dose. Received methylprednisolone 500mg prior to thymo.       Medications   Current Outpatient Medications   Medication Sig     acetaminophen (TYLENOL) 325 MG tablet Take 1-2 tablets (325-650 mg) by mouth every 4 hours as needed for pain     amLODIPine (NORVASC) 5 MG tablet Take 5 mg by mouth daily     atorvastatin (LIPITOR) 10 MG tablet Take 10 mg by mouth daily     citalopram (CELEXA) 20 MG tablet Take 20 mg by mouth daily.     magnesium oxide (MAG-OX) 400 MG tablet Take 1 tablet (400 mg) by mouth 2 times daily     mycophenolic acid (GENERIC EQUIVALENT) 180 MG EC tablet Take 3 tablets (540 mg) by mouth 2 times daily Take in place of mycophenolate (Cellcept)     sodium bicarbonate 650 MG tablet Take 1 tablet (650 mg) by mouth 2 times daily     sulfamethoxazole-trimethoprim  (BACTRIM) 400-80 MG tablet Take 1 tablet by mouth daily     tacrolimus (GENERIC EQUIVALENT) 1 MG capsule Take 1 capsule (1 mg) by mouth 2 times daily     tamsulosin (FLOMAX) 0.4 MG capsule Take 1 capsule (0.4 mg) by mouth daily     valGANciclovir (VALCYTE) 450 MG tablet Take 1 tablet (450 mg) by mouth daily Stop taking on 6/4/2023     tacrolimus (GENERIC EQUIVALENT) 0.5 MG capsule HOLD (Patient not taking: Reported on 4/21/2023)     No current facility-administered medications for this visit.     Medications Discontinued During This Encounter   Medication Reason     lactobacillus rhamnosus, GG, (CULTURELL) capsule Therapy completed (No AVS)     psyllium (METAMUCIL) 28.3 % packet Therapy completed (No AVS)       Physical Exam   Vital Signs: /86 (BP Location: Right arm, Patient Position: Sitting, Cuff Size: Adult Large)   Pulse 68   Wt 81.5 kg (179 lb 9.6 oz)   BMI 25.77 kg/m      GENERAL APPEARANCE: alert and no distress  HENT: mouth without ulcers or lesions  LYMPHATICS: no cervical or supraclavicular nodes  RESP: lungs clear to auscultation - no rales, rhonchi or wheezes  CV: regular rhythm, normal rate, no rub, no murmur  EDEMA: no LE edema bilaterally  ABDOMEN: soft, nondistended, nontender, bowel sounds normal  MS: extremities normal - no gross deformities noted, no evidence of inflammation in joints, no muscle tenderness  SKIN: no rash  TX KIDNEY: normal  DIALYSIS ACCESS: none    Data         Latest Ref Rng & Units 4/17/2023     7:48 AM 4/10/2023     7:45 AM 4/3/2023     7:49 AM   Renal   Na (external) 136 - 145 meq/L 140      141      141        K (external) 3.5 - 5.1 meq/L 4.4      4.1      4.8        Cl 98 - 109 meq/L 108      109      109        Cl (external) 98 - 109 meq/L 108      109      109        CO2 (external) 20 - 29 meq/L 23      22      24        BUN (external) 6 - 22 mg/dL 26      15      18        Cr (external) 0.73 - 1.18 mg/dL 1.25      1.29      1.30        Glucose (external) 70 - 99  mg/dL 98      105      98        Ca (external) 8.5 - 10.5 mg/dL 8.9      9.1      8.8        Mg (external) 1.6 - 2.6 mg/dL  1.4             This result is from an external source.         Latest Ref Rng & Units 4/10/2023     7:45 AM 3/13/2023     7:42 AM 2/20/2023     7:45 AM   Bone Health   Phos (external) 2.3 - 4.7 mg/dL 2.7      3.7      2.8        PTHi (external) 14 - 95 pg/mL 51              This result is from an external source.         Latest Ref Rng & Units 4/17/2023     7:48 AM 4/10/2023     7:45 AM 4/3/2023     7:49 AM   Heme   WBC (external) 4.0 - 11.0 K/uL 4.3      15.0      0.8        Hgb (external) 13.5 - 17.5 g/dL 13.9      13.7      14.0        Plt (external) 140 - 400 K/uL 163      219      225        ABSOLUTE NEUTROPHILS (EXTERNAL) 1.8 - 8.0 K/uL 3.3      12.4         ABSOLUTE LYMPHOCYTES (EXTERNAL) 0.8 - 4.1 K/uL 0.5      0.7         ABSOLUTE MONOCYTES (EXTERNAL) 0.0 - 1.0 K/uL 0.5      1.9         ABSOLUTE EOSINOPHILS (EXTERNAL) 0.0 - 0.7 K/uL 0.0      0.0         ABSOLUTE BASOPHILS (EXTERNAL) 0.0 - 0.2 K/uL 0.1      0.0             This result is from an external source.         Latest Ref Rng & Units 12/4/2022     7:31 AM 11/1/2021     4:40 AM 10/23/2021     3:58 AM   Liver   AP 40 - 129 U/L 50    56     TBili <=1.2 mg/dL 0.6    0.6     ALT 10 - 50 U/L 14    24     AST 10 - 50 U/L 25    23     Tot Protein 6.4 - 8.3 g/dL 5.7    6.5     Albumin 3.4 - 5.0 g/dL  2.0   3.6     Albumin 3.5 - 5.2 g/dL 3.9             Latest Ref Rng & Units 12/4/2022     7:31 AM 10/23/2021     3:58 AM 10/14/2015     5:45 AM   Pancreas   A1C <5.7 % 4.2   5.6   4.8           Latest Ref Rng & Units 12/12/2022     7:35 AM 12/9/2022     8:29 AM 10/27/2021     6:21 AM   Iron studies   Iron 61 - 157 ug/dL 45   83   19     Iron Saturation Index 15 - 46 %   10     Iron Sat Index 15 - 46 % 17   33      Ferritin 31 - 409 ng/mL 699   983   597           Latest Ref Rng & Units 4/10/2023     7:45 AM 3/27/2023     7:38 AM 3/13/2023      7:42 AM   [facility] Txp Virology   CMV DNA Quant Ext Undetected IU/ml  Undetected         LOG IU/ML OF CMVQNT (EXTERNAL) Undetected log IU/mL  Undetected         BK Quant Log Ext log IU/mL Undetected    Undetected        BK Quant Result Ext Undetected IU/mL Undetected    Undetected        BK Quant Spec Ext  Plasma    Plasma            This result is from an external source.        Recent Labs   Lab Test 12/09/22  0829 12/10/22  0719 12/12/22  0735 01/23/23  1215   DOSTAC 12/8/2022  --  12/11/2022 1/22/2023   TACROL 4.0* 6.2 9.9 17.1*     Recent Labs   Lab Test 10/19/15  0707 10/26/15  0707 11/09/15  0758 01/23/23  1215   DOSMPA 2,000 Not Provided 11/8/15 AT 2030  --    MPACID 1.27 4.01* 0.45* 4.44*   MPAG >200.0* 190.0* 20.5* 169.9*

## 2023-04-21 NOTE — LETTER
4/21/2023       RE: Raj Pierce  [address]  [place] [address]     Dear Colleague,    Thank you for referring your patient, Raj Pierce, to the [facility] at [facility]. Please see a copy of my visit note below.    TRANSPLANT NEPHROLOGY EARLY POST TRANSPLANT VISIT    Assessment & Plan   # DDKT: Stable creatinine lately at ~ 1.3-1.6, which is slightly improved from early post transplant baseline closer to ~ 1.5-1.8.   - Baseline Creatinine: ~ 1.3-1.6   - Proteinuria: Normal (<0.2 grams)   - Date DSA Last Checked: Apr/2023      Latest DSA: No cPRA: 67%   - BK Viremia: No   - Kidney Tx Biopsy: No   - Transplant Ureteral Stent: Removed    # Immunosuppression: Tacrolimus immediate release (goal 8-10) and Mycophenolic acid (dose 540 mg every 12 hours)   - Induction with Recent Transplant:  Intermediate Intensity   - Continue with intensive monitoring of immunosuppression for efficacy and toxicity.   - Changes: Not at this time    # Infection Prophylaxis:   - PJP: Sulfa/TMP (Bactrim)  - CMV: Valganciclovir (Valcyte); Patient is CMV IgG Ab discordant (D+/R-) and will continue on Valcyte x 6 months, then check CMV PCR monthly until 12 months post transplant.    # Hypertension: Borderline control;  Goal BP: < 130/80   - Volume status: Euvolemic   - Changes: Not at this time, but would consider starting ARB at next visit if now at goal.    # Leukopenia: Normal WBC after filgrastim, but trending down.  Likely medication related.  Will continue to follow and assess need for further filgrastim doses.    # Mineral Bone Disorder:   - Secondary renal hyperparathyroidism; PTH level: Normal (15-65 pg/ml)        On treatment: None  - Vitamin D; level: Low        On supplement: No; Recommend starting cholecalciferol 25 mcg daily.  - Calcium; level: Normal        On supplement: No    # Electrolytes:   - Potassium; level: Normal        On  supplement: No  - Magnesium; level: Stable low        On supplement: Yes  - Bicarbonate; level: Normal        On supplement: Yes    # PKD, s/p Right Native Nephrectomy: Patient had right native kidney removed at the time of this latest transplant.    # Urinary Retention: Reports being asymptomatic now on tamsulosin.   - If patient continues to do well, could consider stopping it at 6 months post transplant.    # Diarrhea: Symptoms now resolved after changing mycophenolate mofetil to mycophenolic acid at a slightly decreased dose.    # Medical Compliance: Yes    # COVID-19 Virus Review: Discussed COVID-19 virus and the potential medical risks.  Reviewed preventative health recommendations, including wearing a mask where appropriate.  Recommended COVID vaccination should be up to date with either an initial vaccination or booster shot when appropriate.  Asked the patient to inform the transplant center if they are exposed or diagnosed with this virus.    # COVID Vaccination Up To Date: No, due for next dose at 3-4 months post transplant    # Transplant History:  Etiology of Kidney Failure: Polycystic kidney disease (PKD)  Tx: DDKT  Transplant: 12/4/2022 (Kidney), 10/13/2015 (Kidney), 10/23/2021 (Kidney)  Donor Type: Donation after Brain Death Donor Class:   Crossmatch at time of Tx: negative  DSA at time of Tx: No  Significant changes in immunosuppression: None  CMV IgG Ab High Risk Discordance (D+/R-): Yes  EBV IgG Ab High Risk Discordance (D+/R-): No  Significant transplant-related complications: None    Transplant Office Phone Number: [phone]    Assessment and plan was discussed with the patient and he voiced his understanding and agreement.    Return visit: Return for previously scheduled visit.    Francis Espinoza MD    Chief Complaint   Mr. Pierce is a 52 year old here for kidney transplant and immunosuppression management.     History of Present Illness    Mr. Pierce reports feeling good overall  with minimal medical complaints.  Since last clinic visit, patient reports no hospitalizations or new medical complaints and has been doing well overall.  He did have some leukopenia and received filgrastim shots x 3 with improved WBC.    His energy level is good and remains normal now.  He is active and does get some exercise.  Denies any chest pain or shortness of breath with exertion.  No leg swelling.    Appetite is good.  Weight is down ~ 10 lbs, but stable lately.  No nausea or vomiting.  He was having diarrhea, but since changing from mycophenolate mofetil to mycophenolic acid, that has resolved.  No fever, sweats or chills.  No night sweats.  No problems emptying his bladder.    Home BP: 120-130/80s    Problem List   Patient Active Problem List   Diagnosis    Polycystic kidney, autosomal dominant    Dyslipidemia    Vitamin D deficiency    HTN, kidney transplant related    Secondary renal hyperparathyroidism (HCC)    Kidney replaced by transplant    Immunosuppression (H)    Acute gouty arthritis    Hypomagnesemia    Aftercare following organ transplant    Kidney transplant recipient    Metabolic acidosis    Need for pneumocystis prophylaxis    CKD (chronic kidney disease) stage 2, GFR 60-89 ml/min       Allergies   Allergies   Allergen Reactions    Isopropyl Alcohol Rash     Swabs used at hospital    Anti-Thymocyte Globulin (Rabbit) [Antithymocyte Globulin (Equine)] Other (See Comments)     Fever and rigors with rodrigo-op dose. Received methylprednisolone 500mg prior to thymo.       Medications   Current Outpatient Medications   Medication Sig    acetaminophen (TYLENOL) 325 MG tablet Take 1-2 tablets (325-650 mg) by mouth every 4 hours as needed for pain    amLODIPine (NORVASC) 5 MG tablet Take 5 mg by mouth daily    atorvastatin (LIPITOR) 10 MG tablet Take 10 mg by mouth daily    citalopram (CELEXA) 20 MG tablet Take 20 mg by mouth daily.    magnesium oxide (MAG-OX) 400 MG tablet Take 1 tablet (400 mg) by  mouth 2 times daily    mycophenolic acid (GENERIC EQUIVALENT) 180 MG EC tablet Take 3 tablets (540 mg) by mouth 2 times daily Take in place of mycophenolate (Cellcept)    sodium bicarbonate 650 MG tablet Take 1 tablet (650 mg) by mouth 2 times daily    sulfamethoxazole-trimethoprim (BACTRIM) 400-80 MG tablet Take 1 tablet by mouth daily    tacrolimus (GENERIC EQUIVALENT) 1 MG capsule Take 1 capsule (1 mg) by mouth 2 times daily    tamsulosin (FLOMAX) 0.4 MG capsule Take 1 capsule (0.4 mg) by mouth daily    valGANciclovir (VALCYTE) 450 MG tablet Take 1 tablet (450 mg) by mouth daily Stop taking on 6/4/2023    tacrolimus (GENERIC EQUIVALENT) 0.5 MG capsule HOLD (Patient not taking: Reported on 4/21/2023)     No current facility-administered medications for this visit.     Medications Discontinued During This Encounter   Medication Reason    lactobacillus rhamnosus, GG, (CULTURELL) capsule Therapy completed (No AVS)    psyllium (METAMUCIL) 28.3 % packet Therapy completed (No AVS)       Physical Exam   Vital Signs: /86 (BP Location: Right arm, Patient Position: Sitting, Cuff Size: Adult Large)   Pulse 68   Wt 81.5 kg (179 lb 9.6 oz)   BMI 25.77 kg/m      GENERAL APPEARANCE: alert and no distress  HENT: mouth without ulcers or lesions  LYMPHATICS: no cervical or supraclavicular nodes  RESP: lungs clear to auscultation - no rales, rhonchi or wheezes  CV: regular rhythm, normal rate, no rub, no murmur  EDEMA: no LE edema bilaterally  ABDOMEN: soft, nondistended, nontender, bowel sounds normal  MS: extremities normal - no gross deformities noted, no evidence of inflammation in joints, no muscle tenderness  SKIN: no rash  TX KIDNEY: normal  DIALYSIS ACCESS: none    Data         Latest Ref Rng & Units 4/17/2023     7:48 AM 4/10/2023     7:45 AM 4/3/2023     7:49 AM   Renal   Na (external) 136 - 145 meq/L 140      141      141        K (external) 3.5 - 5.1 meq/L 4.4      4.1      4.8        Cl 98 - 109 meq/L 108       109      109        Cl (external) 98 - 109 meq/L 108      109      109        CO2 (external) 20 - 29 meq/L 23      22      24        BUN (external) 6 - 22 mg/dL 26      15      18        Cr (external) 0.73 - 1.18 mg/dL 1.25      1.29      1.30        Glucose (external) 70 - 99 mg/dL 98      105      98        Ca (external) 8.5 - 10.5 mg/dL 8.9      9.1      8.8        Mg (external) 1.6 - 2.6 mg/dL  1.4             This result is from an external source.         Latest Ref Rng & Units 4/10/2023     7:45 AM 3/13/2023     7:42 AM 2/20/2023     7:45 AM   Bone Health   Phos (external) 2.3 - 4.7 mg/dL 2.7      3.7      2.8        PTHi (external) 14 - 95 pg/mL 51              This result is from an external source.         Latest Ref Rng & Units 4/17/2023     7:48 AM 4/10/2023     7:45 AM 4/3/2023     7:49 AM   Heme   WBC (external) 4.0 - 11.0 K/uL 4.3      15.0      0.8        Hgb (external) 13.5 - 17.5 g/dL 13.9      13.7      14.0        Plt (external) 140 - 400 K/uL 163      219      225        ABSOLUTE NEUTROPHILS (EXTERNAL) 1.8 - 8.0 K/uL 3.3      12.4         ABSOLUTE LYMPHOCYTES (EXTERNAL) 0.8 - 4.1 K/uL 0.5      0.7         ABSOLUTE MONOCYTES (EXTERNAL) 0.0 - 1.0 K/uL 0.5      1.9         ABSOLUTE EOSINOPHILS (EXTERNAL) 0.0 - 0.7 K/uL 0.0      0.0         ABSOLUTE BASOPHILS (EXTERNAL) 0.0 - 0.2 K/uL 0.1      0.0             This result is from an external source.         Latest Ref Rng & Units 12/4/2022     7:31 AM 11/1/2021     4:40 AM 10/23/2021     3:58 AM   Liver   AP 40 - 129 U/L 50    56     TBili <=1.2 mg/dL 0.6    0.6     ALT 10 - 50 U/L 14    24     AST 10 - 50 U/L 25    23     Tot Protein 6.4 - 8.3 g/dL 5.7    6.5     Albumin 3.4 - 5.0 g/dL  2.0   3.6     Albumin 3.5 - 5.2 g/dL 3.9             Latest Ref Rng & Units 12/4/2022     7:31 AM 10/23/2021     3:58 AM 10/14/2015     5:45 AM   Pancreas   A1C <5.7 % 4.2   5.6   4.8           Latest Ref Rng & Units 12/12/2022     7:35 AM 12/9/2022     8:29 AM  10/27/2021     6:21 AM   Iron studies   Iron 61 - 157 ug/dL 45   83   19     Iron Saturation Index 15 - 46 %   10     Iron Sat Index 15 - 46 % 17   33      Ferritin 31 - 409 ng/mL 699   983   597           Latest Ref Rng & Units 4/10/2023     7:45 AM 3/27/2023     7:38 AM 3/13/2023     7:42 AM   [facility] Txp Virology   CMV DNA Quant Ext Undetected IU/ml  Undetected         LOG IU/ML OF CMVQNT (EXTERNAL) Undetected log IU/mL  Undetected         BK Quant Log Ext log IU/mL Undetected    Undetected        BK Quant Result Ext Undetected IU/mL Undetected    Undetected        BK Quant Spec Ext  Plasma    Plasma            This result is from an external source.        Recent Labs   Lab Test 12/09/22  0829 12/10/22  0719 12/12/22  0735 01/23/23  1215   DOSTAC 12/8/2022  --  12/11/2022 1/22/2023   TACROL 4.0* 6.2 9.9 17.1*     Recent Labs   Lab Test 10/19/15  0707 10/26/15  0707 11/09/15  0758 01/23/23  1215   DOSMPA 2,000 Not Provided 11/8/15 AT 2030  --    MPACID 1.27 4.01* 0.45* 4.44*   MPAG >200.0* 190.0* 20.5* 169.9*

## 2023-04-21 NOTE — PROGRESS NOTES
Medication Therapy Management (MTM) Encounter    ASSESSMENT:                            Medication Adherence/Access: No issues identified    Renal Transplant:  Magnesium levels low, will increase magnesium. Patient is high risk for CMV, CrCl is >60ml/min. Will increase Valcyte to 900mg daily.     Hypertension: BP near goal <130/80 >4 months post tx.    Hyperlipidemia: Stable.     PLAN:                            1. At 6 months post transplant Pneumovax 23 and Shingrix (2 doses weeks apart).   2. Increase Valcyte to 2 tablets (900mg) once daily.  3. Increase Magnesium to 800mg at noon and 400mg at 5-6 pm.     Follow-up: as needed    SUBJECTIVE/OBJECTIVE:                          Raj Pierce is a 52 year old male called for a follow-up visit.  Today's visit is a follow-up MTM visit from 12/2022     Reason for visit: 4 months post transplant.    Allergies/ADRs: Reviewed in chart  Past Medical History: Reviewed in chart  Tobacco: He reports that he has never smoked. He has never used smokeless tobacco.  Alcohol: not currently using    Medication Adherence/Access: Per patient, misses medication 0 times per week.     Renal Transplant:  Current immunosuppressants include Tacrolimus 1mg twice daily and Myfortic 540mg twice daily.  Pt reports no side effects  Transplant date: 12/4/22, 2015, 2021  CMV prophylaxis: Valcyte 450 mg every day Donor (+), Recipient (-), treat 6 months post tx   PJP prophylaxis: Bactrim S S daily  Supplements: Mag Oxide 400mg daily, Sodium bicarb 650mg twice daily.   Tx Coordinator: Danny White MD: Dr. Espinoza, Using Med Card: Yes  Immunizations: annual flu shot unknown; Pneumovax 23:  2013; Prevnar 13/15/20: 2017; TDaP:  2014; Shingrix: unknown, HBV: immunity per last titers, COVID: JnJ 2021  Lab Results   Component Value Date    39324 1.4 (L) 04/10/2023    34865 1.5 (L) 03/27/2023    60980 1.4 (L) 03/13/2023    51014 1.5 (L) 02/20/2023    22045 1.4 (L) 02/09/2023    78895 23 04/17/2023     18925 22 04/10/2023    98351 24 04/03/2023    89839 20 03/27/2023     Lab Results   Component Value Date    VITDT 28 12/12/2022    VITDT 26 12/09/2022    VITDT 39 10/27/2021    VITDT 35 08/01/2016    VITDT 35 01/25/2016     Hypertension: Current medications include Amlodipine 5mg every evening.  Patient does self-monitor blood pressure. 130/80s.  Patient reports no current medication side effects.  BP Readings from Last 3 Encounters:   04/21/23 130/86   02/17/23 110/78   01/27/23 131/84     Hyperlipidemia: Current therapy includes Atorvastatin 10mg daily.  Patient reports no significant myalgias or other side effects.  The ASCVD Risk score (Koko HURT, et al., 2019) failed to calculate for the following reasons:    The valid HDL cholesterol range is 20 to 100 mg/dL  Recent Labs   Lab Test 12/04/22  0731 10/23/21  0358 04/15/16  0813 10/13/15  0937   CHOL 182 162  --  178    64  --  66   LDL 69 77  --  86   TRIG 60 105   < > 129   CHOLHDLRATIO  --   --   --  2.7    < > = values in this interval not displayed.     Today's Vitals: There were no vitals taken for this visit.  ----------------    I spent 11 minutes with this patient today. All changes were made via collaborative practice agreement with Dr. Herring. A copy of the visit note was provided to the patient's provider(s).    A summary of these recommendations was sent via [employer].    Silas Nino, PharmD  MTM Pharmacist    Phone: [phone]     Telemedicine Visit Details  Type of service:  Telephone visit  Start Time: 10:08  End Time: 10:19     Medication Therapy Recommendations  Aftercare following organ transplant    Current Medication: magnesium oxide (MAG-OX) 400 MG tablet (Discontinued)   Rationale: Dose too low - Dosage too low - Effectiveness   Recommendation: Increase Dose   Status: Accepted per CPA          Current Medication: valGANciclovir (VALCYTE) 450 MG tablet (Discontinued)   Rationale: Dose too low - Dosage too low - Effectiveness    Recommendation: Increase Dose   Status: Accepted per CPA          Rationale: Preventive therapy - Needs additional medication therapy - Indication   Recommendation: Order Vaccine - Shingrix 50 MCG/0.5ML Susr   Status: Patient Agreed - Adherence/Education

## 2023-04-21 NOTE — LETTER
4/21/2023      RE: Raj Pierce  [address]  [place] [address]       TRANSPLANT NEPHROLOGY EARLY POST TRANSPLANT VISIT    Assessment & Plan   # DDKT: Stable creatinine lately at ~ 1.3-1.6, which is slightly improved from early post transplant baseline closer to ~ 1.5-1.8.   - Baseline Creatinine: ~ 1.3-1.6   - Proteinuria: Normal (<0.2 grams)   - Date DSA Last Checked: Apr/2023      Latest DSA: No cPRA: 67%   - BK Viremia: No   - Kidney Tx Biopsy: No   - Transplant Ureteral Stent: Removed    # Immunosuppression: Tacrolimus immediate release (goal 8-10) and Mycophenolic acid (dose 540 mg every 12 hours)   - Induction with Recent Transplant:  Intermediate Intensity   - Continue with intensive monitoring of immunosuppression for efficacy and toxicity.   - Changes: Not at this time    # Infection Prophylaxis:   - PJP: Sulfa/TMP (Bactrim)  - CMV: Valganciclovir (Valcyte); Patient is CMV IgG Ab discordant (D+/R-) and will continue on Valcyte x 6 months, then check CMV PCR monthly until 12 months post transplant.    # Hypertension: Borderline control;  Goal BP: < 130/80   - Volume status: Euvolemic   - Changes: Not at this time, but would consider starting ARB at next visit if now at goal.    # Leukopenia: Normal WBC after filgrastim, but trending down.  Likely medication related.  Will continue to follow and assess need for further filgrastim doses.    # Mineral Bone Disorder:   - Secondary renal hyperparathyroidism; PTH level: Normal (15-65 pg/ml)        On treatment: None  - Vitamin D; level: Low        On supplement: No; Recommend starting cholecalciferol 25 mcg daily.  - Calcium; level: Normal        On supplement: No    # Electrolytes:   - Potassium; level: Normal        On supplement: No  - Magnesium; level: Stable low        On supplement: Yes  - Bicarbonate; level: Normal        On supplement: Yes    # PKD, s/p Right Native Nephrectomy: Patient had right native kidney removed at the time of this  latest transplant.    # Urinary Retention: Reports being asymptomatic now on tamsulosin.   - If patient continues to do well, could consider stopping it at 6 months post transplant.    # Diarrhea: Symptoms now resolved after changing mycophenolate mofetil to mycophenolic acid at a slightly decreased dose.    # Medical Compliance: Yes    # COVID-19 Virus Review: Discussed COVID-19 virus and the potential medical risks.  Reviewed preventative health recommendations, including wearing a mask where appropriate.  Recommended COVID vaccination should be up to date with either an initial vaccination or booster shot when appropriate.  Asked the patient to inform the transplant center if they are exposed or diagnosed with this virus.    # COVID Vaccination Up To Date: No, due for next dose at 3-4 months post transplant    # Transplant History:  Etiology of Kidney Failure: Polycystic kidney disease (PKD)  Tx: DDKT  Transplant: 12/4/2022 (Kidney), 10/13/2015 (Kidney), 10/23/2021 (Kidney)  Donor Type: Donation after Brain Death Donor Class:   Crossmatch at time of Tx: negative  DSA at time of Tx: No  Significant changes in immunosuppression: None  CMV IgG Ab High Risk Discordance (D+/R-): Yes  EBV IgG Ab High Risk Discordance (D+/R-): No  Significant transplant-related complications: None    Transplant Office Phone Number: [phone]    Assessment and plan was discussed with the patient and he voiced his understanding and agreement.    Return visit: Return for previously scheduled visit.    Francis Espinoza MD    Chief Complaint   Mr. Pierce is a 52 year old here for kidney transplant and immunosuppression management.     History of Present Illness    Mr. Pierce reports feeling good overall with minimal medical complaints.  Since last clinic visit, patient reports no hospitalizations or new medical complaints and has been doing well overall.  He did have some leukopenia and received filgrastim shots x 3 with improved  WBC.    His energy level is good and remains normal now.  He is active and does get some exercise.  Denies any chest pain or shortness of breath with exertion.  No leg swelling.    Appetite is good.  Weight is down ~ 10 lbs, but stable lately.  No nausea or vomiting.  He was having diarrhea, but since changing from mycophenolate mofetil to mycophenolic acid, that has resolved.  No fever, sweats or chills.  No night sweats.  No problems emptying his bladder.    Home BP: 120-130/80s    Problem List   Patient Active Problem List   Diagnosis     Polycystic kidney, autosomal dominant     Dyslipidemia     Vitamin D deficiency     HTN, kidney transplant related     Secondary renal hyperparathyroidism (HCC)     Kidney replaced by transplant     Immunosuppression (H)     Acute gouty arthritis     Hypomagnesemia     Aftercare following organ transplant     Kidney transplant recipient     Metabolic acidosis     Need for pneumocystis prophylaxis     CKD (chronic kidney disease) stage 2, GFR 60-89 ml/min       Allergies   Allergies   Allergen Reactions     Isopropyl Alcohol Rash     Swabs used at hospital     Anti-Thymocyte Globulin (Rabbit) [Antithymocyte Globulin (Equine)] Other (See Comments)     Fever and rigors with rodrigo-op dose. Received methylprednisolone 500mg prior to thymo.       Medications   Current Outpatient Medications   Medication Sig     acetaminophen (TYLENOL) 325 MG tablet Take 1-2 tablets (325-650 mg) by mouth every 4 hours as needed for pain     amLODIPine (NORVASC) 5 MG tablet Take 5 mg by mouth daily     atorvastatin (LIPITOR) 10 MG tablet Take 10 mg by mouth daily     citalopram (CELEXA) 20 MG tablet Take 20 mg by mouth daily.     magnesium oxide (MAG-OX) 400 MG tablet Take 1 tablet (400 mg) by mouth 2 times daily     mycophenolic acid (GENERIC EQUIVALENT) 180 MG EC tablet Take 3 tablets (540 mg) by mouth 2 times daily Take in place of mycophenolate (Cellcept)     sodium bicarbonate 650 MG tablet Take 1  tablet (650 mg) by mouth 2 times daily     sulfamethoxazole-trimethoprim (BACTRIM) 400-80 MG tablet Take 1 tablet by mouth daily     tacrolimus (GENERIC EQUIVALENT) 1 MG capsule Take 1 capsule (1 mg) by mouth 2 times daily     tamsulosin (FLOMAX) 0.4 MG capsule Take 1 capsule (0.4 mg) by mouth daily     valGANciclovir (VALCYTE) 450 MG tablet Take 1 tablet (450 mg) by mouth daily Stop taking on 6/4/2023     tacrolimus (GENERIC EQUIVALENT) 0.5 MG capsule HOLD (Patient not taking: Reported on 4/21/2023)     No current facility-administered medications for this visit.     Medications Discontinued During This Encounter   Medication Reason     lactobacillus rhamnosus, GG, (CULTURELL) capsule Therapy completed (No AVS)     psyllium (METAMUCIL) 28.3 % packet Therapy completed (No AVS)       Physical Exam   Vital Signs: /86 (BP Location: Right arm, Patient Position: Sitting, Cuff Size: Adult Large)   Pulse 68   Wt 81.5 kg (179 lb 9.6 oz)   BMI 25.77 kg/m      GENERAL APPEARANCE: alert and no distress  HENT: mouth without ulcers or lesions  LYMPHATICS: no cervical or supraclavicular nodes  RESP: lungs clear to auscultation - no rales, rhonchi or wheezes  CV: regular rhythm, normal rate, no rub, no murmur  EDEMA: no LE edema bilaterally  ABDOMEN: soft, nondistended, nontender, bowel sounds normal  MS: extremities normal - no gross deformities noted, no evidence of inflammation in joints, no muscle tenderness  SKIN: no rash  TX KIDNEY: normal  DIALYSIS ACCESS: none    Data         Latest Ref Rng & Units 4/17/2023     7:48 AM 4/10/2023     7:45 AM 4/3/2023     7:49 AM   Renal   Na (external) 136 - 145 meq/L 140      141      141        K (external) 3.5 - 5.1 meq/L 4.4      4.1      4.8        Cl 98 - 109 meq/L 108      109      109        Cl (external) 98 - 109 meq/L 108      109      109        CO2 (external) 20 - 29 meq/L 23      22      24        BUN (external) 6 - 22 mg/dL 26      15      18        Cr (external)  0.73 - 1.18 mg/dL 1.25      1.29      1.30        Glucose (external) 70 - 99 mg/dL 98      105      98        Ca (external) 8.5 - 10.5 mg/dL 8.9      9.1      8.8        Mg (external) 1.6 - 2.6 mg/dL  1.4             This result is from an external source.         Latest Ref Rng & Units 4/10/2023     7:45 AM 3/13/2023     7:42 AM 2/20/2023     7:45 AM   [facility]   Phos (external) 2.3 - 4.7 mg/dL 2.7      3.7      2.8        PTHi (external) 14 - 95 pg/mL 51              This result is from an external source.         Latest Ref Rng & Units 4/17/2023     7:48 AM 4/10/2023     7:45 AM 4/3/2023     7:49 AM   Heme   WBC (external) 4.0 - 11.0 K/uL 4.3      15.0      0.8        Hgb (external) 13.5 - 17.5 g/dL 13.9      13.7      14.0        Plt (external) 140 - 400 K/uL 163      219      225        ABSOLUTE NEUTROPHILS (EXTERNAL) 1.8 - 8.0 K/uL 3.3      12.4         ABSOLUTE LYMPHOCYTES (EXTERNAL) 0.8 - 4.1 K/uL 0.5      0.7         ABSOLUTE MONOCYTES (EXTERNAL) 0.0 - 1.0 K/uL 0.5      1.9         ABSOLUTE EOSINOPHILS (EXTERNAL) 0.0 - 0.7 K/uL 0.0      0.0         ABSOLUTE BASOPHILS (EXTERNAL) 0.0 - 0.2 K/uL 0.1      0.0             This result is from an external source.         Latest Ref Rng & Units 12/4/2022     7:31 AM 11/1/2021     4:40 AM 10/23/2021     3:58 AM   Liver   AP 40 - 129 U/L 50    56     TBili <=1.2 mg/dL 0.6    0.6     ALT 10 - 50 U/L 14    24     AST 10 - 50 U/L 25    23     Tot Protein 6.4 - 8.3 g/dL 5.7    6.5     Albumin 3.4 - 5.0 g/dL  2.0   3.6     Albumin 3.5 - 5.2 g/dL 3.9             Latest Ref Rng & Units 12/4/2022     7:31 AM 10/23/2021     3:58 AM 10/14/2015     5:45 AM   Pancreas   A1C <5.7 % 4.2   5.6   4.8           Latest Ref Rng & Units 12/12/2022     7:35 AM 12/9/2022     8:29 AM 10/27/2021     6:21 AM   Iron studies   Iron 61 - 157 ug/dL 45   83   19     Iron Saturation Index 15 - 46 %   10     Iron Sat Index 15 - 46 % 17   33      Ferritin 31 - 409 ng/mL [phone]            Latest Ref Rng & Units 4/10/2023     7:45 AM 3/27/2023     7:38 AM 3/13/2023     7:42 AM   [facility] Virology   CMV DNA Quant Ext Undetected IU/ml  Undetected         LOG IU/ML OF CMVQNT (EXTERNAL) Undetected log IU/mL  Undetected         BK Quant Log Ext log IU/mL Undetected    Undetected        BK Quant Result Ext Undetected IU/mL Undetected    Undetected        BK Quant Spec Ext  Plasma    Plasma            This result is from an external source.        Recent Labs   Lab Test 12/09/22  0829 12/10/22  0719 12/12/22  0735 01/23/23  1215   DOSTAC 12/8/2022  --  12/11/2022 1/22/2023   TACROL 4.0* 6.2 9.9 17.1*     Recent Labs   Lab Test 10/19/15  0707 10/26/15  0707 11/09/15  0758 01/23/23  1215   DOSMPA 2,000 Not Provided 11/8/15 AT 2030  --    MPACID 1.27 4.01* 0.45* 4.44*   MPAG >200.0* 190.0* 20.5* 169.9*       Francis Espinoza MD

## 2023-04-21 NOTE — PATIENT INSTRUCTIONS
"Recommendations from today's MTM visit:                                                       1. At 6 months post transplant Pneumovax 23 and Shingrix (2 doses weeks apart).   2. Increase Valcyte to 2 tablets (900mg) once daily.  3. Increase Magnesium to 800mg at noon and 400mg at 5-6 pm.    Follow-up: as needed    It was great speaking with you today.  I value your experience and would be very thankful for your time in providing feedback in our clinic survey. In the next few days, you may receive an email or text message from [employer] with a link to a survey related to your  clinical pharmacist.\"     To schedule another MTM appointment, please call the clinic directly or you may call the MTM scheduling line at [phone] or toll-free at [phone].     My Clinical Pharmacist's contact information:                                                      Please feel free to contact me with any questions or concerns you have.      Silas Nino, PharmD  MTM Pharmacist    Phone: [phone]     "

## 2023-04-21 NOTE — PATIENT INSTRUCTIONS
Patient Recommendations:  - Recommend starting cholecalciferol (vitamin D) 25 mcg (1000 units) daily.    Transplant Patient Information  Your Post Transplant Coordinator is: Mel Cervantes  For non urgent items, we encourage you to contact your coordinator/care team online via [employer]  You and your care team can also contact your transplant coordinator Monday - Friday, 8am - 5pm at [phone] (Option 2 to reach the coordinator or Option 4 to schedule an appointment).  After hours for urgent matters, please call [facility] at [phone].

## 2023-04-26 ENCOUNTER — TELEPHONE (OUTPATIENT)
Dept: TRANSPLANT | Facility: CLINIC | Age: 53
End: 2023-04-26
Payer: COMMERCIAL

## 2023-04-26 DIAGNOSIS — D84.9 IMMUNOSUPPRESSED STATUS (H): ICD-10-CM

## 2023-04-26 DIAGNOSIS — Z76.82 KIDNEY TRANSPLANT CANDIDATE: ICD-10-CM

## 2023-04-26 DIAGNOSIS — Z94.0 KIDNEY TRANSPLANTED: ICD-10-CM

## 2023-04-26 RX ORDER — TACROLIMUS 1 MG/1
1 CAPSULE ORAL 2 TIMES DAILY
Qty: 180 CAPSULE | Refills: 3 | Status: SHIPPED | OUTPATIENT
Start: 2023-04-26 | End: 2023-05-04

## 2023-04-26 RX ORDER — TACROLIMUS 0.5 MG/1
0.5 CAPSULE ORAL 2 TIMES DAILY
Qty: 180 CAPSULE | Refills: 3 | Status: SHIPPED | OUTPATIENT
Start: 2023-04-26 | End: 2023-05-04

## 2023-04-26 NOTE — TELEPHONE ENCOUNTER
ISSUE:   Tacrolimus IR level 4.6 on 4/24, goal 8-10, dose 1 mg BID.    PLAN:   Please call patient and confirm this was an accurate 12-hour trough. Verify Tacrolimus IR dose 1 mg BID. Confirm no new medications or illness. Confirm no missed doses. If accurate trough and accurate dose, increase Tacrolimus IR dose to 1.5 mg BID and repeat labs in 1 weeks    OUTCOME:   Spoke with patient, they confirm accurate trough level and current dose 1 mg BID. Patient confirmed dose change to 1.5 mg BID and to repeat labs in 1 weeks. Orders sent to preferred pharmacy for dose change and lab for repeat labs. Patient voiced understanding of plan.

## 2023-05-04 ENCOUNTER — TELEPHONE (OUTPATIENT)
Dept: TRANSPLANT | Facility: CLINIC | Age: 53
End: 2023-05-04
Payer: COMMERCIAL

## 2023-05-04 DIAGNOSIS — Z76.82 KIDNEY TRANSPLANT CANDIDATE: ICD-10-CM

## 2023-05-04 DIAGNOSIS — Z94.0 KIDNEY TRANSPLANTED: Primary | ICD-10-CM

## 2023-05-04 DIAGNOSIS — D84.9 IMMUNOSUPPRESSED STATUS (H): ICD-10-CM

## 2023-05-04 RX ORDER — TACROLIMUS 0.5 MG/1
0.5 CAPSULE ORAL 2 TIMES DAILY
Qty: 180 CAPSULE | Refills: 3 | Status: SHIPPED | OUTPATIENT
Start: 2023-05-04 | End: 2024-04-09

## 2023-05-04 RX ORDER — TACROLIMUS 1 MG/1
2 CAPSULE ORAL 2 TIMES DAILY
Qty: 360 CAPSULE | Refills: 3 | Status: SHIPPED | OUTPATIENT
Start: 2023-05-04 | End: 2024-04-09

## 2023-05-04 NOTE — TELEPHONE ENCOUNTER
ISSUE:   Tacrolimus IR level 5.7 on 5/1, goal 8-10, dose 1.5 mg BID.    PLAN:   Please call patient and confirm this was an accurate 12-hour trough. Verify Tacrolimus IR dose 1.5 mg BID. Confirm no new medications or illness. Confirm no missed doses. If accurate trough and accurate dose, increase Tacrolimus IR dose to 2 mg BID and repeat labs in 1 weeks    Elif Cervantes RN    OUTCOME:   Spoke with patient, they confirm accurate trough level and current dose 2 mg BID. Patient confirmed dose change to 2.5 mg BID and to repeat labs in 1-2 weeks. Orders sent to preferred pharmacy for dose change and lab for repeat labs. Patient voiced understanding of plan.

## 2023-05-04 NOTE — TELEPHONE ENCOUNTER
Has US this Afternoon in [place],  Has Regan aguero was wondering if he could get an order to remove or is it ok to leave  regan in  and get the US?    No

## 2023-05-31 DIAGNOSIS — R33.9 URINARY RETENTION: Primary | ICD-10-CM

## 2023-05-31 RX ORDER — TAMSULOSIN HYDROCHLORIDE 0.4 MG/1
0.4 CAPSULE ORAL DAILY
Qty: 30 CAPSULE | Refills: 11 | Status: SHIPPED | OUTPATIENT
Start: 2023-05-31 | End: 2024-06-03

## 2023-06-27 ENCOUNTER — VIRTUAL VISIT (OUTPATIENT)
Dept: TRANSPLANT | Facility: CLINIC | Age: 53
End: 2023-06-27
Attending: INTERNAL MEDICINE
Payer: COMMERCIAL

## 2023-06-27 VITALS — HEIGHT: 70 IN | WEIGHT: 182 LBS | BODY MASS INDEX: 26.05 KG/M2

## 2023-06-27 DIAGNOSIS — I15.1 HTN, KIDNEY TRANSPLANT RELATED: Primary | ICD-10-CM

## 2023-06-27 DIAGNOSIS — N18.2 CKD (CHRONIC KIDNEY DISEASE) STAGE 2, GFR 60-89 ML/MIN: ICD-10-CM

## 2023-06-27 DIAGNOSIS — Z48.298 AFTERCARE FOLLOWING ORGAN TRANSPLANT: ICD-10-CM

## 2023-06-27 DIAGNOSIS — Z29.89 NEED FOR PNEUMOCYSTIS PROPHYLAXIS: ICD-10-CM

## 2023-06-27 DIAGNOSIS — E55.9 VITAMIN D DEFICIENCY: ICD-10-CM

## 2023-06-27 DIAGNOSIS — Z94.0 HTN, KIDNEY TRANSPLANT RELATED: Primary | ICD-10-CM

## 2023-06-27 DIAGNOSIS — E83.42 HYPOMAGNESEMIA: ICD-10-CM

## 2023-06-27 DIAGNOSIS — D84.9 IMMUNOSUPPRESSED STATUS (H): ICD-10-CM

## 2023-06-27 DIAGNOSIS — E87.20 METABOLIC ACIDOSIS: ICD-10-CM

## 2023-06-27 DIAGNOSIS — Z94.0 KIDNEY REPLACED BY TRANSPLANT: ICD-10-CM

## 2023-06-27 DIAGNOSIS — R33.9 URINARY RETENTION: ICD-10-CM

## 2023-06-27 PROCEDURE — 99214 OFFICE O/P EST MOD 30 MIN: CPT | Mod: VID | Performed by: INTERNAL MEDICINE

## 2023-06-27 RX ORDER — AMLODIPINE BESYLATE 10 MG/1
10 TABLET ORAL AT BEDTIME
Qty: 90 TABLET | Refills: 3 | Status: SHIPPED | OUTPATIENT
Start: 2023-06-27

## 2023-06-27 ASSESSMENT — PAIN SCALES - GENERAL: PAINLEVEL: NO PAIN (0)

## 2023-06-27 NOTE — PROGRESS NOTES
Virtual Visit Details    Type of service:  Video Visit   Video Start Time: 1434  Video End Time:1444    Originating Location (pt. Location): Home  Distant Location (provider location):  Off-site  Platform used for Video Visit: Well       TRANSPLANT NEPHROLOGY EARLY POST TRANSPLANT VISIT    Assessment & Plan   # DDKT: Stable   - Baseline Creatinine: ~ 1.3-1.6   - Proteinuria: Normal (<0.2 grams)   - Date DSA Last Checked: Apr/2023      Latest DSA: No cPRA: 67%   - BK Viremia: No   - Kidney Tx Biopsy: No   - Transplant Ureteral Stent: Removed    # Immunosuppression: Tacrolimus immediate release (goal 8-10) and Mycophenolic acid (dose 540 mg every 12 hours)   - Induction with Recent Transplant:  Intermediate Intensity   - Continue with intensive monitoring of immunosuppression for efficacy and toxicity.   - Changes: Yes - Tacrolimus goal will now be 6-8 as patient is 6 months post transplant.    # Infection Prophylaxis:   - PJP: Sulfa/TMP (Bactrim)  - CMV: Valganciclovir (Valcyte); Patient is CMV IgG Ab discordant (D+/R-) and can now stop Valcyte being 6 months post transplant.  Will check CMV PCR monthly until 12 months post transplant.    # Hypertension: Borderline control;  Goal BP: < 130/80   - Volume status: Euvolemic   - Changes: Yes - Will increase amlodipine to 10 mg nightly.    # Mineral Bone Disorder:  - Vitamin D; level: Low        On supplement: Yes  - Calcium; level: Normal        On supplement: No    # Electrolytes:   - Potassium; level: Normal        On supplement: No  - Magnesium; level: Normal        On supplement: Yes  - Bicarbonate; level: Normal        On supplement: Yes    # PKD, s/p Right Native Nephrectomy: Patient had right native kidney removed at the time of this latest transplant.    # Urinary Retention: Reports being asymptomatic now on tamsulosin.   - If patient continues to do well, could consider stopping it at next visit.    # Medical Compliance: Yes    # Health Maintenance and  Vaccination Review: Not Reviewed    # Transplant History:  Etiology of Kidney Failure: Polycystic kidney disease (PKD)  Tx: DDKT  Transplant: 12/4/2022 (Kidney), 10/13/2015 (Kidney), 10/23/2021 (Kidney)  Donor Type: Donation after Brain Death Donor Class:   Crossmatch at time of Tx: negative  DSA at time of Tx: No  Significant changes in immunosuppression: None  CMV IgG Ab High Risk Discordance (D+/R-): Yes  EBV IgG Ab High Risk Discordance (D+/R-): No  Significant transplant-related complications: None    Transplant Office Phone Number: [phone]    Assessment and plan was discussed with the patient and he voiced his understanding and agreement.    Return visit: Return in about 2 months (around 8/27/2023).    Francis Espinoza MD    Chief Complaint   Mr. Pierce is a 52 year old here for kidney transplant and immunosuppression management.     History of Present Illness    Mr. Pierce reports feeling good overall with some medical complaints.  Since last clinic visit, patient reports no hospitalizations or new medical complaints and has been doing well overall.  His energy level is good and pretty close to normal now.  He is active, working long hours, and gets some exercise.  Denies any chest pain or shortness of breath with exertion.  No leg swelling.    Appetite is good and weight is stable.  No nausea, vomiting or diarrhea.  No fever, sweats or chills.  No night sweats.  No issues emptying his bladder.    Home BP: 130/80s    Problem List   Patient Active Problem List   Diagnosis     Polycystic kidney, autosomal dominant     Dyslipidemia     Vitamin D deficiency     HTN, kidney transplant related     Secondary renal hyperparathyroidism (HCC)     Kidney replaced by transplant     Immunosuppressed status (H)     Acute gouty arthritis     Hypomagnesemia     Aftercare following organ transplant     Kidney transplant recipient     Metabolic acidosis     Need for pneumocystis prophylaxis     CKD (chronic  "kidney disease) stage 2, GFR 60-89 ml/min     Urinary retention       Allergies   Allergies   Allergen Reactions     Isopropyl Alcohol Rash     Swabs used at hospital     Anti-Thymocyte Globulin (Rabbit) [Antithymocyte Globulin] Other (See Comments)     Fever and rigors with rodrigo-op dose. Received methylprednisolone 500mg prior to thymo.       Medications   Current Outpatient Medications   Medication Sig     acetaminophen (TYLENOL) 325 MG tablet Take 1-2 tablets (325-650 mg) by mouth every 4 hours as needed for pain     amLODIPine (NORVASC) 10 MG tablet Take 1 tablet (10 mg) by mouth At Bedtime     atorvastatin (LIPITOR) 10 MG tablet Take 10 mg by mouth daily     citalopram (CELEXA) 20 MG tablet Take 20 mg by mouth daily.     magnesium oxide (MAG-OX) 400 MG tablet Take 2 tablets (800 mg) by mouth daily AND 1 tablet (400 mg) daily.     mycophenolic acid (GENERIC EQUIVALENT) 180 MG EC tablet Take 3 tablets (540 mg) by mouth 2 times daily Take in place of mycophenolate (Cellcept)     sodium bicarbonate 650 MG tablet Take 1 tablet (650 mg) by mouth 2 times daily     sulfamethoxazole-trimethoprim (BACTRIM) 400-80 MG tablet Take 1 tablet by mouth daily     tacrolimus (GENERIC EQUIVALENT) 0.5 MG capsule Take 1 capsule (0.5 mg) by mouth 2 times daily Total dose = 2.5 mg twice daily     tacrolimus (GENERIC EQUIVALENT) 1 MG capsule Take 2 capsules (2 mg) by mouth 2 times daily Total dose = 2.5 mg twice daily     tamsulosin (FLOMAX) 0.4 MG capsule Take 1 capsule (0.4 mg) by mouth daily     Vitamin D3 (CHOLECALCIFEROL) 25 mcg (1000 units) tablet Take 25 mcg by mouth daily     No current facility-administered medications for this visit.     Medications Discontinued During This Encounter   Medication Reason     valGANciclovir (VALCYTE) 450 MG tablet      amLODIPine (NORVASC) 5 MG tablet        Physical Exam   Vital Signs: Ht 1.778 m (5' 10\")   Wt 82.6 kg (182 lb)   BMI 26.11 kg/m      GENERAL APPEARANCE: alert and no " distress  HENT: no obvious abnormalities on appearance  RESP: breathing appears unremarkable with normal rate, no audible wheezing or cough and no apparent shortness of breath with conversation  MS: extremities normal - no gross deformities noted  SKIN: no apparent rash and normal skin tone  NEURO: speech is clear with no obvious neurological deficits  PSYCH: mentation appears normal and affect normal    Data         Latest Ref Rng & Units 6/26/2023     7:36 AM 6/13/2023     7:40 AM 5/30/2023     7:42 AM   Renal   Na (external) 136 - 145 meq/L 141     142  140    K (external) 3.5 - 5.1 meq/L 4.4     4.4  4.6    Cl 98 - 109 meq/L 107     109  105    Cl (external) 98 - 109 meq/L 107     109  105    CO2 (external) 20 - 29 meq/L 24     21  25    BUN (external) 6 - 22 mg/dL 23     28  24    Cr (external) 0.73 - 1.18 mg/dL 1.36     1.51  1.55    Glucose (external) 70 - 99 mg/dL 93     100  102    Ca (external) 8.5 - 10.5 mg/dL 9.2     9.7  9.6    Mg (external) 1.6 - 2.6 mg/dL  1.7         This result is from an external source.         Latest Ref Rng & Units 6/13/2023     7:40 AM 5/15/2023     8:28 AM 4/10/2023     7:45 AM   Bone Health   Phos (external) 2.3 - 4.7 mg/dL 5.4  4.2  2.7       PTHi (external) 14 - 95 pg/mL   51           This result is from an external source.         Latest Ref Rng & Units 6/26/2023     7:36 AM 6/13/2023     7:40 AM 5/30/2023     7:42 AM   Heme   WBC (external) 4.0 - 11.0 K/uL 5.1  4.9  4.5    Hgb (external) 13.5 - 17.5 g/dL 14.0  14.7  14.0    Plt (external) 140 - 400 K/uL 199  193  205          Latest Ref Rng & Units 5/30/2023     7:42 AM 12/4/2022     7:31 AM 11/1/2021     4:40 AM   Liver   AP 40 - 129 U/L  50     AP (external) 40 - 150 U/L 66      TBili <=1.2 mg/dL  0.6     TBili (external) 0.2 - 1.2 mg/dL 0.5      DBili (external) 0.0 - 0.5 mg/dL 0.2      ALT 10 - 50 U/L  14     ALT (external) 0 - 55 U/L 30      AST 10 - 50 U/L  25     AST (external) 5 - 34 U/L 31      Tot Protein 6.4 -  8.3 g/dL  5.7     Tot Protein (external) 6.0 - 8.3 g/dL 6.4      Albumin 3.4 - 5.0 g/dL   2.0    Albumin 3.5 - 5.2 g/dL  3.9     Albumin (external) 3.5 - 5.2 g/dL 4.2            Latest Ref Rng & Units 5/30/2023     7:42 AM 12/4/2022     7:31 AM 10/23/2021     3:58 AM   Pancreas   A1C <5.7 %  4.2  5.6    A1C (external) <5.7 % 4.8            Latest Ref Rng & Units 12/12/2022     7:35 AM 12/9/2022     8:29 AM 10/27/2021     6:21 AM   Iron studies   Iron 61 - 157 ug/dL 45  83  19    Iron Saturation Index 15 - 46 %   10    Iron Sat Index 15 - 46 % 17  33     Ferritin 31 - 409 ng/mL 699  983  597          Latest Ref Rng & Units 6/13/2023     7:40 AM 5/15/2023     8:28 AM 4/10/2023     7:45 AM   [facility] Txp Virology   BK Quant Log Ext log IU/mL Undetected  Undetected  Undetected    BK Quant Result Ext Undetected IU/mL Undetected  Undetected  Undetected    BK Quant Spec Ext  Plasma  Plasma  Plasma         Recent Labs   Lab Test 12/09/22  0829 12/10/22  0719 12/12/22  0735 01/23/23  1215   DOSTAC 12/8/2022  --  12/11/2022 1/22/2023   TACROL 4.0* 6.2 9.9 17.1*     Recent Labs   Lab Test 10/19/15  0707 10/26/15  0707 11/09/15  0758 01/23/23  1215   DOSMPA 2,000 Not Provided 11/8/15 AT 2030  --    MPACID 1.27 4.01* 0.45* 4.44*   MPAG >200.0* 190.0* 20.5* 169.9*

## 2023-06-27 NOTE — LETTER
6/27/2023         RE: Raj Pierce  [address]  [place] [address]        Dear Colleague,    Thank you for referring your patient, Raj Pierce, to the [facility]. Please see a copy of my visit note below.    Virtual Visit Details    Type of service:  Video Visit   Video Start Time: 1434  Video End Time:1444    Originating Location (pt. Location): Home  Distant Location (provider location):  Off-site  Platform used for Video Visit: [facility]       TRANSPLANT NEPHROLOGY EARLY POST TRANSPLANT VISIT    Assessment & Plan   # DDKT: Stable   - Baseline Creatinine: ~ 1.3-1.6   - Proteinuria: Normal (<0.2 grams)   - Date DSA Last Checked: Apr/2023      Latest DSA: No cPRA: 67%   - BK Viremia: No   - Kidney Tx Biopsy: No   - Transplant Ureteral Stent: Removed    # Immunosuppression: Tacrolimus immediate release (goal 8-10) and Mycophenolic acid (dose 540 mg every 12 hours)   - Induction with Recent Transplant:  Intermediate Intensity   - Continue with intensive monitoring of immunosuppression for efficacy and toxicity.   - Changes: Yes - Tacrolimus goal will now be 6-8 as patient is 6 months post transplant.    # Infection Prophylaxis:   - PJP: Sulfa/TMP (Bactrim)  - CMV: Valganciclovir (Valcyte); Patient is CMV IgG Ab discordant (D+/R-) and can now stop Valcyte being 6 months post transplant.  Will check CMV PCR monthly until 12 months post transplant.    # Hypertension: Borderline control;  Goal BP: < 130/80   - Volume status: Euvolemic   - Changes: Yes - Will increase amlodipine to 10 mg nightly.    # Mineral Bone Disorder:  - Vitamin D; level: Low        On supplement: Yes  - Calcium; level: Normal        On supplement: No    # Electrolytes:   - Potassium; level: Normal        On supplement: No  - Magnesium; level: Normal        On supplement: Yes  - Bicarbonate; level: Normal        On supplement: Yes    # PKD, s/p Right Native Nephrectomy: Patient had right native kidney  removed at the time of this latest transplant.    # Urinary Retention: Reports being asymptomatic now on tamsulosin.   - If patient continues to do well, could consider stopping it at next visit.    # Medical Compliance: Yes    # Health Maintenance and Vaccination Review: Not Reviewed    # Transplant History:  Etiology of Kidney Failure: Polycystic kidney disease (PKD)  Tx: DDKT  Transplant: 12/4/2022 (Kidney), 10/13/2015 (Kidney), 10/23/2021 (Kidney)  Donor Type: Donation after Brain Death Donor Class:   Crossmatch at time of Tx: negative  DSA at time of Tx: No  Significant changes in immunosuppression: None  CMV IgG Ab High Risk Discordance (D+/R-): Yes  EBV IgG Ab High Risk Discordance (D+/R-): No  Significant transplant-related complications: None    Transplant Office Phone Number: [phone]    Assessment and plan was discussed with the patient and he voiced his understanding and agreement.    Return visit: Return in about 2 months (around 8/27/2023).    Francis Espinoza MD    Chief Complaint   Mr. Pierce is a 52 year old here for kidney transplant and immunosuppression management.     History of Present Illness    Mr. Pierce reports feeling good overall with some medical complaints.  Since last clinic visit, patient reports no hospitalizations or new medical complaints and has been doing well overall.  His energy level is good and pretty close to normal now.  He is active, working long hours, and gets some exercise.  Denies any chest pain or shortness of breath with exertion.  No leg swelling.    Appetite is good and weight is stable.  No nausea, vomiting or diarrhea.  No fever, sweats or chills.  No night sweats.  No issues emptying his bladder.    Home BP: 130/80s    Problem List   Patient Active Problem List   Diagnosis     Polycystic kidney, autosomal dominant     Dyslipidemia     Vitamin D deficiency     HTN, kidney transplant related     Secondary renal hyperparathyroidism (HCC)     Kidney  replaced by transplant     Immunosuppressed status (H)     Acute gouty arthritis     Hypomagnesemia     Aftercare following organ transplant     Kidney transplant recipient     Metabolic acidosis     Need for pneumocystis prophylaxis     CKD (chronic kidney disease) stage 2, GFR 60-89 ml/min     Urinary retention       Allergies   Allergies   Allergen Reactions     Isopropyl Alcohol Rash     Swabs used at hospital     Anti-Thymocyte Globulin (Rabbit) [Antithymocyte Globulin] Other (See Comments)     Fever and rigors with rodrigo-op dose. Received methylprednisolone 500mg prior to thymo.       Medications   Current Outpatient Medications   Medication Sig     acetaminophen (TYLENOL) 325 MG tablet Take 1-2 tablets (325-650 mg) by mouth every 4 hours as needed for pain     amLODIPine (NORVASC) 10 MG tablet Take 1 tablet (10 mg) by mouth At Bedtime     atorvastatin (LIPITOR) 10 MG tablet Take 10 mg by mouth daily     citalopram (CELEXA) 20 MG tablet Take 20 mg by mouth daily.     magnesium oxide (MAG-OX) 400 MG tablet Take 2 tablets (800 mg) by mouth daily AND 1 tablet (400 mg) daily.     mycophenolic acid (GENERIC EQUIVALENT) 180 MG EC tablet Take 3 tablets (540 mg) by mouth 2 times daily Take in place of mycophenolate (Cellcept)     sodium bicarbonate 650 MG tablet Take 1 tablet (650 mg) by mouth 2 times daily     sulfamethoxazole-trimethoprim (BACTRIM) 400-80 MG tablet Take 1 tablet by mouth daily     tacrolimus (GENERIC EQUIVALENT) 0.5 MG capsule Take 1 capsule (0.5 mg) by mouth 2 times daily Total dose = 2.5 mg twice daily     tacrolimus (GENERIC EQUIVALENT) 1 MG capsule Take 2 capsules (2 mg) by mouth 2 times daily Total dose = 2.5 mg twice daily     tamsulosin (FLOMAX) 0.4 MG capsule Take 1 capsule (0.4 mg) by mouth daily     Vitamin D3 (CHOLECALCIFEROL) 25 mcg (1000 units) tablet Take 25 mcg by mouth daily     No current facility-administered medications for this visit.     Medications Discontinued During This  "Encounter   Medication Reason     valGANciclovir (VALCYTE) 450 MG tablet      amLODIPine (NORVASC) 5 MG tablet        Physical Exam   Vital Signs: Ht 1.778 m (5' 10\")   Wt 82.6 kg (182 lb)   BMI 26.11 kg/m      GENERAL APPEARANCE: alert and no distress  HENT: no obvious abnormalities on appearance  RESP: breathing appears unremarkable with normal rate, no audible wheezing or cough and no apparent shortness of breath with conversation  MS: extremities normal - no gross deformities noted  SKIN: no apparent rash and normal skin tone  NEURO: speech is clear with no obvious neurological deficits  PSYCH: mentation appears normal and affect normal    Data         Latest Ref Rng & Units 6/26/2023     7:36 AM 6/13/2023     7:40 AM 5/30/2023     7:42 AM   Renal   Na (external) 136 - 145 meq/L 141     142  140    K (external) 3.5 - 5.1 meq/L 4.4     4.4  4.6    Cl 98 - 109 meq/L 107     109  105    Cl (external) 98 - 109 meq/L 107     109  105    CO2 (external) 20 - 29 meq/L 24     21  25    BUN (external) 6 - 22 mg/dL 23     28  24    Cr (external) 0.73 - 1.18 mg/dL 1.36     1.51  1.55    Glucose (external) 70 - 99 mg/dL 93     100  102    Ca (external) 8.5 - 10.5 mg/dL 9.2     9.7  9.6    Mg (external) 1.6 - 2.6 mg/dL  1.7         This result is from an external source.         Latest Ref Rng & Units 6/13/2023     7:40 AM 5/15/2023     8:28 AM 4/10/2023     7:45 AM   Bone Health   Phos (external) 2.3 - 4.7 mg/dL 5.4  4.2  2.7       PTHi (external) 14 - 95 pg/mL   51           This result is from an external source.         Latest Ref Rng & Units 6/26/2023     7:36 AM 6/13/2023     7:40 AM 5/30/2023     7:42 AM   Heme   WBC (external) 4.0 - 11.0 K/uL 5.1  4.9  4.5    Hgb (external) 13.5 - 17.5 g/dL 14.0  14.7  14.0    Plt (external) 140 - 400 K/uL 199  193  205          Latest Ref Rng & Units 5/30/2023     7:42 AM 12/4/2022     7:31 AM 11/1/2021     4:40 AM   Liver   AP 40 - 129 U/L  50     AP (external) 40 - 150 U/L 66    "   TBili <=1.2 mg/dL  0.6     TBili (external) 0.2 - 1.2 mg/dL 0.5      DBili (external) 0.0 - 0.5 mg/dL 0.2      ALT 10 - 50 U/L  14     ALT (external) 0 - 55 U/L 30      AST 10 - 50 U/L  25     AST (external) 5 - 34 U/L 31      Tot Protein 6.4 - 8.3 g/dL  5.7     Tot Protein (external) 6.0 - 8.3 g/dL 6.4      Albumin 3.4 - 5.0 g/dL   2.0    Albumin 3.5 - 5.2 g/dL  3.9     Albumin (external) 3.5 - 5.2 g/dL 4.2            Latest Ref Rng & Units 5/30/2023     7:42 AM 12/4/2022     7:31 AM 10/23/2021     3:58 AM   Pancreas   A1C <5.7 %  4.2  5.6    A1C (external) <5.7 % 4.8            Latest Ref Rng & Units 12/12/2022     7:35 AM 12/9/2022     8:29 AM 10/27/2021     6:21 AM   Iron studies   Iron 61 - 157 ug/dL 45  83  19    Iron Saturation Index 15 - 46 %   10    Iron Sat Index 15 - 46 % 17  33     Ferritin 31 - 409 ng/mL 699  983  597          Latest Ref Rng & Units 6/13/2023     7:40 AM 5/15/2023     8:28 AM 4/10/2023     7:45 AM   [facility] Txp Virology   BK Quant Log Ext log IU/mL Undetected  Undetected  Undetected    BK Quant Result Ext Undetected IU/mL Undetected  Undetected  Undetected    BK Quant Spec Ext  Plasma  Plasma  Plasma         Recent Labs   Lab Test 12/09/22  0829 12/10/22  0719 12/12/22  0735 01/23/23  1215   DOSTAC 12/8/2022  --  12/11/2022 1/22/2023   TACROL 4.0* 6.2 9.9 17.1*     Recent Labs   Lab Test 10/19/15  0707 10/26/15  0707 11/09/15  0758 01/23/23  1215   DOSMPA 2,000 Not Provided 11/8/15 AT 2030  --    MPACID 1.27 4.01* 0.45* 4.44*   MPAG >200.0* 190.0* 20.5* 169.9*       Again, thank you for allowing me to participate in the care of your patient.        Sincerely,        Francis Espinoza MD

## 2023-06-27 NOTE — LETTER
6/27/2023      RE: Raj Pierce  [address]  [place] [address]       Virtual Visit Details    Type of service:  Video Visit   Video Start Time: 1434  Video End Time:1444    Originating Location (pt. Location): Home  Distant Location (provider location):  Off-site  Platform used for Video Visit: [facility]       TRANSPLANT NEPHROLOGY EARLY POST TRANSPLANT VISIT    Assessment & Plan   # DDKT: Stable   - Baseline Creatinine: ~ 1.3-1.6   - Proteinuria: Normal (<0.2 grams)   - Date DSA Last Checked: Apr/2023      Latest DSA: No cPRA: 67%   - BK Viremia: No   - Kidney Tx Biopsy: No   - Transplant Ureteral Stent: Removed    # Immunosuppression: Tacrolimus immediate release (goal 8-10) and Mycophenolic acid (dose 540 mg every 12 hours)   - Induction with Recent Transplant:  Intermediate Intensity   - Continue with intensive monitoring of immunosuppression for efficacy and toxicity.   - Changes: Yes - Tacrolimus goal will now be 6-8 as patient is 6 months post transplant.    # Infection Prophylaxis:   - PJP: Sulfa/TMP (Bactrim)  - CMV: Valganciclovir (Valcyte); Patient is CMV IgG Ab discordant (D+/R-) and can now stop Valcyte being 6 months post transplant.  Will check CMV PCR monthly until 12 months post transplant.    # Hypertension: Borderline control;  Goal BP: < 130/80   - Volume status: Euvolemic   - Changes: Yes - Will increase amlodipine to 10 mg nightly.    # Mineral Bone Disorder:  - Vitamin D; level: Low        On supplement: Yes  - Calcium; level: Normal        On supplement: No    # Electrolytes:   - Potassium; level: Normal        On supplement: No  - Magnesium; level: Normal        On supplement: Yes  - Bicarbonate; level: Normal        On supplement: Yes    # PKD, s/p Right Native Nephrectomy: Patient had right native kidney removed at the time of this latest transplant.    # Urinary Retention: Reports being asymptomatic now on tamsulosin.   - If patient continues to do well, could consider  stopping it at next visit.    # Medical Compliance: Yes    # Health Maintenance and Vaccination Review: Not Reviewed    # Transplant History:  Etiology of Kidney Failure: Polycystic kidney disease (PKD)  Tx: DDKT  Transplant: 12/4/2022 (Kidney), 10/13/2015 (Kidney), 10/23/2021 (Kidney)  Donor Type: Donation after Brain Death Donor Class:   Crossmatch at time of Tx: negative  DSA at time of Tx: No  Significant changes in immunosuppression: None  CMV IgG Ab High Risk Discordance (D+/R-): Yes  EBV IgG Ab High Risk Discordance (D+/R-): No  Significant transplant-related complications: None    Transplant Office Phone Number: [phone]    Assessment and plan was discussed with the patient and he voiced his understanding and agreement.    Return visit: Return in about 2 months (around 8/27/2023).    Francis Espinoza MD    Chief Complaint   Mr. Pierce is a 52 year old here for kidney transplant and immunosuppression management.     History of Present Illness    Mr. Pierce reports feeling good overall with some medical complaints.  Since last clinic visit, patient reports no hospitalizations or new medical complaints and has been doing well overall.  His energy level is good and pretty close to normal now.  He is active, working long hours, and gets some exercise.  Denies any chest pain or shortness of breath with exertion.  No leg swelling.    Appetite is good and weight is stable.  No nausea, vomiting or diarrhea.  No fever, sweats or chills.  No night sweats.  No issues emptying his bladder.    Home BP: 130/80s    Problem List   Patient Active Problem List   Diagnosis     Polycystic kidney, autosomal dominant     Dyslipidemia     Vitamin D deficiency     HTN, kidney transplant related     Secondary renal hyperparathyroidism (HCC)     Kidney replaced by transplant     Immunosuppressed status (H)     Acute gouty arthritis     Hypomagnesemia     Aftercare following organ transplant     Kidney transplant recipient  "    Metabolic acidosis     Need for pneumocystis prophylaxis     CKD (chronic kidney disease) stage 2, GFR 60-89 ml/min     Urinary retention       Allergies   Allergies   Allergen Reactions     Isopropyl Alcohol Rash     Swabs used at hospital     Anti-Thymocyte Globulin (Rabbit) [Antithymocyte Globulin] Other (See Comments)     Fever and rigors with rodrigo-op dose. Received methylprednisolone 500mg prior to thymo.       Medications   Current Outpatient Medications   Medication Sig     acetaminophen (TYLENOL) 325 MG tablet Take 1-2 tablets (325-650 mg) by mouth every 4 hours as needed for pain     amLODIPine (NORVASC) 10 MG tablet Take 1 tablet (10 mg) by mouth At Bedtime     atorvastatin (LIPITOR) 10 MG tablet Take 10 mg by mouth daily     citalopram (CELEXA) 20 MG tablet Take 20 mg by mouth daily.     magnesium oxide (MAG-OX) 400 MG tablet Take 2 tablets (800 mg) by mouth daily AND 1 tablet (400 mg) daily.     mycophenolic acid (GENERIC EQUIVALENT) 180 MG EC tablet Take 3 tablets (540 mg) by mouth 2 times daily Take in place of mycophenolate (Cellcept)     sodium bicarbonate 650 MG tablet Take 1 tablet (650 mg) by mouth 2 times daily     sulfamethoxazole-trimethoprim (BACTRIM) 400-80 MG tablet Take 1 tablet by mouth daily     tacrolimus (GENERIC EQUIVALENT) 0.5 MG capsule Take 1 capsule (0.5 mg) by mouth 2 times daily Total dose = 2.5 mg twice daily     tacrolimus (GENERIC EQUIVALENT) 1 MG capsule Take 2 capsules (2 mg) by mouth 2 times daily Total dose = 2.5 mg twice daily     tamsulosin (FLOMAX) 0.4 MG capsule Take 1 capsule (0.4 mg) by mouth daily     Vitamin D3 (CHOLECALCIFEROL) 25 mcg (1000 units) tablet Take 25 mcg by mouth daily     No current facility-administered medications for this visit.     Medications Discontinued During This Encounter   Medication Reason     valGANciclovir (VALCYTE) 450 MG tablet      amLODIPine (NORVASC) 5 MG tablet        Physical Exam   Vital Signs: Ht 1.778 m (5' 10\")   Wt 82.6 " kg (182 lb)   BMI 26.11 kg/m      GENERAL APPEARANCE: alert and no distress  HENT: no obvious abnormalities on appearance  RESP: breathing appears unremarkable with normal rate, no audible wheezing or cough and no apparent shortness of breath with conversation  MS: extremities normal - no gross deformities noted  SKIN: no apparent rash and normal skin tone  NEURO: speech is clear with no obvious neurological deficits  PSYCH: mentation appears normal and affect normal    Data         Latest Ref Rng & Units 6/26/2023     7:36 AM 6/13/2023     7:40 AM 5/30/2023     7:42 AM   Renal   Na (external) 136 - 145 meq/L 141     142  140    K (external) 3.5 - 5.1 meq/L 4.4     4.4  4.6    Cl 98 - 109 meq/L 107     109  105    Cl (external) 98 - 109 meq/L 107     109  105    CO2 (external) 20 - 29 meq/L 24     21  25    BUN (external) 6 - 22 mg/dL 23     28  24    Cr (external) 0.73 - 1.18 mg/dL 1.36     1.51  1.55    Glucose (external) 70 - 99 mg/dL 93     100  102    Ca (external) 8.5 - 10.5 mg/dL 9.2     9.7  9.6    Mg (external) 1.6 - 2.6 mg/dL  1.7         This result is from an external source.         Latest Ref Rng & Units 6/13/2023     7:40 AM 5/15/2023     8:28 AM 4/10/2023     7:45 AM   Bone Health   Phos (external) 2.3 - 4.7 mg/dL 5.4  4.2  2.7       PTHi (external) 14 - 95 pg/mL   51           This result is from an external source.         Latest Ref Rng & Units 6/26/2023     7:36 AM 6/13/2023     7:40 AM 5/30/2023     7:42 AM   Heme   WBC (external) 4.0 - 11.0 K/uL 5.1  4.9  4.5    Hgb (external) 13.5 - 17.5 g/dL 14.0  14.7  14.0    Plt (external) 140 - 400 K/uL 199  193  205          Latest Ref Rng & Units 5/30/2023     7:42 AM 12/4/2022     7:31 AM 11/1/2021     4:40 AM   Liver   AP 40 - 129 U/L  50     AP (external) 40 - 150 U/L 66      TBili <=1.2 mg/dL  0.6     TBili (external) 0.2 - 1.2 mg/dL 0.5      DBili (external) 0.0 - 0.5 mg/dL 0.2      ALT 10 - 50 U/L  14     ALT (external) 0 - 55 U/L 30      AST 10  - 50 U/L  25     AST (external) 5 - 34 U/L 31      Tot Protein 6.4 - 8.3 g/dL  5.7     Tot Protein (external) 6.0 - 8.3 g/dL 6.4      Albumin 3.4 - 5.0 g/dL   2.0    Albumin 3.5 - 5.2 g/dL  3.9     Albumin (external) 3.5 - 5.2 g/dL 4.2            Latest Ref Rng & Units 5/30/2023     7:42 AM 12/4/2022     7:31 AM 10/23/2021     3:58 AM   Pancreas   A1C <5.7 %  4.2  5.6    A1C (external) <5.7 % 4.8            Latest Ref Rng & Units 12/12/2022     7:35 AM 12/9/2022     8:29 AM 10/27/2021     6:21 AM   Iron studies   Iron 61 - 157 ug/dL 45  83  19    Iron Saturation Index 15 - 46 %   10    Iron Sat Index 15 - 46 % 17  33     Ferritin 31 - 409 ng/mL 699  983  597          Latest Ref Rng & Units 6/13/2023     7:40 AM 5/15/2023     8:28 AM 4/10/2023     7:45 AM   [facility] Virology   BK Quant Log Ext log IU/mL Undetected  Undetected  Undetected    BK Quant Result Ext Undetected IU/mL Undetected  Undetected  Undetected    BK Quant Spec Ext  Plasma  Plasma  Plasma         Recent Labs   Lab Test 12/09/22  0829 12/10/22  0719 12/12/22  0735 01/23/23  1215   DOSTAC 12/8/2022  --  12/11/2022 1/22/2023   TACROL 4.0* 6.2 9.9 17.1*     Recent Labs   Lab Test 10/19/15  0707 10/26/15  0707 11/09/15  0758 01/23/23  1215   DOSMPA 2,000 Not Provided 11/8/15 AT 2030  --    MPACID 1.27 4.01* 0.45* 4.44*   MPAG >200.0* 190.0* 20.5* 169.9*       Francis Espinoza MD

## 2023-06-27 NOTE — NURSING NOTE
Is the patient currently in the state of [place]? YES    Visit mode:VIDEO    If the visit is dropped, the patient can be reconnected by: VIDEO VISIT: Text to cell phone: [phone]    Will anyone else be joining the visit? NO      How would you like to obtain your AVS? MyChart    Are changes needed to the allergy or medication list? NO    Reason for visit: RECHECK

## 2023-07-07 PROBLEM — R33.9 URINARY RETENTION: Status: ACTIVE | Noted: 2023-07-07

## 2023-07-07 NOTE — PATIENT INSTRUCTIONS
Patient Recommendations:  - Stop Valcyte .  - Increase amlodipine to 10 mg nightly.    Transplant Patient Information  Your Post Transplant Coordinator is: Mel Cervantes  For non urgent items, we encourage you to contact your coordinator/care team online via [employer]  You and your care team can also contact your transplant coordinator Monday - Friday, 8am - 5pm at [phone] (Option 2 to reach the coordinator or Option 4 to schedule an appointment).  After hours for urgent matters, please call [facility] at [phone].

## 2023-07-25 ENCOUNTER — LAB (OUTPATIENT)
Dept: LAB | Facility: CLINIC | Age: 53
End: 2023-07-25
Payer: COMMERCIAL

## 2023-07-25 DIAGNOSIS — Z94.0 KIDNEY REPLACED BY TRANSPLANT: ICD-10-CM

## 2023-07-25 DIAGNOSIS — Z48.298 AFTERCARE FOLLOWING ORGAN TRANSPLANT: ICD-10-CM

## 2023-07-25 DIAGNOSIS — Z79.899 ENCOUNTER FOR LONG-TERM CURRENT USE OF MEDICATION: ICD-10-CM

## 2023-07-25 PROCEDURE — 86833 HLA CLASS II HIGH DEFIN QUAL: CPT | Performed by: INTERNAL MEDICINE

## 2023-07-25 PROCEDURE — 86832 HLA CLASS I HIGH DEFIN QUAL: CPT | Performed by: INTERNAL MEDICINE

## 2023-11-15 DIAGNOSIS — Z94.0 KIDNEY REPLACED BY TRANSPLANT: Primary | ICD-10-CM

## 2023-11-15 DIAGNOSIS — Z79.899 ENCOUNTER FOR LONG-TERM CURRENT USE OF MEDICATION: ICD-10-CM

## 2023-11-15 DIAGNOSIS — Z48.298 AFTERCARE FOLLOWING ORGAN TRANSPLANT: ICD-10-CM

## 2023-11-15 DIAGNOSIS — Z98.890 OTHER SPECIFIED POSTPROCEDURAL STATES: ICD-10-CM

## 2023-11-28 NOTE — OR NURSING
Called into OR. Reviewed ultrasound and labs. Dr. Warren requested Albumin infusion and then can transfer to 6B. No further issues reported.   
Dr. Colvin at bedside. CXR OK. Ok to use QLC right internal jugular.  
Paged Transplant Fellow to review ultrasound and labs.  
Ultrasound present.  
left wrist/done

## 2023-12-22 DIAGNOSIS — Z94.0 KIDNEY TRANSPLANTED: Primary | ICD-10-CM

## 2023-12-22 RX ORDER — SULFAMETHOXAZOLE AND TRIMETHOPRIM 400; 80 MG/1; MG/1
1 TABLET ORAL DAILY
Qty: 30 TABLET | Refills: 11 | Status: SHIPPED | OUTPATIENT
Start: 2023-12-22

## 2024-02-18 ENCOUNTER — HEALTH MAINTENANCE LETTER (OUTPATIENT)
Age: 54
End: 2024-02-18

## 2024-03-01 ASSESSMENT — ENCOUNTER SYMPTOMS: NEW SYMPTOMS OF CORONARY ARTERY DISEASE: 0

## 2024-03-30 DIAGNOSIS — Z94.0 KIDNEY REPLACED BY TRANSPLANT: ICD-10-CM

## 2024-03-30 DIAGNOSIS — Z48.298 AFTERCARE FOLLOWING ORGAN TRANSPLANT: ICD-10-CM

## 2024-04-01 RX ORDER — MYCOPHENOLIC ACID 180 MG/1
540 TABLET, DELAYED RELEASE ORAL 2 TIMES DAILY
Qty: 180 TABLET | Refills: 11 | Status: SHIPPED | OUTPATIENT
Start: 2024-04-01

## 2024-04-03 ENCOUNTER — TELEPHONE (OUTPATIENT)
Dept: TRANSPLANT | Facility: CLINIC | Age: 54
End: 2024-04-03
Payer: COMMERCIAL

## 2024-04-03 DIAGNOSIS — Z76.82 KIDNEY TRANSPLANT CANDIDATE: ICD-10-CM

## 2024-04-03 DIAGNOSIS — D84.9 IMMUNOSUPPRESSED STATUS (H): ICD-10-CM

## 2024-04-03 DIAGNOSIS — D75.1 POST-TRANSPLANT ERYTHROCYTOSIS: Primary | ICD-10-CM

## 2024-04-03 DIAGNOSIS — Z94.0 KIDNEY TRANSPLANTED: ICD-10-CM

## 2024-04-03 NOTE — LETTER
PHYSICIAN ORDERS      DATE & TIME ISSUED: 2024 3:03 PM  PATIENT NAME: Raj Pierce   : 1970     [facility] MR# [if applicable]: [id number]     DIAGNOSIS:  kidney transplant, post-transplant erythrocytosis     ICD-10 CODE: Z94.0, D75.1        History of PCKD s/p kidney transplant on 2022. Now with post-transplant erythrocytosis. Requesting the following to rule out dominant mass(es) concerning for renal cell carcinoma:      -kidney transplant US without doppler (kidney transplant located in right iliac fossa)   -native kidney US without doppler (only has left native kidney, right native kidney was removed)        Any questions please call: [phone]  Please call the patient to schedule.      Please fax each result same day as resulted/available    Critical lab results page [phone]  Fax interpretation to:   [phone].    Georges Quach MD

## 2024-04-03 NOTE — TELEPHONE ENCOUNTER
ISSUE:  Trend up in Hgb   Tac 9.7 (goal 4-6), on 2.5 mg bid     Georges Quach MD Poucher, Jessica, RN  Post txp erythrocytosis. Recommend stopping amlodipine, start enalapril 10mg daily. Check Bps and let us know if not controlled to <130/80. Obtain U/S of native L kidney and kidney txp to look for dominant mass. Due for follow up. He could see me virtually in June, that would be fine.    Spoke with Raj, verbalized understanding of plan. US orders faxed to [facility]. Has one native kidney and 2 transplanted kidneys.     ISSUE:   Tacrolimus IR level 9.7 on 4/2, goal 4-6, dose 2 mg BID.    PLAN:   Call Patientand confirm this was an accurate 12-hour trough.   Verify Tacrolimus IR dose 2 mg BID.   Confirm no new medications or illness.   Confirm no missed doses.   If accurate trough and accurate dose, decrease Tacrolimus IR dose to 1.5 mg BID     Is this more than a 50% increase or decrease in current IS dose: No  If YES, justification: N/A    Repeat labs in 1-2 weeks.  *If > 50% change in immunosuppression dose, repeat labs in 1 week.     OUTCOME:   Spoke with Patient, they confirm accurate trough level and current dose 2 mg BID.   Patientconfirmed dose change to 1.5 mg BID.  Patientagreed to repeat labs in 1-2 weeks.   Orders sent to preferred pharmacy for dose change and lab for repeat labs.   Patientvoiced understanding of plan.

## 2024-04-09 RX ORDER — TACROLIMUS 0.5 MG/1
CAPSULE ORAL
Qty: 180 CAPSULE | Refills: 3 | Status: SHIPPED | OUTPATIENT
Start: 2024-04-09 | End: 2024-04-09

## 2024-04-09 RX ORDER — TACROLIMUS 1 MG/1
2 CAPSULE ORAL 2 TIMES DAILY
Qty: 360 CAPSULE | Refills: 3 | Status: SHIPPED | OUTPATIENT
Start: 2024-04-09 | End: 2024-04-09

## 2024-04-09 RX ORDER — TACROLIMUS 0.5 MG/1
0.5 CAPSULE ORAL 2 TIMES DAILY
Qty: 180 CAPSULE | Refills: 3 | Status: SHIPPED | OUTPATIENT
Start: 2024-04-09

## 2024-04-09 RX ORDER — TACROLIMUS 1 MG/1
1 CAPSULE ORAL 2 TIMES DAILY
Qty: 180 CAPSULE | Refills: 3 | Status: SHIPPED | OUTPATIENT
Start: 2024-04-09

## 2024-04-09 RX ORDER — ENALAPRIL MALEATE 10 MG/1
10 TABLET ORAL DAILY
Qty: 30 TABLET | Refills: 3 | Status: SHIPPED | OUTPATIENT
Start: 2024-04-09 | End: 2024-07-19

## 2024-06-01 DIAGNOSIS — R33.9 URINARY RETENTION: ICD-10-CM

## 2024-06-03 RX ORDER — TAMSULOSIN HYDROCHLORIDE 0.4 MG/1
0.4 CAPSULE ORAL DAILY
Qty: 30 CAPSULE | Refills: 0 | Status: SHIPPED | OUTPATIENT
Start: 2024-06-03

## 2024-07-18 ENCOUNTER — TELEPHONE (OUTPATIENT)
Dept: TRANSPLANT | Facility: CLINIC | Age: 54
End: 2024-07-18
Payer: COMMERCIAL

## 2024-07-18 DIAGNOSIS — D75.1 POST-TRANSPLANT ERYTHROCYTOSIS: Primary | ICD-10-CM

## 2024-07-19 RX ORDER — LOSARTAN POTASSIUM 25 MG/1
25 TABLET ORAL DAILY
Qty: 30 TABLET | Refills: 5 | Status: SHIPPED | OUTPATIENT
Start: 2024-07-19

## 2024-07-19 NOTE — TELEPHONE ENCOUNTER
Spoke with Raj, c/o dry cough since starting enalapril a few months ago. Denies fevers, chest pains, shortness of breath. Says cough is a nuisance.     Reviewed with Dr. Espinoza, ok to switch to losartan 25 mg at bedtime. Raj notified, will report BPs back to [facility] after starting.

## 2025-01-13 DIAGNOSIS — Z94.0 KIDNEY TRANSPLANTED: ICD-10-CM

## 2025-01-13 RX ORDER — SULFAMETHOXAZOLE AND TRIMETHOPRIM 400; 80 MG/1; MG/1
1 TABLET ORAL DAILY
Qty: 30 TABLET | Refills: 11 | Status: SHIPPED | OUTPATIENT
Start: 2025-01-13

## 2025-02-04 DIAGNOSIS — R33.9 URINARY RETENTION: ICD-10-CM

## 2025-02-04 NOTE — LETTER
OUTPATIENT LABORATORY TEST ORDER     Patient Name: Raj Pierce   YOB: 1970     [facility] MR# [if applicable]: [id number]   Date & Time: February 4, 2025  9:06 AM  Expiration Date: 1 year after date issued      Diagnoses: Kidney Transplant (ICD-10 Z94.0)   Long term use of medications (ICD-10 Z79.899)    Other specified postprocedural states (Z98.890)     We ask your assistance in obtaining the following laboratory tests, which are part of our routine surveillance program for Solid Organ Transplant patients.     Please fax each result to [phone], same day as resulted/available    Critical lab results page [phone]      Every other month   CBC with platelets  Basic Metabolic Panel (Sodium, Potassium, Chloride, CO2, Creatinine, Urea Nitrogen, glucose, Calcium)  Tacrolimus drug level - 12-hour trough, please document time of last dose      Every 6 Months   Urine for protein/creatinine    At 2 years post-transplant   PRA/DSA level (mailers provided by the patient)    At 2 1/2  years post-transplant   PRA/DSA level (mailers provided by the patient)       If you have any questions, please call [facility] [phone] or [phone], Fax [phone].    .

## 2025-02-06 ENCOUNTER — TELEPHONE (OUTPATIENT)
Dept: TRANSPLANT | Facility: CLINIC | Age: 55
End: 2025-02-06
Payer: MEDICARE

## 2025-02-06 DIAGNOSIS — Z98.890 OTHER SPECIFIED POSTPROCEDURAL STATES: ICD-10-CM

## 2025-02-06 DIAGNOSIS — R33.9 URINARY RETENTION: ICD-10-CM

## 2025-02-06 DIAGNOSIS — Z79.899 ENCOUNTER FOR LONG-TERM CURRENT USE OF MEDICATION: ICD-10-CM

## 2025-02-06 DIAGNOSIS — Z94.0 KIDNEY REPLACED BY TRANSPLANT: Primary | ICD-10-CM

## 2025-02-06 DIAGNOSIS — Z48.298 AFTERCARE FOLLOWING ORGAN TRANSPLANT: ICD-10-CM

## 2025-02-06 DIAGNOSIS — Z20.828 CONTACT WITH AND (SUSPECTED) EXPOSURE TO OTHER VIRAL COMMUNICABLE DISEASES: ICD-10-CM

## 2025-03-13 NOTE — PROGRESS NOTES
TRANSPLANT NEPHROLOGY CLINIC VISIT     Assessment & Plan   # DDKT: CKD Stage 2 - Stable   - Baseline Creatinine: ~ 1.3-1.6   - Proteinuria: Normal (<0.2 grams)   - DSA Hx: No DSA   - Last cPRA: 67%   - BK Viremia: No   - Kidney Tx Biopsy Hx: No biopsy history.    # PKD: No recent kidney pain or gross blood in the urine.    # Immunosuppression: Tacrolimus immediate release (goal 4-6) and Mycophenolic acid (dose 540 mg every 12 hours)   - Induction with Recent Transplant:  Intermediate Intensity Protocol   - Continue with intensive monitoring of immunosuppression for efficacy and toxicity.   - Historical Changes in Immunosuppression: None   - Changes: No    # Infection Prevention:   Last CD4 Level: Not checked  - PJP: Sulfa/TMP (Bactrim)      - CMV IgG Ab High Risk Discordance (D+/R-) at time of transplant: Yes  Present CMV Serostatus: Negative  - EBV IgG Ab High Risk Discordance (D+/R-) at time of transplant: No  Present EBV Serostatus: Positive    # Hypertension: Controlled;  Goal BP: < 130/80   - Changes: Not at this time; Recommend patient check blood pressure at home on a regular basis, such as once every week or two, and follow up with primary Nephrologist or PCP if above goal.    # Mineral Bone Disorder:    - Vitamin D; level: Not checked recently        On supplement: No  - Calcium; level: Normal        On supplement: No    # Electrolytes:   - Potassium; level: Normal        On supplement: No  - Magnesium; level: Not checked recently        On supplement: No  - Bicarbonate; level: Normal        On supplement: Yes    # Obesity, Class I (BMI = 31.0): Weight is up ~ 35 lbs.   - Recommend weight loss for overall health by increasing exercise and watching caloric intake.  - Patient may benefit from GLP1 agonists or SGLT2 inhibitors.    # Other Significant PMH:   - H/o Urinary Retention: No recent issues and remains on tamsulosin.     # Skin Cancer Risk:    - Discussed sun protection and recommend regular follow up  with Dermatology.    # Medical Compliance: No.  Evidence of labs less than recommended and infrequent transplant follow-up.  - Discussed importance of checking labs regularly as recommended, taking medications as prescribed and attending scheduled medical appointments.    # Transplant History:  Etiology of Kidney Failure: Polycystic kidney disease (PKD)  Tx: DDKT  Transplant: 12/4/2022 (Kidney), 10/13/2015 (Kidney), 10/23/2021 (Kidney)  Significant transplant-related complications: None    Transplant Office Phone Number: [phone]    Assessment and plan was discussed with the patient and he voiced his understanding and agreement.    Return visit: Return in about 6 months (around 9/14/2025).    Francis Espinoza MD    The longitudinal plan of care for the diagnosis(es)/condition(s) as documented were addressed during this visit. Due to the added complexity in care, I will continue to support Raj in the subsequent management and with ongoing continuity of care.      Chief Complaint   Mr. Pierce is a 54 year old here for kidney transplant and immunosuppression management.     History of Present Illness    Mr. Pierce reports feeling good overall.  Since last clinic visit:   Hospitalizations: No   New Medical Issues: No  Active/Exercise: Yes  Chest pain or shortness of breath: No  Lower extremity swelling: No  Weight change: Yes; Weight is up ~ 35 lbs   Appetite change: No  Nausea and vomiting: No  Diarrhea: No  Heartburn symptoms: No  Fever, sweats or chills: No  Night sweats: No  Urinary complaints: No    Home BP: Not checked    Problem List   Patient Active Problem List   Diagnosis    Polycystic kidney, autosomal dominant    Dyslipidemia    HTN, kidney transplant related    Secondary renal hyperparathyroidism (HCC)    Kidney replaced by transplant    Immunosuppressed status    Acute gouty arthritis    Aftercare following organ transplant    Metabolic acidosis    Need for pneumocystis prophylaxis    CKD  (chronic kidney disease) stage 2, GFR 60-89 ml/min    Urinary retention       Allergies   Allergies   Allergen Reactions    Isopropyl Alcohol Rash     Swabs used at hospital    Anti-Thymocyte Globulin (Rabbit) [Antithymocyte Globulin] Other (See Comments)     Fever and rigors with rodrigo-op dose. Received methylprednisolone 500mg prior to thymo.       Medications   Current Outpatient Medications   Medication Sig Dispense Refill    atorvastatin (LIPITOR) 10 MG tablet Take 10 mg by mouth daily      citalopram (CELEXA) 20 MG tablet Take 20 mg by mouth daily.      losartan (COZAAR) 25 MG tablet Take 1 tablet (25 mg) by mouth daily (Patient taking differently: Take 25 mg by mouth at bedtime.) 30 tablet 5    mycophenolic acid (GENERIC EQUIVALENT) 180 MG EC tablet Take 3 tablets (540 mg) by mouth 2 times daily Take in place of mycophenolate (Cellcept) 180 tablet 11    sodium bicarbonate 650 MG tablet Take 3 tablets (1,950 mg) by mouth 2 times daily.      sulfamethoxazole-trimethoprim (BACTRIM) 400-80 MG tablet Take 1 tablet by mouth daily 30 tablet 11    tacrolimus (GENERIC EQUIVALENT) 0.5 MG capsule Take 1 capsule (0.5 mg) by mouth 2 times daily Total dose = 1.5 mg twice daily 180 capsule 3    tacrolimus (GENERIC EQUIVALENT) 1 MG capsule Take 1 capsule (1 mg) by mouth 2 times daily Total dose = 1.5 mg twice daily 180 capsule 3    tamsulosin (FLOMAX) 0.4 MG capsule Take 1 capsule (0.4 mg) by mouth daily 30 capsule 0     No current facility-administered medications for this visit.     Medications Discontinued During This Encounter   Medication Reason    amLODIPine (NORVASC) 10 MG tablet Therapy completed (No AVS)    magnesium oxide (MAG-OX) 400 MG tablet Therapy completed (No AVS)    Vitamin D3 (CHOLECALCIFEROL) 25 mcg (1000 units) tablet Therapy completed (No AVS)    acetaminophen (TYLENOL) 325 MG tablet     sodium bicarbonate 650 MG tablet        Physical Exam   Vital Signs: /84 (BP Location: Right arm, Patient  "Position: Sitting, Cuff Size: Adult Large)   Pulse 72   Ht 1.778 m (5' 10\")   Wt 98 kg (216 lb)   BMI 30.99 kg/m      GENERAL APPEARANCE: alert and no distress  HENT: mouth without ulcers or lesions  RESP: lungs clear to auscultation - no rales, rhonchi or wheezes  CV: regular rhythm, normal rate, no rub, no murmur  EDEMA: no LE edema bilaterally  ABDOMEN: soft, nondistended, nontender, bowel sounds normal; Obese  MS: extremities normal - no gross deformities noted, no evidence of inflammation in joints, no muscle tenderness  SKIN: no rash  TX KIDNEY: normal  DIALYSIS ACCESS:  LUE AV fistula with NO thrill    Data         Latest Ref Rng & Units 3/11/2025     7:50 AM 9/12/2024     7:42 AM 5/28/2024     7:46 AM   Renal   Na (external) 136 - 145 meq/L 137  139     142    K (external) 3.5 - 5.1 meq/L 4.5  4.6     4.7    Cl 98 - 109 meq/L 105  105     110    Cl (external) 98 - 109 meq/L 105  105     110    CO2 (external) 20 - 29 meq/L 20  26     21    BUN (external) 6 - 22 mg/dL 23  24     19    Cr (external) 0.73 - 1.18 mg/dL 1.27  1.53     1.31    Glucose (external) 70 - 99 mg/dL 103  104     105    Ca (external) 8.5 - 10.5 mg/dL 9.2  9.5     9.2        This result is from an external source.         Latest Ref Rng & Units 11/29/2023     7:47 AM 8/28/2023     7:42 AM 6/13/2023     7:40 AM   Bone Health   Phos (external) 2.3 - 4.7 mg/dL  3.9     5.4    PTHi (external) 14 - 95 pg/mL 46             This result is from an external source.         Latest Ref Rng & Units 3/11/2025     7:50 AM 9/12/2024     7:42 AM 5/28/2024     7:46 AM   Heme   WBC (external) 4.0 - 11.0 K/uL 4.7  4.7     5.3    Hgb (external) 13.5 - 17.5 g/dL 15.8  15.7     15.5    Plt (external) 140 - 400 K/uL 201  208     189    ABSOLUTE NEUTROPHILS (EXTERNAL) 1.8 - 8.0 K/uL 3.1     3.3        ABSOLUTE LYMPHOCYTES (EXTERNAL) 0.8 - 4.1 K/uL 0.8     0.8        ABSOLUTE MONOCYTES (EXTERNAL) 0.0 - 1.0 K/uL 0.5     0.5        ABSOLUTE EOSINOPHILS (EXTERNAL) " 0.0 - 0.7 K/uL 0.2     0.1        ABSOLUTE BASOPHILS (EXTERNAL) 0.0 - 0.2 K/uL 0.0     0.0            This result is from an external source.         Latest Ref Rng & Units 11/29/2023     7:47 AM 5/30/2023     7:42 AM 12/4/2022     7:31 AM   Liver   AP 40 - 129 U/L   50    AP (external) 40 - 150 U/L  66     TBili <=1.2 mg/dL   0.6    TBili (external) 0.2 - 1.2 mg/dL  0.5     DBili (external) 0.0 - 0.5 mg/dL  0.2     ALT 10 - 50 U/L   14    ALT (external) 0 - 55 U/L  30     AST 10 - 50 U/L   25    AST (external) 5 - 34 U/L  31     Tot Protein 6.4 - 8.3 g/dL   5.7    Tot Protein (external) 6.0 - 8.3 g/dL  6.4     Albumin 3.5 - 5.2 g/dL   3.9    Albumin (external) 3.5 - 5.2 g/dL 4.5     4.2         This result is from an external source.         Latest Ref Rng & Units 5/30/2023     7:42 AM 12/4/2022     7:31 AM 10/23/2021     3:58 AM   Pancreas   A1C <5.7 %  4.2  5.6    A1C (external) <5.7 % 4.8            Latest Ref Rng & Units 11/29/2023     7:47 AM 12/12/2022     7:35 AM 12/9/2022     8:29 AM   Iron studies   Iron 61 - 157 ug/dL  45  83    Iron Sat Index 15 - 46 %  17  33    Ferritin 31 - 409 ng/mL  699  983    Ferritin (external) 22 - 275 NG/Ml 132             This result is from an external source.         Latest Ref Rng & Units 9/12/2024     7:42 AM 5/28/2024     7:46 AM 4/2/2024     7:38 AM   [facility]   BK Quant Log Ext log IU/mL Undetected  Undetected  Undetected    BK Quant Result Ext Undetected IU/mL Undetected  Undetected  Undetected    BK Quant Spec Ext  Plasma  Plasma  Plasma      Failed to redirect to the Timeline version of the REVFS SmartLink.  Recent Labs   Lab Test 12/09/22  0829 12/10/22  0719 12/12/22  0735 01/23/23  1215   DOSTAC 12/8/2022  --  12/11/2022 1/22/2023   TACROL 4.0* 6.2 9.9 17.1*     Recent Labs   Lab Test 01/23/23  1215   MPACID 4.44*   MPAG 169.9*

## 2025-03-14 ENCOUNTER — OFFICE VISIT (OUTPATIENT)
Dept: NEPHROLOGY | Facility: CLINIC | Age: 55
End: 2025-03-14
Attending: INTERNAL MEDICINE
Payer: MEDICARE

## 2025-03-14 VITALS
HEIGHT: 70 IN | SYSTOLIC BLOOD PRESSURE: 126 MMHG | BODY MASS INDEX: 30.92 KG/M2 | DIASTOLIC BLOOD PRESSURE: 84 MMHG | WEIGHT: 216 LBS | HEART RATE: 72 BPM

## 2025-03-14 DIAGNOSIS — Z94.0 HTN, KIDNEY TRANSPLANT RELATED: ICD-10-CM

## 2025-03-14 DIAGNOSIS — Z94.0 KIDNEY REPLACED BY TRANSPLANT: Primary | ICD-10-CM

## 2025-03-14 DIAGNOSIS — E87.8 LOW BICARBONATE: ICD-10-CM

## 2025-03-14 DIAGNOSIS — E87.20 METABOLIC ACIDOSIS: ICD-10-CM

## 2025-03-14 DIAGNOSIS — I15.1 HTN, KIDNEY TRANSPLANT RELATED: ICD-10-CM

## 2025-03-14 DIAGNOSIS — N18.2 CKD (CHRONIC KIDNEY DISEASE) STAGE 2, GFR 60-89 ML/MIN: ICD-10-CM

## 2025-03-14 DIAGNOSIS — Z48.298 AFTERCARE FOLLOWING ORGAN TRANSPLANT: ICD-10-CM

## 2025-03-14 DIAGNOSIS — Z29.89 NEED FOR PNEUMOCYSTIS PROPHYLAXIS: ICD-10-CM

## 2025-03-14 DIAGNOSIS — D75.1 POST-TRANSPLANT ERYTHROCYTOSIS: ICD-10-CM

## 2025-03-14 DIAGNOSIS — D84.9 IMMUNOSUPPRESSED STATUS: ICD-10-CM

## 2025-03-14 RX ORDER — SODIUM BICARBONATE 650 MG/1
1950 TABLET ORAL 2 TIMES DAILY
COMMUNITY
Start: 2025-03-14

## 2025-03-14 NOTE — LETTER
3/14/2025      RE: Raj Pierce  [address]  [place] [address]       TRANSPLANT NEPHROLOGY CLINIC VISIT     Assessment & Plan  # DDKT: CKD Stage 2 - Stable   - Baseline Creatinine: ~ 1.3-1.6   - Proteinuria: Normal (<0.2 grams)   - DSA Hx: No DSA   - Last cPRA: 67%   - BK Viremia: No   - Kidney Tx Biopsy Hx: No biopsy history.    # PKD: No recent kidney pain or gross blood in the urine.    # Immunosuppression: Tacrolimus immediate release (goal 4-6) and Mycophenolic acid (dose 540 mg every 12 hours)   - Induction with Recent Transplant:  Intermediate Intensity Protocol   - Continue with intensive monitoring of immunosuppression for efficacy and toxicity.   - Historical Changes in Immunosuppression: None   - Changes: No    # Infection Prevention:   Last CD4 Level: Not checked  - PJP: Sulfa/TMP (Bactrim)      - CMV IgG Ab High Risk Discordance (D+/R-) at time of transplant: Yes  Present CMV Serostatus: Negative  - EBV IgG Ab High Risk Discordance (D+/R-) at time of transplant: No  Present EBV Serostatus: Positive    # Hypertension: Controlled;  Goal BP: < 130/80   - Changes: Not at this time; Recommend patient check blood pressure at home on a regular basis, such as once every week or two, and follow up with primary Nephrologist or PCP if above goal.    # Mineral Bone Disorder:    - Vitamin D; level: Not checked recently        On supplement: No  - Calcium; level: Normal        On supplement: No    # Electrolytes:   - Potassium; level: Normal        On supplement: No  - Magnesium; level: Not checked recently        On supplement: No  - Bicarbonate; level: Normal        On supplement: Yes    # Obesity, Class I (BMI = 31.0): Weight is up ~ 35 lbs.   - Recommend weight loss for overall health by increasing exercise and watching caloric intake.  - Patient may benefit from GLP1 agonists or SGLT2 inhibitors.    # Other Significant PMH:   - H/o Urinary Retention: No recent issues and remains on  tamsulosin.     # Skin Cancer Risk:    - Discussed sun protection and recommend regular follow up with Dermatology.    # Medical Compliance: No.  Evidence of labs less than recommended and infrequent transplant follow-up.  - Discussed importance of checking labs regularly as recommended, taking medications as prescribed and attending scheduled medical appointments.    # Transplant History:  Etiology of Kidney Failure: Polycystic kidney disease (PKD)  Tx: DDKT  Transplant: 12/4/2022 (Kidney), 10/13/2015 (Kidney), 10/23/2021 (Kidney)  Significant transplant-related complications: None    Transplant Office Phone Number: [phone]    Assessment and plan was discussed with the patient and he voiced his understanding and agreement.    Return visit: Return in about 6 months (around 9/14/2025).    Francis Espinoza MD    The longitudinal plan of care for the diagnosis(es)/condition(s) as documented were addressed during this visit. Due to the added complexity in care, I will continue to support Raj in the subsequent management and with ongoing continuity of care.      Chief Complaint  Mr. Pierce is a 54 year old here for kidney transplant and immunosuppression management.     History of Present Illness   Mr. Pierce reports feeling good overall.  Since last clinic visit:   Hospitalizations: No   New Medical Issues: No  Active/Exercise: Yes  Chest pain or shortness of breath: No  Lower extremity swelling: No  Weight change: Yes; Weight is up ~ 35 lbs   Appetite change: No  Nausea and vomiting: No  Diarrhea: No  Heartburn symptoms: No  Fever, sweats or chills: No  Night sweats: No  Urinary complaints: No    Home BP: Not checked    Problem List  Patient Active Problem List   Diagnosis     Polycystic kidney, autosomal dominant     Dyslipidemia     HTN, kidney transplant related     Secondary renal hyperparathyroidism (HCC)     Kidney replaced by transplant     Immunosuppressed status     Acute gouty arthritis      Aftercare following organ transplant     Metabolic acidosis     Need for pneumocystis prophylaxis     CKD (chronic kidney disease) stage 2, GFR 60-89 ml/min     Urinary retention       Allergies  Allergies   Allergen Reactions     Isopropyl Alcohol Rash     Swabs used at hospital     Anti-Thymocyte Globulin (Rabbit) [Antithymocyte Globulin] Other (See Comments)     Fever and rigors with rodrigo-op dose. Received methylprednisolone 500mg prior to thymo.       Medications  Current Outpatient Medications   Medication Sig Dispense Refill     atorvastatin (LIPITOR) 10 MG tablet Take 10 mg by mouth daily       citalopram (CELEXA) 20 MG tablet Take 20 mg by mouth daily.       losartan (COZAAR) 25 MG tablet Take 1 tablet (25 mg) by mouth daily (Patient taking differently: Take 25 mg by mouth at bedtime.) 30 tablet 5     mycophenolic acid (GENERIC EQUIVALENT) 180 MG EC tablet Take 3 tablets (540 mg) by mouth 2 times daily Take in place of mycophenolate (Cellcept) 180 tablet 11     sodium bicarbonate 650 MG tablet Take 3 tablets (1,950 mg) by mouth 2 times daily.       sulfamethoxazole-trimethoprim (BACTRIM) 400-80 MG tablet Take 1 tablet by mouth daily 30 tablet 11     tacrolimus (GENERIC EQUIVALENT) 0.5 MG capsule Take 1 capsule (0.5 mg) by mouth 2 times daily Total dose = 1.5 mg twice daily 180 capsule 3     tacrolimus (GENERIC EQUIVALENT) 1 MG capsule Take 1 capsule (1 mg) by mouth 2 times daily Total dose = 1.5 mg twice daily 180 capsule 3     tamsulosin (FLOMAX) 0.4 MG capsule Take 1 capsule (0.4 mg) by mouth daily 30 capsule 0     No current facility-administered medications for this visit.     Medications Discontinued During This Encounter   Medication Reason     amLODIPine (NORVASC) 10 MG tablet Therapy completed (No AVS)     magnesium oxide (MAG-OX) 400 MG tablet Therapy completed (No AVS)     Vitamin D3 (CHOLECALCIFEROL) 25 mcg (1000 units) tablet Therapy completed (No AVS)     acetaminophen (TYLENOL) 325 MG tablet   "    sodium bicarbonate 650 MG tablet        Physical Exam  Vital Signs: /84 (BP Location: Right arm, Patient Position: Sitting, Cuff Size: Adult Large)   Pulse 72   Ht 1.778 m (5' 10\")   Wt 98 kg (216 lb)   BMI 30.99 kg/m      GENERAL APPEARANCE: alert and no distress  HENT: mouth without ulcers or lesions  RESP: lungs clear to auscultation - no rales, rhonchi or wheezes  CV: regular rhythm, normal rate, no rub, no murmur  EDEMA: no LE edema bilaterally  ABDOMEN: soft, nondistended, nontender, bowel sounds normal; Obese  MS: extremities normal - no gross deformities noted, no evidence of inflammation in joints, no muscle tenderness  SKIN: no rash  TX KIDNEY: normal  DIALYSIS ACCESS:  LUE AV fistula with NO thrill    Data        Latest Ref Rng & Units 3/11/2025     7:50 AM 9/12/2024     7:42 AM 5/28/2024     7:46 AM   Renal   Na (external) 136 - 145 meq/L 137  139     142    K (external) 3.5 - 5.1 meq/L 4.5  4.6     4.7    Cl 98 - 109 meq/L 105  105     110    Cl (external) 98 - 109 meq/L 105  105     110    CO2 (external) 20 - 29 meq/L 20  26     21    BUN (external) 6 - 22 mg/dL 23  24     19    Cr (external) 0.73 - 1.18 mg/dL 1.27  1.53     1.31    Glucose (external) 70 - 99 mg/dL 103  104     105    Ca (external) 8.5 - 10.5 mg/dL 9.2  9.5     9.2        This result is from an external source.         Latest Ref Rng & Units 11/29/2023     7:47 AM 8/28/2023     7:42 AM 6/13/2023     7:40 AM   Bone Health   Phos (external) 2.3 - 4.7 mg/dL  3.9     5.4    PTHi (external) 14 - 95 pg/mL 46             This result is from an external source.         Latest Ref Rng & Units 3/11/2025     7:50 AM 9/12/2024     7:42 AM 5/28/2024     7:46 AM   Heme   WBC (external) 4.0 - 11.0 K/uL 4.7  4.7     5.3    Hgb (external) 13.5 - 17.5 g/dL 15.8  15.7     15.5    Plt (external) 140 - 400 K/uL 201  208     189    ABSOLUTE NEUTROPHILS (EXTERNAL) 1.8 - 8.0 K/uL 3.1     3.3        ABSOLUTE LYMPHOCYTES (EXTERNAL) 0.8 - 4.1 K/uL " 0.8     0.8        ABSOLUTE MONOCYTES (EXTERNAL) 0.0 - 1.0 K/uL 0.5     0.5        ABSOLUTE EOSINOPHILS (EXTERNAL) 0.0 - 0.7 K/uL 0.2     0.1        ABSOLUTE BASOPHILS (EXTERNAL) 0.0 - 0.2 K/uL 0.0     0.0            This result is from an external source.         Latest Ref Rng & Units 11/29/2023     7:47 AM 5/30/2023     7:42 AM 12/4/2022     7:31 AM   Liver   AP 40 - 129 U/L   50    AP (external) 40 - 150 U/L  66     TBili <=1.2 mg/dL   0.6    TBili (external) 0.2 - 1.2 mg/dL  0.5     DBili (external) 0.0 - 0.5 mg/dL  0.2     ALT 10 - 50 U/L   14    ALT (external) 0 - 55 U/L  30     AST 10 - 50 U/L   25    AST (external) 5 - 34 U/L  31     Tot Protein 6.4 - 8.3 g/dL   5.7    Tot Protein (external) 6.0 - 8.3 g/dL  6.4     Albumin 3.5 - 5.2 g/dL   3.9    Albumin (external) 3.5 - 5.2 g/dL 4.5     4.2         This result is from an external source.         Latest Ref Rng & Units 5/30/2023     7:42 AM 12/4/2022     7:31 AM 10/23/2021     3:58 AM   Pancreas   A1C <5.7 %  4.2  5.6    A1C (external) <5.7 % 4.8            Latest Ref Rng & Units 11/29/2023     7:47 AM 12/12/2022     7:35 AM 12/9/2022     8:29 AM   Iron studies   Iron 61 - 157 ug/dL  45  83    Iron Sat Index 15 - 46 %  17  33    Ferritin 31 - 409 ng/mL  699  983    Ferritin (external) 22 - 275 NG/Ml 132             This result is from an external source.         Latest Ref Rng & Units 9/12/2024     7:42 AM 5/28/2024     7:46 AM 4/2/2024     7:38 AM   [facility]   BK Quant Log Ext log IU/mL Undetected  Undetected  Undetected    BK Quant Result Ext Undetected IU/mL Undetected  Undetected  Undetected    BK Quant Spec Ext  Plasma  Plasma  Plasma      Failed to redirect to the Timeline version of the REVFS SmartLink.  Recent Labs   Lab Test 12/09/22  0829 12/10/22  0719 12/12/22  0735 01/23/23  1215   DOSTAC 12/8/2022  --  12/11/2022 1/22/2023   TACROL 4.0* 6.2 9.9 17.1*     Recent Labs   Lab Test 01/23/23  1215   MPACID 4.44*   MPAG 169.9*       Francis  William Espinoza MD

## 2025-03-14 NOTE — LETTER
3/14/2025       RE: Raj Pierce  [address]  [place] [address]     Dear Colleague,    Thank you for referring your patient, Raj Pierce, to the [facility] at [facility]. Please see a copy of my visit note below.    TRANSPLANT NEPHROLOGY CLINIC VISIT     Assessment & Plan  # DDKT: CKD Stage 2 - Stable   - Baseline Creatinine: ~ 1.3-1.6   - Proteinuria: Normal (<0.2 grams)   - DSA Hx: No DSA   - Last cPRA: 67%   - BK Viremia: No   - Kidney Tx Biopsy Hx: No biopsy history.    # PKD: No recent kidney pain or gross blood in the urine.    # Immunosuppression: Tacrolimus immediate release (goal 4-6) and Mycophenolic acid (dose 540 mg every 12 hours)   - Induction with Recent Transplant:  Intermediate Intensity Protocol   - Continue with intensive monitoring of immunosuppression for efficacy and toxicity.   - Historical Changes in Immunosuppression: None   - Changes: No    # Infection Prevention:   Last CD4 Level: Not checked  - PJP: Sulfa/TMP (Bactrim)      - CMV IgG Ab High Risk Discordance (D+/R-) at time of transplant: Yes  Present CMV Serostatus: Negative  - EBV IgG Ab High Risk Discordance (D+/R-) at time of transplant: No  Present EBV Serostatus: Positive    # Hypertension: Controlled;  Goal BP: < 130/80   - Changes: Not at this time; Recommend patient check blood pressure at home on a regular basis, such as once every week or two, and follow up with primary Nephrologist or PCP if above goal.    # Mineral Bone Disorder:    - Vitamin D; level: Not checked recently        On supplement: No  - Calcium; level: Normal        On supplement: No    # Electrolytes:   - Potassium; level: Normal        On supplement: No  - Magnesium; level: Not checked recently        On supplement: No  - Bicarbonate; level: Normal        On supplement: Yes    # Obesity, Class I (BMI = 31.0): Weight is up ~ 35 lbs.   - Recommend weight loss for  overall health by increasing exercise and watching caloric intake.  - Patient may benefit from GLP1 agonists or SGLT2 inhibitors.    # Other Significant PMH:   - H/o Urinary Retention: No recent issues and remains on tamsulosin.     # Skin Cancer Risk:    - Discussed sun protection and recommend regular follow up with Dermatology.    # Medical Compliance: No.  Evidence of labs less than recommended and infrequent transplant follow-up.  - Discussed importance of checking labs regularly as recommended, taking medications as prescribed and attending scheduled medical appointments.    # Transplant History:  Etiology of Kidney Failure: Polycystic kidney disease (PKD)  Tx: DDKT  Transplant: 12/4/2022 (Kidney), 10/13/2015 (Kidney), 10/23/2021 (Kidney)  Significant transplant-related complications: None    Transplant Office Phone Number: [phone]    Assessment and plan was discussed with the patient and he voiced his understanding and agreement.    Return visit: Return in about 6 months (around 9/14/2025).    Francis Espinoza MD    The longitudinal plan of care for the diagnosis(es)/condition(s) as documented were addressed during this visit. Due to the added complexity in care, I will continue to support Raj in the subsequent management and with ongoing continuity of care.      Chief Complaint  Mr. Pierce is a 54 year old here for kidney transplant and immunosuppression management.     History of Present Illness   Mr. Pierce reports feeling good overall.  Since last clinic visit:   Hospitalizations: No   New Medical Issues: No  Active/Exercise: Yes  Chest pain or shortness of breath: No  Lower extremity swelling: No  Weight change: Yes; Weight is up ~ 35 lbs   Appetite change: No  Nausea and vomiting: No  Diarrhea: No  Heartburn symptoms: No  Fever, sweats or chills: No  Night sweats: No  Urinary complaints: No    Home BP: Not checked    Problem List  Patient Active Problem List   Diagnosis     Polycystic  kidney, autosomal dominant     Dyslipidemia     HTN, kidney transplant related     Secondary renal hyperparathyroidism (HCC)     Kidney replaced by transplant     Immunosuppressed status     Acute gouty arthritis     Aftercare following organ transplant     Metabolic acidosis     Need for pneumocystis prophylaxis     CKD (chronic kidney disease) stage 2, GFR 60-89 ml/min     Urinary retention       Allergies  Allergies   Allergen Reactions     Isopropyl Alcohol Rash     Swabs used at hospital     Anti-Thymocyte Globulin (Rabbit) [Antithymocyte Globulin] Other (See Comments)     Fever and rigors with rodrigo-op dose. Received methylprednisolone 500mg prior to thymo.       Medications  Current Outpatient Medications   Medication Sig Dispense Refill     atorvastatin (LIPITOR) 10 MG tablet Take 10 mg by mouth daily       citalopram (CELEXA) 20 MG tablet Take 20 mg by mouth daily.       losartan (COZAAR) 25 MG tablet Take 1 tablet (25 mg) by mouth daily (Patient taking differently: Take 25 mg by mouth at bedtime.) 30 tablet 5     mycophenolic acid (GENERIC EQUIVALENT) 180 MG EC tablet Take 3 tablets (540 mg) by mouth 2 times daily Take in place of mycophenolate (Cellcept) 180 tablet 11     sodium bicarbonate 650 MG tablet Take 3 tablets (1,950 mg) by mouth 2 times daily.       sulfamethoxazole-trimethoprim (BACTRIM) 400-80 MG tablet Take 1 tablet by mouth daily 30 tablet 11     tacrolimus (GENERIC EQUIVALENT) 0.5 MG capsule Take 1 capsule (0.5 mg) by mouth 2 times daily Total dose = 1.5 mg twice daily 180 capsule 3     tacrolimus (GENERIC EQUIVALENT) 1 MG capsule Take 1 capsule (1 mg) by mouth 2 times daily Total dose = 1.5 mg twice daily 180 capsule 3     tamsulosin (FLOMAX) 0.4 MG capsule Take 1 capsule (0.4 mg) by mouth daily 30 capsule 0     No current facility-administered medications for this visit.     Medications Discontinued During This Encounter   Medication Reason     amLODIPine (NORVASC) 10 MG tablet Therapy  "completed (No AVS)     magnesium oxide (MAG-OX) 400 MG tablet Therapy completed (No AVS)     Vitamin D3 (CHOLECALCIFEROL) 25 mcg (1000 units) tablet Therapy completed (No AVS)     acetaminophen (TYLENOL) 325 MG tablet      sodium bicarbonate 650 MG tablet        Physical Exam  Vital Signs: /84 (BP Location: Right arm, Patient Position: Sitting, Cuff Size: Adult Large)   Pulse 72   Ht 1.778 m (5' 10\")   Wt 98 kg (216 lb)   BMI 30.99 kg/m      GENERAL APPEARANCE: alert and no distress  HENT: mouth without ulcers or lesions  RESP: lungs clear to auscultation - no rales, rhonchi or wheezes  CV: regular rhythm, normal rate, no rub, no murmur  EDEMA: no LE edema bilaterally  ABDOMEN: soft, nondistended, nontender, bowel sounds normal; Obese  MS: extremities normal - no gross deformities noted, no evidence of inflammation in joints, no muscle tenderness  SKIN: no rash  TX KIDNEY: normal  DIALYSIS ACCESS:  LUE AV fistula with NO thrill    Data        Latest Ref Rng & Units 3/11/2025     7:50 AM 9/12/2024     7:42 AM 5/28/2024     7:46 AM   Renal   Na (external) 136 - 145 meq/L 137  139     142    K (external) 3.5 - 5.1 meq/L 4.5  4.6     4.7    Cl 98 - 109 meq/L 105  105     110    Cl (external) 98 - 109 meq/L 105  105     110    CO2 (external) 20 - 29 meq/L 20  26     21    BUN (external) 6 - 22 mg/dL 23  24     19    Cr (external) 0.73 - 1.18 mg/dL 1.27  1.53     1.31    Glucose (external) 70 - 99 mg/dL 103  104     105    Ca (external) 8.5 - 10.5 mg/dL 9.2  9.5     9.2        This result is from an external source.         Latest Ref Rng & Units 11/29/2023     7:47 AM 8/28/2023     7:42 AM 6/13/2023     7:40 AM   Bone Health   Phos (external) 2.3 - 4.7 mg/dL  3.9     5.4    PTHi (external) 14 - 95 pg/mL 46             This result is from an external source.         Latest Ref Rng & Units 3/11/2025     7:50 AM 9/12/2024     7:42 AM 5/28/2024     7:46 AM   Heme   WBC (external) 4.0 - 11.0 K/uL 4.7  4.7     5.3  "   Hgb (external) 13.5 - 17.5 g/dL 15.8  15.7     15.5    Plt (external) 140 - 400 K/uL 201  208     189    ABSOLUTE NEUTROPHILS (EXTERNAL) 1.8 - 8.0 K/uL 3.1     3.3        ABSOLUTE LYMPHOCYTES (EXTERNAL) 0.8 - 4.1 K/uL 0.8     0.8        ABSOLUTE MONOCYTES (EXTERNAL) 0.0 - 1.0 K/uL 0.5     0.5        ABSOLUTE EOSINOPHILS (EXTERNAL) 0.0 - 0.7 K/uL 0.2     0.1        ABSOLUTE BASOPHILS (EXTERNAL) 0.0 - 0.2 K/uL 0.0     0.0            This result is from an external source.         Latest Ref Rng & Units 11/29/2023     7:47 AM 5/30/2023     7:42 AM 12/4/2022     7:31 AM   Liver   AP 40 - 129 U/L   50    AP (external) 40 - 150 U/L  66     TBili <=1.2 mg/dL   0.6    TBili (external) 0.2 - 1.2 mg/dL  0.5     DBili (external) 0.0 - 0.5 mg/dL  0.2     ALT 10 - 50 U/L   14    ALT (external) 0 - 55 U/L  30     AST 10 - 50 U/L   25    AST (external) 5 - 34 U/L  31     Tot Protein 6.4 - 8.3 g/dL   5.7    Tot Protein (external) 6.0 - 8.3 g/dL  6.4     Albumin 3.5 - 5.2 g/dL   3.9    Albumin (external) 3.5 - 5.2 g/dL 4.5     4.2         This result is from an external source.         Latest Ref Rng & Units 5/30/2023     7:42 AM 12/4/2022     7:31 AM 10/23/2021     3:58 AM   Pancreas   A1C <5.7 %  4.2  5.6    A1C (external) <5.7 % 4.8            Latest Ref Rng & Units 11/29/2023     7:47 AM 12/12/2022     7:35 AM 12/9/2022     8:29 AM   Iron studies   Iron 61 - 157 ug/dL  45  83    Iron Sat Index 15 - 46 %  17  33    Ferritin 31 - 409 ng/mL  699  983    Ferritin (external) 22 - 275 NG/Ml 132             This result is from an external source.         Latest Ref Rng & Units 9/12/2024     7:42 AM 5/28/2024     7:46 AM 4/2/2024     7:38 AM   [facility] Txp Virology   BK Quant Log Ext log IU/mL Undetected  Undetected  Undetected    BK Quant Result Ext Undetected IU/mL Undetected  Undetected  Undetected    BK Quant Spec Ext  Plasma  Plasma  Plasma      Failed to redirect to the Timeline version of the REVFS SmartLink.  Recent Labs   Lab Test  12/09/22  0829 12/10/22  0719 12/12/22  0735 01/23/23  1215   DOSTAC 12/8/2022  --  12/11/2022 1/22/2023   TACROL 4.0* 6.2 9.9 17.1*     Recent Labs   Lab Test 01/23/23  1215   MPACID 4.44*   MPAG 169.9*       Again, thank you for allowing me to participate in the care of your patient.      Sincerely,    Francis Espinoza MD

## 2025-03-19 PROBLEM — Z94.0 KIDNEY TRANSPLANT RECIPIENT: Status: RESOLVED | Noted: 2021-10-23 | Resolved: 2025-03-19

## 2025-03-20 NOTE — PATIENT INSTRUCTIONS
Patient Recommendations:  - Recommend weight loss for overall health by increasing exercise and watching caloric intake.  - Recommend checking blood pressure at home on a regular basis, such as once every week or two, and follow up with primary Nephrologist or PCP if above goal of less than 130/80.     Transplant Patient Information  Your Post Transplant Coordinator is: Mel Cervantes  For non urgent items, we encourage you to contact your coordinator/care team online via [employer]  You and your care team can also contact your transplant coordinator Monday - Friday, 8am - 5pm at [phone] (Option 2 to reach the coordinator or Option 4 to schedule an appointment).  After hours for urgent matters, please call [facility] at [phone].

## 2025-03-24 ENCOUNTER — TELEPHONE (OUTPATIENT)
Dept: TRANSPLANT | Facility: CLINIC | Age: 55
End: 2025-03-24
Payer: MEDICARE

## 2025-03-24 DIAGNOSIS — R33.9 URINARY RETENTION: ICD-10-CM

## 2025-03-24 NOTE — LETTER
OUTPATIENT LABORATORY TEST ORDER     Patient Name: Raj Pierce   YOB: 1970     [facility] MR# [if applicable]: [id number]   Date & Time: March 24, 2025  4:05 PM  Expiration Date: 1 year after date issued      Diagnoses: Kidney Transplant (ICD-10 Z94.0)   Long term use of medications (ICD-10 Z79.899)    Other specified postprocedural states (Z98.890)     We ask your assistance in obtaining the following laboratory tests, which are part of our routine surveillance program for Solid Organ Transplant patients.     Please fax each result to [phone], same day as resulted/available    Critical lab results page [phone]    Labs once (due now):   Vitamin D  Magnesium   T-cell subset (absolute CD4 count)   DSA/PRA (mailers provided by patient)     Every other month   CBC with platelets  Basic Metabolic Panel (Sodium, Potassium, Chloride, CO2, Creatinine, Urea Nitrogen, glucose, Calcium)  Tacrolimus drug level - 12-hour trough, please document time of last dose      Every 6 Months   Urine for protein/creatinine   PRA/DSA level (mailers provided by the patient)     If you have any questions, please call [facility] [phone] or [phone], Fax [phone].    .

## 2025-03-24 NOTE — TELEPHONE ENCOUNTER
Francis Espinoza MD Poucher, Jessica, RN  Please check the following labs: Vitamin D, Magnesium, DSA, and CD4 level.    If CD4 level is greater than 200, patient can stop Bactrim.    Eric

## 2025-03-28 ENCOUNTER — LAB (OUTPATIENT)
Dept: LAB | Facility: CLINIC | Age: 55
End: 2025-03-28
Payer: MEDICARE

## 2025-03-28 DIAGNOSIS — Z94.0 KIDNEY REPLACED BY TRANSPLANT: ICD-10-CM

## 2025-04-02 ENCOUNTER — TELEPHONE (OUTPATIENT)
Dept: TRANSPLANT | Facility: CLINIC | Age: 55
End: 2025-04-02
Payer: MEDICARE

## 2025-04-02 DIAGNOSIS — R33.9 URINARY RETENTION: ICD-10-CM

## 2025-04-10 DIAGNOSIS — Z94.0 KIDNEY TRANSPLANTED: ICD-10-CM

## 2025-04-10 DIAGNOSIS — D84.9 IMMUNOSUPPRESSED STATUS: ICD-10-CM

## 2025-04-10 DIAGNOSIS — R33.9 URINARY RETENTION: ICD-10-CM

## 2025-04-10 DIAGNOSIS — Z76.82 KIDNEY TRANSPLANT CANDIDATE: ICD-10-CM

## 2025-04-10 LAB
DONOR IDENTIFICATION: NORMAL
DSA COMMENTS: NORMAL
DSA PRESENT: NO
DSA TEST METHOD: NORMAL
ORGAN: NORMAL
SA 1  COMMENTS: NORMAL
SA 1 CELL: NORMAL
SA 1 TEST METHOD: NORMAL
SA 2 CELL: NORMAL
SA 2 COMMENTS: NORMAL
SA 2 TEST METHOD: NORMAL
SA1 HI RISK ABY: NORMAL
SA1 MOD RISK ABY: NORMAL
SA2 HI RISK ABY: NORMAL
SA2 MOD RISK ABY: NORMAL
UNACCEPTABLE ANTIGENS: NORMAL
UNOS CPRA: 67

## 2025-04-10 RX ORDER — TACROLIMUS 0.5 MG/1
0.5 CAPSULE ORAL 2 TIMES DAILY
Qty: 180 CAPSULE | Refills: 3 | Status: SHIPPED | OUTPATIENT
Start: 2025-04-10

## 2025-04-22 DIAGNOSIS — Z94.0 KIDNEY REPLACED BY TRANSPLANT: ICD-10-CM

## 2025-04-22 DIAGNOSIS — Z48.298 AFTERCARE FOLLOWING ORGAN TRANSPLANT: ICD-10-CM

## 2025-04-22 RX ORDER — MYCOPHENOLIC ACID 180 MG/1
540 TABLET, DELAYED RELEASE ORAL 2 TIMES DAILY
Qty: 180 TABLET | Refills: 0 | Status: SHIPPED | OUTPATIENT
Start: 2025-04-22

## 2025-05-23 DIAGNOSIS — Z94.0 KIDNEY REPLACED BY TRANSPLANT: ICD-10-CM

## 2025-05-23 DIAGNOSIS — Z48.298 AFTERCARE FOLLOWING ORGAN TRANSPLANT: ICD-10-CM

## 2025-05-27 RX ORDER — MYCOPHENOLIC ACID 180 MG/1
540 TABLET, DELAYED RELEASE ORAL 2 TIMES DAILY
Qty: 180 TABLET | Refills: 0 | Status: SHIPPED | OUTPATIENT
Start: 2025-05-27

## 2025-06-21 DIAGNOSIS — Z48.298 AFTERCARE FOLLOWING ORGAN TRANSPLANT: ICD-10-CM

## 2025-06-21 DIAGNOSIS — Z94.0 KIDNEY REPLACED BY TRANSPLANT: ICD-10-CM

## 2025-06-23 RX ORDER — MYCOPHENOLIC ACID 180 MG/1
540 TABLET, DELAYED RELEASE ORAL 2 TIMES DAILY
Qty: 180 TABLET | Refills: 0 | Status: SHIPPED | OUTPATIENT
Start: 2025-06-23

## 2025-07-30 DIAGNOSIS — D84.9 IMMUNOSUPPRESSED STATUS: ICD-10-CM

## 2025-07-30 DIAGNOSIS — Z94.0 KIDNEY TRANSPLANTED: ICD-10-CM

## 2025-07-30 DIAGNOSIS — Z94.0 KIDNEY REPLACED BY TRANSPLANT: Primary | ICD-10-CM

## 2025-07-30 DIAGNOSIS — Z76.82 KIDNEY TRANSPLANT CANDIDATE: ICD-10-CM

## 2025-07-30 DIAGNOSIS — Z48.298 AFTERCARE FOLLOWING ORGAN TRANSPLANT: ICD-10-CM

## 2025-07-30 RX ORDER — MYCOPHENOLIC ACID 180 MG/1
540 TABLET, DELAYED RELEASE ORAL 2 TIMES DAILY
Qty: 180 TABLET | Refills: 0 | Status: SHIPPED | OUTPATIENT
Start: 2025-07-30

## 2025-07-30 RX ORDER — TACROLIMUS 1 MG/1
1 CAPSULE ORAL 2 TIMES DAILY
Qty: 180 CAPSULE | Refills: 0 | Status: SHIPPED | OUTPATIENT
Start: 2025-07-30

## (undated) DEVICE — Device

## (undated) DEVICE — SU SILK 2-0 TIE 12X30" A305H

## (undated) DEVICE — DRAPE POUCH INSTRUMENT 1018

## (undated) DEVICE — BLADE CLIPPER SGL USE 9680

## (undated) DEVICE — SUCTION MANIFOLD NEPTUNE 2 SYS 4 PORT 0702-020-000

## (undated) DEVICE — SU PDS II 0 TP-1 60" Z991G

## (undated) DEVICE — SU PDS II 5-0 RB-1 27" Z303H

## (undated) DEVICE — LINEN TOWEL PACK X30 5481

## (undated) DEVICE — BASIN SET SINGLE STERILE 13752-624

## (undated) DEVICE — SUCTION TIP YANKAUER VIAGUARD BULBOUS HANDLE SMK200

## (undated) DEVICE — SU MONOCRYL 4-0 PS-2 27" UND Y426H

## (undated) DEVICE — SUTURE BOOTS 051003PBX

## (undated) DEVICE — CARTRIDGE ERBEJET PUMP 20150-300

## (undated) DEVICE — SU PROLENE 4-0 SHDA 36" 8521H

## (undated) DEVICE — CLIP HORIZON SM RED WIDE SLOT 001201

## (undated) DEVICE — SU SILK 3-0 TIE 12X30" A304H

## (undated) DEVICE — SU PROLENE 6-0 RB-2DA 30" 8711H

## (undated) DEVICE — SU SILK 4-0 TIE 12X30" A303H

## (undated) DEVICE — DRAPE IOBAN C-SECTION W/POUCH 30X35" 6657

## (undated) DEVICE — CATH FOGARTY EMBOLECTOMY 6FR 80CM LATEX 120806FP

## (undated) DEVICE — LINEN TOWEL PACK X6 WHITE 5487

## (undated) DEVICE — PAD CHUX UNDERPAD 23X24" 7136

## (undated) DEVICE — SPONGE LAP 18X18" X8435

## (undated) DEVICE — INSERT FOGARTY 33MM TRACTION HYDRAJAW HYDRA33

## (undated) DEVICE — DEFIB PRO-PADZ LVP LQD GEL ADULT 8900-2105-01

## (undated) DEVICE — WIPES FOLEY CARE SURESTEP PROVON DFC100

## (undated) DEVICE — SUCTION TIP YANKAUER W/O VENT K86

## (undated) DEVICE — SU PROLENE 7-0 BV-1DA 18" 8701H

## (undated) DEVICE — SURGICEL HEMOSTAT 4X8" 1952

## (undated) DEVICE — SU ETHILON 3-0 PS-1 18" 1663H

## (undated) DEVICE — CLIP APPLIER 09 3/8" SM LIGACLIP MCS20

## (undated) DEVICE — BLADE KNIFE SURG 15 371115

## (undated) DEVICE — VESSEL LOOPS DEV-O-LOOP WHITE MINI 31145728

## (undated) DEVICE — SU PROLENE 5-0 RB-1DA 36"  8556H

## (undated) DEVICE — VESSEL LOOPS YELLOW MAXI 31145694

## (undated) DEVICE — NDL COUNTER 20CT 31142493

## (undated) DEVICE — PREP CHLORAPREP 26ML TINTED ORANGE  260815

## (undated) DEVICE — PREP CHLORAPREP 26ML TINTED HI-LITE ORANGE 930815

## (undated) DEVICE — SYR BULB IRRIG DOVER 60 ML LATEX FREE 67000

## (undated) DEVICE — SURGICEL ABSORBABLE HEMOSTAT SNOW 4"X4" 2083

## (undated) DEVICE — CATH PLUG W/CAP 000076

## (undated) DEVICE — DRSG TEGADERM 8X12" 1629

## (undated) DEVICE — TUBING IRRIG CYSTO/BLADDER SET 81" LF 2C4040

## (undated) DEVICE — LINEN GOWN XLG 5407

## (undated) DEVICE — SOL NACL 0.9% IRRIG 1000ML BOTTLE 2F7124

## (undated) DEVICE — VESSEL LOOPS RED MINI 31145710

## (undated) DEVICE — STPL LINEAR 30X2.5MM VASC TX30V

## (undated) DEVICE — SU PDS II 0 CTX 36" Z370T

## (undated) DEVICE — SYR 10ML LL W/O NDL 302995

## (undated) DEVICE — DRSG MEDIPORE 3 1/2X13 3/4" 3573

## (undated) DEVICE — SU PDS II 4-0 RB-1 27" Z304H

## (undated) DEVICE — SU SILK 3-0 SH CR 8X18" C013D

## (undated) DEVICE — DRAIN JACKSON PRATT ROUND SIL 19FR W/TROCAR LF JP-2232

## (undated) DEVICE — SU PDS II 6-0 RB-2DA 30" Z149H

## (undated) DEVICE — ESU PENCIL SMOKE EVAC W/ROCKER SWITCH 0703-047-000

## (undated) DEVICE — TUBE NASOGASTRIC FEEDING ENTRIFLEX ENFIT 10FR 43 8884721088E

## (undated) DEVICE — CLIP HORIZON MED BLUE 002200

## (undated) DEVICE — DRAIN JACKSON PRATT CHANNEL 19FR ROUND HUBLESS SIL JP-2230

## (undated) DEVICE — TUBING SUCTION 10'X3/16" N510

## (undated) DEVICE — SU SILK 1 TIE 6X30" A307H

## (undated) DEVICE — DRAIN JACKSON PRATT 19FR ROUND SU130-1325

## (undated) DEVICE — DRAPE FLUID WARMING 52 X 60" ORS-321

## (undated) DEVICE — LIGHT HANDLE X1 31140133

## (undated) DEVICE — DRAPE IOBAN INCISE 23X17" 6650EZ

## (undated) DEVICE — CLIP HORIZON LG ORANGE 004200

## (undated) DEVICE — SU SILK 3-0 SH 30" K832H

## (undated) DEVICE — SU SILK 0 TIE 6X30" A306H

## (undated) DEVICE — CATH FOLEY 3WAY 18FR 30ML LATEX 0167SI18

## (undated) DEVICE — CATH CHOLANGIOGRAM 7.5FR TAUT PLASTIC TIP 20018-010

## (undated) DEVICE — GLOVE PROTEXIS BLUE W/NEU-THERA 8.0  2D73EB80

## (undated) DEVICE — ESU LIGASURE IMPACT OPEN SEALER/DVDR CVD LG JAW LF4418

## (undated) DEVICE — SURGICEL FIBRILLAR HEMOSTAT 4"X4" 1963

## (undated) DEVICE — DRSG PRIMAPORE 03 1/8X6" 66000318

## (undated) DEVICE — GLOVE PROTEXIS BLUE W/NEU-THERA 7.5  2D73EB75

## (undated) DEVICE — STPL SKIN 35W ROTATING HEAD PRW35

## (undated) DEVICE — PREP CHLORHEXIDINE 4% 4OZ (HIBICLENS) 57504

## (undated) DEVICE — SU VICRYL 2-0 TIE 12X18" J905T

## (undated) DEVICE — SU PROLENE 1 CTX 30" 8455H

## (undated) DEVICE — SOL NACL 0.9% INJ 1000ML BAG 2B1324X

## (undated) DEVICE — CANNULA VESSEL DLP  30001

## (undated) DEVICE — SU PROLENE 7-0 BV-1DA 30" 8703H

## (undated) DEVICE — ESU GROUND PAD ADULT W/CORD E7507

## (undated) DEVICE — DRAPE SLUSH/WARMER 66X44" ORS-320

## (undated) DEVICE — DRSG PRIMAPORE 02X3" 7133

## (undated) DEVICE — SOL WATER 10ML VIAL 6332318510

## (undated) DEVICE — ESU PENCIL W/COATED BLADE E2450H

## (undated) DEVICE — SU PROLENE 5-0 RB-2DA 30" 8710H

## (undated) DEVICE — GLIDEWIRE TERUMO .035X180 ANG STIFF GS3508

## (undated) DEVICE — GLOVE SENSICARE 7.0 MSG1070 LATEX FREE

## (undated) DEVICE — TUBING SUCTION MEDI-VAC SOFT 3/16"X20' N520A

## (undated) DEVICE — RETR VISCERA FISH MED 3204

## (undated) DEVICE — SU VICRYL 3-0 SH 27" UND J416H

## (undated) DEVICE — SOL WATER IRRIG 1000ML BOTTLE 2F7114

## (undated) DEVICE — GLOVE PROTEXIS BLUE W/NEU-THERA 6.5  2D73EB65

## (undated) DEVICE — CATH TRAY FOLEY SURESTEP 16FR W/URINE MTR STATLK LF A303416A

## (undated) DEVICE — BNDG ABDOMINAL BINDER 9X45-62" 79-89071

## (undated) DEVICE — GOWN IMPERVIOUS BREATHABLE SMART LG 89015

## (undated) DEVICE — SU PROLENE 4-0 RB-1DA 36" 8557H

## (undated) DEVICE — DRAPE SHEET REV FOLD 3/4 9349

## (undated) DEVICE — STPL RELOAD LINEAR 30X2.5MM VASC XR30V

## (undated) DEVICE — APPLICATOR ERBEJET W/SUCTION 20150-230

## (undated) DEVICE — SPONGE KITTNER 30-101

## (undated) DEVICE — RX VISTASEAL FIBRIN SEALANT W/THROMBIN 10ML VST10

## (undated) DEVICE — SUCTION TIP YANKAUER STR K87

## (undated) DEVICE — DRSG DRAIN 2X2" 7087

## (undated) DEVICE — DEVICE CATH STABILIZATION STATLOCK FOLEY 3-WAY FOL0105

## (undated) DEVICE — PREP POVIDONE IODINE SOLUTION 10% 4OZ

## (undated) DEVICE — NDL ANGIOCATH 14GA 5.25" 382269

## (undated) DEVICE — PREP POVIDONE IODINE SCRUB 7.5% 4OZ APL82212

## (undated) DEVICE — SYR 01ML 27GA 0.5" NDL TBC 309623

## (undated) DEVICE — ESU ELEC BLADE 6" COATED E1450-6

## (undated) DEVICE — DRSG STERI STRIP 1/2X4" R1547

## (undated) DEVICE — SYR 30ML LL W/O NDL 302832

## (undated) DEVICE — DRAIN JACKSON PRATT RESERVOIR 400ML SU130-1000

## (undated) DEVICE — CATH FOLEY 3WAY 18FR 30ML SIL 73018SI

## (undated) DEVICE — DRAPE TIBURON CARDIOVASCULAR PERI-GROIN LF 9154

## (undated) DEVICE — ESU GROUND PAD THERMOGUARD PLUS ABC PEDS 7-382

## (undated) DEVICE — DRAPE MAYO STAND 23X54 8337

## (undated) DEVICE — COVER ULTRASOUND PROBE W/GEL FLEXI-FEEL 6"X58" LF  25-FF658

## (undated) DEVICE — ESU HANDPIECE ABC BEND-A-BEAM 6" 134006

## (undated) DEVICE — SU PDS II 4-0 SH 27" Z315H

## (undated) DEVICE — CATH TRAY FOLEY SURESTEP 16FR W/URNE MTR STLK LATEX A303316A

## (undated) DEVICE — CUP AND LID 2PK 2OZ STERILE  SSK9006A

## (undated) DEVICE — DRAIN PENROSE 3/8X18" LATEX 0918020

## (undated) DEVICE — DRAIN JACKSON PRATT RESERVOIR 100ML SU130-1305

## (undated) RX ORDER — FENTANYL CITRATE-0.9 % NACL/PF 10 MCG/ML
PLASTIC BAG, INJECTION (ML) INTRAVENOUS
Status: DISPENSED
Start: 2021-11-04

## (undated) RX ORDER — CEFUROXIME SODIUM 1.5 G/16ML
INJECTION, POWDER, FOR SOLUTION INTRAVENOUS
Status: DISPENSED
Start: 2021-11-04

## (undated) RX ORDER — CEFAZOLIN SODIUM 1 G/3ML
INJECTION, POWDER, FOR SOLUTION INTRAMUSCULAR; INTRAVENOUS
Status: DISPENSED
Start: 2021-11-04

## (undated) RX ORDER — HEPARIN SODIUM 1000 [USP'U]/ML
INJECTION, SOLUTION INTRAVENOUS; SUBCUTANEOUS
Status: DISPENSED
Start: 2021-10-23

## (undated) RX ORDER — HEPARIN SODIUM 1000 [USP'U]/ML
INJECTION, SOLUTION INTRAVENOUS; SUBCUTANEOUS
Status: DISPENSED
Start: 2021-11-04

## (undated) RX ORDER — LIDOCAINE HYDROCHLORIDE 20 MG/ML
INJECTION, SOLUTION EPIDURAL; INFILTRATION; INTRACAUDAL; PERINEURAL
Status: DISPENSED
Start: 2021-10-27

## (undated) RX ORDER — HEPARIN SODIUM 1000 [USP'U]/ML
INJECTION, SOLUTION INTRAVENOUS; SUBCUTANEOUS
Status: DISPENSED
Start: 2022-12-04

## (undated) RX ORDER — FENTANYL CITRATE 50 UG/ML
INJECTION, SOLUTION INTRAMUSCULAR; INTRAVENOUS
Status: DISPENSED
Start: 2022-12-04

## (undated) RX ORDER — EPHEDRINE SULFATE 50 MG/ML
INJECTION, SOLUTION INTRAMUSCULAR; INTRAVENOUS; SUBCUTANEOUS
Status: DISPENSED
Start: 2021-11-04

## (undated) RX ORDER — CALCIUM CHLORIDE 100 MG/ML
INJECTION INTRAVENOUS; INTRAVENTRICULAR
Status: DISPENSED
Start: 2021-11-04

## (undated) RX ORDER — REGADENOSON 0.08 MG/ML
INJECTION, SOLUTION INTRAVENOUS
Status: DISPENSED
Start: 2019-10-28

## (undated) RX ORDER — VERAPAMIL HYDROCHLORIDE 2.5 MG/ML
INJECTION, SOLUTION INTRAVENOUS
Status: DISPENSED
Start: 2022-12-04

## (undated) RX ORDER — ESMOLOL HYDROCHLORIDE 10 MG/ML
INJECTION INTRAVENOUS
Status: DISPENSED
Start: 2021-11-04

## (undated) RX ORDER — CEFAZOLIN SODIUM 1 G/3ML
INJECTION, POWDER, FOR SOLUTION INTRAMUSCULAR; INTRAVENOUS
Status: DISPENSED
Start: 2021-10-23

## (undated) RX ORDER — CARDIOPLEG/ORGAN PRESERV NO.1 9-198-2-1
BOTTLE PERFUSION
Status: DISPENSED
Start: 2021-10-27

## (undated) RX ORDER — DEXTROSE, SODIUM CHLORIDE, SODIUM LACTATE, POTASSIUM CHLORIDE, AND CALCIUM CHLORIDE 5; .6; .31; .03; .02 G/100ML; G/100ML; G/100ML; G/100ML; G/100ML
INJECTION, SOLUTION INTRAVENOUS
Status: DISPENSED
Start: 2021-10-23

## (undated) RX ORDER — FENTANYL CITRATE 50 UG/ML
INJECTION, SOLUTION INTRAMUSCULAR; INTRAVENOUS
Status: DISPENSED
Start: 2021-10-27

## (undated) RX ORDER — ONDANSETRON 2 MG/ML
INJECTION INTRAMUSCULAR; INTRAVENOUS
Status: DISPENSED
Start: 2021-11-04

## (undated) RX ORDER — ALBUMIN, HUMAN INJ 5% 5 %
SOLUTION INTRAVENOUS
Status: DISPENSED
Start: 2021-10-27

## (undated) RX ORDER — DEXTROSE, SODIUM CHLORIDE, SODIUM LACTATE, POTASSIUM CHLORIDE, AND CALCIUM CHLORIDE 5; .6; .31; .03; .02 G/100ML; G/100ML; G/100ML; G/100ML; G/100ML
INJECTION, SOLUTION INTRAVENOUS
Status: DISPENSED
Start: 2021-10-27

## (undated) RX ORDER — FENTANYL CITRATE 50 UG/ML
INJECTION, SOLUTION INTRAMUSCULAR; INTRAVENOUS
Status: DISPENSED
Start: 2021-10-23

## (undated) RX ORDER — DEXAMETHASONE SODIUM PHOSPHATE 4 MG/ML
INJECTION, SOLUTION INTRA-ARTICULAR; INTRALESIONAL; INTRAMUSCULAR; INTRAVENOUS; SOFT TISSUE
Status: DISPENSED
Start: 2021-11-04

## (undated) RX ORDER — ONDANSETRON 2 MG/ML
INJECTION INTRAMUSCULAR; INTRAVENOUS
Status: DISPENSED
Start: 2022-12-04

## (undated) RX ORDER — PAPAVERINE HYDROCHLORIDE 30 MG/ML
INJECTION INTRAMUSCULAR; INTRAVENOUS
Status: DISPENSED
Start: 2022-12-04

## (undated) RX ORDER — PROPOFOL 10 MG/ML
INJECTION, EMULSION INTRAVENOUS
Status: DISPENSED
Start: 2021-10-27

## (undated) RX ORDER — REGADENOSON 0.08 MG/ML
INJECTION, SOLUTION INTRAVENOUS
Status: DISPENSED
Start: 2022-09-01

## (undated) RX ORDER — ONDANSETRON 2 MG/ML
INJECTION INTRAMUSCULAR; INTRAVENOUS
Status: DISPENSED
Start: 2021-10-27

## (undated) RX ORDER — MANNITOL 20 G/100ML
INJECTION, SOLUTION INTRAVENOUS
Status: DISPENSED
Start: 2021-10-27

## (undated) RX ORDER — FENTANYL CITRATE 50 UG/ML
INJECTION, SOLUTION INTRAMUSCULAR; INTRAVENOUS
Status: DISPENSED
Start: 2021-11-04

## (undated) RX ORDER — REGADENOSON 0.08 MG/ML
INJECTION, SOLUTION INTRAVENOUS
Status: DISPENSED
Start: 2017-11-27

## (undated) RX ORDER — CEFUROXIME SODIUM 1.5 G/16ML
INJECTION, POWDER, FOR SOLUTION INTRAVENOUS
Status: DISPENSED
Start: 2022-12-04

## (undated) RX ORDER — REGADENOSON 0.08 MG/ML
INJECTION, SOLUTION INTRAVENOUS
Status: DISPENSED
Start: 2020-12-14

## (undated) RX ORDER — FUROSEMIDE 10 MG/ML
INJECTION INTRAMUSCULAR; INTRAVENOUS
Status: DISPENSED
Start: 2021-10-27

## (undated) RX ORDER — PROPOFOL 10 MG/ML
INJECTION, EMULSION INTRAVENOUS
Status: DISPENSED
Start: 2021-11-04

## (undated) RX ORDER — LIDOCAINE HYDROCHLORIDE 20 MG/ML
INJECTION, SOLUTION EPIDURAL; INFILTRATION; INTRACAUDAL; PERINEURAL
Status: DISPENSED
Start: 2021-11-04

## (undated) RX ORDER — VERAPAMIL HYDROCHLORIDE 2.5 MG/ML
INJECTION, SOLUTION INTRAVENOUS
Status: DISPENSED
Start: 2021-10-23

## (undated) RX ORDER — INDOCYANINE GREEN AND WATER 25 MG
KIT INJECTION
Status: DISPENSED
Start: 2021-11-04

## (undated) RX ORDER — GLYCOPYRROLATE 0.2 MG/ML
INJECTION, SOLUTION INTRAMUSCULAR; INTRAVENOUS
Status: DISPENSED
Start: 2021-11-04

## (undated) RX ORDER — HYDROMORPHONE HYDROCHLORIDE 1 MG/ML
INJECTION, SOLUTION INTRAMUSCULAR; INTRAVENOUS; SUBCUTANEOUS
Status: DISPENSED
Start: 2021-10-23

## (undated) RX ORDER — ACETAMINOPHEN 325 MG/1
TABLET ORAL
Status: DISPENSED
Start: 2021-10-23

## (undated) RX ORDER — ETOMIDATE 2 MG/ML
INJECTION INTRAVENOUS
Status: DISPENSED
Start: 2021-11-04

## (undated) RX ORDER — DIPHENHYDRAMINE HYDROCHLORIDE 50 MG/ML
INJECTION INTRAMUSCULAR; INTRAVENOUS
Status: DISPENSED
Start: 2022-12-04

## (undated) RX ORDER — ROCURONIUM BROMIDE 50 MG/5 ML
SYRINGE (ML) INTRAVENOUS
Status: DISPENSED
Start: 2021-10-27

## (undated) RX ORDER — EPHEDRINE SULFATE 50 MG/ML
INJECTION, SOLUTION INTRAMUSCULAR; INTRAVENOUS; SUBCUTANEOUS
Status: DISPENSED
Start: 2021-10-27

## (undated) RX ORDER — SODIUM CHLORIDE 9 MG/ML
INJECTION, SOLUTION INTRAVENOUS
Status: DISPENSED
Start: 2021-10-23

## (undated) RX ORDER — SODIUM CHLORIDE 9 MG/ML
INJECTION, SOLUTION INTRAVENOUS
Status: DISPENSED
Start: 2021-10-27

## (undated) RX ORDER — ROCURONIUM BROMIDE 50 MG/5 ML
SYRINGE (ML) INTRAVENOUS
Status: DISPENSED
Start: 2021-11-04

## (undated) RX ORDER — HEPARIN SODIUM 1000 [USP'U]/ML
INJECTION, SOLUTION INTRAVENOUS; SUBCUTANEOUS
Status: DISPENSED
Start: 2021-10-27

## (undated) RX ORDER — CEFAZOLIN SODIUM 1 G/3ML
INJECTION, POWDER, FOR SOLUTION INTRAMUSCULAR; INTRAVENOUS
Status: DISPENSED
Start: 2021-10-27

## (undated) RX ORDER — SODIUM CHLORIDE 450 MG/100ML
INJECTION, SOLUTION INTRAVENOUS
Status: DISPENSED
Start: 2021-10-23

## (undated) RX ORDER — HYDROMORPHONE HCL IN WATER/PF 6 MG/30 ML
PATIENT CONTROLLED ANALGESIA SYRINGE INTRAVENOUS
Status: DISPENSED
Start: 2022-12-04